# Patient Record
Sex: FEMALE | Race: WHITE | NOT HISPANIC OR LATINO | Employment: PART TIME | URBAN - NONMETROPOLITAN AREA
[De-identification: names, ages, dates, MRNs, and addresses within clinical notes are randomized per-mention and may not be internally consistent; named-entity substitution may affect disease eponyms.]

---

## 2017-01-03 ENCOUNTER — OFFICE VISIT (OUTPATIENT)
Dept: OBGYN | Facility: OTHER | Age: 27
End: 2017-01-03
Attending: NURSE PRACTITIONER
Payer: COMMERCIAL

## 2017-01-03 VITALS
DIASTOLIC BLOOD PRESSURE: 80 MMHG | WEIGHT: 145 LBS | HEIGHT: 64 IN | RESPIRATION RATE: 15 BRPM | HEART RATE: 90 BPM | SYSTOLIC BLOOD PRESSURE: 122 MMHG | OXYGEN SATURATION: 100 % | BODY MASS INDEX: 24.75 KG/M2

## 2017-01-03 DIAGNOSIS — B96.89 BV (BACTERIAL VAGINOSIS): ICD-10-CM

## 2017-01-03 DIAGNOSIS — N76.0 BV (BACTERIAL VAGINOSIS): ICD-10-CM

## 2017-01-03 DIAGNOSIS — R10.84 ABDOMINAL PAIN, GENERALIZED: Primary | ICD-10-CM

## 2017-01-03 LAB
ALBUMIN UR-MCNC: NEGATIVE MG/DL
APPEARANCE UR: CLEAR
BILIRUB UR QL STRIP: NEGATIVE
COLOR UR AUTO: YELLOW
GLUCOSE UR STRIP-MCNC: NEGATIVE MG/DL
HGB UR QL STRIP: NEGATIVE
KETONES UR STRIP-MCNC: NEGATIVE MG/DL
LEUKOCYTE ESTERASE UR QL STRIP: NEGATIVE
MICRO REPORT STATUS: ABNORMAL
NITRATE UR QL: NEGATIVE
PH UR STRIP: 6 PH (ref 4.7–8)
SP GR UR STRIP: 1.01 (ref 1–1.03)
SPECIMEN SOURCE: ABNORMAL
URN SPEC COLLECT METH UR: NORMAL
UROBILINOGEN UR STRIP-MCNC: NORMAL MG/DL (ref 0–2)
WET PREP SPEC: ABNORMAL

## 2017-01-03 PROCEDURE — 87210 SMEAR WET MOUNT SALINE/INK: CPT | Performed by: NURSE PRACTITIONER

## 2017-01-03 PROCEDURE — 81003 URINALYSIS AUTO W/O SCOPE: CPT | Performed by: NURSE PRACTITIONER

## 2017-01-03 PROCEDURE — 99213 OFFICE O/P EST LOW 20 MIN: CPT | Performed by: NURSE PRACTITIONER

## 2017-01-03 RX ORDER — CLINDAMYCIN PHOSPHATE 20 MG/G
1 CREAM VAGINAL AT BEDTIME
Qty: 40 G | Refills: 0 | Status: SHIPPED | OUTPATIENT
Start: 2017-01-03 | End: 2017-01-06

## 2017-01-03 ASSESSMENT — PAIN SCALES - GENERAL: PAINLEVEL: EXTREME PAIN (8)

## 2017-01-03 NOTE — PROGRESS NOTES
"[facility]                HPI   Ihsan presents with 10-14 day h/o generalized pelvic pain and nausea with vomiting during the most severe pain.   States \"it's just like the last time my endometriosis flared up\".  Hx of vaginal hysterectomy with bilateral salpingectomy and right oophorectomy.  She feels her IBS has improved.  She was treated 3-4 weeks ago for BV but did not have the pain at that time.  She denies vaginal discharge or dysuria. She states,\" I just want you to take out my other ovary\".              Medications:     Current Outpatient Prescriptions Ordered in Epic   Medication     ibuprofen (ADVIL/MOTRIN) 800 MG tablet     OMEPRAZOLE PO     albuterol (PROAIR HFA, PROVENTIL HFA, VENTOLIN HFA) 108 (90 BASE) MCG/ACT inhaler     No current Epic-ordered facility-administered medications on file.                Allergies:   Codeine sulfate; Penicillins; and Tramadol         Review of Systems:   The 5 point Review of Systems is negative other than noted in the HPI                     Physical Exam:   Blood pressure 122/80, pulse 90, resp. rate 15, height 5' 4\" (1.626 m), weight 145 lb (65.772 kg), SpO2 100 %, not currently breastfeeding.  Constitutional:   alert, cooperative and mild distress     Abdomen:   soft, non-distended and tenderness noted in the suprapubic region, in the right lower quadrant and in the left lower quadrant     Genitounirinary:   External Genitalia:  General appearance; normal, Lesions absent  Vagina:  Lesions absent, scant white discharge  Adenexa:  Abnormal findings: tenderness right and left without masses.     Wet prep and UA pending.             Assessment and Plan:   Pelvic pain - wet prep, UA today.  Pelvic US ordered.  Scheduled with Dr. Clifford to discuss her request for left oophorectomy and possible recurrent endometriosis.      RADHA Ko  1/3/2017  10:42 AM  "

## 2017-01-03 NOTE — NURSING NOTE
"Chief Complaint   Patient presents with     Abdominal Pain       Initial /80 mmHg  Pulse 90  Resp 15  Ht 5' 4\" (1.626 m)  Wt 145 lb (65.772 kg)  BMI 24.88 kg/m2  SpO2 100%  LMP  (LMP Unknown) Estimated body mass index is 24.88 kg/(m^2) as calculated from the following:    Height as of this encounter: 5' 4\" (1.626 m).    Weight as of this encounter: 145 lb (65.772 kg).  BP completed using cuff size: jagdish Pastrana      "

## 2017-01-05 ENCOUNTER — HOSPITAL ENCOUNTER (EMERGENCY)
Facility: HOSPITAL | Age: 27
Discharge: HOME | End: 2017-01-05
Payer: COMMERCIAL

## 2017-01-05 ENCOUNTER — HOSPITAL ENCOUNTER (OUTPATIENT)
Dept: ULTRASOUND IMAGING | Facility: HOSPITAL | Age: 27
Discharge: HOME | End: 2017-01-05
Attending: NURSE PRACTITIONER | Admitting: NURSE PRACTITIONER
Payer: COMMERCIAL

## 2017-01-05 VITALS
OXYGEN SATURATION: 99 % | TEMPERATURE: 97.6 F | DIASTOLIC BLOOD PRESSURE: 76 MMHG | RESPIRATION RATE: 18 BRPM | SYSTOLIC BLOOD PRESSURE: 110 MMHG

## 2017-01-05 DIAGNOSIS — H60.00 FURUNCLE OF EAR: ICD-10-CM

## 2017-01-05 PROCEDURE — 76830 TRANSVAGINAL US NON-OB: CPT | Mod: TC

## 2017-01-05 PROCEDURE — 76856 US EXAM PELVIC COMPLETE: CPT | Mod: TC

## 2017-01-05 PROCEDURE — 10021 FNA BX W/O IMG GDN 1ST LES: CPT

## 2017-01-05 PROCEDURE — 40000268 ZZH STATISTIC NO CHARGES

## 2017-01-05 NOTE — ED AVS SNAPSHOT
[facility]    [address] [address]    Phone:  [phone]                                       Ihsan Millan   MRN: [id number]    Department:  [facility]   Date of Visit:  1/5/2017           Patient Information     Date Of Birth          1990        Your diagnoses for this visit were:     Furuncle of ear        You were seen by Aga Ge APRN FNP.      Follow-up Information     Follow up with Temo Houston DO In 1 week.    Specialty:  Family Practice    Why:  if not improving or back to baseline    Contact information:    [facility]  [address]  [facility] [address] [phone]          Follow up with [facility].    Specialty:  EMERGENCY MEDICINE    Why:  As needed, If symptoms worsen    Contact information:    [address] [address] [phone]    Additional information:    From [place]: Take [address]. Turn left at [address]. Turn left at the first stoplight on [address]. At the first stop sign, take a right onto [address]. Take a left into the parking lot and continue through until you reach the North enterance of the building.       From [place]: Take [address]. Take the [address] ramp towards [place]. Turn left onto [address]. Take a slight right onto [address]. Turn left at the first stoplight on [address]. At the first stop sign, take a right onto [address]. Take a left into the parking lot and continue through until you reach the North enterance of the building.       From [place]: Take [address]. Take a right at [address]. At the first stop sign, take a right onto [address]. Take a left into the parking lot and continue through until you reach the North enterance of the building.         Discharge Instructions       See attached for home care  Keep area clean and  dry  Ibuprofen for pain  F/U with PCP if not improving or back to base line in 1 week  Return to  with worsening symptoms.           Abscess (Incision & Drainage)  An abscess (sometimes called a  boil ) occurs when bacteria get trapped under the skin and begin to grow. Pus forms inside the abscess as the body responds to the bacteria. An abscess can occur with an insect bite, ingrown hair, blocked oil gland, pimple, cyst, or puncture wound.  Treatment of your abscess has required an incision to drain the pus. If the abscess pocket was large, a gauze packing may have been inserted. This will need to be removed and possibly replaced on your next visit. Antibiotics are not required in the treatment of a simple abscess, unless the infection is spreading into the skin around the wound (known as  cellulitis ).  Healing of the wound will take about one to two weeks depending on the size of the abscess. Healthy tissue will grow from the bottom and sides of the opening until it seals over.  Home care  The following home care guidelines can help your wound heal:    The wound may drain for the first two days. Cover the wound with a clean dry dressing. If the dressing becomes soaked with blood or pus, change it.    If a gauze packing was placed inside the abscess cavity, you may be advised to remove it yourself. You may do this in the shower. Once the packing is removed, you should wash the area in the shower or bath 3 to 4 times a day, until the skin opening has closed. Make sure you wash your hands after changing the packing or cleaning the wound.    If you were prescribed antibiotics, take them as directed until they are all gone.    You may use acetaminophen (Tylenol) or ibuprofen (Motrin, Advil) to control pain, unless another pain medicine was prescribed. If you have liver disease or ever had a stomach ulcer, talk with your doctor before using these medicines.  Follow-up care  Follow up with your doctor as advised by  our staff. If a gauze packing was inserted in your wound, it should be removed in 1 to 2 days. Check your wound every day for the signs of worsening infection listed below.  When to seek medical care  Get prompt medical attention if any of the following occur:    Increasing redness or swelling    Red streaks in the skin leading away from the wound    Increasing local pain or swelling    Continued pus draining from the wound two days after treatment    Fever of 100.4 F (38 C) or higher, or as directed by your health care provider    Boil returns when you are at home.    1104-7580 [employer]. [address] [place] [address]. All rights reserved. This information is not intended as a substitute for professional medical care. Always follow your healthcare professional's instructions.          Future Appointments        Provider Department Dept Phone Center    1/10/2017 9:30 AM Rodolfo Clifford MD [facility] [phone] [place]         Review of your medicines      Our records show that you are taking the medicines listed below. If these are incorrect, please call your family doctor or clinic.        Dose / Directions Last dose taken    albuterol 108 (90 BASE) MCG/ACT Inhaler   Commonly known as:  PROAIR HFA/PROVENTIL HFA/VENTOLIN HFA   Dose:  2 puff   Quantity:  1 Inhaler        Inhale 2 puffs into the lungs every 4 hours as needed for shortness of breath / dyspnea or wheezing   Refills:  2        clindamycin 2 % cream   Commonly known as:  CLEOCIN   Dose:  1 applicator   Quantity:  40 g        Place 1 applicator vaginally At Bedtime for 3 days   Refills:  0        ibuprofen 800 MG tablet   Commonly known as:  ADVIL/MOTRIN   Dose:  800 mg        Take 1 tablet (800 mg) by mouth every 8 hours as needed   Refills:  0        OMEPRAZOLE PO   Dose:  20 mg        Take 20 mg by mouth every morning   Refills:  0                Procedures and tests performed during your visit      Incision and drainage      Orders Needing Specimen Collection     None      Pending Results     Date and Time Order Name Status Description    1/5/2017 1235 US Pelvic Complete with Transvaginal In process             Pending Culture Results     No orders found from 1/4/2017 to 1/6/2017.            Thank you for choosing [facility]       Thank you for choosing [facility] for your care. Our goal is always to provide you with excellent care. Hearing back from our patients is one way we can continue to improve our services. Please take a few minutes to complete the written survey that you may receive in the mail after you visit with us. Thank you!        [employer] Information     [employer] gives you secure access to your electronic health record. If you see a primary care provider, you can also send messages to your care team and make appointments. If you have questions, please call your primary care clinic.  If you do not have a primary care provider, please call [phone] and they will assist you.        Care EveryWhere ID     This is your Care EveryWhere ID. This could be used by other organizations to access your [facility] medical records  [id number]        After Visit Summary       This is your record. Keep this with you and show to your community pharmacist(s) and doctor(s) at your next visit.

## 2017-01-05 NOTE — ED AVS SNAPSHOT
[facility]    [address] [address]    Phone:  [phone]                                       Ihsan Millan   MRN: [id number]    Department:  [facility]   Date of Visit:  1/5/2017           After Visit Summary Signature Page     I have received my discharge instructions, and my questions have been answered. I have discussed any challenges I see with this plan with the nurse or doctor.    ..........................................................................................................................................  Patient/Patient Representative Signature      ..........................................................................................................................................  Patient Representative Print Name and Relationship to Patient    ..................................................               ................................................  Date                                            Time    ..........................................................................................................................................  Reviewed by Signature/Title    ...................................................              ..............................................  Date                                                            Time

## 2017-01-05 NOTE — ED PROVIDER NOTES
"  History     Chief Complaint   Patient presents with     Wound Check     c/o red bump near left ear that has been there for a month. states \"it won't pop, and it's really starting to hurt.\"      The history is provided by the patient. No  was used.     Ihsan Millan is a 26 year old female who presents to  with friend for evaluation of left pre auricular cyst. Noted \"bump\" about a month ago, became red and swollen over the past couple days. Now more painful. Did try to \"pop it\" today, took ibuprofen with mild improvement.     I have reviewed the Medications, Allergies, Past Medical and Surgical History, and Social History in the Epic system.    Review of Systems   Constitutional: Negative for fever.   HENT: Negative for congestion.    Eyes: Negative for redness.   Respiratory: Negative for shortness of breath.    Cardiovascular: Negative for chest pain.   Gastrointestinal: Negative for abdominal pain.   Genitourinary: Negative for difficulty urinating.   Musculoskeletal: Negative for arthralgias and neck stiffness.   Skin: Positive for wound. Negative for color change.        Pustule left pre auricle, see HPI   Neurological: Negative for headaches.   Psychiatric/Behavioral: Negative for confusion.       Physical Exam   BP: 110/76 mmHg  Heart Rate: 86  Temp: 97.6  F (36.4  C)  Resp: 18  SpO2: 99 %  Physical Exam   Constitutional: She is oriented to person, place, and time. No distress.   HENT:   Head: Normocephalic.   Right Ear: Tympanic membrane normal.   Left Ear: Tympanic membrane normal.   Ears:    1 cm area of erythema, induration, with pustule head as illustrated. Area cleansed with alcohol, anesthestized with lidocaine 1 % 1 ml, aspirated with 18 g needle, scant amt purulent drainage.     Eyes: Conjunctivae are normal. Pupils are equal, round, and reactive to light.   Cardiovascular: Normal rate and regular rhythm.    Pulmonary/Chest: Effort normal. No respiratory distress. "   Abdominal: Soft. She exhibits no distension.   Musculoskeletal: Normal range of motion.   Neurological: She is alert and oriented to person, place, and time.   Skin: Skin is warm and dry. She is not diaphoretic.   Nursing note and vitals reviewed.      ED Course   Incision + drainage  Date/Time: 1/5/2017 4:04 PM  Performed by: AGA GE  Authorized by: AGA GE  Consent: Verbal consent obtained.  Consent given by: patient  Patient identity confirmed: verbally with patient  Type: abscess  Body area: head  Location details: face  Local anesthetic: lidocaine 1% without epinephrine  Needle gauge: 18  Complexity: simple  Drainage: purulent  Drainage amount: scant  Wound treatment: wound left open  Patient tolerance: Patient tolerated the procedure well with no immediate complications              Assessments & Plan (with Medical Decision Making)   Pt presents with pustule, cyst left pre auricle region. I and D performed per procedure note. Pt tolerated well.   Epic discharge instructions given. Pt discharged in stable condition.     I have reviewed the nursing notes.    I have reviewed the findings, diagnosis, plan and need for follow up with the patient.    Discharge Medication List as of 1/5/2017  4:06 PM          Final diagnoses:   Furuncle of ear     See attached for home care  Keep area clean and dry  Ibuprofen for pain  F/U with PCP if not improving or back to base line in 1 week  Return to  with worsening symptoms.   1/5/2017   [facility]      Aga Ge APRN FNP  01/06/17 1527

## 2017-01-05 NOTE — ED NOTES
Pt presents with wound to left ear area x 1 month but popped out yesterday and started to hurt really bad.

## 2017-01-05 NOTE — DISCHARGE INSTRUCTIONS
See attached for home care  Keep area clean and dry  Ibuprofen for pain  F/U with PCP if not improving or back to base line in 1 week  Return to  with worsening symptoms.           Abscess (Incision & Drainage)  An abscess (sometimes called a  boil ) occurs when bacteria get trapped under the skin and begin to grow. Pus forms inside the abscess as the body responds to the bacteria. An abscess can occur with an insect bite, ingrown hair, blocked oil gland, pimple, cyst, or puncture wound.  Treatment of your abscess has required an incision to drain the pus. If the abscess pocket was large, a gauze packing may have been inserted. This will need to be removed and possibly replaced on your next visit. Antibiotics are not required in the treatment of a simple abscess, unless the infection is spreading into the skin around the wound (known as  cellulitis ).  Healing of the wound will take about one to two weeks depending on the size of the abscess. Healthy tissue will grow from the bottom and sides of the opening until it seals over.  Home care  The following home care guidelines can help your wound heal:    The wound may drain for the first two days. Cover the wound with a clean dry dressing. If the dressing becomes soaked with blood or pus, change it.    If a gauze packing was placed inside the abscess cavity, you may be advised to remove it yourself. You may do this in the shower. Once the packing is removed, you should wash the area in the shower or bath 3 to 4 times a day, until the skin opening has closed. Make sure you wash your hands after changing the packing or cleaning the wound.    If you were prescribed antibiotics, take them as directed until they are all gone.    You may use acetaminophen (Tylenol) or ibuprofen (Motrin, Advil) to control pain, unless another pain medicine was prescribed. If you have liver disease or ever had a stomach ulcer, talk with your doctor before using these medicines.  Follow-up  care  Follow up with your doctor as advised by our staff. If a gauze packing was inserted in your wound, it should be removed in 1 to 2 days. Check your wound every day for the signs of worsening infection listed below.  When to seek medical care  Get prompt medical attention if any of the following occur:    Increasing redness or swelling    Red streaks in the skin leading away from the wound    Increasing local pain or swelling    Continued pus draining from the wound two days after treatment    Fever of 100.4 F (38 C) or higher, or as directed by your health care provider    Boil returns when you are at home.    6854-4211 [employer]. [address] [place] [address]. All rights reserved. This information is not intended as a substitute for professional medical care. Always follow your healthcare professional's instructions.

## 2017-01-06 ASSESSMENT — ENCOUNTER SYMPTOMS
ARTHRALGIAS: 0
CONFUSION: 0
EYE REDNESS: 0
DIFFICULTY URINATING: 0
ABDOMINAL PAIN: 0
NECK STIFFNESS: 0
FEVER: 0
COLOR CHANGE: 0
WOUND: 1
SHORTNESS OF BREATH: 0
HEADACHES: 0

## 2017-01-10 ENCOUNTER — OFFICE VISIT (OUTPATIENT)
Dept: OBGYN | Facility: OTHER | Age: 27
End: 2017-01-10
Attending: OBSTETRICS & GYNECOLOGY
Payer: COMMERCIAL

## 2017-01-10 VITALS
SYSTOLIC BLOOD PRESSURE: 110 MMHG | DIASTOLIC BLOOD PRESSURE: 72 MMHG | BODY MASS INDEX: 24.75 KG/M2 | WEIGHT: 145 LBS | HEART RATE: 104 BPM | OXYGEN SATURATION: 100 % | HEIGHT: 64 IN

## 2017-01-10 DIAGNOSIS — R10.2 PELVIC PAIN IN FEMALE: ICD-10-CM

## 2017-01-10 DIAGNOSIS — N80.9 ENDOMETRIOSIS: Primary | ICD-10-CM

## 2017-01-10 PROCEDURE — 96372 THER/PROPH/DIAG INJ SC/IM: CPT | Performed by: OBSTETRICS & GYNECOLOGY

## 2017-01-10 PROCEDURE — 99214 OFFICE O/P EST MOD 30 MIN: CPT | Mod: 25 | Performed by: OBSTETRICS & GYNECOLOGY

## 2017-01-10 ASSESSMENT — PAIN SCALES - GENERAL: PAINLEVEL: SEVERE PAIN (7)

## 2017-01-10 NOTE — NURSING NOTE
"Chief Complaint   Patient presents with     Pelvic Pain     bilateral pelvic pain       Initial /72 mmHg  Pulse 104  Ht 5' 4\" (1.626 m)  Wt 145 lb (65.772 kg)  BMI 24.88 kg/m2  SpO2 100%  LMP  (LMP Unknown) Estimated body mass index is 24.88 kg/(m^2) as calculated from the following:    Height as of this encounter: 5' 4\" (1.626 m).    Weight as of this encounter: 145 lb (65.772 kg).  BP completed using cuff size: jagdish Flower      "

## 2017-01-10 NOTE — PROGRESS NOTES
S:  F/u pelvic pain  25 yo f with 3 wk h/o lower abdominal pain.  Associated Nausea.   No other GI/ sx except urinary frequency.  Was recently treated for BV without improvement.  UA negative.  H/o IBS, LAVH and RSO, endometriosis.   See my prior evals.          Patient Active Problem List   Diagnosis     GERD (gastroesophageal reflux disease)     Intermittent asthma     Moderate persistent asthma     H. pylori infection     ASCUS on Pap smear     Postpartum depression     Endometriosis     Surgery, elective     Status post laparoscopic assisted vaginal hysterectomy (LAVH)            Past Medical History   Diagnosis Date     Asthma      H. pylori infection      GERD (gastroesophageal reflux disease)             Past Surgical History   Procedure Laterality Date     No surgeries       Dilation and curettage, hysteroscopy diagnostic, combined  8/1/2014     Procedure: COMBINED DILATION AND CURETTAGE, HYSTEROSCOPY DIAGNOSTIC;  Surgeon: Rodolfo Clifford MD;  Location: [facility]     Esophagoscopy, gastroscopy, duodenoscopy (egd), combined N/A 9/5/2014     Procedure: COMBINED ESOPHAGOSCOPY, GASTROSCOPY, DUODENOSCOPY (EGD);  Surgeon: Zacarias Paz MD;  Location: [facility]     Colonoscopy N/A 11/6/2014     Procedure: COLONOSCOPY;  Surgeon: Zacarias Paz MD;  Location: [facility]     Laparoscopy diagnostic (gyn) N/A 3/18/2015     Procedure: LAPAROSCOPY DIAGNOSTIC (GYN);  Surgeon: Rodolfo Clifford MD;  Location: [facility]     Laparoscopic assisted hysterectomy vaginal N/A 6/24/2015     Procedure: LAPAROSCOPIC ASSISTED HYSTERECTOMY VAGINAL;  Surgeon: Rodolfo Clifford MD;  Location: [facility]     Salpingectomy Bilateral 6/24/2015     Procedure: SALPINGECTOMY;  Surgeon: Rodolfo Clifford MD;  Location: [facility]     Laparoscopic oophorectomy Right 3/23/2016     Procedure: LAPAROSCOPIC OOPHORECTOMY;  Surgeon: Rodolfo Clifford MD;  Location: [facility]            Social History   Substance Use Topics     Smoking status: Current Every Day Smoker -- 0.50 packs/day  "for 7 years     Types: Cigarettes     Smokeless tobacco: Never Used     Alcohol Use: Yes      Comment: social            Family History   Problem Relation Age of Onset     Asthma Mother      Unknown/Adopted Maternal Grandmother      Unknown/Adopted Maternal Grandfather                Allergies   Allergen Reactions     Codeine Sulfate Rash     Penicillins Rash     Tramadol Rash            Current Outpatient Prescriptions   Medication Sig Dispense Refill     leuprolide (LUPRON DEPOT) 11.25 MG kit Inject 11.25 mg into the muscle every 3 months 1 each 0     OMEPRAZOLE PO Take 20 mg by mouth every morning       ibuprofen (ADVIL/MOTRIN) 800 MG tablet Take 1 tablet (800 mg) by mouth every 8 hours as needed  0     albuterol (PROAIR HFA, PROVENTIL HFA, VENTOLIN HFA) 108 (90 BASE) MCG/ACT inhaler Inhale 2 puffs into the lungs every 4 hours as needed for shortness of breath / dyspnea or wheezing 1 Inhaler 2          Review Of Systems  Constitutional:  Denies fever  GI/ negative except as noted per hpi    O:   /72 mmHg  Pulse 104  Ht 5' 4\" (1.626 m)  Wt 145 lb (65.772 kg)  BMI 24.88 kg/m2  SpO2 100%  LMP  (LMP Unknown)  Gen:  NAD, A and O  Vitals: /72 mmHg  Pulse 104  Ht 5' 4\" (1.626 m)  Wt 145 lb (65.772 kg)  BMI 24.88 kg/m2  SpO2 100%  LMP  (LMP Unknown)  BMI= Body mass index is 24.88 kg/(m^2).  Abd soft, + ttp LLQ over sigmoid, ND    Vulva: No lesions, erythema, BUS wnl  Vagina: Pink, moist mucosa with rugae,no lesions  Cervix: absent.  Bladder mild ttp  Uterus: Midposition, normal size and count our, mobile, NT  Adnexa: right  Non-palpable, Left:  NT, no masses  Anus without lesions  Ext.  neg           A:Pelvic pain.  Chronic.  H/o IBS and endometriosis    P:  Depo-Lupron r/b/se's discussed.  This would treat endometriosis/GYN pelvic pain.  If no improvement consider further GI/ w/u.  Pt exam more consistent with IBS.  If improves on Lupron then more likely gyn/endometriosis pain and could " consider continuing or surgical options.  Pt agrees with POC.  Norethindrone rx for menopausal SE's and bone protection.  F/u prn problems in the interim.  25  minutes were spent with the patient with greater than 50% of the visit spent in face-to-face counseling and coordination of care. Depo-Lupron 11.25mg given to pt.

## 2017-01-10 NOTE — PROGRESS NOTES
The following medication was given:     MEDICATION: Lupron Depot 11.25 mg  ROUTE: IM  SITE: [facility]  DOSE: 11.25  LOT #: [id number]  :  [employer]  EXPIRATION DATE:  11/25/18  NDC#: 8953-2812-44  CANDIDA PEREZ

## 2017-02-10 ENCOUNTER — HOSPITAL ENCOUNTER (EMERGENCY)
Facility: HOSPITAL | Age: 27
Discharge: HOME | End: 2017-02-10
Attending: FAMILY MEDICINE | Admitting: FAMILY MEDICINE
Payer: MEDICAID

## 2017-02-10 VITALS
HEART RATE: 89 BPM | TEMPERATURE: 98.2 F | SYSTOLIC BLOOD PRESSURE: 110 MMHG | OXYGEN SATURATION: 99 % | DIASTOLIC BLOOD PRESSURE: 70 MMHG | RESPIRATION RATE: 16 BRPM

## 2017-02-10 DIAGNOSIS — J18.9 PNEUMONIA OF RIGHT MIDDLE LOBE DUE TO INFECTIOUS ORGANISM: ICD-10-CM

## 2017-02-10 DIAGNOSIS — K29.00 OTHER ACUTE GASTRITIS: ICD-10-CM

## 2017-02-10 LAB
ALBUMIN SERPL-MCNC: 4 G/DL (ref 3.4–5)
ALBUMIN UR-MCNC: NEGATIVE MG/DL
ALP SERPL-CCNC: 73 U/L (ref 40–150)
ALT SERPL W P-5'-P-CCNC: 15 U/L (ref 0–50)
ANION GAP SERPL CALCULATED.3IONS-SCNC: 10 MMOL/L (ref 3–14)
APPEARANCE UR: CLEAR
AST SERPL W P-5'-P-CCNC: 6 U/L (ref 0–45)
BACTERIA #/AREA URNS HPF: ABNORMAL /HPF
BASOPHILS # BLD AUTO: 0 10E9/L (ref 0–0.2)
BASOPHILS NFR BLD AUTO: 0.1 %
BILIRUB SERPL-MCNC: 0.1 MG/DL (ref 0.2–1.3)
BILIRUB UR QL STRIP: NEGATIVE
BUN SERPL-MCNC: 21 MG/DL (ref 7–30)
CALCIUM SERPL-MCNC: 8.6 MG/DL (ref 8.5–10.1)
CHLORIDE SERPL-SCNC: 108 MMOL/L (ref 94–109)
CO2 SERPL-SCNC: 25 MMOL/L (ref 20–32)
COLOR UR AUTO: ABNORMAL
CREAT SERPL-MCNC: 0.84 MG/DL (ref 0.52–1.04)
CRP SERPL-MCNC: <2.9 MG/L (ref 0–8)
DIFFERENTIAL METHOD BLD: NORMAL
EOSINOPHIL # BLD AUTO: 0.2 10E9/L (ref 0–0.7)
EOSINOPHIL NFR BLD AUTO: 2.2 %
ERYTHROCYTE [DISTWIDTH] IN BLOOD BY AUTOMATED COUNT: 12.8 % (ref 10–15)
GFR SERPL CREATININE-BSD FRML MDRD: 82 ML/MIN/1.7M2
GLUCOSE SERPL-MCNC: 89 MG/DL (ref 70–99)
GLUCOSE UR STRIP-MCNC: NEGATIVE MG/DL
HCT VFR BLD AUTO: 41.1 % (ref 35–47)
HGB BLD-MCNC: 14 G/DL (ref 11.7–15.7)
HGB UR QL STRIP: NEGATIVE
IMM GRANULOCYTES # BLD: 0 10E9/L (ref 0–0.4)
IMM GRANULOCYTES NFR BLD: 0.3 %
KETONES UR STRIP-MCNC: NEGATIVE MG/DL
LACTATE SERPL-SCNC: 0.9 MMOL/L (ref 0.4–2)
LEUKOCYTE ESTERASE UR QL STRIP: ABNORMAL
LYMPHOCYTES # BLD AUTO: 1.6 10E9/L (ref 0.8–5.3)
LYMPHOCYTES NFR BLD AUTO: 22.7 %
MCH RBC QN AUTO: 30.2 PG (ref 26.5–33)
MCHC RBC AUTO-ENTMCNC: 34.1 G/DL (ref 31.5–36.5)
MCV RBC AUTO: 89 FL (ref 78–100)
MONOCYTES # BLD AUTO: 0.4 10E9/L (ref 0–1.3)
MONOCYTES NFR BLD AUTO: 6.1 %
MUCOUS THREADS #/AREA URNS LPF: PRESENT /LPF
NEUTROPHILS # BLD AUTO: 4.7 10E9/L (ref 1.6–8.3)
NEUTROPHILS NFR BLD AUTO: 68.6 %
NITRATE UR QL: NEGATIVE
NRBC # BLD AUTO: 0 10*3/UL
NRBC BLD AUTO-RTO: 0 /100
PH UR STRIP: 6.5 PH (ref 4.7–8)
PLATELET # BLD AUTO: 194 10E9/L (ref 150–450)
POTASSIUM SERPL-SCNC: 3.7 MMOL/L (ref 3.4–5.3)
PROT SERPL-MCNC: 7.2 G/DL (ref 6.8–8.8)
RBC # BLD AUTO: 4.63 10E12/L (ref 3.8–5.2)
RBC #/AREA URNS AUTO: 1 /HPF (ref 0–2)
SODIUM SERPL-SCNC: 143 MMOL/L (ref 133–144)
SP GR UR STRIP: 1.01 (ref 1–1.03)
SQUAMOUS #/AREA URNS AUTO: 3 /HPF (ref 0–1)
URN SPEC COLLECT METH UR: ABNORMAL
UROBILINOGEN UR STRIP-MCNC: NORMAL MG/DL (ref 0–2)
WBC # BLD AUTO: 6.9 10E9/L (ref 4–11)
WBC #/AREA URNS AUTO: 2 /HPF (ref 0–2)

## 2017-02-10 PROCEDURE — 99285 EMERGENCY DEPT VISIT HI MDM: CPT | Mod: 25

## 2017-02-10 PROCEDURE — 99285 EMERGENCY DEPT VISIT HI MDM: CPT | Performed by: FAMILY MEDICINE

## 2017-02-10 PROCEDURE — 81001 URINALYSIS AUTO W/SCOPE: CPT | Performed by: FAMILY MEDICINE

## 2017-02-10 PROCEDURE — 71020 ZZHC CHEST TWO VIEWS, FRONT/LAT: CPT | Mod: TC

## 2017-02-10 PROCEDURE — 25000125 ZZHC RX 250: Performed by: INTERNAL MEDICINE

## 2017-02-10 PROCEDURE — 96361 HYDRATE IV INFUSION ADD-ON: CPT

## 2017-02-10 PROCEDURE — 96374 THER/PROPH/DIAG INJ IV PUSH: CPT | Mod: 59

## 2017-02-10 PROCEDURE — 25000132 ZZH RX MED GY IP 250 OP 250 PS 637: Performed by: INTERNAL MEDICINE

## 2017-02-10 PROCEDURE — 36415 COLL VENOUS BLD VENIPUNCTURE: CPT | Performed by: FAMILY MEDICINE

## 2017-02-10 PROCEDURE — 80053 COMPREHEN METABOLIC PANEL: CPT | Performed by: FAMILY MEDICINE

## 2017-02-10 PROCEDURE — S0028 INJECTION, FAMOTIDINE, 20 MG: HCPCS | Performed by: INTERNAL MEDICINE

## 2017-02-10 PROCEDURE — 74177 CT ABD & PELVIS W/CONTRAST: CPT | Mod: TC

## 2017-02-10 PROCEDURE — 76705 ECHO EXAM OF ABDOMEN: CPT | Mod: TC

## 2017-02-10 PROCEDURE — 96376 TX/PRO/DX INJ SAME DRUG ADON: CPT

## 2017-02-10 PROCEDURE — 87040 BLOOD CULTURE FOR BACTERIA: CPT | Performed by: INTERNAL MEDICINE

## 2017-02-10 PROCEDURE — 85025 COMPLETE CBC W/AUTO DIFF WBC: CPT | Performed by: FAMILY MEDICINE

## 2017-02-10 PROCEDURE — 96375 TX/PRO/DX INJ NEW DRUG ADDON: CPT

## 2017-02-10 PROCEDURE — 83605 ASSAY OF LACTIC ACID: CPT | Performed by: INTERNAL MEDICINE

## 2017-02-10 PROCEDURE — 25000128 H RX IP 250 OP 636: Performed by: FAMILY MEDICINE

## 2017-02-10 PROCEDURE — 25000128 H RX IP 250 OP 636: Performed by: INTERNAL MEDICINE

## 2017-02-10 PROCEDURE — 86140 C-REACTIVE PROTEIN: CPT | Performed by: FAMILY MEDICINE

## 2017-02-10 RX ORDER — MORPHINE SULFATE 2 MG/ML
2 INJECTION, SOLUTION INTRAMUSCULAR; INTRAVENOUS ONCE
Status: COMPLETED | OUTPATIENT
Start: 2017-02-10 | End: 2017-02-10

## 2017-02-10 RX ORDER — DIPHENHYDRAMINE HYDROCHLORIDE 50 MG/ML
25 INJECTION INTRAMUSCULAR; INTRAVENOUS ONCE
Status: COMPLETED | OUTPATIENT
Start: 2017-02-10 | End: 2017-02-10

## 2017-02-10 RX ORDER — ALUMINA, MAGNESIA, AND SIMETHICONE 2400; 2400; 240 MG/30ML; MG/30ML; MG/30ML
15 SUSPENSION ORAL ONCE
Status: DISCONTINUED | OUTPATIENT
Start: 2017-02-10 | End: 2017-02-11 | Stop reason: HOSPADM

## 2017-02-10 RX ORDER — LEVOFLOXACIN 750 MG/1
750 TABLET, FILM COATED ORAL ONCE
Status: COMPLETED | OUTPATIENT
Start: 2017-02-10 | End: 2017-02-10

## 2017-02-10 RX ORDER — SODIUM CHLORIDE 9 MG/ML
1000 INJECTION, SOLUTION INTRAVENOUS CONTINUOUS
Status: DISCONTINUED | OUTPATIENT
Start: 2017-02-10 | End: 2017-02-11 | Stop reason: HOSPADM

## 2017-02-10 RX ORDER — MORPHINE SULFATE 2 MG/ML
4 INJECTION, SOLUTION INTRAMUSCULAR; INTRAVENOUS ONCE
Status: COMPLETED | OUTPATIENT
Start: 2017-02-10 | End: 2017-02-10

## 2017-02-10 RX ORDER — ONDANSETRON 2 MG/ML
4 INJECTION INTRAMUSCULAR; INTRAVENOUS
Status: COMPLETED | OUTPATIENT
Start: 2017-02-10 | End: 2017-02-10

## 2017-02-10 RX ORDER — LEVOFLOXACIN 750 MG/1
750 TABLET, FILM COATED ORAL DAILY
Qty: 5 TABLET | Refills: 0 | Status: SHIPPED | OUTPATIENT
Start: 2017-02-10 | End: 2017-02-15

## 2017-02-10 RX ORDER — IOPAMIDOL 612 MG/ML
100 INJECTION, SOLUTION INTRAVASCULAR ONCE
Status: COMPLETED | OUTPATIENT
Start: 2017-02-10 | End: 2017-02-10

## 2017-02-10 RX ORDER — FAMOTIDINE 20 MG/1
20 TABLET, FILM COATED ORAL 2 TIMES DAILY
Qty: 20 TABLET | Refills: 0 | Status: SHIPPED | OUTPATIENT
Start: 2017-02-10 | End: 2017-02-11

## 2017-02-10 RX ADMIN — MORPHINE SULFATE 2 MG: 2 INJECTION, SOLUTION INTRAMUSCULAR; INTRAVENOUS at 19:11

## 2017-02-10 RX ADMIN — SODIUM CHLORIDE 1000 ML: 9 INJECTION, SOLUTION INTRAVENOUS at 18:52

## 2017-02-10 RX ADMIN — IOPAMIDOL 100 ML: 612 INJECTION, SOLUTION INTRAVASCULAR at 20:39

## 2017-02-10 RX ADMIN — MORPHINE SULFATE 4 MG: 2 INJECTION, SOLUTION INTRAMUSCULAR; INTRAVENOUS at 21:19

## 2017-02-10 RX ADMIN — DIPHENHYDRAMINE HYDROCHLORIDE 25 MG: 50 INJECTION, SOLUTION INTRAMUSCULAR; INTRAVENOUS at 19:11

## 2017-02-10 RX ADMIN — LEVOFLOXACIN 750 MG: 750 TABLET, FILM COATED ORAL at 22:14

## 2017-02-10 RX ADMIN — FAMOTIDINE 20 MG: 10 INJECTION, SOLUTION INTRAVENOUS at 21:20

## 2017-02-10 RX ADMIN — ONDANSETRON 4 MG: 2 INJECTION INTRAMUSCULAR; INTRAVENOUS at 18:52

## 2017-02-10 ASSESSMENT — ENCOUNTER SYMPTOMS
NAUSEA: 1
RESPIRATORY NEGATIVE: 1
FREQUENCY: 0
DYSPHORIC MOOD: 1
CONSTIPATION: 1
DYSURIA: 0
NEUROLOGICAL NEGATIVE: 1
VOMITING: 1
MUSCULOSKELETAL NEGATIVE: 1
ACTIVITY CHANGE: 1
CARDIOVASCULAR NEGATIVE: 1
ABDOMINAL PAIN: 1

## 2017-02-10 NOTE — ED AVS SNAPSHOT
[facility]    [address] [address]    Phone:  [phone]                                       Ihsan Millan   MRN: [id number]    Department:  [facility]   Date of Visit:  2/10/2017           Patient Information     Date Of Birth          1990        Your diagnoses for this visit were:     Pneumonia of right middle lobe due to infectious organism     Other acute gastritis        You were seen by Joann Baugh MD.      Follow-up Information     Follow up with Temo Houston DO.    Specialty:  Family Practice    Contact information:    [facility]  [address] [facility] [address]  [phone]          Discharge Instructions         Discharge Instructions for Pneumonia  You have been diagnosed with pneumonia, a serious lung infection. Most cases of pneumonia are caused by bacteria. Pneumonia most often occurs in older adults, young children, and people with chronic health problems.  Home care    Take your medication exactly as directed. Don t skip doses. Continue taking your antibiotics as directed until they are all gone -- even if you start to feel better. This will prevent the pneumonia from coming back.    Drink at least 8 glasses of water daily, unless directed otherwise. This helps to loosen and thin secretions so that you can cough them up.    Use a cool-mist humidifier in your bedroom. Be sure to clean the humidifier daily.    Coughing up mucus is normal. Don t use medications to suppress your cough unless your cough is dry, painful, or interferes with your sleep. You may use an expectorant if ordered by your doctor.    Warm compresses or a heating pad on the lowest setting can be used to relieve chest discomfort. Use several times a day for 15 to 20 minutes at a time. (To prevent injuring your skin, be sure the temperature of the compress or heating pad is warm, not hot.)    Get plenty of rest until your  fever, shortness of breath, and chest pain go away.    Plan to get a flu shot every year.    Ask your doctor about pneumonia vaccinations.     Follow-up care  Make a follow-up appointment as directed by our staff.     When to seek medical care  Call 911 right away if you have any of the following:    Chest pain    Trouble breathing    Blue lips or fingernails  Otherwise, call your doctor if you have any of the following:    Fever above 101.5 F  (38.6 C)    Yellow, green, bloody, or smelly sputum    More than normal mucus production    Vomiting     1437-8442 [employer]. [address] [place] [address]. All rights reserved. This information is not intended as a substitute for professional medical care. Always follow your healthcare professional's instructions.          Future Appointments        Provider Department Dept Phone Center    4/11/2017 9:30 AM Rodolfo Clifford MD [facility] [phone] [facility]         Review of your medicines      START taking        Dose / Directions Last dose taken    famotidine 20 MG tablet   Commonly known as:  PEPCID   Dose:  20 mg   Quantity:  20 tablet        Take 1 tablet (20 mg) by mouth 2 times daily for 10 days   Refills:  0        levofloxacin 750 MG tablet   Commonly known as:  LEVAQUIN   Dose:  750 mg   Quantity:  5 tablet        Take 1 tablet (750 mg) by mouth daily for 5 days   Refills:  0          Our records show that you are taking the medicines listed below. If these are incorrect, please call your family doctor or clinic.        Dose / Directions Last dose taken    albuterol 108 (90 BASE) MCG/ACT Inhaler   Commonly known as:  PROAIR HFA/PROVENTIL HFA/VENTOLIN HFA   Dose:  2 puff   Quantity:  1 Inhaler        Inhale 2 puffs into the lungs every 4 hours as needed for shortness of breath / dyspnea or wheezing   Refills:  2        ibuprofen 800 MG tablet   Commonly known as:  ADVIL/MOTRIN   Dose:  800 mg        Take 1 tablet (800  mg) by mouth every 8 hours as needed   Refills:  0        leuprolide 11.25 MG kit   Commonly known as:  LUPRON DEPOT   Dose:  11.25 mg   Quantity:  1 each        Inject 11.25 mg into the muscle every 3 months   Refills:  0        OMEPRAZOLE PO   Dose:  20 mg        Take 20 mg by mouth every morning   Refills:  0                Prescriptions were sent or printed at these locations (2 Prescriptions)                   Other Prescriptions                Printed at Department/Unit printer (2 of 2)         levofloxacin (LEVAQUIN) 750 MG tablet               famotidine (PEPCID) 20 MG tablet                Procedures and tests performed during your visit     Procedure/Test Number of Times Performed    Abdomen US, limited (RUQ only) 1    Blood culture 2    CBC with platelets differential 1    CRP inflammation 1    CT Abdomen Pelvis w Contrast 1    Comprehensive metabolic panel 1    Lactic acid 1    Peripheral IV: Standard 1    UA reflex to Microscopic and Culture 1    Vital signs 1    XR Chest 2 Views 1      Orders Needing Specimen Collection     None      Pending Results     Date and Time Order Name Status Description    2/10/2017 2115 Blood culture In process     2/10/2017 2115 Blood culture In process     2/10/2017 2106 XR Chest 2 Views In process     2/10/2017 2009 CT Abdomen Pelvis w Contrast Preliminary     2/10/2017 1903 Abdomen US, limited (RUQ only) In process             Pending Culture Results     Date and Time Order Name Status Description    2/10/2017 2115 Blood culture In process     2/10/2017 2115 Blood culture In process             Thank you for choosing [facility]       Thank you for choosing [facility] for your care. Our goal is always to provide you with excellent care. Hearing back from our patients is one way we can continue to improve our services. Please take a few minutes to complete the written survey that you may receive in the mail after you visit with us. Thank you!        MyChart Information      [facility] gives you secure access to your electronic health record. If you see a primary care provider, you can also send messages to your care team and make appointments. If you have questions, please call your primary care clinic.  If you do not have a primary care provider, please call [phone] and they will assist you.        Care EveryWhere ID     This is your Care EveryWhere ID. This could be used by other organizations to access your [facility] medical records  [id number]        After Visit Summary       This is your record. Keep this with you and show to your community pharmacist(s) and doctor(s) at your next visit.

## 2017-02-10 NOTE — ED AVS SNAPSHOT
[facility]    [address] [address]    Phone:  [phone]                                       Ihsan Millan   MRN: [id number]    Department:  [facility]   Date of Visit:  2/10/2017           After Visit Summary Signature Page     I have received my discharge instructions, and my questions have been answered. I have discussed any challenges I see with this plan with the nurse or doctor.    ..........................................................................................................................................  Patient/Patient Representative Signature      ..........................................................................................................................................  Patient Representative Print Name and Relationship to Patient    ..................................................               ................................................  Date                                            Time    ..........................................................................................................................................  Reviewed by Signature/Title    ...................................................              ..............................................  Date                                                            Time

## 2017-02-11 ENCOUNTER — HOSPITAL ENCOUNTER (OUTPATIENT)
Facility: HOSPITAL | Age: 27
Setting detail: OBSERVATION
Discharge: HOME | End: 2017-02-12
Attending: FAMILY MEDICINE | Admitting: FAMILY MEDICINE
Payer: MEDICAID

## 2017-02-11 DIAGNOSIS — J18.9 PNEUMONIA OF RIGHT MIDDLE LOBE DUE TO INFECTIOUS ORGANISM: Primary | ICD-10-CM

## 2017-02-11 DIAGNOSIS — R10.11 RUQ ABDOMINAL PAIN: ICD-10-CM

## 2017-02-11 LAB
ALBUMIN SERPL-MCNC: 4.1 G/DL (ref 3.4–5)
ALP SERPL-CCNC: 75 U/L (ref 40–150)
ALT SERPL W P-5'-P-CCNC: 18 U/L (ref 0–50)
ANION GAP SERPL CALCULATED.3IONS-SCNC: 7 MMOL/L (ref 3–14)
AST SERPL W P-5'-P-CCNC: 7 U/L (ref 0–45)
BASOPHILS # BLD AUTO: 0 10E9/L (ref 0–0.2)
BASOPHILS NFR BLD AUTO: 0.2 %
BILIRUB SERPL-MCNC: 0.3 MG/DL (ref 0.2–1.3)
BUN SERPL-MCNC: 11 MG/DL (ref 7–30)
CALCIUM SERPL-MCNC: 9.2 MG/DL (ref 8.5–10.1)
CHLORIDE SERPL-SCNC: 108 MMOL/L (ref 94–109)
CO2 SERPL-SCNC: 27 MMOL/L (ref 20–32)
CREAT SERPL-MCNC: 0.86 MG/DL (ref 0.52–1.04)
CRP SERPL-MCNC: 5.2 MG/L (ref 0–8)
DIFFERENTIAL METHOD BLD: NORMAL
EOSINOPHIL # BLD AUTO: 0.1 10E9/L (ref 0–0.7)
EOSINOPHIL NFR BLD AUTO: 1.7 %
ERYTHROCYTE [DISTWIDTH] IN BLOOD BY AUTOMATED COUNT: 12.9 % (ref 10–15)
GFR SERPL CREATININE-BSD FRML MDRD: 80 ML/MIN/1.7M2
GLUCOSE SERPL-MCNC: 74 MG/DL (ref 70–99)
HCT VFR BLD AUTO: 40.9 % (ref 35–47)
HGB BLD-MCNC: 14 G/DL (ref 11.7–15.7)
IMM GRANULOCYTES # BLD: 0 10E9/L (ref 0–0.4)
IMM GRANULOCYTES NFR BLD: 0.2 %
LYMPHOCYTES # BLD AUTO: 1.8 10E9/L (ref 0.8–5.3)
LYMPHOCYTES NFR BLD AUTO: 38.1 %
MCH RBC QN AUTO: 30.3 PG (ref 26.5–33)
MCHC RBC AUTO-ENTMCNC: 34.2 G/DL (ref 31.5–36.5)
MCV RBC AUTO: 89 FL (ref 78–100)
MONOCYTES # BLD AUTO: 0.4 10E9/L (ref 0–1.3)
MONOCYTES NFR BLD AUTO: 8 %
NEUTROPHILS # BLD AUTO: 2.4 10E9/L (ref 1.6–8.3)
NEUTROPHILS NFR BLD AUTO: 51.8 %
NRBC # BLD AUTO: 0 10*3/UL
NRBC BLD AUTO-RTO: 0 /100
PLATELET # BLD AUTO: 186 10E9/L (ref 150–450)
POTASSIUM SERPL-SCNC: 3.8 MMOL/L (ref 3.4–5.3)
PROT SERPL-MCNC: 7.4 G/DL (ref 6.8–8.8)
RBC # BLD AUTO: 4.62 10E12/L (ref 3.8–5.2)
SODIUM SERPL-SCNC: 142 MMOL/L (ref 133–144)
WBC # BLD AUTO: 4.6 10E9/L (ref 4–11)

## 2017-02-11 PROCEDURE — 99285 EMERGENCY DEPT VISIT HI MDM: CPT | Mod: 25

## 2017-02-11 PROCEDURE — 86140 C-REACTIVE PROTEIN: CPT | Performed by: FAMILY MEDICINE

## 2017-02-11 PROCEDURE — 74020 ZZHC X-RAY ABDOMEN COMPLETE: CPT | Mod: TC

## 2017-02-11 PROCEDURE — 96374 THER/PROPH/DIAG INJ IV PUSH: CPT

## 2017-02-11 PROCEDURE — 25000128 H RX IP 250 OP 636: Performed by: FAMILY MEDICINE

## 2017-02-11 PROCEDURE — 96375 TX/PRO/DX INJ NEW DRUG ADDON: CPT

## 2017-02-11 PROCEDURE — 80053 COMPREHEN METABOLIC PANEL: CPT | Performed by: FAMILY MEDICINE

## 2017-02-11 PROCEDURE — 85025 COMPLETE CBC W/AUTO DIFF WBC: CPT | Performed by: FAMILY MEDICINE

## 2017-02-11 PROCEDURE — 99284 EMERGENCY DEPT VISIT MOD MDM: CPT | Performed by: FAMILY MEDICINE

## 2017-02-11 PROCEDURE — 71020 ZZHC CHEST TWO VIEWS, FRONT/LAT: CPT | Mod: TC

## 2017-02-11 PROCEDURE — 96361 HYDRATE IV INFUSION ADD-ON: CPT

## 2017-02-11 RX ORDER — ONDANSETRON 2 MG/ML
4 INJECTION INTRAMUSCULAR; INTRAVENOUS
Status: COMPLETED | OUTPATIENT
Start: 2017-02-11 | End: 2017-02-11

## 2017-02-11 RX ORDER — KETOROLAC TROMETHAMINE 30 MG/ML
30 INJECTION, SOLUTION INTRAMUSCULAR; INTRAVENOUS ONCE
Status: COMPLETED | OUTPATIENT
Start: 2017-02-11 | End: 2017-02-11

## 2017-02-11 RX ORDER — SODIUM CHLORIDE 9 MG/ML
1000 INJECTION, SOLUTION INTRAVENOUS CONTINUOUS
Status: DISCONTINUED | OUTPATIENT
Start: 2017-02-11 | End: 2017-02-12 | Stop reason: HOSPADM

## 2017-02-11 RX ORDER — MORPHINE SULFATE 2 MG/ML
2 INJECTION, SOLUTION INTRAMUSCULAR; INTRAVENOUS ONCE
Status: COMPLETED | OUTPATIENT
Start: 2017-02-11 | End: 2017-02-11

## 2017-02-11 RX ORDER — DIPHENHYDRAMINE HYDROCHLORIDE 50 MG/ML
25 INJECTION INTRAMUSCULAR; INTRAVENOUS ONCE
Status: COMPLETED | OUTPATIENT
Start: 2017-02-11 | End: 2017-02-11

## 2017-02-11 RX ADMIN — SODIUM CHLORIDE 1000 ML: 9 INJECTION, SOLUTION INTRAVENOUS at 21:55

## 2017-02-11 RX ADMIN — DIPHENHYDRAMINE HYDROCHLORIDE 25 MG: 50 INJECTION, SOLUTION INTRAMUSCULAR; INTRAVENOUS at 23:08

## 2017-02-11 RX ADMIN — KETOROLAC TROMETHAMINE 30 MG: 30 INJECTION, SOLUTION INTRAMUSCULAR; INTRAVENOUS at 22:02

## 2017-02-11 RX ADMIN — ONDANSETRON 4 MG: 2 INJECTION INTRAMUSCULAR; INTRAVENOUS at 21:58

## 2017-02-11 RX ADMIN — MORPHINE SULFATE 2 MG: 2 INJECTION, SOLUTION INTRAMUSCULAR; INTRAVENOUS at 23:10

## 2017-02-11 RX ADMIN — SODIUM CHLORIDE 1000 ML: 9 INJECTION, SOLUTION INTRAVENOUS at 22:40

## 2017-02-11 RX ADMIN — SODIUM CHLORIDE 1000 ML: 9 INJECTION, SOLUTION INTRAVENOUS at 23:52

## 2017-02-11 ASSESSMENT — ENCOUNTER SYMPTOMS
CONSTIPATION: 0
DIARRHEA: 0
FATIGUE: 1
ABDOMINAL PAIN: 1
NERVOUS/ANXIOUS: 1
APPETITE CHANGE: 1
PALPITATIONS: 0
NAUSEA: 1
FEVER: 0
NEUROLOGICAL NEGATIVE: 1
CHEST TIGHTNESS: 0
MUSCULOSKELETAL NEGATIVE: 1
ACTIVITY CHANGE: 1
VOMITING: 1
COUGH: 1
WHEEZING: 0
DYSPHORIC MOOD: 1

## 2017-02-11 NOTE — ED NOTES
Pt to be discharged home on levaquin and pepcid, pt given paper scripts.  Pt verbalized understanding of all discharge instructions.

## 2017-02-11 NOTE — ED PROVIDER NOTES
History     Chief Complaint   Patient presents with     Abdominal Pain     c/o abd pain and vomiting     HPI  Ihsan Millan is a 26 year old female who has been having nausea and vomiting for about a week, has been unable to hold anything down.  She began having pain on the right side from the back to the front about 2 days ago, and at present the pain is 8/10.  Her bowels have been working as they usually do, she has IBS with constipation, she did have a bowel movement today and it appeared normal.  She previously had an issue with the right kidney having what sounds like hydronephrosis, but it resolved completely.  She has never had a kidney stone and has never had anything done with her gallbladder.    I have reviewed the Medications, Allergies, Past Medical and Surgical History, and Social History in the Epic system.    Review of Systems   Constitutional: Positive for activity change.   HENT: Negative.    Respiratory: Negative.    Cardiovascular: Negative.    Gastrointestinal: Positive for nausea, vomiting, abdominal pain and constipation.   Genitourinary: Negative for dysuria, urgency and frequency.   Musculoskeletal: Negative.    Skin: Positive for pallor.   Neurological: Negative.    Psychiatric/Behavioral: Positive for dysphoric mood.       Physical Exam   BP: 112/70 mmHg  Heart Rate: 107  Temp: 97.2  F (36.2  C)  Resp: 16  SpO2: 97 %  Physical Exam   Constitutional: She is oriented to person, place, and time. She appears well-developed and well-nourished.   HENT:   Head: Normocephalic and atraumatic.   Neck: Normal range of motion. Neck supple.   Cardiovascular: Regular rhythm, normal heart sounds and intact distal pulses.  Tachycardia present.    No murmur heard.  Pulmonary/Chest: Effort normal and breath sounds normal. No respiratory distress.   Abdominal: Soft. Bowel sounds are normal. She exhibits no distension. There is tenderness in the right upper quadrant. There is guarding and positive  Hanna's sign. There is no rebound.   Pain radiates to or from right back.   Musculoskeletal: Normal range of motion. She exhibits no edema or tenderness.   Neurological: She is alert and oriented to person, place, and time.   Skin: Skin is warm and dry.   Psychiatric: Her speech is normal. Judgment normal. Her affect is blunt. Cognition and memory are normal.   Nursing note and vitals reviewed.      ED Course   Procedures        Labs Ordered and Resulted from Time of ED Arrival Up to the Time of Departure from the ED   COMPREHENSIVE METABOLIC PANEL - Abnormal; Notable for the following:     Bilirubin Total 0.1 (*)     All other components within normal limits   UA MACROSCOPIC WITH REFLEX TO MICRO AND CULTURE - Abnormal; Notable for the following:     Leukocyte Esterase Urine Trace (*)     Bacteria Urine None (*)     Squamous Epithelial /HPF Urine 3 (*)     Mucous Urine Present (*)     All other components within normal limits   CBC WITH PLATELETS DIFFERENTIAL   CRP INFLAMMATION   LACTIC ACID   VITAL SIGNS   PERIPHERAL IV CATHETER       Assessments & Plan (with Medical Decision Making)   Ultrasound did not give cause for RUQ pain, patient will need CT for further diagnostics.  Given Morphine for pain with Benadryl given codeine allergy, also Zofran for nausea.      Patient signed out to Dr. Rj Figueroa at change of shift with CT pending.    I have reviewed the nursing notes.    I have reviewed the findings, diagnosis, plan and need for follow up with the patient.    New Prescriptions    FAMOTIDINE (PEPCID) 20 MG TABLET    Take 1 tablet (20 mg) by mouth 2 times daily for 10 days    LEVOFLOXACIN (LEVAQUIN) 750 MG TABLET    Take 1 tablet (750 mg) by mouth daily for 5 days       DIAGNOSIS  Gastritis    2/10/2017   [facility]      Joann Baugh MD  02/11/17 2319

## 2017-02-11 NOTE — DISCHARGE INSTRUCTIONS
Discharge Instructions for Pneumonia  You have been diagnosed with pneumonia, a serious lung infection. Most cases of pneumonia are caused by bacteria. Pneumonia most often occurs in older adults, young children, and people with chronic health problems.  Home care    Take your medication exactly as directed. Don t skip doses. Continue taking your antibiotics as directed until they are all gone -- even if you start to feel better. This will prevent the pneumonia from coming back.    Drink at least 8 glasses of water daily, unless directed otherwise. This helps to loosen and thin secretions so that you can cough them up.    Use a cool-mist humidifier in your bedroom. Be sure to clean the humidifier daily.    Coughing up mucus is normal. Don t use medications to suppress your cough unless your cough is dry, painful, or interferes with your sleep. You may use an expectorant if ordered by your doctor.    Warm compresses or a heating pad on the lowest setting can be used to relieve chest discomfort. Use several times a day for 15 to 20 minutes at a time. (To prevent injuring your skin, be sure the temperature of the compress or heating pad is warm, not hot.)    Get plenty of rest until your fever, shortness of breath, and chest pain go away.    Plan to get a flu shot every year.    Ask your doctor about pneumonia vaccinations.     Follow-up care  Make a follow-up appointment as directed by our staff.     When to seek medical care  Call 911 right away if you have any of the following:    Chest pain    Trouble breathing    Blue lips or fingernails  Otherwise, call your doctor if you have any of the following:    Fever above 101.5 F  (38.6 C)    Yellow, green, bloody, or smelly sputum    More than normal mucus production    Vomiting     8885-9968 [email or web address]. [address] [place] [address], [place] [address]. All rights reserved. This information is not intended as a substitute for professional medical care. Always  follow your healthcare professional's instructions.

## 2017-02-11 NOTE — IP AVS SNAPSHOT
[facility]    [address] [place] [address]    Phone:  [phone]    Fax:  [phone]                                       After Visit Summary   2/11/2017    Ihsan Millan    MRN: [id number]           After Visit Summary Signature Page     I have received my discharge instructions, and my questions have been answered. I have discussed any challenges I see with this plan with the nurse or doctor.    ..........................................................................................................................................  Patient/Patient Representative Signature      ..........................................................................................................................................  Patient Representative Print Name and Relationship to Patient    ..................................................               ................................................  Date                                            Time    ..........................................................................................................................................  Reviewed by Signature/Title    ...................................................              ..............................................  Date                                                            Time

## 2017-02-11 NOTE — ED NOTES
"Pt reports abdominal pain times one week, with vomiting after each meal, \"unable to keep anything down\".  Pt reports R flank pain that wraps to front abdomen.  Pt denies any pain with urination. Pt has \"normal\" BM this am.  Reports some \"hot flashes and shaky chills\".  Rates pain 8/10 to abdomen.   "

## 2017-02-11 NOTE — ED PROVIDER NOTES
CT abd prelem report:  R middle lobe infiltraion  CXR: normal  Pt has had coughing for days  levaquin started, she is allergic to PCN and due to gastritis can not tolerated zithromax  Fu with PCP

## 2017-02-11 NOTE — IP AVS SNAPSHOT
MRN:[id number]                      After Visit Summary   2/11/2017    Ihsan Millan    MRN: [id number]           Thank you!     Thank you for choosing [facility] for your care. Our goal is always to provide you with excellent care. Hearing back from our patients is one way we can continue to improve our services. Please take a few minutes to complete the written survey that you may receive in the mail after you visit with us. Thank you!        Patient Information     Date Of Birth          1990        About your hospital stay     You were admitted on:  February 12, 2017 You last received care in the:  [facility]    You were discharged on:  February 12, 2017       Who to Call     For medical emergencies, please call 911.  For non-urgent questions about your medical care, please call your primary care provider or clinic, [phone]          Attending Provider     Provider Specialty    Joann Baugh MD Emergency Medicine    Temo Houston DO [facility]       Primary Care Provider Office Phone # Fax #    Temo Houston [phone]       [facility] [address] [facility] [address]        Your next 10 appointments already scheduled     Apr 11, 2017  9:30 AM CDT   (Arrive by 9:15 AM)   SHORT with Rodolfo Clifford MD   [facility]    [address] [facility] [facility] [id number] [phone]              Further instructions from your care team       *Finish taking Levaquin 750mg tablets as prescribed, when completed; then take Levaquin 500mg tablets as prescribed.     F/u Dr. Houston in 1-2 wks, call Monday to make your appointment [phone]    Pending Results     Date and Time Order Name Status Description    2/10/2017 2115 Blood culture Preliminary     2/10/2017 2115 Blood culture Preliminary             Statement of Approval     Ordered          02/12/17 1022  I have reviewed and agree with  "all the recommendations and orders detailed in this document.  EFFECTIVE NOW     Approved and electronically signed by:  Svetlana Benitez MD             Admission Information     Date & Time Provider Department Dept. Phone    2/11/2017 Temo Houston,  [facility] [phone]      Your Vitals Were     Blood Pressure Temperature Respirations Height Last Period Pulse Oximetry    101/71 98.3  F (36.8  C) (Tympanic) 18 1.651 m (5' 5\") (LMP Unknown) 96%      [employer] Information     [employer] gives you secure access to your electronic health record. If you see a primary care provider, you can also send messages to your care team and make appointments. If you have questions, please call your primary care clinic.  If you do not have a primary care provider, please call [phone] and they will assist you.        Care EveryWhere ID     This is your Care EveryWhere ID. This could be used by other organizations to access your [facility] medical records  [id number]           Review of your medicines      START taking        Dose / Directions    HYDROcodone-acetaminophen 5-325 MG per tablet   Commonly known as:  NORCO        Dose:  1-2 tablet   Take 1-2 tablets by mouth every 4 hours as needed for moderate to severe pain maximum 8  tablet(s) per day   Quantity:  20 tablet   Refills:  0       prochlorperazine 10 MG tablet   Commonly known as:  COMPAZINE        Dose:  10 mg   Take 1 tablet (10 mg) by mouth every 6 hours as needed for nausea or vomiting   Quantity:  20 tablet   Refills:  0         CONTINUE these medicines which may have CHANGED, or have new prescriptions. If we are uncertain of the size of tablets/capsules you have at home, strength may be listed as something that might have changed.        Dose / Directions    * levofloxacin 750 MG tablet   Commonly known as:  LEVAQUIN   This may have changed:  Another medication with the same name was added. Make sure you understand how and when to take " each.        Dose:  750 mg   Take 1 tablet (750 mg) by mouth daily for 5 days   Quantity:  5 tablet   Refills:  0       * levofloxacin 500 MG tablet   Commonly known as:  LEVAQUIN   This may have changed:  You were already taking a medication with the same name, and this prescription was added. Make sure you understand how and when to take each.        Dose:  500 mg   Take 1 tablet (500 mg) by mouth daily for 5 doses   Quantity:  5 tablet   Refills:  0       * Notice:  This list has 2 medication(s) that are the same as other medications prescribed for you. Read the directions carefully, and ask your doctor or other care provider to review them with you.      CONTINUE these medicines which have NOT CHANGED        Dose / Directions    albuterol 108 (90 BASE) MCG/ACT Inhaler   Commonly known as:  PROAIR HFA/PROVENTIL HFA/VENTOLIN HFA   Used for:  Intermittent asthma        Dose:  2 puff   Inhale 2 puffs into the lungs every 4 hours as needed for shortness of breath / dyspnea or wheezing   Quantity:  1 Inhaler   Refills:  2       leuprolide 11.25 MG kit   Commonly known as:  LUPRON DEPOT   Used for:  Endometriosis        Dose:  11.25 mg   Inject 11.25 mg into the muscle every 3 months   Quantity:  1 each   Refills:  0       OMEPRAZOLE PO        Dose:  20 mg   Take 20 mg by mouth every morning   Refills:  0         STOP taking     ibuprofen 800 MG tablet   Commonly known as:  ADVIL/MOTRIN                Where to get your medicines      These medications were sent to [facility] [address] [address]     Phone:  [phone]     levofloxacin 500 MG tablet    prochlorperazine 10 MG tablet         Some of these will need a paper prescription and others can be bought over the counter. Ask your nurse if you have questions.     Bring a paper prescription for each of these medications     HYDROcodone-acetaminophen 5-325 MG per tablet                Protect  others around you: Learn how to safely use, store and throw away your medicines at www.disposemymeds.org.             Medication List: This is a list of all your medications and when to take them. Check marks below indicate your daily home schedule. Keep this list as a reference.      Medications           Morning Afternoon Evening Bedtime As Needed    albuterol 108 (90 BASE) MCG/ACT Inhaler   Commonly known as:  PROAIR HFA/PROVENTIL HFA/VENTOLIN HFA   Inhale 2 puffs into the lungs every 4 hours as needed for shortness of breath / dyspnea or wheezing                                HYDROcodone-acetaminophen 5-325 MG per tablet   Commonly known as:  NORCO   Take 1-2 tablets by mouth every 4 hours as needed for moderate to severe pain maximum 8  tablet(s) per day                                leuprolide 11.25 MG kit   Commonly known as:  LUPRON DEPOT   Inject 11.25 mg into the muscle every 3 months                                * levofloxacin 750 MG tablet   Commonly known as:  LEVAQUIN   Take 1 tablet (750 mg) by mouth daily for 5 days                                * levofloxacin 500 MG tablet   Commonly known as:  LEVAQUIN   Take 1 tablet (500 mg) by mouth daily for 5 doses                                OMEPRAZOLE PO   Take 20 mg by mouth every morning                                prochlorperazine 10 MG tablet   Commonly known as:  COMPAZINE   Take 1 tablet (10 mg) by mouth every 6 hours as needed for nausea or vomiting                                * Notice:  This list has 2 medication(s) that are the same as other medications prescribed for you. Read the directions carefully, and ask your doctor or other care provider to review them with you.              More Information        Discharge Instructions for Pneumonia  You have been diagnosed with pneumonia, a serious lung infection. Most cases of pneumonia are caused by bacteria. Pneumonia most often occurs in older adults, young children, and people with  chronic health problems.  Home care    Take your medication exactly as directed. Don t skip doses. Continue taking your antibiotics as directed until they are all gone -- even if you start to feel better. This will prevent the pneumonia from coming back.    Drink at least 8 glasses of water daily, unless directed otherwise. This helps to loosen and thin secretions so that you can cough them up.    Use a cool-mist humidifier in your bedroom. Be sure to clean the humidifier daily.    Coughing up mucus is normal. Don t use medications to suppress your cough unless your cough is dry, painful, or interferes with your sleep. You may use an expectorant if ordered by your doctor.    Warm compresses or a heating pad on the lowest setting can be used to relieve chest discomfort. Use several times a day for 15 to 20 minutes at a time. (To prevent injuring your skin, be sure the temperature of the compress or heating pad is warm, not hot.)    Get plenty of rest until your fever, shortness of breath, and chest pain go away.    Plan to get a flu shot every year.    Ask your doctor about pneumonia vaccinations.     Follow-up care  Make a follow-up appointment as directed by our staff.     When to seek medical care  Call 911 right away if you have any of the following:    Chest pain    Trouble breathing    Blue lips or fingernails  Otherwise, call your doctor if you have any of the following:    Fever above 101.5 F  (38.6 C)    Yellow, green, bloody, or smelly sputum    More than normal mucus production    Vomiting     2631-1702 [employer]. [address] [place] [address], [place] [address]. All rights reserved. This information is not intended as a substitute for professional medical care. Always follow your healthcare professional's instructions.                Treating Pneumonia  Pneumonia is an infection of one or both of the lungs. Pneumonia:    Is usually caused by a virus    Can be very serious, especially in infants,  young children, and older adults. And also in those with other long-term health problems or weakened immune systems    Is sometimes treated at home and sometimes in the hospital    Antibiotic Medications  Antibiotics may be prescribed or administered for pneumonia caused by bacteria. They may be pills or oral medications, or shots or injections. Or they may be given intravenously (IV) or into the vein. If you are taking oral medications at home:    Fill your prescription and start taking your medication as soon as you can.    You will likely start to feel better in a day or two, but don t stop taking the antibiotic.    Use a pill organizer to help you remember to take your medication.    Let your health care provider know if you have side effects.    Take your medication exactly as directed on the label. Talk to your pharmacist if you have any questions.  Antiviral Medications  Antiviral medication may be prescribed or given for pneumonia cause by a virus. For example, antiviral medication may be prescribed for pneumonia caused by the flu virus. Antibiotics do not work against viruses. If you are taking antiviral medication at home:    Fill your prescription and start taking your medication as soon as you can.    Talk with your provider or pharmacist about possible side effects.    Take the medication exactly as instructed.  To Relieve Symptoms  There are many medications that can help relieve symptoms of pneumonia. Some are prescription and some are over-the-counter.  Your health care provider may recommend:    Acetaminophen or ibuprofen to lower your fever and to lessen headache or other pain    Cough medication to loosen mucus or to reduce coughing  Make sure you check with your health care provider or pharmacist before taking any over-the-counter medications.  Special Treatments  If you are hospitalized for pneumonia, you may have other therapies, including:    Inhaled medications to help with breathing or chest  congestion    Supplemental oxygen to increase low oxygen levels  Drink Fluids and Eat Healthy  You should eat healthy to help your body fight the infection and drink a lot of fluids to replace fluids lost from fever and to help loosen mucus in the chest.    Diet. Make health food choices, including fruits and vegetables, lean meats and other proteins, 100% whole grain and low- or no-fat dairy products.    Fluids. Drinks at least 6-8 tall glasses a day. Water and 100% fruit or vegetable juice are best.  Get Plenty of Rest and Sleep  You may be more tired than usual for a while. It is important to get enough sleep at night. And, it's important to rest during the day. Talk with your health care provider if coughing or other symptoms are interfering with your sleep.  Preventing the Spread of Germs  The best thing you can do to prevent spreading germs is to wash your hands often. You should:    Rub your hand with soap and water for 20 to 30 seconds.    Clean in between your fingers, the backs of your hands, and around your nails.    Dry your hands on a separate towel or use paper towels.  You should also:    Keep alcohol-based hand  nearby.    Make sure you also clean surfaces that you touch. Use a product that kills all types of germs.    Stay away from others until you are feeling better.  When to Call Your Health Care Provider  Call your health care provider if you have any of these:    Symptoms get worse    Fever continues    Shortness of breath gets worse    Increased mucus or mucus that is darker in color    Coughing gets worse    Lips or fingers are bluish in color    Side effects from your medication     9969-0610 [employer]. [address] [place] [address], [place] [address]. All rights reserved. This information is not intended as a substitute for professional medical care. Always follow your healthcare professional's instructions.                What is Pneumonia?  Pneumonia is a serious lung  infection. Many cases of pneumonia are caused by bacteria or viruses. Other less common causes include    Fungi    Chemicals    Gases    You may also get pneumonia after another illness, such as a cold, flu, or bronchitis. Those most at risk include the elderly and people with chronic health problems.  Healthy Lungs    Air travels in and out of the lungs through tubes called airways.    The tubes branch into smaller passages called bronchioles. These end in balloonlike sacs called alveoli.    Blood vessels surrounding the alveoli take oxygen into the bloodstream. At the same time, the alveoli remove carbon dioxide (a waste gas) from the blood. The carbon dioxide is then exhaled.  When You Have Pneumonia      Pneumonia causes the bronchioles and the alveoli to fill with excess mucus and become inflamed.    Your body s response may be to cough. This can help clear out the fluid.    The fluid (or mucus) you cough up may appear green or dark yellow.    The excess mucus may make you feel short of breath.    The inflammation and infection may give you a fever.  What are the Symptoms?  Symptoms of pneumonia can come without warning. At first, you may think you have a cold or flu. But symptoms may get worse quickly, turning into pneumonia. Symyptoms can be different for bacterial and viral pneumonia. Common symptoms may include the following:    Severe cough with green or yellow mucus that doesn't improve or gets worse    Fever and chills    Nausea, vomiting or diarrhea    Shortness of breath with normal daily activities    Increased heart rate    Chest pain or discomfort when breathing in or coughing    Headache    Excessive sweating and clammy skin    7306-7357 [employer]. [address] [place] [address], [place] [address]. All rights reserved. This information is not intended as a substitute for professional medical care. Always follow your healthcare professional's instructions.                Patient  Education    Levofloxacin Ophthalmic drops, solution    Levofloxacin Oral solution    Levofloxacin Oral tablet    Levofloxacin Solution for injection    Levofloxacin, Dextrose Solution for injection  Levofloxacin Oral tablet  What is this medicine?  LEVOFLOXACIN (radha deja FLOX a sin) is a quinolone antibiotic. It is used to treat certain kinds of bacterial infections. It will not work for colds, flu, or other viral infections.  This medicine may be used for other purposes; ask your health care provider or pharmacist if you have questions.  What should I tell my health care provider before I take this medicine?  They need to know if you have any of these conditions:    cerebral disease    irregular heartbeat    kidney disease    seizure disorder    an unusual or allergic reaction to levofloxacin, other antibiotics or medicines, foods, dyes, or preservatives    pregnant or trying to get pregnant    breast-feeding  How should I use this medicine?  Take this medicine by mouth with a full glass of water. Follow the directions on the prescription label. This medicine can be taken with or without food. Take your medicine at regular intervals. Do not take your medicine more often than directed. Do not skip doses or stop your medicine early even if you feel better. Do not stop taking except on your doctor's advice.  A special MedGuide will be given to you by the pharmacist with each prescription and refill. Be sure to read this information carefully each time.  Talk to your pediatrician regarding the use of this medicine in children. While this drug may be prescribed for children as young as 6 months for selected conditions, precautions do apply.  Overdosage: If you think you have taken too much of this medicine contact a poison control center or emergency room at once.  NOTE: This medicine is only for you. Do not share this medicine with others.  What if I miss a dose?  If you miss a dose, take it as soon as you remember. If  it is almost time for your next dose, take only that dose. Do not take double or extra doses.  What may interact with this medicine?  Do not take this medicine with any of the following medications:    arsenic trioxide    chloroquine    droperidol    medicines for irregular heart rhythm like amiodarone, disopyramide, dofetilide, flecainide, quinidine, procainamide, sotalol    some medicines for depression or mental problems like phenothiazines, pimozide, and ziprasidone  This medicine may also interact with the following medications:    amoxapine    antacids    cisapride    dairy products    didanosine (ddI) buffered tablets or powder    haloperidol    multivitamins    NSAIDS, medicines for pain and inflammation, like ibuprofen or naproxen    retinoid products like tretinoin or isotretinoin    risperidone    some other antibiotics like clarithromycin or erythromycin    sucralfate    theophylline    warfarin  This list may not describe all possible interactions. Give your health care provider a list of all the medicines, herbs, non-prescription drugs, or dietary supplements you use. Also tell them if you smoke, drink alcohol, or use illegal drugs. Some items may interact with your medicine.  What should I watch for while using this medicine?  Tell your doctor or health care professional if your symptoms do not improve or if they get worse. Drink several glasses of water a day and cut down on drinks that contain caffeine. You must not get dehydrated while taking this medicine.  You may get drowsy or dizzy. Do not drive, use machinery, or do anything that needs mental alertness until you know how this medicine affects you. Do not sit or stand up quickly, especially if you are an older patient. This reduces the risk of dizzy or fainting spells.  This medicine can make you more sensitive to the sun. Keep out of the sun. If you cannot avoid being in the sun, wear protective clothing and use a sunscreen. Do not use sun  lamps or tanning beds/booths. Contact your doctor if you get a sunburn.  If you are a diabetic monitor your blood glucose carefully. If you get an unusual reading stop taking this medicine and call your doctor right away.  Do not treat diarrhea with over-the-counter products. Contact your doctor if you have diarrhea that lasts more than 2 days or if the diarrhea is severe and watery.  Avoid antacids, calcium, iron, and zinc products for 2 hours before and 2 hours after taking a dose of this medicine.  What side effects may I notice from receiving this medicine?  Side effects that you should report to your doctor or health care professional as soon as possible:    allergic reactions like skin rash or hives, swelling of the face, lips, or tongue    changes in vision    confusion, nightmares or hallucinations    difficulty breathing    irregular heartbeat, chest pain    joint, muscle or tendon pain    pain or difficulty passing urine    persistent headache with or without blurred vision    redness, blistering, peeling or loosening of the skin, including inside the mouth    seizures    unusual pain, numbness, tingling, or weakness    vaginal irritation, discharge  Side effects that usually do not require medical attention (report to your doctor or health care professional if they continue or are bothersome):    diarrhea    dry mouth    headache    stomach upset, nausea    trouble sleeping  This list may not describe all possible side effects. Call your doctor for medical advice about side effects. You may report side effects to FDA at 6-172-FDA-2242.  Where should I keep my medicine?  Keep out of the reach of children.  Store at room temperature between 15 and 30 degrees C (59 and 86 degrees F). Keep in a tightly closed container. Throw away any unused medicine after the expiration date.  NOTE:This sheet is a summary. It may not cover all possible information. If you have questions about this medicine, talk to your  doctor, pharmacist, or health care provider. Copyright  2016 [employer]                Patient Education    Hydrocodone Bitartrate, Acetaminophen Oral capsule    Hydrocodone Bitartrate, Acetaminophen Oral solution    Hydrocodone Bitartrate, Acetaminophen Oral tablet  Hydrocodone Bitartrate, Acetaminophen Oral tablet  What is this medicine?  ACETAMINOPHEN; HYDROCODONE (a set a DENNYS odalis fen; artie droe KOE done) is a pain reliever. It is used to treat moderate to severe pain.  This medicine may be used for other purposes; ask your health care provider or pharmacist if you have questions.  What should I tell my health care provider before I take this medicine?  They need to know if you have any of these conditions:    brain tumor    Crohn's disease, inflammatory bowel disease, or ulcerative colitis    drug abuse or addiction    head injury    heart or circulation problems    if you often drink alcohol    kidney disease or problems going to the bathroom    liver disease    lung disease, asthma, or breathing problems    an unusual or allergic reaction to acetaminophen, hydrocodone, other opioid analgesics, other medicines, foods, dyes, or preservatives    pregnant or trying to get pregnant    breast-feeding  How should I use this medicine?  Take this medicine by mouth. Swallow it with a full glass of water. Follow the directions on the prescription label. If the medicine upsets your stomach, take the medicine with food or milk. Do not take more than you are told to take.  Talk to your pediatrician regarding the use of this medicine in children. This medicine is not approved for use in children.  Patients over 65 years may have a stronger reaction and need a smaller dose.  Overdosage: If you think you have taken too much of this medicine contact a poison control center or emergency room at once.  NOTE: This medicine is only for you. Do not share this medicine with others.  What if I miss a dose?  If you miss a dose, take  it as soon as you can. If it is almost time for your next dose, take only that dose. Do not take double or extra doses.  What may interact with this medicine?    alcohol    antihistamines    isoniazid    medicines for depression, anxiety, or psychotic disturbances    medicines for sleep    muscle relaxants    naltrexone    narcotic medicines (opiates) for pain    phenobarbital    ritonavir    tramadol  This list may not describe all possible interactions. Give your health care provider a list of all the medicines, herbs, non-prescription drugs, or dietary supplements you use. Also tell them if you smoke, drink alcohol, or use illegal drugs. Some items may interact with your medicine.  What should I watch for while using this medicine?  Tell your doctor or health care professional if your pain does not go away, if it gets worse, or if you have new or a different type of pain. You may develop tolerance to the medicine. Tolerance means that you will need a higher dose of the medicine for pain relief. Tolerance is normal and is expected if you take the medicine for a long time.  Do not suddenly stop taking your medicine because you may develop a severe reaction. Your body becomes used to the medicine. This does NOT mean you are addicted. Addiction is a behavior related to getting and using a drug for a non-medical reason. If you have pain, you have a medical reason to take pain medicine. Your doctor will tell you how much medicine to take. If your doctor wants you to stop the medicine, the dose will be slowly lowered over time to avoid any side effects.  You may get drowsy or dizzy when you first start taking the medicine or change doses. Do not drive, use machinery, or do anything that may be dangerous until you know how the medicine affects you. Stand or sit up slowly.  There are different types of narcotic medicines (opiates) for pain. If you take more than one type at the same time, you may have more side effects.  Give your health care provider a list of all medicines you use. Your doctor will tell you how much medicine to take. Do not take more medicine than directed. Call emergency for help if you have problems breathing.  The medicine will cause constipation. Try to have a bowel movement at least every 2 to 3 days. If you do not have a bowel movement for 3 days, call your doctor or health care professional.  Too much acetaminophen can be very dangerous. Do not take Tylenol (acetaminophen) or medicines that contain acetaminophen with this medicine. Many non-prescription medicines contain acetaminophen. Always read the labels carefully.  What side effects may I notice from receiving this medicine?  Side effects that you should report to your doctor or health care professional as soon as possible:    allergic reactions like skin rash, itching or hives, swelling of the face, lips, or tongue    breathing problems    confusion    feeling faint or lightheaded, falls    stomach pain    yellowing of the eyes or skin  Side effects that usually do not require medical attention (report to your doctor or health care professional if they continue or are bothersome):    nausea, vomiting    stomach upset  This list may not describe all possible side effects. Call your doctor for medical advice about side effects. You may report side effects to FDA at 0-940-FDA-6576.  Where should I keep my medicine?  Keep out of the reach of children. This medicine can be abused. Keep your medicine in a safe place to protect it from theft. Do not share this medicine with anyone. Selling or giving away this medicine is dangerous and against the law.  Store at room temperature between 15 and 30 degrees C (59 and 86 degrees F). Protect from light. Keep container tightly closed.  Throw away any unused medicine after the expiration date. Discard unused medicine and used packaging carefully. Pets and children can be harmed if they find used or lost  packages.  NOTE: This sheet is a summary. It may not cover all possible information. If you have questions about this medicine, talk to your doctor, pharmacist, or health care provider.  NOTE:This sheet is a summary. It may not cover all possible information. If you have questions about this medicine, talk to your doctor, pharmacist, or health care provider. Copyright  2016 [employer] Standard                Patient Education    Prochlorperazine Edisylate Solution for injection    Prochlorperazine Maleate Oral tablet    Prochlorperazine Rectal suppository  Prochlorperazine Maleate Oral tablet  What is this medicine?  PROCHLORPERAZINE (proe klor PER a zeen) helps to control severe nausea and vomiting. This medicine is also used to treat schizophrenia. It can also help patients who experience anxiety that is not due to psychological illness.  This medicine may be used for other purposes; ask your health care provider or pharmacist if you have questions.  What should I tell my health care provider before I take this medicine?  They need to know if you have any of these conditions:    blood disorders or disease    dementia    liver disease or jaundice    Parkinson's disease    uncontrollable movement disorder    an unusual or allergic reaction to prochlorperazine, other medicines, foods, dyes, or preservatives    pregnant or trying to get pregnant    breast-feeding  How should I use this medicine?  Take this medicine by mouth with a glass of water. Follow the directions on the prescription label. Take your doses at regular intervals. Do not take your medicine more often than directed. Do not stop taking this medicine suddenly. This can cause nausea, vomiting, and dizziness. Ask your doctor or health care professional for advice.  Talk to your pediatrician regarding the use of this medicine in children. Special care may be needed. While this drug may be prescribed for children as young as 2 years for selected conditions,  precautions do apply.  Overdosage: If you think you have taken too much of this medicine contact a poison control center or emergency room at once.  NOTE: This medicine is only for you. Do not share this medicine with others.  What if I miss a dose?  If you miss a dose, take it as soon as you can. If it is almost time for your next dose, take only that dose. Do not take double or extra doses.  What may interact with this medicine?  Do not take this medicine with any of the following medications:    amoxapine    antidepressants like citalopram, escitalopram, fluoxetine, paroxetine, and sertraline    deferoxamine    dofetilide    maprotiline    tricyclic antidepressants like amitriptyline, clomipramine, imipramine, nortiptyline and others  This medicine may also interact with the following medications:    lithium    medicines for pain    phenytoin    propranolol    warfarin  This list may not describe all possible interactions. Give your health care provider a list of all the medicines, herbs, non-prescription drugs, or dietary supplements you use. Also tell them if you smoke, drink alcohol, or use illegal drugs. Some items may interact with your medicine.  What should I watch for while using this medicine?  Visit your doctor or health care professional for regular checks on your progress.  You may get drowsy or dizzy. Do not drive, use machinery, or do anything that needs mental alertness until you know how this medicine affects you. Do not stand or sit up quickly, especially if you are an older patient. This reduces the risk of dizzy or fainting spells. Alcohol may interfere with the effect of this medicine. Avoid alcoholic drinks.  This medicine can reduce the response of your body to heat or cold. Dress warm in cold weather and stay hydrated in hot weather. If possible, avoid extreme temperatures like saunas, hot tubs, very hot or cold showers, or activities that can cause dehydration such as vigorous  exercise.  This medicine can make you more sensitive to the sun. Keep out of the sun. If you cannot avoid being in the sun, wear protective clothing and use sunscreen. Do not use sun lamps or tanning beds/booths.  Your mouth may get dry. Chewing sugarless gum or sucking hard candy, and drinking plenty of water may help. Contact your doctor if the problem does not go away or is severe.  What side effects may I notice from receiving this medicine?  Side effects that you should report to your doctor or health care professional as soon as possible:    blurred vision    breast enlargement in men or women    breast milk in women who are not breast-feeding    chest pain, fast or irregular heartbeat    confusion, restlessness    dark yellow or brown urine    difficulty breathing or swallowing    dizziness or fainting spells    drooling, shaking, movement difficulty (shuffling walk) or rigidity    fever, chills, sore throat    involuntary or uncontrollable movements of the eyes, mouth, head, arms, and legs    seizures    stomach area pain    unusually weak or tired    unusual bleeding or bruising    yellowing of skin or eyes  Side effects that usually do not require medical attention (report to your doctor or health care professional if they continue or are bothersome):    difficulty passing urine    difficulty sleeping    headache    sexual dysfunction    skin rash, or itching  This list may not describe all possible side effects. Call your doctor for medical advice about side effects. You may report side effects to FDA at 8-039-FDA-3223.  Where should I keep my medicine?  Keep out of the reach of children.  Store at room temperature between 15 and 30 degrees C (59 and 86 degrees F). Protect from light. Throw away any unused medicine after the expiration date.  NOTE:This sheet is a summary. It may not cover all possible information. If you have questions about this medicine, talk to your doctor, pharmacist, or health care  provider. Copyright  2016 Gold Standard

## 2017-02-12 VITALS
RESPIRATION RATE: 18 BRPM | SYSTOLIC BLOOD PRESSURE: 101 MMHG | HEIGHT: 65 IN | DIASTOLIC BLOOD PRESSURE: 71 MMHG | TEMPERATURE: 98.3 F | OXYGEN SATURATION: 96 %

## 2017-02-12 PROBLEM — R10.11 RUQ ABDOMINAL PAIN: Status: ACTIVE | Noted: 2017-02-12

## 2017-02-12 PROBLEM — Z72.0 TOBACCO ABUSE: Chronic | Status: ACTIVE | Noted: 2017-02-12

## 2017-02-12 PROBLEM — J18.9 PNEUMONIA OF RIGHT MIDDLE LOBE DUE TO INFECTIOUS ORGANISM: Status: ACTIVE | Noted: 2017-02-12

## 2017-02-12 PROCEDURE — G0378 HOSPITAL OBSERVATION PER HR: HCPCS

## 2017-02-12 PROCEDURE — 96375 TX/PRO/DX INJ NEW DRUG ADDON: CPT

## 2017-02-12 PROCEDURE — 25000128 H RX IP 250 OP 636: Performed by: FAMILY MEDICINE

## 2017-02-12 PROCEDURE — 96376 TX/PRO/DX INJ SAME DRUG ADON: CPT

## 2017-02-12 RX ORDER — ONDANSETRON 4 MG/1
4 TABLET, ORALLY DISINTEGRATING ORAL EVERY 6 HOURS PRN
Status: DISCONTINUED | OUTPATIENT
Start: 2017-02-12 | End: 2017-02-12 | Stop reason: HOSPADM

## 2017-02-12 RX ORDER — HYDROCODONE BITARTRATE AND ACETAMINOPHEN 5; 325 MG/1; MG/1
1-2 TABLET ORAL EVERY 4 HOURS PRN
Qty: 20 TABLET | Refills: 0 | Status: SHIPPED | OUTPATIENT
Start: 2017-02-12 | End: 2017-04-14

## 2017-02-12 RX ORDER — ALBUTEROL SULFATE 90 UG/1
2 AEROSOL, METERED RESPIRATORY (INHALATION) EVERY 4 HOURS PRN
Status: DISCONTINUED | OUTPATIENT
Start: 2017-02-12 | End: 2017-02-12 | Stop reason: HOSPADM

## 2017-02-12 RX ORDER — PROCHLORPERAZINE MALEATE 10 MG
10 TABLET ORAL EVERY 6 HOURS PRN
Qty: 20 TABLET | Refills: 0 | Status: SHIPPED | OUTPATIENT
Start: 2017-02-12 | End: 2017-04-14

## 2017-02-12 RX ORDER — LIDOCAINE 40 MG/G
CREAM TOPICAL
Status: DISCONTINUED | OUTPATIENT
Start: 2017-02-12 | End: 2017-02-12 | Stop reason: HOSPADM

## 2017-02-12 RX ORDER — NALOXONE HYDROCHLORIDE 0.4 MG/ML
.1-.4 INJECTION, SOLUTION INTRAMUSCULAR; INTRAVENOUS; SUBCUTANEOUS
Status: DISCONTINUED | OUTPATIENT
Start: 2017-02-12 | End: 2017-02-12 | Stop reason: HOSPADM

## 2017-02-12 RX ORDER — LEVOFLOXACIN 500 MG/1
500 TABLET, FILM COATED ORAL DAILY
Qty: 5 TABLET | Refills: 0 | Status: SHIPPED | OUTPATIENT
Start: 2017-02-12 | End: 2017-02-17

## 2017-02-12 RX ORDER — ACETAMINOPHEN 325 MG/1
650 TABLET ORAL EVERY 4 HOURS PRN
Status: DISCONTINUED | OUTPATIENT
Start: 2017-02-12 | End: 2017-02-12 | Stop reason: HOSPADM

## 2017-02-12 RX ORDER — MORPHINE SULFATE 2 MG/ML
2-4 INJECTION, SOLUTION INTRAMUSCULAR; INTRAVENOUS
Status: DISCONTINUED | OUTPATIENT
Start: 2017-02-12 | End: 2017-02-12 | Stop reason: HOSPADM

## 2017-02-12 RX ORDER — ONDANSETRON 2 MG/ML
4 INJECTION INTRAMUSCULAR; INTRAVENOUS EVERY 6 HOURS PRN
Status: DISCONTINUED | OUTPATIENT
Start: 2017-02-12 | End: 2017-02-12 | Stop reason: HOSPADM

## 2017-02-12 RX ORDER — NALOXONE HYDROCHLORIDE 0.4 MG/ML
.1-.4 INJECTION, SOLUTION INTRAMUSCULAR; INTRAVENOUS; SUBCUTANEOUS
Status: DISCONTINUED | OUTPATIENT
Start: 2017-02-12 | End: 2017-02-12

## 2017-02-12 RX ORDER — LEVOFLOXACIN 5 MG/ML
500 INJECTION, SOLUTION INTRAVENOUS EVERY 24 HOURS
Status: DISCONTINUED | OUTPATIENT
Start: 2017-02-12 | End: 2017-02-12 | Stop reason: HOSPADM

## 2017-02-12 RX ADMIN — MORPHINE SULFATE 2 MG: 2 INJECTION, SOLUTION INTRAMUSCULAR; INTRAVENOUS at 05:33

## 2017-02-12 RX ADMIN — MORPHINE SULFATE 2 MG: 2 INJECTION, SOLUTION INTRAMUSCULAR; INTRAVENOUS at 01:13

## 2017-02-12 RX ADMIN — ONDANSETRON 4 MG: 2 INJECTION, SOLUTION INTRAMUSCULAR; INTRAVENOUS at 10:26

## 2017-02-12 RX ADMIN — MORPHINE SULFATE 2 MG: 2 INJECTION, SOLUTION INTRAMUSCULAR; INTRAVENOUS at 10:29

## 2017-02-12 RX ADMIN — SODIUM CHLORIDE 1000 ML: 9 INJECTION, SOLUTION INTRAVENOUS at 08:33

## 2017-02-12 RX ADMIN — MORPHINE SULFATE 2 MG: 2 INJECTION, SOLUTION INTRAMUSCULAR; INTRAVENOUS at 10:04

## 2017-02-12 RX ADMIN — LEVOFLOXACIN 500 MG: 5 INJECTION, SOLUTION INTRAVENOUS at 10:35

## 2017-02-12 ASSESSMENT — PAIN DESCRIPTION - DESCRIPTORS: DESCRIPTORS: NAGGING;SHARP

## 2017-02-12 NOTE — ED PROVIDER NOTES
History     Chief Complaint   Patient presents with     Cough     notes dx with pneumonia last night and started on an antibiotic. c/o chest hurts to cough or deep breathe.     HPI  Ihsan Millan is a 26 year old female who was here last evening with pain in her upper right abdomen and under her rib cage.  She was here last night with the same complaints, has had nausea and vomiting again today and is feeling dehydrated.  She states her right chest hurts when she coughs or breathes, that her bowels are working normally and she is having no pain in the LUQ and minimal pain in the epigastrium.  She was placed on Pepcid last night and has taken it as prescribed without any improvement in the right sided pain.  She does not improve with NSAIDs.  She did start Levaquin for a presumed pneumonia diagnosed last night, radiologist did not confirm this diagnosis.  Patient was informed of this.      I have reviewed the Medications, Allergies, Past Medical and Surgical History, and Social History in the Epic system.    Review of Systems   Constitutional: Positive for activity change, appetite change and fatigue. Negative for fever.   HENT: Negative.    Respiratory: Positive for cough. Negative for chest tightness and wheezing.         Pain with deep inspiration in right ribcage.   Cardiovascular: Negative for palpitations.   Gastrointestinal: Positive for nausea, vomiting and abdominal pain. Negative for diarrhea and constipation.   Musculoskeletal: Negative.    Skin: Negative.    Neurological: Negative.    Psychiatric/Behavioral: Positive for dysphoric mood. The patient is nervous/anxious.        Physical Exam   BP: 101/56 mmHg  Heart Rate: 109  Temp: 96.8  F (36  C)  Resp: 18  SpO2: 96 %  Physical Exam   Constitutional: She is oriented to person, place, and time. She appears well-developed and well-nourished. No distress.   HENT:   Head: Normocephalic and atraumatic.   Neck: Normal range of motion. Neck supple.    Pulmonary/Chest: Effort normal and breath sounds normal.   Abdominal: Soft. Bowel sounds are normal. She exhibits no distension. There is tenderness in the right upper quadrant. There is guarding. There is no rebound.   Musculoskeletal: Normal range of motion.   Neurological: She is alert and oriented to person, place, and time.   Skin: Skin is warm and dry.   Psychiatric: Her affect is blunt.   Nursing note and vitals reviewed.      ED Course   Procedures        Labs Ordered and Resulted from Time of ED Arrival Up to the Time of Departure from the ED   CBC WITH PLATELETS DIFFERENTIAL   COMPREHENSIVE METABOLIC PANEL   CRP INFLAMMATION   PERIPHERAL IV CATHETER       Assessments & Plan (with Medical Decision Making)   XR of abdomen shows no air-fluid levels, there is a bolus of stool in the ascending colon.  CXR is again negative.  Pain persists, treated with morphine for pain and Zofran for nausea, right chest pain did not improve with Toradol.  Patient will be admitted to medicine by Dr. Svetlana Benitez for further evaluation and treatment.    I have reviewed the nursing notes.    I have reviewed the findings, diagnosis, plan and need for follow up with the patient.    Current Discharge Medication List          Final diagnoses:   RUQ abdominal pain       2/11/2017   [facility]      Joann Baugh MD  02/11/17 3497

## 2017-02-12 NOTE — PROGRESS NOTES
[facility]  Progress Note            Subjective:   Still having pain. Not as severe. Coughing continues                 Medications:     Current Facility-Administered Medications Ordered in Epic   Medication Dose Route Frequency Last Rate Last Dose     morphine (PF) injection 2-4 mg  2-4 mg Intravenous Q3H PRN   2 mg at 02/12/17 1004     albuterol (PROAIR HFA/PROVENTIL HFA/VENTOLIN HFA) Inhaler 2 puff  2 puff Inhalation Q4H PRN         omeprazole (priLOSEC) CR capsule 20 mg  20 mg Oral QAM AC         naloxone (NARCAN) injection 0.1-0.4 mg  0.1-0.4 mg Intravenous Q2 Min PRN         lidocaine 1 % 1 mL  1 mL Other Q1H PRN         lidocaine (LMX4) kit   Topical Q1H PRN         sodium chloride (PF) 0.9% PF flush 3 mL  3 mL Intracatheter Q1H PRN         sodium chloride (PF) 0.9% PF flush 3 mL  3 mL Intracatheter Q8H         acetaminophen (TYLENOL) tablet 650 mg  650 mg Oral Q4H PRN         ondansetron (ZOFRAN-ODT) ODT tab 4 mg  4 mg Oral Q6H PRN        Or     ondansetron (ZOFRAN) injection 4 mg  4 mg Intravenous Q6H PRN   4 mg at 02/12/17 1026     levofloxacin (LEVAQUIN) infusion 500 mg  500 mg Intravenous Q24H         sodium chloride (PF) 0.9% PF flush 3 mL  3 mL Intracatheter Q1H PRN         sodium chloride (PF) 0.9% PF flush 3 mL  3 mL Intracatheter Q8H   3 mL at 02/11/17 2150     0.9% sodium chloride infusion  1,000 mL Intravenous Continuous 125 mL/hr at 02/12/17 0833 1,000 mL at 02/12/17 0833     influenza quadrivalent (PF) vacc age 3 yrs and older (FLUZONE or Flulaval) injection 0.5 mL  0.5 mL Intramuscular Prior to discharge   0.5 mL at 02/12/17 0957     pneumococcal vaccine (PNEUMOVAX 23-herrera) injection 0.5 mL  0.5 mL Intramuscular Prior to discharge   0.5 mL at 02/12/17 0958     Current Outpatient Prescriptions Ordered in Epic   Medication     levofloxacin (LEVAQUIN) 500 MG tablet     HYDROcodone-acetaminophen (NORCO) 5-325 MG per tablet       Review of Systems:   C: NEGATIVE for fever, chills, change  "in weight  E/M: NEGATIVE for ear, mouth and throat problems  R:as above  CV: NEGATIVE for chest pain, palpitations or peripheral edema               Physical Exam:   Vitals were reviewed  Patient Vitals for the past 24 hrs:   BP Temp Temp src Heart Rate Resp SpO2 Height   02/12/17 0955 101/71 98.3  F (36.8  C) Tympanic 70 18 96 % -   02/11/17 2340 109/78 97.4  F (36.3  C) Tympanic 80 18 99 % 5' 5\" (1.651 m)   02/11/17 2329 95/59 97.6  F (36.4  C) Oral 77 16 98 % -   02/11/17 2315 95/59 - - 77 - 98 % -   02/11/17 2300 99/77 - - 90 - 100 % -   02/11/17 2245 101/65 - - 84 - 100 % -   02/11/17 2230 95/75 - - 80 - 97 % -   02/11/17 2223 - - - - - 99 % -   02/11/17 2222 107/68 - - 90 - - -   02/11/17 2200 102/69 - - 82 - 95 % -   02/11/17 2145 102/73 - - 91 - 95 % -   02/11/17 2130 112/78 - - 92 - 98 % -   02/11/17 2115 107/77 - - 92 - 97 % -   02/11/17 2110 - - - - - 100 % -   02/11/17 2106 - - - - - 96 % -   02/11/17 2105 99/80 - - 80 - - -   02/11/17 1908 101/56 96.8  F (36  C) Tympanic 109 18 96 % -     Constitutional: Awake, alert, cooperative.  Eyes:  sclera clear  Lungs: No increased work of breathing, good air exchange, clear to auscultation bilaterally, no crackles or wheezing.  Cardiovascular: Regular rate and rhythm, normal S1 and S2, no S3 or S4, and no murmur noted.  Abdomen: tender RUQ but no guarding   Neurologic: Awake, alert, oriented to name, place and time.    Neuropsychiatric: Normal affect, mood, orientation, memory and insight.  Skin: No rashes      Peripheral IV 02/11/17 Left (Active)   Site Assessment WDL 2/12/2017  1:16 AM   Line Status Infusing 2/12/2017  1:16 AM   Phlebitis Scale 0-->no symptoms 2/12/2017  1:16 AM   Infiltration Scale 0 2/12/2017  1:16 AM   Extravasation? No 2/11/2017 11:40 PM   Number of days:1     Plan to discontinue (See Orders)         Data:     Results for orders placed or performed during the hospital encounter of 02/11/17 (from the past 24 hour(s))   CBC with platelets " differential   Result Value Ref Range    WBC 4.6 4.0 - 11.0 10e9/L    RBC Count 4.62 3.8 - 5.2 10e12/L    Hemoglobin 14.0 11.7 - 15.7 g/dL    Hematocrit 40.9 35.0 - 47.0 %    MCV 89 78 - 100 fl    MCH 30.3 26.5 - 33.0 pg    MCHC 34.2 31.5 - 36.5 g/dL    RDW 12.9 10.0 - 15.0 %    Platelet Count 186 150 - 450 10e9/L    Diff Method Automated Method     % Neutrophils 51.8 %    % Lymphocytes 38.1 %    % Monocytes 8.0 %    % Eosinophils 1.7 %    % Basophils 0.2 %    % Immature Granulocytes 0.2 %    Nucleated RBCs 0 0 /100    Absolute Neutrophil 2.4 1.6 - 8.3 10e9/L    Absolute Lymphocytes 1.8 0.8 - 5.3 10e9/L    Absolute Monocytes 0.4 0.0 - 1.3 10e9/L    Absolute Eosinophils 0.1 0.0 - 0.7 10e9/L    Absolute Basophils 0.0 0.0 - 0.2 10e9/L    Abs Immature Granulocytes 0.0 0 - 0.4 10e9/L    Absolute Nucleated RBC 0.0    Comprehensive metabolic panel   Result Value Ref Range    Sodium 142 133 - 144 mmol/L    Potassium 3.8 3.4 - 5.3 mmol/L    Chloride 108 94 - 109 mmol/L    Carbon Dioxide 27 20 - 32 mmol/L    Anion Gap 7 3 - 14 mmol/L    Glucose 74 70 - 99 mg/dL    Urea Nitrogen 11 7 - 30 mg/dL    Creatinine 0.86 0.52 - 1.04 mg/dL    GFR Estimate 80 >60 mL/min/1.7m2    GFR Estimate If Black >90   GFR Calc   >60 mL/min/1.7m2    Calcium 9.2 8.5 - 10.1 mg/dL    Bilirubin Total 0.3 0.2 - 1.3 mg/dL    Albumin 4.1 3.4 - 5.0 g/dL    Protein Total 7.4 6.8 - 8.8 g/dL    Alkaline Phosphatase 75 40 - 150 U/L    ALT 18 0 - 50 U/L    AST 7 0 - 45 U/L   CRP inflammation   Result Value Ref Range    CRP Inflammation 5.2 0.0 - 8.0 mg/L   XR Abdomen 2 Views    Narrative    FLAT AND UPRIGHT VIEWS OF ABDOMEN    REPORT:  There is a normal intestinal gas pattern.  No extraluminal  gas or pathologic intra-abdominal calcifications.    IMPRESSION:  NORMAL ABDOMINAL GAS PATTERN.  Exam Date: Feb 11, 2017 10:18:46 PM  Author: KAYLA WYNNE  This report is preliminary and transcribed     XR Chest 2 Views    Narrative    CHEST TWO  VIEWS    REPORT: The lungs are clear.  Heart and pulmonary vessels are within  normal limits.    IMPRESSION:  NO EVIDENCE OF ACTIVE CARDIOPULMONARY DISEASE.  Exam Date: Feb 11, 2017 10:18:50 PM  Author: KAYLA WYNNE  This report is preliminary and transcribed       All cardiac studies reviewed by me.  All imaging studies reviewed by me.         Assessment and Plan:   Principal Problem:    Pneumonia of right middle lobe due to infectious organism  Likely referred source of her ruq pain.will discharge on po levaquin. Vital signs stable. Nl wbc. Plan in place for f/u  Active Problems:    RUQ abdominal pain    As above    Tobacco abuse  Encouraged cessation

## 2017-02-12 NOTE — PROGRESS NOTES
Ihsan Millan 26 year old    Returned from di  Exam performed: cxrs abd xr  Tolerated Procedure: well  May resume previous diet: Yes  Pushed to radiologist reading service? Not applicable  Time returned to unit: 2224  Handoff Method: Call Light on and in reach of patient   Was patient held? NO  If yes, by whom? Technologist NAME   2/11/2017 10:24 PM  Tammy Faust

## 2017-02-12 NOTE — DISCHARGE SUMMARY
DISCHARGE DIAGNOSES:   1.  Right upper quadrant pain likely secondary to right middle lobe pneumonia.   2.  History of gastroesophageal reflux disease.   3.  Tobacco abuse.      DISCHARGE MEDICATIONS:   1.  Levaquin was initially prescribed 750 mg in the emergency room.  She will finish those 4 doses and then she will be prescribed 500 mg for 5 additional doses.  (The reason for the dosage change is due to lack of insurance and the 500 mg should be more reasonably priced for her).    2.  ProAir as needed.   3.  Omeprazole 20mg daily  4. Compazine 5mg 1 po q 6hrs prn      PROCEDURES PERFORMED AND FINDINGS DURING THIS HOSPITALIZATION:  None but again the patient did have an ultrasound in the emergency room the day before discharge, which was normal and chest x-ray was normal but abdominal CT scan showed a right middle lobe infiltrate.      DISCHARGE DIET:  Clear liquid diet for the next several days until she is feeling better, then advance diet as tolerated.      DISCHARGE ACTIVITY:  As tolerated.      FOLLOWUP:  Scheduled with Dr. Houston in 2 weeks.      HOSPITAL COURSE:  Ihsan Millan is a 26-year-old female that came in with right upper quadrant pain.  Evaluation was strictly geared toward the intra-abdominal pathology.  However, the CT scan showed the right middle lobe infiltrate.  She was put on Levaquin just for 5 days.  She came back the next day for increasing pain.  She was admitted because of unknown etiology but in discussion with the patient, she has had just feeling of sickness, cough.  When she eats she vomits.  I felt that her symptoms were most consistent with her pneumonia.  Her lung exam was unremarkable.  Her vital signs were stable.  I did inform the patient after completing a course of antibiotics for 10 days if her symptoms continue she will possibly need a HIDA scan to rule out biliary dyskinesia and/or upper endoscopy to rule out gastritis or peptic ulcer disease.  In the meantime, the  patient will stay on PPI therapy.         SCARLETT PEREZ MD             D: 2017 10:29   T: 2017 11:06   MT: [email or web address]      Name:     LEX BALLARD   MRN:      [id number]        Account:        [id number]   :      1990           Admit Date:                                       Discharge Date:       Document: [id number]

## 2017-02-12 NOTE — DISCHARGE INSTRUCTIONS
*Finish taking Levaquin 750mg tablets as prescribed, when completed; then take Levaquin 500mg tablets as prescribed.     F/u Dr. Houston in 1-2 wks, call Monday to make your appointment [phone]

## 2017-02-12 NOTE — PROGRESS NOTES
Into see Ihsan, assessment as per flow sheet.     Ihsan is dc today home. Would like to go asap. She currently does not have any insurance. Was on [employer] but was recently dropped and has the paperwork to re apply. Her concern is for prescription needs. Nursing reported they had called pharmacy to request meds sent home. Pharmacy is not able to send meds with patient as local community pharmacies are open. Call to [facility], [place] rx for 20 tabs 22.69. Informed patient. She would like Zofran, a couple tabs. Alerted MD, obliged with request. Called [facility] for quote 18.49 for 20 tabs (as prescribed). Upon return to patients room after obaining this info, patient had already dc.

## 2017-02-12 NOTE — H&P
CHIEF COMPLAINT:  Abdominal pain.      SUBJECTIVE:  Ihsan Millan is a 26-year-old female who has had abdominal pain in the right upper quadrant for the last week.  According to the patient, if she eats anything solid, she will completely regurgitate undigested food.  The pain will escalate.  If she does not eat and only drinks liquids it is much better.  She has had no associated fever.  The pain does radiate from the right upper quadrant to the right mid back.  She has a tendency to be somewhat constipated because of irritable bowel but that has not changed in the setting of her abdominal pain.  Stools are the same color.  There has been no blood or mucus.  She has not vomited blood.  She had a history of GERD but does not feel her symptoms are related to that.  She has been consistently taking her omeprazole daily.  She is a smoker of 1/2 pack per day.  No alcohol use.  She has not had any foreign travel and no one else at home has similar symptoms.      She presented to the emergency room.  Labs were done which were normal.  Chest x-ray showed a questionable infiltrate but Radiology disagreed and that was read as normal.  She did have both an ultrasound of the abdomen and a CT scan of the abdomen.  The ultrasound of the abdomen was negative.  However, the CT scan of the abdomen showed a right middle lobe infiltrate suspicious for pneumonia which was different than her CAT scan of 05/2016.  She was started on Levaquin by Dr. Figueroa  and sent home, but her symptoms did not improve.      PAST MEDICAL HISTORY:   1.  G2, P2.   2.  Tobacco abuse.   3.  Possible history of endometriosis.  She has had a hysterectomy, but she has been getting Lupron injections every 3 months.  This is not new and she does not think it is associated with her symptoms.      ALLERGIES:  Codeine, penicillin and tramadol.      FAMILY HISTORY:  Positive for asthma.      SOCIAL HISTORY:  Lives locally, has 2 small children at home.       REVIEW OF SYSTEMS:   HEENT:  No headache.   EYES:  Glasses.   THROAT:  No issues.   EARS:  No concerns.   CHEST:  No chest pain or palpitations.   LUNGS:  Positive for cough.   ABDOMEN:  See above.   UROLOGIC AND GYNECOLOGIC:  Patient has a questionable history of endometriosis.   MENTAL STATUS:  Mood is stable.    NEUROLOGIC:  No dizziness.      PHYSICAL EXAMINATION:     VITAL SIGNS:  Temperature is 98.3.  Heart rate is 70, blood pressure is 101/71, respiratory rate is 18, O2 sats are 96%.   GENERAL:  Alert female in no acute distress.   HEENT:  NT/AC.  Eyes without injection.  TMs are clear.  Oral mucosa is slightly dry.     NECK:  Without adenopathy.   LUNGS:  Slightly diminished at the right base, otherwise good air movement.  No wheezes, crackles or rhonchi.   CV: Regular rate and rhythm. No murmur  ABDOMEN:  Slightly tender in the right upper quadrant but no masses, guarding or rebound tenderness.   EXTREMITIES:  Without edema.   NEUROLOGIC:  Alert and oriented x3.   MENTAL STATUS:  Reveals a flat mood.      LABORATORY DATA:  Sodium 142, potassium is 3.8, BUN is 11, creatinine is 0.86, glucose 74.  White count of 4.6, hemoglobin 14.0.      ASSESSMENT AND PLAN:   1.  This is a 26-year-old female who presents with atypical right upper quadrant pain with a negative evaluation for intra-abdominal pathology.  However, the patient could have biliary dyskinesia.  Unfortunately, at the current time, she does not have insurance and I did discuss the possibility of doing an outpatient HIDA scan.  She does have a history of acid reflux.  Possible peptic ulcer disease could also be contributing to her symptoms.  I encouraged her to continue with proton pump inhibitor therapy and then adding an H2 blocker.  Will not do Carafate as this would be too expensive for the patient and not absolutely necessary.  Chest x-ray did not show an infiltrate, but the CAT scan did show a right middle lobe pneumonia and so this is the  most feasible thing that is probably causing her right upper quadrant pain.  We will continue the outpatient treatment with Levaquin.  This would need to be for 10 days, not 5 as indicated in the emergency room.  Strongly encouraged smoking cessation.  If her symptoms do not improve, she may at some point need an upper endoscopy, but the patient was urged to get insurance so these other testing modalities could be done.   2.  Tobacco abuse.  Strongly encouraged cessation.   3.  History of gastroesophageal reflux disease.  Continue omeprazole.   4.  Expected length of hospitalization less than or equal to 2 days.   5.  Code status:  Patient is full code.   6.  Deep venous thrombosis prophylaxis.  The patient is low risk.         SCARLETT PEREZ MD             D: 2017 10:12   T: 2017 11:22   MT: GIANCARLO      Name:     LEX BALLARD   MRN:      [id number]        Account:      [id number]   :      1990           Admitted:     [id number]      Document: [id number]

## 2017-02-12 NOTE — PLAN OF CARE
Problem: Goal Outcome Summary  Goal: Goal Outcome Summary  Outcome: No Change  Pt alert and oriented, needs being met, using call light, transferring independently. Pt complained of pain x2, IV pain meds given x2, with pt follow up assessment pt sleeping. Pt complains of RUQ abdominal pain. Pt had no falls or injuries this shift. Will continue to monitor. Face to face report given with opportunity to observe patient.    Report given to Moriah Bryson   2/12/2017  8:20 AM

## 2017-02-12 NOTE — DOWNTIME EVENT NOTE
The EMR was down for 4.5 hours on 2/12/2017.    Esvin Bryson was responsible for completing the paper charting during this time period.     The following information was re-entered into the system by Esvin Bryson: MAR    The following information will remain in the paper chart: None    Esvin Bryson  2/12/2017

## 2017-02-12 NOTE — PLAN OF CARE
[facility] Inpatient Admission Note:    Patient admitted to 3228/3228-1 at approximately 2335  via bed accompanied by transport tech, son and sister from emergency room . Report received from Liliana VASQUEZ in SBAR format at 2310 via telephone. Patient ambulated to bed via self.. Patient is alert and oriented X 3, reports pain; rates at 7 on 0-10 scale.  Patient oriented to room, unit, hourly rounding, and plan of care. Explained admission packet and patient handbook with patient bill of rights brochure. Will continue to monitor and document as needed.     Inpatient Nursing criteria listed below was met:      Health care directives status obtained and documented: Yes      Care Everywhere authorization obtained No      MRSA swab completed for patient 65 years and older: N/A      Patient identifies a surrogate decision maker: Yes If yes, who:Al Contact Information:[phone]      Core Measure diagnosis present:No       If initial lactic acid >2.0, repeat lactic acid drawn within one hour of arrival to unit: No. If no, state reason: Not completed in ER      Vaccination assessment and education completed: Yes   Vaccinations received prior to admission: Pneumovax no  Influenza(seasonal)  NO   Vaccination(s) ordered: influenza vaccine, pneumococcal vaccine      Clergy visit ordered if patient requests: No      Skin issues/needs documented: Yes      Isolation Patient: no Education given, correct sign in place and documentation row added to PCS:  No      Fall Prevention No: Care plan updated, education given and documented, sticker and magnet in place: No      Care Plan initiated: Yes      Education Documented (including assessment): Yes      Patient has discharge needs : No

## 2017-02-12 NOTE — PLAN OF CARE
Problem: Goal Outcome Summary  Goal: Goal Outcome Summary  Outcome: Adequate for Discharge Date Met:  02/12/17  Patient discharged at 11:45PM via wheel chair accompanied by sister and staff. Prescriptions sent with patient to fill . All belongings sent with patient.      Discharge instructions reviewed with pt. Listed belongings gathered and returned to patient.      Patient discharged to home  Report called to n/a     Core Measures and Vaccines  Core Measures applicable during stay: No. If yes, state diagnosis:   Pneumonia and Influenza given prior to discharge, if indicated: N/A     Surgical Patient   Surgical Procedures during stay: n/a  Did patient receive discharge instruction on wound care and recognition of infection symptoms? N/A     MISC  Follow up appointment made:  No  Home and hospital aquired medications returned to patient: N/A  Patient reports pain was well managed at discharge: Yes

## 2017-02-17 LAB
BACTERIA SPEC CULT: NORMAL
BACTERIA SPEC CULT: NORMAL
Lab: NORMAL
Lab: NORMAL
MICRO REPORT STATUS: NORMAL
MICRO REPORT STATUS: NORMAL
SPECIMEN SOURCE: NORMAL
SPECIMEN SOURCE: NORMAL

## 2017-03-13 DIAGNOSIS — R10.11 RUQ ABDOMINAL PAIN: Primary | ICD-10-CM

## 2017-03-27 ENCOUNTER — HOSPITAL ENCOUNTER (OUTPATIENT)
Dept: NUCLEAR MEDICINE | Facility: HOSPITAL | Age: 27
Discharge: HOME | End: 2017-03-27
Attending: FAMILY MEDICINE | Admitting: FAMILY MEDICINE
Payer: MEDICAID

## 2017-03-27 PROCEDURE — 78227 HEPATOBIL SYST IMAGE W/DRUG: CPT | Mod: TC

## 2017-03-27 PROCEDURE — A9537 TC99M MEBROFENIN: HCPCS | Mod: TC

## 2017-03-27 RX ORDER — KIT FOR THE PREPARATION OF TECHNETIUM TC 99M MEBROFENIN 45 MG/10ML
5 INJECTION, POWDER, LYOPHILIZED, FOR SOLUTION INTRAVENOUS ONCE
Status: COMPLETED | OUTPATIENT
Start: 2017-03-27 | End: 2017-03-27

## 2017-03-27 RX ADMIN — KIT FOR THE PREPARATION OF TECHNETIUM TC 99M MEBROFENIN 5 MILLICURIE: 45 INJECTION, POWDER, LYOPHILIZED, FOR SOLUTION INTRAVENOUS at 12:21

## 2017-04-14 ENCOUNTER — OFFICE VISIT (OUTPATIENT)
Dept: OBGYN | Facility: OTHER | Age: 27
End: 2017-04-14
Attending: OBSTETRICS & GYNECOLOGY
Payer: COMMERCIAL

## 2017-04-14 VITALS
HEART RATE: 85 BPM | WEIGHT: 120 LBS | DIASTOLIC BLOOD PRESSURE: 64 MMHG | SYSTOLIC BLOOD PRESSURE: 110 MMHG | HEIGHT: 65 IN | BODY MASS INDEX: 19.99 KG/M2

## 2017-04-14 DIAGNOSIS — N80.9 ENDOMETRIOSIS: ICD-10-CM

## 2017-04-14 DIAGNOSIS — R10.2 PELVIC PAIN IN FEMALE: Primary | ICD-10-CM

## 2017-04-14 PROCEDURE — 99213 OFFICE O/P EST LOW 20 MIN: CPT | Performed by: OBSTETRICS & GYNECOLOGY

## 2017-04-14 ASSESSMENT — PAIN SCALES - GENERAL: PAINLEVEL: NO PAIN (0)

## 2017-04-14 NOTE — MR AVS SNAPSHOT
"              After Visit Summary   4/14/2017    Ihsan Millan    MRN: [id number]           Patient Information     Date Of Birth          1990        Visit Information        Provider Department      4/14/2017 11:30 AM Rodolfo Clifford MD [facility]        Today's Diagnoses     Pelvic pain in female    -  1    Endometriosis          Care Instructions    NPO after midnight.        Follow-ups after your visit        Who to contact     If you have questions or need follow up information about today's clinic visit or your schedule please contact [facility] directly at [phone].  Normal or non-critical lab and imaging results will be communicated to you by [employer], letter or phone within 4 business days after the clinic has received the results. If you do not hear from us within 7 days, please contact the clinic through [place] or phone. If you have a critical or abnormal lab result, we will notify you by phone as soon as possible.  Submit refill requests through [employer] or call your pharmacy and they will forward the refill request to us. Please allow 3 business days for your refill to be completed.          Additional Information About Your Visit        MyChart Information     [employer] gives you secure access to your electronic health record. If you see a primary care provider, you can also send messages to your care team and make appointments. If you have questions, please call your primary care clinic.  If you do not have a primary care provider, please call [phone] and they will assist you.        Care EveryWhere ID     This is your Care EveryWhere ID. This could be used by other organizations to access your [facility] medical records  [id number]        Your Vitals Were     Pulse Height Last Period BMI (Body Mass Index)          85 1.651 m (5' 5\") (LMP Unknown) 19.97 kg/m2         Blood Pressure from Last 3 Encounters:   04/14/17 110/64   02/12/17 101/71   02/10/17 " 110/70    Weight from Last 3 Encounters:   04/14/17 54.4 kg (120 lb)   01/10/17 65.8 kg (145 lb)   01/03/17 65.8 kg (145 lb)              Today, you had the following     No orders found for display       Primary Care Provider Office Phone # Fax #    Temo Houston [phone]       [facility] [address] [facility] [address]        Thank you!     Thank you for choosing [facility]  for your care. Our goal is always to provide you with excellent care. Hearing back from our patients is one way we can continue to improve our services. Please take a few minutes to complete the written survey that you may receive in the mail after your visit with us. Thank you!             Your Updated Medication List - Protect others around you: Learn how to safely use, store and throw away your medicines at www.disposemymeds.org.          This list is accurate as of: 4/14/17 12:02 PM.  Always use your most recent med list.                   Brand Name Dispense Instructions for use    albuterol 108 (90 BASE) MCG/ACT Inhaler    PROAIR HFA/PROVENTIL HFA/VENTOLIN HFA    1 Inhaler    Inhale 2 puffs into the lungs every 4 hours as needed for shortness of breath / dyspnea or wheezing       OMEPRAZOLE PO      Take 20 mg by mouth every morning

## 2017-04-14 NOTE — PROGRESS NOTES
S: f/u pelvic pain/endometriosis.  See my prior evals.  Pt with significant pain improvement on Depo-Lupron.  Currently denies pelvic pain/dyspareunia/significant vasomotor sx, vb etc.  Prior LAVH/BS and Right oophorectomy.          Patient Active Problem List   Diagnosis     GERD (gastroesophageal reflux disease)     Intermittent asthma     Moderate persistent asthma     H. pylori infection     ASCUS on Pap smear     Postpartum depression     Endometriosis     Surgery, elective     Status post laparoscopic assisted vaginal hysterectomy (LAVH)     RUQ abdominal pain     Pneumonia of right middle lobe due to infectious organism     Tobacco abuse            Past Medical History:   Diagnosis Date     Asthma      GERD (gastroesophageal reflux disease)      H. pylori infection             Past Surgical History:   Procedure Laterality Date     COLONOSCOPY N/A 11/6/2014    Procedure: COLONOSCOPY;  Surgeon: Zacarias Paz MD;  Location: [facility]     DILATION AND CURETTAGE, HYSTEROSCOPY DIAGNOSTIC, COMBINED  8/1/2014    Procedure: COMBINED DILATION AND CURETTAGE, HYSTEROSCOPY DIAGNOSTIC;  Surgeon: Rodolfo Clifford MD;  Location: [facility]     ESOPHAGOSCOPY, GASTROSCOPY, DUODENOSCOPY (EGD), COMBINED N/A 9/5/2014    Procedure: COMBINED ESOPHAGOSCOPY, GASTROSCOPY, DUODENOSCOPY (EGD);  Surgeon: Zacarias Paz MD;  Location: [facility]     LAPAROSCOPIC ASSISTED HYSTERECTOMY VAGINAL N/A 6/24/2015    Procedure: LAPAROSCOPIC ASSISTED HYSTERECTOMY VAGINAL;  Surgeon: Rodolfo Clifford MD;  Location: [facility]     LAPAROSCOPIC OOPHORECTOMY Right 3/23/2016    Procedure: LAPAROSCOPIC OOPHORECTOMY;  Surgeon: Rodolfo Clifford MD;  Location: [facility]     LAPAROSCOPY DIAGNOSTIC (GYN) N/A 3/18/2015    Procedure: LAPAROSCOPY DIAGNOSTIC (GYN);  Surgeon: Rodolfo Clifford MD;  Location: [facility] OR     no surgeries       SALPINGECTOMY Bilateral 6/24/2015    Procedure: SALPINGECTOMY;  Surgeon: Rodolfo Clifford MD;  Location: [facility]            Social History   Substance Use  "Topics     Smoking status: Current Every Day Smoker     Packs/day: 0.50     Years: 7.00     Types: Cigarettes     Smokeless tobacco: Never Used      Comment: declines quitline referral     Alcohol use Yes      Comment: social            Family History   Problem Relation Age of Onset     Asthma Mother      Unknown/Adopted Maternal Grandmother      Unknown/Adopted Maternal Grandfather                Allergies   Allergen Reactions     Codeine Sulfate Rash     Penicillins Rash     Tramadol Rash            Current Outpatient Prescriptions   Medication Sig Dispense Refill     OMEPRAZOLE PO Take 20 mg by mouth every morning       albuterol (PROAIR HFA, PROVENTIL HFA, VENTOLIN HFA) 108 (90 BASE) MCG/ACT inhaler Inhale 2 puffs into the lungs every 4 hours as needed for shortness of breath / dyspnea or wheezing 1 Inhaler 2          Review Of Systems  Constitutional:  Denies fever  GI/ negative except as noted per hpi    O:   /64  Pulse 85  Ht 1.651 m (5' 5\")  Wt 54.4 kg (120 lb)  LMP  (LMP Unknown)  BMI 19.97 kg/m2  Gen:  NAD, A and O  Abd soft, NT, ND         A:  Chronic pelvic pain/endometriosis.  Improved on Depo-Lupron therapy.    P:  Discussed options of continued Dept-Lupron therapy vs surgical management (laparascopic left oophorectomy).  We discussed long term menopausal risks with premature surgical menopause (CAD, osteoporosis, increased mortality) and need for ERT given young age to mitigate those risks.   Pt would like to proceed with definitive surgical treatment.  Reviewed goals, risks, alternatives for planned procedure.  Including risk of bleeding, infection, damage to nerves, blood vessels, bowel and bladder. Discussed recovery period and expected discomfort..  Discussed possible continued abdominal/pelvic pain from non-gyn causes.   All questions were answered.  Preoperative instructions discussed.  NPO after midnight.  20 minutes were spent with the patient with greater than 50% of the visit " spent in face-to-face counseling and coordination of care.  Surgery scheduled May 3 and she will call for preop appt with Dr. Houston.

## 2017-04-19 DIAGNOSIS — N80.9 ENDOMETRIOSIS: Primary | ICD-10-CM

## 2017-04-19 DIAGNOSIS — R10.2 PELVIC PAIN IN FEMALE: ICD-10-CM

## 2017-04-21 ENCOUNTER — TRANSFERRED RECORDS (OUTPATIENT)
Dept: HEALTH INFORMATION MANAGEMENT | Facility: HOSPITAL | Age: 27
End: 2017-04-21

## 2017-04-21 LAB
ALT SERPL-CCNC: 29 U/L (ref 18–65)
AST SERPL-CCNC: 12 U/L (ref 10–30)
CREAT SERPL-MCNC: 0.88 MG/DL (ref 0.7–1.2)
GLUCOSE SERPL-MCNC: 76 MG/DL (ref 60–99)
POTASSIUM SERPL-SCNC: 4.2 MEQ/L (ref 3.5–5.1)

## 2017-05-02 RX ORDER — PROMETHAZINE HYDROCHLORIDE 12.5 MG/1
12.5 TABLET ORAL EVERY 6 HOURS PRN
COMMUNITY
End: 2017-05-18

## 2017-05-02 RX ORDER — VENLAFAXINE HYDROCHLORIDE 75 MG/1
75 CAPSULE, EXTENDED RELEASE ORAL DAILY
COMMUNITY
End: 2017-08-02

## 2017-05-03 ENCOUNTER — ANESTHESIA (OUTPATIENT)
Dept: SURGERY | Facility: HOSPITAL | Age: 27
End: 2017-05-03
Payer: COMMERCIAL

## 2017-05-03 ENCOUNTER — ANESTHESIA EVENT (OUTPATIENT)
Dept: SURGERY | Facility: HOSPITAL | Age: 27
End: 2017-05-03
Payer: COMMERCIAL

## 2017-05-03 ENCOUNTER — HOSPITAL ENCOUNTER (OUTPATIENT)
Facility: HOSPITAL | Age: 27
Discharge: HOME | End: 2017-05-03
Attending: OBSTETRICS & GYNECOLOGY | Admitting: OBSTETRICS & GYNECOLOGY
Payer: COMMERCIAL

## 2017-05-03 ENCOUNTER — SURGERY (OUTPATIENT)
Age: 27
End: 2017-05-03

## 2017-05-03 VITALS
HEART RATE: 91 BPM | TEMPERATURE: 98.5 F | SYSTOLIC BLOOD PRESSURE: 96 MMHG | OXYGEN SATURATION: 98 % | BODY MASS INDEX: 22.02 KG/M2 | HEIGHT: 64 IN | WEIGHT: 129 LBS | DIASTOLIC BLOOD PRESSURE: 65 MMHG | RESPIRATION RATE: 18 BRPM

## 2017-05-03 DIAGNOSIS — Z41.9 SURGERY, ELECTIVE: Primary | ICD-10-CM

## 2017-05-03 LAB
ABO + RH BLD: NORMAL
ABO + RH BLD: NORMAL
BLD GP AB SCN SERPL QL: NORMAL
BLOOD BANK CMNT PATIENT-IMP: NORMAL
SPECIMEN EXP DATE BLD: NORMAL

## 2017-05-03 PROCEDURE — 40000306 ZZH STATISTIC PRE PROC ASSESS II: Performed by: OBSTETRICS & GYNECOLOGY

## 2017-05-03 PROCEDURE — 25000125 ZZHC RX 250: Performed by: ANESTHESIOLOGY

## 2017-05-03 PROCEDURE — 25000566 ZZH SEVOFLURANE, EA 15 MIN: Performed by: ANESTHESIOLOGY

## 2017-05-03 PROCEDURE — 25000125 ZZHC RX 250: Performed by: NURSE ANESTHETIST, CERTIFIED REGISTERED

## 2017-05-03 PROCEDURE — 36000056 ZZH SURGERY LEVEL 3 1ST 30 MIN: Performed by: OBSTETRICS & GYNECOLOGY

## 2017-05-03 PROCEDURE — 86901 BLOOD TYPING SEROLOGIC RH(D): CPT | Performed by: OBSTETRICS & GYNECOLOGY

## 2017-05-03 PROCEDURE — 25800025 ZZH RX 258: Performed by: ANESTHESIOLOGY

## 2017-05-03 PROCEDURE — 25000125 ZZHC RX 250: Performed by: OBSTETRICS & GYNECOLOGY

## 2017-05-03 PROCEDURE — 37000008 ZZH ANESTHESIA TECHNICAL FEE, 1ST 30 MIN: Performed by: OBSTETRICS & GYNECOLOGY

## 2017-05-03 PROCEDURE — 25000128 H RX IP 250 OP 636: Performed by: NURSE ANESTHETIST, CERTIFIED REGISTERED

## 2017-05-03 PROCEDURE — 27110028 ZZH OR GENERAL SUPPLY NON-STERILE: Performed by: OBSTETRICS & GYNECOLOGY

## 2017-05-03 PROCEDURE — 01999 UNLISTED ANES PROCEDURE: CPT | Performed by: NURSE ANESTHETIST, CERTIFIED REGISTERED

## 2017-05-03 PROCEDURE — 86850 RBC ANTIBODY SCREEN: CPT | Performed by: OBSTETRICS & GYNECOLOGY

## 2017-05-03 PROCEDURE — 27210794 ZZH OR GENERAL SUPPLY STERILE: Performed by: OBSTETRICS & GYNECOLOGY

## 2017-05-03 PROCEDURE — 71000027 ZZH RECOVERY PHASE 2 EACH 15 MINS: Performed by: OBSTETRICS & GYNECOLOGY

## 2017-05-03 PROCEDURE — S0077 INJECTION, CLINDAMYCIN PHOSP: HCPCS | Performed by: OBSTETRICS & GYNECOLOGY

## 2017-05-03 PROCEDURE — 58661 LAPAROSCOPY REMOVE ADNEXA: CPT | Performed by: OBSTETRICS & GYNECOLOGY

## 2017-05-03 PROCEDURE — 37000009 ZZH ANESTHESIA TECHNICAL FEE, EACH ADDTL 15 MIN: Performed by: OBSTETRICS & GYNECOLOGY

## 2017-05-03 PROCEDURE — 71000015 ZZH RECOVERY PHASE 1 LEVEL 2 EA ADDTL HR: Performed by: OBSTETRICS & GYNECOLOGY

## 2017-05-03 PROCEDURE — 25000128 H RX IP 250 OP 636: Performed by: OBSTETRICS & GYNECOLOGY

## 2017-05-03 PROCEDURE — 25000132 ZZH RX MED GY IP 250 OP 250 PS 637

## 2017-05-03 PROCEDURE — 88305 TISSUE EXAM BY PATHOLOGIST: CPT | Mod: TC | Performed by: OBSTETRICS & GYNECOLOGY

## 2017-05-03 PROCEDURE — 58661 LAPAROSCOPY REMOVE ADNEXA: CPT | Performed by: ANESTHESIOLOGY

## 2017-05-03 PROCEDURE — 25000128 H RX IP 250 OP 636: Performed by: ANESTHESIOLOGY

## 2017-05-03 PROCEDURE — 71000014 ZZH RECOVERY PHASE 1 LEVEL 2 FIRST HR: Performed by: OBSTETRICS & GYNECOLOGY

## 2017-05-03 PROCEDURE — 36000058 ZZH SURGERY LEVEL 3 EA 15 ADDTL MIN: Performed by: OBSTETRICS & GYNECOLOGY

## 2017-05-03 PROCEDURE — 86900 BLOOD TYPING SEROLOGIC ABO: CPT | Performed by: OBSTETRICS & GYNECOLOGY

## 2017-05-03 RX ORDER — IBUPROFEN 800 MG/1
800 TABLET, FILM COATED ORAL EVERY 8 HOURS PRN
Qty: 40 TABLET | Refills: 1 | Status: SHIPPED | OUTPATIENT
Start: 2017-05-03 | End: 2017-08-12

## 2017-05-03 RX ORDER — ONDANSETRON 2 MG/ML
4 INJECTION INTRAMUSCULAR; INTRAVENOUS EVERY 30 MIN PRN
Status: DISCONTINUED | OUTPATIENT
Start: 2017-05-03 | End: 2017-05-03 | Stop reason: HOSPADM

## 2017-05-03 RX ORDER — GLYCOPYRROLATE 0.2 MG/ML
INJECTION, SOLUTION INTRAMUSCULAR; INTRAVENOUS PRN
Status: DISCONTINUED | OUTPATIENT
Start: 2017-05-03 | End: 2017-05-03

## 2017-05-03 RX ORDER — FENTANYL CITRATE 50 UG/ML
25-50 INJECTION, SOLUTION INTRAMUSCULAR; INTRAVENOUS
Status: DISCONTINUED | OUTPATIENT
Start: 2017-05-03 | End: 2017-05-03 | Stop reason: HOSPADM

## 2017-05-03 RX ORDER — ONDANSETRON 2 MG/ML
INJECTION INTRAMUSCULAR; INTRAVENOUS PRN
Status: DISCONTINUED | OUTPATIENT
Start: 2017-05-03 | End: 2017-05-03

## 2017-05-03 RX ORDER — HYDROCODONE BITARTRATE AND ACETAMINOPHEN 7.5; 325 MG/1; MG/1
1 TABLET ORAL ONCE
Status: CANCELLED | OUTPATIENT
Start: 2017-05-03

## 2017-05-03 RX ORDER — SCOLOPAMINE TRANSDERMAL SYSTEM 1 MG/1
1 PATCH, EXTENDED RELEASE TRANSDERMAL ONCE
Status: COMPLETED | OUTPATIENT
Start: 2017-05-03 | End: 2017-05-03

## 2017-05-03 RX ORDER — DEXAMETHASONE SODIUM PHOSPHATE 10 MG/ML
INJECTION, SOLUTION INTRAMUSCULAR; INTRAVENOUS PRN
Status: DISCONTINUED | OUTPATIENT
Start: 2017-05-03 | End: 2017-05-03

## 2017-05-03 RX ORDER — HYDROMORPHONE HYDROCHLORIDE 1 MG/ML
.3-.5 INJECTION, SOLUTION INTRAMUSCULAR; INTRAVENOUS; SUBCUTANEOUS EVERY 10 MIN PRN
Status: DISCONTINUED | OUTPATIENT
Start: 2017-05-03 | End: 2017-05-03 | Stop reason: HOSPADM

## 2017-05-03 RX ORDER — NALOXONE HYDROCHLORIDE 0.4 MG/ML
.1-.4 INJECTION, SOLUTION INTRAMUSCULAR; INTRAVENOUS; SUBCUTANEOUS
Status: DISCONTINUED | OUTPATIENT
Start: 2017-05-03 | End: 2017-05-03 | Stop reason: HOSPADM

## 2017-05-03 RX ORDER — FENTANYL CITRATE 50 UG/ML
INJECTION, SOLUTION INTRAMUSCULAR; INTRAVENOUS PRN
Status: DISCONTINUED | OUTPATIENT
Start: 2017-05-03 | End: 2017-05-03

## 2017-05-03 RX ORDER — KETOROLAC TROMETHAMINE 30 MG/ML
30 INJECTION, SOLUTION INTRAMUSCULAR; INTRAVENOUS EVERY 6 HOURS PRN
Status: DISCONTINUED | OUTPATIENT
Start: 2017-05-03 | End: 2017-05-03 | Stop reason: HOSPADM

## 2017-05-03 RX ORDER — KETOROLAC TROMETHAMINE 30 MG/ML
30 INJECTION, SOLUTION INTRAMUSCULAR; INTRAVENOUS ONCE
Status: COMPLETED | OUTPATIENT
Start: 2017-05-03 | End: 2017-05-03

## 2017-05-03 RX ORDER — MEPERIDINE HYDROCHLORIDE 25 MG/ML
12.5 INJECTION INTRAMUSCULAR; INTRAVENOUS; SUBCUTANEOUS
Status: DISCONTINUED | OUTPATIENT
Start: 2017-05-03 | End: 2017-05-03 | Stop reason: HOSPADM

## 2017-05-03 RX ORDER — HYDRALAZINE HYDROCHLORIDE 20 MG/ML
2.5-5 INJECTION INTRAMUSCULAR; INTRAVENOUS EVERY 10 MIN PRN
Status: DISCONTINUED | OUTPATIENT
Start: 2017-05-03 | End: 2017-05-03 | Stop reason: HOSPADM

## 2017-05-03 RX ORDER — PROMETHAZINE HYDROCHLORIDE 25 MG/ML
12.5 INJECTION, SOLUTION INTRAMUSCULAR; INTRAVENOUS
Status: DISCONTINUED | OUTPATIENT
Start: 2017-05-03 | End: 2017-05-03 | Stop reason: HOSPADM

## 2017-05-03 RX ORDER — ALBUTEROL SULFATE 0.83 MG/ML
2.5 SOLUTION RESPIRATORY (INHALATION) EVERY 4 HOURS PRN
Status: DISCONTINUED | OUTPATIENT
Start: 2017-05-03 | End: 2017-05-03 | Stop reason: HOSPADM

## 2017-05-03 RX ORDER — DEXAMETHASONE SODIUM PHOSPHATE 4 MG/ML
4 INJECTION, SOLUTION INTRA-ARTICULAR; INTRALESIONAL; INTRAMUSCULAR; INTRAVENOUS; SOFT TISSUE EVERY 10 MIN PRN
Status: DISCONTINUED | OUTPATIENT
Start: 2017-05-03 | End: 2017-05-03 | Stop reason: HOSPADM

## 2017-05-03 RX ORDER — SODIUM CHLORIDE, SODIUM LACTATE, POTASSIUM CHLORIDE, CALCIUM CHLORIDE 600; 310; 30; 20 MG/100ML; MG/100ML; MG/100ML; MG/100ML
INJECTION, SOLUTION INTRAVENOUS CONTINUOUS
Status: DISCONTINUED | OUTPATIENT
Start: 2017-05-03 | End: 2017-05-03 | Stop reason: HOSPADM

## 2017-05-03 RX ORDER — HYDROCODONE BITARTRATE AND ACETAMINOPHEN 7.5; 325 MG/1; MG/1
1-2 TABLET ORAL EVERY 6 HOURS PRN
Qty: 30 TABLET | Refills: 0 | Status: SHIPPED | OUTPATIENT
Start: 2017-05-03 | End: 2017-05-18

## 2017-05-03 RX ORDER — ONDANSETRON 4 MG/1
4 TABLET, ORALLY DISINTEGRATING ORAL EVERY 30 MIN PRN
Status: DISCONTINUED | OUTPATIENT
Start: 2017-05-03 | End: 2017-05-03 | Stop reason: HOSPADM

## 2017-05-03 RX ORDER — PROPOFOL 10 MG/ML
INJECTION, EMULSION INTRAVENOUS PRN
Status: DISCONTINUED | OUTPATIENT
Start: 2017-05-03 | End: 2017-05-03

## 2017-05-03 RX ORDER — CLINDAMYCIN PHOSPHATE 900 MG/50ML
900 INJECTION, SOLUTION INTRAVENOUS
Status: COMPLETED | OUTPATIENT
Start: 2017-05-03 | End: 2017-05-03

## 2017-05-03 RX ORDER — HYDROCODONE BITARTRATE AND ACETAMINOPHEN 7.5; 325 MG/1; MG/1
TABLET ORAL
Status: COMPLETED
Start: 2017-05-03 | End: 2017-05-03

## 2017-05-03 RX ORDER — LABETALOL HYDROCHLORIDE 5 MG/ML
10 INJECTION, SOLUTION INTRAVENOUS
Status: DISCONTINUED | OUTPATIENT
Start: 2017-05-03 | End: 2017-05-03 | Stop reason: HOSPADM

## 2017-05-03 RX ORDER — LIDOCAINE HYDROCHLORIDE 20 MG/ML
INJECTION, SOLUTION INFILTRATION; PERINEURAL PRN
Status: DISCONTINUED | OUTPATIENT
Start: 2017-05-03 | End: 2017-05-03

## 2017-05-03 RX ORDER — NEOSTIGMINE METHYLSULFATE 1 MG/ML
VIAL (ML) INJECTION PRN
Status: DISCONTINUED | OUTPATIENT
Start: 2017-05-03 | End: 2017-05-03

## 2017-05-03 RX ADMIN — ROCURONIUM BROMIDE 15 MG: 10 INJECTION INTRAVENOUS at 09:41

## 2017-05-03 RX ADMIN — FENTANYL CITRATE 25 MCG: 50 INJECTION, SOLUTION INTRAMUSCULAR; INTRAVENOUS at 10:13

## 2017-05-03 RX ADMIN — KETOROLAC TROMETHAMINE 30 MG: 30 INJECTION, SOLUTION INTRAMUSCULAR; INTRAVENOUS at 08:22

## 2017-05-03 RX ADMIN — FENTANYL CITRATE 25 MCG: 50 INJECTION, SOLUTION INTRAMUSCULAR; INTRAVENOUS at 10:45

## 2017-05-03 RX ADMIN — HYDROCODONE BITARTRATE AND ACETAMINOPHEN 1 TABLET: 7.5; 325 TABLET ORAL at 11:56

## 2017-05-03 RX ADMIN — SODIUM CHLORIDE, POTASSIUM CHLORIDE, SODIUM LACTATE AND CALCIUM CHLORIDE: 600; 310; 30; 20 INJECTION, SOLUTION INTRAVENOUS at 08:51

## 2017-05-03 RX ADMIN — GLYCOPYRROLATE 0.4 MG: 0.2 INJECTION, SOLUTION INTRAMUSCULAR; INTRAVENOUS at 10:13

## 2017-05-03 RX ADMIN — FENTANYL CITRATE 50 MCG: 50 INJECTION, SOLUTION INTRAMUSCULAR; INTRAVENOUS at 11:04

## 2017-05-03 RX ADMIN — FENTANYL CITRATE 25 MCG: 50 INJECTION, SOLUTION INTRAMUSCULAR; INTRAVENOUS at 09:53

## 2017-05-03 RX ADMIN — MIDAZOLAM HYDROCHLORIDE 2 MG: 1 INJECTION, SOLUTION INTRAMUSCULAR; INTRAVENOUS at 09:28

## 2017-05-03 RX ADMIN — DEXAMETHASONE SODIUM PHOSPHATE 10 MG: 10 INJECTION, SOLUTION INTRAMUSCULAR; INTRAVENOUS at 09:40

## 2017-05-03 RX ADMIN — HYDROMORPHONE HYDROCHLORIDE 0.5 MG: 1 INJECTION, SOLUTION INTRAMUSCULAR; INTRAVENOUS; SUBCUTANEOUS at 11:46

## 2017-05-03 RX ADMIN — CLINDAMYCIN PHOSPHATE 900 MG: 18 INJECTION, SOLUTION INTRAVENOUS at 09:28

## 2017-05-03 RX ADMIN — NEOSTIGMINE METHYLSULFATE 3 MG: 1 INJECTION INTRAMUSCULAR; INTRAVENOUS; SUBCUTANEOUS at 10:13

## 2017-05-03 RX ADMIN — SODIUM CHLORIDE, POTASSIUM CHLORIDE, SODIUM LACTATE AND CALCIUM CHLORIDE: 600; 310; 30; 20 INJECTION, SOLUTION INTRAVENOUS at 09:28

## 2017-05-03 RX ADMIN — FENTANYL CITRATE 25 MCG: 50 INJECTION, SOLUTION INTRAMUSCULAR; INTRAVENOUS at 11:17

## 2017-05-03 RX ADMIN — LIDOCAINE HYDROCHLORIDE 40 MG: 20 INJECTION, SOLUTION INFILTRATION; PERINEURAL at 09:37

## 2017-05-03 RX ADMIN — FENTANYL CITRATE 50 MCG: 50 INJECTION, SOLUTION INTRAMUSCULAR; INTRAVENOUS at 09:28

## 2017-05-03 RX ADMIN — FENTANYL CITRATE 50 MCG: 50 INJECTION, SOLUTION INTRAMUSCULAR; INTRAVENOUS at 10:39

## 2017-05-03 RX ADMIN — SCOPALAMINE 1 PATCH: 1 PATCH, EXTENDED RELEASE TRANSDERMAL at 08:23

## 2017-05-03 RX ADMIN — FENTANYL CITRATE 25 MCG: 50 INJECTION, SOLUTION INTRAMUSCULAR; INTRAVENOUS at 10:53

## 2017-05-03 RX ADMIN — PROPOFOL 150 MG: 10 INJECTION, EMULSION INTRAVENOUS at 09:37

## 2017-05-03 RX ADMIN — ONDANSETRON 4 MG: 2 INJECTION INTRAMUSCULAR; INTRAVENOUS at 09:47

## 2017-05-03 RX ADMIN — SUCCINYLCHOLINE CHLORIDE 100 MG: 20 INJECTION, SOLUTION INTRAMUSCULAR; INTRAVENOUS at 09:37

## 2017-05-03 ASSESSMENT — LIFESTYLE VARIABLES: TOBACCO_USE: 1

## 2017-05-03 NOTE — OR NURSING
Patient and responsible adult given discharge instructions with no questions regarding instructions. Aleta score 20. Pain level 2/10.  Discharged from unit via w/c. Patient discharged to home.

## 2017-05-03 NOTE — DISCHARGE INSTRUCTIONS
Call MD prior to DC.  No driving today or while on pain meds  Pelvic rest for 2 weeks  Call Dr. Clifford [phone] as necessary if problems in interim,.  Schedule PO check 2-4 weeks Dr. Clifford  No heavy lifting, vigorous activity, swim, bath, exercise for 2 week    Remove the scopolamine patch behind your left ear after 24 hours after application.   After removing the patch, wash your hands and the area behind your ear thoroughly with soap and water.   The patch will still contain some medicine after use.   To avoid accidental contact or ingestion by children or pets, fold the used patch in half with the sticky side together and throw away in the trash out of the reach of children and pets.

## 2017-05-03 NOTE — ANESTHESIA PREPROCEDURE EVALUATION
Anesthesia Evaluation     . Pt has had prior anesthetic.     No history of anesthetic complications          ROS/MED HX    ENT/Pulmonary:     (+)tobacco use, Current use 0.5 PPD packs/day  Mild Persistent asthma , . .    Neurologic:  - neg neurologic ROS     Cardiovascular:  - neg cardiovascular ROS   (+) ----. : . . . :. . Previous cardiac testing date:results:date: results:ECG reviewed date:1/22/2016 results:NSR@86, LAE date: results:          METS/Exercise Tolerance:     Hematologic:  - neg hematologic  ROS       Musculoskeletal:  - neg musculoskeletal ROS       GI/Hepatic:     (+) GERD       Renal/Genitourinary:     (+) Other Renal/ Genitourinary, ENDOMETRIOSIS, PELVIC PAIN IN FEMALE      Endo:  - neg endo ROS       Psychiatric:     (+) psychiatric history depression      Infectious Disease:  - neg infectious disease ROS       Malignancy:      - no malignancy   Other: Comment: S/p Hysterectomy   (+) No chance of pregnancy   - neg other ROS                 Physical Exam  Normal systems: cardiovascular, pulmonary and dental    Airway   Mallampati: III  TM distance: >3 FB  Neck ROM: full    Dental     Cardiovascular   Rhythm and rate: regular and normal      Pulmonary    breath sounds clear to auscultation                    Anesthesia Plan      History & Physical Review  History and physical reviewed and following examination; no interval change.    ASA Status:  2 .    NPO Status:  > 8 hours    Plan for General and ETT with Intravenous and Propofol induction. Maintenance will be Balanced.    PONV prophylaxis:  Ondansetron (or other 5HT-3), Scopolamine patch and Dexamethasone or Solumedrol  S/p LAVH 6/24/2015      Postoperative Care  Postoperative pain management:  IV analgesics and Oral pain medications.      Consents  Anesthetic plan, risks, benefits and alternatives discussed with:  Patient..                          .

## 2017-05-03 NOTE — IP AVS SNAPSHOT
[facility]    [address] [address]    Phone:  [phone]                                       After Visit Summary   5/3/2017    Ihsan Millan    MRN: [id number]           After Visit Summary Signature Page     I have received my discharge instructions, and my questions have been answered. I have discussed any challenges I see with this plan with the nurse or doctor.    ..........................................................................................................................................  Patient/Patient Representative Signature      ..........................................................................................................................................  Patient Representative Print Name and Relationship to Patient    ..................................................               ................................................  Date                                            Time    ..........................................................................................................................................  Reviewed by Signature/Title    ...................................................              ..............................................  Date                                                            Time

## 2017-05-03 NOTE — IP AVS SNAPSHOT
MRN:[id number]                      After Visit Summary   5/3/2017    Ihsan Millan    MRN: [id number]           Thank you!     Thank you for choosing [facility] for your care. Our goal is always to provide you with excellent care. Hearing back from our patients is one way we can continue to improve our services. Please take a few minutes to complete the written survey that you may receive in the mail after you visit with us. Thank you!        Patient Information     Date Of Birth          1990        About your hospital stay     You were admitted on:  May 3, 2017 You last received care in the:  [facility]    You were discharged on:  May 3, 2017       Who to Call     For medical emergencies, please call 911.  For non-urgent questions about your medical care, please call your primary care provider or clinic, [phone]  For questions related to your surgery, please call your surgery clinic        Attending Provider     Provider Specialty    Rodolfo Clifford MD OB/Gyn       Primary Care Provider Office Phone # Fax #    Temo YVONNE [place] [phone]       [facility] [address] [address]        Further instructions from your care team       Call MD prior to DC.  No driving today or while on pain meds  Pelvic rest for 2 weeks  Call Dr. Clifford [phone] as necessary if problems in interim,.  Schedule PO check 2-4 weeks Dr. Clifford  No heavy lifting, vigorous activity, swim, bath, exercise for 2 week    Remove the scopolamine patch behind your left ear after 24 hours after application.   After removing the patch, wash your hands and the area behind your ear thoroughly with soap and water.   The patch will still contain some medicine after use.   To avoid accidental contact or ingestion by children or pets, fold the used patch in half with the sticky side together and throw away in the trash out of the reach of children and pets.  "      Pending Results     No orders found from 5/1/2017 to 5/4/2017.            Admission Information     Date & Time Provider Department Dept. Phone    5/3/2017 Rodolfo Clifford MD [facility] Preop/Phase [phone]      Your Vitals Were     Blood Pressure Pulse Temperature Respirations Height Weight    96/65 91 98.5  F (36.9  C) (Temporal) 18 1.626 m (5' 4\") 58.5 kg (129 lb)    Last Period Pulse Oximetry BMI (Body Mass Index)             (LMP Unknown) 98% 22.14 kg/m2         Walk-inhart Information     [employer] gives you secure access to your electronic health record. If you see a primary care provider, you can also send messages to your care team and make appointments. If you have questions, please call your primary care clinic.  If you do not have a primary care provider, please call [phone] and they will assist you.        Care EveryWhere ID     This is your Care EveryWhere ID. This could be used by other organizations to access your [facility] medical records  [id number]           Review of your medicines      START taking        Dose / Directions    HYDROcodone-acetaminophen 7.5-325 MG per tablet   Commonly known as:  NORCO   Used for:  Surgery, elective        Dose:  1-2 tablet   Take 1-2 tablets by mouth every 6 hours as needed for moderate to severe pain   Quantity:  30 tablet   Refills:  0       ibuprofen 800 MG tablet   Commonly known as:  ADVIL/MOTRIN   Used for:  Surgery, elective        Dose:  800 mg   Take 1 tablet (800 mg) by mouth every 8 hours as needed for moderate pain   Quantity:  40 tablet   Refills:  1         CONTINUE these medicines which have NOT CHANGED        Dose / Directions    albuterol 108 (90 BASE) MCG/ACT Inhaler   Commonly known as:  PROAIR HFA/PROVENTIL HFA/VENTOLIN HFA   Used for:  Intermittent asthma        Dose:  2 puff   Inhale 2 puffs into the lungs every 4 hours as needed for shortness of breath / dyspnea or wheezing   Quantity:  1 Inhaler   Refills:  2       OMEPRAZOLE PO        " Dose:  20 mg   Take 20 mg by mouth every morning   Refills:  0       promethazine 12.5 MG tablet   Commonly known as:  PHENERGAN        Dose:  12.5 mg   Take 12.5 mg by mouth every 6 hours as needed for nausea   Refills:  0       venlafaxine 75 MG 24 hr capsule   Commonly known as:  EFFEXOR-XR        Dose:  75 mg   Take 75 mg by mouth daily   Refills:  0            Where to get your medicines      These medications were sent to [facility] - [facility] - [address]  [address] [place] [address]     Phone:  [phone]     ibuprofen 800 MG tablet         Some of these will need a paper prescription and others can be bought over the counter. Ask your nurse if you have questions.     Bring a paper prescription for each of these medications     HYDROcodone-acetaminophen 7.5-325 MG per tablet                Protect others around you: Learn how to safely use, store and throw away your medicines at www.disposemymeds.org.             Medication List: This is a list of all your medications and when to take them. Check marks below indicate your daily home schedule. Keep this list as a reference.      Medications           Morning Afternoon Evening Bedtime As Needed    albuterol 108 (90 BASE) MCG/ACT Inhaler   Commonly known as:  PROAIR HFA/PROVENTIL HFA/VENTOLIN HFA   Inhale 2 puffs into the lungs every 4 hours as needed for shortness of breath / dyspnea or wheezing                                HYDROcodone-acetaminophen 7.5-325 MG per tablet   Commonly known as:  NORCO   Take 1-2 tablets by mouth every 6 hours as needed for moderate to severe pain   Last time this was given:  1 tablet on 5/3/2017 11:56 AM                                ibuprofen 800 MG tablet   Commonly known as:  ADVIL/MOTRIN   Take 1 tablet (800 mg) by mouth every 8 hours as needed for moderate pain                                OMEPRAZOLE PO   Take 20 mg by mouth every morning                                promethazine 12.5 MG  tablet   Commonly known as:  PHENERGAN   Take 12.5 mg by mouth every 6 hours as needed for nausea                                venlafaxine 75 MG 24 hr capsule   Commonly known as:  EFFEXOR-XR   Take 75 mg by mouth daily                                          More Information        Discharge Instructions for Oophorectomy  You had a procedure called oophorectomy. This is the surgical removal of one or more ovaries. These walnut-sized organs in your pelvic area make and release the eggs. The eggs can grow to become a baby when combined with a man s sperm. Ovaries also make hormones that regulate your menstrual cycle (also called your period). Your periods may stop if both ovaries were removed if you were still menstruating before the surgery. You may have other symptoms of menopause as well, such as hot flashes. A hysterectomy to remove the uterus is often done with oophorectomy.      Activity    Rest when you are tired.    Take your medicine exactly as instructed by your doctor.    Continue the coughing and deep breathing exercises that you learned in the hospital.    Listen to your body. If an activity causes pain, stop.    Limit your activity for 4 to 6 weeks.    Don t lift anything heavier than 10 pounds.    Don't do strenuous activities, such as mowing the lawn, vacuuming, or playing sports.    Limit your activity to regular short walks. Gradually increase your pace and distance as you feel able.    Don t drive for until you are comfortable without taking narcotics. This may take up to 2 weeks. You may ride in a car for short trips.  Other home care    Don t put anything in your vagina until your doctor says it s OK.    Don t douche.    Don t use tampons.    Don t have sex.    Shower as needed.    Wash your incision gently with mild soap and warm water.    Keep your incision clean and dry.    Check your temperature each day for 1 week after your surgery.    Eat a healthy, well-balanced diet.    Avoid  constipation.    Use laxatives, stool softeners, or enemas as directed by your doctor.    Eat more high-fiber foods.    Drink 6 to 8 glasses of water every day, unless directed otherwise.  Follow-up  Make a follow-up appointment as directed by our staff.     When to call your doctor  Call your doctor right away if you have any of the following:    Fever above 100.4 F (38 C) or chills    Bright red bleeding or foul smelling discharge from your vagina    Trouble urinating, or burning during urination    Severe abdominal pain or bloating    Redness, swelling, or draining at your incision site    Shortness of breath or chest pain    Nausea and vomiting    Increasing pain with or without activity     0803-5551 [employer]. [address] [place] [address], [place] [address]. All rights reserved. This information is not intended as a substitute for professional medical care. Always follow your healthcare professional's instructions.

## 2017-05-03 NOTE — OR NURSING
Hand off report given to Delaney VASQUEZ at bedside in Phase 2. Patient awake and alert, RA, surgical discomfort 3/10.

## 2017-05-03 NOTE — ANESTHESIA CARE TRANSFER NOTE
Patient: Ihsan Millan    Procedure(s):  LAPAROSCOPIC LEFT OOPHORECTOMY - Wound Class: II-Clean Contaminated    Diagnosis: ENDOMETRIOSIS, PELVIC PAIN IN FEMALE  Diagnosis Additional Information: No value filed.    Anesthesia Type:   General, ETT     Note:  Airway :Nasal Cannula  Patient transferred to:PACU        Vitals: (Last set prior to Anesthesia Care Transfer)    CRNA VITALS  5/3/2017 0952 - 5/3/2017 1026      5/3/2017             Pulse: 66    SpO2: 100 %    Resp Rate (observed): 18    Resp Rate (set): 8                Electronically Signed By: NELSON Rasheed CRNA  May 3, 2017  10:26 AM

## 2017-05-03 NOTE — ANESTHESIA POSTPROCEDURE EVALUATION
Patient: Ihsan Millan    Procedure(s):  LAPAROSCOPIC LEFT OOPHORECTOMY - Wound Class: II-Clean Contaminated    Diagnosis:ENDOMETRIOSIS, PELVIC PAIN IN FEMALE  Diagnosis Additional Information: No value filed.    Anesthesia Type:  General, ETT    Note:  Anesthesia Post Evaluation    Patient location during evaluation: Phase 2, PACU and Bedside  Patient participation: Able to fully participate in evaluation  Level of consciousness: awake and alert  Pain management: adequate  Airway patency: patent  Cardiovascular status: acceptable  Respiratory status: acceptable  Hydration status: stable  PONV: none     Anesthetic complications: None          Last vitals:  Vitals:    05/03/17 1145 05/03/17 1200 05/03/17 1215   BP: 96/64 96/65    Pulse:      Resp: 12 18 18   Temp:      SpO2: 97% 98%          Electronically Signed By: Rush Forde MD  May 3, 2017  1:28 PM

## 2017-05-03 NOTE — OP NOTE
[facility]  Operative Note    Pre-operative diagnosis: ENDOMETRIOSIS, PELVIC PAIN IN FEMALE   Post-operative diagnosis Same, Pathology Pending   Procedure: Procedure(s):  LAPAROSCOPIC LEFT OOPHORECTOMY - Wound Class: II-Clean Contaminated.  Fulguration of endometriosis   Surgeon:  Assistant: Rodolfo Clifford MD  None   Anesthesia: General    Estimated blood loss: Minimal   Blood transfusion: No transfusion was given during surgery   Drains: None   Specimens: Left ovary   Findings: Uterus, tubes and right ovary surgically absent.  Small endometriotic implants on left pelvic sidewall and posterior cul-de-sac.  Normal appearing left ovary.    Otherwise normal abdominal anatomy.     Complications: None   Condition: Stable       OPERATIVE NARRATION: The patient was brought to the Operating Room and uneventfully placed under general anesthesia. She was prepped and draped in the dorsal lithotomy position and her bladder drained. A sponge stick was placed in vagina. . We then changed gloves and proceeded to the abdominal portion of the case. A 5 mm infraumbilical skin incision was performed with a scalpel. A Veress needle was introduced without difficulty and a water drop test performed. The abdomen was insufflated with several liters of carbon dioxide. A 5 mm trocar and a laparoscope were introduced. Visualization was excellent. Findings were as described above. Next, an 11 mm suprapubic and a 5 mm  lower quadrant ports were placed under direct visualization. After initial exploration the endometriotic implants were fulgurated with monopolar cautery.  The LigaSure bipolar cutting cautery device was used to transect the Left  IP ligament away from pelvic sidewall structures.  The dissection was continued through the round and uteroovarian ligaments.  The adnexa were transected and removed through suprapubic port in an endobag.  The pelvis was irrigated and checked for hemostasis. A Jamari-Salazar needle was  used to close the suprapubic fascia with interrupted 0 Vicryl sutures. At this point there was excellent hemostasis and we proceeded to closure. The remaining trocars were removed and excess carbon dioxide expressed from the abdomen. Subcutaneous spaces were irrigated and checked for hemostasis. The skin incisions were closed with subcuticular 3.0 Monocryl and  surgical glue. There were no complications. The sponge stick was removed.  The patient was transferred to the Recovery Room in excellent and stable condition.     MARINA RAINEY MD

## 2017-05-04 ENCOUNTER — HOSPITAL ENCOUNTER (EMERGENCY)
Facility: HOSPITAL | Age: 27
Discharge: HOME | End: 2017-05-04
Attending: PHYSICIAN ASSISTANT | Admitting: PHYSICIAN ASSISTANT
Payer: COMMERCIAL

## 2017-05-04 VITALS
OXYGEN SATURATION: 98 % | DIASTOLIC BLOOD PRESSURE: 66 MMHG | SYSTOLIC BLOOD PRESSURE: 96 MMHG | HEART RATE: 65 BPM | TEMPERATURE: 98 F | RESPIRATION RATE: 16 BRPM

## 2017-05-04 DIAGNOSIS — Z98.890 POST-OPERATIVE NAUSEA AND VOMITING: ICD-10-CM

## 2017-05-04 DIAGNOSIS — G89.18 ACUTE POST-OPERATIVE PAIN: ICD-10-CM

## 2017-05-04 DIAGNOSIS — R11.2 POST-OPERATIVE NAUSEA AND VOMITING: ICD-10-CM

## 2017-05-04 LAB
ALBUMIN SERPL-MCNC: 3.6 G/DL (ref 3.4–5)
ALBUMIN UR-MCNC: NEGATIVE MG/DL
ALP SERPL-CCNC: 60 U/L (ref 40–150)
ALT SERPL W P-5'-P-CCNC: 28 U/L (ref 0–50)
ANION GAP SERPL CALCULATED.3IONS-SCNC: 8 MMOL/L (ref 3–14)
APPEARANCE UR: CLEAR
AST SERPL W P-5'-P-CCNC: 10 U/L (ref 0–45)
BASOPHILS # BLD AUTO: 0 10E9/L (ref 0–0.2)
BASOPHILS NFR BLD AUTO: 0.1 %
BILIRUB SERPL-MCNC: 0.2 MG/DL (ref 0.2–1.3)
BILIRUB UR QL STRIP: NEGATIVE
BUN SERPL-MCNC: 13 MG/DL (ref 7–30)
CALCIUM SERPL-MCNC: 8.4 MG/DL (ref 8.5–10.1)
CHLORIDE SERPL-SCNC: 112 MMOL/L (ref 94–109)
CO2 SERPL-SCNC: 26 MMOL/L (ref 20–32)
COLOR UR AUTO: NORMAL
COPATH REPORT: NORMAL
CREAT SERPL-MCNC: 0.75 MG/DL (ref 0.52–1.04)
DIFFERENTIAL METHOD BLD: NORMAL
EOSINOPHIL # BLD AUTO: 0.2 10E9/L (ref 0–0.7)
EOSINOPHIL NFR BLD AUTO: 2 %
ERYTHROCYTE [DISTWIDTH] IN BLOOD BY AUTOMATED COUNT: 13.2 % (ref 10–15)
GFR SERPL CREATININE-BSD FRML MDRD: ABNORMAL ML/MIN/1.7M2
GLUCOSE SERPL-MCNC: 92 MG/DL (ref 70–99)
GLUCOSE UR STRIP-MCNC: NEGATIVE MG/DL
HCT VFR BLD AUTO: 35.4 % (ref 35–47)
HGB BLD-MCNC: 12 G/DL (ref 11.7–15.7)
HGB UR QL STRIP: NEGATIVE
IMM GRANULOCYTES # BLD: 0 10E9/L (ref 0–0.4)
IMM GRANULOCYTES NFR BLD: 0.3 %
KETONES UR STRIP-MCNC: NEGATIVE MG/DL
LEUKOCYTE ESTERASE UR QL STRIP: NEGATIVE
LYMPHOCYTES # BLD AUTO: 3 10E9/L (ref 0.8–5.3)
LYMPHOCYTES NFR BLD AUTO: 30.8 %
MCH RBC QN AUTO: 30.2 PG (ref 26.5–33)
MCHC RBC AUTO-ENTMCNC: 33.9 G/DL (ref 31.5–36.5)
MCV RBC AUTO: 89 FL (ref 78–100)
MONOCYTES # BLD AUTO: 0.4 10E9/L (ref 0–1.3)
MONOCYTES NFR BLD AUTO: 3.9 %
NEUTROPHILS # BLD AUTO: 6.2 10E9/L (ref 1.6–8.3)
NEUTROPHILS NFR BLD AUTO: 62.9 %
NITRATE UR QL: NEGATIVE
NRBC # BLD AUTO: 0 10*3/UL
NRBC BLD AUTO-RTO: 0 /100
PH UR STRIP: 6.5 PH (ref 4.7–8)
PLATELET # BLD AUTO: 179 10E9/L (ref 150–450)
POTASSIUM SERPL-SCNC: 3.8 MMOL/L (ref 3.4–5.3)
PROT SERPL-MCNC: 6.3 G/DL (ref 6.8–8.8)
RBC # BLD AUTO: 3.98 10E12/L (ref 3.8–5.2)
SODIUM SERPL-SCNC: 146 MMOL/L (ref 133–144)
SP GR UR STRIP: 1.01 (ref 1–1.03)
URN SPEC COLLECT METH UR: NORMAL
UROBILINOGEN UR STRIP-MCNC: NORMAL MG/DL (ref 0–2)
WBC # BLD AUTO: 9.8 10E9/L (ref 4–11)

## 2017-05-04 PROCEDURE — 36415 COLL VENOUS BLD VENIPUNCTURE: CPT | Performed by: PHYSICIAN ASSISTANT

## 2017-05-04 PROCEDURE — 80053 COMPREHEN METABOLIC PANEL: CPT | Performed by: PHYSICIAN ASSISTANT

## 2017-05-04 PROCEDURE — 96375 TX/PRO/DX INJ NEW DRUG ADDON: CPT

## 2017-05-04 PROCEDURE — 99284 EMERGENCY DEPT VISIT MOD MDM: CPT | Performed by: PHYSICIAN ASSISTANT

## 2017-05-04 PROCEDURE — 81003 URINALYSIS AUTO W/O SCOPE: CPT | Performed by: PHYSICIAN ASSISTANT

## 2017-05-04 PROCEDURE — 25000128 H RX IP 250 OP 636: Performed by: PHYSICIAN ASSISTANT

## 2017-05-04 PROCEDURE — 99284 EMERGENCY DEPT VISIT MOD MDM: CPT | Mod: 25

## 2017-05-04 PROCEDURE — 85025 COMPLETE CBC W/AUTO DIFF WBC: CPT | Performed by: PHYSICIAN ASSISTANT

## 2017-05-04 PROCEDURE — 96374 THER/PROPH/DIAG INJ IV PUSH: CPT

## 2017-05-04 PROCEDURE — 96361 HYDRATE IV INFUSION ADD-ON: CPT

## 2017-05-04 RX ORDER — MORPHINE SULFATE 2 MG/ML
4 INJECTION, SOLUTION INTRAMUSCULAR; INTRAVENOUS ONCE
Status: COMPLETED | OUTPATIENT
Start: 2017-05-04 | End: 2017-05-04

## 2017-05-04 RX ORDER — KETOROLAC TROMETHAMINE 30 MG/ML
30 INJECTION, SOLUTION INTRAMUSCULAR; INTRAVENOUS ONCE
Status: COMPLETED | OUTPATIENT
Start: 2017-05-04 | End: 2017-05-04

## 2017-05-04 RX ORDER — ONDANSETRON 4 MG/1
4 TABLET, ORALLY DISINTEGRATING ORAL EVERY 6 HOURS PRN
Qty: 20 TABLET | Refills: 0 | Status: SHIPPED | OUTPATIENT
Start: 2017-05-04 | End: 2017-05-07

## 2017-05-04 RX ORDER — ONDANSETRON 2 MG/ML
8 INJECTION INTRAMUSCULAR; INTRAVENOUS ONCE
Status: COMPLETED | OUTPATIENT
Start: 2017-05-04 | End: 2017-05-04

## 2017-05-04 RX ORDER — ASPIRIN 81 MG
100 TABLET, DELAYED RELEASE (ENTERIC COATED) ORAL 2 TIMES DAILY
Qty: 60 TABLET | Refills: 1 | Status: SHIPPED | OUTPATIENT
Start: 2017-05-04 | End: 2017-05-18

## 2017-05-04 RX ADMIN — MORPHINE SULFATE 4 MG: 2 INJECTION, SOLUTION INTRAMUSCULAR; INTRAVENOUS at 14:18

## 2017-05-04 RX ADMIN — KETOROLAC TROMETHAMINE 30 MG: 30 INJECTION, SOLUTION INTRAMUSCULAR; INTRAVENOUS at 15:16

## 2017-05-04 RX ADMIN — SODIUM CHLORIDE 1000 ML: 9 INJECTION, SOLUTION INTRAVENOUS at 14:18

## 2017-05-04 RX ADMIN — ONDANSETRON 8 MG: 2 INJECTION INTRAMUSCULAR; INTRAVENOUS at 14:18

## 2017-05-04 RX ADMIN — SODIUM CHLORIDE 1000 ML: 9 INJECTION, SOLUTION INTRAVENOUS at 15:16

## 2017-05-04 ASSESSMENT — ENCOUNTER SYMPTOMS
FEVER: 0
FLANK PAIN: 0
NAUSEA: 1
UNEXPECTED WEIGHT CHANGE: 0
ANAL BLEEDING: 0
ABDOMINAL PAIN: 1
CHILLS: 0
HEMATURIA: 0
CONSTIPATION: 0
VOMITING: 1
DIFFICULTY URINATING: 0
NEUROLOGICAL NEGATIVE: 1
MUSCULOSKELETAL NEGATIVE: 1
SHORTNESS OF BREATH: 0
FREQUENCY: 0
DIARRHEA: 0
BLOOD IN STOOL: 0
APPETITE CHANGE: 0
DYSURIA: 1
ABDOMINAL DISTENTION: 0
SORE THROAT: 0

## 2017-05-04 NOTE — ED AVS SNAPSHOT
[facility]    [address]    [place] [address]    Phone:  [phone]                                       Ihsan Millan   MRN: [id number]    Department:  [facility]   Date of Visit:  5/4/2017           Patient Information     Date Of Birth          1990        Your diagnoses for this visit were:     Acute post-operative pain     Post-operative nausea and vomiting        You were seen by Elizabeth Billy PA-C.      Follow-up Information     Follow up with Rodolfo Clifford MD In 2 weeks.    Specialty:  OB/Gyn    Why:  as scheduled.     Contact information:    [facility]  [address]  [place] [address] [phone]          Follow up with [facility].    Specialty:  EMERGENCY MEDICINE    Why:  If symptoms worsen    Contact information:    [address] [place] [address] [phone]    Additional information:    From [place]: Take [address]. Turn left at [address]. Turn left at the first stoplight on [address]. At the first stop sign, take a right onto [address]. Take a left into the parking lot and continue through until you reach the North enterance of the building.       From [place]: Take [address]. Take the [address] ramp towards [place]. Turn left onto [address]. Take a slight right onto [address]. Turn left at the first stoplight on [address]. At the first stop sign, take a right onto [address]. Take a left into the parking lot and continue through until you reach the North enterance of the building.       From [place]: Take [address]. Take a right at [address]. At the first stop sign, take a right onto [address]. Take a left into the parking lot and continue through until you reach the North enterance of the building.         Discharge Instructions       Start taking the Colace as prescribed to soften your  stools. Take the Zofran as prescribed for nausea/vomiting. Increase the hydrocodone from 1 tab every 6 hours, to 1 tab every 4 hours. Continue taking the Ibuprofen every 6-8 hours. Return here with new/worsening symptoms.     Pain Management After Surgery  Once you re home after surgery, you may have some pain, since even minor surgery causes swelling and breakdown of tissue. When it comes to effective pain management, the tips you may have learned in the hospital also work at home. To get the best pain relief possible, remember these points:  Special note: Be sure to follow any specific post-operative instructions from your surgeon or nurse.     Use your medicine only as directed    If your pain is not relieved or if it gets worse, call your health care provider.    If pain lessens, try taking your medicine less often or in smaller doses.  Remember that medicines need time to work    Most pain relievers taken by mouth need at least 20 to 30 minutes to take effect. They may not reach their maximum effect for close to an hour.     Take pain medicine at regular times as directed. Don t wait until the pain gets bad to take it.  Time your medicine    Try to time your medicine so that you take it before beginning an activity, such as dressing or sitting at the table for dinner.    Taking your medicine at night may help you get a good night s rest.  Eat lots of fruit and vegetables    Constipation is a common side effect with some pain medicines. Eating fruit, vegetables, and other foods high in fiber can help.    Drink lots of fluids.    Talk to your health care provider about taking a preventive bowel regimen.  Avoid drinking alcohol while taking pain medicine    Mixing alcohol and pain medicine can cause dizziness and slow your respiratory system. It can even be fatal.  Avoid driving or operating machinery while taking pain medicines that can cause drowsiness.    3671-4646 [employer]. [address] [place] [address]  [place] [address]. All rights reserved. This information is not intended as a substitute for professional medical care. Always follow your healthcare professional's instructions.          Future Appointments        Provider Department Dept Phone Center    5/18/2017 9:30 AM Rodolfo Clifford MD [facility] [phone] [place] [facility]         Review of your medicines      START taking        Dose / Directions Last dose taken    docusate sodium 100 MG tablet   Commonly known as:  COLACE   Dose:  100 mg   Quantity:  60 tablet        Take 100 mg by mouth 2 times daily   Refills:  1        ondansetron 4 MG ODT tab   Commonly known as:  ZOFRAN ODT   Dose:  4 mg   Quantity:  20 tablet        Take 1 tablet (4 mg) by mouth every 6 hours as needed for nausea   Refills:  0          Our records show that you are taking the medicines listed below. If these are incorrect, please call your family doctor or clinic.        Dose / Directions Last dose taken    albuterol 108 (90 BASE) MCG/ACT Inhaler   Commonly known as:  PROAIR HFA/PROVENTIL HFA/VENTOLIN HFA   Dose:  2 puff   Quantity:  1 Inhaler        Inhale 2 puffs into the lungs every 4 hours as needed for shortness of breath / dyspnea or wheezing   Refills:  2        AMBIEN PO        Does not know how many mg   Refills:  0        HYDROcodone-acetaminophen 7.5-325 MG per tablet   Commonly known as:  NORCO   Dose:  1-2 tablet   Quantity:  30 tablet        Take 1-2 tablets by mouth every 6 hours as needed for moderate to severe pain   Refills:  0        ibuprofen 800 MG tablet   Commonly known as:  ADVIL/MOTRIN   Dose:  800 mg   Quantity:  40 tablet        Take 1 tablet (800 mg) by mouth every 8 hours as needed for moderate pain   Refills:  1        OMEPRAZOLE PO   Dose:  20 mg        Take 20 mg by mouth every morning   Refills:  0        promethazine 12.5 MG tablet   Commonly known as:  PHENERGAN   Dose:  12.5 mg        Take 12.5 mg by mouth every 6 hours as needed  for nausea   Refills:  0        venlafaxine 75 MG 24 hr capsule   Commonly known as:  EFFEXOR-XR   Dose:  75 mg        Take 75 mg by mouth daily   Refills:  0                Prescriptions were sent or printed at these locations (2 Prescriptions)                   [id number] - [place], [place] - [address] AT [facility] [address]   [address], [place] [address]    Telephone:  [phone]   Fax:  [phone]   Hours:                  E-Prescribed (2 of 2)         docusate sodium (COLACE) 100 MG tablet               ondansetron (ZOFRAN ODT) 4 MG ODT tab                Procedures and tests performed during your visit     CBC with platelets differential    Comprehensive metabolic panel    Peripheral IV catheter    UA reflex to Microscopic and Culture      Orders Needing Specimen Collection     None      Pending Results     No orders found from 5/2/2017 to 5/5/2017.            Pending Culture Results     No orders found from 5/2/2017 to 5/5/2017.            Thank you for choosing [facility]       Thank you for choosing [facility] for your care. Our goal is always to provide you with excellent care. Hearing back from our patients is one way we can continue to improve our services. Please take a few minutes to complete the written survey that you may receive in the mail after you visit with us. Thank you!        Geo[employer] Information     [employer] gives you secure access to your electronic health record. If you see a primary care provider, you can also send messages to your care team and make appointments. If you have questions, please call your primary care clinic.  If you do not have a primary care provider, please call [phone] and they will assist you.        Care EveryWhere ID     This is your Care EveryWhere ID. This could be used by other organizations to access your [facility] medical records  [id number]        After Visit Summary       This is your record. Keep this with you and show to your  community pharmacist(s) and doctor(s) at your next visit.

## 2017-05-04 NOTE — ED NOTES
D/c instructions reviewed with patient. IV removed.  Prescriptions e-scribed to pharmacy of choice.  Encouraged to return with new or worsening symptoms. No questions or concerns.

## 2017-05-04 NOTE — ED PROVIDER NOTES
History     Chief Complaint   Patient presents with     Hematemesis     Post-op Problem     HPI  Ihsan Millan is a 26 year old female who presents with nausea, vomiting, with some streaks of blood in the emesis, as well as increased lower abdominal pain, s/p left oophorectomy performed by Dr. Clifford yesterday. She has been taking Norco 7.5-325 every 6 hours and Ibuprofen 800mg every 8 hours for pain. She does not have anything for nausea at home. Denies fevers. Has h/o right oophorectomy and hysterectomy all secondary to pelvic pain due to endometriosis. Also, notes mild dysuria since yesterday.     I have reviewed the Medications, Allergies, Past Medical and Surgical History, and Social History in the Epic system.    Review of Systems   Constitutional: Negative for appetite change, chills, fever and unexpected weight change.   HENT: Negative for sore throat.    Respiratory: Negative for shortness of breath.    Cardiovascular: Negative for chest pain.   Gastrointestinal: Positive for abdominal pain, nausea and vomiting. Negative for abdominal distention, anal bleeding, blood in stool, constipation and diarrhea.   Genitourinary: Positive for dysuria. Negative for difficulty urinating, dyspareunia, flank pain, frequency, genital sores, hematuria, pelvic pain, urgency, vaginal bleeding, vaginal discharge and vaginal pain.   Musculoskeletal: Negative.    Skin: Negative.    Neurological: Negative.    All other systems reviewed and are negative.     Past Medical History:   Past Medical History:   Diagnosis Date     Asthma      GERD (gastroesophageal reflux disease)      H. pylori infection        Past Surgical History:   Procedure Laterality Date     COLONOSCOPY N/A 11/6/2014    Procedure: COLONOSCOPY;  Surgeon: Zacarias Paz MD;  Location: [facility]     DILATION AND CURETTAGE, HYSTEROSCOPY DIAGNOSTIC, COMBINED  8/1/2014    Procedure: COMBINED DILATION AND CURETTAGE, HYSTEROSCOPY DIAGNOSTIC;  Surgeon: Rodolfo Clifford  MD CALI;  Location[facility]     ESOPHAGOSCOPY, GASTROSCOPY, DUODENOSCOPY (EGD), COMBINED N/A 9/5/2014    Procedure: COMBINED ESOPHAGOSCOPY, GASTROSCOPY, DUODENOSCOPY (EGD);  Surgeon: Zacarias Paz MD;  [facility]     LAPAROSCOPIC ASSISTED HYSTERECTOMY VAGINAL N/A 6/24/2015    Procedure: LAPAROSCOPIC ASSISTED HYSTERECTOMY VAGINAL;  Surgeon: Rodolfo Clifford MD;  [facility]     LAPAROSCOPIC OOPHORECTOMY Right 3/23/2016    Procedure: LAPAROSCOPIC OOPHORECTOMY;  Surgeon: Rodolfo Clifford MD;  [facility]     LAPAROSCOPIC SALPINGO-OOPHORECTOMY Left 5/3/2017    Procedure: LAPAROSCOPIC SALPINGO-OOPHORECTOMY;  LAPAROSCOPIC LEFT OOPHORECTOMY;  Surgeon: Rodolfo Clifford MD;  [facility]     LAPAROSCOPY DIAGNOSTIC (GYN) N/A 3/18/2015    Procedure: LAPAROSCOPY DIAGNOSTIC (GYN);  Surgeon: Rodolfo Clifford MD;  Location: [facility]     no surgeries       SALPINGECTOMY Bilateral 6/24/2015    Procedure: SALPINGECTOMY;  Surgeon: Rodolfo Clifford MD;  Location: [facility]       Social History     Social History     Marital status: Single     Spouse name: N/A     Number of children: N/A     Years of education: N/A     Occupational History     Not on file.     Social History Main Topics     Smoking status: Current Every Day Smoker     Packs/day: 0.50     Years: 7.00     Types: Cigarettes     Smokeless tobacco: Never Used      Comment: declines quitline referral     Alcohol use Yes      Comment: social     Drug use: No     Sexual activity: Yes     Partners: Male     Birth control/ protection: None, Female Surgical     Other Topics Concern     Parent/Sibling W/ Cabg, Mi Or Angioplasty Before 65f 55m? No     Social History Narrative       Discharge Medication List as of 5/4/2017  4:05 PM      START taking these medications    Details   docusate sodium (COLACE) 100 MG tablet Take 100 mg by mouth 2 times daily, Disp-60 tablet, R-1, E-Prescribe      ondansetron (ZOFRAN ODT) 4 MG ODT tab Take 1 tablet (4 mg) by mouth every 6 hours as needed for  nausea, Disp-20 tablet, R-0, E-Prescribe         CONTINUE these medications which have NOT CHANGED    Details   Zolpidem Tartrate (AMBIEN PO) Does not know how many mg, Historical      HYDROcodone-acetaminophen (NORCO) 7.5-325 MG per tablet Take 1-2 tablets by mouth every 6 hours as needed for moderate to severe pain, Disp-30 tablet, R-0, Local Print      ibuprofen (ADVIL/MOTRIN) 800 MG tablet Take 1 tablet (800 mg) by mouth every 8 hours as needed for moderate pain, Disp-40 tablet, R-1, E-Prescribe      venlafaxine (EFFEXOR-XR) 75 MG 24 hr capsule Take 75 mg by mouth daily, Historical      OMEPRAZOLE PO Take 20 mg by mouth every morning, Historical      albuterol (PROAIR HFA, PROVENTIL HFA, VENTOLIN HFA) 108 (90 BASE) MCG/ACT inhaler Inhale 2 puffs into the lungs every 4 hours as needed for shortness of breath / dyspnea or wheezing, Disp-1 Inhaler, R-2, E-Prescribe      promethazine (PHENERGAN) 12.5 MG tablet Take 12.5 mg by mouth every 6 hours as needed for nausea, Historical             Allergies: Lexapro [escitalopram]; Codeine sulfate; Penicillins; and Tramadol      Physical Exam   BP: 140/63  Pulse: 65  Heart Rate: 77  Temp: 98.2  F (36.8  C)  Resp: 20  SpO2: 98 %  Physical Exam   Constitutional: She is oriented to person, place, and time. Vital signs are normal. She appears well-developed and well-nourished.  Non-toxic appearance. She does not have a sickly appearance. She does not appear ill. She appears distressed (Mildly. ).   HENT:   Head: Normocephalic and atraumatic.   Right Ear: External ear normal.   Left Ear: External ear normal.   Nose: Nose normal.   Mouth/Throat: Oropharynx is clear and moist. No oropharyngeal exudate.   Eyes: Conjunctivae are normal. Pupils are equal, round, and reactive to light. Right eye exhibits no discharge. Left eye exhibits no discharge. No scleral icterus.   Neck: Normal range of motion. Neck supple.   Cardiovascular: Normal rate, regular rhythm, normal heart sounds and  intact distal pulses.  Exam reveals no gallop and no friction rub.    No murmur heard.  Pulmonary/Chest: Effort normal and breath sounds normal. No respiratory distress. She has no wheezes. She has no rales. She exhibits no tenderness.   Abdominal: Soft. Bowel sounds are normal. She exhibits no distension and no mass. There is generalized tenderness. There is no rebound and no guarding.       Musculoskeletal: Normal range of motion. She exhibits no edema.   Lymphadenopathy:     She has no cervical adenopathy.   Neurological: She is alert and oriented to person, place, and time. No cranial nerve deficit.   Skin: Skin is warm and dry. No rash noted. She is not diaphoretic. No erythema. No pallor.   Psychiatric: She has a normal mood and affect. Her behavior is normal. Judgment and thought content normal.   Nursing note and vitals reviewed.      ED Course     ED Course     Procedures          Labs Ordered and Resulted from Time of ED Arrival Up to the Time of Departure from the ED   COMPREHENSIVE METABOLIC PANEL - Abnormal; Notable for the following:        Result Value    Sodium 146 (*)     Chloride 112 (*)     Calcium 8.4 (*)     Protein Total 6.3 (*)     All other components within normal limits   UA MACROSCOPIC WITH REFLEX TO MICRO AND CULTURE   CBC WITH PLATELETS DIFFERENTIAL   PERIPHERAL IV CATHETER       Assessments & Plan (with Medical Decision Making)   Ihsan was mildly dehydrated today so was given a 2 liter bolus of NS. Morphine 4mg IV, Toradol 30mg IV, and Zofran 8mg IV given for pain and nausea. Pt is feeling much better following. Labs are unremarkable. UA negative for infection. No indications for imaging at this point. See plan below.     Plan: Start taking the Colace as prescribed to soften your stools. Take the Zofran as prescribed for nausea/vomiting. Increase the hydrocodone from 1 tab every 6 hours, to 1 tab every 4 hours. Continue taking the Ibuprofen every 6-8 hours. Return here with  new/worsening symptoms.     I have reviewed the nursing notes.    I have reviewed the findings, diagnosis, plan and need for follow up with the patient.    Discharge Medication List as of 5/4/2017  4:05 PM      START taking these medications    Details   docusate sodium (COLACE) 100 MG tablet Take 100 mg by mouth 2 times daily, Disp-60 tablet, R-1, E-Prescribe      ondansetron (ZOFRAN ODT) 4 MG ODT tab Take 1 tablet (4 mg) by mouth every 6 hours as needed for nausea, Disp-20 tablet, R-0, E-Prescribe             Final diagnoses:   Acute post-operative pain   Post-operative nausea and vomiting       5/4/2017   [facility]     Elizabeth Billy PA-C  05/04/17 1474

## 2017-05-04 NOTE — DISCHARGE INSTRUCTIONS
Start taking the Colace as prescribed to soften your stools. Take the Zofran as prescribed for nausea/vomiting. Increase the hydrocodone from 1 tab every 6 hours, to 1 tab every 4 hours. Continue taking the Ibuprofen every 6-8 hours. Return here with new/worsening symptoms.     Pain Management After Surgery  Once you re home after surgery, you may have some pain, since even minor surgery causes swelling and breakdown of tissue. When it comes to effective pain management, the tips you may have learned in the hospital also work at home. To get the best pain relief possible, remember these points:  Special note: Be sure to follow any specific post-operative instructions from your surgeon or nurse.     Use your medicine only as directed    If your pain is not relieved or if it gets worse, call your health care provider.    If pain lessens, try taking your medicine less often or in smaller doses.  Remember that medicines need time to work    Most pain relievers taken by mouth need at least 20 to 30 minutes to take effect. They may not reach their maximum effect for close to an hour.     Take pain medicine at regular times as directed. Don t wait until the pain gets bad to take it.  Time your medicine    Try to time your medicine so that you take it before beginning an activity, such as dressing or sitting at the table for dinner.    Taking your medicine at night may help you get a good night s rest.  Eat lots of fruit and vegetables    Constipation is a common side effect with some pain medicines. Eating fruit, vegetables, and other foods high in fiber can help.    Drink lots of fluids.    Talk to your health care provider about taking a preventive bowel regimen.  Avoid drinking alcohol while taking pain medicine    Mixing alcohol and pain medicine can cause dizziness and slow your respiratory system. It can even be fatal.  Avoid driving or operating machinery while taking pain medicines that can cause drowsiness.     0131-5110 [employer]. [address] [place] [address], [place] [address]. All rights reserved. This information is not intended as a substitute for professional medical care. Always follow your healthcare professional's instructions.

## 2017-05-04 NOTE — ED AVS SNAPSHOT
[facility]    [address] [address]    Phone:  [phone]                                       Ihsan Millan   MRN: [id number]    Department:  [facility]   Date of Visit:  5/4/2017           After Visit Summary Signature Page     I have received my discharge instructions, and my questions have been answered. I have discussed any challenges I see with this plan with the nurse or doctor.    ..........................................................................................................................................  Patient/Patient Representative Signature      ..........................................................................................................................................  Patient Representative Print Name and Relationship to Patient    ..................................................               ................................................  Date                                            Time    ..........................................................................................................................................  Reviewed by Signature/Title    ...................................................              ..............................................  Date                                                            Time

## 2017-05-18 ENCOUNTER — OFFICE VISIT (OUTPATIENT)
Dept: OBGYN | Facility: OTHER | Age: 27
End: 2017-05-18
Attending: OBSTETRICS & GYNECOLOGY
Payer: COMMERCIAL

## 2017-05-18 VITALS
SYSTOLIC BLOOD PRESSURE: 98 MMHG | OXYGEN SATURATION: 96 % | HEART RATE: 86 BPM | HEIGHT: 64 IN | DIASTOLIC BLOOD PRESSURE: 67 MMHG

## 2017-05-18 DIAGNOSIS — N95.1 MENOPAUSAL SYNDROME (HOT FLASHES): Primary | ICD-10-CM

## 2017-05-18 PROCEDURE — 99212 OFFICE O/P EST SF 10 MIN: CPT

## 2017-05-18 PROCEDURE — 99024 POSTOP FOLLOW-UP VISIT: CPT | Performed by: OBSTETRICS & GYNECOLOGY

## 2017-05-18 RX ORDER — ESTRADIOL 1 MG/1
1 TABLET ORAL DAILY
Qty: 90 TABLET | Refills: 1 | Status: SHIPPED | OUTPATIENT
Start: 2017-05-18 | End: 2018-03-29

## 2017-05-18 ASSESSMENT — PAIN SCALES - GENERAL: PAINLEVEL: SEVERE PAIN (6)

## 2017-05-18 NOTE — PROGRESS NOTES
"AIME Millan is a 26 year old female presents for post operative check. She is  2  week(s) status post Laparoscopic LSO.  She reports doing well and denies significant pain or bleeding.  Bowel and bladder function is satisfactory. Denies incisional problems. Significant findings endometriosis    O.  Blood pressure 98/67, pulse 86, height 5' 4\" (1.626 m), SpO2 96 %, not currently breastfeeding.    Abd: soft, non-tender, non-distended. Incision clear, dry, and intact without evidence of infection.    A. /P. Satisfactory post-op check.Released from restrictions.    F/u prn problems or at next annual examination.    Discussed hormone replacement therapy and the potential benefits for symptoms associated with menopause.   She is also aware that there is bone benefit and HRT can help alleviate her hot flashes and possible cardioprotection in her age group.  Risks discussed.  Pt desires to proceed.  Estrace 1 mg daily.  Discussed sx of hormone excess/sufficiency.     Pt has my card and phone number to call as needed if problems in the interim or she does not here her results.    Pt has my card and phone number to call as needed if problems.      Rodolfo Clifford  "

## 2017-05-18 NOTE — NURSING NOTE
"Chief Complaint   Patient presents with     Surgical Followup     post op lap left oophorectomy on 5/3/17       Initial BP 98/67 (BP Location: Left arm, Cuff Size: Adult Regular)  Pulse 86  Ht 5' 4\" (1.626 m)  LMP  (LMP Unknown)  SpO2 96% Estimated body mass index is 22.14 kg/(m^2) as calculated from the following:    Height as of 5/3/17: 5' 4\" (1.626 m).    Weight as of 5/3/17: 129 lb (58.5 kg).  Medication Reconciliation: complete     Nadia Flower      "

## 2017-05-18 NOTE — MR AVS SNAPSHOT
"              After Visit Summary   5/18/2017    Ihsan Millan    MRN: [id number]           Patient Information     Date Of Birth          1990        Visit Information        Provider Department      5/18/2017 2:15 PM Rodolfo Clifford MD [facility]        Today's Diagnoses     Menopausal syndrome (hot flashes)    -  1      Care Instructions    F/u prn        Follow-ups after your visit        Who to contact     If you have questions or need follow up information about today's clinic visit or your schedule please contact [facility] directly at [phone].  Normal or non-critical lab and imaging results will be communicated to you by [employer], letter or phone within 4 business days after the clinic has received the results. If you do not hear from us within 7 days, please contact the clinic through [employer] or phone. If you have a critical or abnormal lab result, we will notify you by phone as soon as possible.  Submit refill requests through [employer] or call your pharmacy and they will forward the refill request to us. Please allow 3 business days for your refill to be completed.          Additional Information About Your Visit        MyChart Information     [employer] gives you secure access to your electronic health record. If you see a primary care provider, you can also send messages to your care team and make appointments. If you have questions, please call your primary care clinic.  If you do not have a primary care provider, please call [phone] and they will assist you.        Care EveryWhere ID     This is your Care EveryWhere ID. This could be used by other organizations to access your [facility] medical records  [id number]        Your Vitals Were     Pulse Height Last Period Pulse Oximetry          86 5' 4\" (1.626 m) (LMP Unknown) 96%         Blood Pressure from Last 3 Encounters:   05/18/17 98/67   05/04/17 96/66   05/03/17 96/65    Weight from Last 3 Encounters: "   05/03/17 129 lb (58.5 kg)   04/14/17 120 lb (54.4 kg)   01/10/17 145 lb (65.8 kg)              Today, you had the following     No orders found for display         Today's Medication Changes          These changes are accurate as of: 5/18/17  2:41 PM.  If you have any questions, ask your nurse or doctor.               Start taking these medicines.        Dose/Directions    estradiol 1 MG tablet   Commonly known as:  ESTRACE   Used for:  Menopausal syndrome (hot flashes)        Dose:  1 mg   Take 1 tablet (1 mg) by mouth daily   Quantity:  90 tablet   Refills:  1            Where to get your medicines      These medications were sent to [id number] - [place], [place] - [address] [place] [facility] [place] [facility] [address]  [address], [facility] [address]     Phone:  [phone]     estradiol 1 MG tablet                Primary Care Provider Office Phone # Fax #    Temo RUSSELL DO Rosemarie [phone]       [facility] [address]  [facility] [address]        Thank you!     Thank you for choosing [facility]  for your care. Our goal is always to provide you with excellent care. Hearing back from our patients is one way we can continue to improve our services. Please take a few minutes to complete the written survey that you may receive in the mail after your visit with us. Thank you!             Your Updated Medication List - Protect others around you: Learn how to safely use, store and throw away your medicines at www.disposemymeds.org.          This list is accurate as of: 5/18/17  2:41 PM.  Always use your most recent med list.                   Brand Name Dispense Instructions for use    albuterol 108 (90 BASE) MCG/ACT Inhaler    PROAIR HFA/PROVENTIL HFA/VENTOLIN HFA    1 Inhaler    Inhale 2 puffs into the lungs every 4 hours as needed for shortness of breath / dyspnea or wheezing       LUISA STAFFORD      Reported on 5/18/2017       estradiol 1 MG tablet    ESTRACE    90  tablet    Take 1 tablet (1 mg) by mouth daily       ibuprofen 800 MG tablet    ADVIL/MOTRIN    40 tablet    Take 1 tablet (800 mg) by mouth every 8 hours as needed for moderate pain       OMEPRAZOLE PO      Take 20 mg by mouth every morning       venlafaxine 75 MG 24 hr capsule    EFFEXOR-XR     Take 75 mg by mouth daily

## 2017-06-16 ENCOUNTER — HOSPITAL ENCOUNTER (EMERGENCY)
Facility: HOSPITAL | Age: 27
Discharge: HOME | End: 2017-06-16
Attending: PHYSICIAN ASSISTANT | Admitting: PHYSICIAN ASSISTANT
Payer: COMMERCIAL

## 2017-06-16 VITALS
DIASTOLIC BLOOD PRESSURE: 51 MMHG | RESPIRATION RATE: 20 BRPM | OXYGEN SATURATION: 95 % | SYSTOLIC BLOOD PRESSURE: 86 MMHG | TEMPERATURE: 98.2 F

## 2017-06-16 DIAGNOSIS — J18.9 PNEUMONIA OF RIGHT MIDDLE LOBE DUE TO INFECTIOUS ORGANISM: ICD-10-CM

## 2017-06-16 LAB
ALBUMIN SERPL-MCNC: 3.7 G/DL (ref 3.4–5)
ALBUMIN UR-MCNC: 10 MG/DL
ALP SERPL-CCNC: 90 U/L (ref 40–150)
ALT SERPL W P-5'-P-CCNC: 23 U/L (ref 0–50)
ANION GAP SERPL CALCULATED.3IONS-SCNC: 8 MMOL/L (ref 3–14)
APPEARANCE UR: CLEAR
AST SERPL W P-5'-P-CCNC: 9 U/L (ref 0–45)
BACTERIA #/AREA URNS HPF: ABNORMAL /HPF
BACTERIA SPEC CULT: NORMAL
BASOPHILS # BLD AUTO: 0 10E9/L (ref 0–0.2)
BASOPHILS NFR BLD AUTO: 0.2 %
BILIRUB SERPL-MCNC: 0.8 MG/DL (ref 0.2–1.3)
BILIRUB UR QL STRIP: NEGATIVE
BUN SERPL-MCNC: 16 MG/DL (ref 7–30)
CALCIUM SERPL-MCNC: 8.6 MG/DL (ref 8.5–10.1)
CHLORIDE SERPL-SCNC: 106 MMOL/L (ref 94–109)
CO2 SERPL-SCNC: 24 MMOL/L (ref 20–32)
COLOR UR AUTO: YELLOW
CREAT SERPL-MCNC: 0.8 MG/DL (ref 0.52–1.04)
CRP SERPL-MCNC: 34.1 MG/L (ref 0–8)
DIFFERENTIAL METHOD BLD: ABNORMAL
EOSINOPHIL # BLD AUTO: 0 10E9/L (ref 0–0.7)
EOSINOPHIL NFR BLD AUTO: 0.1 %
ERYTHROCYTE [DISTWIDTH] IN BLOOD BY AUTOMATED COUNT: 13.2 % (ref 10–15)
GFR SERPL CREATININE-BSD FRML MDRD: 86 ML/MIN/1.7M2
GLUCOSE SERPL-MCNC: 88 MG/DL (ref 70–99)
GLUCOSE UR STRIP-MCNC: NEGATIVE MG/DL
HCG UR QL: NEGATIVE
HCT VFR BLD AUTO: 38 % (ref 35–47)
HGB BLD-MCNC: 13.1 G/DL (ref 11.7–15.7)
HGB UR QL STRIP: ABNORMAL
IMM GRANULOCYTES # BLD: 0.1 10E9/L (ref 0–0.4)
IMM GRANULOCYTES NFR BLD: 0.5 %
KETONES UR STRIP-MCNC: 40 MG/DL
LACTATE SERPL-SCNC: 1.1 MMOL/L (ref 0.4–2)
LEUKOCYTE ESTERASE UR QL STRIP: NEGATIVE
LYMPHOCYTES # BLD AUTO: 1.9 10E9/L (ref 0.8–5.3)
LYMPHOCYTES NFR BLD AUTO: 10.6 %
MCH RBC QN AUTO: 30.3 PG (ref 26.5–33)
MCHC RBC AUTO-ENTMCNC: 34.5 G/DL (ref 31.5–36.5)
MCV RBC AUTO: 88 FL (ref 78–100)
MICRO REPORT STATUS: NORMAL
MICRO REPORT STATUS: NORMAL
MONOCYTES # BLD AUTO: 0.9 10E9/L (ref 0–1.3)
MONOCYTES NFR BLD AUTO: 5.2 %
MUCOUS THREADS #/AREA URNS LPF: PRESENT /LPF
NEUTROPHILS # BLD AUTO: 14.7 10E9/L (ref 1.6–8.3)
NEUTROPHILS NFR BLD AUTO: 83.4 %
NITRATE UR QL: NEGATIVE
NRBC # BLD AUTO: 0 10*3/UL
NRBC BLD AUTO-RTO: 0 /100
PH UR STRIP: 5 PH (ref 4.7–8)
PLATELET # BLD AUTO: 231 10E9/L (ref 150–450)
POTASSIUM SERPL-SCNC: 3.7 MMOL/L (ref 3.4–5.3)
PROT SERPL-MCNC: 7.2 G/DL (ref 6.8–8.8)
RBC # BLD AUTO: 4.33 10E12/L (ref 3.8–5.2)
RBC #/AREA URNS AUTO: <1 /HPF (ref 0–2)
SODIUM SERPL-SCNC: 138 MMOL/L (ref 133–144)
SP GR UR STRIP: 1.02 (ref 1–1.03)
SPECIMEN SOURCE: NORMAL
SPECIMEN SOURCE: NORMAL
SQUAMOUS #/AREA URNS AUTO: 1 /HPF (ref 0–1)
URN SPEC COLLECT METH UR: ABNORMAL
UROBILINOGEN UR STRIP-MCNC: NORMAL MG/DL (ref 0–2)
WBC # BLD AUTO: 17.6 10E9/L (ref 4–11)
WBC #/AREA URNS AUTO: <1 /HPF (ref 0–2)
WET PREP SPEC: NORMAL

## 2017-06-16 PROCEDURE — 25000132 ZZH RX MED GY IP 250 OP 250 PS 637: Performed by: PHYSICIAN ASSISTANT

## 2017-06-16 PROCEDURE — 25000125 ZZHC RX 250: Performed by: PHYSICIAN ASSISTANT

## 2017-06-16 PROCEDURE — 87491 CHLMYD TRACH DNA AMP PROBE: CPT | Performed by: PHYSICIAN ASSISTANT

## 2017-06-16 PROCEDURE — 81001 URINALYSIS AUTO W/SCOPE: CPT | Performed by: PHYSICIAN ASSISTANT

## 2017-06-16 PROCEDURE — 81025 URINE PREGNANCY TEST: CPT | Performed by: PHYSICIAN ASSISTANT

## 2017-06-16 PROCEDURE — 83605 ASSAY OF LACTIC ACID: CPT | Performed by: PHYSICIAN ASSISTANT

## 2017-06-16 PROCEDURE — 71020 ZZHC CHEST TWO VIEWS, FRONT/LAT: CPT | Mod: TC

## 2017-06-16 PROCEDURE — 99285 EMERGENCY DEPT VISIT HI MDM: CPT | Performed by: PHYSICIAN ASSISTANT

## 2017-06-16 PROCEDURE — 96367 TX/PROPH/DG ADDL SEQ IV INF: CPT

## 2017-06-16 PROCEDURE — 96375 TX/PRO/DX INJ NEW DRUG ADDON: CPT

## 2017-06-16 PROCEDURE — 74177 CT ABD & PELVIS W/CONTRAST: CPT | Mod: TC

## 2017-06-16 PROCEDURE — 87210 SMEAR WET MOUNT SALINE/INK: CPT | Performed by: PHYSICIAN ASSISTANT

## 2017-06-16 PROCEDURE — 96365 THER/PROPH/DIAG IV INF INIT: CPT | Mod: 59

## 2017-06-16 PROCEDURE — 87591 N.GONORRHOEAE DNA AMP PROB: CPT | Performed by: PHYSICIAN ASSISTANT

## 2017-06-16 PROCEDURE — 87040 BLOOD CULTURE FOR BACTERIA: CPT | Performed by: PHYSICIAN ASSISTANT

## 2017-06-16 PROCEDURE — 85025 COMPLETE CBC W/AUTO DIFF WBC: CPT | Performed by: PHYSICIAN ASSISTANT

## 2017-06-16 PROCEDURE — 86140 C-REACTIVE PROTEIN: CPT | Performed by: PHYSICIAN ASSISTANT

## 2017-06-16 PROCEDURE — 25000128 H RX IP 250 OP 636: Performed by: PHYSICIAN ASSISTANT

## 2017-06-16 PROCEDURE — 80053 COMPREHEN METABOLIC PANEL: CPT | Performed by: PHYSICIAN ASSISTANT

## 2017-06-16 PROCEDURE — 99285 EMERGENCY DEPT VISIT HI MDM: CPT | Mod: 25

## 2017-06-16 PROCEDURE — 36415 COLL VENOUS BLD VENIPUNCTURE: CPT | Performed by: PHYSICIAN ASSISTANT

## 2017-06-16 RX ORDER — LEVOFLOXACIN 500 MG/1
500 TABLET, FILM COATED ORAL DAILY
Qty: 7 TABLET | Refills: 0 | Status: SHIPPED | OUTPATIENT
Start: 2017-06-16 | End: 2017-06-23

## 2017-06-16 RX ORDER — ACETAMINOPHEN 10 MG/ML
1000 INJECTION, SOLUTION INTRAVENOUS ONCE
Status: COMPLETED | OUTPATIENT
Start: 2017-06-16 | End: 2017-06-16

## 2017-06-16 RX ORDER — KETOROLAC TROMETHAMINE 30 MG/ML
30 INJECTION, SOLUTION INTRAMUSCULAR; INTRAVENOUS ONCE
Status: COMPLETED | OUTPATIENT
Start: 2017-06-16 | End: 2017-06-16

## 2017-06-16 RX ORDER — ONDANSETRON 2 MG/ML
4 INJECTION INTRAMUSCULAR; INTRAVENOUS ONCE
Status: COMPLETED | OUTPATIENT
Start: 2017-06-16 | End: 2017-06-16

## 2017-06-16 RX ORDER — LEVOFLOXACIN 500 MG/1
TABLET, FILM COATED ORAL
Status: DISCONTINUED
Start: 2017-06-16 | End: 2017-06-17 | Stop reason: HOSPADM

## 2017-06-16 RX ORDER — IOPAMIDOL 612 MG/ML
100 INJECTION, SOLUTION INTRAVASCULAR ONCE
Status: COMPLETED | OUTPATIENT
Start: 2017-06-16 | End: 2017-06-16

## 2017-06-16 RX ORDER — LORAZEPAM 2 MG/ML
0.5 INJECTION INTRAMUSCULAR ONCE
Status: COMPLETED | OUTPATIENT
Start: 2017-06-16 | End: 2017-06-16

## 2017-06-16 RX ORDER — LEVOFLOXACIN 500 MG/1
500 TABLET, FILM COATED ORAL DAILY
Status: DISCONTINUED | OUTPATIENT
Start: 2017-06-17 | End: 2017-06-16

## 2017-06-16 RX ORDER — LEVOFLOXACIN 500 MG/1
500 TABLET, FILM COATED ORAL DAILY
Status: DISCONTINUED | OUTPATIENT
Start: 2017-06-16 | End: 2017-06-17 | Stop reason: HOSPADM

## 2017-06-16 RX ORDER — SENNOSIDES 8.6 MG
650 CAPSULE ORAL EVERY 8 HOURS PRN
Qty: 60 TABLET | Refills: 0 | Status: SHIPPED | OUTPATIENT
Start: 2017-06-16 | End: 2017-08-12

## 2017-06-16 RX ORDER — MORPHINE SULFATE 2 MG/ML
4 INJECTION, SOLUTION INTRAMUSCULAR; INTRAVENOUS ONCE
Status: COMPLETED | OUTPATIENT
Start: 2017-06-16 | End: 2017-06-16

## 2017-06-16 RX ORDER — IBUPROFEN 600 MG/1
600 TABLET, FILM COATED ORAL EVERY 6 HOURS PRN
Qty: 30 TABLET | Refills: 1 | Status: SHIPPED | OUTPATIENT
Start: 2017-06-16 | End: 2017-08-12

## 2017-06-16 RX ADMIN — IOPAMIDOL 100 ML: 612 INJECTION, SOLUTION INTRAVASCULAR at 20:51

## 2017-06-16 RX ADMIN — LORAZEPAM 0.5 MG: 2 INJECTION, SOLUTION INTRAMUSCULAR; INTRAVENOUS at 21:37

## 2017-06-16 RX ADMIN — ONDANSETRON 4 MG: 2 INJECTION INTRAMUSCULAR; INTRAVENOUS at 19:45

## 2017-06-16 RX ADMIN — CEFOXITIN 1 G: 1 INJECTION, POWDER, FOR SOLUTION INTRAVENOUS at 21:12

## 2017-06-16 RX ADMIN — SODIUM CHLORIDE 1000 ML: 9 INJECTION, SOLUTION INTRAVENOUS at 20:43

## 2017-06-16 RX ADMIN — KETOROLAC TROMETHAMINE 30 MG: 30 INJECTION, SOLUTION INTRAMUSCULAR at 19:44

## 2017-06-16 RX ADMIN — ACETAMINOPHEN 1000 MG: 10 INJECTION, SOLUTION INTRAVENOUS at 19:57

## 2017-06-16 RX ADMIN — LEVOFLOXACIN 500 MG: 500 TABLET ORAL at 21:54

## 2017-06-16 RX ADMIN — MORPHINE SULFATE 4 MG: 2 INJECTION, SOLUTION INTRAMUSCULAR; INTRAVENOUS at 20:10

## 2017-06-16 RX ADMIN — SODIUM CHLORIDE 1000 ML: 9 INJECTION, SOLUTION INTRAVENOUS at 19:43

## 2017-06-16 ASSESSMENT — ENCOUNTER SYMPTOMS
SORE THROAT: 0
NAUSEA: 1
ABDOMINAL PAIN: 0
DYSURIA: 0
UNEXPECTED WEIGHT CHANGE: 0
SHORTNESS OF BREATH: 0
DIARRHEA: 0
BACK PAIN: 1
APPETITE CHANGE: 0
ABDOMINAL DISTENTION: 0
CHILLS: 1
DIFFICULTY URINATING: 0
VOMITING: 0
NEUROLOGICAL NEGATIVE: 1
FLANK PAIN: 0
CONSTIPATION: 0
ANAL BLEEDING: 0
FEVER: 1
FREQUENCY: 0
BLOOD IN STOOL: 0
HEMATURIA: 0

## 2017-06-16 NOTE — ED AVS SNAPSHOT
[facility]    [address] [address]    Phone:  [phone]                                       Ihsan Millan   MRN: [id number]    Department:  [facility]   Date of Visit:  6/16/2017           After Visit Summary Signature Page     I have received my discharge instructions, and my questions have been answered. I have discussed any challenges I see with this plan with the nurse or doctor.    ..........................................................................................................................................  Patient/Patient Representative Signature      ..........................................................................................................................................  Patient Representative Print Name and Relationship to Patient    ..................................................               ................................................  Date                                            Time    ..........................................................................................................................................  Reviewed by Signature/Title    ...................................................              ..............................................  Date                                                            Time

## 2017-06-16 NOTE — ED AVS SNAPSHOT
[facility]    [address] [address]    Phone:  [phone]                                       Ihsan Millan   MRN: [id number]    Department:  [facility]   Date of Visit:  6/16/2017           Patient Information     Date Of Birth          1990        Your diagnoses for this visit were:     Pneumonia of right middle lobe due to infectious organism        You were seen by Elizabeth Billy PA-C.      Follow-up Information     Follow up with Temo Houston DO In 1 week.    Specialty:  Family Practice    Contact information:    [facility]  [address] [facility] [address] [phone]          Follow up with [facility].    Specialty:  EMERGENCY MEDICINE    Why:  If symptoms worsen    Contact information:    [address] [address] [phone]    Additional information:    From [place]: Take [address]. Turn left at [address]. Turn left at the first stoplight on [address]. At the first stop sign, take a right onto [address]. Take a left into the parking lot and continue through until you reach the North enterance of the building.       From [place]: Take [address]. Take the [address] ramp towards [place]. Turn left onto [address]. Take a slight right onto [address]. Turn left at the first stoplight on [address]. At the first stop sign, take a right onto [address]. Take a left into the parking lot and continue through until you reach the North enterance of the building.       From [place]: Take [address]. Take a right at [address]. At the first stop sign, take a right onto [address]. Take a left into the parking lot and continue through until you reach the North enterance of the building.         Discharge Instructions       Take the Levaquin as prescribed for your pneumonia. Take the Ibuprofen  and Tylenol as prescribed for pain and fever control. Increase fluids and rest. Follow up with primary care in 1 week for re-check. Return here sooner with any new or worsening symptoms.       Pneumonia (Adult)  Pneumonia is an infection deep within the lungs. It is in the small air sacs (alveoli). Pneumonia may be caused by a virus or bacteria. Pneumonia caused by bacteria is usually treated with an antibiotic. Severe cases may need to be treated in the hospital. Milder cases can be treated at home. Symptoms usually start to get better during the first 2 days of treatment.    Home care  Follow these guidelines when caring for yourself at home:    Rest at home for the first 2 to 3 days, or until you feel stronger. Don t let yourself get overly tired when you go back to your activities.    Stay away from cigarette smoke - yours or other people s.    You may use acetaminophen or ibuprofen to control fever or pain, unless another medicine was prescribed. If you have chronic liver or kidney disease, talk with your healthcare provider before using these medicines. Also talk with your provider if you ve had a stomach ulcer or gastrointestinal bleeding. Don t give aspirin to anyone younger than 18 years of age who is ill with a fever. It may cause severe liver damage.    Your appetite may be poor, so a light diet is fine.    Drink 6 to 8 glasses of fluids every day to make sure you are getting enough fluids. Beverages can include water, sport drinks, sodas without caffeine, juices, tea, or soup. Fluids will help loosen secretions in the lung. This will make it easier for you to cough up the phlegm (sputum). If you also have heart or kidney disease, check with your healthcare provider before you drink extra fluids.    Take antibiotic medicine prescribed until it is all gone, even if you are feeling better after a few days.  Follow-up care  Follow up with your healthcare provider in the next 2 to 3 days, or as advised. This  is to be sure the medicine is helping you get better.  If you are 65 or older, you should get a pneumococcal vaccine and a yearly flu (influenza) shot. You should also get these vaccines if you have chronic lung disease like asthma, emphysema, or COPD. Recently, a second type of pneumonia vaccine has become available for everyone over 65 years old. This is in addition to the previous vaccine. Ask your provider about this.  When to seek medical advice  Call your healthcare provider right away if any of these occur:    You don t get better within the first 48 hours of treatment    Shortness of breath gets worse    Rapid breathing (more than 25 breaths per minute)    Coughing up blood    Chest pain gets worse with breathing    Fever of 100.4 F (38 C) or higher that doesn t get better with fever medicine    Weakness, dizziness, or fainting that gets worse    Thirst or dry mouth that gets worse    Sinus pain, headache, or a stiff neck    Chest pain not caused by coughing  Date Last Reviewed: 1/1/2017 2000-2017 [employer]. [address] [place] [address]. All rights reserved. This information is not intended as a substitute for professional medical care. Always follow your healthcare professional's instructions.          What to expect when you have contrast    During your exam, we will inject  contrast  into your vein or artery. (Contrast is a clear liquid with iodine in it. It shows up on X-rays.)    You may feel warm or hot. You may have a metal taste in your mouth and a slight upset stomach. You may also feel pressure near the kidneys and bladder. These effects will last about 1 to 3 minutes.    Please tell us if you have:    Sneezing     Itching    Hives     Swelling in the face    A hoarse voice    Breathing problems    Other new symptoms    Serious problems are rare.  They may include:    Irregular heartbeat     Seizures    Kidney failure              Tissue damage    Shock       Death    If you have any problems during the exam, we  will treat them right away.    When you get home    Call your hospital if you have any new symptoms in the next 2 days, like hives or swelling. (Phone numbers are at the bottom of this page.) Or call your family doctor.     If you have wheezing or trouble breathing, call 911.    Self-care  -Drink at least 4 extra glasses of water today.   This reduces the stress on your kidneys.  -Keep taking your regular medicines.    The contrast will pass out of your body in your  Urine(pee). This will happen in the next 24 hours. You  will not feel this. Your urine will not  change color.    If you have kidney problems or take metformin    Drink 4 to 8 large glasses of water for the next  2 days, if you are not on a fluid restriction.    ?If you take metformin (Glucophage or Glucovance) for diabetes, keep taking it.      ?Your kidney function tests are abnormal.  If you take Metformin, do not take it for 48 hours. Please go to your clinic for a blood test within 3 days after your exam before the restarting this medicine.     (Note to provider:please give patient prescription for lab tests.)    ?Special instructions: Drink at least 4 extra glasses of water today.   This reduces the stress on your kidneys.    I have read and understand the above information.    Patient Sign Here:______________________________________Date:________Time:______    Staff Sign Here:________________________________________Date:_______Time:______      Radiology Departments:     ?[facility]: [phone] ?[facility]: [phone]     ?[place]: [phone] ?[facility]:[phone]      ?[facility]: [phone]  ?[place]: [phone]  ?[place]:[phone]    ?[facility] [place]:[phone]  ?[facility] [place]:[phone]       Review of your medicines      START taking        Dose / Directions Last dose taken    acetaminophen 650 MG CR tablet   Commonly known as:  TYLENOL 8 HOUR   Dose:  650 mg   Quantity:   60 tablet        Take 1 tablet (650 mg) by mouth every 8 hours as needed for mild pain or fever   Refills:  0        levofloxacin 500 MG tablet   Commonly known as:  LEVAQUIN   Dose:  500 mg   Quantity:  7 tablet        Take 1 tablet (500 mg) by mouth daily for 7 days   Refills:  0          Our records show that you are taking the medicines listed below. If these are incorrect, please call your family doctor or clinic.        Dose / Directions Last dose taken    albuterol 108 (90 BASE) MCG/ACT Inhaler   Commonly known as:  PROAIR HFA/PROVENTIL HFA/VENTOLIN HFA   Dose:  2 puff   Quantity:  1 Inhaler        Inhale 2 puffs into the lungs every 4 hours as needed for shortness of breath / dyspnea or wheezing   Refills:  2        AMBIEN PO        Reported on 5/18/2017   Refills:  0        estradiol 1 MG tablet   Commonly known as:  ESTRACE   Dose:  1 mg   Quantity:  90 tablet        Take 1 tablet (1 mg) by mouth daily   Refills:  1        OMEPRAZOLE PO   Dose:  20 mg        Take 20 mg by mouth every morning   Refills:  0        venlafaxine 75 MG 24 hr capsule   Commonly known as:  EFFEXOR-XR   Dose:  75 mg        Take 75 mg by mouth daily   Refills:  0          ASK your doctor about these medications        Dose / Directions Last dose taken    * ibuprofen 800 MG tablet   Commonly known as:  ADVIL/MOTRIN   Dose:  800 mg   What changed:  Another medication with the same name was added. Make sure you understand how and when to take each.   Quantity:  40 tablet   Ask about: Which instructions should I use?        Take 1 tablet (800 mg) by mouth every 8 hours as needed for moderate pain   Refills:  1        * ibuprofen 600 MG tablet   Commonly known as:  ADVIL/MOTRIN   Dose:  600 mg   What changed:  You were already taking a medication with the same name, and this prescription was added. Make sure you understand how and when to take each.   Quantity:  30 tablet   Ask about: Which instructions should I use?        Take 1 tablet  (600 mg) by mouth every 6 hours as needed for moderate pain   Refills:  1        * Notice:  This list has 2 medication(s) that are the same as other medications prescribed for you. Read the directions carefully, and ask your doctor or other care provider to review them with you.            Prescriptions were sent or printed at these locations (3 Prescriptions)                   [id number] - [place], [place] - [address] AT [facility] [address]   [address], [place] [address]    Telephone:  [phone]   Fax:  [phone]   Hours:                  E-Prescribed (3 of 3)         levofloxacin (LEVAQUIN) 500 MG tablet               acetaminophen (TYLENOL 8 HOUR) 650 MG CR tablet               ibuprofen (ADVIL/MOTRIN) 600 MG tablet                Procedures and tests performed during your visit     Procedure/Test Number of Times Performed    Blood culture 2    CBC with platelets differential 1    CHLAMYDIA TRACHOMATIS PCR 1    CRP inflammation 1    CT Abdomen Pelvis w Contrast 1    Chest XR,  PA & LAT 1    Comprehensive metabolic panel 1    HCG qualitative urine 1    Lactic acid 1    NEISSERIA GONORRHOEA PCR 1    Peripheral IV catheter 1    UA reflex to Microscopic and Culture 1    Wet prep 1      Orders Needing Specimen Collection     None      Pending Results     Date and Time Order Name Status Description    6/16/2017 2124 Chest XR,  PA & LAT In process     6/16/2017 2034 CT Abdomen Pelvis w Contrast Preliminary     6/16/2017 2006 CHLAMYDIA TRACHOMATIS PCR In process     6/16/2017 2006 NEISSERIA GONORRHOEA PCR In process     6/16/2017 1951 Blood culture In process             Pending Culture Results     Date and Time Order Name Status Description    6/16/2017 2006 CHLAMYDIA TRACHOMATIS PCR In process     6/16/2017 2006 NEISSERIA GONORRHOEA PCR In process     6/16/2017 1951 Blood culture In process             Thank you for choosing [facility]       Thank you for choosing [facility] for your care.  Our goal is always to provide you with excellent care. Hearing back from our patients is one way we can continue to improve our services. Please take a few minutes to complete the written survey that you may receive in the mail after you visit with us. Thank you!        [facility] Information     [employer] gives you secure access to your electronic health record. If you see a primary care provider, you can also send messages to your care team and make appointments. If you have questions, please call your primary care clinic.  If you do not have a primary care provider, please call [phone] and they will assist you.        Care EveryWhere ID     This is your Care EveryWhere ID. This could be used by other organizations to access your [facility] medical records  [id number]        After Visit Summary       This is your record. Keep this with you and show to your community pharmacist(s) and doctor(s) at your next visit.

## 2017-06-17 NOTE — ED NOTES
Pt co lower back pain bilaterally for a few days with low grade temp.  States blood in urine a few days ago.  Nauseated no vomitting.  Pt has had total hysterectomy.

## 2017-06-17 NOTE — DISCHARGE INSTRUCTIONS
Take the Levaquin as prescribed for your pneumonia. Take the Ibuprofen and Tylenol as prescribed for pain and fever control. Increase fluids and rest. Follow up with primary care in 1 week for re-check. Return here sooner with any new or worsening symptoms.       Pneumonia (Adult)  Pneumonia is an infection deep within the lungs. It is in the small air sacs (alveoli). Pneumonia may be caused by a virus or bacteria. Pneumonia caused by bacteria is usually treated with an antibiotic. Severe cases may need to be treated in the hospital. Milder cases can be treated at home. Symptoms usually start to get better during the first 2 days of treatment.    Home care  Follow these guidelines when caring for yourself at home:    Rest at home for the first 2 to 3 days, or until you feel stronger. Don t let yourself get overly tired when you go back to your activities.    Stay away from cigarette smoke - yours or other people s.    You may use acetaminophen or ibuprofen to control fever or pain, unless another medicine was prescribed. If you have chronic liver or kidney disease, talk with your healthcare provider before using these medicines. Also talk with your provider if you ve had a stomach ulcer or gastrointestinal bleeding. Don t give aspirin to anyone younger than 18 years of age who is ill with a fever. It may cause severe liver damage.    Your appetite may be poor, so a light diet is fine.    Drink 6 to 8 glasses of fluids every day to make sure you are getting enough fluids. Beverages can include water, sport drinks, sodas without caffeine, juices, tea, or soup. Fluids will help loosen secretions in the lung. This will make it easier for you to cough up the phlegm (sputum). If you also have heart or kidney disease, check with your healthcare provider before you drink extra fluids.    Take antibiotic medicine prescribed until it is all gone, even if you are feeling better after a few days.  Follow-up care  Follow up with  your healthcare provider in the next 2 to 3 days, or as advised. This is to be sure the medicine is helping you get better.  If you are 65 or older, you should get a pneumococcal vaccine and a yearly flu (influenza) shot. You should also get these vaccines if you have chronic lung disease like asthma, emphysema, or COPD. Recently, a second type of pneumonia vaccine has become available for everyone over 65 years old. This is in addition to the previous vaccine. Ask your provider about this.  When to seek medical advice  Call your healthcare provider right away if any of these occur:    You don t get better within the first 48 hours of treatment    Shortness of breath gets worse    Rapid breathing (more than 25 breaths per minute)    Coughing up blood    Chest pain gets worse with breathing    Fever of 100.4 F (38 C) or higher that doesn t get better with fever medicine    Weakness, dizziness, or fainting that gets worse    Thirst or dry mouth that gets worse    Sinus pain, headache, or a stiff neck    Chest pain not caused by coughing  Date Last Reviewed: 1/1/2017 2000-2017 [employer]. [address] [place] [address]. All rights reserved. This information is not intended as a substitute for professional medical care. Always follow your healthcare professional's instructions.          What to expect when you have contrast    During your exam, we will inject  contrast  into your vein or artery. (Contrast is a clear liquid with iodine in it. It shows up on X-rays.)    You may feel warm or hot. You may have a metal taste in your mouth and a slight upset stomach. You may also feel pressure near the kidneys and bladder. These effects will last about 1 to 3 minutes.    Please tell us if you have:    Sneezing     Itching    Hives     Swelling in the face    A hoarse voice    Breathing problems    Other new symptoms    Serious problems are rare.  They may include:    Irregular heartbeat      Seizures    Kidney failure              Tissue damage    Shock      Death    If you have any problems during the exam, we  will treat them right away.    When you get home    Call your hospital if you have any new symptoms in the next 2 days, like hives or swelling. (Phone numbers are at the bottom of this page.) Or call your family doctor.     If you have wheezing or trouble breathing, call 911.    Self-care  -Drink at least 4 extra glasses of water today.   This reduces the stress on your kidneys.  -Keep taking your regular medicines.    The contrast will pass out of your body in your  Urine(pee). This will happen in the next 24 hours. You  will not feel this. Your urine will not  change color.    If you have kidney problems or take metformin    Drink 4 to 8 large glasses of water for the next  2 days, if you are not on a fluid restriction.    ?If you take metformin (Glucophage or Glucovance) for diabetes, keep taking it.      ?Your kidney function tests are abnormal.  If you take Metformin, do not take it for 48 hours. Please go to your clinic for a blood test within 3 days after your exam before the restarting this medicine.     (Note to provider:please give patient prescription for lab tests.)    ?Special instructions: Drink at least 4 extra glasses of water today.   This reduces the stress on your kidneys.    I have read and understand the above information.    Patient Sign Here:______________________________________Date:________Time:______    Staff Sign Here:________________________________________Date:_______Time:______      Radiology Departments:     ?[facility]: [phone] ?[place]: [phone]     ?[place]: [phone] ?[facility]:[phone]      ?[place]: [phone]  ?[facility]: [phone]  ?[place]:[phone]    ?[facility]:[phone]  ?[facility]:[phone]

## 2017-06-17 NOTE — ED PROVIDER NOTES
History     Chief Complaint   Patient presents with     Back Pain     c/o lower back pain and h/a, temp 100.3 in triage. denies uti symptoms     HPI  Ihsan Millan is a 26 year old female who presents with lower back pain, headache, fevers and chills since last night. She is s/p left oopherectomy just over 1 month ago performed by Dr. Clifford. She had been doing well since the surgery until last night. She has h/o of a right oophorectomy, hysterectomy with cervix removed as well secondary to endometriosis. She denies abdominal pain or dysuria. She is sexually active currently. Denies cough or neck stiffness.    I have reviewed the Medications, Allergies, Past Medical and Surgical History, and Social History in the Epic system.    Review of Systems   Constitutional: Positive for chills and fever. Negative for appetite change and unexpected weight change.   HENT: Negative for sore throat.    Respiratory: Negative for shortness of breath.    Cardiovascular: Negative for chest pain.   Gastrointestinal: Positive for nausea. Negative for abdominal distention, abdominal pain, anal bleeding, blood in stool, constipation, diarrhea and vomiting.   Genitourinary: Negative.  Negative for difficulty urinating, dyspareunia, dysuria, flank pain, frequency, genital sores, hematuria, pelvic pain, urgency, vaginal bleeding, vaginal discharge and vaginal pain.   Musculoskeletal: Positive for back pain.   Skin: Negative.    Neurological: Negative.    All other systems reviewed and are negative.     Past Medical History:   Past Medical History:   Diagnosis Date     Asthma      GERD (gastroesophageal reflux disease)      H. pylori infection        Past Surgical History:   Procedure Laterality Date     COLONOSCOPY N/A 11/6/2014    Procedure: COLONOSCOPY;  Surgeon: Zacarias Paz MD;  [facility]     DILATION AND CURETTAGE, HYSTEROSCOPY DIAGNOSTIC, COMBINED  8/1/2014    Procedure: COMBINED DILATION AND CURETTAGE, HYSTEROSCOPY  DIAGNOSTIC;  Surgeon: Rodolfo Clifford MD;  Location[facility]     ESOPHAGOSCOPY, GASTROSCOPY, DUODENOSCOPY (EGD), COMBINED N/A 9/5/2014    Procedure: COMBINED ESOPHAGOSCOPY, GASTROSCOPY, DUODENOSCOPY (EGD);  Surgeon: Zacarias Paz MD;  [facility]     LAPAROSCOPIC ASSISTED HYSTERECTOMY VAGINAL N/A 6/24/2015    Procedure: LAPAROSCOPIC ASSISTED HYSTERECTOMY VAGINAL;  Surgeon: Rodolfo Clifford MD;  [facility]     LAPAROSCOPIC OOPHORECTOMY Right 3/23/2016    Procedure: LAPAROSCOPIC OOPHORECTOMY;  Surgeon: Rodolfo Clifford MD;  [facility]     LAPAROSCOPIC SALPINGO-OOPHORECTOMY Left 5/3/2017    Procedure: LAPAROSCOPIC SALPINGO-OOPHORECTOMY;  LAPAROSCOPIC LEFT OOPHORECTOMY;  Surgeon: Rodolfo Clifford MD;  [facility]     LAPAROSCOPY DIAGNOSTIC (GYN) N/A 3/18/2015    Procedure: LAPAROSCOPY DIAGNOSTIC (GYN);  Surgeon: Rodolfo Clifford MD;  Location: [facility]     no surgeries       SALPINGECTOMY Bilateral 6/24/2015    Procedure: SALPINGECTOMY;  Surgeon: Rodolfo Clifford MD;  Location[facility]       Social History     Social History     Marital status: Single     Spouse name: N/A     Number of children: N/A     Years of education: N/A     Occupational History     Not on file.     Social History Main Topics     Smoking status: Current Every Day Smoker     Packs/day: 0.50     Years: 7.00     Types: Cigarettes     Smokeless tobacco: Never Used      Comment: declines quitline referral     Alcohol use Yes      Comment: social     Drug use: No     Sexual activity: Yes     Partners: Male     Birth control/ protection: None, Female Surgical     Other Topics Concern     Parent/Sibling W/ Cabg, Mi Or Angioplasty Before 65f 55m? No     Social History Narrative       Patient's Medications   New Prescriptions    ACETAMINOPHEN (TYLENOL 8 HOUR) 650 MG CR TABLET    Take 1 tablet (650 mg) by mouth every 8 hours as needed for mild pain or fever    IBUPROFEN (ADVIL/MOTRIN) 600 MG TABLET    Take 1 tablet (600 mg) by mouth every 6 hours as  needed for moderate pain    LEVOFLOXACIN (LEVAQUIN) 500 MG TABLET    Take 1 tablet (500 mg) by mouth daily for 7 days   Previous Medications    ALBUTEROL (PROAIR HFA, PROVENTIL HFA, VENTOLIN HFA) 108 (90 BASE) MCG/ACT INHALER    Inhale 2 puffs into the lungs every 4 hours as needed for shortness of breath / dyspnea or wheezing    ESTRADIOL (ESTRACE) 1 MG TABLET    Take 1 tablet (1 mg) by mouth daily    IBUPROFEN (ADVIL/MOTRIN) 800 MG TABLET    Take 1 tablet (800 mg) by mouth every 8 hours as needed for moderate pain    OMEPRAZOLE PO    Take 20 mg by mouth every morning    VENLAFAXINE (EFFEXOR-XR) 75 MG 24 HR CAPSULE    Take 75 mg by mouth daily    ZOLPIDEM TARTRATE (AMBIEN PO)    Reported on 5/18/2017   Modified Medications    No medications on file   Discontinued Medications    No medications on file       Allergies: Lexapro [escitalopram]; Codeine sulfate; Penicillins; and Tramadol      Physical Exam   BP: 103/68  Heart Rate: (!) 124  Temp: 100.3  F (37.9  C)  Resp: 16  SpO2: 98 %  Physical Exam   Constitutional: She is oriented to person, place, and time. Vital signs are normal. She appears well-developed and well-nourished.  Non-toxic appearance. She does not have a sickly appearance. She does not appear ill. No distress.   HENT:   Head: Normocephalic and atraumatic.   Right Ear: External ear normal.   Left Ear: External ear normal.   Nose: Nose normal.   Mouth/Throat: Oropharynx is clear and moist. No oropharyngeal exudate.   Eyes: Conjunctivae are normal. Pupils are equal, round, and reactive to light. Right eye exhibits no discharge. Left eye exhibits no discharge. No scleral icterus.   Neck: Normal range of motion. Neck supple.   Cardiovascular: Normal rate, regular rhythm, normal heart sounds and intact distal pulses.  Exam reveals no gallop and no friction rub.    No murmur heard.  Pulmonary/Chest: Effort normal and breath sounds normal. No respiratory distress. She has no wheezes. She has no rales. She  exhibits no tenderness.   Abdominal: Soft. Bowel sounds are normal. She exhibits no distension and no mass. There is generalized tenderness. There is CVA tenderness. There is no rebound and no guarding.   Genitourinary: Vagina normal. Right adnexum displays no mass. Left adnexum displays no mass. No vaginal discharge found.   Genitourinary Comments: S/p complete hysterectomy. She does have right sided pelvic tenderness on bimanual exam.    Musculoskeletal: Normal range of motion. She exhibits no edema.   Lymphadenopathy:     She has no cervical adenopathy.   Neurological: She is alert and oriented to person, place, and time. No cranial nerve deficit.   Skin: Skin is warm and dry. No rash noted. She is not diaphoretic. No erythema. No pallor.   Psychiatric: She has a normal mood and affect. Her behavior is normal. Judgment and thought content normal.   Nursing note and vitals reviewed.      ED Course     ED Course     Procedures               Labs Ordered and Resulted from Time of ED Arrival Up to the Time of Departure from the ED   UA MACROSCOPIC WITH REFLEX TO MICRO AND CULTURE - Abnormal; Notable for the following:        Result Value    Ketones Urine 40 (*)     Blood Urine Trace (*)     Protein Albumin Urine 10 (*)     Bacteria Urine None (*)     Mucous Urine Present (*)     All other components within normal limits   CBC WITH PLATELETS DIFFERENTIAL - Abnormal; Notable for the following:     WBC 17.6 (*)     Absolute Neutrophil 14.7 (*)     All other components within normal limits   CRP INFLAMMATION - Abnormal; Notable for the following:     CRP Inflammation 34.1 (*)     All other components within normal limits   HCG QUALITATIVE URINE   COMPREHENSIVE METABOLIC PANEL   LACTIC ACID   PERIPHERAL IV CATHETER   BLOOD CULTURE   BLOOD CULTURE   WET PREP   NEISSERIA GONORRHOEAE PCR   CHLAMYDIA TRACHOMATIS PCR       Assessments & Plan (with Medical Decision Making)   Ihsan is ill appearing and febrile at 101.3 upon  arrival with tachycardia at 124. Labs reveal leukocytosis with white count at 17.6 with left shift and CRP elevated at 34.1. Lactic acid WNL. She is not septic. Pelvic exam reveals right sided pelvic tenderness, though she is s/o complete hysterectomy. Generalized abdominal tenderness on exam. CT abd/pelvis with IV contrast was performed given right sided pelvic tenderness to r/o appy and post-surgical complications. The abdominal/pelvis portion of the CT was normal, visualizing a normal appendix, but surprisingly shows a right middle lobe infiltrate consistent with pneumonia. 2 view CXR confirms this. Pt has not had a cough, but with back pain and fevers, certainly could fit this picture. Given recent surgery with hospitalization, will cover with Levaquin. She was given Cefoxitin 1g IV here while awaiting CT assuming she had an intra-abdominal infection. She was given IV tylenol and toradol for pain and fever, with fever resolving after. MS 4mg IV and Zofran 4mg IV also given along with 0.5mg Ativan and she feels better following. 2 liter bolus of NS also given. She was discharged home in good condition with her friend to drive.     Plan: Take the Levaquin as prescribed for your pneumonia. Take the Ibuprofen and Tylenol as prescribed for pain and fever control. Increase fluids and rest. Follow up with primary care in 1 week for re-check. Return here sooner with any new or worsening symptoms.         I have reviewed the nursing notes.    I have reviewed the findings, diagnosis, plan and need for follow up with the patient.    New Prescriptions    ACETAMINOPHEN (TYLENOL 8 HOUR) 650 MG CR TABLET    Take 1 tablet (650 mg) by mouth every 8 hours as needed for mild pain or fever    IBUPROFEN (ADVIL/MOTRIN) 600 MG TABLET    Take 1 tablet (600 mg) by mouth every 6 hours as needed for moderate pain    LEVOFLOXACIN (LEVAQUIN) 500 MG TABLET    Take 1 tablet (500 mg) by mouth daily for 7 days       Final diagnoses:   Pneumonia  of right middle lobe due to infectious organism       6/16/2017   [facility]     Elizabeth Billy PA-C  06/16/17 6124       Elizabeth Billy PA-C  06/16/17 8024

## 2017-06-19 NOTE — ED NOTES
Chest XR report faxed to PCP, Dr. LINDEN Houston.  Impression - right middle lobe infiltrate.  Pt was seen in ED on 6/16/17, prescribed Levaquin 500 mg daily x 7 days and advised to follow up with PCP in one week.

## 2017-06-20 LAB
C TRACH DNA SPEC QL NAA+PROBE: NORMAL
N GONORRHOEA DNA SPEC QL NAA+PROBE: NORMAL
SPECIMEN SOURCE: NORMAL
SPECIMEN SOURCE: NORMAL

## 2017-06-20 NOTE — PROGRESS NOTES
TC to patient, notified of negative Gonorrhea and Chlamydia tests.  Pt stated will be making follow up appt with PCP, Dr. Houston.  No further questions or concerns at this time.

## 2017-06-22 LAB
BACTERIA SPEC CULT: NORMAL
MICRO REPORT STATUS: NORMAL
SPECIMEN SOURCE: NORMAL

## 2017-07-25 ENCOUNTER — HOSPITAL ENCOUNTER (EMERGENCY)
Facility: HOSPITAL | Age: 27
Discharge: HOME | End: 2017-07-26
Attending: FAMILY MEDICINE | Admitting: FAMILY MEDICINE
Payer: COMMERCIAL

## 2017-07-25 DIAGNOSIS — R10.31 ABDOMINAL PAIN, RIGHT LOWER QUADRANT: ICD-10-CM

## 2017-07-25 DIAGNOSIS — K64.4 EXTERNAL HEMORRHOIDS: ICD-10-CM

## 2017-07-25 PROCEDURE — 36415 COLL VENOUS BLD VENIPUNCTURE: CPT | Performed by: FAMILY MEDICINE

## 2017-07-25 PROCEDURE — 80053 COMPREHEN METABOLIC PANEL: CPT | Performed by: FAMILY MEDICINE

## 2017-07-25 PROCEDURE — 99285 EMERGENCY DEPT VISIT HI MDM: CPT | Mod: 25

## 2017-07-25 PROCEDURE — 99285 EMERGENCY DEPT VISIT HI MDM: CPT | Performed by: FAMILY MEDICINE

## 2017-07-25 PROCEDURE — 85025 COMPLETE CBC W/AUTO DIFF WBC: CPT | Performed by: FAMILY MEDICINE

## 2017-07-25 PROCEDURE — 83690 ASSAY OF LIPASE: CPT | Performed by: FAMILY MEDICINE

## 2017-07-25 RX ORDER — SODIUM CHLORIDE 9 MG/ML
1000 INJECTION, SOLUTION INTRAVENOUS CONTINUOUS
Status: DISCONTINUED | OUTPATIENT
Start: 2017-07-25 | End: 2017-07-26 | Stop reason: HOSPADM

## 2017-07-25 NOTE — ED AVS SNAPSHOT
[facility]    [address] [address]    Phone:  [phone]                                       Ihsan Millan   MRN: [id number]    Department:  [facility]   Date of Visit:  7/25/2017           After Visit Summary Signature Page     I have received my discharge instructions, and my questions have been answered. I have discussed any challenges I see with this plan with the nurse or doctor.    ..........................................................................................................................................  Patient/Patient Representative Signature      ..........................................................................................................................................  Patient Representative Print Name and Relationship to Patient    ..................................................               ................................................  Date                                            Time    ..........................................................................................................................................  Reviewed by Signature/Title    ...................................................              ..............................................  Date                                                            Time

## 2017-07-25 NOTE — ED AVS SNAPSHOT
[facility]    [address] [address]    Phone:  [phone]                                       Ihsan Millan   MRN: [id number]    Department:  [facility]   Date of Visit:  7/25/2017           Patient Information     Date Of Birth          1990        Your diagnoses for this visit were:     Abdominal pain, right lower quadrant and supraumbilical.       You were seen by Lela Shabazz MD.      Follow-up Information     Follow up with Temo Houston DO. Schedule an appointment as soon as possible for a visit in 2 days.    Specialty:  Family Practice    Contact information:    [facility]  [address] [facility] [address] [phone]          Follow up with [facility].    Specialty:  EMERGENCY MEDICINE    Why:  If symptoms worsen    Contact information:    [address] [address] [phone]    Additional information:    From [facility]: Take [address]. Turn left at [address]. Turn left at the first stoplight on [address]. At the first stop sign, take a right onto [address]. Take a left into the parking lot and continue through until you reach the North enterance of the building.       From [place]: Take [address]. Take the [address] ramp towards [place]. Turn left onto [address]. Take a slight right onto [address]. Turn left at the first stoplight on [address]. At the first stop sign, take a right onto [address]. Take a left into the parking lot and continue through until you reach the North enterance of the building.       From [place]: Take [address]. Take a right at [address]. At the first stop sign, take a right onto [address]. Take a left into the parking lot and continue through until you reach the North enterance of the building.         Discharge Instructions       Thank you for  coming to [facility].  If you are concerned or things get worse, don't hesitate to return to the Emergency Room.    Return to the ER if you are having vomiting, increased pain, fever or you're concerned.     Use tylenol 1000 mg up to 4 times per day or T3, norco/hydrocodone, perocet/oxycodone but not both at the same time.    Take one or the other but not both at the same time.  You can take up to 8 tablets total in 24 hours.  If you are driving while on narcotics, it is considered a DUI.    Use ibuprofen 800 mg three times daily with food.    You can use both at the same time and repeat in 6 hours or you can alternate every 3 hours or you can do one or the other.   Home  Back  SP  RU  VI  CH     *ABDOMINAL PAIN, UNCERTAIN CAUSE (Female)    Based on your visit today, the exact cause of your abdominal (stomach) pain is not certain. Your condition does not seem serious now; however, the signs of a serious problem may take more time to appear. Therefore, it is important for you to watch for any new symptoms or worsening of your condition.  HOME CARE:    Rest until your next exam. No strenuous activities.    Eat a diet low in fiber (called a low-residue diet). Foods allowed include refined breads, white rice, fruit and vegetable juices without pulp, tender meats. These foods will pass more easily through the intestine.    Avoid fried or fatty foods, dairy, alcohol and spicy foods until your symptoms go away.  FOLLOW UP with your doctor or this facility as instructed, or if your pain does not begin to improve in the next 24 hours.  GET PROMPT MEDICAL ATTENTION if any of the following occur:    Pain gets worse or moves to the right lower abdomen    New or worsening vomiting or diarrhea    Swelling of the abdomen    Unable to pass stool for more than three days    New fever over 101  F (38.3 C), or rising fever    Blood in vomit or bowel movements (dark red or black color)    Jaundice (yellow  color of eyes and skin)    Weakness, dizziness or fainting    Chest, arm, back, neck or jaw pain    Unexpected vaginal bleeding or missed period    8112-3478 [facility], [address] [place] [address], [place] [address]. All rights reserved. This information is not intended as a substitute for professional medical care. Always follow your healthcare professional's instructions.  What to expect when you have contrast    During your exam, we will inject  contrast  into your vein or artery. (Contrast is a clear liquid with iodine in it. It shows up on X-rays.)    You may feel warm or hot. You may have a metal taste in your mouth and a slight upset stomach. You may also feel pressure near the kidneys and bladder. These effects will last about 1 to 3 minutes.    Please tell us if you have:    Sneezing     Itching    Hives     Swelling in the face    A hoarse voice    Breathing problems    Other new symptoms    Serious problems are rare.  They may include:    Irregular heartbeat     Seizures    Kidney failure              Tissue damage    Shock      Death    If you have any problems during the exam, we  will treat them right away.    When you get home    Call your hospital if you have any new symptoms in the next 2 days, like hives or swelling. (Phone numbers are at the bottom of this page.) Or call your family doctor.     If you have wheezing or trouble breathing, call 911.    Self-care  -Drink at least 4 extra glasses of water today.   This reduces the stress on your kidneys.  -Keep taking your regular medicines.    The contrast will pass out of your body in your  Urine(pee). This will happen in the next 24 hours. You  will not feel this. Your urine will not  change color.    If you have kidney problems or take metformin    Drink 4 to 8 large glasses of water for the next  2 days, if you are not on a fluid restriction.    ?If you take metformin (Glucophage or Glucovance) for diabetes, keep taking it.      ?Your kidney  function tests are abnormal.  If you take Metformin, do not take it for 48 hours. Please go to your clinic for a blood test within 3 days after your exam before the restarting this medicine.     (Note to provider:please give patient prescription for lab tests.)    ?Special instructions: Drink an extra 4 glasses of water to flush out the contrast.     I have read and understand the above information.    Patient Sign Here:______________________________________Date:________Time:______    Staff Sign Here:________________________________________Date:_______Time:______      Radiology Departments:     ?[facility]: [phone] ?[facility]: [phone]     ?[place]: [phone] ?[facility]:[phone]      ?[place]: [phone]  ?[facility]: [phone]  ?[place]:[phone]    ?[facility]:[phone]  ?[facility]:[phone]       Review of your medicines      START taking        Dose / Directions Last dose taken    HYDROcodone-acetaminophen 5-325 MG   Commonly known as:  NORCO   Dose:  1-2 tablet   Quantity:  6 tablet        Take 1-2 tablets by mouth every 4 hours as needed for moderate to severe pain   Refills:  0          Our records show that you are taking the medicines listed below. If these are incorrect, please call your family doctor or clinic.        Dose / Directions Last dose taken    acetaminophen 650 MG CR tablet   Commonly known as:  TYLENOL 8 HOUR   Dose:  650 mg   Quantity:  60 tablet        Take 1 tablet (650 mg) by mouth every 8 hours as needed for mild pain or fever   Refills:  0        albuterol 108 (90 BASE) MCG/ACT Inhaler   Commonly known as:  PROAIR HFA/PROVENTIL HFA/VENTOLIN HFA   Dose:  2 puff   Quantity:  1 Inhaler        Inhale 2 puffs into the lungs every 4 hours as needed for shortness of breath / dyspnea or wheezing   Refills:  2        AMBIEN PO        Reported on 5/18/2017   Refills:  0        estradiol 1 MG tablet   Commonly known as:  ESTRACE   Dose:  1 mg   Quantity:  90  tablet        Take 1 tablet (1 mg) by mouth daily   Refills:  1        * ibuprofen 800 MG tablet   Commonly known as:  ADVIL/MOTRIN   Dose:  800 mg   Quantity:  40 tablet        Take 1 tablet (800 mg) by mouth every 8 hours as needed for moderate pain   Refills:  1        * ibuprofen 600 MG tablet   Commonly known as:  ADVIL/MOTRIN   Dose:  600 mg   Quantity:  30 tablet        Take 1 tablet (600 mg) by mouth every 6 hours as needed for moderate pain   Refills:  1        OMEPRAZOLE PO   Dose:  20 mg        Take 20 mg by mouth every morning   Refills:  0        venlafaxine 75 MG 24 hr capsule   Commonly known as:  EFFEXOR-XR   Dose:  75 mg        Take 75 mg by mouth daily   Refills:  0        * Notice:  This list has 2 medication(s) that are the same as other medications prescribed for you. Read the directions carefully, and ask your doctor or other care provider to review them with you.            Prescriptions were sent or printed at these locations (1 Prescription)                   [facility] [address] [place] [address] [address] [address] [place], [place] [address] [address] [address]    Telephone:  [phone]   Fax:  [phone]   Hours:                  Printed at Department/Unit printer (1 of 1)         HYDROcodone-acetaminophen (NORCO) 5-325 MG                Procedures and tests performed during your visit     CBC with platelets differential    CT Abdomen Pelvis w Contrast    Comprehensive metabolic panel    Lipase      Orders Needing Specimen Collection     None      Pending Results     Date and Time Order Name Status Description    7/25/2017 2346 CT Abdomen Pelvis w Contrast In process             Pending Culture Results     No orders found for last 3 day(s).            Thank you for choosing [facility]       Thank you for choosing [facility] for your care. Our goal is always to provide you with excellent care. Hearing back from our patients is one way we can continue to improve our  services. Please take a few minutes to complete the written survey that you may receive in the mail after you visit with us. Thank you!        [facility] Information     [facility] gives you secure access to your electronic health record. If you see a primary care provider, you can also send messages to your care team and make appointments. If you have questions, please call your primary care clinic.  If you do not have a primary care provider, please call [phone] and they will assist you.        Care EveryWhere ID     This is your Care EveryWhere ID. This could be used by other organizations to access your [facility] medical records  [id number]        Equal Access to Services     ZHENG BLANCO : Ancelmo Saeed, hermes de jesus, leah wray. So [facility] [phone].    ATENCIÓN: Si habla español, tiene a spear disposición servicios gratuitos de asistencia lingüística. Llame al [phone].    We comply with applicable federal civil rights laws and [place] laws. We do not discriminate on the basis of race, color, national origin, age, disability sex, sexual orientation or gender identity.            After Visit Summary       This is your record. Keep this with you and show to your community pharmacist(s) and doctor(s) at your next visit.

## 2017-07-26 VITALS
TEMPERATURE: 98.3 F | HEART RATE: 75 BPM | DIASTOLIC BLOOD PRESSURE: 67 MMHG | OXYGEN SATURATION: 97 % | RESPIRATION RATE: 16 BRPM | SYSTOLIC BLOOD PRESSURE: 99 MMHG

## 2017-07-26 LAB
ALBUMIN SERPL-MCNC: 3.7 G/DL (ref 3.4–5)
ALP SERPL-CCNC: 87 U/L (ref 40–150)
ALT SERPL W P-5'-P-CCNC: 20 U/L (ref 0–50)
ANION GAP SERPL CALCULATED.3IONS-SCNC: 8 MMOL/L (ref 3–14)
AST SERPL W P-5'-P-CCNC: 13 U/L (ref 0–45)
BASOPHILS # BLD AUTO: 0 10E9/L (ref 0–0.2)
BASOPHILS NFR BLD AUTO: 0.1 %
BILIRUB SERPL-MCNC: 0.2 MG/DL (ref 0.2–1.3)
BUN SERPL-MCNC: 16 MG/DL (ref 7–30)
CALCIUM SERPL-MCNC: 8.7 MG/DL (ref 8.5–10.1)
CHLORIDE SERPL-SCNC: 111 MMOL/L (ref 94–109)
CO2 SERPL-SCNC: 25 MMOL/L (ref 20–32)
CREAT SERPL-MCNC: 0.8 MG/DL (ref 0.52–1.04)
DIFFERENTIAL METHOD BLD: NORMAL
EOSINOPHIL # BLD AUTO: 0.3 10E9/L (ref 0–0.7)
EOSINOPHIL NFR BLD AUTO: 3.3 %
ERYTHROCYTE [DISTWIDTH] IN BLOOD BY AUTOMATED COUNT: 13 % (ref 10–15)
GFR SERPL CREATININE-BSD FRML MDRD: 86 ML/MIN/1.7M2
GLUCOSE SERPL-MCNC: 120 MG/DL (ref 70–99)
HCT VFR BLD AUTO: 39.3 % (ref 35–47)
HGB BLD-MCNC: 13.3 G/DL (ref 11.7–15.7)
IMM GRANULOCYTES # BLD: 0 10E9/L (ref 0–0.4)
IMM GRANULOCYTES NFR BLD: 0.2 %
LIPASE SERPL-CCNC: 184 U/L (ref 73–393)
LYMPHOCYTES # BLD AUTO: 3.9 10E9/L (ref 0.8–5.3)
LYMPHOCYTES NFR BLD AUTO: 43.4 %
MCH RBC QN AUTO: 30.2 PG (ref 26.5–33)
MCHC RBC AUTO-ENTMCNC: 33.8 G/DL (ref 31.5–36.5)
MCV RBC AUTO: 89 FL (ref 78–100)
MONOCYTES # BLD AUTO: 0.4 10E9/L (ref 0–1.3)
MONOCYTES NFR BLD AUTO: 4.2 %
NEUTROPHILS # BLD AUTO: 4.4 10E9/L (ref 1.6–8.3)
NEUTROPHILS NFR BLD AUTO: 48.8 %
NRBC # BLD AUTO: 0 10*3/UL
NRBC BLD AUTO-RTO: 0 /100
PLATELET # BLD AUTO: 216 10E9/L (ref 150–450)
POTASSIUM SERPL-SCNC: 3.5 MMOL/L (ref 3.4–5.3)
PROT SERPL-MCNC: 6.8 G/DL (ref 6.8–8.8)
RBC # BLD AUTO: 4.4 10E12/L (ref 3.8–5.2)
SODIUM SERPL-SCNC: 144 MMOL/L (ref 133–144)
WBC # BLD AUTO: 9 10E9/L (ref 4–11)

## 2017-07-26 PROCEDURE — 96374 THER/PROPH/DIAG INJ IV PUSH: CPT | Mod: 59

## 2017-07-26 PROCEDURE — 74177 CT ABD & PELVIS W/CONTRAST: CPT | Mod: TC

## 2017-07-26 PROCEDURE — 96361 HYDRATE IV INFUSION ADD-ON: CPT

## 2017-07-26 PROCEDURE — 25000132 ZZH RX MED GY IP 250 OP 250 PS 637: Performed by: FAMILY MEDICINE

## 2017-07-26 PROCEDURE — 96375 TX/PRO/DX INJ NEW DRUG ADDON: CPT

## 2017-07-26 PROCEDURE — 25000128 H RX IP 250 OP 636: Performed by: FAMILY MEDICINE

## 2017-07-26 RX ORDER — ONDANSETRON 2 MG/ML
4 INJECTION INTRAMUSCULAR; INTRAVENOUS EVERY 30 MIN PRN
Status: DISCONTINUED | OUTPATIENT
Start: 2017-07-26 | End: 2017-07-26 | Stop reason: HOSPADM

## 2017-07-26 RX ORDER — HYDROCODONE BITARTRATE AND ACETAMINOPHEN 5; 325 MG/1; MG/1
2 TABLET ORAL ONCE
Status: COMPLETED | OUTPATIENT
Start: 2017-07-26 | End: 2017-07-26

## 2017-07-26 RX ORDER — KETOROLAC TROMETHAMINE 30 MG/ML
30 INJECTION, SOLUTION INTRAMUSCULAR; INTRAVENOUS ONCE
Status: COMPLETED | OUTPATIENT
Start: 2017-07-26 | End: 2017-07-26

## 2017-07-26 RX ORDER — IOPAMIDOL 612 MG/ML
100 INJECTION, SOLUTION INTRAVASCULAR ONCE
Status: COMPLETED | OUTPATIENT
Start: 2017-07-26 | End: 2017-07-26

## 2017-07-26 RX ADMIN — SODIUM CHLORIDE 1000 ML: 9 INJECTION, SOLUTION INTRAVENOUS at 00:01

## 2017-07-26 RX ADMIN — ONDANSETRON 4 MG: 2 INJECTION INTRAMUSCULAR; INTRAVENOUS at 01:06

## 2017-07-26 RX ADMIN — IOPAMIDOL 100 ML: 612 INJECTION, SOLUTION INTRAVASCULAR at 00:36

## 2017-07-26 RX ADMIN — HYDROCODONE BITARTRATE AND ACETAMINOPHEN 2 TABLET: 5; 325 TABLET ORAL at 01:06

## 2017-07-26 RX ADMIN — KETOROLAC TROMETHAMINE 30 MG: 30 INJECTION, SOLUTION INTRAMUSCULAR; INTRAVENOUS at 01:06

## 2017-07-26 ASSESSMENT — ENCOUNTER SYMPTOMS
VOMITING: 0
BRUISES/BLEEDS EASILY: 1
DIZZINESS: 0
ANAL BLEEDING: 1
DIARRHEA: 1
CHILLS: 0
SHORTNESS OF BREATH: 0
ABDOMINAL PAIN: 1
BLOOD IN STOOL: 1
SORE THROAT: 0
BACK PAIN: 0
COUGH: 0
FEVER: 0
NECK PAIN: 0
DYSURIA: 0
HEADACHES: 1
NAUSEA: 1

## 2017-07-26 NOTE — DISCHARGE INSTRUCTIONS
Thank you for coming to [facility].  If you are concerned or things get worse, don't hesitate to return to the Emergency Room.    Return to the ER if you are having vomiting, increased pain, fever or you're concerned.     Use tylenol 1000 mg up to 4 times per day or T3, norco/hydrocodone, perocet/oxycodone but not both at the same time.    Take one or the other but not both at the same time.  You can take up to 8 tablets total in 24 hours.  If you are driving while on narcotics, it is considered a DUI.    Use ibuprofen 800 mg three times daily with food.    You can use both at the same time and repeat in 6 hours or you can alternate every 3 hours or you can do one or the other.   Home  Back  SP  RU  VI  CH     *ABDOMINAL PAIN, UNCERTAIN CAUSE (Female)    Based on your visit today, the exact cause of your abdominal (stomach) pain is not certain. Your condition does not seem serious now; however, the signs of a serious problem may take more time to appear. Therefore, it is important for you to watch for any new symptoms or worsening of your condition.  HOME CARE:    Rest until your next exam. No strenuous activities.    Eat a diet low in fiber (called a low-residue diet). Foods allowed include refined breads, white rice, fruit and vegetable juices without pulp, tender meats. These foods will pass more easily through the intestine.    Avoid fried or fatty foods, dairy, alcohol and spicy foods until your symptoms go away.  FOLLOW UP with your doctor or this facility as instructed, or if your pain does not begin to improve in the next 24 hours.  GET PROMPT MEDICAL ATTENTION if any of the following occur:    Pain gets worse or moves to the right lower abdomen    New or worsening vomiting or diarrhea    Swelling of the abdomen    Unable to pass stool for more than three days    New fever over 101  F (38.3 C), or rising fever    Blood in vomit or bowel movements (dark red or black  color)    Jaundice (yellow color of eyes and skin)    Weakness, dizziness or fainting    Chest, arm, back, neck or jaw pain    Unexpected vaginal bleeding or missed period    [id number] Marium Acevedo, [address], [place] [address]. All rights reserved. This information is not intended as a substitute for professional medical care. Always follow your healthcare professional's instructions.  What to expect when you have contrast    During your exam, we will inject  contrast  into your vein or artery. (Contrast is a clear liquid with iodine in it. It shows up on X-rays.)    You may feel warm or hot. You may have a metal taste in your mouth and a slight upset stomach. You may also feel pressure near the kidneys and bladder. These effects will last about 1 to 3 minutes.    Please tell us if you have:    Sneezing     Itching    Hives     Swelling in the face    A hoarse voice    Breathing problems    Other new symptoms    Serious problems are rare.  They may include:    Irregular heartbeat     Seizures    Kidney failure              Tissue damage    Shock      Death    If you have any problems during the exam, we  will treat them right away.    When you get home    Call your hospital if you have any new symptoms in the next 2 days, like hives or swelling. (Phone numbers are at the bottom of this page.) Or call your family doctor.     If you have wheezing or trouble breathing, call 911.    Self-care  -Drink at least 4 extra glasses of water today.   This reduces the stress on your kidneys.  -Keep taking your regular medicines.    The contrast will pass out of your body in your  Urine(pee). This will happen in the next 24 hours. You  will not feel this. Your urine will not  change color.    If you have kidney problems or take metformin    Drink 4 to 8 large glasses of water for the next  2 days, if you are not on a fluid restriction.    ?If you take metformin (Glucophage or Glucovance) for diabetes, keep taking  it.      ?Your kidney function tests are abnormal.  If you take Metformin, do not take it for 48 hours. Please go to your clinic for a blood test within 3 days after your exam before the restarting this medicine.     (Note to provider:please give patient prescription for lab tests.)    ?Special instructions: Drink an extra 4 glasses of water to flush out the contrast.     I have read and understand the above information.    Patient Sign Here:______________________________________Date:________Time:______    Staff Sign Here:________________________________________Date:_______Time:______      Radiology Departments:     ?[facility]: [phone] ?[place]: [phone]     ?[place]: [phone] ?[facility]:[phone]      ?[place]: [phone]  ?[facility]: [phone]  ?[place]:[phone]    ?[facility]:[phone]  ?[facility]:[phone]

## 2017-07-26 NOTE — ED NOTES
Pt verbalized understanding of all discharge instructions. Pt reporting pain improved with norco that was given.  Pt given take home pack of norco.  Pt to followup with primary, no questions or concerns.

## 2017-07-26 NOTE — ED PROVIDER NOTES
"  History     Chief Complaint   Patient presents with     Abdominal Pain     abdominal pain that started 3 days ago     Rectal Bleeding     bloody stools started today     HPI  Ihsan Millan is a 26 year old female who has a  h/o of IBS and a right oophorectomy, hysterectomy with cervix removed (due to endometriosis) who has had RLQ, epigastric, umbilical abdominal pain x 3 days.  She woke up on Sunday and was fine and had a blood stool.  Abdominal pain started on Sunday afternoon. The pain stayed just above her umbilicus and now has moved down to the RLQ. Pain has been constant just superior to the umbilicus and is st2 DarvocetHe will call in her will as heabbing. Pain has been 4-8/10. She took tylenol and it helps a little bit.      RLQ pain: when it comes it's sharp . She says she has to lay down because it hurts \"so bad.\" Pain lasts for about 20 minutes when it happens.     BRBPR: Sunday x 1 bm but said it was there when she wiped. Monday x 1 bm --blood in the stool and when she wiped. Tuesday she had blood in her underwear.     Colonoscopy and EGD: 2014--both were normal per her report.     IBS: she says it manifests that she has a lot of hard stools.     Gifty her friend saw her 3 hours before she came in. The pain on the right side was new and she had nausea.     Social History:  7/17: Ihsan lives with her 2 children. She stays home with her children.     I have reviewed the Medications, Allergies, Past Medical and Surgical History, and Social History in the Epic system.    Allergies:   Allergies   Allergen Reactions     Lexapro [Escitalopram] Nausea and Vomiting     Codeine Sulfate Rash     Penicillins Rash     Tramadol Rash         No current facility-administered medications on file prior to encounter.   Current Outpatient Prescriptions on File Prior to Encounter:  acetaminophen (TYLENOL 8 HOUR) 650 MG CR tablet Take 1 tablet (650 mg) by mouth every 8 hours as needed for mild pain or fever   estradiol " (ESTRACE) 1 MG tablet Take 1 tablet (1 mg) by mouth daily   venlafaxine (EFFEXOR-XR) 75 MG 24 hr capsule Take 75 mg by mouth daily   OMEPRAZOLE PO Take 20 mg by mouth every morning   albuterol (PROAIR HFA, PROVENTIL HFA, VENTOLIN HFA) 108 (90 BASE) MCG/ACT inhaler Inhale 2 puffs into the lungs every 4 hours as needed for shortness of breath / dyspnea or wheezing   ibuprofen (ADVIL/MOTRIN) 600 MG tablet Take 1 tablet (600 mg) by mouth every 6 hours as needed for moderate pain   Zolpidem Tartrate (AMBIEN PO) Reported on 5/18/2017   ibuprofen (ADVIL/MOTRIN) 800 MG tablet Take 1 tablet (800 mg) by mouth every 8 hours as needed for moderate pain       Patient Active Problem List   Diagnosis     GERD (gastroesophageal reflux disease)     Intermittent asthma     Moderate persistent asthma     H. pylori infection     ASCUS on Pap smear     Postpartum depression     Endometriosis     Surgery, elective     Status post laparoscopic assisted vaginal hysterectomy (LAVH)     RUQ abdominal pain     Pneumonia of right middle lobe due to infectious organism (H)     Tobacco abuse       Past Surgical History:   Procedure Laterality Date     COLONOSCOPY N/A 11/6/2014    Procedure: COLONOSCOPY;  Surgeon: Zacarias Paz MD;  Location: [facility] OR     DILATION AND CURETTAGE, HYSTEROSCOPY DIAGNOSTIC, COMBINED  8/1/2014    Procedure: COMBINED DILATION AND CURETTAGE, HYSTEROSCOPY DIAGNOSTIC;  Surgeon: Rodoflo Clifford MD;  Location: [facility] OR     ESOPHAGOSCOPY, GASTROSCOPY, DUODENOSCOPY (EGD), COMBINED N/A 9/5/2014    Procedure: COMBINED ESOPHAGOSCOPY, GASTROSCOPY, DUODENOSCOPY (EGD);  Surgeon: Zacarias Paz MD;  Location: [facility]     LAPAROSCOPIC ASSISTED HYSTERECTOMY VAGINAL N/A 6/24/2015    Procedure: LAPAROSCOPIC ASSISTED HYSTERECTOMY VAGINAL;  Surgeon: Rodolfo Clifford MD;  Location: [facility]     LAPAROSCOPIC OOPHORECTOMY Right 3/23/2016    Procedure: LAPAROSCOPIC OOPHORECTOMY;  Surgeon: Rodolfo Clifford MD;  Location: [facility]     LAPAROSCOPIC  "SALPINGO-OOPHORECTOMY Left 5/3/2017    Procedure: LAPAROSCOPIC SALPINGO-OOPHORECTOMY;  LAPAROSCOPIC LEFT OOPHORECTOMY;  Surgeon: Rodolfo Clifford MD;  Location: [facility]     LAPAROSCOPY DIAGNOSTIC (GYN) N/A 3/18/2015    Procedure: LAPAROSCOPY DIAGNOSTIC (GYN);  Surgeon: Rodolfo Clifford MD;  Location: [facility] OR     no surgeries       SALPINGECTOMY Bilateral 6/24/2015    Procedure: SALPINGECTOMY;  Surgeon: Rodolfo Clifford MD;  Location: [facility]       Social History   Substance Use Topics     Smoking status: Current Every Day Smoker     Packs/day: 0.50     Years: 7.00     Types: Cigarettes     Smokeless tobacco: Never Used      Comment: declines quitline referral     Alcohol use Yes      Comment: social       Most Recent Immunizations   Administered Date(s) Administered     DTAP (<7y) 09/16/1992     HepB-Peds 04/27/2004     Influenza (IIV3) 10/29/2013     Influenza Vaccine IM 3yrs+ 4 Valent IIV4 10/29/2013     MMR 03/11/1992     Poliovirus, inactivated (IPV) 03/11/1992     Rhogam 01/30/2014     TDAP Vaccine (Boostrix) 01/30/2014     Tdap (Adacel,Boostrix) 01/30/2014     Tetanus 01/01/2005       BMI: Estimated body mass index is 22.14 kg/(m^2) as calculated from the following:    Height as of 5/3/17: 1.626 m (5' 4\").    Weight as of 5/3/17: 58.5 kg (129 lb).      Review of Systems   Constitutional: Negative for chills (she has felt cold) and fever.   HENT: Negative for ear pain and sore throat.    Eyes: Negative for visual disturbance.   Respiratory: Negative for cough and shortness of breath.    Gastrointestinal: Positive for abdominal pain, anal bleeding, blood in stool, diarrhea and nausea. Negative for vomiting.   Genitourinary: Negative for dysuria.   Musculoskeletal: Negative for back pain and neck pain.   Skin: Negative for rash.   Neurological: Positive for headaches (on and off). Negative for dizziness.   Hematological: Bruises/bleeds easily.   Psychiatric/Behavioral:        + depression. No anxiety   All other systems " reviewed and are negative.      Physical Exam   BP: 107/86  Pulse: 89  Temp: 98.3  F (36.8  C)  Resp: 16  SpO2: 99 %  Physical Exam   Constitutional: She is oriented to person, place, and time. She appears well-developed. No distress.   She seems to be tired.    HENT:   Head: Normocephalic and atraumatic.   Eyes: Pupils are equal, round, and reactive to light.   Cardiovascular: Normal rate, regular rhythm and normal heart sounds.  Exam reveals no gallop and no friction rub.    No murmur heard.  Pulmonary/Chest: Effort normal and breath sounds normal. No respiratory distress.   Abdominal: There is tenderness (she says it hurts most when I push in the epigastrium and in the RLQ). There is no rebound and no guarding.   Genitourinary:   Genitourinary Comments: Has a thrombosed hemorrhoid which is draining. Patient says it doesn't cause her pain.    Musculoskeletal: She exhibits no edema.   Neurological: She is alert and oriented to person, place, and time.   Skin: Skin is warm and dry.   Psychiatric: She has a normal mood and affect.   Nursing note and vitals reviewed.      ED Course     ED Course     Procedures            Patient Vitals for the past 24 hrs:   BP Temp Temp src Pulse Heart Rate Resp SpO2   07/26/17 0159 99/67 98.3  F (36.8  C) Oral 75 - 16 97 %   07/26/17 0000 112/73 - - - 83 - 99 %   07/25/17 2330 99/65 - - - 81 - 97 %   07/25/17 2314 103/75 - - - 83 - 98 %   07/25/17 2312 - 98.3  F (36.8  C) Oral - - - -   07/25/17 2311 107/86 - - 89 - 16 99 %       LABORATORY (REVIEWED AND INTERPRETED):  CBC BMP Liver Panel   Recent Labs   Lab Test  07/25/17   2321   WBC  9.0   HGB  13.3   PLT  216    Recent Labs   Lab Test  07/25/17   2321   POTASSIUM  3.5   CHLORIDE  111*   BUN  16    Recent Labs   Lab Test  07/25/17   2321   BILITOTAL  0.2   ALT  20   AST  13   LIPASE  184        UA     DIP MICRO   Recent Labs   Lab Test  06/16/17   1925   COLOR  Yellow   NITRITE  Negative    Invalid input(s): RBCUA, WBCUA,  BACTERIAUA, EPITHELIALUA       OTHER LABS   Recent Labs   Lab Test  07/25/17   2321  02/07/16   2137   07/18/14   1554   TSH   --    --    --   1.11   LIPASE  184  154   < >   --    AMYLASE   --   40   < >   --     < > = values in this interval not displayed.            INTERVENTIONS:  Medications   0.9% sodium chloride BOLUS (0 mLs Intravenous Stopped 7/26/17 0106)     Followed by   0.9% sodium chloride infusion (not administered)   ondansetron (ZOFRAN) injection 4 mg (4 mg Intravenous Given 7/26/17 0106)   HYDROcodone-acetaminophen (NORCO) 5-325 MG 6 tab ED starter pack (  Not Given 7/26/17 0157)   iopamidol (ISOVUE-300) IV solution 61% 100 mL (100 mLs Intravenous Given 7/26/17 0036)   sodium chloride (PF) 0.9% PF flush 60 mL (60 mLs Intravenous Given 7/26/17 0036)   ketorolac (TORADOL) injection 30 mg (30 mg Intravenous Given 7/26/17 0106)   HYDROcodone-acetaminophen (NORCO) 5-325 MG per tablet 2 tablet (2 tablets Oral Given 7/26/17 0106)       ECG (Reviewed and Interpreted by me):  None    IMAGING (Reviewed and Interpreted by me):  Abdominal Pelvic CT: negative    ED COURSE:  11:58 PM labs ordered    12:30 AM pt is at CT    12:59 AM discussed labs. She's been on norco in the past. Toradol, zofran ordered. Norco ordered if needed.     Discussed negative CT    IMPRESSIONS AND PLAN:  Abdominal pain: Ihsan is a 25 yo female who had negative colonoscopy and EGD in 2014. She has now had 3 days of abdominal pain and BRBPR. She says the pain is above her umbilicus and in her RLQ. CBC, CMP and CT are all negative. She was given IVF, zofran, toradol and norco. She is sent home with norco #6.     A broad differential diagnosis was considered including pancreatitis, biliary colic, cholecystitis, ileus, volvulus, bowel obstruction, urinary tract infection, kidney stone, pyelonephritis, mesenteric adenitis, appendicitis, UTI, aortic dissection, aortic aneurysm, among others      Hemorrhoid: she has a ruptured thrombosed  hemorrhoid. It is not causing her much pain per her report.  This is the most likely source of her BRBPR. Discussed steroid cream.     DIAGNOSIS:    ICD-10-CM    1. Abdominal pain, right lower quadrant R10.31 Lipase    and supraumbilical.   2. External hemorrhoids K64.4        DISCHARGE MEDICATIONS:  Discharge Medication List as of 7/26/2017  1:52 AM      START taking these medications    Details   HYDROcodone-acetaminophen (NORCO) 5-325 MG Take 1-2 tablets by mouth every 4 hours as needed for moderate to severe pain, Disp-6 tablet, R-0, Local Print               LOS:  4      7/25/2017  [facility]    Temo Houston Cassandra E, MD  07/26/17 0698

## 2017-07-26 NOTE — ED NOTES
Pt comes in with report of abdominal pain in RLQ, epigastric, & umbilical region pain for 3 days.   Pt reports nausea along with this pain.  Pt noted bloody stools that started this am, denies constipation.  Pt reports a history of IBS.  Pt has no history of bloody stools until today.  Pt has no pain with urination or frequency.  IV placed, labs gathered. Pt denies any recent temps.

## 2017-08-02 ENCOUNTER — HOSPITAL ENCOUNTER (EMERGENCY)
Facility: HOSPITAL | Age: 27
Discharge: HOME | End: 2017-08-02
Attending: INTERNAL MEDICINE | Admitting: INTERNAL MEDICINE

## 2017-08-02 VITALS
OXYGEN SATURATION: 98 % | TEMPERATURE: 98.6 F | RESPIRATION RATE: 16 BRPM | SYSTOLIC BLOOD PRESSURE: 107 MMHG | DIASTOLIC BLOOD PRESSURE: 73 MMHG

## 2017-08-02 DIAGNOSIS — K29.00 OTHER ACUTE GASTRITIS: ICD-10-CM

## 2017-08-02 LAB
ALBUMIN SERPL-MCNC: 3.8 G/DL (ref 3.4–5)
ALP SERPL-CCNC: 91 U/L (ref 40–150)
ALT SERPL W P-5'-P-CCNC: 20 U/L (ref 0–50)
AMYLASE SERPL-CCNC: 39 U/L (ref 30–110)
ANION GAP SERPL CALCULATED.3IONS-SCNC: 6 MMOL/L (ref 3–14)
AST SERPL W P-5'-P-CCNC: 8 U/L (ref 0–45)
BASOPHILS # BLD AUTO: 0 10E9/L (ref 0–0.2)
BASOPHILS NFR BLD AUTO: 0.2 %
BILIRUB SERPL-MCNC: 0.2 MG/DL (ref 0.2–1.3)
BUN SERPL-MCNC: 13 MG/DL (ref 7–30)
CALCIUM SERPL-MCNC: 9 MG/DL (ref 8.5–10.1)
CHLORIDE SERPL-SCNC: 111 MMOL/L (ref 94–109)
CO2 SERPL-SCNC: 26 MMOL/L (ref 20–32)
CREAT SERPL-MCNC: 0.75 MG/DL (ref 0.52–1.04)
CRP SERPL-MCNC: <2.9 MG/L (ref 0–8)
DIFFERENTIAL METHOD BLD: NORMAL
EOSINOPHIL # BLD AUTO: 0.3 10E9/L (ref 0–0.7)
EOSINOPHIL NFR BLD AUTO: 3.3 %
ERYTHROCYTE [DISTWIDTH] IN BLOOD BY AUTOMATED COUNT: 13.4 % (ref 10–15)
GFR SERPL CREATININE-BSD FRML MDRD: ABNORMAL ML/MIN/1.7M2
GLUCOSE SERPL-MCNC: 102 MG/DL (ref 70–99)
HCG UR QL: NEGATIVE
HCT VFR BLD AUTO: 40.7 % (ref 35–47)
HGB BLD-MCNC: 13.8 G/DL (ref 11.7–15.7)
IMM GRANULOCYTES # BLD: 0 10E9/L (ref 0–0.4)
IMM GRANULOCYTES NFR BLD: 0.2 %
LIPASE SERPL-CCNC: 195 U/L (ref 73–393)
LYMPHOCYTES # BLD AUTO: 3.5 10E9/L (ref 0.8–5.3)
LYMPHOCYTES NFR BLD AUTO: 35 %
MCH RBC QN AUTO: 30.1 PG (ref 26.5–33)
MCHC RBC AUTO-ENTMCNC: 33.9 G/DL (ref 31.5–36.5)
MCV RBC AUTO: 89 FL (ref 78–100)
MONOCYTES # BLD AUTO: 0.4 10E9/L (ref 0–1.3)
MONOCYTES NFR BLD AUTO: 4.1 %
NEUTROPHILS # BLD AUTO: 5.7 10E9/L (ref 1.6–8.3)
NEUTROPHILS NFR BLD AUTO: 57.2 %
NRBC # BLD AUTO: 0 10*3/UL
NRBC BLD AUTO-RTO: 0 /100
PLATELET # BLD AUTO: 240 10E9/L (ref 150–450)
POTASSIUM SERPL-SCNC: 3.6 MMOL/L (ref 3.4–5.3)
PROT SERPL-MCNC: 7.3 G/DL (ref 6.8–8.8)
RBC # BLD AUTO: 4.58 10E12/L (ref 3.8–5.2)
SODIUM SERPL-SCNC: 143 MMOL/L (ref 133–144)
WBC # BLD AUTO: 10 10E9/L (ref 4–11)

## 2017-08-02 PROCEDURE — 25000128 H RX IP 250 OP 636: Performed by: INTERNAL MEDICINE

## 2017-08-02 PROCEDURE — S0028 INJECTION, FAMOTIDINE, 20 MG: HCPCS | Performed by: INTERNAL MEDICINE

## 2017-08-02 PROCEDURE — 71020 ZZHC CHEST TWO VIEWS, FRONT/LAT: CPT | Mod: TC

## 2017-08-02 PROCEDURE — 25000132 ZZH RX MED GY IP 250 OP 250 PS 637: Performed by: INTERNAL MEDICINE

## 2017-08-02 PROCEDURE — 96374 THER/PROPH/DIAG INJ IV PUSH: CPT

## 2017-08-02 PROCEDURE — 85025 COMPLETE CBC W/AUTO DIFF WBC: CPT | Performed by: INTERNAL MEDICINE

## 2017-08-02 PROCEDURE — 82150 ASSAY OF AMYLASE: CPT | Performed by: INTERNAL MEDICINE

## 2017-08-02 PROCEDURE — 83690 ASSAY OF LIPASE: CPT | Performed by: INTERNAL MEDICINE

## 2017-08-02 PROCEDURE — 99285 EMERGENCY DEPT VISIT HI MDM: CPT | Mod: 25

## 2017-08-02 PROCEDURE — 96375 TX/PRO/DX INJ NEW DRUG ADDON: CPT

## 2017-08-02 PROCEDURE — 25000125 ZZHC RX 250: Performed by: INTERNAL MEDICINE

## 2017-08-02 PROCEDURE — 81025 URINE PREGNANCY TEST: CPT | Performed by: INTERNAL MEDICINE

## 2017-08-02 PROCEDURE — 94640 AIRWAY INHALATION TREATMENT: CPT

## 2017-08-02 PROCEDURE — 93005 ELECTROCARDIOGRAM TRACING: CPT

## 2017-08-02 PROCEDURE — 86140 C-REACTIVE PROTEIN: CPT | Performed by: INTERNAL MEDICINE

## 2017-08-02 PROCEDURE — 36415 COLL VENOUS BLD VENIPUNCTURE: CPT | Performed by: INTERNAL MEDICINE

## 2017-08-02 PROCEDURE — 80053 COMPREHEN METABOLIC PANEL: CPT | Performed by: INTERNAL MEDICINE

## 2017-08-02 PROCEDURE — 99285 EMERGENCY DEPT VISIT HI MDM: CPT | Performed by: INTERNAL MEDICINE

## 2017-08-02 PROCEDURE — 40000275 ZZH STATISTIC RCP TIME EA 10 MIN

## 2017-08-02 RX ORDER — FAMOTIDINE 20 MG/1
20 TABLET, FILM COATED ORAL 2 TIMES DAILY
Qty: 20 TABLET | Refills: 0 | Status: SHIPPED | OUTPATIENT
Start: 2017-08-02 | End: 2017-08-12

## 2017-08-02 RX ORDER — ALUMINA, MAGNESIA, AND SIMETHICONE 2400; 2400; 240 MG/30ML; MG/30ML; MG/30ML
30 SUSPENSION ORAL EVERY 4 HOURS PRN
Status: DISCONTINUED | OUTPATIENT
Start: 2017-08-02 | End: 2017-08-02 | Stop reason: HOSPADM

## 2017-08-02 RX ORDER — ALBUTEROL SULFATE 0.83 MG/ML
2.5 SOLUTION RESPIRATORY (INHALATION) ONCE
Status: COMPLETED | OUTPATIENT
Start: 2017-08-02 | End: 2017-08-02

## 2017-08-02 RX ORDER — METOCLOPRAMIDE HYDROCHLORIDE 5 MG/ML
10 INJECTION INTRAMUSCULAR; INTRAVENOUS ONCE
Status: COMPLETED | OUTPATIENT
Start: 2017-08-02 | End: 2017-08-02

## 2017-08-02 RX ADMIN — METOCLOPRAMIDE 10 MG: 5 INJECTION, SOLUTION INTRAMUSCULAR; INTRAVENOUS at 00:58

## 2017-08-02 RX ADMIN — ALUMINUM HYDROXIDE, MAGNESIUM HYDROXIDE, AND DIMETHICONE 30 ML: 400; 400; 40 SUSPENSION ORAL at 00:54

## 2017-08-02 RX ADMIN — ALBUTEROL SULFATE 2.5 MG: 2.5 SOLUTION RESPIRATORY (INHALATION) at 01:20

## 2017-08-02 RX ADMIN — FAMOTIDINE 20 MG: 10 INJECTION, SOLUTION INTRAVENOUS at 00:54

## 2017-08-02 ASSESSMENT — ENCOUNTER SYMPTOMS
DIAPHORESIS: 0
SHORTNESS OF BREATH: 0
ABDOMINAL DISTENTION: 0
CHEST TIGHTNESS: 0
HEMATURIA: 0
VOMITING: 0
COLOR CHANGE: 0
WOUND: 0
ABDOMINAL PAIN: 1
VOICE CHANGE: 0
FEVER: 0
CONFUSION: 0
HEADACHES: 0
PALPITATIONS: 0
ARTHRALGIAS: 0
NUMBNESS: 0
COUGH: 0
DYSURIA: 0
BLOOD IN STOOL: 0
NECK PAIN: 0
ANAL BLEEDING: 0
NECK STIFFNESS: 0
LIGHT-HEADEDNESS: 0
BACK PAIN: 0
MYALGIAS: 0
WHEEZING: 0
DIZZINESS: 0
FLANK PAIN: 0
CHILLS: 0
NAUSEA: 1

## 2017-08-02 NOTE — ED PROVIDER NOTES
History     Chief Complaint   Patient presents with     Chest Pain     x 2 days     Patient is a 26 year old female presenting with abdominal pain. The history is provided by the patient.   Abdominal Pain   Pain location:  Epigastric  Pain quality: sharp    Pain radiates to:  Chest  Pain severity:  Moderate  Onset quality:  Gradual  Timing:  Constant  Chronicity:  New  Associated symptoms: chest pain and nausea    Associated symptoms: no chills, no cough, no dysuria, no fever, no hematuria, no shortness of breath and no vomiting          I have reviewed the Medications, Allergies, Past Medical and Surgical History, and Social History in the Epic system.        Review of Systems   Constitutional: Negative for chills, diaphoresis and fever.   HENT: Negative for voice change.    Eyes: Negative for visual disturbance.   Respiratory: Negative for cough, chest tightness, shortness of breath and wheezing.    Cardiovascular: Positive for chest pain. Negative for palpitations and leg swelling.   Gastrointestinal: Positive for abdominal pain and nausea. Negative for abdominal distention, anal bleeding, blood in stool and vomiting.   Genitourinary: Negative for decreased urine volume, dysuria, flank pain and hematuria.   Musculoskeletal: Negative for arthralgias, back pain, gait problem, myalgias, neck pain and neck stiffness.   Skin: Negative for color change, pallor, rash and wound.   Neurological: Negative for dizziness, syncope, light-headedness, numbness and headaches.   Psychiatric/Behavioral: Negative for confusion and suicidal ideas.       Physical Exam   BP: 107/78  Heart Rate: 84  Temp: 97.5  F (36.4  C)  Resp: 16  SpO2: 99 %  Physical Exam   Constitutional: She is oriented to person, place, and time. She appears well-developed and well-nourished.   HENT:   Head: Normocephalic and atraumatic.   Mouth/Throat: No oropharyngeal exudate.   Eyes: Conjunctivae are normal. Pupils are equal, round, and reactive to light.    Neck: Normal range of motion. Neck supple. No JVD present. No tracheal deviation present. No thyromegaly present.   Cardiovascular: Normal rate, regular rhythm, normal heart sounds and intact distal pulses.  Exam reveals no gallop and no friction rub.    No murmur heard.  Pulmonary/Chest: Effort normal and breath sounds normal. No stridor. No respiratory distress. She has no wheezes. She has no rales. She exhibits no tenderness.   Abdominal: Soft. Bowel sounds are normal. She exhibits no distension and no mass. There is tenderness in the epigastric area. There is no rebound and no guarding.   Musculoskeletal: Normal range of motion. She exhibits no edema or tenderness.   Lymphadenopathy:     She has no cervical adenopathy.   Neurological: She is alert and oriented to person, place, and time.   Skin: Skin is warm and dry. No rash noted. No erythema. No pallor.   Psychiatric: Her behavior is normal.   Nursing note and vitals reviewed.      ED Course     ED Course     Procedures               Labs Ordered and Resulted from Time of ED Arrival Up to the Time of Departure from the ED   COMPREHENSIVE METABOLIC PANEL - Abnormal; Notable for the following:        Result Value    Chloride 111 (*)     Glucose 102 (*)     All other components within normal limits   CBC WITH PLATELETS DIFFERENTIAL   LIPASE   AMYLASE   CRP INFLAMMATION   HCG QUALITATIVE URINE       Assessments & Plan (with Medical Decision Making)   Epigastric pain with radation to substernal area  Hx of chronic abd pain with uncertain etiology  Labs WNL  Received anticid  Symptoms improved  Dc home  I have reviewed the nursing notes.    I have reviewed the findings, diagnosis, plan and need for follow up with the patient.      New Prescriptions    FAMOTIDINE (PEPCID) 20 MG TABLET    Take 1 tablet (20 mg) by mouth 2 times daily for 10 days       Final diagnoses:   Other acute gastritis       8/2/2017   [facility]     Rj Figueroa MD  08/02/17  0147

## 2017-08-02 NOTE — ED AVS SNAPSHOT
[facility]    [address] [address]    Phone:  [phone]                                       Ihsan Millan   MRN: [id number]    Department:  [facility]   Date of Visit:  8/2/2017           Patient Information     Date Of Birth          1990        Your diagnoses for this visit were:     Other acute gastritis        You were seen by Rj Figueroa MD.      Follow-up Information     Call Temo Houston DO.    Specialty:  Family Practice    Contact information:    Critical access hospital  [address] [facility] [address]  [phone]          Discharge Instructions         Understanding Gastritis    Gastritis is a painful inflammation of the stomach lining. It has a number of causes. Gastritis and its symptoms can be relieved with treatment. Work with your healthcare provider to find ways to treat your symptoms.  The Stomach  To digest the food you eat, your stomach makes strong acids and enzymes. A healthy stomach has built-in defenses that protect its lining from damage by these acids and enzymes.  When you have gastritis  Acids may damage the stomach lining when the built-in defenses of the stomach don t function as they should. The stomach lining can then become inflamed. When this happens, it is called gastritis.  Causes of gastritis  Gastritis has many causes. They may include:    Aspirin and anti-inflammatory medicines    Tobacco use    Alcohol use    Helicobacter pylori (H. pylori) bacteria    Trauma from injuries, burns, or major surgery    Critical illness or autoimmune disorders  Common symptoms  With gastritis, you may notice one or more of the following:    A burning feeling in your upper belly    Pain that happens after eating certain foods    Gas or a bloated feeling in your stomach    Frequent belching    Nausea with or without vomiting    Loss of appetite    Feeling full quickly    Fatigue  Date Last Reviewed: 7/1/2016     7859-4636 [employer]. [address] [place] [address]. All rights reserved. This information is not intended as a substitute for professional medical care. Always follow your healthcare professional's instructions.             Review of your medicines      START taking        Dose / Directions Last dose taken    famotidine 20 MG tablet   Commonly known as:  PEPCID   Dose:  20 mg   Quantity:  20 tablet        Take 1 tablet (20 mg) by mouth 2 times daily for 10 days   Refills:  0          Our records show that you are taking the medicines listed below. If these are incorrect, please call your family doctor or clinic.        Dose / Directions Last dose taken    acetaminophen 650 MG CR tablet   Commonly known as:  TYLENOL 8 HOUR   Dose:  650 mg   Quantity:  60 tablet        Take 1 tablet (650 mg) by mouth every 8 hours as needed for mild pain or fever   Refills:  0        albuterol 108 (90 BASE) MCG/ACT Inhaler   Commonly known as:  PROAIR HFA/PROVENTIL HFA/VENTOLIN HFA   Dose:  2 puff   Quantity:  1 Inhaler        Inhale 2 puffs into the lungs every 4 hours as needed for shortness of breath / dyspnea or wheezing   Refills:  2        estradiol 1 MG tablet   Commonly known as:  ESTRACE   Dose:  1 mg   Quantity:  90 tablet        Take 1 tablet (1 mg) by mouth daily   Refills:  1        HYDROcodone-acetaminophen 5-325 MG   Commonly known as:  NORCO   Dose:  1-2 tablet   Quantity:  6 tablet        Take 1-2 tablets by mouth every 4 hours as needed for moderate to severe pain   Refills:  0        * ibuprofen 800 MG tablet   Commonly known as:  ADVIL/MOTRIN   Dose:  800 mg   Quantity:  40 tablet        Take 1 tablet (800 mg) by mouth every 8 hours as needed for moderate pain   Refills:  1        * ibuprofen 600 MG tablet   Commonly known as:  ADVIL/MOTRIN   Dose:  600 mg   Quantity:  30 tablet        Take 1 tablet (600 mg) by mouth every 6 hours as needed for moderate pain   Refills:  1         OMEPRAZOLE PO   Dose:  20 mg        Take 20 mg by mouth every morning   Refills:  0        * Notice:  This list has 2 medication(s) that are the same as other medications prescribed for you. Read the directions carefully, and ask your doctor or other care provider to review them with you.            Prescriptions were sent or printed at these locations (1 Prescription)                   [facility] - [place], [place] - [address] AT [facility] [address]   [address], [place] [address]    Telephone:  [phone]   Fax:  [phone]   Hours:                  Printed at Department/Unit printer (1 of 1)         famotidine (PEPCID) 20 MG tablet                Procedures and tests performed during your visit     Amylase    CBC with platelets differential    CRP inflammation    Chest XR,  PA & LAT    Comprehensive metabolic panel    HCG qualitative urine    Lipase      Orders Needing Specimen Collection     None      Pending Results     Date and Time Order Name Status Description    8/2/2017 0106 Chest XR,  PA & LAT In process             Pending Culture Results     No orders found from 7/31/2017 to 8/3/2017.            Thank you for choosing [facility]       Thank you for choosing [facility] for your care. Our goal is always to provide you with excellent care. Hearing back from our patients is one way we can continue to improve our services. Please take a few minutes to complete the written survey that you may receive in the mail after you visit with us. Thank you!        [facility] Information     [employer] [facility] gives you secure access to your electronic health record. If you see a primary care provider, you can also send messages to your care team and make appointments. If you have questions, please call your primary care clinic.  If you do not have a primary care provider, please call [phone] and they will assist you.        Care EveryWhere ID     This is your Care EveryWhere ID. This could be used by  other organizations to access your [facility] medical records  [id number]        Equal Access to Services     ZHENG BLANCO : Ancelmo Saeed, hermes de jesus, leah wray. So [facility] [phone].    ATENCIÓN: Si habla español, tiene a spear disposición servicios gratuitos de asistencia lingüística. Llame al [phone].    We comply with applicable federal civil rights laws and [place] laws. We do not discriminate on the basis of race, color, national origin, age, disability sex, sexual orientation or gender identity.            After Visit Summary       This is your record. Keep this with you and show to your community pharmacist(s) and doctor(s) at your next visit.

## 2017-08-02 NOTE — ED NOTES
Dr. Figueroa notified of  No change in pain after meds, harsh cough and end inspiratory wheezes in upper post lung fields. Orders obtained.

## 2017-08-02 NOTE — ED NOTES
Discharge instructions reviewed with patient.  Patient verbalized understanding.  Patient given 1 printed RX.  No further questions at this time.  Home to rest.

## 2017-08-02 NOTE — DISCHARGE INSTRUCTIONS
Understanding Gastritis    Gastritis is a painful inflammation of the stomach lining. It has a number of causes. Gastritis and its symptoms can be relieved with treatment. Work with your healthcare provider to find ways to treat your symptoms.  The Stomach  To digest the food you eat, your stomach makes strong acids and enzymes. A healthy stomach has built-in defenses that protect its lining from damage by these acids and enzymes.  When you have gastritis  Acids may damage the stomach lining when the built-in defenses of the stomach don t function as they should. The stomach lining can then become inflamed. When this happens, it is called gastritis.  Causes of gastritis  Gastritis has many causes. They may include:    Aspirin and anti-inflammatory medicines    Tobacco use    Alcohol use    Helicobacter pylori (H. pylori) bacteria    Trauma from injuries, burns, or major surgery    Critical illness or autoimmune disorders  Common symptoms  With gastritis, you may notice one or more of the following:    A burning feeling in your upper belly    Pain that happens after eating certain foods    Gas or a bloated feeling in your stomach    Frequent belching    Nausea with or without vomiting    Loss of appetite    Feeling full quickly    Fatigue  Date Last Reviewed: 7/1/2016 2000-2017 [employer]. [address] [place] [address]. All rights reserved. This information is not intended as a substitute for professional medical care. Always follow your healthcare professional's instructions.

## 2017-08-02 NOTE — ED AVS SNAPSHOT
[facility]    [address] [address]    Phone:  [phone]                                       Ihsan Millan   MRN: [id number]    Department[facility]  [facility]   Date of Visit:  8/2/2017           After Visit Summary Signature Page     I have received my discharge instructions, and my questions have been answered. I have discussed any challenges I see with this plan with the nurse or doctor.    ..........................................................................................................................................  Patient/Patient Representative Signature      ..........................................................................................................................................  Patient Representative Print Name and Relationship to Patient    ..................................................               ................................................  Date                                            Time    ..........................................................................................................................................  Reviewed by Signature/Title    ...................................................              ..............................................  Date                                                            Time

## 2017-08-02 NOTE — ED NOTES
Pt presents with 2 day hx of chest pain that increases with cough and deep breath. Loose cough productive of green sputum  Denies fever.

## 2017-08-12 ENCOUNTER — HOSPITAL ENCOUNTER (EMERGENCY)
Facility: HOSPITAL | Age: 27
Discharge: HOME | End: 2017-08-12
Attending: PHYSICIAN ASSISTANT | Admitting: PHYSICIAN ASSISTANT

## 2017-08-12 VITALS
RESPIRATION RATE: 16 BRPM | TEMPERATURE: 97.7 F | OXYGEN SATURATION: 97 % | DIASTOLIC BLOOD PRESSURE: 74 MMHG | SYSTOLIC BLOOD PRESSURE: 113 MMHG | HEART RATE: 104 BPM

## 2017-08-12 DIAGNOSIS — M20.012 MALLET FINGER, LEFT: ICD-10-CM

## 2017-08-12 PROCEDURE — 99213 OFFICE O/P EST LOW 20 MIN: CPT

## 2017-08-12 PROCEDURE — 73140 X-RAY EXAM OF FINGER(S): CPT | Mod: TC,LT

## 2017-08-12 PROCEDURE — 99202 OFFICE O/P NEW SF 15 MIN: CPT | Performed by: PHYSICIAN ASSISTANT

## 2017-08-12 ASSESSMENT — ENCOUNTER SYMPTOMS
ARTHRALGIAS: 1
CONSTITUTIONAL NEGATIVE: 1
PSYCHIATRIC NEGATIVE: 1
NEUROLOGICAL NEGATIVE: 1
CARDIOVASCULAR NEGATIVE: 1

## 2017-08-12 NOTE — ED AVS SNAPSHOT
[facility]    [address] [address]    Phone:  [phone]                                       Ihsan Millan   MRN: [id number]    Department[facility]  [facility]   Date of Visit:  8/12/2017           Patient Information     Date Of Birth          1990        Your diagnoses for this visit were:     Mallet finger, left        You were seen by Cinda Bhatia PA.      Follow-up Information     Follow up with Temo Houston DO.    Specialty:  Family Practice    Why:  In the next week for reevaluation.    Contact information:    [facility]  [address] [facility] [address] [phone]          Follow up with [facility].    Specialty:  EMERGENCY MEDICINE    Why:  If further concerns develop    Contact information:    [address] [address] [phone]    Additional information:    From [facility]: Take [address]. Turn left at [address]. Turn left at the first stoplight on [address]. At the first stop sign, take a right onto [address]. Take a left into the parking lot and continue through until you reach the North enterance of the building.       From [place]: Take [address]. Take the [address] ramp towards [place]. Turn left onto [address]. Take a slight right onto [address]. Turn left at the first stoplight on [address]. At the first stop sign, take a right onto [address]. Take a left into the parking lot and continue through until you reach the North enterance of the building.       From [place]: Take [address]. Take a right at [address]. At the first stop sign, take a right onto [address]. Take a left into the parking lot and continue through until you reach the North enterance of the building.       Discharge References/Attachments     RADHA MAHER (ENGLISH)         Review of your medicines       Our records show that you are taking the medicines listed below. If these are incorrect, please call your family doctor or clinic.        Dose / Directions Last dose taken    albuterol 108 (90 BASE) MCG/ACT Inhaler   Commonly known as:  PROAIR HFA/PROVENTIL HFA/VENTOLIN HFA   Dose:  2 puff   Quantity:  1 Inhaler        Inhale 2 puffs into the lungs every 4 hours as needed for shortness of breath / dyspnea or wheezing   Refills:  2        estradiol 1 MG tablet   Commonly known as:  ESTRACE   Dose:  1 mg   Quantity:  90 tablet        Take 1 tablet (1 mg) by mouth daily   Refills:  1        OMEPRAZOLE PO   Dose:  20 mg        Take 20 mg by mouth every morning   Refills:  0                Procedures and tests performed during your visit     Fingers XR, 2-3 views, left      Orders Needing Specimen Collection     None      Pending Results     Date and Time Order Name Status Description    8/12/2017 1246 Fingers XR, 2-3 views, left In process             Pending Culture Results     No orders found from 8/10/2017 to 8/13/2017.            Thank you for choosing [facility]       Thank you for choosing [facility] for your care. Our goal is always to provide you with excellent care. Hearing back from our patients is one way we can continue to improve our services. Please take a few minutes to complete the written survey that you may receive in the mail after you visit with us. Thank you!        [employer] Information     [facility] gives you secure access to your electronic health record. If you see a primary care provider, you can also send messages to your care team and make appointments. If you have questions, please call your primary care clinic.  If you do not have a primary care provider, please call [phone] and they will assist you.        Care EveryWhere ID     This is your Care EveryWhere ID. This could be used by other organizations to access your [facility] medical records  [id number]        Equal Access to Services      ZHENG BLANCO : Hadii clifton Saeed, warefugioda luqadaha, qaybta kaalmaroslyn flowers, leah dennis. So [facility] [phone].    ATENCIÓN: Si habla español, tiene a spear disposición servicios gratuitos de asistencia lingüística. Llame al [phone].    We comply with applicable federal civil rights laws and [place] laws. We do not discriminate on the basis of race, color, national origin, age, disability sex, sexual orientation or gender identity.            After Visit Summary       This is your record. Keep this with you and show to your community pharmacist(s) and doctor(s) at your next visit.

## 2017-08-12 NOTE — ED NOTES
Ambulated to room  5 independently.  Woke up today with left middle finger swollen, crooked and painful.  No known injury.  CMS intact. Rates pain at 4, Tylenol 1000 mg at 0900.

## 2017-08-12 NOTE — ED NOTES
Splint applied to left middle finger by RENU Bhatia.  Copy of XR disk given to patient.  Pt will follow up with Dr. Houston in the next week.

## 2017-08-12 NOTE — ED AVS SNAPSHOT
[facility]    [address] [address]    Phone:  [phone]                                       Ihsan Millan   MRN: [id number]    Department[facility]  [facility]   Date of Visit:  8/12/2017           After Visit Summary Signature Page     I have received my discharge instructions, and my questions have been answered. I have discussed any challenges I see with this plan with the nurse or doctor.    ..........................................................................................................................................  Patient/Patient Representative Signature      ..........................................................................................................................................  Patient Representative Print Name and Relationship to Patient    ..................................................               ................................................  Date                                            Time    ..........................................................................................................................................  Reviewed by Signature/Title    ...................................................              ..............................................  Date                                                            Time

## 2017-08-12 NOTE — ED PROVIDER NOTES
History     Chief Complaint   Patient presents with     Hand Pain     woke up this am to her left hand middle finger swollen and crooked. states she doesnt remember injurying it     The history is provided by the patient. No  was used.     Ihsan Millan is a 26 year old female who woke this morning with her left 3rd finger tip bent/crooked and has some swelling.  Denies any known injury.  Does have some pain.  No redness    I have reviewed the Medications, Allergies, Past Medical and Surgical History, and Social History in the Epic system.    Allergies:   Allergies   Allergen Reactions     Lexapro [Escitalopram] Nausea and Vomiting     Codeine Sulfate Rash     Penicillins Rash     Tramadol Rash         No current facility-administered medications on file prior to encounter.   Current Outpatient Prescriptions on File Prior to Encounter:  estradiol (ESTRACE) 1 MG tablet Take 1 tablet (1 mg) by mouth daily   OMEPRAZOLE PO Take 20 mg by mouth every morning   albuterol (PROAIR HFA, PROVENTIL HFA, VENTOLIN HFA) 108 (90 BASE) MCG/ACT inhaler Inhale 2 puffs into the lungs every 4 hours as needed for shortness of breath / dyspnea or wheezing       Patient Active Problem List   Diagnosis     GERD (gastroesophageal reflux disease)     Intermittent asthma     Moderate persistent asthma     H. pylori infection     ASCUS on Pap smear     Postpartum depression     Endometriosis     Surgery, elective     Status post laparoscopic assisted vaginal hysterectomy (LAVH)     RUQ abdominal pain     Pneumonia of right middle lobe due to infectious organism (H)     Tobacco abuse       Past Surgical History:   Procedure Laterality Date     COLONOSCOPY N/A 11/6/2014    Procedure: COLONOSCOPY;  Surgeon: Zacarias Paz MD;  Location: [facility]     DILATION AND CURETTAGE, HYSTEROSCOPY DIAGNOSTIC, COMBINED  8/1/2014    Procedure: COMBINED DILATION AND CURETTAGE, HYSTEROSCOPY DIAGNOSTIC;  Surgeon: Rodolfo Clifford MD;   "Location: [facility]     ESOPHAGOSCOPY, GASTROSCOPY, DUODENOSCOPY (EGD), COMBINED N/A 9/5/2014    Procedure: COMBINED ESOPHAGOSCOPY, GASTROSCOPY, DUODENOSCOPY (EGD);  Surgeon: Zacarias Paz MD;  [facility]     LAPAROSCOPIC ASSISTED HYSTERECTOMY VAGINAL N/A 6/24/2015    Procedure: LAPAROSCOPIC ASSISTED HYSTERECTOMY VAGINAL;  Surgeon: Rodolfo Clifford MD;  Location: [facility]     LAPAROSCOPIC OOPHORECTOMY Right 3/23/2016    Procedure: LAPAROSCOPIC OOPHORECTOMY;  Surgeon: Rodolfo Clifford MD;  [facility]: [facility]     LAPAROSCOPIC SALPINGO-OOPHORECTOMY Left 5/3/2017    Procedure: LAPAROSCOPIC SALPINGO-OOPHORECTOMY;  LAPAROSCOPIC LEFT OOPHORECTOMY;  Surgeon: Rodolfo Clifford MD;  Location: [facility]     LAPAROSCOPY DIAGNOSTIC (GYN) N/A 3/18/2015    Procedure: LAPAROSCOPY DIAGNOSTIC (GYN);  Surgeon: Rodolfo Clifford MD;  Location: [facility]     no surgeries       SALPINGECTOMY Bilateral 6/24/2015    Procedure: SALPINGECTOMY;  Surgeon: Rodolfo Clifford MD;  Location: [facility]       Social History   Substance Use Topics     Smoking status: Current Every Day Smoker     Packs/day: 0.50     Years: 7.00     Types: Cigarettes     Smokeless tobacco: Never Used      Comment: declines quitline referral     Alcohol use Yes      Comment: social       Most Recent Immunizations   Administered Date(s) Administered     DTAP (<7y) 09/16/1992     HepB-Peds 04/27/2004     Influenza (IIV3) 10/29/2013     Influenza Vaccine IM 3yrs+ 4 Valent IIV4 10/29/2013     MMR 03/11/1992     Poliovirus, inactivated (IPV) 03/11/1992     Rhogam 01/30/2014     TDAP Vaccine (Boostrix) 01/30/2014     Tdap (Adacel,Boostrix) 01/30/2014     Tetanus 01/01/2005       BMI: Estimated body mass index is 22.14 kg/(m^2) as calculated from the following:    Height as of 5/3/17: 1.626 m (5' 4\").    Weight as of 5/3/17: 58.5 kg (129 lb).      Review of Systems   Constitutional: Negative.    HENT: Negative.    Cardiovascular: Negative.    Musculoskeletal: Positive for arthralgias.   Neurological: " Negative.    Psychiatric/Behavioral: Negative.        Physical Exam   BP: 113/74  Pulse: 104  Temp: 97.7  F (36.5  C)  Resp: 16  SpO2: 97 %  Physical Exam   Constitutional: She is oriented to person, place, and time. She appears well-developed and well-nourished. No distress.   Cardiovascular:   tachycardia   Pulmonary/Chest: Effort normal.   Musculoskeletal:   Left 3rd finger: the DIP joint is hyperextended. +PFROM, decreased active flexing due to pain at the DIP joint. M/n/v intact. 4/5 strength due to pain. Minimal edema. No erythema/ecchymosis   Neurological: She is alert and oriented to person, place, and time.   Skin: She is not diaphoretic.   Psychiatric: She has a normal mood and affect.   Nursing note and vitals reviewed.      ED Course     ED Course     Procedures      left 3rd finger xray:  No acute bony process noted, pending official rad results.    Finger splint applied.  DIP brought back to neutral and then splinted.  Pt tolerated well    Assessments & Plan (with Medical Decision Making)     I have reviewed the nursing notes.    I have reviewed the findings, diagnosis, plan and need for follow up with the patient.      Final diagnoses:   Mallet finger, left           Patient verbally educated and given appropriate education sheets for the diagnoses and has no questions.  Wear splint until told otherwise by a medical provider.  Follow up with your Primary Care provider in the next week for reevaluation. You may need further splinting and/or Occupational therapy.  if concerns develop, return to the ER  Cinda Bhatia Certified  Physician Assistant  8/12/2017  1:37 PM  URGENT CARE CLINIC      8/12/2017   [facility]     Cinda Bhatia PA  08/12/17 2842

## 2017-08-30 ENCOUNTER — HOSPITAL ENCOUNTER (EMERGENCY)
Facility: HOSPITAL | Age: 27
Discharge: HOME | End: 2017-08-30
Attending: NURSE PRACTITIONER | Admitting: NURSE PRACTITIONER

## 2017-08-30 VITALS
OXYGEN SATURATION: 99 % | DIASTOLIC BLOOD PRESSURE: 60 MMHG | TEMPERATURE: 97 F | SYSTOLIC BLOOD PRESSURE: 101 MMHG | RESPIRATION RATE: 16 BRPM

## 2017-08-30 DIAGNOSIS — H69.92 EUSTACHIAN TUBE DYSFUNCTION, LEFT: ICD-10-CM

## 2017-08-30 PROCEDURE — 99213 OFFICE O/P EST LOW 20 MIN: CPT | Performed by: NURSE PRACTITIONER

## 2017-08-30 PROCEDURE — 99213 OFFICE O/P EST LOW 20 MIN: CPT

## 2017-08-30 RX ORDER — MOMETASONE FUROATE MONOHYDRATE 50 UG/1
2 SPRAY, METERED NASAL DAILY
Qty: 1 BOX | Refills: 0 | Status: SHIPPED | OUTPATIENT
Start: 2017-08-30 | End: 2017-09-27

## 2017-08-30 ASSESSMENT — ENCOUNTER SYMPTOMS
NEUROLOGICAL NEGATIVE: 1
FEVER: 0
RHINORRHEA: 0
GASTROINTESTINAL NEGATIVE: 1
MUSCULOSKELETAL NEGATIVE: 1
SORE THROAT: 0
HEMATOLOGIC/LYMPHATIC NEGATIVE: 1
CARDIOVASCULAR NEGATIVE: 1
SINUS PRESSURE: 0
CONSTITUTIONAL NEGATIVE: 1

## 2017-08-30 NOTE — DISCHARGE INSTRUCTIONS
Common Middle Ear Problems    Your middle ear may have been injured or infected recently. Over time, certain growths or bone disease can also harm the middle ear. Left untreated, middle ear problems often lead to lifelong hearing loss. There are two types of hearing loss: conductive and sensorineural. One or both kinds can occur. Injury, infection, certain growths, or bone disease can cause your symptoms. A ruptured eardrum or a long-lasting (chronic) ear infection may be painful and decrease hearing.  Symptoms    Hearing loss in one or both ears    Fluid, often smelly, draining from the ear    Pain, pressure, or discomfort in the ear    Ringing in the ear  Conductive and sensorineural hearing loss  Sound waves may be disrupted before they reach the inner ear. If this happens, conductive hearing loss may occur. The ear canal can be blocked by wax, infection, a tumor, or a foreign object. The eardrum can be injured or infected. Abnormal bone growth, infection, or tumors in the middle ear can block sound waves.  Sound waves may not be processed correctly in the inner ear. If this happens, sensorineural hearing loss may occur. Permanent hearing loss is most commonly associated with sensorineural problems.  The tests and evaluations used to diagnose what type of hearing problem you have will depend on your symptoms.   Date Last Reviewed: 10/1/2016    3845-6438 [employer]. [address] [place] [address], [place] [address]. All rights reserved. This information is not intended as a substitute for professional medical care. Always follow your healthcare professional's instructions.      AFRIN Nasal spray for 3 days on and three days off  Nasonex no more than 2 weeks  Ibuprofen 800 mg with food every 8 hours

## 2017-08-30 NOTE — ED AVS SNAPSHOT
[facility]    [address] [address]    Phone:  [phone]                                       Ihsan Millan   MRN: [id number]    Department:  [facility]   Date of Visit:  8/30/2017           After Visit Summary Signature Page     I have received my discharge instructions, and my questions have been answered. I have discussed any challenges I see with this plan with the nurse or doctor.    ..........................................................................................................................................  Patient/Patient Representative Signature      ..........................................................................................................................................  Patient Representative Print Name and Relationship to Patient    ..................................................               ................................................  Date                                            Time    ..........................................................................................................................................  Reviewed by Signature/Title    ...................................................              ..............................................  Date                                                            Time

## 2017-08-30 NOTE — ED PROVIDER NOTES
History     Chief Complaint   Patient presents with     Otalgia     c/o lt ear pain     The history is provided by the patient. No  was used.     Ihsan Millan is a 26 year old female who presents with one month of left ear pain.  Throbbing pain , constant.  She denies any URI sx, no hx of seasonal allergies. She has taken occasional doses of tylenol which has been effective.     I have reviewed the Medications, Allergies, Past Medical and Surgical History, and Social History in the Epic system.  Dr Houston for PCP/ normally healthy      Review of Systems   Constitutional: Negative.  Negative for fever.   HENT: Positive for ear pain (left). Negative for congestion, ear discharge, postnasal drip, rhinorrhea, sinus pressure and sore throat.    Cardiovascular: Negative.    Gastrointestinal: Negative.    Genitourinary: Negative.    Musculoskeletal: Negative.    Skin: Negative.  Negative for rash.   Neurological: Negative.    Hematological: Negative.        Physical Exam   BP: 101/60  Heart Rate: 92  Temp: 97  F (36.1  C)  Resp: 16  SpO2: 99 %  Physical Exam   Constitutional: She is oriented to person, place, and time. She appears well-developed and well-nourished. No distress.   HENT:   Head: Normocephalic and atraumatic.   Right Ear: External ear normal.   Left Ear: External ear normal.   Mouth/Throat: Oropharynx is clear and moist. No oropharyngeal exudate.   Left TM with minimal retraction ,blm's visualized, light reflex intact  Right TM translucent, BLM's visualized , light reflex intact  Nasal mucosa with minimal swelling ,erythematous and pale     Eyes: Conjunctivae and EOM are normal. Pupils are equal, round, and reactive to light. Right eye exhibits no discharge. Left eye exhibits no discharge.   Neck: Normal range of motion. Neck supple.   Cardiovascular: Normal rate.    Pulmonary/Chest: Effort normal.   Musculoskeletal: Normal range of motion.   Lymphadenopathy:     She has no  cervical adenopathy.   Neurological: She is alert and oriented to person, place, and time.   Skin: Skin is warm and dry. No rash noted. She is not diaphoretic.   Nursing note and vitals reviewed.      ED Course     ED Course     Procedures             Labs Ordered and Resulted from Time of ED Arrival Up to the Time of Departure from the ED - No data to display    Assessments & Plan (with Medical Decision Making)     I have reviewed the nursing notes.  Likely ETD    Pathophysiology, possible etiology and treatment with potential outcomes, risks, benefits, and alternatives discussed to the best of my ability        Ibuprofen 800 mg every 8 hours, with food, afrin 3 days on and 3 days off, nasonex for 2 weeks . Netti pot.   Explained to pt that there is not a single effective treatment but this regime may help  F/U with Dr Houston for no slow resolution, new or worsening sx.  Here as needed.   I have reviewed the findings, diagnosis, plan and need for follow up with the patient.      Discharge Medication List as of 8/30/2017  3:13 PM      START taking these medications    Details   mometasone (NASONEX) 50 MCG/ACT spray Spray 2 sprays into both nostrils daily, Disp-1 Box, R-0, E-Prescribe             Final diagnoses:   Eustachian tube dysfunction, left       8/30/2017   [facility]     Marina Toscano NP  08/30/17 [id number]

## 2017-08-30 NOTE — ED AVS SNAPSHOT
[facility]    [address] [address]    Phone:  [phone]                                       Ihsan Millan   MRN: [id number]    Department:  [facility]   Date of Visit:  8/30/2017           Patient Information     Date Of Birth          1990        Your diagnoses for this visit were:     Eustachian tube dysfunction, left        You were seen by Marina Toscano NP.      Follow-up Information     Follow up with Temo Houston DO.    Specialty:  Family Practice    Why:  As needed, If symptoms worsen    Contact information:    [facility]  [address] [facility] [address] [phone]          Follow up with [facility].    Specialty:  EMERGENCY MEDICINE    Why:  As needed, If symptoms worsen    Contact information:    [address] [address] [phone]    Additional information:    From [place]: Take [address]. Turn left at [address]. Turn left at the first stoplight on [address]. At the first stop sign, take a right onto [address]. Take a left into the parking lot and continue through until you reach the North enterance of the building.       From [place]: Take [address]. Take the [address] ramp towards [place]. Turn left onto [address]. Take a slight right onto [address]. Turn left at the first stoplight on [address]. At the first stop sign, take a right onto [address]. Take a left into the parking lot and continue through until you reach the North enterance of the building.       From [place]: Take [address]. Take a right at [address]. At the first stop sign, take a right onto [address]. Take a left into the parking lot and continue through until you reach the North enterance of the building.         Discharge Instructions         Common Middle Ear Problems    Your middle ear may have  been injured or infected recently. Over time, certain growths or bone disease can also harm the middle ear. Left untreated, middle ear problems often lead to lifelong hearing loss. There are two types of hearing loss: conductive and sensorineural. One or both kinds can occur. Injury, infection, certain growths, or bone disease can cause your symptoms. A ruptured eardrum or a long-lasting (chronic) ear infection may be painful and decrease hearing.  Symptoms    Hearing loss in one or both ears    Fluid, often smelly, draining from the ear    Pain, pressure, or discomfort in the ear    Ringing in the ear  Conductive and sensorineural hearing loss  Sound waves may be disrupted before they reach the inner ear. If this happens, conductive hearing loss may occur. The ear canal can be blocked by wax, infection, a tumor, or a foreign object. The eardrum can be injured or infected. Abnormal bone growth, infection, or tumors in the middle ear can block sound waves.  Sound waves may not be processed correctly in the inner ear. If this happens, sensorineural hearing loss may occur. Permanent hearing loss is most commonly associated with sensorineural problems.  The tests and evaluations used to diagnose what type of hearing problem you have will depend on your symptoms.   Date Last Reviewed: 10/1/2016    5186-1450 [employer]. [address] [place] [address], [place] [id number]. All rights reserved. This information is not intended as a substitute for professional medical care. Always follow your healthcare professional's instructions.      AFRIN Nasal spray for 3 days on and three days off  Nasonex no more than 2 weeks  Ibuprofen 800 mg with food every 8 hours       Review of your medicines      START taking        Dose / Directions Last dose taken    mometasone 50 MCG/ACT spray   Commonly known as:  NASONEX   Dose:  2 spray   Quantity:  1 Box        Spray 2 sprays into both nostrils daily   Refills:  0          Our  records show that you are taking the medicines listed below. If these are incorrect, please call your family doctor or clinic.        Dose / Directions Last dose taken    albuterol 108 (90 BASE) MCG/ACT Inhaler   Commonly known as:  PROAIR HFA/PROVENTIL HFA/VENTOLIN HFA   Dose:  2 puff   Quantity:  1 Inhaler        Inhale 2 puffs into the lungs every 4 hours as needed for shortness of breath / dyspnea or wheezing   Refills:  2        estradiol 1 MG tablet   Commonly known as:  ESTRACE   Dose:  1 mg   Quantity:  90 tablet        Take 1 tablet (1 mg) by mouth daily   Refills:  1        OMEPRAZOLE PO   Dose:  20 mg        Take 20 mg by mouth every morning   Refills:  0                Prescriptions were sent or printed at these locations (1 Prescription)                   [facility] [address] [facility] [place] - [address] AT [facility] [address]   [address] [place] [address]    Telephone:  [phone]   Fax:  [phone]   Hours:                  E-Prescribed (1 of 1)         mometasone (NASONEX) 50 MCG/ACT spray                Orders Needing Specimen Collection     None      Pending Results     No orders found from 8/28/2017 to 8/31/2017.            Pending Culture Results     No orders found from 8/28/2017 to 8/31/2017.            Thank you for choosing [facility]       Thank you for choosing [facility] for your care. Our goal is always to provide you with excellent care. Hearing back from our patients is one way we can continue to improve our services. Please take a few minutes to complete the written survey that you may receive in the mail after you visit with us. Thank you!        [employer] Information     [employer] gives you secure access to your electronic health record. If you see a primary care provider, you can also send messages to your care team and make appointments. If you have questions, please call your primary care clinic.  If you do not have a primary care provider, please call [phone]  and they will assist you.        Care EveryWhere ID     This is your Care EveryWhere ID. This could be used by other organizations to access your [facility] medical records  [id number]        Equal Access to Services     ZHENG BLANCO : Ancelmo Saeed, hermes de jesus, aisha flowers, leah dennis. So [facility] [phone].    ATENCIÓN: Si habla español, tiene a spear disposición servicios gratuitos de asistencia lingüística. Llame al [phone].    We comply with applicable federal civil rights laws and [place] laws. We do not discriminate on the basis of race, color, national origin, age, disability sex, sexual orientation or gender identity.            After Visit Summary       This is your record. Keep this with you and show to your community pharmacist(s) and doctor(s) at your next visit.

## 2017-09-27 ENCOUNTER — HOSPITAL ENCOUNTER (EMERGENCY)
Facility: HOSPITAL | Age: 27
Discharge: HOME | End: 2017-09-27
Attending: PHYSICIAN ASSISTANT | Admitting: PHYSICIAN ASSISTANT

## 2017-09-27 VITALS
TEMPERATURE: 97.7 F | DIASTOLIC BLOOD PRESSURE: 66 MMHG | RESPIRATION RATE: 16 BRPM | SYSTOLIC BLOOD PRESSURE: 121 MMHG | HEART RATE: 92 BPM | OXYGEN SATURATION: 98 %

## 2017-09-27 DIAGNOSIS — N30.00 ACUTE CYSTITIS WITHOUT HEMATURIA: ICD-10-CM

## 2017-09-27 DIAGNOSIS — Z13.9 SCREENING FOR CONDITION: ICD-10-CM

## 2017-09-27 DIAGNOSIS — S39.012A BACK STRAIN, INITIAL ENCOUNTER: ICD-10-CM

## 2017-09-27 DIAGNOSIS — R11.15 INTRACTABLE CYCLICAL VOMITING WITH NAUSEA: ICD-10-CM

## 2017-09-27 LAB
ALBUMIN SERPL-MCNC: 3.9 G/DL (ref 3.4–5)
ALBUMIN UR-MCNC: NEGATIVE MG/DL
ALP SERPL-CCNC: 86 U/L (ref 40–150)
ALT SERPL W P-5'-P-CCNC: 26 U/L (ref 0–50)
ANION GAP SERPL CALCULATED.3IONS-SCNC: 6 MMOL/L (ref 3–14)
APPEARANCE UR: CLEAR
AST SERPL W P-5'-P-CCNC: 14 U/L (ref 0–45)
BACTERIA #/AREA URNS HPF: ABNORMAL /HPF
BASOPHILS # BLD AUTO: 0 10E9/L (ref 0–0.2)
BASOPHILS NFR BLD AUTO: 0.3 %
BILIRUB SERPL-MCNC: 0.1 MG/DL (ref 0.2–1.3)
BILIRUB UR QL STRIP: NEGATIVE
BUN SERPL-MCNC: 23 MG/DL (ref 7–30)
CALCIUM SERPL-MCNC: 8.9 MG/DL (ref 8.5–10.1)
CHLORIDE SERPL-SCNC: 110 MMOL/L (ref 94–109)
CO2 SERPL-SCNC: 27 MMOL/L (ref 20–32)
COLOR UR AUTO: ABNORMAL
CREAT SERPL-MCNC: 0.71 MG/DL (ref 0.52–1.04)
DIFFERENTIAL METHOD BLD: NORMAL
EOSINOPHIL # BLD AUTO: 0.2 10E9/L (ref 0–0.7)
EOSINOPHIL NFR BLD AUTO: 2.8 %
ERYTHROCYTE [DISTWIDTH] IN BLOOD BY AUTOMATED COUNT: 13.6 % (ref 10–15)
GFR SERPL CREATININE-BSD FRML MDRD: >90 ML/MIN/1.7M2
GLUCOSE SERPL-MCNC: 106 MG/DL (ref 70–99)
GLUCOSE UR STRIP-MCNC: NEGATIVE MG/DL
HCG UR QL: NEGATIVE
HCT VFR BLD AUTO: 41.3 % (ref 35–47)
HGB BLD-MCNC: 14.2 G/DL (ref 11.7–15.7)
HGB UR QL STRIP: NEGATIVE
IMM GRANULOCYTES # BLD: 0 10E9/L (ref 0–0.4)
IMM GRANULOCYTES NFR BLD: 0.3 %
KETONES UR STRIP-MCNC: NEGATIVE MG/DL
LEUKOCYTE ESTERASE UR QL STRIP: ABNORMAL
LYMPHOCYTES # BLD AUTO: 2.7 10E9/L (ref 0.8–5.3)
LYMPHOCYTES NFR BLD AUTO: 34.1 %
MCH RBC QN AUTO: 30.2 PG (ref 26.5–33)
MCHC RBC AUTO-ENTMCNC: 34.4 G/DL (ref 31.5–36.5)
MCV RBC AUTO: 88 FL (ref 78–100)
MONOCYTES # BLD AUTO: 0.3 10E9/L (ref 0–1.3)
MONOCYTES NFR BLD AUTO: 3.9 %
NEUTROPHILS # BLD AUTO: 4.6 10E9/L (ref 1.6–8.3)
NEUTROPHILS NFR BLD AUTO: 58.6 %
NITRATE UR QL: NEGATIVE
NRBC # BLD AUTO: 0 10*3/UL
NRBC BLD AUTO-RTO: 0 /100
PH UR STRIP: 5.5 PH (ref 4.7–8)
PLATELET # BLD AUTO: 234 10E9/L (ref 150–450)
POTASSIUM SERPL-SCNC: 4 MMOL/L (ref 3.4–5.3)
PROT SERPL-MCNC: 7.2 G/DL (ref 6.8–8.8)
RBC # BLD AUTO: 4.7 10E12/L (ref 3.8–5.2)
RBC #/AREA URNS AUTO: <1 /HPF (ref 0–2)
SODIUM SERPL-SCNC: 143 MMOL/L (ref 133–144)
SOURCE: ABNORMAL
SP GR UR STRIP: 1.02 (ref 1–1.03)
UROBILINOGEN UR STRIP-MCNC: NORMAL MG/DL (ref 0–2)
WBC # BLD AUTO: 7.9 10E9/L (ref 4–11)
WBC #/AREA URNS AUTO: 2 /HPF (ref 0–2)

## 2017-09-27 PROCEDURE — 99213 OFFICE O/P EST LOW 20 MIN: CPT | Performed by: PHYSICIAN ASSISTANT

## 2017-09-27 PROCEDURE — 80053 COMPREHEN METABOLIC PANEL: CPT | Performed by: PHYSICIAN ASSISTANT

## 2017-09-27 PROCEDURE — 85025 COMPLETE CBC W/AUTO DIFF WBC: CPT | Performed by: PHYSICIAN ASSISTANT

## 2017-09-27 PROCEDURE — 81001 URINALYSIS AUTO W/SCOPE: CPT | Performed by: PHYSICIAN ASSISTANT

## 2017-09-27 PROCEDURE — 81025 URINE PREGNANCY TEST: CPT | Performed by: PHYSICIAN ASSISTANT

## 2017-09-27 PROCEDURE — 99213 OFFICE O/P EST LOW 20 MIN: CPT

## 2017-09-27 PROCEDURE — 36415 COLL VENOUS BLD VENIPUNCTURE: CPT | Performed by: PHYSICIAN ASSISTANT

## 2017-09-27 RX ORDER — CYCLOBENZAPRINE HCL 5 MG
5 TABLET ORAL 3 TIMES DAILY PRN
Qty: 20 TABLET | Refills: 0 | Status: SHIPPED | OUTPATIENT
Start: 2017-09-27 | End: 2017-10-11

## 2017-09-27 RX ORDER — SULFAMETHOXAZOLE/TRIMETHOPRIM 800-160 MG
1 TABLET ORAL 2 TIMES DAILY
Qty: 10 TABLET | Refills: 0 | Status: SHIPPED | OUTPATIENT
Start: 2017-09-27 | End: 2017-10-11

## 2017-09-27 RX ORDER — CYCLOBENZAPRINE HCL 5 MG
TABLET ORAL
Qty: 20 TABLET | Refills: 0 | Status: SHIPPED | OUTPATIENT
Start: 2017-09-27 | End: 2017-09-27

## 2017-09-27 RX ORDER — ONDANSETRON 4 MG/1
4 TABLET, FILM COATED ORAL EVERY 8 HOURS PRN
Qty: 10 TABLET | Refills: 0 | Status: SHIPPED | OUTPATIENT
Start: 2017-09-27 | End: 2017-10-11

## 2017-09-27 RX ORDER — ONDANSETRON 4 MG/1
4 TABLET, ORALLY DISINTEGRATING ORAL EVERY 8 HOURS PRN
Qty: 10 TABLET | Refills: 0 | Status: SHIPPED | OUTPATIENT
Start: 2017-09-27 | End: 2017-09-27

## 2017-09-27 RX ORDER — SULFAMETHOXAZOLE/TRIMETHOPRIM 800-160 MG
1 TABLET ORAL 2 TIMES DAILY
Qty: 10 TABLET | Refills: 0 | Status: SHIPPED | OUTPATIENT
Start: 2017-09-27 | End: 2017-09-27

## 2017-09-27 ASSESSMENT — ENCOUNTER SYMPTOMS
ABDOMINAL PAIN: 0
HEMATURIA: 0
PSYCHIATRIC NEGATIVE: 1
FEVER: 0
DIARRHEA: 0
DYSURIA: 0
SHORTNESS OF BREATH: 0
SORE THROAT: 0
SINUS PRESSURE: 0
NECK STIFFNESS: 0
FREQUENCY: 1
NEUROLOGICAL NEGATIVE: 1
NECK PAIN: 0
MYALGIAS: 1
FLANK PAIN: 0
NAUSEA: 1
FATIGUE: 1

## 2017-09-27 NOTE — ED NOTES
"C/o lower back pain for 4 days, reports the first few days back was \"tingly\" c/o feeling nauseous also, vomited last yesterday. No known injury to back, tylenol at 0600  "

## 2017-09-27 NOTE — ED AVS SNAPSHOT
[facility]    [address] [address]    Phone:  [phone]                                       Ihsan Millan   MRN: [id number]    Department:  [facility]   Date of Visit:  9/27/2017           After Visit Summary Signature Page     I have received my discharge instructions, and my questions have been answered. I have discussed any challenges I see with this plan with the nurse or doctor.    ..........................................................................................................................................  Patient/Patient Representative Signature      ..........................................................................................................................................  Patient Representative Print Name and Relationship to Patient    ..................................................               ................................................  Date                                            Time    ..........................................................................................................................................  Reviewed by Signature/Title    ...................................................              ..............................................  Date                                                            Time

## 2017-09-27 NOTE — ED AVS SNAPSHOT
[facility]    [address] [address]    Phone:  [phone]                                       Ihsan Millan   MRN: [id number]    Department[facility]  [facility]   Date of Visit:  9/27/2017           Patient Information     Date Of Birth          1990        Your diagnoses for this visit were:     Acute cystitis without hematuria     Back strain, initial encounter     Intractable cyclical vomiting with nausea     Screening for condition CBC, CMP within normal limits       You were seen by Cinda Bhatia PA.      Follow-up Information     Follow up with Temo Houston DO.    Specialty:  Family Practice    Why:  If symptoms worsen    Contact information:    [facility]  [address] [facility] [address] [phone]          Follow up with [facility].    Specialty:  EMERGENCY MEDICINE    Why:  If further concerns develop    Contact information:    [address] [address] [phone]    Additional information:    From [facility]: Take [address]. Turn left at [address]. Turn left at the first stoplight on [address]. At the first stop sign, take a right onto [address]. Take a left into the parking lot and continue through until you reach the North enterance of the building.       From [place]: Take [address]. Take the [address] ramp towards [place]. Turn left onto [address]. Take a slight right onto [address]. Turn left at the first stoplight on [address]. At the first stop sign, take a right onto [address]. Take a left into the parking lot and continue through until you reach the North enterance of the building.       From [place]: Take [address]. Take a right at [address]. At the first stop sign, take a right onto [address]. Take a left into the parking lot and continue through until you reach the  North enterance of the building.       Discharge References/Attachments     BACK SPRAIN/STRAIN (ENGLISH)    URINARY TRACT INFECTIONS (UTIS), UNDERSTANDING (ENGLISH)    VOMITING (6Y-ADULT) (ENGLISH)         Review of your medicines      START taking        Dose / Directions Last dose taken    cyclobenzaprine 5 MG tablet   Commonly known as:  FLEXERIL   Quantity:  20 tablet        Take half to one tablet every 8 hours as needed for muscle pain   Refills:  0        ondansetron 4 MG ODT tab   Commonly known as:  ZOFRAN ODT   Dose:  4 mg   Quantity:  10 tablet        Take 1 tablet (4 mg) by mouth every 8 hours as needed   Refills:  0        sulfamethoxazole-trimethoprim 800-160 MG per tablet   Commonly known as:  BACTRIM DS/SEPTRA DS   Dose:  1 tablet   Quantity:  10 tablet        Take 1 tablet by mouth 2 times daily   Refills:  0          Our records show that you are taking the medicines listed below. If these are incorrect, please call your family doctor or clinic.        Dose / Directions Last dose taken    albuterol 108 (90 BASE) MCG/ACT Inhaler   Commonly known as:  PROAIR HFA/PROVENTIL HFA/VENTOLIN HFA   Dose:  2 puff   Quantity:  1 Inhaler        Inhale 2 puffs into the lungs every 4 hours as needed for shortness of breath / dyspnea or wheezing   Refills:  2        estradiol 1 MG tablet   Commonly known as:  ESTRACE   Dose:  1 mg   Quantity:  90 tablet        Take 1 tablet (1 mg) by mouth daily   Refills:  1        OMEPRAZOLE PO   Dose:  20 mg        Take 20 mg by mouth every morning   Refills:  0                Prescriptions were sent or printed at these locations (3 Prescriptions)                   [facility] [id number] - [place] - [address] AT [facility] [address] [place] [address]    Telephone:  [phone]   Fax:  [phone]   Hours:                  E-Prescribed (3 of 3)         sulfamethoxazole-trimethoprim (BACTRIM DS/SEPTRA DS) 800-160 MG per tablet                ondansetron (ZOFRAN ODT) 4 MG ODT tab               cyclobenzaprine (FLEXERIL) 5 MG tablet                Procedures and tests performed during your visit     CBC with platelets differential    Comprehensive metabolic panel    HCG qualitative urine    UA reflex to Microscopic and Culture      Orders Needing Specimen Collection     None      Pending Results     No orders found from 9/25/2017 to 9/28/2017.            Pending Culture Results     No orders found from 9/25/2017 to 9/28/2017.            Thank you for choosing [facility]       Thank you for choosing [facility] for your care. Our goal is always to provide you with excellent care. Hearing back from our patients is one way we can continue to improve our services. Please take a few minutes to complete the written survey that you may receive in the mail after you visit with us. Thank you!        [employer] Information     [employer] gives you secure access to your electronic health record. If you see a primary care provider, you can also send messages to your care team and make appointments. If you have questions, please call your primary care clinic.  If you do not have a primary care provider, please call [phone] and they will assist you.        Care EveryWhere ID     This is your Care EveryWhere ID. This could be used by other organizations to access your [facility] medical records  [id number]        Equal Access to Services     ZHENG BLANCO AH: Ancelmo Saeed, wajeannine de jesus, qarehana kaalmaroslyn flowers, leah dennis. So [facility] [phone].    ATENCIÓN: Si habla español, tiene a spear disposición servicios gratuitos de asistencia lingüística. Llame al [phone].    We comply with applicable federal civil rights laws and [place] laws. We do not discriminate on the basis of race, color, national origin, age, disability sex, sexual orientation or gender identity.            After Visit Summary       This is your record.  Keep this with you and show to your community pharmacist(s) and doctor(s) at your next visit.

## 2017-09-27 NOTE — ED PROVIDER NOTES
"  History     Chief Complaint   Patient presents with     Back Pain     bilateral lower back pain for 4 days. pt reprots she \"feels sick, also, like nauseated\"      The history is provided by the patient. No  was used.     Ihsan Millan is a 26 year old female who has LBP x 4 days. Has Urinary frequency. No burning/urgency. Has nausea but does not wish to have medication for this at this time. No v/d/f/c. Has decreased energy. No sore throat/ear/sinus pain. No pelvic or abd pain.     I have reviewed the Medications, Allergies, Past Medical and Surgical History, and Social History in the Epic system.    Allergies:   Allergies   Allergen Reactions     Lexapro [Escitalopram] Nausea and Vomiting     Codeine Sulfate Rash     Penicillins Rash     Tramadol Rash         No current facility-administered medications on file prior to encounter.   Current Outpatient Prescriptions on File Prior to Encounter:  estradiol (ESTRACE) 1 MG tablet Take 1 tablet (1 mg) by mouth daily   OMEPRAZOLE PO Take 20 mg by mouth every morning   albuterol (PROAIR HFA, PROVENTIL HFA, VENTOLIN HFA) 108 (90 BASE) MCG/ACT inhaler Inhale 2 puffs into the lungs every 4 hours as needed for shortness of breath / dyspnea or wheezing       Patient Active Problem List   Diagnosis     GERD (gastroesophageal reflux disease)     Intermittent asthma     Moderate persistent asthma     H. pylori infection     ASCUS on Pap smear     Postpartum depression     Endometriosis     Surgery, elective     Status post laparoscopic assisted vaginal hysterectomy (LAVH)     RUQ abdominal pain     Pneumonia of right middle lobe due to infectious organism (H)     Tobacco abuse       Past Surgical History:   Procedure Laterality Date     COLONOSCOPY N/A 11/6/2014    Procedure: COLONOSCOPY;  Surgeon: Zacarias Paz MD;  Location: [facility]     DILATION AND CURETTAGE, HYSTEROSCOPY DIAGNOSTIC, COMBINED  8/1/2014    Procedure: COMBINED DILATION AND CURETTAGE, " "HYSTEROSCOPY DIAGNOSTIC;  Surgeon: Rodolfo Clifford MD;  [facility]     ESOPHAGOSCOPY, GASTROSCOPY, DUODENOSCOPY (EGD), COMBINED N/A 9/5/2014    Procedure: COMBINED ESOPHAGOSCOPY, GASTROSCOPY, DUODENOSCOPY (EGD);  Surgeon: Zacarias Paz MD;  [facility]     LAPAROSCOPIC ASSISTED HYSTERECTOMY VAGINAL N/A 6/24/2015    Procedure: LAPAROSCOPIC ASSISTED HYSTERECTOMY VAGINAL;  Surgeon: Rodolfo Clifford MD;  [facility]     LAPAROSCOPIC OOPHORECTOMY Right 3/23/2016    Procedure: LAPAROSCOPIC OOPHORECTOMY;  Surgeon: Rodolfo Clifford MD;  [facility]     LAPAROSCOPIC SALPINGO-OOPHORECTOMY Left 5/3/2017    Procedure: LAPAROSCOPIC SALPINGO-OOPHORECTOMY;  LAPAROSCOPIC LEFT OOPHORECTOMY;  Surgeon: Rodolfo Clifford MD;  [facility]     LAPAROSCOPY DIAGNOSTIC (GYN) N/A 3/18/2015    Procedure: LAPAROSCOPY DIAGNOSTIC (GYN);  Surgeon: Rodolfo Clifford MD;  Location: [facility]     no surgeries       SALPINGECTOMY Bilateral 6/24/2015    Procedure: SALPINGECTOMY;  Surgeon: Rodolfo Clifford MD;  Location: [facility]       Social History   Substance Use Topics     Smoking status: Current Every Day Smoker     Packs/day: 0.50     Years: 7.00     Types: Cigarettes     Smokeless tobacco: Never Used      Comment: declines quitline referral     Alcohol use Yes      Comment: social       Most Recent Immunizations   Administered Date(s) Administered     DTAP (<7y) 09/16/1992     HepB 04/27/2004     Influenza (IIV3) 10/29/2013     Influenza Vaccine IM 3yrs+ 4 Valent IIV4 10/29/2013     MMR 03/11/1992     Poliovirus, inactivated (IPV) 03/11/1992     Rhogam 01/30/2014     TDAP Vaccine (Boostrix) 01/30/2014     Tdap (Adacel,Boostrix) 01/30/2014     Tetanus 01/01/2005       BMI: Estimated body mass index is 22.14 kg/(m^2) as calculated from the following:    Height as of 5/3/17: 1.626 m (5' 4\").    Weight as of 5/3/17: 58.5 kg (129 lb).      Review of Systems   Constitutional: Positive for fatigue. Negative for fever.   HENT: Negative for ear " pain, sinus pressure and sore throat.    Respiratory: Negative for shortness of breath.    Cardiovascular: Negative for chest pain.   Gastrointestinal: Positive for nausea. Negative for abdominal pain and diarrhea.   Genitourinary: Positive for frequency. Negative for dysuria, flank pain, hematuria, pelvic pain and urgency.   Musculoskeletal: Positive for myalgias. Negative for neck pain and neck stiffness.   Neurological: Negative.    Psychiatric/Behavioral: Negative.        Physical Exam   BP: 121/66  Pulse: 92  Temp: 97.7  F (36.5  C)  Resp: 16  SpO2: 98 %  Physical Exam   Constitutional: She is oriented to person, place, and time. She appears well-developed and well-nourished. No distress.   HENT:   Head: Normocephalic and atraumatic.   Neck: Normal range of motion. Neck supple.   Cardiovascular: Normal rate, regular rhythm and normal heart sounds.    Pulmonary/Chest: Effort normal and breath sounds normal. No respiratory distress.   Abdominal: Soft. Bowel sounds are normal. There is no tenderness.   Musculoskeletal:   Back: no e/e/e/e. +AFROM, m/n/v, 5/5 strength, moderate TTP to lumbar paraspinous muscles.\    No CVA TTP    BLE: +AFROM, m/n/v intact, 5/5 strength   Neurological: She is alert and oriented to person, place, and time.   Skin: She is not diaphoretic.   Psychiatric: She has a normal mood and affect.   Nursing note and vitals reviewed.      ED Course     ED Course     Procedures        Labs Ordered and Resulted from Time of ED Arrival Up to the Time of Departure from the ED   UA MACROSCOPIC WITH REFLEX TO MICRO AND CULTURE - Abnormal; Notable for the following:        Result Value    Leukocyte Esterase Urine Trace (*)     Bacteria Urine None (*)     All other components within normal limits   COMPREHENSIVE METABOLIC PANEL - Abnormal; Notable for the following:     Chloride 110 (*)     Glucose 106 (*)     Bilirubin Total 0.1 (*)     All other components within normal limits   CBC WITH PLATELETS  DIFFERENTIAL   HCG QUALITATIVE URINE       Assessments & Plan (with Medical Decision Making)     I have reviewed the nursing notes.    I have reviewed the findings, diagnosis, plan and need for follow up with the patient.    Discharge Medication List as of 9/27/2017 11:09 AM      START taking these medications    Details   sulfamethoxazole-trimethoprim (BACTRIM DS/SEPTRA DS) 800-160 MG per tablet Take 1 tablet by mouth 2 times daily, Disp-10 tablet, R-0, E-Prescribe      ondansetron (ZOFRAN ODT) 4 MG ODT tab Take 1 tablet (4 mg) by mouth every 8 hours as needed, Disp-10 tablet, R-0, E-Prescribe      cyclobenzaprine (FLEXERIL) 5 MG tablet Take half to one tablet every 8 hours as needed for muscle pain, Disp-20 tablet, R-0, E-Prescribe             Final diagnoses:   Acute cystitis without hematuria   Back strain, initial encounter   Intractable cyclical vomiting with nausea   Screening for condition - CBC, CMP within normal limits         Patient verbally educated and given appropriate education sheets for the diagnoses and has no questions.  Take medications as directed.   Follow up with your Primary Care provider if symptoms increase or if concerns develop, return to the ER  Cinda Bhatia Certified  Physician Assistant  9/27/2017  11:45 AM  URGENT CARE CLINIC      9/27/2017   [facility]     Cinda Bhatia PA  09/27/17 1149

## 2017-10-11 ENCOUNTER — HOSPITAL ENCOUNTER (EMERGENCY)
Facility: HOSPITAL | Age: 27
Discharge: HOME | End: 2017-10-11
Attending: PHYSICIAN ASSISTANT | Admitting: PHYSICIAN ASSISTANT

## 2017-10-11 ENCOUNTER — APPOINTMENT (OUTPATIENT)
Dept: GENERAL RADIOLOGY | Facility: HOSPITAL | Age: 27
End: 2017-10-11
Attending: PHYSICIAN ASSISTANT

## 2017-10-11 VITALS
DIASTOLIC BLOOD PRESSURE: 65 MMHG | BODY MASS INDEX: 22.31 KG/M2 | RESPIRATION RATE: 16 BRPM | HEART RATE: 98 BPM | TEMPERATURE: 98.4 F | OXYGEN SATURATION: 98 % | SYSTOLIC BLOOD PRESSURE: 99 MMHG | WEIGHT: 130 LBS

## 2017-10-11 DIAGNOSIS — R07.81 RIB PAIN: ICD-10-CM

## 2017-10-11 DIAGNOSIS — S29.011A MUSCLE STRAIN OF CHEST WALL, INITIAL ENCOUNTER: ICD-10-CM

## 2017-10-11 PROCEDURE — 99213 OFFICE O/P EST LOW 20 MIN: CPT | Performed by: PHYSICIAN ASSISTANT

## 2017-10-11 PROCEDURE — 99214 OFFICE O/P EST MOD 30 MIN: CPT | Mod: 25

## 2017-10-11 PROCEDURE — 71100 X-RAY EXAM RIBS UNI 2 VIEWS: CPT | Mod: TC,LT

## 2017-10-11 PROCEDURE — 99213 OFFICE O/P EST LOW 20 MIN: CPT

## 2017-10-11 PROCEDURE — 25000128 H RX IP 250 OP 636: Performed by: PHYSICIAN ASSISTANT

## 2017-10-11 PROCEDURE — 96372 THER/PROPH/DIAG INJ SC/IM: CPT

## 2017-10-11 RX ORDER — CYCLOBENZAPRINE HCL 10 MG
1 TABLET ORAL ONCE
Status: COMPLETED | OUTPATIENT
Start: 2017-10-11 | End: 2017-10-11

## 2017-10-11 RX ORDER — PREDNISONE 20 MG/1
TABLET ORAL
Qty: 10 TABLET | Refills: 0 | Status: SHIPPED | OUTPATIENT
Start: 2017-10-11 | End: 2017-10-19

## 2017-10-11 RX ORDER — KETOROLAC TROMETHAMINE 30 MG/ML
60 INJECTION, SOLUTION INTRAMUSCULAR; INTRAVENOUS ONCE
Status: COMPLETED | OUTPATIENT
Start: 2017-10-11 | End: 2017-10-11

## 2017-10-11 RX ORDER — CYCLOBENZAPRINE HCL 10 MG
5-10 TABLET ORAL 3 TIMES DAILY PRN
Qty: 16 TABLET | Refills: 1 | Status: SHIPPED | OUTPATIENT
Start: 2017-10-11 | End: 2017-12-09

## 2017-10-11 RX ADMIN — Medication 1 TABLET: at 22:06

## 2017-10-11 RX ADMIN — KETOROLAC TROMETHAMINE 60 MG: 30 INJECTION, SOLUTION INTRAMUSCULAR; INTRAVENOUS at 22:04

## 2017-10-11 ASSESSMENT — ENCOUNTER SYMPTOMS
CHILLS: 0
CONSTITUTIONAL NEGATIVE: 1
SORE THROAT: 0
SINUS PRESSURE: 0
FEVER: 0
EYES NEGATIVE: 1
NEUROLOGICAL NEGATIVE: 1
PSYCHIATRIC NEGATIVE: 1
SHORTNESS OF BREATH: 0
COUGH: 0
RESPIRATORY NEGATIVE: 1

## 2017-10-11 NOTE — ED AVS SNAPSHOT
[facility]    [address] [address]    Phone:  [phone]                                       Ihsan Millan   MRN: [id number]    Department:  [facility]   Date of Visit:  10/11/2017           Patient Information     Date Of Birth          1990        Your diagnoses for this visit were:     Muscle strain of chest wall, initial encounter     Rib pain        You were seen by Primo Oliva PA.      Follow-up Information     Follow up with Temo Houston DO In 1 week.    Specialty:  Family Practice    Contact information:    [facility]  [address] [facility] [address]  [phone]          Discharge Instructions       - Prednisone for pain/swelling  - May fill the flexeril if needed  - Topicals: HEAT more so than ice. Consider Arnica Cream (5$ at [employer]) and/or Capsaicin. (1/4 teaspoon cayenne pepper in a palmful olive oil and rub in 2-3 times daily for 5-7 days for pain)    * Primary care if no better next week, rechecking sooner with rashes.       Discharge References/Attachments     CHEST WALL PAIN, COSTOCHONDRITIS (ENGLISH)      ED Discharge Orders     CHIROPRACTIC REFERRAL       Your provider has referred you to: Per patient preference.     Please be aware that coverage of these services is subject to the terms and limitations of your health insurance plan.  Call member services at your health plan with any benefit or coverage questions.      Please bring the following to your appointment:    >>   Any x-rays, CTs or MRIs which have been performed.  Contact the facility where they were done to arrange for  prior to your scheduled appointment.    >>   List of current medications   >>   This referral request   >>   Any documents/labs given to you for this referral                     Review of your medicines      START taking        Dose / Directions Last dose taken    cyclobenzaprine 10 MG tablet   Commonly known as:   FLEXERIL   Dose:  5-10 mg   Quantity:  16 tablet        Take 0.5-1 tablets (5-10 mg) by mouth 3 times daily as needed for muscle spasms   Refills:  1        predniSONE 20 MG tablet   Commonly known as:  DELTASONE   Quantity:  10 tablet        Take two tablets (= 40mg) each day for 5 (five) days   Refills:  0          Our records show that you are taking the medicines listed below. If these are incorrect, please call your family doctor or clinic.        Dose / Directions Last dose taken    albuterol 108 (90 BASE) MCG/ACT Inhaler   Commonly known as:  PROAIR HFA/PROVENTIL HFA/VENTOLIN HFA   Dose:  2 puff   Quantity:  1 Inhaler        Inhale 2 puffs into the lungs every 4 hours as needed for shortness of breath / dyspnea or wheezing   Refills:  2        estradiol 1 MG tablet   Commonly known as:  ESTRACE   Dose:  1 mg   Quantity:  90 tablet        Take 1 tablet (1 mg) by mouth daily   Refills:  1        OMEPRAZOLE PO   Dose:  20 mg        Take 20 mg by mouth every morning   Refills:  0                Prescriptions were sent or printed at these locations (2 Prescriptions)                   [facility] [id number]  [place], [place] - [address] [facility] [address]   [address] [facility] [address] [address]    Telephone:  [phone]   Fax:  [phone]   Hours:                  Printed at Department/Unit printer (2 of 2)         predniSONE (DELTASONE) 20 MG tablet               cyclobenzaprine (FLEXERIL) 10 MG tablet                Procedures and tests performed during your visit     XR Ribs Left 2 Views      Orders Needing Specimen Collection     None      Pending Results     Date and Time Order Name Status Description    10/11/2017 2124 XR Ribs Left 2 Views In process             Pending Culture Results     No orders found from 10/9/2017 to 10/12/2017.            Thank you for choosing [facility]       Thank you for choosing [facility] for your care. Our goal is always to provide you with excellent care. Hearing back from our patients is one way we  can continue to improve our services. Please take a few minutes to complete the written survey that you may receive in the mail after you visit with us. Thank you!        [employer] Information     [employer] gives you secure access to your electronic health record. If you see a primary care provider, you can also send messages to your care team and make appointments. If you have questions, please call your primary care clinic.  If you do not have a primary care provider, please call [phone] and they will assist you.        Care EveryWhere ID     This is your Care EveryWhere ID. This could be used by other organizations to access your [facility] medical records  [id number]        Equal Access to Services     ZHENG BLANCO : Ancelmo Saeed, hermes de jesus, aisha flowers, leah dennis. So [facility] [phone].    ATENCIÓN: Si habla español, tiene a spear disposición servicios gratuitos de asistencia lingüística. Llame al [phone].    We comply with applicable federal civil rights laws and [place] laws. We do not discriminate on the basis of race, color, national origin, age, disability, sex, sexual orientation, or gender identity.            After Visit Summary       This is your record. Keep this with you and show to your community pharmacist(s) and doctor(s) at your next visit.

## 2017-10-11 NOTE — ED AVS SNAPSHOT
[facility]    [address] [address]    Phone:  [phone]                                       Ihsan Millan   MRN: [id number]    Department:  [facility]   Date of Visit:  10/11/2017           After Visit Summary Signature Page     I have received my discharge instructions, and my questions have been answered. I have discussed any challenges I see with this plan with the nurse or doctor.    ..........................................................................................................................................  Patient/Patient Representative Signature      ..........................................................................................................................................  Patient Representative Print Name and Relationship to Patient    ..................................................               ................................................  Date                                            Time    ..........................................................................................................................................  Reviewed by Signature/Title    ...................................................              ..............................................  Date                                                            Time

## 2017-10-12 NOTE — ED PROVIDER NOTES
"  History     Chief Complaint   Patient presents with     Rib Pain     \"left rib pain; started hurting a couple months ago, getting worse tonight\"      The history is provided by the patient. No  was used.     Ihsan Millan is a 26 year old female who presents with \"rib pain\" for months that is worse than normal tonight. It is described as achy and sharp. Movement like twisting or reaching out makes it worse, not moving makes it better. Tylenol tried with no improvement. No trauma. NO cough. No fevers. No shingles/rashes.     I have reviewed the Medications, Allergies, Past Medical and Surgical History, and Social History in the Epic system.    Allergies as of 10/11/2017 - Darshan as Reviewed 10/11/2017   Allergen Reaction Noted     Lexapro [escitalopram] Nausea and Vomiting 05/02/2017     Codeine sulfate Rash 06/02/2013     Penicillins Rash 06/02/2013     Tramadol Rash 06/02/2013     Discharge Medication List as of 10/11/2017 10:09 PM      START taking these medications    Details   predniSONE (DELTASONE) 20 MG tablet Take two tablets (= 40mg) each day for 5 (five) days, Disp-10 tablet, R-0, Local Print      cyclobenzaprine (FLEXERIL) 10 MG tablet Take 0.5-1 tablets (5-10 mg) by mouth 3 times daily as needed for muscle spasms, Disp-16 tablet, R-1, Local Print         CONTINUE these medications which have NOT CHANGED    Details   estradiol (ESTRACE) 1 MG tablet Take 1 tablet (1 mg) by mouth daily, Disp-90 tablet, R-1, E-Prescribe      OMEPRAZOLE PO Take 20 mg by mouth every morning, Historical      albuterol (PROAIR HFA, PROVENTIL HFA, VENTOLIN HFA) 108 (90 BASE) MCG/ACT inhaler Inhale 2 puffs into the lungs every 4 hours as needed for shortness of breath / dyspnea or wheezing, Disp-1 Inhaler, R-2, E-Prescribe           Past Medical History:   Diagnosis Date     Asthma      GERD (gastroesophageal reflux disease)      H. pylori infection      Past Surgical History:   Procedure Laterality Date "     COLONOSCOPY N/A 11/6/2014    Procedure: COLONOSCOPY;  Surgeon: Zacarias Paz MD;  [facility]     DILATION AND CURETTAGE, HYSTEROSCOPY DIAGNOSTIC, COMBINED  8/1/2014    Procedure: COMBINED DILATION AND CURETTAGE, HYSTEROSCOPY DIAGNOSTIC;  Surgeon: Rodolfo Clifford MD;  [facility]     ESOPHAGOSCOPY, GASTROSCOPY, DUODENOSCOPY (EGD), COMBINED N/A 9/5/2014    Procedure: COMBINED ESOPHAGOSCOPY, GASTROSCOPY, DUODENOSCOPY (EGD);  Surgeon: Zacarias Paz MD;  [facility]     LAPAROSCOPIC ASSISTED HYSTERECTOMY VAGINAL N/A 6/24/2015    Procedure: LAPAROSCOPIC ASSISTED HYSTERECTOMY VAGINAL;  Surgeon: Rodolfo Clifford MD;  [facility]     LAPAROSCOPIC OOPHORECTOMY Right 3/23/2016    Procedure: LAPAROSCOPIC OOPHORECTOMY;  Surgeon: Rodolfo Clifford MD;  [facility]     LAPAROSCOPIC SALPINGO-OOPHORECTOMY Left 5/3/2017    Procedure: LAPAROSCOPIC SALPINGO-OOPHORECTOMY;  LAPAROSCOPIC LEFT OOPHORECTOMY;  Surgeon: Rodolfo Clifford MD;  [facility]     LAPAROSCOPY DIAGNOSTIC (GYN) N/A 3/18/2015    Procedure: LAPAROSCOPY DIAGNOSTIC (GYN);  Surgeon: Rodolfo Clifford MD;  [facility]: [facility]     no surgeries       SALPINGECTOMY Bilateral 6/24/2015    Procedure: SALPINGECTOMY;  Surgeon: Rodolfo Clifford MD;  [facility]: [facility]     Family History   Problem Relation Age of Onset     Asthma Mother      Unknown/Adopted Maternal Grandmother      Unknown/Adopted Maternal Grandfather      Social History     Social History     Marital status: Single     Spouse name: N/A     Number of children: N/A     Years of education: N/A     Social History Main Topics     Smoking status: Current Every Day Smoker     Packs/day: 0.50     Years: 7.00     Types: Cigarettes     Smokeless tobacco: Never Used      Comment: declines quitline referral     Alcohol use Yes      Comment: social     Drug use: No     Sexual activity: Yes     Partners: Male     Birth control/ protection: None, Female Surgical     Other Topics Concern     Parent/Sibling W/  Cabg, Mi Or Angioplasty Before 65f 55m? No     Social History Narrative    7/17: Ihsan lives with her 2 children. She stays home with her children.              Review of Systems   Constitutional: Negative.  Negative for chills and fever.   HENT: Negative for congestion, ear pain, sinus pressure and sore throat.    Eyes: Negative.    Respiratory: Negative.  Negative for cough and shortness of breath.    Cardiovascular: Positive for chest pain (chest wall/rib).   Skin: Negative.    Neurological: Negative.    Psychiatric/Behavioral: Negative.        Physical Exam   BP: 99/65  Pulse: 98  Temp: 98.4  F (36.9  C)  Resp: 16  Weight: 59 kg (130 lb)  SpO2: 98 %       Physical Exam   Constitutional: She is oriented to person, place, and time. She appears well-developed and well-nourished.   HENT:   Head: Normocephalic and atraumatic.   Right Ear: External ear normal.   Left Ear: External ear normal.   Mouth/Throat: Oropharynx is clear and moist.   Eyes: Conjunctivae are normal.   Neck: Neck supple.   Cardiovascular: Normal rate and normal heart sounds.    Pulmonary/Chest: Effort normal and breath sounds normal. She has no wheezes. She has no rales.       Neurological: She is alert and oriented to person, place, and time.   Skin: Skin is warm and dry.   Psychiatric: She has a normal mood and affect.   Nursing note and vitals reviewed.    Medications   ketorolac (TORADOL) injection 60 mg (60 mg Intramuscular Given 10/11/17 2204)   cyclobenzaprine (FLEXERIL) 3 tablet ed starter pack 1 tablet (1 tablet Oral Given 10/11/17 2206)   Patient tolerated. Patient signed out AMA prior to 20 minutes.       ED Course     ED Course     Procedures    XR RIBS LT 2 VW, 10/11/2017 9:33 PM     History: Female, age 26 years; pain     Comparison: None.     Technique: Two views obtained.     FINDINGS: The visualized portions of the lungs are clear. No evidence  of rib fracture and evidence of pneumothorax.         IMPRESSION:   1.  No acute or  subacute abnormality. Normal examination.     JANE NOEL MD    Assessments & Plan (with Medical Decision Making)     I have reviewed the nursing notes.  I have reviewed the findings, diagnosis, plan and need for follow up with the patient.    Discharge Medication List as of 10/11/2017 10:09 PM      START taking these medications    Details   predniSONE (DELTASONE) 20 MG tablet Take two tablets (= 40mg) each day for 5 (five) days, Disp-10 tablet, R-0, Local Print      cyclobenzaprine (FLEXERIL) 10 MG tablet Take 0.5-1 tablets (5-10 mg) by mouth 3 times daily as needed for muscle spasms, Disp-16 tablet, R-1, Local Print             Final diagnoses:   Muscle strain of chest wall, initial encounter   Rib pain   pt treated as above. Consider chiropractor and topicals. Follow up with Primary Care Provider in 5-7 days with poor improvement. Seek attention sooner with worsening despite treatment. Patient verbally educated and given appropriate education sheets for each of the diagnoses and has no questions.    Primo Oliva PA-C   10/11/2017   10:25 PM      10/11/2017   [facility]     Primo Oliva PA  10/12/17 5545

## 2017-10-12 NOTE — ED NOTES
Pt presents today alone for c/o left rib pain located in her lower axilla area. Pt says the pain started a few months ago but it's getting worse, plus she has been moving now which is exacerbating it.

## 2017-10-12 NOTE — DISCHARGE INSTRUCTIONS
- Prednisone for pain/swelling  - May fill the flexeril if needed  - Topicals: HEAT more so than ice. Consider Arnica Cream (5$ at [employer]) and/or Capsaicin. (1/4 teaspoon cayenne pepper in a palmful olive oil and rub in 2-3 times daily for 5-7 days for pain)    * Primary care if no better next week, rechecking sooner with rashes.

## 2017-10-16 ENCOUNTER — OFFICE VISIT (OUTPATIENT)
Dept: CHIROPRACTIC MEDICINE | Facility: OTHER | Age: 27
End: 2017-10-16
Attending: CHIROPRACTOR

## 2017-10-16 DIAGNOSIS — M54.50 ACUTE BILATERAL LOW BACK PAIN WITHOUT SCIATICA: ICD-10-CM

## 2017-10-16 DIAGNOSIS — M99.02 SEGMENTAL AND SOMATIC DYSFUNCTION OF THORACIC REGION: Primary | ICD-10-CM

## 2017-10-16 DIAGNOSIS — M99.03 SEGMENTAL AND SOMATIC DYSFUNCTION OF LUMBAR REGION: ICD-10-CM

## 2017-10-16 PROCEDURE — 98940 CHIROPRACT MANJ 1-2 REGIONS: CPT | Mod: AT | Performed by: CHIROPRACTOR

## 2017-10-16 NOTE — MR AVS SNAPSHOT
After Visit Summary   10/16/2017    Ihsan Millan    MRN: [id number]           Patient Information     Date Of Birth          1990        Visit Information        Provider Department      10/16/2017 11:20 AM Homer Lopez DC  [facility]        Today's Diagnoses     Segmental and somatic dysfunction of thoracic region    -  1    Acute bilateral low back pain without sciatica        Segmental and somatic dysfunction of lumbar region           Follow-ups after your visit        Who to contact     If you have questions or need follow up information about today's clinic visit or your schedule please contact  [facility] directly at [phone].  Normal or non-critical lab and imaging results will be communicated to you by [employer], letter or phone within 4 business days after the clinic has received the results. If you do not hear from us within 7 days, please contact the clinic through [employer] [facility] or phone. If you have a critical or abnormal lab result, we will notify you by phone as soon as possible.  Submit refill requests through [employer] or call your pharmacy and they will forward the refill request to us. Please allow 3 business days for your refill to be completed.          Additional Information About Your Visit        MyChart Information     [employer] gives you secure access to your electronic health record. If you see a primary care provider, you can also send messages to your care team and make appointments. If you have questions, please call your primary care clinic.  If you do not have a primary care provider, please call [phone] and they will assist you.        Care EveryWhere ID     This is your Care EveryWhere ID. This could be used by other organizations to access your [facility] medical records  [id number]        Your Vitals Were     Last Period                   (LMP Unknown)            Blood Pressure from Last 3 Encounters:   10/19/17 105/73   10/11/17  99/65   09/27/17 121/66    Weight from Last 3 Encounters:   10/11/17 130 lb (59 kg)   05/03/17 129 lb (58.5 kg)   04/14/17 120 lb (54.4 kg)              We Performed the Following     CHIROPRAC MANIP,SPINAL,1-2 REGIONS        Primary Care Provider Office Phone # Fax #    Temo Houston [phone]       [facility] [address] [facility] [address]        Equal Access to Services     ZHENG BLANCO : Hadii aad ku hadasho Soomaali, waaxda luqadaha, qaybta kaalmada adeegyada, waxay idiin hayaan danielle graham . So [facility] [phone].    ATENCIÓN: Si habla español, tiene a spear disposición servicios gratuitos de asistencia lingüística. Ll[facility] [phone].    We comply with applicable federal civil rights laws and [place] laws. We do not discriminate on the basis of race, color, national origin, age, disability, sex, sexual orientation, or gender identity.            Thank you!     Thank you for choosing  [facility]  for your care. Our goal is always to provide you with excellent care. Hearing back from our patients is one way we can continue to improve our services. Please take a few minutes to complete the written survey that you may receive in the mail after your visit with us. Thank you!             Your Updated Medication List - Protect others around you: Learn how to safely use, store and throw away your medicines at www.disposemymeds.org.          This list is accurate as of: 10/16/17 11:59 PM.  Always use your most recent med list.                   Brand Name Dispense Instructions for use Diagnosis    albuterol 108 (90 BASE) MCG/ACT Inhaler    PROAIR HFA/PROVENTIL HFA/VENTOLIN HFA    1 Inhaler    Inhale 2 puffs into the lungs every 4 hours as needed for shortness of breath / dyspnea or wheezing    Intermittent asthma       cyclobenzaprine 10 MG tablet    FLEXERIL    16 tablet    Take 0.5-1 tablets (5-10 mg) by mouth 3 times daily as needed for muscle  spasms        estradiol 1 MG tablet    ESTRACE    90 tablet    Take 1 tablet (1 mg) by mouth daily    Menopausal syndrome (hot flashes)       OMEPRAZOLE PO      Take 20 mg by mouth every morning

## 2017-10-19 ENCOUNTER — HOSPITAL ENCOUNTER (EMERGENCY)
Facility: HOSPITAL | Age: 27
Discharge: HOME | End: 2017-10-19
Attending: NURSE PRACTITIONER | Admitting: NURSE PRACTITIONER

## 2017-10-19 VITALS
DIASTOLIC BLOOD PRESSURE: 73 MMHG | SYSTOLIC BLOOD PRESSURE: 105 MMHG | TEMPERATURE: 99 F | RESPIRATION RATE: 16 BRPM | OXYGEN SATURATION: 100 %

## 2017-10-19 DIAGNOSIS — J06.9 UPPER RESPIRATORY TRACT INFECTION, UNSPECIFIED TYPE: ICD-10-CM

## 2017-10-19 DIAGNOSIS — J45.21 MILD INTERMITTENT ASTHMA WITH EXACERBATION: ICD-10-CM

## 2017-10-19 LAB
FLUAV+FLUBV AG SPEC QL: NEGATIVE
FLUAV+FLUBV AG SPEC QL: NEGATIVE
SPECIMEN SOURCE: NORMAL

## 2017-10-19 PROCEDURE — 99213 OFFICE O/P EST LOW 20 MIN: CPT | Performed by: NURSE PRACTITIONER

## 2017-10-19 PROCEDURE — 87804 INFLUENZA ASSAY W/OPTIC: CPT | Performed by: FAMILY MEDICINE

## 2017-10-19 PROCEDURE — 99213 OFFICE O/P EST LOW 20 MIN: CPT

## 2017-10-19 RX ORDER — PREDNISONE 20 MG/1
TABLET ORAL
Qty: 10 TABLET | Refills: 0 | Status: SHIPPED | OUTPATIENT
Start: 2017-10-19 | End: 2017-11-09

## 2017-10-19 ASSESSMENT — ENCOUNTER SYMPTOMS
SHORTNESS OF BREATH: 1
FEVER: 0
WHEEZING: 1
DIARRHEA: 0
HEADACHES: 1
CHILLS: 1
MYALGIAS: 1
APPETITE CHANGE: 0
SINUS PRESSURE: 1
VOMITING: 0
ARTHRALGIAS: 1
COUGH: 1
NAUSEA: 0
SINUS PAIN: 1
SORE THROAT: 0
FATIGUE: 1

## 2017-10-19 NOTE — ED AVS SNAPSHOT
[facility]    [address] [address]    Phone:  [phone]                                       Ihsan Millan   MRN: [id number]    Department:  [facility]   Date of Visit:  10/19/2017           Patient Information     Date Of Birth          1990        Your diagnoses for this visit were:     Upper respiratory tract infection, unspecified type     Mild intermittent asthma with exacerbation        You were seen by Chiquis Parra NP.      Follow-up Information     Follow up with [facility].    Specialty:  EMERGENCY MEDICINE    Why:  As needed, If symptoms worsen, or concerns develop    Contact information:    [address] [address] [phone]    Additional information:    From [facility]: Take [address]. Turn left at [address]. Turn left at the first stoplight on [address]. At the first stop sign, take a right onto [address]. Take a left into the parking lot and continue through until you reach the North enterance of the building.       From [place]: Take [address]. Take the [address] ramp towards [place]. Turn left onto [address]. Take a slight right onto [address] [facility]. Turn left at the first stoplight on [address]. At the first stop sign, take a right onto [address]. Take a left into the parking lot and continue through until you reach the North enterance of the building.       From [place]: Take [address]. Take a right at [address]. At the first stop sign, take a right onto [address]. Take a left into the parking lot and continue through until you reach the North enterance of the building.         Follow up with Temo Houston DO On 10/19/2017.    Specialty:  Family Practice    Why:  to schedule an appointment for follow up next week    Contact information:    [facility]  [address]    [place] [address]  [phone]        Discharge References/Attachments     ASTHMA, ACUTE (ADULT) (ENGLISH)    URI, VIRAL W/ WHEEZING (ADULT) (ENGLISH)         Review of your medicines      START taking        Dose / Directions Last dose taken    predniSONE 20 MG tablet   Commonly known as:  DELTASONE   Quantity:  10 tablet        Take two tablets (= 40mg) each day for 5 (five) days   Refills:  0          Our records show that you are taking the medicines listed below. If these are incorrect, please call your family doctor or clinic.        Dose / Directions Last dose taken    albuterol 108 (90 BASE) MCG/ACT Inhaler   Commonly known as:  PROAIR HFA/PROVENTIL HFA/VENTOLIN HFA   Dose:  2 puff   Quantity:  1 Inhaler        Inhale 2 puffs into the lungs every 4 hours as needed for shortness of breath / dyspnea or wheezing   Refills:  2        cyclobenzaprine 10 MG tablet   Commonly known as:  FLEXERIL   Dose:  5-10 mg   Quantity:  16 tablet        Take 0.5-1 tablets (5-10 mg) by mouth 3 times daily as needed for muscle spasms   Refills:  1        estradiol 1 MG tablet   Commonly known as:  ESTRACE   Dose:  1 mg   Quantity:  90 tablet        Take 1 tablet (1 mg) by mouth daily   Refills:  1        OMEPRAZOLE PO   Dose:  20 mg        Take 20 mg by mouth every morning   Refills:  0                Prescriptions were sent or printed at these locations (1 Prescription)                   [facility]  [place], [place] - [address] [facility] [address] [address] [facility] [address] [address]    Telephone:  [phone]   Fax:  [phone]   Hours:                  E-Prescribed (1 of 1)         predniSONE (DELTASONE) 20 MG tablet                Procedures and tests performed during your visit     Influenza A/B antigen      Orders Needing Specimen Collection     None      Pending Results     No orders found from 10/17/2017 to 10/20/2017.            Pending Culture Results     No orders found from 10/17/2017 to 10/20/2017.            Thank you  for choosing [facility]       Thank you for choosing [facility] for your care. Our goal is always to provide you with excellent care. Hearing back from our patients is one way we can continue to improve our services. Please take a few minutes to complete the written survey that you may receive in the mail after you visit with us. Thank you!        [place] [employer] Information     [employer] gives you secure access to your electronic health record. If you see a primary care provider, you can also send messages to your care team and make appointments. If you have questions, please call your primary care clinic.  If you do not have a primary care provider, please call [phone] and they will assist you.        Care EveryWhere ID     This is your Care EveryWhere ID. This could be used by other organizations to access your [facility] medical records  [id number]        Equal Access to Services     ZHENG BLANCO : Ancelmo Saeed, hermes de jesus, leah wray. So [facility] [phone].    ATENCIÓN: Si habla español, tiene a spear disposición servicios gratuitos de asistencia lingüística. Llame al [phone].    We comply with applicable federal civil rights laws and [place] laws. We do not discriminate on the basis of race, color, national origin, age, disability, sex, sexual orientation, or gender identity.            After Visit Summary       This is your record. Keep this with you and show to your community pharmacist(s) and doctor(s) at your next visit.

## 2017-10-19 NOTE — ED AVS SNAPSHOT
[facility]    [address] [address]    Phone:  [phone]                                       Ihsan Millan   MRN: [id number]    Department:  [facility]   Date of Visit:  10/19/2017           After Visit Summary Signature Page     I have received my discharge instructions, and my questions have been answered. I have discussed any challenges I see with this plan with the nurse or doctor.    ..........................................................................................................................................  Patient/Patient Representative Signature      ..........................................................................................................................................  Patient Representative Print Name and Relationship to Patient    ..................................................               ................................................  Date                                            Time    ..........................................................................................................................................  Reviewed by Signature/Title    ...................................................              ..............................................  Date                                                            Time

## 2017-10-19 NOTE — ED PROVIDER NOTES
History     Chief Complaint   Patient presents with     Cough     started a few days ago     The history is provided by the patient. No  was used.     Ihsan Millan is a 26 year old female who presents today with a CC of cough, nasal congestion, feeling hot and cold, and body aches x 2 days.  She has been taking tylenol with minimal improvement.   She has a history of asthma and has been using albuterol inhaler without relief.  Appetite has been normal.      Problem List:    Patient Active Problem List    Diagnosis Date Noted     RUQ abdominal pain 02/12/2017     Priority: Medium     Pneumonia of right middle lobe due to infectious organism (H) 02/12/2017     Priority: Medium     Tobacco abuse 02/12/2017     Priority: Medium     Status post laparoscopic assisted vaginal hysterectomy (LAVH) 06/25/2015     Priority: Medium     Surgery, elective 06/24/2015     Priority: Medium     Endometriosis 04/16/2015     Priority: Medium     Depo lupron 4/15.  Laparoscopy 3/15       ASCUS on Pap smear 07/18/2014     Priority: Medium     + hpv colpo 7/14       Postpartum depression 07/18/2014     Priority: Medium     zoloft rx       H. pylori infection      Priority: Medium     Moderate persistent asthma 03/19/2014     Priority: Medium     GERD (gastroesophageal reflux disease) 09/12/2013     Priority: Medium     Intermittent asthma 09/12/2013     Priority: Medium     Increased with pregnancy  Send for eval and asthma action plan  Smoking cessation counseling  Flu shot given          Past Medical History:    Past Medical History:   Diagnosis Date     Asthma      GERD (gastroesophageal reflux disease)      H. pylori infection        Past Surgical History:    Past Surgical History:   Procedure Laterality Date     COLONOSCOPY N/A 11/6/2014    Procedure: COLONOSCOPY;  Surgeon: Zacarias Paz MD;  [facility]     DILATION AND CURETTAGE, HYSTEROSCOPY DIAGNOSTIC, COMBINED  8/1/2014    Procedure: COMBINED  DILATION AND CURETTAGE, HYSTEROSCOPY DIAGNOSTIC;  Surgeon: Rodolfo Clifford MD;  [facility]     ESOPHAGOSCOPY, GASTROSCOPY, DUODENOSCOPY (EGD), COMBINED N/A 9/5/2014    Procedure: COMBINED ESOPHAGOSCOPY, GASTROSCOPY, DUODENOSCOPY (EGD);  Surgeon: Zacarias Paz MD;  Location: [facility]     LAPAROSCOPIC ASSISTED HYSTERECTOMY VAGINAL N/A 6/24/2015    Procedure: LAPAROSCOPIC ASSISTED HYSTERECTOMY VAGINAL;  Surgeon: Rodolfo Clifford MD;  [facility]: [facility]     LAPAROSCOPIC OOPHORECTOMY Right 3/23/2016    Procedure: LAPAROSCOPIC OOPHORECTOMY;  Surgeon: Rodolfo Clifford MD;  [facility]: [facility]     LAPAROSCOPIC SALPINGO-OOPHORECTOMY Left 5/3/2017    Procedure: LAPAROSCOPIC SALPINGO-OOPHORECTOMY;  LAPAROSCOPIC LEFT OOPHORECTOMY;  Surgeon: Rodolfo Clifford MD;  [facility]: [facility]     LAPAROSCOPY DIAGNOSTIC (GYN) N/A 3/18/2015    Procedure: LAPAROSCOPY DIAGNOSTIC (GYN);  Surgeon: Rodolfo Clifford MD;  Location: [facility]     no surgeries       SALPINGECTOMY Bilateral 6/24/2015    Procedure: SALPINGECTOMY;  Surgeon: Rodolfo Clifford MD;  Location: [facility]       Family History:    Family History   Problem Relation Age of Onset     Asthma Mother      Unknown/Adopted Maternal Grandmother      Unknown/Adopted Maternal Grandfather        Social History:  Marital Status:  Single [1]  Social History   Substance Use Topics     Smoking status: Current Every Day Smoker     Packs/day: 0.50     Years: 7.00     Types: Cigarettes     Smokeless tobacco: Never Used      Comment: declines quitline referral     Alcohol use Yes      Comment: social        Medications:      predniSONE (DELTASONE) 20 MG tablet   cyclobenzaprine (FLEXERIL) 10 MG tablet   estradiol (ESTRACE) 1 MG tablet   OMEPRAZOLE PO   albuterol (PROAIR HFA, PROVENTIL HFA, VENTOLIN HFA) 108 (90 BASE) MCG/ACT inhaler         Review of Systems   Constitutional: Positive for chills and fatigue. Negative for appetite change and fever.   HENT: Positive for congestion, ear pain (left ear feels plugged), sinus  pain and sinus pressure. Negative for sore throat.    Respiratory: Positive for cough, shortness of breath (a little bit) and wheezing.    Gastrointestinal: Negative for diarrhea, nausea and vomiting.   Musculoskeletal: Positive for arthralgias and myalgias.   Skin: Negative for rash.   Neurological: Positive for headaches.       Physical Exam   BP: 105/73  Heart Rate: 102  Temp: 99  F (37.2  C)  Resp: 16  SpO2: 100 %      Physical Exam   Constitutional: She is oriented to person, place, and time. She appears well-developed. She is cooperative.  Non-toxic appearance. She appears ill. No distress.   HENT:   Head: Normocephalic and atraumatic.   Right Ear: Tympanic membrane, external ear and ear canal normal.   Left Ear: External ear and ear canal normal. Tympanic membrane is retracted. Tympanic membrane is not perforated, not erythematous and not bulging.   Mouth/Throat: Uvula is midline. No posterior oropharyngeal edema or posterior oropharyngeal erythema.   Eyes: Conjunctivae are normal.   Neck: Normal range of motion. Neck supple.   Cardiovascular: Normal rate and regular rhythm.    Pulmonary/Chest: Effort normal. She has wheezes (end inspiratory wheezes bilaterally upper airways).   Musculoskeletal: Normal range of motion.   Neurological: She is alert and oriented to person, place, and time.   Psychiatric: She has a normal mood and affect. Her behavior is normal.   Nursing note and vitals reviewed.      ED Course     ED Course     Procedures    No results found for this or any previous visit (from the past 24 hour(s)).    Assessments & Plan (with Medical Decision Making)     I have reviewed the nursing notes.    I have reviewed the findings, diagnosis, plan and need for follow up with the patient.  ASSESSMENT / PLAN:  (J06.9) Upper respiratory tract infection, unspecified type  Comment:   Plan:  Patient verbally educated and given appropriate education sheets for each of their diagnoses and has no  questions.   Symptomatic treatments such as those listed below are recommended as needed:   Take OTC motrin or tylenol as directed on the bottle as needed.   Cool mist humidifier at bedside    May try safely elevating HOB or sleeping in recliner   Take OTC cold medicine as directed on bottle - check ingredients, many are multi symptom and may contain tylenol or motrin   Frequent sips of non-caffeine fluids, rest, wash hands often   Sinus rinses such as a netti pot may help with sinus symptoms   Return to ED/UC if symptoms worsen or concerns develop: shortness of breath,     chest pain, unable to control fever < 103 with medications, persistent vomiting, signs/symptoms of dehydration.   If symptoms persist follow up with PCP for re-evaluation.      (J45.21) Mild intermittent asthma with exacerbation  Comment: needs refill on albuterol inhaler, currently does not have insurance, attempted to get patient set up with money from patient crisis fund to pay for inhaler, but she was unable to wait for the processing to go through.  She states she will follow up with Dr Houston for possible sample  Plan:  Prednisone as prescribed   See above        Discharge Medication List as of 10/19/2017  3:13 PM      START taking these medications    Details   predniSONE (DELTASONE) 20 MG tablet Take two tablets (= 40mg) each day for 5 (five) days, Disp-10 tablet, R-0, E-Prescribe             Final diagnoses:   Upper respiratory tract infection, unspecified type   Mild intermittent asthma with exacerbation       10/19/2017   [facility]     Chiquis Parra NP  10/20/17 3129

## 2017-10-19 NOTE — ED NOTES
Pt presents today with family for c/o cough and low grade temp for a few days now, pt was swabbed in triage for influenza.

## 2017-10-20 NOTE — PROGRESS NOTES
Late entry:    Into see patient for assistance with prescriptions. Will be needing a steroid and an inhaler. Per  provider, patient has limited funds.     Pt sees Dr. Houston at [facility].     Call to [facility] for coupons or other programs available.  Spoke with Penny.     Proair respiclick $25.00 after coupon,   Ventolin $66.00  Proair $70.49    Into see patient about costs, Pt states not able to pay for meds.  Call to admin for patient crisis fund application assistance.  In process of requesting the approval signatures for the cost of both medications,  provider informs writer patient left as she couldn't wait.

## 2017-10-23 NOTE — PROGRESS NOTES
Subjective Finding:    Chief compalint: Patient presents with:  Back Pain: sharp rib pain  , Pain Scale: 9/10, Intensity: sharp, Duration: 4 days, Radiating: no.    Date of injury:     Activities that the pain restricts:   Home/household/hobbies/social activities: yes.  Work duties: no.  Sleep: no.  Makes symptoms better: rest.  Makes symptoms worse: activity and walking.  Have you seen anyone else for the symptoms? Yes: MD.  Work related: no.  Automobile related injury: no.    Objective and Assessment:    Posture Analysis:   High shoulder: .  Head tilt: .  High iliac crest: .  Head carriage: neutral.  Thoracic Kyphosis: forward.  Lumbar Lordosis: neutral.    Lumbar Range of Motion: extension decreased.  Cervical Range of Motion: .  Thoracic Range of Motion: extension decreased.  Extremity Range of Motion: .    Palpation:   T paraspinals: sharp pain, no    Segmental dysfunction pre-treatment and treatment area: T6, T8 and L3.    Assessment post-treatment:  Cervical: .  Thoracic: ROM increased.  Lumbar: ROM increased.    Comments: .      Complicating Factors: .    Procedure(s):  CMT:  10103 Chiropractic manipulative treatment 1-2 regions performed   Thoracic: Diversified, See above for level, Prone and Lumbar: Diversified, See above for level, Side posture    Modalities:  None performed this visit    Therapeutic procedures:  None    Plan:  Treatment plan: PRN.  Instructed patient: stretch as instructed at visit.  Short term goals: reduce pain and increase ROM.  Long term goals: restore normal function.  Prognosis: excellent.

## 2017-11-02 ENCOUNTER — OFFICE VISIT (OUTPATIENT)
Dept: CHIROPRACTIC MEDICINE | Facility: OTHER | Age: 27
End: 2017-11-02
Attending: CHIROPRACTOR
Payer: COMMERCIAL

## 2017-11-02 DIAGNOSIS — M54.50 ACUTE LEFT-SIDED LOW BACK PAIN WITHOUT SCIATICA: ICD-10-CM

## 2017-11-02 DIAGNOSIS — M99.02 SEGMENTAL AND SOMATIC DYSFUNCTION OF THORACIC REGION: Primary | ICD-10-CM

## 2017-11-02 DIAGNOSIS — M99.03 SEGMENTAL AND SOMATIC DYSFUNCTION OF LUMBAR REGION: ICD-10-CM

## 2017-11-02 PROCEDURE — 98940 CHIROPRACT MANJ 1-2 REGIONS: CPT | Mod: AT | Performed by: CHIROPRACTOR

## 2017-11-02 NOTE — PROGRESS NOTES
Subjective Finding:    Chief compalint: No chief complaint on file.  , Pain Scale: 5/10, Intensity: sharp, Duration: 10 days, Radiating: no.    Date of injury:     Activities that the pain restricts:   Home/household/hobbies/social activities: yes.  Work duties: no.  Sleep: no.  Makes symptoms better: rest.  Makes symptoms worse: activity and walking.  Have you seen anyone else for the symptoms? Yes: MD.  Work related: no.  Automobile related injury: no.    Objective and Assessment:    Posture Analysis:   High shoulder: .  Head tilt: .  High iliac crest: .  Head carriage: neutral.  Thoracic Kyphosis: forward.  Lumbar Lordosis: neutral.    Lumbar Range of Motion: extension decreased.  Cervical Range of Motion: .  Thoracic Range of Motion: extension decreased.  Extremity Range of Motion: .    Palpation:   T paraspinals: sharp pain, no    Segmental dysfunction pre-treatment and treatment area: T6, T8 and L3.    Assessment post-treatment:  Cervical: .  Thoracic: ROM increased.  Lumbar: ROM increased.    Comments: .      Complicating Factors: .    Procedure(s):  CMT:  13945 Chiropractic manipulative treatment 1-2 regions performed   Thoracic: Diversified, See above for level, Prone and Lumbar: Diversified, See above for level, Side posture    Modalities:  None performed this visit    Therapeutic procedures:  None    Plan:  Treatment plan: PRN.  Instructed patient: stretch as instructed at visit.  Short term goals: reduce pain and increase ROM.  Long term goals: restore normal function.  Prognosis: excellent.

## 2017-11-02 NOTE — MR AVS SNAPSHOT
After Visit Summary   11/2/2017    Ihsan Millan    MRN: [id number]           Patient Information     Date Of Birth          1990        Visit Information        Provider Department      11/2/2017 11:20 AM Homer Lopez DC  [facility] [place]        Today's Diagnoses     Segmental and somatic dysfunction of thoracic region    -  1    Acute left-sided low back pain without sciatica        Segmental and somatic dysfunction of lumbar region           Follow-ups after your visit        Who to contact     If you have questions or need follow up information about today's clinic visit or your schedule please contact  [facility] [place] directly at [phone].  Normal or non-critical lab and imaging results will be communicated to you by kissnofroghart, letter or phone within 4 business days after the clinic has received the results. If you do not hear from us within 7 days, please contact the clinic through kissnofroghart or phone. If you have a critical or abnormal lab result, we will notify you by phone as soon as possible.  Submit refill requests through App[employer] or call your pharmacy and they will forward the refill request to us. Please allow 3 business days for your refill to be completed.          Additional Information About Your Visit        MyChart Information     [employer] gives you secure access to your electronic health record. If you see a primary care provider, you can also send messages to your care team and make appointments. If you have questions, please call your primary care clinic.  If you do not have a primary care provider, please call [phone] and they will assist you.        Care EveryWhere ID     This is your Care EveryWhere ID. This could be used by other organizations to access your [facility] medical records  [id number]        Your Vitals Were     Last Period                   (LMP Unknown)            Blood Pressure from Last 3 Encounters:   10/19/17 105/73   10/11/17  99/65   09/27/17 121/66    Weight from Last 3 Encounters:   10/11/17 130 lb (59 kg)   05/03/17 129 lb (58.5 kg)   04/14/17 120 lb (54.4 kg)              We Performed the Following     CHIROPRAC MANIP,SPINAL,1-2 REGIONS        Primary Care Provider Office Phone # Fax #    Temo Houston [phone]       [facility] [address] [facility] [address]        Equal Access to Services     ZHENG BLANCO : Hadii aad ku hadasho Soomaali, waaxda luqadaha, qaybta kaalmada adeegyada, waxay idiin hayaan danielle graham . So [facility] [phone].    ATENCIÓN: Si habla español, tiene a spear disposición servicios gratuitos de asistencia lingüística. [facility] [phone].    We comply with applicable federal civil rights laws and [place] laws. We do not discriminate on the basis of race, color, national origin, age, disability, sex, sexual orientation, or gender identity.            Thank you!     Thank you for choosing  [facility]  for your care. Our goal is always to provide you with excellent care. Hearing back from our patients is one way we can continue to improve our services. Please take a few minutes to complete the written survey that you may receive in the mail after your visit with us. Thank you!             Your Updated Medication List - Protect others around you: Learn how to safely use, store and throw away your medicines at www.disposemymeds.org.          This list is accurate as of: 11/2/17  2:02 PM.  Always use your most recent med list.                   Brand Name Dispense Instructions for use Diagnosis    albuterol 108 (90 BASE) MCG/ACT Inhaler    PROAIR HFA/PROVENTIL HFA/VENTOLIN HFA    1 Inhaler    Inhale 2 puffs into the lungs every 4 hours as needed for shortness of breath / dyspnea or wheezing    Intermittent asthma       cyclobenzaprine 10 MG tablet    FLEXERIL    16 tablet    Take 0.5-1 tablets (5-10 mg) by mouth 3 times daily as needed for muscle spasms         estradiol 1 MG tablet    ESTRACE    90 tablet    Take 1 tablet (1 mg) by mouth daily    Menopausal syndrome (hot flashes)       OMEPRAZOLE PO      Take 20 mg by mouth every morning        predniSONE 20 MG tablet    DELTASONE    10 tablet    Take two tablets (= 40mg) each day for 5 (five) days

## 2017-11-09 ENCOUNTER — HOSPITAL ENCOUNTER (EMERGENCY)
Facility: HOSPITAL | Age: 27
Discharge: HOME | End: 2017-11-09
Attending: INTERNAL MEDICINE | Admitting: INTERNAL MEDICINE
Payer: COMMERCIAL

## 2017-11-09 VITALS
DIASTOLIC BLOOD PRESSURE: 60 MMHG | TEMPERATURE: 97.8 F | RESPIRATION RATE: 16 BRPM | SYSTOLIC BLOOD PRESSURE: 98 MMHG | OXYGEN SATURATION: 96 % | HEART RATE: 80 BPM

## 2017-11-09 DIAGNOSIS — A08.4 VIRAL GASTROENTERITIS: ICD-10-CM

## 2017-11-09 DIAGNOSIS — N39.0 URINARY TRACT INFECTION WITHOUT HEMATURIA, SITE UNSPECIFIED: ICD-10-CM

## 2017-11-09 LAB
ALBUMIN SERPL-MCNC: 3.5 G/DL (ref 3.4–5)
ALBUMIN UR-MCNC: 10 MG/DL
ALP SERPL-CCNC: 86 U/L (ref 40–150)
ALT SERPL W P-5'-P-CCNC: 29 U/L (ref 0–50)
AMORPH CRY #/AREA URNS HPF: ABNORMAL /HPF
ANION GAP SERPL CALCULATED.3IONS-SCNC: 7 MMOL/L (ref 3–14)
APPEARANCE UR: ABNORMAL
AST SERPL W P-5'-P-CCNC: 11 U/L (ref 0–45)
BACTERIA #/AREA URNS HPF: ABNORMAL /HPF
BASOPHILS # BLD AUTO: 0 10E9/L (ref 0–0.2)
BASOPHILS NFR BLD AUTO: 0.3 %
BILIRUB SERPL-MCNC: 0.2 MG/DL (ref 0.2–1.3)
BILIRUB UR QL STRIP: NEGATIVE
BUN SERPL-MCNC: 18 MG/DL (ref 7–30)
CALCIUM SERPL-MCNC: 8.9 MG/DL (ref 8.5–10.1)
CHLORIDE SERPL-SCNC: 107 MMOL/L (ref 94–109)
CO2 SERPL-SCNC: 28 MMOL/L (ref 20–32)
COLOR UR AUTO: YELLOW
CREAT SERPL-MCNC: 0.7 MG/DL (ref 0.52–1.04)
CRP SERPL-MCNC: <2.9 MG/L (ref 0–8)
DIFFERENTIAL METHOD BLD: NORMAL
EOSINOPHIL # BLD AUTO: 0.3 10E9/L (ref 0–0.7)
EOSINOPHIL NFR BLD AUTO: 4.1 %
ERYTHROCYTE [DISTWIDTH] IN BLOOD BY AUTOMATED COUNT: 13.4 % (ref 10–15)
GFR SERPL CREATININE-BSD FRML MDRD: >90 ML/MIN/1.7M2
GLUCOSE SERPL-MCNC: 87 MG/DL (ref 70–99)
GLUCOSE UR STRIP-MCNC: NEGATIVE MG/DL
HCG UR QL: NEGATIVE
HCT VFR BLD AUTO: 38.9 % (ref 35–47)
HGB BLD-MCNC: 13.2 G/DL (ref 11.7–15.7)
HGB UR QL STRIP: NEGATIVE
IMM GRANULOCYTES # BLD: 0 10E9/L (ref 0–0.4)
IMM GRANULOCYTES NFR BLD: 0.1 %
KETONES UR STRIP-MCNC: NEGATIVE MG/DL
LEUKOCYTE ESTERASE UR QL STRIP: ABNORMAL
LIPASE SERPL-CCNC: 195 U/L (ref 73–393)
LYMPHOCYTES # BLD AUTO: 3.3 10E9/L (ref 0.8–5.3)
LYMPHOCYTES NFR BLD AUTO: 47.9 %
MCH RBC QN AUTO: 30.3 PG (ref 26.5–33)
MCHC RBC AUTO-ENTMCNC: 33.9 G/DL (ref 31.5–36.5)
MCV RBC AUTO: 89 FL (ref 78–100)
MONOCYTES # BLD AUTO: 0.4 10E9/L (ref 0–1.3)
MONOCYTES NFR BLD AUTO: 6 %
MUCOUS THREADS #/AREA URNS LPF: PRESENT /LPF
NEUTROPHILS # BLD AUTO: 2.8 10E9/L (ref 1.6–8.3)
NEUTROPHILS NFR BLD AUTO: 41.6 %
NITRATE UR QL: NEGATIVE
NRBC # BLD AUTO: 0 10*3/UL
NRBC BLD AUTO-RTO: 0 /100
PH UR STRIP: 7.5 PH (ref 4.7–8)
PLATELET # BLD AUTO: 237 10E9/L (ref 150–450)
POTASSIUM SERPL-SCNC: 4 MMOL/L (ref 3.4–5.3)
PROT SERPL-MCNC: 6.6 G/DL (ref 6.8–8.8)
RBC # BLD AUTO: 4.36 10E12/L (ref 3.8–5.2)
RBC #/AREA URNS AUTO: 0 /HPF (ref 0–2)
SODIUM SERPL-SCNC: 142 MMOL/L (ref 133–144)
SOURCE: ABNORMAL
SP GR UR STRIP: 1.02 (ref 1–1.03)
SQUAMOUS #/AREA URNS AUTO: 4 /HPF (ref 0–1)
UROBILINOGEN UR STRIP-MCNC: 2 MG/DL (ref 0–2)
WBC # BLD AUTO: 6.8 10E9/L (ref 4–11)
WBC #/AREA URNS AUTO: 20 /HPF (ref 0–2)

## 2017-11-09 PROCEDURE — 85025 COMPLETE CBC W/AUTO DIFF WBC: CPT | Performed by: INTERNAL MEDICINE

## 2017-11-09 PROCEDURE — 87086 URINE CULTURE/COLONY COUNT: CPT | Performed by: INTERNAL MEDICINE

## 2017-11-09 PROCEDURE — 36415 COLL VENOUS BLD VENIPUNCTURE: CPT | Performed by: INTERNAL MEDICINE

## 2017-11-09 PROCEDURE — S0028 INJECTION, FAMOTIDINE, 20 MG: HCPCS | Performed by: INTERNAL MEDICINE

## 2017-11-09 PROCEDURE — 99284 EMERGENCY DEPT VISIT MOD MDM: CPT | Performed by: INTERNAL MEDICINE

## 2017-11-09 PROCEDURE — 25000125 ZZHC RX 250: Performed by: INTERNAL MEDICINE

## 2017-11-09 PROCEDURE — 25000128 H RX IP 250 OP 636: Performed by: INTERNAL MEDICINE

## 2017-11-09 PROCEDURE — 96375 TX/PRO/DX INJ NEW DRUG ADDON: CPT

## 2017-11-09 PROCEDURE — 80053 COMPREHEN METABOLIC PANEL: CPT | Performed by: INTERNAL MEDICINE

## 2017-11-09 PROCEDURE — 81025 URINE PREGNANCY TEST: CPT | Performed by: INTERNAL MEDICINE

## 2017-11-09 PROCEDURE — 25000132 ZZH RX MED GY IP 250 OP 250 PS 637: Performed by: INTERNAL MEDICINE

## 2017-11-09 PROCEDURE — 83690 ASSAY OF LIPASE: CPT | Performed by: INTERNAL MEDICINE

## 2017-11-09 PROCEDURE — 86140 C-REACTIVE PROTEIN: CPT | Performed by: INTERNAL MEDICINE

## 2017-11-09 PROCEDURE — 81001 URINALYSIS AUTO W/SCOPE: CPT | Performed by: INTERNAL MEDICINE

## 2017-11-09 PROCEDURE — 96361 HYDRATE IV INFUSION ADD-ON: CPT

## 2017-11-09 PROCEDURE — 96374 THER/PROPH/DIAG INJ IV PUSH: CPT

## 2017-11-09 PROCEDURE — 99284 EMERGENCY DEPT VISIT MOD MDM: CPT | Mod: 25

## 2017-11-09 RX ORDER — ALUMINA, MAGNESIA, AND SIMETHICONE 2400; 2400; 240 MG/30ML; MG/30ML; MG/30ML
30 SUSPENSION ORAL EVERY 4 HOURS PRN
Status: DISCONTINUED | OUTPATIENT
Start: 2017-11-09 | End: 2017-11-10 | Stop reason: HOSPADM

## 2017-11-09 RX ORDER — METOCLOPRAMIDE HYDROCHLORIDE 5 MG/ML
5 INJECTION INTRAMUSCULAR; INTRAVENOUS ONCE
Status: COMPLETED | OUTPATIENT
Start: 2017-11-09 | End: 2017-11-09

## 2017-11-09 RX ORDER — CIPROFLOXACIN 500 MG/1
500 TABLET, FILM COATED ORAL ONCE
Status: COMPLETED | OUTPATIENT
Start: 2017-11-09 | End: 2017-11-09

## 2017-11-09 RX ORDER — FAMOTIDINE 20 MG/1
20 TABLET, FILM COATED ORAL 2 TIMES DAILY
Qty: 20 TABLET | Refills: 0 | Status: SHIPPED | OUTPATIENT
Start: 2017-11-09 | End: 2017-11-19

## 2017-11-09 RX ORDER — CIPROFLOXACIN 500 MG/1
500 TABLET, FILM COATED ORAL 2 TIMES DAILY
Qty: 6 TABLET | Refills: 0 | Status: SHIPPED | OUTPATIENT
Start: 2017-11-09 | End: 2017-11-12

## 2017-11-09 RX ADMIN — FAMOTIDINE 20 MG: 10 INJECTION, SOLUTION INTRAVENOUS at 22:06

## 2017-11-09 RX ADMIN — METOCLOPRAMIDE 5 MG: 5 INJECTION, SOLUTION INTRAMUSCULAR; INTRAVENOUS at 22:04

## 2017-11-09 RX ADMIN — SODIUM CHLORIDE 1000 ML: 9 INJECTION, SOLUTION INTRAVENOUS at 22:03

## 2017-11-09 RX ADMIN — CIPROFLOXACIN HYDROCHLORIDE 500 MG: 500 TABLET, FILM COATED ORAL at 23:01

## 2017-11-09 RX ADMIN — ALUMINUM HYDROXIDE, MAGNESIUM HYDROXIDE, AND DIMETHICONE 30 ML: 400; 400; 40 SUSPENSION ORAL at 22:08

## 2017-11-09 NOTE — ED AVS SNAPSHOT
[facility]    [address] [address]    Phone:  [phone]                                       Ihsan Millan   MRN: [id number]    Department:  [facility]   Date of Visit:  11/9/2017           After Visit Summary Signature Page     I have received my discharge instructions, and my questions have been answered. I have discussed any challenges I see with this plan with the nurse or doctor.    ..........................................................................................................................................  Patient/Patient Representative Signature      ..........................................................................................................................................  Patient Representative Print Name and Relationship to Patient    ..................................................               ................................................  Date                                            Time    ..........................................................................................................................................  Reviewed by Signature/Title    ...................................................              ..............................................  Date                                                            Time

## 2017-11-09 NOTE — ED AVS SNAPSHOT
[facility]    [address] [place] [address]    [place] [address]    Phone:  [phone]                                       Ihsan Millan   MRN: [id number]    Department:  [facility]   Date of Visit:  11/9/2017           Patient Information     Date Of Birth          1990        Your diagnoses for this visit were:     Viral gastroenteritis     Urinary tract infection without hematuria, site unspecified        You were seen by Rj Figueroa MD.      Follow-up Information     Schedule an appointment as soon as possible for a visit with Temo Houston DO.    Specialty:  Family Practice    Contact information:    [facility]  [address] [facility] [address]  [phone]          Discharge Instructions         Understanding Urinary Tract Infections (UTIs)  Most UTIs are caused by bacteria, although they may also be caused by viruses or fungi. Bacteria from the bowel are the most common source of infection. The infection may start because of any of the following:    Sexual activity. During sex, bacteria can travel from the penis, vagina, or rectum into the urethra.     Bacteria on the skin outside the rectum may travel into the urethra. This is more common in women since the rectum and urethra are closer to each other than in men. Wiping from front to back after using the toilet and keeping the area clean can help prevent germs from getting to the urethra.    Blockage of urine flow through the urinary tract. If urine sits too long, germs may start to grow out of control.      Parts of the urinary tract  The infection can occur in any part of the urinary tract.    The kidneys collect and store urine.    The ureters carry urine from the kidneys to the bladder.    The bladder holds urine until you are ready to let it out.    The urethra carries urine from the bladder out of the body. It is shorter in women, so bacteria can move through it more easily. The urethra is  longer in men, so a UTI is less likely to reach the bladder or kidneys in men.  Date Last Reviewed: 1/1/2017 2000-2017 The [employer]. [address] [place] [address], [place] [address]. All rights reserved. This information is not intended as a substitute for professional medical care. Always follow your healthcare professional's instructions.          Viral Gastroenteritis (Adult)    Gastroenteritis is commonly called the stomach flu. It is most often caused by a virus that affects the stomach and intestinal tract and usually lasts from 2 to 7 days. Common viruses causing gastroenteritis include norovirus, rotavirus, and hepatitis A. Non-viral causes of gastroenteritis include bacteria, parasites, and toxins.  The danger from repeated vomiting or diarrhea is dehydration. This is the loss of too much fluid from the body. When this occurs, body fluids must be replaced. Antibiotics do not help with this illness because it is usually viral.Simple home treatment will be helpful.  Symptoms of viral gastroenteritis may include:    Watery, loose stools    Stomach pain or abdominal cramps    Fever and chills    Nausea and vomiting    Loss of bowel control    Headache  Home care  Gastroenteritis is transmitted by contact with the stool or vomit of an infected person. This can occur from person to person or from contact with a contaminated surface.  Follow these guidelines when caring for yourself at home:    If symptoms are severe, rest at home for the next 24 hours or until you are feeling better.    Wash your hands with soap and water or use alcohol-based  to prevent the spread of infection. Wash your hands after touching anyone who is sick.    Wash your hands or use alcohol-based  after using the toilet and before meals. Clean the toilet after each use.  Remember these tips when preparing food:    People with diarrhea should not prepare or serve food to others. When preparing foods, wash your hands  before and after.    Wash your hands after using cutting boards, countertops, knives, or utensils that have been in contact with raw food.    Keep uncooked meats away from cooked and ready-to-eat foods.  Medicine  You may use acetaminophen or NSAID medicines like ibuprofen or naproxen to control fever unless another medicine was given. If you have chronic liver or kidney disease, talk with your healthcare provider before using these medicines. Also talk with your provider if you've had a stomach ulcer or gastrointestinal bleeding. Don't give aspirin to anyone under 18 years of age who is ill with a fever. It may cause severe liver damage. Don't use NSAIDS is you are already taking one for another condition (like arthritis) or are on aspirin (such as for heart disease or after a stroke).  If medicine for vomiting or diarrhea are prescribed, take these only as directed. Do not take over-the-counter medicines for vomiting or diarrhea unless instructed by your healthcare provider.  Diet  Follow these guidelines for food:    Water and liquids are important so you don't get dehydrated. Drink a small amount at a time or suck on ice chips if you are vomiting.    If you eat, avoid fatty, greasy, spicy, or fried foods.    Don't eat dairy if you have diarrhea. This can make diarrhea worse.    Avoid tobacco, alcohol, and caffeine which may worsen symptoms.  During the first 24 hours (the first full day), follow the diet below:    Beverages. Sports drinks, soft drinks without caffeine, ginger ale, mineral water (plain or flavored), decaffeinated tea and coffee. If you are very dehydrated, sports drinks aren't a good choice. They have too much sugar and not enough electrolytes. In this case, commercially available products called oral rehydration solutions, are best.    Soups. Eat clear broth, consommé, and bouillon.    Desserts. Eat gelatin, popsicles, and fruit juice bars.  During the next 24 hours (the second day), you may add  the following to the above:    Hot cereal, plain toast, bread, rolls, and crackers    Plain noodles, rice, mashed potatoes, chicken noodle or rice soup    Unsweetened canned fruit (avoid pineapple), bananas    Limit fat intake to less than 15 grams per day. Do this by avoiding margarine, butter, oils, mayonnaise, sauces, gravies, fried foods, peanut butter, meat, poultry, and fish.    Limit fiber and avoid raw or cooked vegetables, fresh fruits (except bananas), and bran cereals.    Limit caffeine and chocolate. Don't use spices or seasonings other than salt.    Limit dairy products.    Avoid alcohol.  During the next 24 hours:    Gradually resume a normal diet as you feel better and your symptoms improve.    If at any time it starts getting worse again, go back to clear liquids until you feel better.  Follow-up care  Follow up with your healthcare provider, or as advised. Call your provider if you don't get better within 24 hours or if diarrhea lasts more than a week. Also follow up if you are unable to keep down liquids and get dehydrated. If a stool (diarrhea) sample was taken, call as directed for the results.  Call 911  Call 911 if any of these occur:    Trouble breathing    Chest pain    Confused    Severe drowsiness or trouble awakening    Fainting or loss of consciousness    Rapid heart rate    Seizure    Stiff neck  When to seek medical advice  Call your healthcare provider right away if any of these occur:    Abdominal pain that gets worse    Continued vomiting (unable to keep liquids down)    Frequent diarrhea (more than 5 times a day)    Blood in vomit or stool (black or red color)    Dark urine, reduced urine output, or extreme thirst    Weakness or dizziness    Drowsiness    Fever of 100.4 F (38 C) oral or higher that does not get better with fever medicine    New rash  Date Last Reviewed: 1/3/2016    6300-4137 The [employer]. [address] [facility], [place] [address]. All rights reserved.  This information is not intended as a substitute for professional medical care. Always follow your healthcare professional's instructions.             Review of your medicines      START taking        Dose / Directions Last dose taken    ciprofloxacin 500 MG tablet   Commonly known as:  CIPRO   Dose:  500 mg   Quantity:  6 tablet        Take 1 tablet (500 mg) by mouth 2 times daily for 3 days   Refills:  0        famotidine 20 MG tablet   Commonly known as:  PEPCID   Dose:  20 mg   Quantity:  20 tablet        Take 1 tablet (20 mg) by mouth 2 times daily for 10 days   Refills:  0          Our records show that you are taking the medicines listed below. If these are incorrect, please call your family doctor or clinic.        Dose / Directions Last dose taken    albuterol 108 (90 BASE) MCG/ACT Inhaler   Commonly known as:  PROAIR HFA/PROVENTIL HFA/VENTOLIN HFA   Dose:  2 puff   Quantity:  1 Inhaler        Inhale 2 puffs into the lungs every 4 hours as needed for shortness of breath / dyspnea or wheezing   Refills:  2        cyclobenzaprine 10 MG tablet   Commonly known as:  FLEXERIL   Dose:  5-10 mg   Quantity:  16 tablet        Take 0.5-1 tablets (5-10 mg) by mouth 3 times daily as needed for muscle spasms   Refills:  1        estradiol 1 MG tablet   Commonly known as:  ESTRACE   Dose:  1 mg   Quantity:  90 tablet        Take 1 tablet (1 mg) by mouth daily   Refills:  1        OMEPRAZOLE PO   Dose:  20 mg        Take 20 mg by mouth every morning   Refills:  0                Prescriptions were sent or printed at these locations (2 Prescriptions)                   Other Prescriptions                Printed at Department/Unit printer (2 of 2)         ciprofloxacin (CIPRO) 500 MG tablet               famotidine (PEPCID) 20 MG tablet                Procedures and tests performed during your visit     CBC with platelets differential    CRP inflammation    Comprehensive metabolic panel    HCG qualitative urine    Lipase     UA with Microscopic reflex to Culture    Urine Culture Aerobic Bacterial      Orders Needing Specimen Collection     None      Pending Results     No orders found from 11/7/2017 to 11/10/2017.            Pending Culture Results     No orders found from 11/7/2017 to 11/10/2017.            Thank you for choosing [facility]       Thank you for choosing [facility] for your care. Our goal is always to provide you with excellent care. Hearing back from our patients is one way we can continue to improve our services. Please take a few minutes to complete the written survey that you may receive in the mail after you visit with us. Thank you!        [employer] Information     "[employer] gives you secure access to your electronic health record. If you see a primary care provider, you can also send messages to your care team and make appointments. If you have questions, please call your primary care clinic.  If you do not have a primary care provider, please call [phone] and they will assist you.        Care EveryWhere ID     This is your Care EveryWhere ID. This could be used by other organizations to access your [facility] medical records  [id number]        Equal Access to Services     ZHENG BLANCO AH: Hadheriberto scott Sophani, waaxda luqadaha, qaybta kaalmaroslyn flowers, leah dennis. So [facility] [phone].    ATENCIÓN: Si habla español, tiene a spear disposición servicios gratuitos de asistencia lingüística. Llame al [phone].    We comply with applicable federal civil rights laws and [place] laws. We do not discriminate on the basis of race, color, national origin, age, disability, sex, sexual orientation, or gender identity.            After Visit Summary       This is your record. Keep this with you and show to your community pharmacist(s) and doctor(s) at your next visit.

## 2017-11-10 ASSESSMENT — ENCOUNTER SYMPTOMS
HEADACHES: 0
ABDOMINAL PAIN: 1
WHEEZING: 0
PALPITATIONS: 0
FREQUENCY: 1
VOICE CHANGE: 0
ABDOMINAL DISTENTION: 0
BLOOD IN STOOL: 0
DIAPHORESIS: 0
COUGH: 0
NAUSEA: 1
DIARRHEA: 1
FLANK PAIN: 0
FEVER: 0
DYSURIA: 0
NECK PAIN: 0
BACK PAIN: 0
SHORTNESS OF BREATH: 0
VOMITING: 1
CHEST TIGHTNESS: 0
CONFUSION: 0
ANAL BLEEDING: 0
NECK STIFFNESS: 0
MYALGIAS: 0
NUMBNESS: 0
ARTHRALGIAS: 0
COLOR CHANGE: 0
LIGHT-HEADEDNESS: 0
HEMATURIA: 0
WOUND: 0
CHILLS: 0
DIZZINESS: 0

## 2017-11-10 NOTE — ED NOTES
Pt presents with c/o of abdominal pain on right side radiating to umbilicus, sharp and constant for 3 days with vomiting and diarrhea.  No fevers.

## 2017-11-10 NOTE — ED PROVIDER NOTES
History     Chief Complaint   Patient presents with     Abdominal Pain     RUQ and RLQ pain x 3 days, sharp, constant pain with vomiting and diarrhea     Patient is a 26 year old female presenting with abdominal pain. The history is provided by the patient.   Abdominal Pain   Pain location:  RLQ and suprapubic  Pain quality: sharp    Pain radiates to:  Does not radiate  Onset quality:  Gradual  Duration:  3 days  Timing:  Intermittent  Chronicity:  New  Associated symptoms: diarrhea, nausea and vomiting    Associated symptoms: no chest pain, no chills, no cough, no dysuria, no fever, no hematuria and no shortness of breath          Problem List:    Patient Active Problem List    Diagnosis Date Noted     RUQ abdominal pain 02/12/2017     Priority: Medium     Pneumonia of right middle lobe due to infectious organism (H) 02/12/2017     Priority: Medium     Tobacco abuse 02/12/2017     Priority: Medium     Status post laparoscopic assisted vaginal hysterectomy (LAVH) 06/25/2015     Priority: Medium     Surgery, elective 06/24/2015     Priority: Medium     Endometriosis 04/16/2015     Priority: Medium     Depo lupron 4/15.  Laparoscopy 3/15       ASCUS on Pap smear 07/18/2014     Priority: Medium     + hpv colpo 7/14       Postpartum depression 07/18/2014     Priority: Medium     zoloft rx       H. pylori infection      Priority: Medium     Moderate persistent asthma 03/19/2014     Priority: Medium     GERD (gastroesophageal reflux disease) 09/12/2013     Priority: Medium     Intermittent asthma 09/12/2013     Priority: Medium     Increased with pregnancy  Send for eval and asthma action plan  Smoking cessation counseling  Flu shot given          Past Medical History:    Past Medical History:   Diagnosis Date     Asthma      GERD (gastroesophageal reflux disease)      H. pylori infection        Past Surgical History:    Past Surgical History:   Procedure Laterality Date     COLONOSCOPY N/A 11/6/2014    Procedure:  COLONOSCOPY;  Surgeon: Zacarias Paz MD;  Location: [facility]     DILATION AND CURETTAGE, HYSTEROSCOPY DIAGNOSTIC, COMBINED  8/1/2014    Procedure: COMBINED DILATION AND CURETTAGE, HYSTEROSCOPY DIAGNOSTIC;  Surgeon: Rodolfo Clifford MD;  Location: [facility]     ESOPHAGOSCOPY, GASTROSCOPY, DUODENOSCOPY (EGD), COMBINED N/A 9/5/2014    Procedure: COMBINED ESOPHAGOSCOPY, GASTROSCOPY, DUODENOSCOPY (EGD);  Surgeon: Zacarias Paz MD;  Location: [facility]     LAPAROSCOPIC ASSISTED HYSTERECTOMY VAGINAL N/A 6/24/2015    Procedure: LAPAROSCOPIC ASSISTED HYSTERECTOMY VAGINAL;  Surgeon: Rodolfo Clifford MD;  [facility]     LAPAROSCOPIC OOPHORECTOMY Right 3/23/2016    Procedure: LAPAROSCOPIC OOPHORECTOMY;  Surgeon: Rodolfo Clifford MD;  [facility]     LAPAROSCOPIC SALPINGO-OOPHORECTOMY Left 5/3/2017    Procedure: LAPAROSCOPIC SALPINGO-OOPHORECTOMY;  LAPAROSCOPIC LEFT OOPHORECTOMY;  Surgeon: Rodolfo Clifford MD;  [facility]     LAPAROSCOPY DIAGNOSTIC (GYN) N/A 3/18/2015    Procedure: LAPAROSCOPY DIAGNOSTIC (GYN);  Surgeon: Rodolfo Clifford MD;  Location: [facility]     no surgeries       SALPINGECTOMY Bilateral 6/24/2015    Procedure: SALPINGECTOMY;  Surgeon: Rdoolfo Clifford MD;  Location: [facility]       Family History:    Family History   Problem Relation Age of Onset     Asthma Mother      Unknown/Adopted Maternal Grandmother      Unknown/Adopted Maternal Grandfather        Social History:  Marital Status:  Single [1]  Social History   Substance Use Topics     Smoking status: Current Every Day Smoker     Packs/day: 0.50     Years: 7.00     Types: Cigarettes     Smokeless tobacco: Never Used      Comment: declines quitline referral     Alcohol use Yes      Comment: social        Medications:      ciprofloxacin (CIPRO) 500 MG tablet   famotidine (PEPCID) 20 MG tablet   cyclobenzaprine (FLEXERIL) 10 MG tablet   estradiol (ESTRACE) 1 MG tablet   OMEPRAZOLE PO   albuterol (PROAIR HFA, PROVENTIL HFA, VENTOLIN HFA) 108 (90 BASE) MCG/ACT  inhaler         Review of Systems   Constitutional: Negative for chills, diaphoresis and fever.   HENT: Negative for voice change.    Eyes: Negative for visual disturbance.   Respiratory: Negative for cough, chest tightness, shortness of breath and wheezing.    Cardiovascular: Negative for chest pain, palpitations and leg swelling.   Gastrointestinal: Positive for abdominal pain, diarrhea, nausea and vomiting. Negative for abdominal distention, anal bleeding and blood in stool.   Genitourinary: Positive for frequency and urgency. Negative for decreased urine volume, dysuria, flank pain and hematuria.   Musculoskeletal: Negative for arthralgias, back pain, gait problem, myalgias, neck pain and neck stiffness.   Skin: Negative for color change, pallor, rash and wound.   Neurological: Negative for dizziness, syncope, light-headedness, numbness and headaches.   Psychiatric/Behavioral: Negative for confusion and suicidal ideas.       Physical Exam   BP: 103/68  Pulse: 90  Temp: 96.8  F (36  C)  Resp: 16  SpO2: 100 %      Physical Exam   Constitutional: She is oriented to person, place, and time. She appears well-developed and well-nourished.   HENT:   Head: Normocephalic and atraumatic.   Mouth/Throat: No oropharyngeal exudate.   Eyes: Conjunctivae are normal. Pupils are equal, round, and reactive to light.   Neck: Normal range of motion. Neck supple. No JVD present. No tracheal deviation present. No thyromegaly present.   Cardiovascular: Normal rate, regular rhythm, normal heart sounds and intact distal pulses.  Exam reveals no gallop and no friction rub.    No murmur heard.  Pulmonary/Chest: Effort normal and breath sounds normal. No stridor. No respiratory distress. She has no wheezes. She has no rales. She exhibits no tenderness.   Abdominal: Soft. Bowel sounds are normal. She exhibits no distension and no mass. There is no tenderness. There is no rebound and no guarding.   Musculoskeletal: Normal range of motion.  She exhibits no edema or tenderness.   Lymphadenopathy:     She has no cervical adenopathy.   Neurological: She is alert and oriented to person, place, and time.   Skin: Skin is warm and dry. No rash noted. No erythema. No pallor.   Psychiatric: Her behavior is normal.   Nursing note and vitals reviewed.      ED Course     ED Course     Procedures                   Labs Ordered and Resulted from Time of ED Arrival Up to the Time of Departure from the ED   COMPREHENSIVE METABOLIC PANEL - Abnormal; Notable for the following:        Result Value    Protein Total 6.6 (*)     All other components within normal limits   ROUTINE UA WITH MICROSCOPIC REFLEX TO CULTURE - Abnormal; Notable for the following:     Protein Albumin Urine 10 (*)     Leukocyte Esterase Urine Large (*)     WBC Urine 20 (*)     Bacteria Urine None (*)     Squamous Epithelial /HPF Urine 4 (*)     Mucous Urine Present (*)     Amorphous Crystals Few (*)     All other components within normal limits   CBC WITH PLATELETS DIFFERENTIAL   LIPASE   CRP INFLAMMATION   HCG QUALITATIVE URINE   URINE CULTURE AEROBIC BACTERIAL       Assessments & Plan (with Medical Decision Making)   3 days of diarrhea, 3-5 water BM, RLQ pain, with mild urianry symptoms  Labs: WNL  UA: positive     Like cystitis + viral gasroenteritis  Symptoms resolved, feeling comfortable  Dc home, advised to return to ER if symptoms recurred, had fever, chills, vomting   she verbalized that  understood it  And agreed with the plan  Allergic to PCN, cipro started  I have reviewed the nursing notes.    I have reviewed the findings, diagnosis, plan and need for follow up with the patient.      Discharge Medication List as of 11/9/2017 10:57 PM      START taking these medications    Details   ciprofloxacin (CIPRO) 500 MG tablet Take 1 tablet (500 mg) by mouth 2 times daily for 3 days, Disp-6 tablet, R-0, Local Print      famotidine (PEPCID) 20 MG tablet Take 1 tablet (20 mg) by mouth 2 times daily  for 10 days, Disp-20 tablet, R-0, Local Print             Final diagnoses:   Viral gastroenteritis   Urinary tract infection without hematuria, site unspecified       11/9/2017   [facility]     Rj Figueroa MD  11/10/17 3684

## 2017-11-10 NOTE — DISCHARGE INSTRUCTIONS
Understanding Urinary Tract Infections (UTIs)  Most UTIs are caused by bacteria, although they may also be caused by viruses or fungi. Bacteria from the bowel are the most common source of infection. The infection may start because of any of the following:    Sexual activity. During sex, bacteria can travel from the penis, vagina, or rectum into the urethra.     Bacteria on the skin outside the rectum may travel into the urethra. This is more common in women since the rectum and urethra are closer to each other than in men. Wiping from front to back after using the toilet and keeping the area clean can help prevent germs from getting to the urethra.    Blockage of urine flow through the urinary tract. If urine sits too long, germs may start to grow out of control.      Parts of the urinary tract  The infection can occur in any part of the urinary tract.    The kidneys collect and store urine.    The ureters carry urine from the kidneys to the bladder.    The bladder holds urine until you are ready to let it out.    The urethra carries urine from the bladder out of the body. It is shorter in women, so bacteria can move through it more easily. The urethra is longer in men, so a UTI is less likely to reach the bladder or kidneys in men.  Date Last Reviewed: 1/1/2017 2000-2017 [employer]. [address] [place] [address], [place] [id number]. All rights reserved. This information is not intended as a substitute for professional medical care. Always follow your healthcare professional's instructions.          Viral Gastroenteritis (Adult)    Gastroenteritis is commonly called the stomach flu. It is most often caused by a virus that affects the stomach and intestinal tract and usually lasts from 2 to 7 days. Common viruses causing gastroenteritis include norovirus, rotavirus, and hepatitis A. Non-viral causes of gastroenteritis include bacteria, parasites, and toxins.  The danger from repeated vomiting or diarrhea  is dehydration. This is the loss of too much fluid from the body. When this occurs, body fluids must be replaced. Antibiotics do not help with this illness because it is usually viral.Simple home treatment will be helpful.  Symptoms of viral gastroenteritis may include:    Watery, loose stools    Stomach pain or abdominal cramps    Fever and chills    Nausea and vomiting    Loss of bowel control    Headache  Home care  Gastroenteritis is transmitted by contact with the stool or vomit of an infected person. This can occur from person to person or from contact with a contaminated surface.  Follow these guidelines when caring for yourself at home:    If symptoms are severe, rest at home for the next 24 hours or until you are feeling better.    Wash your hands with soap and water or use alcohol-based  to prevent the spread of infection. Wash your hands after touching anyone who is sick.    Wash your hands or use alcohol-based  after using the toilet and before meals. Clean the toilet after each use.  Remember these tips when preparing food:    People with diarrhea should not prepare or serve food to others. When preparing foods, wash your hands before and after.    Wash your hands after using cutting boards, countertops, knives, or utensils that have been in contact with raw food.    Keep uncooked meats away from cooked and ready-to-eat foods.  Medicine  You may use acetaminophen or NSAID medicines like ibuprofen or naproxen to control fever unless another medicine was given. If you have chronic liver or kidney disease, talk with your healthcare provider before using these medicines. Also talk with your provider if you've had a stomach ulcer or gastrointestinal bleeding. Don't give aspirin to anyone under 18 years of age who is ill with a fever. It may cause severe liver damage. Don't use NSAIDS is you are already taking one for another condition (like arthritis) or are on aspirin (such as for heart  disease or after a stroke).  If medicine for vomiting or diarrhea are prescribed, take these only as directed. Do not take over-the-counter medicines for vomiting or diarrhea unless instructed by your healthcare provider.  Diet  Follow these guidelines for food:    Water and liquids are important so you don't get dehydrated. Drink a small amount at a time or suck on ice chips if you are vomiting.    If you eat, avoid fatty, greasy, spicy, or fried foods.    Don't eat dairy if you have diarrhea. This can make diarrhea worse.    Avoid tobacco, alcohol, and caffeine which may worsen symptoms.  During the first 24 hours (the first full day), follow the diet below:    Beverages. Sports drinks, soft drinks without caffeine, ginger ale, mineral water (plain or flavored), decaffeinated tea and coffee. If you are very dehydrated, sports drinks aren't a good choice. They have too much sugar and not enough electrolytes. In this case, commercially available products called oral rehydration solutions, are best.    Soups. Eat clear broth, consommé, and bouillon.    Desserts. Eat gelatin, popsicles, and fruit juice bars.  During the next 24 hours (the second day), you may add the following to the above:    Hot cereal, plain toast, bread, rolls, and crackers    Plain noodles, rice, mashed potatoes, chicken noodle or rice soup    Unsweetened canned fruit (avoid pineapple), bananas    Limit fat intake to less than 15 grams per day. Do this by avoiding margarine, butter, oils, mayonnaise, sauces, gravies, fried foods, peanut butter, meat, poultry, and fish.    Limit fiber and avoid raw or cooked vegetables, fresh fruits (except bananas), and bran cereals.    Limit caffeine and chocolate. Don't use spices or seasonings other than salt.    Limit dairy products.    Avoid alcohol.  During the next 24 hours:    Gradually resume a normal diet as you feel better and your symptoms improve.    If at any time it starts getting worse again, go  back to clear liquids until you feel better.  Follow-up care  Follow up with your healthcare provider, or as advised. Call your provider if you don't get better within 24 hours or if diarrhea lasts more than a week. Also follow up if you are unable to keep down liquids and get dehydrated. If a stool (diarrhea) sample was taken, call as directed for the results.  Call 911  Call 911 if any of these occur:    Trouble breathing    Chest pain    Confused    Severe drowsiness or trouble awakening    Fainting or loss of consciousness    Rapid heart rate    Seizure    Stiff neck  When to seek medical advice  Call your healthcare provider right away if any of these occur:    Abdominal pain that gets worse    Continued vomiting (unable to keep liquids down)    Frequent diarrhea (more than 5 times a day)    Blood in vomit or stool (black or red color)    Dark urine, reduced urine output, or extreme thirst    Weakness or dizziness    Drowsiness    Fever of 100.4 F (38 C) oral or higher that does not get better with fever medicine    New rash  Date Last Reviewed: 1/3/2016    6536-4735 [employer]. [address] [place] [address], [place] [address]. All rights reserved. This information is not intended as a substitute for professional medical care. Always follow your healthcare professional's instructions.

## 2017-11-11 LAB
BACTERIA SPEC CULT: ABNORMAL
SPECIMEN SOURCE: ABNORMAL

## 2017-11-26 ENCOUNTER — HEALTH MAINTENANCE LETTER (OUTPATIENT)
Age: 27
End: 2017-11-26

## 2017-12-05 ENCOUNTER — HOSPITAL ENCOUNTER (EMERGENCY)
Facility: HOSPITAL | Age: 27
Discharge: HOME | End: 2017-12-05
Attending: PHYSICIAN ASSISTANT | Admitting: PHYSICIAN ASSISTANT
Payer: COMMERCIAL

## 2017-12-05 VITALS
OXYGEN SATURATION: 99 % | DIASTOLIC BLOOD PRESSURE: 72 MMHG | RESPIRATION RATE: 16 BRPM | SYSTOLIC BLOOD PRESSURE: 113 MMHG | TEMPERATURE: 98.5 F

## 2017-12-05 DIAGNOSIS — H66.002 LEFT ACUTE SUPPURATIVE OTITIS MEDIA: ICD-10-CM

## 2017-12-05 DIAGNOSIS — H69.92 EUSTACHIAN TUBE DISORDER, LEFT: ICD-10-CM

## 2017-12-05 PROCEDURE — 99213 OFFICE O/P EST LOW 20 MIN: CPT | Performed by: PHYSICIAN ASSISTANT

## 2017-12-05 PROCEDURE — 99213 OFFICE O/P EST LOW 20 MIN: CPT

## 2017-12-05 RX ORDER — CEFDINIR 300 MG/1
300 CAPSULE ORAL 2 TIMES DAILY
Qty: 14 CAPSULE | Refills: 0 | Status: SHIPPED | OUTPATIENT
Start: 2017-12-05 | End: 2017-12-09

## 2017-12-05 RX ORDER — FLUTICASONE PROPIONATE 50 MCG
2 SPRAY, SUSPENSION (ML) NASAL DAILY
Qty: 16 G | Refills: 1 | Status: SHIPPED | OUTPATIENT
Start: 2017-12-05 | End: 2018-01-07

## 2017-12-05 ASSESSMENT — ENCOUNTER SYMPTOMS
NECK STIFFNESS: 0
COUGH: 1
FEVER: 0
FATIGUE: 0
NAUSEA: 0
TROUBLE SWALLOWING: 0
ABDOMINAL PAIN: 0
EYE DISCHARGE: 0
EYE REDNESS: 0
VOMITING: 0
APPETITE CHANGE: 0
LIGHT-HEADEDNESS: 0
DIZZINESS: 0
DIARRHEA: 0
PSYCHIATRIC NEGATIVE: 1
VOICE CHANGE: 0
SORE THROAT: 0
SINUS PRESSURE: 0
NECK PAIN: 0
HEADACHES: 0
CARDIOVASCULAR NEGATIVE: 1

## 2017-12-05 NOTE — ED AVS SNAPSHOT
[facility]    [address] [address]    Phone:  [phone]                                       Ihsan Millan   MRN: [id number]    Department:  [facility]   Date of Visit:  12/5/2017           After Visit Summary Signature Page     I have received my discharge instructions, and my questions have been answered. I have discussed any challenges I see with this plan with the nurse or doctor.    ..........................................................................................................................................  Patient/Patient Representative Signature      ..........................................................................................................................................  Patient Representative Print Name and Relationship to Patient    ..................................................               ................................................  Date                                            Time    ..........................................................................................................................................  Reviewed by Signature/Title    ...................................................              ..............................................  Date                                                            Time

## 2017-12-09 ENCOUNTER — HOSPITAL ENCOUNTER (EMERGENCY)
Facility: HOSPITAL | Age: 27
Discharge: HOME | End: 2017-12-09
Attending: PHYSICIAN ASSISTANT | Admitting: PHYSICIAN ASSISTANT
Payer: COMMERCIAL

## 2017-12-09 VITALS
RESPIRATION RATE: 16 BRPM | TEMPERATURE: 98.4 F | DIASTOLIC BLOOD PRESSURE: 68 MMHG | SYSTOLIC BLOOD PRESSURE: 110 MMHG | OXYGEN SATURATION: 98 % | HEIGHT: 64 IN

## 2017-12-09 DIAGNOSIS — S39.012A BACK STRAIN, INITIAL ENCOUNTER: ICD-10-CM

## 2017-12-09 PROCEDURE — 96372 THER/PROPH/DIAG INJ SC/IM: CPT

## 2017-12-09 PROCEDURE — 25000128 H RX IP 250 OP 636: Performed by: PHYSICIAN ASSISTANT

## 2017-12-09 PROCEDURE — 99214 OFFICE O/P EST MOD 30 MIN: CPT | Performed by: PHYSICIAN ASSISTANT

## 2017-12-09 PROCEDURE — 99212 OFFICE O/P EST SF 10 MIN: CPT | Mod: 25

## 2017-12-09 PROCEDURE — 25000132 ZZH RX MED GY IP 250 OP 250 PS 637: Performed by: PHYSICIAN ASSISTANT

## 2017-12-09 RX ORDER — CYCLOBENZAPRINE HCL 10 MG
10 TABLET ORAL ONCE
Status: COMPLETED | OUTPATIENT
Start: 2017-12-09 | End: 2017-12-09

## 2017-12-09 RX ORDER — KETOROLAC TROMETHAMINE 30 MG/ML
60 INJECTION, SOLUTION INTRAMUSCULAR; INTRAVENOUS ONCE
Status: COMPLETED | OUTPATIENT
Start: 2017-12-09 | End: 2017-12-09

## 2017-12-09 RX ORDER — ETODOLAC 500 MG
500 TABLET ORAL 2 TIMES DAILY
Qty: 10 TABLET | Refills: 0 | Status: SHIPPED | OUTPATIENT
Start: 2017-12-09 | End: 2018-01-07

## 2017-12-09 RX ORDER — CYCLOBENZAPRINE HCL 10 MG
5-10 TABLET ORAL
Qty: 14 TABLET | Refills: 1 | Status: SHIPPED | OUTPATIENT
Start: 2017-12-09 | End: 2018-01-07

## 2017-12-09 RX ADMIN — KETOROLAC TROMETHAMINE 60 MG: 30 INJECTION, SOLUTION INTRAMUSCULAR at 13:59

## 2017-12-09 RX ADMIN — CYCLOBENZAPRINE HYDROCHLORIDE 10 MG: 10 TABLET, FILM COATED ORAL at 13:58

## 2017-12-09 ASSESSMENT — ENCOUNTER SYMPTOMS
CARDIOVASCULAR NEGATIVE: 1
NUMBNESS: 0
HEMATURIA: 0
PSYCHIATRIC NEGATIVE: 1
FEVER: 0
FLANK PAIN: 0
FATIGUE: 0
GASTROINTESTINAL NEGATIVE: 1
ARTHRALGIAS: 0
FREQUENCY: 0
JOINT SWELLING: 0
BACK PAIN: 1
NAUSEA: 0
VOMITING: 0
WEAKNESS: 0
CHILLS: 0
COUGH: 0
DYSURIA: 0
DIFFICULTY URINATING: 0
RESPIRATORY NEGATIVE: 1

## 2017-12-09 NOTE — ED PROVIDER NOTES
History     Chief Complaint   Patient presents with     Back Pain     Pt states she slipped on ice at 1500 yesterday. Denies hitting head, denies any neck pain. Pt states pain on left side/midback worse today than yesterday.     HPI  Ihsan Millan is a 27 year old female who presents with low back pain after mechanical fall on ice 2 days ago. Pain is midline, sharp in nature and associated with jolting pain down to buttock bilaterally. No loss of bowel or bladder function. Made through her shift at work yesterday, but woke up today with significant worsening. No loss bowel/bladder control and no saddle numbness.     Problem List:    Patient Active Problem List    Diagnosis Date Noted     RUQ abdominal pain 02/12/2017     Priority: Medium     Pneumonia of right middle lobe due to infectious organism (H) 02/12/2017     Priority: Medium     Tobacco abuse 02/12/2017     Priority: Medium     Status post laparoscopic assisted vaginal hysterectomy (LAVH) 06/25/2015     Priority: Medium     Surgery, elective 06/24/2015     Priority: Medium     Endometriosis 04/16/2015     Priority: Medium     Depo lupron 4/15.  Laparoscopy 3/15       ASCUS on Pap smear 07/18/2014     Priority: Medium     + hpv colpo 7/14       Postpartum depression 07/18/2014     Priority: Medium     zoloft rx       H. pylori infection      Priority: Medium     Moderate persistent asthma 03/19/2014     Priority: Medium     GERD (gastroesophageal reflux disease) 09/12/2013     Priority: Medium     Intermittent asthma 09/12/2013     Priority: Medium     Increased with pregnancy  Send for eval and asthma action plan  Smoking cessation counseling  Flu shot given          Past Medical History:    Past Medical History:   Diagnosis Date     Asthma      GERD (gastroesophageal reflux disease)      H. pylori infection        Past Surgical History:    Past Surgical History:   Procedure Laterality Date     COLONOSCOPY N/A 11/6/2014    Procedure: COLONOSCOPY;   Surgeon: Zacarias Paz MD;  [facility]     DILATION AND CURETTAGE, HYSTEROSCOPY DIAGNOSTIC, COMBINED  8/1/2014    Procedure: COMBINED DILATION AND CURETTAGE, HYSTEROSCOPY DIAGNOSTIC;  Surgeon: Rodolfo Clifford MD;  [facility]     ESOPHAGOSCOPY, GASTROSCOPY, DUODENOSCOPY (EGD), COMBINED N/A 9/5/2014    Procedure: COMBINED ESOPHAGOSCOPY, GASTROSCOPY, DUODENOSCOPY (EGD);  Surgeon: Zacarias Paz MD;  [facility]     LAPAROSCOPIC ASSISTED HYSTERECTOMY VAGINAL N/A 6/24/2015    Procedure: LAPAROSCOPIC ASSISTED HYSTERECTOMY VAGINAL;  Surgeon: Rodolfo Clifford MD;  [facility]     LAPAROSCOPIC OOPHORECTOMY Right 3/23/2016    Procedure: LAPAROSCOPIC OOPHORECTOMY;  Surgeon: Rodolfo Clifford MD;  [facility]     LAPAROSCOPIC SALPINGO-OOPHORECTOMY Left 5/3/2017    Procedure: LAPAROSCOPIC SALPINGO-OOPHORECTOMY;  LAPAROSCOPIC LEFT OOPHORECTOMY;  Surgeon: Rodolfo Clifford MD;  [facility]     LAPAROSCOPY DIAGNOSTIC (GYN) N/A 3/18/2015    Procedure: LAPAROSCOPY DIAGNOSTIC (GYN);  Surgeon: Rodolfo Clifford MD;  [facility]     no surgeries       SALPINGECTOMY Bilateral 6/24/2015    Procedure: SALPINGECTOMY;  Surgeon: Rodolfo Clifford MD;  [facility]       Family History:    Family History   Problem Relation Age of Onset     Asthma Mother      Unknown/Adopted Maternal Grandmother      Unknown/Adopted Maternal Grandfather        Social History:  Marital Status:  Single [1]  Social History   Substance Use Topics     Smoking status: Current Every Day Smoker     Packs/day: 0.50     Years: 7.00     Types: Cigarettes     Smokeless tobacco: Never Used      Comment: declines quitline referral     Alcohol use Yes      Comment: social        Medications:      Acetaminophen (TYLENOL PO)   etodolac (LODINE) 500 MG tablet   cyclobenzaprine (FLEXERIL) 10 MG tablet   fluticasone (FLONASE) 50 MCG/ACT spray   estradiol (ESTRACE) 1 MG tablet   OMEPRAZOLE PO   albuterol (PROAIR HFA, PROVENTIL HFA, VENTOLIN HFA) 108 (90  "BASE) MCG/ACT inhaler         Review of Systems   Constitutional: Negative for chills, fatigue and fever.   Respiratory: Negative.  Negative for cough.    Cardiovascular: Negative.    Gastrointestinal: Negative.  Negative for nausea and vomiting.   Genitourinary: Negative for difficulty urinating, dysuria, flank pain, frequency, hematuria and urgency.   Musculoskeletal: Positive for back pain. Negative for arthralgias and joint swelling.   Skin: Negative for rash.   Neurological: Negative for weakness and numbness (tingling into buttock).   Psychiatric/Behavioral: Negative.        Physical Exam   BP: 110/68  Heart Rate: 104  Temp: 98.4  F (36.9  C)  Resp: 16  Height: 162.6 cm (5' 4\")  SpO2: 98 %      Physical Exam   Constitutional: She is oriented to person, place, and time. She appears well-developed and well-nourished.   Eyes: Conjunctivae are normal.   Cardiovascular: Normal rate.    Pulmonary/Chest: Effort normal.   Musculoskeletal: She exhibits tenderness.        Lumbar back: She exhibits decreased range of motion and tenderness. She exhibits no bony tenderness.        Back:    Lumbar spine without gross deformity, rash or bruising.  NO point tenderness.  Patient is tender left lateral lumbar area. NO palpable spasm. Left lateral rotation to 35+ degrees. Right lateral rotation to 35+ degrees. Brings fingertips within 20 inches of the floor. Patellar reflexes 2+ bilaterally. Sensation intact medial leg bilaterally. Pt can extend at the knees bilaterally. Sensation intact over dorsal surface of feet bilaterally. Pt can dorsiflex feet bilaterally. Sensation intact along lateral surface foot bilaterally. Pt can plantarflex bilaterally.       Neurological: She is alert and oriented to person, place, and time.   Skin: Skin is warm and dry.   Psychiatric: She has a normal mood and affect.   Nursing note and vitals reviewed.      ED Course     ED Course     Procedures    Medications   ketorolac (TORADOL) injection 60 " mg (60 mg Intramuscular Given 12/9/17 6160)   cyclobenzaprine (FLEXERIL) tablet 10 mg (10 mg Oral Given 12/9/17 7137)   Patient tolerated. Patient observed for 20 minutes.       Assessments & Plan (with Medical Decision Making)     I have reviewed the nursing notes.  I have reviewed the findings, diagnosis, plan and need for follow up with the patient.    New Prescriptions    CYCLOBENZAPRINE (FLEXERIL) 10 MG TABLET    Take 0.5-1 tablets (5-10 mg) by mouth nightly as needed for muscle spasms    ETODOLAC (LODINE) 500 MG TABLET    Take 1 tablet (500 mg) by mouth 2 times daily for 5 days       Final diagnoses:   Back strain, initial encounter   Flexeril for spasm/strain, etodolac for anitinflammatory, capsaicin/heat topically.  Stretch as tolerated. Consider chiropractor, massage, acupuncture. Patient verbally educated and given appropriate education sheets for the diagnoses and has no questions. F/U PCP in one week with persistence; tp ED sooner with loss of bowel/bladder function or saddle numbness.     Primo Oliva PA-C  12/9/2017  2:23 PM  [facility]      12/9/2017   [facility]     Primo Oliva PA  12/09/17 1426

## 2017-12-09 NOTE — ED AVS SNAPSHOT
[facility]    [address] [address]    Phone:  [phone]                                       Ihsan Millan   MRN: [id number]    Department:  [facility]   Date of Visit:  12/9/2017           Patient Information     Date Of Birth          1990        Your diagnoses for this visit were:     Back strain, initial encounter        You were seen by Primo Oliva PA.      Follow-up Information     Follow up with Temo Houston DO In 3 days.    Specialty:  Family Practice    Contact information:    [facility]  [address] [facility] [address] [phone]          Follow up with [facility].    Specialty:  EMERGENCY MEDICINE    Why:  If symptoms worsen    Contact information:    [address] [address] [phone]    Additional information:    From [facility]: Take [address]. Turn left at [address] [facility]. Turn left at the first stoplight on [address]. At the first stop sign, take a right onto [address]. Take a left into the parking lot and continue through until you reach the North enterance of the building.       From [place]: Take [address]. Take the [address] ramp towards [place]. Turn left onto [address]. Take a slight right onto [address] [facility]. Turn left at the first stoplight on [address]. At the first stop sign, take a right onto [address]. Take a left into the parking lot and continue through until you reach the North enterance of the building.       From [place]: Take [address]. Take a right at [address] [place] [address]. At the first stop sign, take a right onto [address]. Take a left into the parking lot and continue through until you reach the North enterance of the building.         Discharge Instructions       - Oral muscle relaxant/Flexeril.   - Etodolac orally for 5 days for pain/swelling  - Heat packs and  topical Capsaicin - may get hot, so test it on a smaller area of skin  - Be mindful of lifting and posture.  - As soon as tolerated, stretch.  - Consider chiropractor, massage, acupuncture.    * Most back pain will go away on it's own in 6-8 weeks. Follow up with family practice with persistent symptoms in 7-10 days.   ** Must be rechecked with: numbness in groin, loss of bowel or bladder function.     Discharge References/Attachments     BACK SPRAIN/STRAIN (ENGLISH)      Future Appointments        Provider Department Dept Phone Center    12/14/2017 10:30 AM Svetlana Ramirez NP [facility] [phone] [place]         Review of your medicines      START taking        Dose / Directions Last dose taken    cyclobenzaprine 10 MG tablet   Commonly known as:  FLEXERIL   Dose:  5-10 mg   Quantity:  14 tablet        Take 0.5-1 tablets (5-10 mg) by mouth nightly as needed for muscle spasms   Refills:  1        etodolac 500 MG tablet   Commonly known as:  LODINE   Dose:  500 mg   Quantity:  10 tablet        Take 1 tablet (500 mg) by mouth 2 times daily for 5 days   Refills:  0          Our records show that you are taking the medicines listed below. If these are incorrect, please call your family doctor or clinic.        Dose / Directions Last dose taken    albuterol 108 (90 BASE) MCG/ACT Inhaler   Commonly known as:  PROAIR HFA/PROVENTIL HFA/VENTOLIN HFA   Dose:  2 puff   Quantity:  1 Inhaler        Inhale 2 puffs into the lungs every 4 hours as needed for shortness of breath / dyspnea or wheezing   Refills:  2        estradiol 1 MG tablet   Commonly known as:  ESTRACE   Dose:  1 mg   Quantity:  90 tablet        Take 1 tablet (1 mg) by mouth daily   Refills:  1        fluticasone 50 MCG/ACT spray   Commonly known as:  FLONASE   Dose:  2 spray   Quantity:  16 g        Spray 2 sprays into both nostrils daily   Refills:  1        OMEPRAZOLE PO   Dose:  20 mg        Take 20 mg by mouth every morning   Refills:  0         TYLENOL PO   Dose:  1000 mg        Take 1,000 mg by mouth every 8 hours as needed for mild pain or fever   Refills:  0                Prescriptions were sent or printed at these locations (2 Prescriptions)                   [id number] - [place] - [address] AT [facility] [address]   [address], [place] [address]    Telephone:  [phone]   Fax:  [phone]   Hours:                  E-Prescribed (2 of 2)         etodolac (LODINE) 500 MG tablet               cyclobenzaprine (FLEXERIL) 10 MG tablet                Orders Needing Specimen Collection     None      Pending Results     No orders found from 12/7/2017 to 12/10/2017.            Pending Culture Results     No orders found from 12/7/2017 to 12/10/2017.            Thank you for choosing [facility]       Thank you for choosing [facility] for your care. Our goal is always to provide you with excellent care. Hearing back from our patients is one way we can continue to improve our services. Please take a few minutes to complete the written survey that you may receive in the mail after you visit with us. Thank you!        [facility] Information     [employer] gives you secure access to your electronic health record. If you see a primary care provider, you can also send messages to your care team and make appointments. If you have questions, please call your primary care clinic.  If you do not have a primary care provider, please call [phone] and they will assist you.        Care EveryWhere ID     This is your Care EveryWhere ID. This could be used by other organizations to access your [facility] medical records  [id number]        Equal Access to Services     [facility]: Hadii clifton garzao Sophani, waaxda luqadaha, qaybta kaalmada adeegyada, leah dennis. So [facility] [phone].    ATENCIÓN: Si habla español, tiene a spear disposición servicios gratuitos de asistencia lingüística. Llame al  [phone].    We comply with applicable federal civil rights laws and [place] laws. We do not discriminate on the basis of race, color, national origin, age, disability, sex, sexual orientation, or gender identity.            After Visit Summary       This is your record. Keep this with you and show to your community pharmacist(s) and doctor(s) at your next visit.

## 2017-12-09 NOTE — ED NOTES
AVS reviewed with pt including when pt needs to seek medical attention and the symptoms she should call 911 for. Pt verbalizes understanding and is agreeable to this plan. Pt discharged to home.

## 2017-12-09 NOTE — ED NOTES
"Pt ambulated independently into room. Pt reports that yesterday at 1500 she slipped on some ice and fell on her back and states she was twisting her torso in attempt to break her fall. Back pain, \"wasn't as bad yesterday, but I could hardly get out of bed this morning and it has been getting progressively worse. I still have to go to work today.\"  Pt rates pain at 8/10, took 1000 mg tylenol at 1100 this morning. Pt denies pain down legs. Pt states she has new intermittent tingling in her legs. No incontinence.   "

## 2017-12-09 NOTE — DISCHARGE INSTRUCTIONS
- Oral muscle relaxant/Flexeril.   - Etodolac orally for 5 days for pain/swelling  - Heat packs and topical Capsaicin - may get hot, so test it on a smaller area of skin  - Be mindful of lifting and posture.  - As soon as tolerated, stretch.  - Consider chiropractor, massage, acupuncture.    * Most back pain will go away on it's own in 6-8 weeks. Follow up with family practice with persistent symptoms in 7-10 days.   ** Must be rechecked with: numbness in groin, loss of bowel or bladder function.

## 2017-12-09 NOTE — LETTER
[place] [address] [place] [address]  Main: [phone]  Dept: [phone]      December 9, 2017      Re: Ihsan Millan      TO WHOM IT MAY CONCERN:    Ihsan Millan was evaluated in the urgent care for acute injury. She has started treatment and I expect her symptoms to improve in 2-3 days. She may return to work but is advised to avoid lifting and twisting for 3 days.         Sincerely,      Primo Oliva PA-C   12/9/2017   2:01 PM

## 2017-12-09 NOTE — ED AVS SNAPSHOT
[facility]    [address] [address]    Phone:  [phone]                                       Ihsan Millan   MRN: [id number]    Department[facility]  [facility]   Date of Visit:  12/9/2017           After Visit Summary Signature Page     I have received my discharge instructions, and my questions have been answered. I have discussed any challenges I see with this plan with the nurse or doctor.    ..........................................................................................................................................  Patient/Patient Representative Signature      ..........................................................................................................................................  Patient Representative Print Name and Relationship to Patient    ..................................................               ................................................  Date                                            Time    ..........................................................................................................................................  Reviewed by Signature/Title    ...................................................              ..............................................  Date                                                            Time

## 2017-12-15 ENCOUNTER — OFFICE VISIT (OUTPATIENT)
Dept: CHIROPRACTIC MEDICINE | Facility: OTHER | Age: 27
End: 2017-12-15
Attending: CHIROPRACTOR
Payer: COMMERCIAL

## 2017-12-15 DIAGNOSIS — M99.02 SEGMENTAL AND SOMATIC DYSFUNCTION OF THORACIC REGION: ICD-10-CM

## 2017-12-15 DIAGNOSIS — M54.50 ACUTE BILATERAL LOW BACK PAIN WITHOUT SCIATICA: ICD-10-CM

## 2017-12-15 DIAGNOSIS — M99.03 SEGMENTAL AND SOMATIC DYSFUNCTION OF LUMBAR REGION: Primary | ICD-10-CM

## 2017-12-15 PROCEDURE — 98940 CHIROPRACT MANJ 1-2 REGIONS: CPT | Mod: AT | Performed by: CHIROPRACTOR

## 2017-12-15 NOTE — MR AVS SNAPSHOT
After Visit Summary   12/15/2017    Ihsan Millan    MRN: [id number]           Patient Information     Date Of Birth          1990        Visit Information        Provider Department      12/15/2017 8:40 AM Homer Lopez DC  [facility]        Today's Diagnoses     Segmental and somatic dysfunction of lumbar region    -  1    Acute bilateral low back pain without sciatica        Segmental and somatic dysfunction of thoracic region           Follow-ups after your visit        Who to contact     If you have questions or need follow up information about today's clinic visit or your schedule please contact  [facility] directly at [phone].  Normal or non-critical lab and imaging results will be communicated to you by [facility], letter or phone within 4 business days after the clinic has received the results. If you do not hear from us within 7 days, please contact the clinic through [facility] or phone. If you have a critical or abnormal lab result, we will notify you by phone as soon as possible.  Submit refill requests through [facility] or call your pharmacy and they will forward the refill request to us. Please allow 3 business days for your refill to be completed.          Additional Information About Your Visit        MyChart Information     [facility] gives you secure access to your electronic health record. If you see a primary care provider, you can also send messages to your care team and make appointments. If you have questions, please call your primary care clinic.  If you do not have a primary care provider, please call [phone] and they will assist you.        Care EveryWhere ID     This is your Care EveryWhere ID. This could be used by other organizations to access your [facility] medical records  [id number]        Your Vitals Were     Last Period                   (LMP Unknown)            Blood Pressure from Last 3 Encounters:   12/09/17 110/68   12/05/17  113/72   11/09/17 98/60    Weight from Last 3 Encounters:   10/11/17 130 lb (59 kg)   05/03/17 129 lb (58.5 kg)   04/14/17 120 lb (54.4 kg)              We Performed the Following     CHIROPRAC MANIP,SPINAL,1-2 REGIONS        Primary Care Provider Office Phone # Fax #    Temo Houston [phone]       [facility] [address] [facility] [address]        Equal Access to Services     ZHENG BLANCO : Hadii aad ku hadasho Soomaali, waaxda luqadaha, qaybta kaalmada adeegyada, waxay idiin hayaan adejc graham . So [facility] [phone].    ATENCIÓN: Si habla español, tiene a spear disposición servicios gratuitos de asistencia lingüística. Llame al [phone].    We comply with applicable federal civil rights laws and [place] laws. We do not discriminate on the basis of race, color, national origin, age, disability, sex, sexual orientation, or gender identity.            Thank you!     Thank you for choosing  [facility]  for your care. Our goal is always to provide you with excellent care. Hearing back from our patients is one way we can continue to improve our services. Please take a few minutes to complete the written survey that you may receive in the mail after your visit with us. Thank you!             Your Updated Medication List - Protect others around you: Learn how to safely use, store and throw away your medicines at www.disposemymeds.org.          This list is accurate as of: 12/15/17 11:59 PM.  Always use your most recent med list.                   Brand Name Dispense Instructions for use Diagnosis    albuterol 108 (90 BASE) MCG/ACT Inhaler    PROAIR HFA/PROVENTIL HFA/VENTOLIN HFA    1 Inhaler    Inhale 2 puffs into the lungs every 4 hours as needed for shortness of breath / dyspnea or wheezing    Intermittent asthma       cyclobenzaprine 10 MG tablet    FLEXERIL    14 tablet    Take 0.5-1 tablets (5-10 mg) by mouth nightly as needed for muscle spasms         estradiol 1 MG tablet    ESTRACE    90 tablet    Take 1 tablet (1 mg) by mouth daily    Menopausal syndrome (hot flashes)       etodolac 500 MG tablet    LODINE    10 tablet    Take 1 tablet (500 mg) by mouth 2 times daily for 5 days        fluticasone 50 MCG/ACT spray    FLONASE    16 g    Spray 2 sprays into both nostrils daily        OMEPRAZOLE PO      Take 20 mg by mouth every morning        TYLENOL PO      Take 1,000 mg by mouth every 8 hours as needed for mild pain or fever

## 2017-12-18 NOTE — PROGRESS NOTES
Subjective Finding:    Chief compalint: Patient presents with:  Back Pain: mid and low back pain  , Pain Scale: 5/10, Intensity: sharp, Duration: 3 days, Radiating: no.    Date of injury:     Activities that the pain restricts:   Home/household/hobbies/social activities: yes.  Work duties: no.  Sleep: no.  Makes symptoms better: rest.  Makes symptoms worse: activity and walking.  Have you seen anyone else for the symptoms? Yes: MD.  Work related: no.  Automobile related injury: no.    Objective and Assessment:    Posture Analysis:   High shoulder: .  Head tilt: .  High iliac crest: .  Head carriage: neutral.  Thoracic Kyphosis: forward.  Lumbar Lordosis: neutral.    Lumbar Range of Motion: extension decreased.  Cervical Range of Motion: .  Thoracic Range of Motion: extension decreased.  Extremity Range of Motion: .    Palpation:   T paraspinals: sharp pain, no    Segmental dysfunction pre-treatment and treatment area: T6, T8 and L3.    Assessment post-treatment:  Cervical: .  Thoracic: ROM increased.  Lumbar: ROM increased.    Comments: .      Complicating Factors: .    Procedure(s):  CMT:  34434 Chiropractic manipulative treatment 1-2 regions performed   Thoracic: Diversified, See above for level, Prone and Lumbar: Diversified, See above for level, Side posture    Modalities:  None performed this visit    Therapeutic procedures:  None    Plan:  Treatment plan: PRN.  Instructed patient: stretch as instructed at visit.  Short term goals: reduce pain and increase ROM.  Long term goals: restore normal function.  Prognosis: excellent.

## 2018-01-01 ENCOUNTER — HOSPITAL ENCOUNTER (EMERGENCY)
Facility: HOSPITAL | Age: 28
Discharge: HOME | End: 2018-01-01
Attending: PHYSICIAN ASSISTANT | Admitting: PHYSICIAN ASSISTANT
Payer: COMMERCIAL

## 2018-01-01 VITALS
DIASTOLIC BLOOD PRESSURE: 69 MMHG | OXYGEN SATURATION: 100 % | RESPIRATION RATE: 16 BRPM | TEMPERATURE: 97.2 F | SYSTOLIC BLOOD PRESSURE: 117 MMHG

## 2018-01-01 DIAGNOSIS — J45.901 EXACERBATION OF ASTHMA, UNSPECIFIED ASTHMA SEVERITY, UNSPECIFIED WHETHER PERSISTENT: ICD-10-CM

## 2018-01-01 DIAGNOSIS — J11.1 INFLUENZA-LIKE ILLNESS: ICD-10-CM

## 2018-01-01 LAB
FLUAV+FLUBV AG SPEC QL: NEGATIVE
FLUAV+FLUBV AG SPEC QL: NEGATIVE
SPECIMEN SOURCE: NORMAL

## 2018-01-01 PROCEDURE — 25000128 H RX IP 250 OP 636: Performed by: PHYSICIAN ASSISTANT

## 2018-01-01 PROCEDURE — 94640 AIRWAY INHALATION TREATMENT: CPT

## 2018-01-01 PROCEDURE — 25000125 ZZHC RX 250: Performed by: PHYSICIAN ASSISTANT

## 2018-01-01 PROCEDURE — 87804 INFLUENZA ASSAY W/OPTIC: CPT | Performed by: PHYSICIAN ASSISTANT

## 2018-01-01 PROCEDURE — 94640 AIRWAY INHALATION TREATMENT: CPT | Mod: 76

## 2018-01-01 PROCEDURE — G0463 HOSPITAL OUTPT CLINIC VISIT: HCPCS | Mod: 25

## 2018-01-01 PROCEDURE — 96372 THER/PROPH/DIAG INJ SC/IM: CPT

## 2018-01-01 PROCEDURE — 40000275 ZZH STATISTIC RCP TIME EA 10 MIN

## 2018-01-01 PROCEDURE — 99213 OFFICE O/P EST LOW 20 MIN: CPT | Performed by: PHYSICIAN ASSISTANT

## 2018-01-01 RX ORDER — ONDANSETRON 4 MG/1
4 TABLET, ORALLY DISINTEGRATING ORAL ONCE
Status: COMPLETED | OUTPATIENT
Start: 2018-01-01 | End: 2018-01-01

## 2018-01-01 RX ORDER — PREDNISONE 20 MG/1
TABLET ORAL
Qty: 10 TABLET | Refills: 0 | Status: SHIPPED | OUTPATIENT
Start: 2018-01-01 | End: 2018-01-20

## 2018-01-01 RX ORDER — IPRATROPIUM BROMIDE AND ALBUTEROL SULFATE 2.5; .5 MG/3ML; MG/3ML
3 SOLUTION RESPIRATORY (INHALATION) ONCE
Status: COMPLETED | OUTPATIENT
Start: 2018-01-01 | End: 2018-01-01

## 2018-01-01 RX ORDER — ALBUTEROL SULFATE 0.83 MG/ML
2.5 SOLUTION RESPIRATORY (INHALATION) ONCE
Status: COMPLETED | OUTPATIENT
Start: 2018-01-01 | End: 2018-01-01

## 2018-01-01 RX ORDER — METHYLPREDNISOLONE SODIUM SUCCINATE 125 MG/2ML
125 INJECTION, POWDER, LYOPHILIZED, FOR SOLUTION INTRAMUSCULAR; INTRAVENOUS ONCE
Status: COMPLETED | OUTPATIENT
Start: 2018-01-01 | End: 2018-01-01

## 2018-01-01 RX ORDER — ONDANSETRON 4 MG/1
4 TABLET, ORALLY DISINTEGRATING ORAL EVERY 8 HOURS PRN
Qty: 10 TABLET | Refills: 0 | Status: SHIPPED | OUTPATIENT
Start: 2018-01-01 | End: 2018-01-04

## 2018-01-01 RX ADMIN — METHYLPREDNISOLONE SODIUM SUCCINATE 125 MG: 125 INJECTION, POWDER, FOR SOLUTION INTRAMUSCULAR; INTRAVENOUS at 20:06

## 2018-01-01 RX ADMIN — ONDANSETRON 4 MG: 4 TABLET, ORALLY DISINTEGRATING ORAL at 20:06

## 2018-01-01 RX ADMIN — ALBUTEROL SULFATE 2.5 MG: 2.5 SOLUTION RESPIRATORY (INHALATION) at 20:21

## 2018-01-01 RX ADMIN — IPRATROPIUM BROMIDE AND ALBUTEROL SULFATE 3 ML: .5; 3 SOLUTION RESPIRATORY (INHALATION) at 19:51

## 2018-01-01 ASSESSMENT — ENCOUNTER SYMPTOMS
PSYCHIATRIC NEGATIVE: 1
COUGH: 1
FEVER: 1
CARDIOVASCULAR NEGATIVE: 1
NEUROLOGICAL NEGATIVE: 1
CHEST TIGHTNESS: 1
WHEEZING: 1
SHORTNESS OF BREATH: 1
SINUS PRESSURE: 1
VOMITING: 1
CHILLS: 1
SORE THROAT: 0
NAUSEA: 1
FATIGUE: 1
EYES NEGATIVE: 1

## 2018-01-01 NOTE — LETTER
[address] [address]  Main: [phone]  Dept: [phone]      January 1, 2018      Re: Ihsan Millan      TO WHOM IT MAY CONCERN:    Ihsan Millan was evaluated in the emergency department for acute illness. She has started treatment and I expect her condition to improve over the next 3-5 days. She may return to work 24 hours after fevers have resolved.         Sincerely,      Primo Oliva PA-C   1/1/2018   7:48 PM

## 2018-01-01 NOTE — ED AVS SNAPSHOT
[facility]    [address] [address]    Phone:  [phone]                                       Ihsan Millan   MRN: [id number]    Department[facility]  [facility]   Date of Visit:  1/1/2018           After Visit Summary Signature Page     I have received my discharge instructions, and my questions have been answered. I have discussed any challenges I see with this plan with the nurse or doctor.    ..........................................................................................................................................  Patient/Patient Representative Signature      ..........................................................................................................................................  Patient Representative Print Name and Relationship to Patient    ..................................................               ................................................  Date                                            Time    ..........................................................................................................................................  Reviewed by Signature/Title    ...................................................              ..............................................  Date                                                            Time

## 2018-01-01 NOTE — ED AVS SNAPSHOT
[facility]    [address] [address]    Phone:  [phone]                                       Ihsan Millan   MRN: [id number]    Department:  [facility]   Date of Visit:  1/1/2018           Patient Information     Date Of Birth          1990        Your diagnoses for this visit were:     Influenza-like illness     Exacerbation of asthma, unspecified asthma severity, unspecified whether persistent        You were seen by Primo Oliva PA.      Follow-up Information     Follow up with Temo Houston DO.    Specialty:  Family Practice    Why:  3-5 days    Contact information:    [facility]  [address] [facility] [address] [phone]          Follow up with [facility].    Specialty:  EMERGENCY MEDICINE    Why:  If symptoms worsen    Contact information:    [address] [address] [phone]    Additional information:    From [place]: Take [address]. Turn left at [address]. Turn left at the first stoplight on [address]. At the first stop sign, take a right onto [address]. Take a left into the parking lot and continue through until you reach the North enterance of the building.       From [place]: Take [address]. Take the [address] ramp towards [place]. Turn left onto [address]. Take a slight right onto [address]. Turn left at the first stoplight on [address]. At the first stop sign, take a right onto [address]. Take a left into the parking lot and continue through until you reach the North enterance of the building.       From [place]: Take [address]. Take a right at [address]. At the first stop sign, take a right onto [address]. Take a left into the parking lot and continue through until you reach the North enterance of the building.         Discharge Instructions       - Will treat  as influenza like illness with asthma exacerbation.   - Prednisone burst starting tomorrow.   - Nebs/inahler 3-4 x daily for 2-3 days.   - Zofran for nausea.   - REST and plenty of fluids.   * If poor improvement after 5 days, follow up with primary for recheck - back here or ER sooner with worsening despite treatment.     Discharge References/Attachments     ASTHMA, ACUTE (ADULT) (ENGLISH)    (ADULT), INFLUENZA (ENGLISH)         Review of your medicines      START taking        Dose / Directions Last dose taken    ondansetron 4 MG ODT tab   Commonly known as:  ZOFRAN ODT   Dose:  4 mg   Quantity:  10 tablet        Take 1 tablet (4 mg) by mouth every 8 hours as needed for nausea   Refills:  0        predniSONE 20 MG tablet   Commonly known as:  DELTASONE   Quantity:  10 tablet        Take two tablets (= 40mg) each day for 5 (five) days   Refills:  0          Our records show that you are taking the medicines listed below. If these are incorrect, please call your family doctor or clinic.        Dose / Directions Last dose taken    albuterol 108 (90 BASE) MCG/ACT Inhaler   Commonly known as:  PROAIR HFA/PROVENTIL HFA/VENTOLIN HFA   Dose:  2 puff   Quantity:  1 Inhaler        Inhale 2 puffs into the lungs every 4 hours as needed for shortness of breath / dyspnea or wheezing   Refills:  2        cyclobenzaprine 10 MG tablet   Commonly known as:  FLEXERIL   Dose:  5-10 mg   Quantity:  14 tablet        Take 0.5-1 tablets (5-10 mg) by mouth nightly as needed for muscle spasms   Refills:  1        estradiol 1 MG tablet   Commonly known as:  ESTRACE   Dose:  1 mg   Quantity:  90 tablet        Take 1 tablet (1 mg) by mouth daily   Refills:  1        etodolac 500 MG tablet   Commonly known as:  LODINE   Dose:  500 mg   Quantity:  10 tablet        Take 1 tablet (500 mg) by mouth 2 times daily for 5 days   Refills:  0        fluticasone 50 MCG/ACT spray   Commonly known as:  FLONASE   Dose:  2 spray   Quantity:  16 g         Spray 2 sprays into both nostrils daily   Refills:  1        OMEPRAZOLE PO   Dose:  20 mg        Take 20 mg by mouth every morning   Refills:  0        TYLENOL PO   Dose:  1000 mg        Take 1,000 mg by mouth every 8 hours as needed for mild pain or fever   Refills:  0                Prescriptions were sent or printed at these locations (2 Prescriptions)                   [facility] [place], [place] - [address] AT [facility] [address]   [address], [place] [address]    Telephone:  [phone]   Fax:  [phone]   Hours:                  E-Prescribed (2 of 2)         predniSONE (DELTASONE) 20 MG tablet               ondansetron (ZOFRAN ODT) 4 MG ODT tab                Procedures and tests performed during your visit     Influenza A/B antigen      Orders Needing Specimen Collection     None      Pending Results     No orders found from 12/30/2017 to 1/2/2018.            Pending Culture Results     No orders found from 12/30/2017 to 1/2/2018.            Thank you for choosing [facility]       Thank you for choosing [facility] for your care. Our goal is always to provide you with excellent care. Hearing back from our patients is one way we can continue to improve our services. Please take a few minutes to complete the written survey that you may receive in the mail after you visit with us. Thank you!        [employer] Information     [employer] gives you secure access to your electronic health record. If you see a primary care provider, you can also send messages to your care team and make appointments. If you have questions, please call your primary care clinic.  If you do not have a primary care provider, please call [phone] and they will assist you.        Care EveryWhere ID     This is your Care EveryWhere ID. This could be used by other organizations to access your [facility] medical records  [id number]        Equal Access to Services     ZHENG BLANCO AH: Ancelmo Saeed  hermes de jesus, leah wray. So [facility] [phone].    ATENCIÓN: Si habla español, tiene a spear disposición servicios gratuitos de asistencia lingüística. Llame al [phone].    We comply with applicable federal civil rights laws and [place] laws. We do not discriminate on the basis of race, color, national origin, age, disability, sex, sexual orientation, or gender identity.            After Visit Summary       This is your record. Keep this with you and show to your community pharmacist(s) and doctor(s) at your next visit.

## 2018-01-02 NOTE — ED PROVIDER NOTES
History     Chief Complaint   Patient presents with     Cough     c/o cough and chest hurts to breathe     Vomiting     c/o vomiting and diarrhea     The history is provided by the patient. No  was used.     Ihsan Millan is a 27 year old female who presents with acute onset flu like illness starting today. symptoms include cough, fevers/chills, body aches, headache followed by N/V and loose stools tonight. Chest tightness hurts the most. 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     Problem List:    Patient Active Problem List    Diagnosis Date Noted     RUQ abdominal pain 02/12/2017     Priority: Medium     Pneumonia of right middle lobe due to infectious organism (H) 02/12/2017     Priority: Medium     Tobacco abuse 02/12/2017     Priority: Medium     Status post laparoscopic assisted vaginal hysterectomy (LAVH) 06/25/2015     Priority: Medium     Surgery, elective 06/24/2015     Priority: Medium     Endometriosis 04/16/2015     Priority: Medium     Depo lupron 4/15.  Laparoscopy 3/15       ASCUS on Pap smear 07/18/2014     Priority: Medium     + hpv colpo 7/14       Postpartum depression 07/18/2014     Priority: Medium     zoloft rx       H. pylori infection      Priority: Medium     Moderate persistent asthma 03/19/2014     Priority: Medium     GERD (gastroesophageal reflux disease) 09/12/2013     Priority: Medium     Intermittent asthma 09/12/2013     Priority: Medium     Increased with pregnancy  Send for eval and asthma action plan  Smoking cessation counseling  Flu shot given          Past Medical History:    Past Medical History:   Diagnosis Date     Asthma      GERD (gastroesophageal reflux disease)      H. pylori infection        Past Surgical History:    Past Surgical History:   Procedure Laterality Date     COLONOSCOPY N/A 11/6/2014    Procedure: COLONOSCOPY;  Surgeon: Zacarias Paz MD;  [facility]     DILATION AND CURETTAGE, HYSTEROSCOPY DIAGNOSTIC,  COMBINED  8/1/2014    Procedure: COMBINED DILATION AND CURETTAGE, HYSTEROSCOPY DIAGNOSTIC;  Surgeon: Rodolfo Clifford MD;  [facility]     ESOPHAGOSCOPY, GASTROSCOPY, DUODENOSCOPY (EGD), COMBINED N/A 9/5/2014    Procedure: COMBINED ESOPHAGOSCOPY, GASTROSCOPY, DUODENOSCOPY (EGD);  Surgeon: Zacarias Paz MD;  [facility]: [facility]     LAPAROSCOPIC ASSISTED HYSTERECTOMY VAGINAL N/A 6/24/2015    Procedure: LAPAROSCOPIC ASSISTED HYSTERECTOMY VAGINAL;  Surgeon: Rodolfo Clifford MD;  [facility]: [facility]     LAPAROSCOPIC OOPHORECTOMY Right 3/23/2016    Procedure: LAPAROSCOPIC OOPHORECTOMY;  Surgeon: Rodolfo Clifford MD;  [facility]     LAPAROSCOPIC SALPINGO-OOPHORECTOMY Left 5/3/2017    Procedure: LAPAROSCOPIC SALPINGO-OOPHORECTOMY;  LAPAROSCOPIC LEFT OOPHORECTOMY;  Surgeon: Rodolfo Clifford MD;  Location: [facility]     LAPAROSCOPY DIAGNOSTIC (GYN) N/A 3/18/2015    Procedure: LAPAROSCOPY DIAGNOSTIC (GYN);  Surgeon: Rodolfo Clifford MD;  Location: [facility]     no surgeries       SALPINGECTOMY Bilateral 6/24/2015    Procedure: SALPINGECTOMY;  Surgeon: Rodolfo Clifford MD;  Location: [facility]       Family History:    Family History   Problem Relation Age of Onset     Asthma Mother      Unknown/Adopted Maternal Grandmother      Unknown/Adopted Maternal Grandfather        Social History:  Marital Status:  Single [1]  Social History   Substance Use Topics     Smoking status: Current Every Day Smoker     Packs/day: 0.50     Years: 7.00     Types: Cigarettes     Smokeless tobacco: Never Used      Comment: declines quitline referral     Alcohol use Yes      Comment: social        Medications:      predniSONE (DELTASONE) 20 MG tablet   ondansetron (ZOFRAN ODT) 4 MG ODT tab   Acetaminophen (TYLENOL PO)   etodolac (LODINE) 500 MG tablet   cyclobenzaprine (FLEXERIL) 10 MG tablet   fluticasone (FLONASE) 50 MCG/ACT spray   estradiol (ESTRACE) 1 MG tablet   OMEPRAZOLE PO   albuterol (PROAIR HFA, PROVENTIL HFA, VENTOLIN HFA) 108 (90 BASE) MCG/ACT inhaler          Review of Systems   Constitutional: Positive for chills, fatigue and fever.   HENT: Positive for congestion and sinus pressure. Negative for ear pain and sore throat.    Eyes: Negative.    Respiratory: Positive for cough, chest tightness, shortness of breath and wheezing.    Cardiovascular: Negative.    Gastrointestinal: Positive for nausea and vomiting.   Skin: Negative.    Neurological: Negative.    Psychiatric/Behavioral: Negative.        Physical Exam   BP: 117/69  Heart Rate: 96  Temp: 97.2  F (36.2  C)  Resp: 16  SpO2: 100 %      Physical Exam   Constitutional: She is oriented to person, place, and time. She appears well-developed and well-nourished.   HENT:   Head: Normocephalic and atraumatic.   Right Ear: External ear normal.   Left Ear: External ear normal.   Mouth/Throat: Posterior oropharyngeal erythema present. No posterior oropharyngeal edema.   Eyes: Conjunctivae are normal.   Neck: Neck supple.   Cardiovascular: Normal rate and normal heart sounds.    Pulmonary/Chest: Effort normal. She has wheezes.   Abdominal: Soft. Bowel sounds are normal. There is no tenderness.   Neurological: She is alert and oriented to person, place, and time.   Skin: Skin is warm and dry.   Psychiatric: She has a normal mood and affect.   Nursing note and vitals reviewed.      ED Course     ED Course     Procedures  Medications   ondansetron (ZOFRAN-ODT) ODT tab 4 mg (4 mg Oral Given 1/1/18 2006)   ipratropium - albuterol 0.5 mg/2.5 mg/3 mL (DUONEB) neb solution 3 mL (3 mLs Nebulization Given 1/1/18 1951)   methylPREDNISolone sodium succinate (solu-MEDROL) injection 125 mg (125 mg Intramuscular Given 1/1/18 2006)   albuterol neb solution 2.5 mg (2.5 mg Nebulization Given 1/1/18 2021)       Labs Ordered and Resulted from Time of ED Arrival Up to the Time of Departure from the ED   INFLUENZA A/B ANTIGEN       Assessments & Plan (with Medical Decision Making)     I have reviewed the nursing notes.  I have reviewed the  findings, diagnosis, plan and need for follow up with the patient.    Discharge Medication List as of 1/1/2018  8:35 PM      START taking these medications    Details   predniSONE (DELTASONE) 20 MG tablet Take two tablets (= 40mg) each day for 5 (five) days, Disp-10 tablet, R-0, E-Prescribe      ondansetron (ZOFRAN ODT) 4 MG ODT tab Take 1 tablet (4 mg) by mouth every 8 hours as needed for nausea, Disp-10 tablet, R-0, E-Prescribe             Final diagnoses:   Influenza-like illness   Exacerbation of asthma, unspecified asthma severity, unspecified whether persistent   2 nebs, steroid and zofran in office with improvement of symptoms. Pt started on prednisone burst for asthma. Will treat as influenza with exposure. May use zofran PRN for nausea. Fluids, rest and return to work 24 hrs after fevers resolved. Follow up with Primary Care Provider in 3-5 days with poor improvement. Seek attention sooner with worsening despite treatment. Patient verbally educated and given appropriate education sheets for each of the diagnoses and has no questions.    Primo Oliva PA-C   1/2/2018   7:54 PM    1/1/2018   [facility]     Primo Oliva PA  01/02/18 1954

## 2018-01-02 NOTE — DISCHARGE INSTRUCTIONS
- Will treat as influenza like illness with asthma exacerbation.   - Prednisone burst starting tomorrow.   - Nebs/inahler 3-4 x daily for 2-3 days.   - Zofran for nausea.   - REST and plenty of fluids.   * If poor improvement after 5 days, follow up with primary for recheck - back here or ER sooner with worsening despite treatment.

## 2018-01-07 ENCOUNTER — HOSPITAL ENCOUNTER (EMERGENCY)
Facility: HOSPITAL | Age: 28
Discharge: HOME | End: 2018-01-07
Attending: FAMILY MEDICINE | Admitting: FAMILY MEDICINE
Payer: COMMERCIAL

## 2018-01-07 ENCOUNTER — APPOINTMENT (OUTPATIENT)
Dept: ULTRASOUND IMAGING | Facility: HOSPITAL | Age: 28
End: 2018-01-07
Attending: FAMILY MEDICINE
Payer: COMMERCIAL

## 2018-01-07 VITALS
HEART RATE: 105 BPM | DIASTOLIC BLOOD PRESSURE: 67 MMHG | RESPIRATION RATE: 16 BRPM | TEMPERATURE: 98.1 F | OXYGEN SATURATION: 97 % | SYSTOLIC BLOOD PRESSURE: 91 MMHG

## 2018-01-07 DIAGNOSIS — R10.31 RLQ ABDOMINAL PAIN: ICD-10-CM

## 2018-01-07 DIAGNOSIS — K59.01 SLOW TRANSIT CONSTIPATION: ICD-10-CM

## 2018-01-07 LAB
ANION GAP SERPL CALCULATED.3IONS-SCNC: 7 MMOL/L (ref 3–14)
BASOPHILS # BLD AUTO: 0 10E9/L (ref 0–0.2)
BASOPHILS NFR BLD AUTO: 0.3 %
BUN SERPL-MCNC: 15 MG/DL (ref 7–30)
CALCIUM SERPL-MCNC: 8.9 MG/DL (ref 8.5–10.1)
CHLORIDE SERPL-SCNC: 108 MMOL/L (ref 94–109)
CO2 SERPL-SCNC: 26 MMOL/L (ref 20–32)
CREAT SERPL-MCNC: 0.79 MG/DL (ref 0.52–1.04)
CRP SERPL-MCNC: <2.9 MG/L (ref 0–8)
DIFFERENTIAL METHOD BLD: ABNORMAL
EOSINOPHIL # BLD AUTO: 0.3 10E9/L (ref 0–0.7)
EOSINOPHIL NFR BLD AUTO: 3.1 %
ERYTHROCYTE [DISTWIDTH] IN BLOOD BY AUTOMATED COUNT: 12.8 % (ref 10–15)
GFR SERPL CREATININE-BSD FRML MDRD: 88 ML/MIN/1.7M2
GLUCOSE SERPL-MCNC: 86 MG/DL (ref 70–99)
HCT VFR BLD AUTO: 46.4 % (ref 35–47)
HGB BLD-MCNC: 15.8 G/DL (ref 11.7–15.7)
IMM GRANULOCYTES # BLD: 0 10E9/L (ref 0–0.4)
IMM GRANULOCYTES NFR BLD: 0.4 %
LYMPHOCYTES # BLD AUTO: 3.8 10E9/L (ref 0.8–5.3)
LYMPHOCYTES NFR BLD AUTO: 36.7 %
MCH RBC QN AUTO: 29.9 PG (ref 26.5–33)
MCHC RBC AUTO-ENTMCNC: 34.1 G/DL (ref 31.5–36.5)
MCV RBC AUTO: 88 FL (ref 78–100)
MONOCYTES # BLD AUTO: 0.5 10E9/L (ref 0–1.3)
MONOCYTES NFR BLD AUTO: 4.4 %
NEUTROPHILS # BLD AUTO: 5.8 10E9/L (ref 1.6–8.3)
NEUTROPHILS NFR BLD AUTO: 55.1 %
NRBC # BLD AUTO: 0 10*3/UL
NRBC BLD AUTO-RTO: 0 /100
PLATELET # BLD AUTO: 287 10E9/L (ref 150–450)
POTASSIUM SERPL-SCNC: 4.1 MMOL/L (ref 3.4–5.3)
RBC # BLD AUTO: 5.28 10E12/L (ref 3.8–5.2)
SODIUM SERPL-SCNC: 141 MMOL/L (ref 133–144)
WBC # BLD AUTO: 10.5 10E9/L (ref 4–11)

## 2018-01-07 PROCEDURE — 36415 COLL VENOUS BLD VENIPUNCTURE: CPT | Performed by: FAMILY MEDICINE

## 2018-01-07 PROCEDURE — 80048 BASIC METABOLIC PNL TOTAL CA: CPT | Performed by: FAMILY MEDICINE

## 2018-01-07 PROCEDURE — 99284 EMERGENCY DEPT VISIT MOD MDM: CPT | Performed by: FAMILY MEDICINE

## 2018-01-07 PROCEDURE — 76705 ECHO EXAM OF ABDOMEN: CPT | Mod: TC

## 2018-01-07 PROCEDURE — 85025 COMPLETE CBC W/AUTO DIFF WBC: CPT | Performed by: FAMILY MEDICINE

## 2018-01-07 PROCEDURE — 25000128 H RX IP 250 OP 636: Performed by: FAMILY MEDICINE

## 2018-01-07 PROCEDURE — 99285 EMERGENCY DEPT VISIT HI MDM: CPT | Mod: 25

## 2018-01-07 PROCEDURE — 25000131 ZZH RX MED GY IP 250 OP 636 PS 637: Performed by: FAMILY MEDICINE

## 2018-01-07 PROCEDURE — 96374 THER/PROPH/DIAG INJ IV PUSH: CPT

## 2018-01-07 PROCEDURE — 86140 C-REACTIVE PROTEIN: CPT | Performed by: FAMILY MEDICINE

## 2018-01-07 PROCEDURE — 96375 TX/PRO/DX INJ NEW DRUG ADDON: CPT

## 2018-01-07 RX ORDER — MECLIZINE HYDROCHLORIDE 25 MG/1
12.5-25 TABLET ORAL 3 TIMES DAILY PRN
Qty: 15 TABLET | Refills: 0 | COMMUNITY
Start: 2018-01-07 | End: 2018-01-20

## 2018-01-07 RX ORDER — MECLIZINE HYDROCHLORIDE 25 MG/1
25 TABLET ORAL ONCE
Status: COMPLETED | OUTPATIENT
Start: 2018-01-07 | End: 2018-01-07

## 2018-01-07 RX ORDER — KETOROLAC TROMETHAMINE 30 MG/ML
30 INJECTION, SOLUTION INTRAMUSCULAR; INTRAVENOUS ONCE
Status: COMPLETED | OUTPATIENT
Start: 2018-01-07 | End: 2018-01-07

## 2018-01-07 RX ORDER — KETOROLAC TROMETHAMINE 30 MG/ML
60 INJECTION, SOLUTION INTRAMUSCULAR; INTRAVENOUS ONCE
Status: DISCONTINUED | OUTPATIENT
Start: 2018-01-07 | End: 2018-01-07

## 2018-01-07 RX ORDER — MORPHINE SULFATE 2 MG/ML
2 INJECTION, SOLUTION INTRAMUSCULAR; INTRAVENOUS ONCE
Status: COMPLETED | OUTPATIENT
Start: 2018-01-07 | End: 2018-01-07

## 2018-01-07 RX ORDER — DIPHENHYDRAMINE HYDROCHLORIDE 50 MG/ML
12.5 INJECTION INTRAMUSCULAR; INTRAVENOUS ONCE
Status: COMPLETED | OUTPATIENT
Start: 2018-01-07 | End: 2018-01-07

## 2018-01-07 RX ADMIN — KETOROLAC TROMETHAMINE 30 MG: 30 INJECTION, SOLUTION INTRAMUSCULAR at 18:16

## 2018-01-07 RX ADMIN — MORPHINE SULFATE 2 MG: 2 INJECTION, SOLUTION INTRAMUSCULAR; INTRAVENOUS at 18:54

## 2018-01-07 RX ADMIN — DIPHENHYDRAMINE HYDROCHLORIDE 12.5 MG: 50 INJECTION, SOLUTION INTRAMUSCULAR; INTRAVENOUS at 18:54

## 2018-01-07 RX ADMIN — MECLIZINE HYDROCHLORIDE 25 MG: 25 TABLET ORAL at 19:36

## 2018-01-07 ASSESSMENT — ENCOUNTER SYMPTOMS
MUSCULOSKELETAL NEGATIVE: 1
FEVER: 0
CONSTIPATION: 0
DYSURIA: 0
NAUSEA: 0
SHORTNESS OF BREATH: 0
WEAKNESS: 0
DIAPHORESIS: 0
ABDOMINAL PAIN: 1
DIZZINESS: 1
ACTIVITY CHANGE: 1
VOMITING: 0
DIARRHEA: 0

## 2018-01-07 NOTE — ED NOTES
"Pt arrives to ER with report of RLQ abdominal pain for one week with vomiting, denies loose stools. Pt also reports being dizzy.  Pt states in to er one week ago and dx with \"flu\".    "

## 2018-01-07 NOTE — ED AVS SNAPSHOT
[facility]    [address] [address]    Phone:  [phone]                                       Ihsan Millan   MRN: [id number]    Department:  [facility]   Date of Visit:  1/7/2018           Patient Information     Date Of Birth          1990        Your diagnoses for this visit were:     RLQ abdominal pain     Slow transit constipation        You were seen by Joann Baugh MD.      Follow-up Information     Follow up with Temo Houston DO In 2 days.    Specialty:  Family Practice    Why:  Follow up ED visit    Contact information:    [facility]  [address] [facility] [id number] [phone]          Discharge Instructions       This could still be early appendicitis, so if symptoms do not go away after you empty your bowels, go to your primary physician for reevaluation.  At present labs and ultrasound do not indicate appendicitis.    Dizziness can be treated with Meclizine as needed.  If it does not remit, follow up with primary care for reevaluation.    Discharge References/Attachments     CONSTIPATION, TREATING (ENGLISH)         Review of your medicines      START taking        Dose / Directions Last dose taken    magnesium hydroxide 400 MG/5ML suspension   Commonly known as:  MILK OF MAGNESIA   Dose:  30 mL   Quantity:  360 mL        Take 30 mLs by mouth daily as needed for constipation   Refills:  1        meclizine 25 MG tablet   Commonly known as:  ANTIVERT   Dose:  12.5-25 mg   Quantity:  15 tablet        Take 0.5-1 tablets (12.5-25 mg) by mouth 3 times daily as needed for dizziness   Refills:  0          Our records show that you are taking the medicines listed below. If these are incorrect, please call your family doctor or clinic.        Dose / Directions Last dose taken    albuterol 108 (90 BASE) MCG/ACT Inhaler   Commonly known as:  PROAIR HFA/PROVENTIL HFA/VENTOLIN HFA   Dose:  2 puff   Quantity:  1  Inhaler        Inhale 2 puffs into the lungs every 4 hours as needed for shortness of breath / dyspnea or wheezing   Refills:  2        estradiol 1 MG tablet   Commonly known as:  ESTRACE   Dose:  1 mg   Quantity:  90 tablet        Take 1 tablet (1 mg) by mouth daily   Refills:  1        OMEPRAZOLE PO   Dose:  20 mg        Take 20 mg by mouth every morning   Refills:  0        predniSONE 20 MG tablet   Commonly known as:  DELTASONE   Quantity:  10 tablet        Take two tablets (= 40mg) each day for 5 (five) days   Refills:  0        TYLENOL PO   Dose:  1000 mg        Take 1,000 mg by mouth every 8 hours as needed for mild pain or fever   Refills:  0                Prescriptions were sent or printed at these locations (2 Prescriptions)                   [facility] - [place], [place] - [address] AT [facility] [address]   [address], [place] [address]    Telephone:  [phone]   Fax:  [phone]   Hours:                  E-Prescribed (1 of 2)         magnesium hydroxide (MILK OF MAGNESIA) 400 MG/5ML suspension                 Not Printed or Sent (1 of 2)         meclizine (ANTIVERT) 25 MG tablet                Procedures and tests performed during your visit     Basic metabolic panel    CBC with platelets differential    CRP inflammation    US Abdomen Limited      Orders Needing Specimen Collection     None      Pending Results     Date and Time Order Name Status Description    1/7/2018 1810 US Abdomen Limited In process             Pending Culture Results     No orders found from 1/5/2018 to 1/8/2018.            Thank you for choosing [facility]       Thank you for choosing [facility] for your care. Our goal is always to provide you with excellent care. Hearing back from our patients is one way we can continue to improve our services. Please take a few minutes to complete the written survey that you may receive in the mail after you visit with us. Thank you!        MyChart Information      [employer] gives you secure access to your electronic health record. If you see a primary care provider, you can also send messages to your care team and make appointments. If you have questions, please call your primary care clinic.  If you do not have a primary care provider, please call [phone] and they will assist you.        Care EveryWhere ID     This is your Care EveryWhere ID. This could be used by other organizations to access your [facility] medical records  [id number]        Equal Access to Services     ZHENG BLANCO : Hadii clifton garzao Sophani, waaxda luqadaha, qaybta kaalmada adeeglissa, leah dennis. So [facility] [phone].    ATENCIÓN: Si habla español, tiene a spear disposición servicios gratuitos de asistencia lingüística. Llame al [phone].    We comply with applicable federal civil rights laws and [place] laws. We do not discriminate on the basis of race, color, national origin, age, disability, sex, sexual orientation, or gender identity.            After Visit Summary       This is your record. Keep this with you and show to your community pharmacist(s) and doctor(s) at your next visit.

## 2018-01-07 NOTE — ED AVS SNAPSHOT
[facility]    [address] [address]    Phone:  [phone]                                       Ihsan Millan   MRN: [id number]    Department[facility]  [facility]   Date of Visit:  1/7/2018           After Visit Summary Signature Page     I have received my discharge instructions, and my questions have been answered. I have discussed any challenges I see with this plan with the nurse or doctor.    ..........................................................................................................................................  Patient/Patient Representative Signature      ..........................................................................................................................................  Patient Representative Print Name and Relationship to Patient    ..................................................               ................................................  Date                                            Time    ..........................................................................................................................................  Reviewed by Signature/Title    ...................................................              ..............................................  Date                                                            Time

## 2018-01-08 NOTE — ED PROVIDER NOTES
History     Chief Complaint   Patient presents with     Abdominal Pain     RLQ pain, vomiting     Dizziness     one week     HPI  Ihsan Millan is a 27 year old female who comes to the ED for 2 problems - RLQ pain with vomiting and dizziness.  She states she has been dizzy for a week, but the pain in her abdomen is new today.  She is curled up in a ball on the gurney and only reluctantly repositions for exam.  She has a history of endometriosis and has had a EVELYNE with BSO, so ovaries are not a concern.  She does still have an appendix.  The dizziness is described as unsteadiness, not spinning, and occurs when she changes posture, turns her head or while she is walking.    Problem List:    Patient Active Problem List    Diagnosis Date Noted     RUQ abdominal pain 02/12/2017     Priority: Medium     Pneumonia of right middle lobe due to infectious organism (H) 02/12/2017     Priority: Medium     Tobacco abuse 02/12/2017     Priority: Medium     Status post laparoscopic assisted vaginal hysterectomy (LAVH) 06/25/2015     Priority: Medium     Surgery, elective 06/24/2015     Priority: Medium     Endometriosis 04/16/2015     Priority: Medium     Depo lupron 4/15.  Laparoscopy 3/15       ASCUS on Pap smear 07/18/2014     Priority: Medium     + hpv colpo 7/14       Postpartum depression 07/18/2014     Priority: Medium     zoloft rx       H. pylori infection      Priority: Medium     Moderate persistent asthma 03/19/2014     Priority: Medium     GERD (gastroesophageal reflux disease) 09/12/2013     Priority: Medium     Intermittent asthma 09/12/2013     Priority: Medium     Increased with pregnancy  Send for eval and asthma action plan  Smoking cessation counseling  Flu shot given          Past Medical History:    Past Medical History:   Diagnosis Date     Asthma      GERD (gastroesophageal reflux disease)      H. pylori infection        Past Surgical History:    Past Surgical History:   Procedure Laterality Date      COLONOSCOPY N/A 11/6/2014    Procedure: COLONOSCOPY;  Surgeon: Zacarias Paz MD;  Location: [facility]     DILATION AND CURETTAGE, HYSTEROSCOPY DIAGNOSTIC, COMBINED  8/1/2014    Procedure: COMBINED DILATION AND CURETTAGE, HYSTEROSCOPY DIAGNOSTIC;  Surgeon: Rodolfo Clifford MD;  [facility]     ESOPHAGOSCOPY, GASTROSCOPY, DUODENOSCOPY (EGD), COMBINED N/A 9/5/2014    Procedure: COMBINED ESOPHAGOSCOPY, GASTROSCOPY, DUODENOSCOPY (EGD);  Surgeon: Zacarias Paz MD;  [facility]     LAPAROSCOPIC ASSISTED HYSTERECTOMY VAGINAL N/A 6/24/2015    Procedure: LAPAROSCOPIC ASSISTED HYSTERECTOMY VAGINAL;  Surgeon: Rodolfo Clifford MD;  [facility]     LAPAROSCOPIC OOPHORECTOMY Right 3/23/2016    Procedure: LAPAROSCOPIC OOPHORECTOMY;  Surgeon: Rodolfo Clifford MD;  [facility]     LAPAROSCOPIC SALPINGO-OOPHORECTOMY Left 5/3/2017    Procedure: LAPAROSCOPIC SALPINGO-OOPHORECTOMY;  LAPAROSCOPIC LEFT OOPHORECTOMY;  Surgeon: Rodolfo Clifford MD;  [facility]     LAPAROSCOPY DIAGNOSTIC (GYN) N/A 3/18/2015    Procedure: LAPAROSCOPY DIAGNOSTIC (GYN);  Surgeon: Rodolfo Clifford MD;  Location: [facility]     no surgeries       SALPINGECTOMY Bilateral 6/24/2015    Procedure: SALPINGECTOMY;  Surgeon: Rodolfo Clifford MD;  Location: [facility]       Family History:    Family History   Problem Relation Age of Onset     Asthma Mother      Unknown/Adopted Maternal Grandmother      Unknown/Adopted Maternal Grandfather        Social History:  Marital Status:  Single [1]  Social History   Substance Use Topics     Smoking status: Current Every Day Smoker     Packs/day: 0.50     Years: 7.00     Types: Cigarettes     Smokeless tobacco: Never Used      Comment: declines quitline referral     Alcohol use Yes      Comment: social        Medications:      magnesium hydroxide (MILK OF MAGNESIA) 400 MG/5ML suspension   meclizine (ANTIVERT) 25 MG tablet   predniSONE (DELTASONE) 20 MG tablet   Acetaminophen (TYLENOL PO)   estradiol (ESTRACE) 1 MG tablet    OMEPRAZOLE PO   albuterol (PROAIR HFA, PROVENTIL HFA, VENTOLIN HFA) 108 (90 BASE) MCG/ACT inhaler         Review of Systems   Constitutional: Positive for activity change. Negative for diaphoresis and fever.   HENT: Negative for congestion and ear pain.    Respiratory: Negative for shortness of breath.    Cardiovascular: Negative for chest pain.   Gastrointestinal: Positive for abdominal pain. Negative for constipation, diarrhea, nausea and vomiting.   Genitourinary: Negative for dysuria.   Musculoskeletal: Negative.    Skin: Negative.    Neurological: Positive for dizziness. Negative for syncope and weakness.       Physical Exam   BP: 117/69  Pulse: 105  Temp: 98.7  F (37.1  C)  Resp: 18  SpO2: 94 %      Physical Exam   Constitutional: She is oriented to person, place, and time. She appears well-developed and well-nourished. She appears distressed.   HENT:   Head: Normocephalic and atraumatic.   Neck: Normal range of motion. Neck supple.   Cardiovascular: Normal rate, regular rhythm and normal heart sounds.    No murmur heard.  Pulmonary/Chest: Effort normal and breath sounds normal. No respiratory distress.   Abdominal: Soft. Bowel sounds are normal. She exhibits no distension. There is tenderness in the right lower quadrant. There is guarding. There is no rebound, no tenderness at McBurney's point and negative Hanna's sign.   Neurological: She is alert and oriented to person, place, and time.   Skin: Skin is warm and dry.   Psychiatric: She has a normal mood and affect.   Vitals reviewed.      ED Course     ED Course     Procedures      Labs Ordered and Resulted from Time of ED Arrival Up to the Time of Departure from the ED   CBC WITH PLATELETS DIFFERENTIAL - Abnormal; Notable for the following:        Result Value    RBC Count 5.28 (*)     Hemoglobin 15.8 (*)     All other components within normal limits   BASIC METABOLIC PANEL   CRP INFLAMMATION       Assessments & Plan (with Medical Decision Making)    Patient received Toradol without improvement in her pain, then Morphine 2mg with Benadryl 12.5 for further pain control.  US abdomen shows large amount of peristalsis in the RLQ, no evidence of appendicitis.  Treated dizziness with Meclizine.  Patient discharged with a prescription for this and MOM to assist with evacuating bowels.  Follow up with primary care for reevaluation if either of the problems are still active in 3 days.    I have reviewed the nursing notes.    I have reviewed the findings, diagnosis, plan and need for follow up with the patient.  New Prescriptions    MAGNESIUM HYDROXIDE (MILK OF MAGNESIA) 400 MG/5ML SUSPENSION    Take 30 mLs by mouth daily as needed for constipation    MECLIZINE (ANTIVERT) 25 MG TABLET    Take 0.5-1 tablets (12.5-25 mg) by mouth 3 times daily as needed for dizziness       Final diagnoses:   RLQ abdominal pain   Slow transit constipation       1/7/2018   [facility]     Joann Baugh MD  01/07/18 1956

## 2018-01-08 NOTE — ED NOTES
Face to face report given with opportunity to observe patient.    Report given to PEDRO Garner   1/7/2018  7:05 PM

## 2018-01-08 NOTE — PROGRESS NOTES
Verbal preliminary results for the STAT Ultrasound were given to Dr. Samantha Lopez at 1900.  Final report by radiologist to follow.

## 2018-01-08 NOTE — ED NOTES
Patient states that pain is now 5/10 from 7/10. She denies nausea or vomiting at this time. Does state she has some dizziness and Meclizine is given as ordered.

## 2018-01-08 NOTE — DISCHARGE INSTRUCTIONS
This could still be early appendicitis, so if symptoms do not go away after you empty your bowels, go to your primary physician for reevaluation.  At present labs and ultrasound do not indicate appendicitis.    Dizziness can be treated with Meclizine as needed.  If it does not remit, follow up with primary care for reevaluation.

## 2018-01-20 ENCOUNTER — HOSPITAL ENCOUNTER (EMERGENCY)
Facility: HOSPITAL | Age: 28
Discharge: HOME | End: 2018-01-21
Attending: FAMILY MEDICINE | Admitting: FAMILY MEDICINE
Payer: COMMERCIAL

## 2018-01-20 DIAGNOSIS — R10.31 RLQ ABDOMINAL PAIN: ICD-10-CM

## 2018-01-20 DIAGNOSIS — N28.82 DILATION OF RIGHT URETER: ICD-10-CM

## 2018-01-20 LAB
ALBUMIN UR-MCNC: NEGATIVE MG/DL
APPEARANCE UR: CLEAR
BASOPHILS # BLD AUTO: 0 10E9/L (ref 0–0.2)
BASOPHILS NFR BLD AUTO: 0.3 %
BILIRUB UR QL STRIP: NEGATIVE
COLOR UR AUTO: YELLOW
DIFFERENTIAL METHOD BLD: NORMAL
EOSINOPHIL # BLD AUTO: 0.2 10E9/L (ref 0–0.7)
EOSINOPHIL NFR BLD AUTO: 4.1 %
ERYTHROCYTE [DISTWIDTH] IN BLOOD BY AUTOMATED COUNT: 12.9 % (ref 10–15)
GLUCOSE UR STRIP-MCNC: NEGATIVE MG/DL
HCT VFR BLD AUTO: 36.3 % (ref 35–47)
HGB BLD-MCNC: 12.4 G/DL (ref 11.7–15.7)
HGB UR QL STRIP: NEGATIVE
IMM GRANULOCYTES # BLD: 0 10E9/L (ref 0–0.4)
IMM GRANULOCYTES NFR BLD: 0.2 %
KETONES UR STRIP-MCNC: NEGATIVE MG/DL
LEUKOCYTE ESTERASE UR QL STRIP: NEGATIVE
LYMPHOCYTES # BLD AUTO: 2.6 10E9/L (ref 0.8–5.3)
LYMPHOCYTES NFR BLD AUTO: 44.5 %
MCH RBC QN AUTO: 30.2 PG (ref 26.5–33)
MCHC RBC AUTO-ENTMCNC: 34.2 G/DL (ref 31.5–36.5)
MCV RBC AUTO: 89 FL (ref 78–100)
MONOCYTES # BLD AUTO: 0.3 10E9/L (ref 0–1.3)
MONOCYTES NFR BLD AUTO: 5.6 %
NEUTROPHILS # BLD AUTO: 2.7 10E9/L (ref 1.6–8.3)
NEUTROPHILS NFR BLD AUTO: 45.3 %
NITRATE UR QL: NEGATIVE
NRBC # BLD AUTO: 0 10*3/UL
NRBC BLD AUTO-RTO: 0 /100
PH UR STRIP: 6 PH (ref 4.7–8)
PLATELET # BLD AUTO: 203 10E9/L (ref 150–450)
RBC # BLD AUTO: 4.1 10E12/L (ref 3.8–5.2)
SOURCE: NORMAL
SP GR UR STRIP: 1.02 (ref 1–1.03)
UROBILINOGEN UR STRIP-MCNC: NORMAL MG/DL (ref 0–2)
WBC # BLD AUTO: 5.9 10E9/L (ref 4–11)

## 2018-01-20 PROCEDURE — 99285 EMERGENCY DEPT VISIT HI MDM: CPT | Mod: 25

## 2018-01-20 PROCEDURE — 99285 EMERGENCY DEPT VISIT HI MDM: CPT | Performed by: FAMILY MEDICINE

## 2018-01-20 PROCEDURE — 85025 COMPLETE CBC W/AUTO DIFF WBC: CPT | Performed by: FAMILY MEDICINE

## 2018-01-20 PROCEDURE — 36415 COLL VENOUS BLD VENIPUNCTURE: CPT | Performed by: FAMILY MEDICINE

## 2018-01-20 PROCEDURE — 81003 URINALYSIS AUTO W/O SCOPE: CPT | Performed by: FAMILY MEDICINE

## 2018-01-20 PROCEDURE — 80048 BASIC METABOLIC PNL TOTAL CA: CPT | Performed by: FAMILY MEDICINE

## 2018-01-20 NOTE — ED AVS SNAPSHOT
[facility]    [address] [address]    Phone:  [phone]                                       Ihsan Millan   MRN: [id number]    Department:  [facility]   Date of Visit:  1/20/2018           Patient Information     Date Of Birth          1990        Your diagnoses for this visit were:     RLQ abdominal pain     Dilation of right ureter        You were seen by Lisa Erwin MD.      Follow-up Information     Follow up with Temo Houston DO.    Specialty:  Family Practice    Why:  for ER follow up and to discuss CT results     Contact information:    Pending sale to [facility]  [address] [facility] [id number] [phone]          Discharge Instructions       What to expect when you have contrast    During your exam, we will inject  contrast  into your vein or artery. (Contrast is a clear liquid with iodine in it. It shows up on X-rays.)    You may feel warm or hot. You may have a metal taste in your mouth and a slight upset stomach. You may also feel pressure near the kidneys and bladder. These effects will last about 1 to 3 minutes.    Please tell us if you have:    Sneezing     Itching    Hives     Swelling in the face    A hoarse voice    Breathing problems    Other new symptoms    Serious problems are rare.  They may include:    Irregular heartbeat     Seizures    Kidney failure              Tissue damage    Shock      Death    If you have any problems during the exam, we  will treat them right away.    When you get home    Call your hospital if you have any new symptoms in the next 2 days, like hives or swelling. (Phone numbers are at the bottom of this page.) Or call your family doctor.     If you have wheezing or trouble breathing, call 911.    Self-care  -Drink at least 4 extra glasses of water today.   This reduces the stress on your kidneys.  -Keep taking your regular medicines.    The contrast will pass out of your  body in your  Urine(pee). This will happen in the next 24 hours. You  will not feel this. Your urine will not  change color.    If you have kidney problems or take metformin    Drink 4 to 8 large glasses of water for the next  2 days, if you are not on a fluid restriction.    ?If you take metformin (Glucophage or Glucovance) for diabetes, keep taking it.      ?Your kidney function tests are abnormal.  If you take Metformin, do not take it for 48 hours. Please go to your clinic for a blood test within 3 days after your exam before the restarting this medicine.     (Note to provider:please give patient prescription for lab tests.)    ?Special instructions: Drink an extra 4 glasses of water to flush out the contrast.     I have read and understand the above information.    Patient Sign Here:______________________________________Date:________Time:______    Staff Sign Here:________________________________________Date:_______Time:______      Radiology Departments:     ?[facility]: [phone] ?[facility]: [phone]     ?[facility]: [phone] ?[facility]:[phone]      ?[place]: [phone]  ?[facility]: [phone]  ?[facility]:[phone]    ?[facility]:[phone]  ?[facility]:[phone]       Review of your medicines      Our records show that you are taking the medicines listed below. If these are incorrect, please call your family doctor or clinic.        Dose / Directions Last dose taken    albuterol 108 (90 BASE) MCG/ACT Inhaler   Commonly known as:  PROAIR HFA/PROVENTIL HFA/VENTOLIN HFA   Dose:  2 puff   Quantity:  1 Inhaler        Inhale 2 puffs into the lungs every 4 hours as needed for shortness of breath / dyspnea or wheezing   Refills:  2        estradiol 1 MG tablet   Commonly known as:  ESTRACE   Dose:  1 mg   Quantity:  90 tablet        Take 1 tablet (1 mg) by mouth daily   Refills:  1        OMEPRAZOLE PO   Dose:  20 mg        Take 20 mg by mouth every morning   Refills:  0        TYLENOL  PO   Dose:  1000 mg        Take 1,000 mg by mouth every 8 hours as needed for mild pain or fever   Refills:  0                Procedures and tests performed during your visit     Basic metabolic panel    CBC with platelets differential    CT Abdomen w Contrast    UA reflex to Microscopic and Culture      Orders Needing Specimen Collection     None      Pending Results     Date and Time Order Name Status Description    1/21/2018 0017 CT Abdomen w Contrast In process             Pending Culture Results     No orders found for last 3 day(s).            Thank you for choosing [facility]       Thank you for choosing [facility] for your care. Our goal is always to provide you with excellent care. Hearing back from our patients is one way we can continue to improve our services. Please take a few minutes to complete the written survey that you may receive in the mail after you visit with us. Thank you!        [facility] Information     [employer] gives you secure access to your electronic health record. If you see a primary care provider, you can also send messages to your care team and make appointments. If you have questions, please call your primary care clinic.  If you do not have a primary care provider, please call [phone] and they will assist you.        Care EveryWhere ID     This is your Care EveryWhere ID. This could be used by other organizations to access your [facility] medical records  [id number]        Equal Access to Services     ZHENG BLANCO : Ancelmo Saeed, hermes de jesus, aisha flowers, leah dennis. So [facility] [phone].    ATENCIÓN: Si habla español, tiene a spear disposición servicios gratuitos de asistencia lingüística. Krunal al [phone].    We comply with applicable federal civil rights laws and [place] laws. We do not discriminate on the basis of race, color, national origin, age, disability, sex, sexual orientation, or gender identity.             After Visit Summary       This is your record. Keep this with you and show to your community pharmacist(s) and doctor(s) at your next visit.

## 2018-01-20 NOTE — ED AVS SNAPSHOT
[facility]    [address] [address]    Phone:  [phone]                                       Ihsan Millan   MRN: [id number]    Department:  [facility]   Date of Visit:  1/20/2018           After Visit Summary Signature Page     I have received my discharge instructions, and my questions have been answered. I have discussed any challenges I see with this plan with the nurse or doctor.    ..........................................................................................................................................  Patient/Patient Representative Signature      ..........................................................................................................................................  Patient Representative Print Name and Relationship to Patient    ..................................................               ................................................  Date                                            Time    ..........................................................................................................................................  Reviewed by Signature/Title    ...................................................              ..............................................  Date                                                            Time

## 2018-01-21 ENCOUNTER — APPOINTMENT (OUTPATIENT)
Dept: CT IMAGING | Facility: HOSPITAL | Age: 28
End: 2018-01-21
Attending: FAMILY MEDICINE
Payer: COMMERCIAL

## 2018-01-21 VITALS
SYSTOLIC BLOOD PRESSURE: 97 MMHG | DIASTOLIC BLOOD PRESSURE: 54 MMHG | RESPIRATION RATE: 16 BRPM | HEART RATE: 82 BPM | TEMPERATURE: 97.9 F | OXYGEN SATURATION: 98 %

## 2018-01-21 LAB
ANION GAP SERPL CALCULATED.3IONS-SCNC: 8 MMOL/L (ref 3–14)
BUN SERPL-MCNC: 16 MG/DL (ref 7–30)
CALCIUM SERPL-MCNC: 8.5 MG/DL (ref 8.5–10.1)
CHLORIDE SERPL-SCNC: 108 MMOL/L (ref 94–109)
CO2 SERPL-SCNC: 26 MMOL/L (ref 20–32)
CREAT SERPL-MCNC: 0.77 MG/DL (ref 0.52–1.04)
GFR SERPL CREATININE-BSD FRML MDRD: >90 ML/MIN/1.7M2
GLUCOSE SERPL-MCNC: 81 MG/DL (ref 70–99)
POTASSIUM SERPL-SCNC: 3.7 MMOL/L (ref 3.4–5.3)
SODIUM SERPL-SCNC: 142 MMOL/L (ref 133–144)

## 2018-01-21 PROCEDURE — 25000128 H RX IP 250 OP 636: Performed by: FAMILY MEDICINE

## 2018-01-21 PROCEDURE — 96375 TX/PRO/DX INJ NEW DRUG ADDON: CPT | Mod: 59

## 2018-01-21 PROCEDURE — 96361 HYDRATE IV INFUSION ADD-ON: CPT

## 2018-01-21 PROCEDURE — 74160 CT ABDOMEN W/CONTRAST: CPT | Mod: TC

## 2018-01-21 PROCEDURE — 96374 THER/PROPH/DIAG INJ IV PUSH: CPT

## 2018-01-21 RX ORDER — IOPAMIDOL 612 MG/ML
100 INJECTION, SOLUTION INTRAVASCULAR ONCE
Status: COMPLETED | OUTPATIENT
Start: 2018-01-21 | End: 2018-01-21

## 2018-01-21 RX ORDER — ONDANSETRON 2 MG/ML
4 INJECTION INTRAMUSCULAR; INTRAVENOUS ONCE
Status: COMPLETED | OUTPATIENT
Start: 2018-01-21 | End: 2018-01-21

## 2018-01-21 RX ORDER — MORPHINE SULFATE 4 MG/ML
2 INJECTION, SOLUTION INTRAMUSCULAR; INTRAVENOUS ONCE
Status: COMPLETED | OUTPATIENT
Start: 2018-01-21 | End: 2018-01-21

## 2018-01-21 RX ORDER — ONDANSETRON 4 MG/1
4 TABLET, FILM COATED ORAL EVERY 8 HOURS PRN
Qty: 6 TABLET | Refills: 0 | Status: SHIPPED | OUTPATIENT
Start: 2018-01-21 | End: 2018-03-29

## 2018-01-21 RX ADMIN — ONDANSETRON 4 MG: 2 INJECTION INTRAMUSCULAR; INTRAVENOUS at 00:29

## 2018-01-21 RX ADMIN — SODIUM CHLORIDE 1000 ML: 9 INJECTION, SOLUTION INTRAVENOUS at 00:30

## 2018-01-21 RX ADMIN — MORPHINE SULFATE 2 MG: 4 INJECTION, SOLUTION INTRAMUSCULAR; INTRAVENOUS at 00:30

## 2018-01-21 RX ADMIN — IOPAMIDOL 100 ML: 612 INJECTION, SOLUTION INTRAVENOUS at 01:17

## 2018-01-21 NOTE — DISCHARGE INSTRUCTIONS
What to expect when you have contrast    During your exam, we will inject  contrast  into your vein or artery. (Contrast is a clear liquid with iodine in it. It shows up on X-rays.)    You may feel warm or hot. You may have a metal taste in your mouth and a slight upset stomach. You may also feel pressure near the kidneys and bladder. These effects will last about 1 to 3 minutes.    Please tell us if you have:    Sneezing     Itching    Hives     Swelling in the face    A hoarse voice    Breathing problems    Other new symptoms    Serious problems are rare.  They may include:    Irregular heartbeat     Seizures    Kidney failure              Tissue damage    Shock      Death    If you have any problems during the exam, we  will treat them right away.    When you get home    Call your hospital if you have any new symptoms in the next 2 days, like hives or swelling. (Phone numbers are at the bottom of this page.) Or call your family doctor.     If you have wheezing or trouble breathing, call 911.    Self-care  -Drink at least 4 extra glasses of water today.   This reduces the stress on your kidneys.  -Keep taking your regular medicines.    The contrast will pass out of your body in your  Urine(pee). This will happen in the next 24 hours. You  will not feel this. Your urine will not  change color.    If you have kidney problems or take metformin    Drink 4 to 8 large glasses of water for the next  2 days, if you are not on a fluid restriction.    ?If you take metformin (Glucophage or Glucovance) for diabetes, keep taking it.      ?Your kidney function tests are abnormal.  If you take Metformin, do not take it for 48 hours. Please go to your clinic for a blood test within 3 days after your exam before the restarting this medicine.     (Note to provider:please give patient prescription for lab tests.)    ?Special instructions: Drink an extra 4 glasses of water to flush out the contrast.     I have read and understand the  above information.    Patient Sign Here:______________________________________Date:________Time:______    Staff Sign Here:________________________________________Date:_______Time:______      Radiology Departments:     ?[facility]: [phone] ?[place]: [phone]     ?[place]: [phone] ?[facility]:[phone]      ?[facility]: [phone]  ?[place]: [phone]  ?[place]:[phone]    ?[facility]:[phone]  ?[facility]:[phone]

## 2018-01-21 NOTE — ED NOTES
Discharge instructions reviewed with patient, and patient verbalized understanding. Pt ambulated to friend that she walked here with's ED room. Pt will walk home.

## 2018-01-21 NOTE — ED NOTES
Pt ambulatory to ED room 1 with c/o 4 days of n/v and 2 days of diarrhea-which has since resolved approx 2 days ago. Pt stating that she had a regular bowel movement today. Pt has not been on abx recently. Pt does have her appendix and gallbladder and is unsure what makes pain better/worse. Pt stating that she does have her appedix and gallbladder. Pt does not have periods-had a hysterectomy. Pt denies dysuria, vaginal discharge, or exposure to STDs.

## 2018-01-21 NOTE — ED PROVIDER NOTES
History     Chief Complaint   Patient presents with     Abdominal Pain     HPI  Ihsan Millan is a 27 year old female who presents with recurrent abdominal pain . She has been seen in the ER previously for lower abdominal pain . She does not have her ovaries. She has been advised to see her primary care.  Pain is still located in the RLQ . No fever or other systemic symtpoms. She has persistent nausea     Problem List:    Patient Active Problem List    Diagnosis Date Noted     RUQ abdominal pain 02/12/2017     Priority: Medium     Pneumonia of right middle lobe due to infectious organism (H) 02/12/2017     Priority: Medium     Tobacco abuse 02/12/2017     Priority: Medium     Status post laparoscopic assisted vaginal hysterectomy (LAVH) 06/25/2015     Priority: Medium     Surgery, elective 06/24/2015     Priority: Medium     Endometriosis 04/16/2015     Priority: Medium     Depo lupron 4/15.  Laparoscopy 3/15       ASCUS on Pap smear 07/18/2014     Priority: Medium     + hpv colpo 7/14       Postpartum depression 07/18/2014     Priority: Medium     zoloft rx       H. pylori infection      Priority: Medium     Moderate persistent asthma 03/19/2014     Priority: Medium     GERD (gastroesophageal reflux disease) 09/12/2013     Priority: Medium     Intermittent asthma 09/12/2013     Priority: Medium     Increased with pregnancy  Send for eval and asthma action plan  Smoking cessation counseling  Flu shot given          Past Medical History:    Past Medical History:   Diagnosis Date     Asthma      GERD (gastroesophageal reflux disease)      H. pylori infection        Past Surgical History:    Past Surgical History:   Procedure Laterality Date     COLONOSCOPY N/A 11/6/2014    Procedure: COLONOSCOPY;  Surgeon: Zacarias Paz MD;  Location: [facility]     DILATION AND CURETTAGE, HYSTEROSCOPY DIAGNOSTIC, COMBINED  8/1/2014    Procedure: COMBINED DILATION AND CURETTAGE, HYSTEROSCOPY DIAGNOSTIC;  Surgeon: Rodolfo Clifford,  MD;  Location[facility]     ESOPHAGOSCOPY, GASTROSCOPY, DUODENOSCOPY (EGD), COMBINED N/A 9/5/2014    Procedure: COMBINED ESOPHAGOSCOPY, GASTROSCOPY, DUODENOSCOPY (EGD);  Surgeon: Zacarias Paz MD;  [facility]     LAPAROSCOPIC ASSISTED HYSTERECTOMY VAGINAL N/A 6/24/2015    Procedure: LAPAROSCOPIC ASSISTED HYSTERECTOMY VAGINAL;  Surgeon: Rodolfo Clifford MD;  [facility]     LAPAROSCOPIC OOPHORECTOMY Right 3/23/2016    Procedure: LAPAROSCOPIC OOPHORECTOMY;  Surgeon: Rodolfo Clifford MD;  [facility]     LAPAROSCOPIC SALPINGO-OOPHORECTOMY Left 5/3/2017    Procedure: LAPAROSCOPIC SALPINGO-OOPHORECTOMY;  LAPAROSCOPIC LEFT OOPHORECTOMY;  Surgeon: Rodolfo Clifford MD;  [facility]     LAPAROSCOPY DIAGNOSTIC (GYN) N/A 3/18/2015    Procedure: LAPAROSCOPY DIAGNOSTIC (GYN);  Surgeon: Rodolfo Clifford MD;  Location: [facility]     no surgeries       SALPINGECTOMY Bilateral 6/24/2015    Procedure: SALPINGECTOMY;  Surgeon: Rodolfo Clifford MD;  [facility]       Family History:    Family History   Problem Relation Age of Onset     Asthma Mother      Unknown/Adopted Maternal Grandmother      Unknown/Adopted Maternal Grandfather        Social History:  Marital Status:  Single [1]  Social History   Substance Use Topics     Smoking status: Current Every Day Smoker     Packs/day: 0.50     Years: 7.00     Types: Cigarettes     Smokeless tobacco: Never Used      Comment: declines quitline referral     Alcohol use Yes      Comment: social        Medications:      Acetaminophen (TYLENOL PO)   estradiol (ESTRACE) 1 MG tablet   OMEPRAZOLE PO   albuterol (PROAIR HFA, PROVENTIL HFA, VENTOLIN HFA) 108 (90 BASE) MCG/ACT inhaler         Review of Systems   Constitutional: Positive for appetite change. Negative for chills, diaphoresis, fatigue and fever.   HENT: Negative.    Eyes: Negative.    Respiratory: Negative.    Cardiovascular: Negative.    Gastrointestinal: Positive for abdominal pain, nausea and vomiting. Negative for blood in  stool and constipation.   Endocrine: Negative.    Genitourinary: Negative.    Musculoskeletal: Negative.    Skin: Negative.    Neurological: Negative.        Physical Exam   BP: 112/73  Pulse: 86  Temp: 96.3  F (35.7  C)  Resp: 16  SpO2: 98 %      Physical Exam   Constitutional: She is oriented to person, place, and time. She appears well-developed and well-nourished. No distress.   HENT:   Head: Normocephalic and atraumatic.   Right Ear: External ear normal.   Left Ear: External ear normal.   Mouth/Throat: Oropharynx is clear and moist.   Eyes: Pupils are equal, round, and reactive to light.   Neck: Normal range of motion. Neck supple.   Cardiovascular: Normal rate and regular rhythm.    Pulmonary/Chest: Effort normal and breath sounds normal.   Abdominal: Soft. She exhibits no distension. There is tenderness. There is no rebound and no guarding.   Musculoskeletal: Normal range of motion.   Neurological: She is alert and oriented to person, place, and time.   Skin: Skin is warm and dry. She is not diaphoretic.   Psychiatric: She has a normal mood and affect.   Nursing note and vitals reviewed.      ED Course     ED Course     Procedures          Patient arrived to ED with compaint of recurrent RLQ abdominal pain . Patient triaged to exam room Vital signs taken  No significant abnormality Intravenous fluids were administered,zofran given. 2 mg morphine for pain . CT abdomen and pelvis ordered showing extrarenal pelvis with ureteral dilation on the right . Unclear significance . No stone. No appendicitis. Reviewed results of CT scan with patient . Patient will be discharged with instructions to follow up with primary care to review results of her CT scan and discuss further recommendations for further evaluation and management of her abdominal pain which is now becoming a chronic issue.   Results for orders placed or performed during the hospital encounter of 01/20/18   CT Abdomen w Contrast    Narrative     PROCEDURE: CT ABDOMEN W CONTRAST 1/21/2018 1:23 AM    HISTORY: pain;     COMPARISONS: None.    Meds/Dose Given: [id number]    TECHNIQUE: Axial postcontrast enhanced images with coronal and  sagittal reformatted images.    FINDINGS: There are some patchy densities at the anterior right lung  base and posteriorly at the left lung base. There are vague  groundglass opacities in both lung bases, a stable finding.    No focal abnormality is seen in the liver, spleen, adrenal glands,  pancreas or in either kidney. Slightly prominent extrarenal pelvis on  the right is a stable finding. No ureteral stone is seen.    There are fluid-filled small bowel loops which are not dilated. Gas  and stool is seen throughout the colon. There is some mild  diverticulosis but no inflammatory changes seen to suggest  diverticulitis. No inflammatory changes seen in the right lower  quadrant to suggest appendicitis. There is no focal fluid collection.    There is no focal bone lesion. No compression fracture is seen.         Impression    IMPRESSION: No acute mass effect, inflammatory change or fluid  collection.    Findings are concordant with the original virtual radiology result.    KAYLYNN SMITH MD   UA reflex to Microscopic and Culture   Result Value Ref Range    Color Urine Yellow     Appearance Urine Clear     Glucose Urine Negative NEG^Negative mg/dL    Bilirubin Urine Negative NEG^Negative    Ketones Urine Negative NEG^Negative mg/dL    Specific Gravity Urine 1.019 1.003 - 1.035    Blood Urine Negative NEG^Negative    pH Urine 6.0 4.7 - 8.0 pH    Protein Albumin Urine Negative NEG^Negative mg/dL    Urobilinogen mg/dL Normal 0.0 - 2.0 mg/dL    Nitrite Urine Negative NEG^Negative    Leukocyte Esterase Urine Negative NEG^Negative    Source Midstream Urine    Basic metabolic panel   Result Value Ref Range    Sodium 142 133 - 144 mmol/L    Potassium 3.7 3.4 - 5.3 mmol/L    Chloride 108 94 - 109 mmol/L    Carbon Dioxide 26 20 - 32  mmol/L    Anion Gap 8 3 - 14 mmol/L    Glucose 81 70 - 99 mg/dL    Urea Nitrogen 16 7 - 30 mg/dL    Creatinine 0.77 0.52 - 1.04 mg/dL    GFR Estimate >90 >60 mL/min/1.7m2    GFR Estimate If Black >90 >60 mL/min/1.7m2    Calcium 8.5 8.5 - 10.1 mg/dL   CBC with platelets differential   Result Value Ref Range    WBC 5.9 4.0 - 11.0 10e9/L    RBC Count 4.10 3.8 - 5.2 10e12/L    Hemoglobin 12.4 11.7 - 15.7 g/dL    Hematocrit 36.3 35.0 - 47.0 %    MCV 89 78 - 100 fl    MCH 30.2 26.5 - 33.0 pg    MCHC 34.2 31.5 - 36.5 g/dL    RDW 12.9 10.0 - 15.0 %    Platelet Count 203 150 - 450 10e9/L    Diff Method Automated Method     % Neutrophils 45.3 %    % Lymphocytes 44.5 %    % Monocytes 5.6 %    % Eosinophils 4.1 %    % Basophils 0.3 %    % Immature Granulocytes 0.2 %    Nucleated RBCs 0 0 /100    Absolute Neutrophil 2.7 1.6 - 8.3 10e9/L    Absolute Lymphocytes 2.6 0.8 - 5.3 10e9/L    Absolute Monocytes 0.3 0.0 - 1.3 10e9/L    Absolute Eosinophils 0.2 0.0 - 0.7 10e9/L    Absolute Basophils 0.0 0.0 - 0.2 10e9/L    Abs Immature Granulocytes 0.0 0 - 0.4 10e9/L    Absolute Nucleated RBC 0.0              Labs Ordered and Resulted from Time of ED Arrival Up to the Time of Departure from the ED   UA MACROSCOPIC WITH REFLEX TO MICRO AND CULTURE   BASIC METABOLIC PANEL   CBC WITH PLATELETS DIFFERENTIAL       Assessments & Plan (with Medical Decision Making)     I have reviewed the nursing notes.    I have reviewed the findings, diagnosis, plan and need for follow up with the patient.      New Prescriptions    No medications on file       Final diagnoses:   None   (R10.31) RLQ abdominal pain  Etiology uncertain but no appendicitis or acute etiology identified at todays ER encounter     (N28.82) Dilation of right ureter  Comment: uncertain significance Plan: Recommend follow up with primary provider for further recommendations         1/20/2018   [facility]     Lisa Erwin MD  01/22/18 [id number]

## 2018-01-22 ASSESSMENT — ENCOUNTER SYMPTOMS
EYES NEGATIVE: 1
BLOOD IN STOOL: 0
ENDOCRINE NEGATIVE: 1
CARDIOVASCULAR NEGATIVE: 1
RESPIRATORY NEGATIVE: 1
MUSCULOSKELETAL NEGATIVE: 1
ABDOMINAL PAIN: 1
VOMITING: 1
DIAPHORESIS: 0
NEUROLOGICAL NEGATIVE: 1
CHILLS: 0
NAUSEA: 1
FEVER: 0
CONSTIPATION: 0
APPETITE CHANGE: 1
FATIGUE: 0

## 2018-01-30 ENCOUNTER — APPOINTMENT (OUTPATIENT)
Dept: GENERAL RADIOLOGY | Facility: HOSPITAL | Age: 28
End: 2018-01-30
Attending: PHYSICIAN ASSISTANT
Payer: COMMERCIAL

## 2018-01-30 ENCOUNTER — HOSPITAL ENCOUNTER (EMERGENCY)
Facility: HOSPITAL | Age: 28
Discharge: HOME | End: 2018-01-30
Attending: PHYSICIAN ASSISTANT | Admitting: PHYSICIAN ASSISTANT
Payer: COMMERCIAL

## 2018-01-30 VITALS
RESPIRATION RATE: 16 BRPM | HEART RATE: 92 BPM | OXYGEN SATURATION: 98 % | TEMPERATURE: 98.5 F | SYSTOLIC BLOOD PRESSURE: 107 MMHG | DIASTOLIC BLOOD PRESSURE: 66 MMHG

## 2018-01-30 DIAGNOSIS — S70.02XA CONTUSION OF LEFT HIP, INITIAL ENCOUNTER: ICD-10-CM

## 2018-01-30 DIAGNOSIS — S46.012A ROTATOR CUFF STRAIN, LEFT, INITIAL ENCOUNTER: ICD-10-CM

## 2018-01-30 PROCEDURE — 73030 X-RAY EXAM OF SHOULDER: CPT | Mod: TC,LT

## 2018-01-30 PROCEDURE — 99213 OFFICE O/P EST LOW 20 MIN: CPT | Performed by: PHYSICIAN ASSISTANT

## 2018-01-30 PROCEDURE — G0463 HOSPITAL OUTPT CLINIC VISIT: HCPCS

## 2018-01-30 PROCEDURE — 73502 X-RAY EXAM HIP UNI 2-3 VIEWS: CPT | Mod: TC

## 2018-01-30 RX ORDER — CETIRIZINE HYDROCHLORIDE 10 MG/1
10 TABLET ORAL ONCE
Status: DISCONTINUED | OUTPATIENT
Start: 2018-01-30 | End: 2018-01-30

## 2018-01-30 RX ORDER — CYCLOBENZAPRINE HCL 10 MG
TABLET ORAL
Qty: 20 TABLET | Refills: 0 | Status: SHIPPED | OUTPATIENT
Start: 2018-01-30 | End: 2018-03-29

## 2018-01-30 RX ORDER — KETOROLAC TROMETHAMINE 10 MG/1
10 TABLET, FILM COATED ORAL EVERY 6 HOURS PRN
Qty: 10 TABLET | Refills: 0 | Status: SHIPPED | OUTPATIENT
Start: 2018-01-30 | End: 2018-03-29

## 2018-01-30 ASSESSMENT — ENCOUNTER SYMPTOMS
NECK PAIN: 0
ARTHRALGIAS: 1
CARDIOVASCULAR NEGATIVE: 1
CONSTITUTIONAL NEGATIVE: 1
NECK STIFFNESS: 0
NEUROLOGICAL NEGATIVE: 1
MYALGIAS: 1
PSYCHIATRIC NEGATIVE: 1

## 2018-01-30 NOTE — ED AVS SNAPSHOT
[facility]    [address] [address]    Phone:  [phone]                                       Ihsan Millan   MRN: [id number]    Department:  [facility]   Date of Visit:  1/30/2018           Patient Information     Date Of Birth          1990        Your diagnoses for this visit were:     Rotator cuff strain, left, initial encounter     Contusion of left hip, initial encounter        You were seen by Cinda Bhatia PA.      Follow-up Information     Follow up with Temo Houston DO.    Specialty:  Family Practice    Why:  If symptoms worsen    Contact information:    [facility]  [address] [facility] [address] [phone]          Follow up with [facility].    Specialty:  EMERGENCY MEDICINE    Why:  If further concerns develop    Contact information:    [address] [address] [phone]    Additional information:    From [place] Area: Take [address]. Turn left at [address]. Turn left at the first stoplight on [address]. At the first stop sign, take a right onto [address]. Take a left into the parking lot and continue through until you reach the North enterance of the building.       From [place]: Take [address]. Take the [address] ramp towards [place]. Turn left onto [address]. Take a slight right onto [address]. Turn left at the first stoplight on [address]. At the first stop sign, take a right onto [address]. Take a left into the parking lot and continue through until you reach the North enterance of the building.       From [place]: Take [address]. Take a right at [address]. At the first stop sign, take a right onto [address]. Take a left into the parking lot and continue through until you reach the North enterance of the building.       Discharge References/Attachments     BONE  BRUISE (BONE CONTUSION), UNDERSTANDING (ENGLISH)    HIP CONTUSION (ENGLISH)    ROTATOR CUFF TENDONITIS, UNDERSTANDING (ENGLISH)         Review of your medicines      START taking        Dose / Directions Last dose taken    cyclobenzaprine 10 MG tablet   Commonly known as:  FLEXERIL   Quantity:  20 tablet        Take half to one tablet every 8 hours as needed for muscle spasms   Refills:  0        ketorolac 10 MG tablet   Commonly known as:  TORADOL   Dose:  10 mg   Quantity:  10 tablet        Take 1 tablet (10 mg) by mouth every 6 hours as needed for moderate pain   Refills:  0          Our records show that you are taking the medicines listed below. If these are incorrect, please call your family doctor or clinic.        Dose / Directions Last dose taken    albuterol 108 (90 BASE) MCG/ACT Inhaler   Commonly known as:  PROAIR HFA/PROVENTIL HFA/VENTOLIN HFA   Dose:  2 puff   Quantity:  1 Inhaler        Inhale 2 puffs into the lungs every 4 hours as needed for shortness of breath / dyspnea or wheezing   Refills:  2        estradiol 1 MG tablet   Commonly known as:  ESTRACE   Dose:  1 mg   Quantity:  90 tablet        Take 1 tablet (1 mg) by mouth daily   Refills:  1        OMEPRAZOLE PO   Dose:  20 mg        Take 20 mg by mouth every morning   Refills:  0        ondansetron 4 MG tablet   Commonly known as:  ZOFRAN   Dose:  4 mg   Quantity:  6 tablet        Take 1 tablet (4 mg) by mouth every 8 hours as needed for nausea   Refills:  0        TYLENOL PO   Dose:  1000 mg        Take 1,000 mg by mouth every 8 hours as needed for mild pain or fever   Refills:  0                Prescriptions were sent or printed at these locations (2 Prescriptions)                   [facility] [id number]  LISA SCHNEIDER - [address] AT [facility] [address] [place] [address]    Telephone:  [phone]   Fax:  [phone]   Hours:                  E-Prescribed (2 of 2)         cyclobenzaprine (FLEXERIL) 10 MG  tablet               ketorolac (TORADOL) 10 MG tablet                Procedures and tests performed during your visit     XR Pelvis including Hip Left 2-3 Views    XR Shoulder Left G/E 3 Views      Orders Needing Specimen Collection     None      Pending Results     No orders found from 1/28/2018 to 1/31/2018.            Pending Culture Results     No orders found from 1/28/2018 to 1/31/2018.            Thank you for choosing [facility]       Thank you for choosing [facility] for your care. Our goal is always to provide you with excellent care. Hearing back from our patients is one way we can continue to improve our services. Please take a few minutes to complete the written survey that you may receive in the mail after you visit with us. Thank you!        [facility] Information     [facility] gives you secure access to your electronic health record. If you see a primary care provider, you can also send messages to your care team and make appointments. If you have questions, please call your primary care clinic.  If you do not have a primary care provider, please call [phone] and they will assist you.        Care EveryWhere ID     This is your Care EveryWhere ID. This could be used by other organizations to access your [facility] medical records  [id number]        Equal Access to Services     ZHENG BLANCO : Ancelmo Saeed, hermes de jesus, leah wray . [facility] [phone].    ATENCIÓN: Si habla español, tiene a spear disposición servicios gratuitos de asistencia lingüística. Llame al [phone].    We comply with applicable federal civil rights laws and [place] laws. We do not discriminate on the basis of race, color, national origin, age, disability, sex, sexual orientation, or gender identity.            After Visit Summary       This is your record. Keep this with you and show to your community pharmacist(s) and doctor(s) at your next visit.

## 2018-01-30 NOTE — ED AVS SNAPSHOT
[facility]    [address] [address]    Phone:  [phone]                                       Ihsan Millan   MRN: [id number]    Department:  [facility]   Date of Visit:  1/30/2018           After Visit Summary Signature Page     I have received my discharge instructions, and my questions have been answered. I have discussed any challenges I see with this plan with the nurse or doctor.    ..........................................................................................................................................  Patient/Patient Representative Signature      ..........................................................................................................................................  Patient Representative Print Name and Relationship to Patient    ..................................................               ................................................  Date                                            Time    ..........................................................................................................................................  Reviewed by Signature/Title    ...................................................              ..............................................  Date                                                            Time

## 2018-01-30 NOTE — LETTER
[facility]  [address]  [place] [address]  Phone: [phone]    January 30, 2018        Ihsan Millan  [address]  [place] [address]          To whom it may concern:    RE: Ihsan Millan    Patient was seen and treated today at our clinic.    Please, excuse her from work on 31 Jan 2018, due to injury.      Sincerely,        Cinda Bhatia Certified  Physician Assistant  1/30/2018  9:25 PM  [facility]ARE CL[facility]

## 2018-01-31 NOTE — ED PROVIDER NOTES
History     Chief Complaint   Patient presents with     Shoulder Pain     Hip Pain     The history is provided by the patient. No  was used.     Ihsan Millan is a 27 year old female who has left shoulder and left hip pain, since falling on the ice. No head injury. No neck pain. Pt denies any other injury at this time.      Problem List:    Patient Active Problem List    Diagnosis Date Noted     RUQ abdominal pain 02/12/2017     Priority: Medium     Pneumonia of right middle lobe due to infectious organism (H) 02/12/2017     Priority: Medium     Tobacco abuse 02/12/2017     Priority: Medium     Status post laparoscopic assisted vaginal hysterectomy (LAVH) 06/25/2015     Priority: Medium     Surgery, elective 06/24/2015     Priority: Medium     Endometriosis 04/16/2015     Priority: Medium     Depo lupron 4/15.  Laparoscopy 3/15       ASCUS on Pap smear 07/18/2014     Priority: Medium     + hpv colpo 7/14       Postpartum depression 07/18/2014     Priority: Medium     zoloft rx       H. pylori infection      Priority: Medium     Moderate persistent asthma 03/19/2014     Priority: Medium     GERD (gastroesophageal reflux disease) 09/12/2013     Priority: Medium     Intermittent asthma 09/12/2013     Priority: Medium     Increased with pregnancy  Send for eval and asthma action plan  Smoking cessation counseling  Flu shot given          Past Medical History:    Past Medical History:   Diagnosis Date     Asthma      GERD (gastroesophageal reflux disease)      H. pylori infection        Past Surgical History:    Past Surgical History:   Procedure Laterality Date     COLONOSCOPY N/A 11/6/2014    Procedure: COLONOSCOPY;  Surgeon: Zacarias Paz MD;  [facility]     DILATION AND CURETTAGE, HYSTEROSCOPY DIAGNOSTIC, COMBINED  8/1/2014    Procedure: COMBINED DILATION AND CURETTAGE, HYSTEROSCOPY DIAGNOSTIC;  Surgeon: Rodolfo Clifford MD;  [facility]     ESOPHAGOSCOPY, GASTROSCOPY,  DUODENOSCOPY (EGD), COMBINED N/A 9/5/2014    Procedure: COMBINED ESOPHAGOSCOPY, GASTROSCOPY, DUODENOSCOPY (EGD);  Surgeon: Zacarias Paz MD;  [facility]     LAPAROSCOPIC ASSISTED HYSTERECTOMY VAGINAL N/A 6/24/2015    Procedure: LAPAROSCOPIC ASSISTED HYSTERECTOMY VAGINAL;  Surgeon: Rodolfo Clifford MD;  [facility]     LAPAROSCOPIC OOPHORECTOMY Right 3/23/2016    Procedure: LAPAROSCOPIC OOPHORECTOMY;  Surgeon: Rodolfo Clifford MD;  [facility]     LAPAROSCOPIC SALPINGO-OOPHORECTOMY Left 5/3/2017    Procedure: LAPAROSCOPIC SALPINGO-OOPHORECTOMY;  LAPAROSCOPIC LEFT OOPHORECTOMY;  Surgeon: Rodolfo Clifford MD;  [facility]     LAPAROSCOPY DIAGNOSTIC (GYN) N/A 3/18/2015    Procedure: LAPAROSCOPY DIAGNOSTIC (GYN);  Surgeon: Rodolfo Clifford MD;  Location: [facility]     no surgeries       SALPINGECTOMY Bilateral 6/24/2015    Procedure: SALPINGECTOMY;  Surgeon: Rodolfo Clifford MD;  [facility]       Family History:    Family History   Problem Relation Age of Onset     Asthma Mother      Unknown/Adopted Maternal Grandmother      Unknown/Adopted Maternal Grandfather        Social History:  Marital Status:  Single [1]  Social History   Substance Use Topics     Smoking status: Current Every Day Smoker     Packs/day: 0.50     Years: 7.00     Types: Cigarettes     Smokeless tobacco: Never Used      Comment: declines quitline referral     Alcohol use Yes      Comment: social        Medications:      cyclobenzaprine (FLEXERIL) 10 MG tablet   ketorolac (TORADOL) 10 MG tablet   Acetaminophen (TYLENOL PO)   estradiol (ESTRACE) 1 MG tablet   OMEPRAZOLE PO   albuterol (PROAIR HFA, PROVENTIL HFA, VENTOLIN HFA) 108 (90 BASE) MCG/ACT inhaler   ondansetron (ZOFRAN) 4 MG tablet         Review of Systems   Constitutional: Negative.    HENT: Negative.    Cardiovascular: Negative.    Musculoskeletal: Positive for arthralgias and myalgias. Negative for neck pain and neck stiffness.   Neurological: Negative.    Psychiatric/Behavioral:  Negative.        Physical Exam   BP: 107/66  Pulse: 92  Temp: 98.5  F (36.9  C)  Resp: 16  SpO2: 98 %      Physical Exam   Constitutional: She is oriented to person, place, and time. She appears well-developed and well-nourished. No distress.   Cardiovascular: Normal rate.    Pulmonary/Chest: Effort normal.   Musculoskeletal:   Left posterior shoulder: diffuse moderate TTP. No e/e/e/e. +AFROM w/ posterior shoulder pain. M/n/v intact. 4/5 strength due to pain. Pt guards against palpation in anyway.    Left Hip: small area of ecchymosis to the lateral side, approx 1 cm x 1 cm. Moderate TTP to this area.  Pt guards against palpation in anyway.    Subjective complaints well outweigh objective finding   Neurological: She is alert and oriented to person, place, and time.   Skin: She is not diaphoretic.   Psychiatric: She has a normal mood and affect.   Nursing note and vitals reviewed.      ED Course     ED Course     Procedures                XR Shoulder Left G/E 3 Views (Final result) Result time: 01/30/18 21:09:08     Final result by Stephen Wynne MD (01/30/18 21:09:08)     Impression:     IMPRESSION: No acute fracture.      STEPHEN WYNNE MD     Narrative:     PROCEDURE:  XR SHOULDER LT G/E 3 VW    HISTORY: fall;     COMPARISON:  None.    TECHNIQUE:  4 views of the left shoulder were obtained.    FINDINGS:  No fracture or dislocation is identified. The joint spaces  are preserved.                   XR Pelvis including Hip Left 2-3 Views (Final result) Result time: 01/30/18 21:10:33     Final result by Stephen Wynne MD (01/30/18 21:10:33)     Impression:     IMPRESSION: Negative examination pelvis and left hip    STEPHEN WYNNE MD     Narrative:     PROCEDURE: XR PELVIS AND HIP LEFT 2 VIEWS 1/30/2018 9:01 PM    HISTORY: fall;     COMPARISONS: None.    TECHNIQUE: Pelvis one view left hip 2 views    FINDINGS: Pelvis is intact. The sacrum and sacroiliac joints appear  normal. Articular spaces are normal  height of both hips. Both proximal  femurs appear intact.    Left hip 2 views the acetabulum and proximal femur on the left appears  normal. There are no fractures or destructive lesions seen                         Assessments & Plan (with Medical Decision Making)     I have reviewed the nursing notes.    I have reviewed the findings, diagnosis, plan and need for follow up with the patient.      Discharge Medication List as of 1/30/2018  9:20 PM      START taking these medications    Details   cyclobenzaprine (FLEXERIL) 10 MG tablet Take half to one tablet every 8 hours as needed for muscle spasms, Disp-20 tablet, R-0, E-Prescribe      ketorolac (TORADOL) 10 MG tablet Take 1 tablet (10 mg) by mouth every 6 hours as needed for moderate pain, Disp-10 tablet, R-0, E-Prescribe             Final diagnoses:   Rotator cuff strain, left, initial encounter   Contusion of left hip, initial encounter         Work excuse given for tomorrow  Patient verbally educated and given appropriate education sheets for the diagnoses and has no questions.  Take medications as directed.   Follow up with your Primary Care provider if symptoms increase or if concerns develop, return to the ER  Cinda Bhatia Certified  Physician Assistant  1/30/2018  10:53 PM  URGENT CARE CLINIC      1/30/2018   [facility]     Cinda Bhatia PA  01/30/18 1310

## 2018-02-17 ENCOUNTER — HOSPITAL ENCOUNTER (EMERGENCY)
Facility: HOSPITAL | Age: 28
Discharge: HOME | End: 2018-02-17
Attending: EMERGENCY MEDICINE | Admitting: EMERGENCY MEDICINE
Payer: COMMERCIAL

## 2018-02-17 ENCOUNTER — APPOINTMENT (OUTPATIENT)
Dept: GENERAL RADIOLOGY | Facility: HOSPITAL | Age: 28
End: 2018-02-17
Attending: EMERGENCY MEDICINE
Payer: COMMERCIAL

## 2018-02-17 VITALS
SYSTOLIC BLOOD PRESSURE: 123 MMHG | TEMPERATURE: 96.2 F | OXYGEN SATURATION: 98 % | RESPIRATION RATE: 16 BRPM | HEART RATE: 92 BPM | DIASTOLIC BLOOD PRESSURE: 71 MMHG

## 2018-02-17 DIAGNOSIS — S50.01XA CONTUSION OF RIGHT ELBOW, INITIAL ENCOUNTER: Primary | ICD-10-CM

## 2018-02-17 DIAGNOSIS — M25.521 RIGHT ELBOW PAIN: ICD-10-CM

## 2018-02-17 PROCEDURE — 99283 EMERGENCY DEPT VISIT LOW MDM: CPT

## 2018-02-17 PROCEDURE — 73070 X-RAY EXAM OF ELBOW: CPT | Mod: TC,RT

## 2018-02-17 PROCEDURE — 99283 EMERGENCY DEPT VISIT LOW MDM: CPT | Performed by: EMERGENCY MEDICINE

## 2018-02-17 PROCEDURE — 25000128 H RX IP 250 OP 636: Performed by: EMERGENCY MEDICINE

## 2018-02-17 RX ORDER — KETOROLAC TROMETHAMINE 30 MG/ML
30 INJECTION, SOLUTION INTRAMUSCULAR; INTRAVENOUS ONCE
Status: DISCONTINUED | OUTPATIENT
Start: 2018-02-17 | End: 2018-02-18 | Stop reason: HOSPADM

## 2018-02-17 NOTE — ED AVS SNAPSHOT
[facility]    [address] [address]    Phone:  [phone]                                       Ihsan Millan   MRN: [id number]    Department:  [facility]   Date of Visit:  2/17/2018           After Visit Summary Signature Page     I have received my discharge instructions, and my questions have been answered. I have discussed any challenges I see with this plan with the nurse or doctor.    ..........................................................................................................................................  Patient/Patient Representative Signature      ..........................................................................................................................................  Patient Representative Print Name and Relationship to Patient    ..................................................               ................................................  Date                                            Time    ..........................................................................................................................................  Reviewed by Signature/Title    ...................................................              ..............................................  Date                                                            Time

## 2018-02-17 NOTE — ED AVS SNAPSHOT
[facility]    [address] [address]    Phone:  [phone]                                       Ihsan Millan   MRN: [id number]    Department:  [facility]   Date of Visit:  2/17/2018           Patient Information     Date Of Birth          1990        Your diagnoses for this visit were:     Right elbow pain     Contusion of right elbow, initial encounter        You were seen by Gumaro Mahoney MD.      Follow-up Information     Follow up with Temo Houston DO.    Specialty:  Family Practice    Why:  As needed    Contact information:    [facility]  [address] [facility] [address]  [phone]          Discharge Instructions         Elbow Bruise  You have a bruise (contusion) of your elbow. A bruise causes local pain, swelling, and sometimes bruising. There are no broken bones. This injury takes a few days to a few weeks to heal. You may be given a sling for comfort and arm support.  You may notice color changes over the skin. It may change from reddish to bluish to greenish or yellowish before the bruising fades. The skin will then go back to its normal color.  Home care  Follow these guidelines when caring for yourself at home.    Keep your arm elevated to reduce pain and swelling. This is most important during the first 2 days (48 hours) after the injury.    Put an ice pack on the injured area. Do this for 20 minutes every 1 to 2 hours the first day. You can make an ice pack by wrapping a plastic bag of ice in a thin towel. You should continue to use the ice pack 3 to 4 times a day for the next 2 days. Then use the ice pack as needed to ease pain and swelling.    Don t use a heating pad.    Don t stick a needle into the contusion or bruising to drain it.    You may use acetaminophen or ibuprofen to control pain, unless another pain medicine was prescribed. If you have chronic liver or kidney disease, talk with your healthcare  provider before using these medicines. Also talk with your provider if you ve had a stomach ulcer or gastrointestinal bleeding.    If a sling was provided, you may take it off to shower or bathe. Don t wear it for more than 1 week or it may cause joint stiffness.  Follow-up care  Follow up with your healthcare provider, or as advised, if you are not starting to get better within the next 3 days.  When to seek medical advice  Call your healthcare provider right away if any of these occur:    Pain or swelling gets worse    The back of your elbow becomes very swollen where it almost looks like a gold ball or egg-like mass is growing there. This is a sign of olecrenon bursitis or septic bursitis which may need immediate treatment.    Redness, red streaks down the arm, warmth, or drainage from the bruise    Hand or fingers becomes cold, blue, numb, or tingly    New bruises, and you don t know what caused them    Contusion doesn t heal  Date Last Reviewed: 2/1/2017 2000-2017 The "[employer]. [address] [place] [address]. All rights reserved. This information is not intended as a substitute for professional medical care. Always follow your healthcare professional's instructions.             Review of your medicines      Our records show that you are taking the medicines listed below. If these are incorrect, please call your family doctor or clinic.        Dose / Directions Last dose taken    albuterol 108 (90 BASE) MCG/ACT Inhaler   Commonly known as:  PROAIR HFA/PROVENTIL HFA/VENTOLIN HFA   Dose:  2 puff   Quantity:  1 Inhaler        Inhale 2 puffs into the lungs every 4 hours as needed for shortness of breath / dyspnea or wheezing   Refills:  2        cyclobenzaprine 10 MG tablet   Commonly known as:  FLEXERIL   Quantity:  20 tablet        Take half to one tablet every 8 hours as needed for muscle spasms   Refills:  0        estradiol 1 MG tablet   Commonly known as:  ESTRACE   Dose:  1 mg    Quantity:  90 tablet        Take 1 tablet (1 mg) by mouth daily   Refills:  1        ketorolac 10 MG tablet   Commonly known as:  TORADOL   Dose:  10 mg   Quantity:  10 tablet        Take 1 tablet (10 mg) by mouth every 6 hours as needed for moderate pain   Refills:  0        OMEPRAZOLE PO   Dose:  20 mg        Take 20 mg by mouth every morning   Refills:  0        ondansetron 4 MG tablet   Commonly known as:  ZOFRAN   Dose:  4 mg   Quantity:  6 tablet        Take 1 tablet (4 mg) by mouth every 8 hours as needed for nausea   Refills:  0        TYLENOL PO   Dose:  1000 mg        Take 1,000 mg by mouth every 8 hours as needed for mild pain or fever   Refills:  0                Procedures and tests performed during your visit     Elbow XR, 2 views, right      Orders Needing Specimen Collection     None      Pending Results     Date and Time Order Name Status Description    2/17/2018 2249 Elbow XR, 2 views, right In process             Pending Culture Results     No orders found from 2/15/2018 to 2/18/2018.            Thank you for choosing [facility]       Thank you for choosing [facility] for your care. Our goal is always to provide you with excellent care. Hearing back from our patients is one way we can continue to improve our services. Please take a few minutes to complete the written survey that you may receive in the mail after you visit with us. Thank you!        [facility] Information     [facility] gives you secure access to your electronic health record. If you see a primary care provider, you can also send messages to your care team and make appointments. If you have questions, please call your primary care clinic.  If you do not have a primary care provider, please call [phone] and they will assist you.        Care EveryWhere ID     This is your Care EveryWhere ID. This could be used by other organizations to access your [facility] medical records  [id number]        Equal Access to Services     ZHENG BLANCO AH:  Hadii clifton Saeed, warefugioda liza, qadavidta kaalleah nazario. So [facility] [phone].    ATENCIÓN: Si habla español, tiene a spear disposición servicios gratuitos de asistencia lingüística. Llame al [phone].    We comply with applicable federal civil rights laws and [place] laws. We do not discriminate on the basis of race, color, national origin, age, disability, sex, sexual orientation, or gender identity.            After Visit Summary       This is your record. Keep this with you and show to your community pharmacist(s) and doctor(s) at your next visit.

## 2018-02-18 NOTE — ED NOTES
Patient arrives with friend with complaint of right elbow pain. Patient states she tripped and fell onto her right side. Reports she did not hit her head or lose consciousness. Currently rating pain to right elbow 8/10. CMS intact. Swelling noted, no obvious deformity. Patient took Tylenol at home. Ice pack given. Call light within reach.

## 2018-02-18 NOTE — ED PROVIDER NOTES
History     Chief Complaint   Patient presents with     Arm Pain     Patient fell half hour ago onto right elbow. 8/10 pain to right arm. CMS intact.      HPI  Ihsan Millan is a 27 year old female who presented to the emergency department with complaints of right elbow pain.  Patient apparently slipped and fell to the floor landing on the right elbow.  This happened approximately 1 hour ago.  Since then she has had pain in the right elbow.  She denies any bleeding or bruising.  The elbow is flexed and patient is not willing to extend it because of the pain.  She denies any other injuries.  She took Tylenol prior to arrival at the ED and cold pack was applied.    Problem List:    Patient Active Problem List    Diagnosis Date Noted     RUQ abdominal pain 02/12/2017     Priority: Medium     Pneumonia of right middle lobe due to infectious organism (H) 02/12/2017     Priority: Medium     Tobacco abuse 02/12/2017     Priority: Medium     Status post laparoscopic assisted vaginal hysterectomy (LAVH) 06/25/2015     Priority: Medium     Surgery, elective 06/24/2015     Priority: Medium     Endometriosis 04/16/2015     Priority: Medium     Depo lupron 4/15.  Laparoscopy 3/15       ASCUS on Pap smear 07/18/2014     Priority: Medium     + hpv colpo 7/14       Postpartum depression 07/18/2014     Priority: Medium     zoloft rx       H. pylori infection      Priority: Medium     Moderate persistent asthma 03/19/2014     Priority: Medium     GERD (gastroesophageal reflux disease) 09/12/2013     Priority: Medium     Intermittent asthma 09/12/2013     Priority: Medium     Increased with pregnancy  Send for eval and asthma action plan  Smoking cessation counseling  Flu shot given          Past Medical History:    Past Medical History:   Diagnosis Date     Asthma      GERD (gastroesophageal reflux disease)      H. pylori infection        Past Surgical History:    Past Surgical History:   Procedure Laterality Date      COLONOSCOPY N/A 11/6/2014    Procedure: COLONOSCOPY;  Surgeon: Zacarias Paz MD;  [facility]     DILATION AND CURETTAGE, HYSTEROSCOPY DIAGNOSTIC, COMBINED  8/1/2014    Procedure: COMBINED DILATION AND CURETTAGE, HYSTEROSCOPY DIAGNOSTIC;  Surgeon: Rodolfo Clifford MD;  [facility]     ESOPHAGOSCOPY, GASTROSCOPY, DUODENOSCOPY (EGD), COMBINED N/A 9/5/2014    Procedure: COMBINED ESOPHAGOSCOPY, GASTROSCOPY, DUODENOSCOPY (EGD);  Surgeon: Zacarisa Paz MD;  [facility]     LAPAROSCOPIC ASSISTED HYSTERECTOMY VAGINAL N/A 6/24/2015    Procedure: LAPAROSCOPIC ASSISTED HYSTERECTOMY VAGINAL;  Surgeon: Rodolfo Clifford MD;  [facility]     LAPAROSCOPIC OOPHORECTOMY Right 3/23/2016    Procedure: LAPAROSCOPIC OOPHORECTOMY;  Surgeon: Rodolfo Clifford MD;  [facility]     LAPAROSCOPIC SALPINGO-OOPHORECTOMY Left 5/3/2017    Procedure: LAPAROSCOPIC SALPINGO-OOPHORECTOMY;  LAPAROSCOPIC LEFT OOPHORECTOMY;  Surgeon: Rodolfo Clifford MD;  [facility]     LAPAROSCOPY DIAGNOSTIC (GYN) N/A 3/18/2015    Procedure: LAPAROSCOPY DIAGNOSTIC (GYN);  Surgeon: Rodolfo Clifford MD;  [facility]: [facility]     no surgeries       SALPINGECTOMY Bilateral 6/24/2015    Procedure: SALPINGECTOMY;  Surgeon: Rodolfo Clifford MD;  [facility]       Family History:    Family History   Problem Relation Age of Onset     Asthma Mother      Unknown/Adopted Maternal Grandmother      Unknown/Adopted Maternal Grandfather        Social History:  Marital Status:  Single [1]  Social History   Substance Use Topics     Smoking status: Current Every Day Smoker     Packs/day: 0.50     Years: 7.00     Types: Cigarettes     Smokeless tobacco: Never Used      Comment: declines quitline referral     Alcohol use Yes      Comment: social        Medications:      ketorolac (TORADOL) 10 MG tablet   Acetaminophen (TYLENOL PO)   estradiol (ESTRACE) 1 MG tablet   OMEPRAZOLE PO   albuterol (PROAIR HFA, PROVENTIL HFA, VENTOLIN HFA) 108 (90 BASE) MCG/ACT inhaler    cyclobenzaprine (FLEXERIL) 10 MG tablet   ondansetron (ZOFRAN) 4 MG tablet         Review of Systems   All other systems reviewed and are negative.      Physical Exam   BP: 123/71  Pulse: 92  Temp: 96.2  F (35.7  C)  Resp: 16  SpO2: 98 %      Physical Exam   Constitutional: She is oriented to person, place, and time. She appears well-developed and well-nourished. No distress.   HENT:   Head: Normocephalic and atraumatic.   Mouth/Throat: Oropharynx is clear and moist. No oropharyngeal exudate.   Eyes: Pupils are equal, round, and reactive to light. No scleral icterus.   Cardiovascular: Normal rate, regular rhythm, normal heart sounds and intact distal pulses.    Pulmonary/Chest: Breath sounds normal. No respiratory distress. She has no wheezes. She has no rales.   Abdominal: Soft. Bowel sounds are normal. There is no tenderness.   Musculoskeletal: She exhibits tenderness. She exhibits no edema.        Arms:  Neurological: She is alert and oriented to person, place, and time.   Skin: Skin is warm. No rash noted. She is not diaphoretic.   Nursing note and vitals reviewed.      ED Course   Patient evaluated  and x-ray of the right elbow ordered.  Cold packs applied to the right elbow.    ED Course     Procedures         Labs Ordered and Resulted from Time of ED Arrival Up to the Time of Departure from the ED - No data to display    Assessments & Plan (with Medical Decision Making)   Contusion to the right elbow: Patient apparently fell 1 hour ago after slipping on ice complaining of pain in the right elbow.  Evaluated at the ED showing intact distal pulses.  X-ray of the right elbow is negative for any fractures.  Patient subsequently discharged home and advised to take OTC Tylenol as needed for pain. Continue applying cold packs.  All questions answered and subsequently discharged home.    I have reviewed the nursing notes.    I have reviewed the findings, diagnosis, plan and need for follow up with the  patient.    Discharge Medication List as of 2/17/2018 11:10 PM          Final diagnoses:   Right elbow pain   Contusion of right elbow, initial encounter       2/17/2018   [facility]     Gumaro Mahoney MD  02/17/18 2471

## 2018-02-18 NOTE — DISCHARGE INSTRUCTIONS
Elbow Bruise  You have a bruise (contusion) of your elbow. A bruise causes local pain, swelling, and sometimes bruising. There are no broken bones. This injury takes a few days to a few weeks to heal. You may be given a sling for comfort and arm support.  You may notice color changes over the skin. It may change from reddish to bluish to greenish or yellowish before the bruising fades. The skin will then go back to its normal color.  Home care  Follow these guidelines when caring for yourself at home.    Keep your arm elevated to reduce pain and swelling. This is most important during the first 2 days (48 hours) after the injury.    Put an ice pack on the injured area. Do this for 20 minutes every 1 to 2 hours the first day. You can make an ice pack by wrapping a plastic bag of ice in a thin towel. You should continue to use the ice pack 3 to 4 times a day for the next 2 days. Then use the ice pack as needed to ease pain and swelling.    Don t use a heating pad.    Don t stick a needle into the contusion or bruising to drain it.    You may use acetaminophen or ibuprofen to control pain, unless another pain medicine was prescribed. If you have chronic liver or kidney disease, talk with your healthcare provider before using these medicines. Also talk with your provider if you ve had a stomach ulcer or gastrointestinal bleeding.    If a sling was provided, you may take it off to shower or bathe. Don t wear it for more than 1 week or it may cause joint stiffness.  Follow-up care  Follow up with your healthcare provider, or as advised, if you are not starting to get better within the next 3 days.  When to seek medical advice  Call your healthcare provider right away if any of these occur:    Pain or swelling gets worse    The back of your elbow becomes very swollen where it almost looks like a gold ball or egg-like mass is growing there. This is a sign of olecrenon bursitis or septic bursitis which may need immediate  treatment.    Redness, red streaks down the arm, warmth, or drainage from the bruise    Hand or fingers becomes cold, blue, numb, or tingly    New bruises, and you don t know what caused them    Contusion doesn t heal  Date Last Reviewed: 2/1/2017 2000-2017 The [employer]. [address] [place], [place] [address]. All rights reserved. This information is not intended as a substitute for professional medical care. Always follow your healthcare professional's instructions.

## 2018-03-07 ENCOUNTER — OFFICE VISIT (OUTPATIENT)
Dept: CHIROPRACTIC MEDICINE | Facility: OTHER | Age: 28
End: 2018-03-07
Attending: CHIROPRACTOR
Payer: COMMERCIAL

## 2018-03-07 DIAGNOSIS — M99.02 SEGMENTAL AND SOMATIC DYSFUNCTION OF THORACIC REGION: ICD-10-CM

## 2018-03-07 DIAGNOSIS — M99.01 SEGMENTAL AND SOMATIC DYSFUNCTION OF CERVICAL REGION: ICD-10-CM

## 2018-03-07 DIAGNOSIS — M99.03 SEGMENTAL AND SOMATIC DYSFUNCTION OF LUMBAR REGION: Primary | ICD-10-CM

## 2018-03-07 DIAGNOSIS — M54.50 ACUTE BILATERAL LOW BACK PAIN WITHOUT SCIATICA: ICD-10-CM

## 2018-03-07 PROCEDURE — 99213 OFFICE O/P EST LOW 20 MIN: CPT | Mod: 25 | Performed by: CHIROPRACTOR

## 2018-03-07 PROCEDURE — 98941 CHIROPRACT MANJ 3-4 REGIONS: CPT | Mod: AT | Performed by: CHIROPRACTOR

## 2018-03-07 NOTE — MR AVS SNAPSHOT
After Visit Summary   3/7/2018    Ihsan Millan    MRN: [id number]           Patient Information     Date Of Birth          1990        Visit Information        Provider Department      3/7/2018 4:20 PM Homer Lopez DC  [facility]        Today's Diagnoses     Segmental and somatic dysfunction of lumbar region    -  1    Acute bilateral low back pain without sciatica        Segmental and somatic dysfunction of thoracic region        Segmental and somatic dysfunction of cervical region           Follow-ups after your visit        Who to contact     If you have questions or need follow up information about today's clinic visit or your schedule please contact  [facility] directly at [phone].  Normal or non-critical lab and imaging results will be communicated to you by bright boxhart, letter or phone within 4 business days after the clinic has received the results. If you do not hear from us within 7 days, please contact the clinic through bright boxhart or phone. If you have a critical or abnormal lab result, we will notify you by phone as soon as possible.  Submit refill requests through [employer] or call your pharmacy and they will forward the refill request to us. Please allow 3 business days for your refill to be completed.          Additional Information About Your Visit        MyChart Information     [employer] gives you secure access to your electronic health record. If you see a primary care provider, you can also send messages to your care team and make appointments. If you have questions, please call your primary care clinic.  If you do not have a primary care provider, please call [phone] and they will assist you.        Care EveryWhere ID     This is your Care EveryWhere ID. This could be used by other organizations to access your [facility] medical records  [id number]        Your Vitals Were     Last Period                   (LMP Unknown)            Blood Pressure  from Last 3 Encounters:   02/17/18 123/71   01/30/18 107/66   01/21/18 97/54    Weight from Last 3 Encounters:   10/11/17 130 lb (59 kg)   05/03/17 129 lb (58.5 kg)   04/14/17 120 lb (54.4 kg)              We Performed the Following     CHIROPRAC MANIP,SPINAL,3-4 REGIONS        Primary Care Provider Office Phone # Fax #    Temo Houston [phone]       [facility] [address]  [facility] [address]        Equal Access to Services     [facility]: Hadii aad ku hadasho Soomaali, waaxda luqadaha, qaybta kaalmada adeegyaroslyn, leah graham . So [facility] [phone].    ATENCIÓN: Si habla español, tiene a spear disposición servicios gratuitos de asistencia lingüística. [facility] [phone].    We comply with applicable federal civil rights laws and [place] laws. We do not discriminate on the basis of race, color, national origin, age, disability, sex, sexual orientation, or gender identity.            Thank you!     Thank you for choosing  [facility]  for your care. Our goal is always to provide you with excellent care. Hearing back from our patients is one way we can continue to improve our services. Please take a few minutes to complete the written survey that you may receive in the mail after your visit with us. Thank you!             Your Updated Medication List - Protect others around you: Learn how to safely use, store and throw away your medicines at www.disposemymeds.org.          This list is accurate as of 3/7/18 11:59 PM.  Always use your most recent med list.                   Brand Name Dispense Instructions for use Diagnosis    albuterol 108 (90 BASE) MCG/ACT Inhaler    PROAIR HFA/PROVENTIL HFA/VENTOLIN HFA    1 Inhaler    Inhale 2 puffs into the lungs every 4 hours as needed for shortness of breath / dyspnea or wheezing    Intermittent asthma       cyclobenzaprine 10 MG tablet    FLEXERIL    20 tablet    Take half to one tablet every  8 hours as needed for muscle spasms        estradiol 1 MG tablet    ESTRACE    90 tablet    Take 1 tablet (1 mg) by mouth daily    Menopausal syndrome (hot flashes)       ketorolac 10 MG tablet    TORADOL    10 tablet    Take 1 tablet (10 mg) by mouth every 6 hours as needed for moderate pain        OMEPRAZOLE PO      Take 20 mg by mouth every morning        ondansetron 4 MG tablet    ZOFRAN    6 tablet    Take 1 tablet (4 mg) by mouth every 8 hours as needed for nausea        TYLENOL PO      Take 1,000 mg by mouth every 8 hours as needed for mild pain or fever

## 2018-03-09 NOTE — PROGRESS NOTES
Subjective Finding:    Chief compalint: No chief complaint on file.  , Pain Scale: 5/10, Intensity: sharp, Duration: 3 days, Radiating: no.    Date of injury:     Activities that the pain restricts:   Home/household/hobbies/social activities: yes.  Work duties: no.  Sleep: no.  Makes symptoms better: rest.  Makes symptoms worse: activity and walking.  Have you seen anyone else for the symptoms? Yes: MD.  Work related: no.  Automobile related injury: no.    Objective and Assessment:    Posture Analysis:   High shoulder: .  Head tilt: .  High iliac crest: .  Head carriage: neutral.  Thoracic Kyphosis: forward.  Lumbar Lordosis: neutral.    Lumbar Range of Motion: extension decreased.  Cervical Range of Motion: .  Thoracic Range of Motion: extension decreased.  Extremity Range of Motion: .    Palpation:   T paraspinals: sharp pain, no    Segmental dysfunction pre-treatment and treatment area: T6, T8 and L3.  C45    Assessment post-treatment:  Cervical: .  Thoracic: ROM increased.  Lumbar: ROM increased.    Comments: .      Complicating Factors: .    Procedure(s):  CMT:  80656 Chiropractic manipulative treatment 1-2 regions performed   Thoracic: Diversified, See above for level, Prone and Lumbar: Diversified, See above for level, Side posture    Modalities:  None performed this visit    Therapeutic procedures:  None    Plan:  Treatment plan: PRN.  Instructed patient: stretch as instructed at visit.  Short term goals: reduce pain and increase ROM.  Long term goals: restore normal function.  Prognosis: excellent.

## 2018-03-23 ENCOUNTER — HOSPITAL ENCOUNTER (EMERGENCY)
Facility: HOSPITAL | Age: 28
Discharge: HOME | End: 2018-03-23
Attending: NURSE PRACTITIONER | Admitting: NURSE PRACTITIONER
Payer: COMMERCIAL

## 2018-03-23 VITALS
TEMPERATURE: 97.8 F | SYSTOLIC BLOOD PRESSURE: 99 MMHG | OXYGEN SATURATION: 99 % | DIASTOLIC BLOOD PRESSURE: 67 MMHG | RESPIRATION RATE: 14 BRPM

## 2018-03-23 DIAGNOSIS — J20.9 ACUTE BRONCHITIS, UNSPECIFIED ORGANISM: ICD-10-CM

## 2018-03-23 LAB
FLUAV+FLUBV AG SPEC QL: NEGATIVE
FLUAV+FLUBV AG SPEC QL: NEGATIVE
SPECIMEN SOURCE: NORMAL

## 2018-03-23 PROCEDURE — G0463 HOSPITAL OUTPT CLINIC VISIT: HCPCS

## 2018-03-23 PROCEDURE — 99213 OFFICE O/P EST LOW 20 MIN: CPT | Performed by: NURSE PRACTITIONER

## 2018-03-23 PROCEDURE — 87804 INFLUENZA ASSAY W/OPTIC: CPT | Performed by: FAMILY MEDICINE

## 2018-03-23 RX ORDER — BENZONATATE 100 MG/1
100 CAPSULE ORAL 3 TIMES DAILY PRN
Qty: 30 CAPSULE | Refills: 0 | Status: SHIPPED | OUTPATIENT
Start: 2018-03-23 | End: 2018-03-29

## 2018-03-23 RX ORDER — AZITHROMYCIN 250 MG/1
TABLET, FILM COATED ORAL
Qty: 6 TABLET | Refills: 0 | Status: SHIPPED | OUTPATIENT
Start: 2018-03-23 | End: 2018-03-29

## 2018-03-23 RX ORDER — ALBUTEROL SULFATE 90 UG/1
2 AEROSOL, METERED RESPIRATORY (INHALATION) EVERY 6 HOURS PRN
Qty: 1 INHALER | Refills: 0 | Status: SHIPPED | OUTPATIENT
Start: 2018-03-23 | End: 2023-04-13

## 2018-03-23 ASSESSMENT — ENCOUNTER SYMPTOMS
DIARRHEA: 0
WEAKNESS: 0
WHEEZING: 1
ACTIVITY CHANGE: 0
APPETITE CHANGE: 1
NAUSEA: 0
DYSURIA: 0
RHINORRHEA: 0
PSYCHIATRIC NEGATIVE: 1
STRIDOR: 0
SHORTNESS OF BREATH: 0
COUGH: 1
FEVER: 0
SINUS PRESSURE: 0
SINUS PAIN: 0
SORE THROAT: 0
VOMITING: 0

## 2018-03-23 NOTE — ED AVS SNAPSHOT
[facility]    [address] [address]    Phone:  [phone]                                       Ihsan Millan   MRN: [id number]    Department:  [facility]   Date of Visit:  3/23/2018           After Visit Summary Signature Page     I have received my discharge instructions, and my questions have been answered. I have discussed any challenges I see with this plan with the nurse or doctor.    ..........................................................................................................................................  Patient/Patient Representative Signature      ..........................................................................................................................................  Patient Representative Print Name and Relationship to Patient    ..................................................               ................................................  Date                                            Time    ..........................................................................................................................................  Reviewed by Signature/Title    ...................................................              ..............................................  Date                                                            Time

## 2018-03-23 NOTE — ED AVS SNAPSHOT
[facility]    [address] [address]    Phone:  [phone]                                       Ihsan Millan   MRN: [id number]    Department:  [facility]   Date of Visit:  3/23/2018           Patient Information     Date Of Birth          1990        Your diagnoses for this visit were:     Acute bronchitis, unspecified organism        You were seen by Henny Tamez NP.      Follow-up Information     Follow up with Temo Houston DO.    Specialty:  Family Practice    Why:  As needed, If symptoms worsen    Contact information:    [facility]  [address] [facility] [address] [phone]          Follow up with [facility].    Specialty:  EMERGENCY MEDICINE    Why:  As needed, If symptoms worsen    Contact information:    [address] [address] [phone]    Additional information:    From [place]: Take [address]. Turn left at [address]. Turn left at the first stoplight on [address]. At the first stop sign, take a right onto [address]. Take a left into the parking lot and continue through until you reach the North enterance of the building.       From [place]: Take [address]. Take the [address] ramp towards [place]. Turn left onto [address]. Take a slight right onto [address]. Turn left at the first stoplight on [address]. At the first stop sign, take a right onto [address]. Take a left into the parking lot and continue through until you reach the North enterance of the building.       From [place]: Take [address]. Take a right at [address]. At the first stop sign, take a right onto [address]. Take a left into the parking lot and continue through until you reach the North enterance of the building.         Discharge Instructions       Take antibiotics as ordered.   Eat a yogurt daily  while taking antibiotics.   Take tessalon as needed as directed for cough.   Use Albuterol inhaler as needed as directed for cough.   Increase water intake.   Follow up with PCP with any increase in symptoms or concerns.   Return to urgent care or emergency department with any increase in symptoms or concerns.     Discharge References/Attachments     BRONCHITIS, ANTIBIOTIC TREATMENT (ADULT) (ENGLISH)         Review of your medicines      START taking        Dose / Directions Last dose taken    azithromycin 250 MG tablet   Commonly known as:  ZITHROMAX Z-CHRISTELLE   Quantity:  6 tablet        Two tablets on the first day, then one tablet daily for the next 4 days   Refills:  0        benzonatate 100 MG capsule   Commonly known as:  TESSALON   Dose:  100 mg   Quantity:  30 capsule        Take 1 capsule (100 mg) by mouth 3 times daily as needed for cough   Refills:  0          CONTINUE these medicines which may have CHANGED, or have new prescriptions. If we are uncertain of the size of tablets/capsules you have at home, strength may be listed as something that might have changed.        Dose / Directions Last dose taken    albuterol 108 (90 BASE) MCG/ACT Inhaler   Commonly known as:  PROAIR HFA/PROVENTIL HFA/VENTOLIN HFA   Dose:  2 puff   What changed:  when to take this   Quantity:  1 Inhaler        Inhale 2 puffs into the lungs every 6 hours as needed for shortness of breath / dyspnea or wheezing   Refills:  0          Our records show that you are taking the medicines listed below. If these are incorrect, please call your family doctor or clinic.        Dose / Directions Last dose taken    cyclobenzaprine 10 MG tablet   Commonly known as:  FLEXERIL   Quantity:  20 tablet        Take half to one tablet every 8 hours as needed for muscle spasms   Refills:  0        estradiol 1 MG tablet   Commonly known as:  ESTRACE   Dose:  1 mg   Quantity:  90 tablet        Take 1 tablet (1 mg) by mouth daily   Refills:  1         ketorolac 10 MG tablet   Commonly known as:  TORADOL   Dose:  10 mg   Quantity:  10 tablet        Take 1 tablet (10 mg) by mouth every 6 hours as needed for moderate pain   Refills:  0        OMEPRAZOLE PO   Dose:  20 mg        Take 20 mg by mouth every morning   Refills:  0        ondansetron 4 MG tablet   Commonly known as:  ZOFRAN   Dose:  4 mg   Quantity:  6 tablet        Take 1 tablet (4 mg) by mouth every 8 hours as needed for nausea   Refills:  0        TYLENOL PO   Dose:  1000 mg        Take 1,000 mg by mouth every 8 hours as needed for mild pain or fever   Refills:  0                Prescriptions were sent or printed at these locations (3 Prescriptions)                   [id number] - [place], [place] - [address] AT [facility] [address], [place] [address]    Telephone:  [phone]   Fax:  [phone]   Hours:                  E-Prescribed (3 of 3)         azithromycin (ZITHROMAX Z-CHRISTELLE) 250 MG tablet               benzonatate (TESSALON) 100 MG capsule               albuterol (PROAIR HFA/PROVENTIL HFA/VENTOLIN HFA) 108 (90 BASE) MCG/ACT Inhaler                Procedures and tests performed during your visit     Influenza A/B antigen      Orders Needing Specimen Collection     None      Pending Results     No orders found from 3/21/2018 to 3/24/2018.            Pending Culture Results     No orders found from 3/21/2018 to 3/24/2018.            Thank you for choosing [facility]       Thank you for choosing [facility] for your care. Our goal is always to provide you with excellent care. Hearing back from our patients is one way we can continue to improve our services. Please take a few minutes to complete the written survey that you may receive in the mail after you visit with us. Thank you!        [employer] Information     [employer] gives you secure access to your electronic health record. If you see a primary care provider, you can also send messages to your care team and make  appointments. If you have questions, please call your primary care clinic.  If you do not have a primary care provider, please call [phone] and they will assist you.        Care EveryWhere ID     This is your Care EveryWhere ID. This could be used by other organizations to access your [place] [facility] medical records  [id number]        Equal Access to Services     ZHENG BLANCO : Ancelmo Saeed, hermes de jesus, leah wray. So [facility] -[phone].    ATENCIÓN: Si habla español, tiene a spear disposición servicios gratuitos de asistencia lingüística. Llame al [phone].    We comply with applicable federal civil rights laws and [place] laws. We do not discriminate on the basis of race, color, national origin, age, disability, sex, sexual orientation, or gender identity.            After Visit Summary       This is your record. Keep this with you and show to your community pharmacist(s) and doctor(s) at your next visit.

## 2018-03-24 NOTE — DISCHARGE INSTRUCTIONS
Take antibiotics as ordered.   Eat a yogurt daily while taking antibiotics.   Take tessalon as needed as directed for cough.   Use Albuterol inhaler as needed as directed for cough.   Increase water intake.   Follow up with PCP with any increase in symptoms or concerns.   Return to urgent care or emergency department with any increase in symptoms or concerns.

## 2018-03-24 NOTE — ED PROVIDER NOTES
History     Chief Complaint   Patient presents with     Cough     productive cough x3 days. denies fevers.      The history is provided by the patient. No  was used.     Ihsan Millan is a 27 year old female who presents with a cough that started 3 days ago. She's taken tylenol and Nyquil with mild effectiveness. Denies fever. Drinking well. Decreased appetite. Bowel and bladder are working well. No antibiotic use in the past 30 days. She is a tobacco user and smokes 0.5 PPD of cigarettes.       Problem List:    Patient Active Problem List    Diagnosis Date Noted     RUQ abdominal pain 02/12/2017     Priority: Medium     Pneumonia of right middle lobe due to infectious organism (H) 02/12/2017     Priority: Medium     Tobacco abuse 02/12/2017     Priority: Medium     Status post laparoscopic assisted vaginal hysterectomy (LAVH) 06/25/2015     Priority: Medium     Surgery, elective 06/24/2015     Priority: Medium     Endometriosis 04/16/2015     Priority: Medium     Depo lupron 4/15.  Laparoscopy 3/15       ASCUS on Pap smear 07/18/2014     Priority: Medium     + hpv colpo 7/14       Postpartum depression 07/18/2014     Priority: Medium     zoloft rx       H. pylori infection      Priority: Medium     Moderate persistent asthma 03/19/2014     Priority: Medium     GERD (gastroesophageal reflux disease) 09/12/2013     Priority: Medium     Intermittent asthma 09/12/2013     Priority: Medium     Increased with pregnancy  Send for eval and asthma action plan  Smoking cessation counseling  Flu shot given          Past Medical History:    Past Medical History:   Diagnosis Date     Asthma      GERD (gastroesophageal reflux disease)      H. pylori infection        Past Surgical History:    Past Surgical History:   Procedure Laterality Date     COLONOSCOPY N/A 11/6/2014    Procedure: COLONOSCOPY;  Surgeon: Zacarias Paz MD;  Location: [facility]     DILATION AND CURETTAGE, HYSTEROSCOPY DIAGNOSTIC,  COMBINED  8/1/2014    Procedure: COMBINED DILATION AND CURETTAGE, HYSTEROSCOPY DIAGNOSTIC;  Surgeon: Rodolfo Clifford MD;  Location: [facility]     ESOPHAGOSCOPY, GASTROSCOPY, DUODENOSCOPY (EGD), COMBINED N/A 9/5/2014    Procedure: COMBINED ESOPHAGOSCOPY, GASTROSCOPY, DUODENOSCOPY (EGD);  Surgeon: Zacarias Paz MD;  Location: [facility]     LAPAROSCOPIC ASSISTED HYSTERECTOMY VAGINAL N/A 6/24/2015    Procedure: LAPAROSCOPIC ASSISTED HYSTERECTOMY VAGINAL;  Surgeon: Rodolfo Clifford MD;  Location: [facility]     LAPAROSCOPIC OOPHORECTOMY Right 3/23/2016    Procedure: LAPAROSCOPIC OOPHORECTOMY;  Surgeon: Rodolfo Cliffodr MD;  Location: [facility]     LAPAROSCOPIC SALPINGO-OOPHORECTOMY Left 5/3/2017    Procedure: LAPAROSCOPIC SALPINGO-OOPHORECTOMY;  LAPAROSCOPIC LEFT OOPHORECTOMY;  Surgeon: Rodolfo Clifford MD;  [facility]: [facility]     LAPAROSCOPY DIAGNOSTIC (GYN) N/A 3/18/2015    Procedure: LAPAROSCOPY DIAGNOSTIC (GYN);  Surgeon: Rodolfo Clifford MD;  Location: [facility] OR     no surgeries       SALPINGECTOMY Bilateral 6/24/2015    Procedure: SALPINGECTOMY;  Surgeon: Rodolfo Clifford MD;  Location: [facility]       Family History:    Family History   Problem Relation Age of Onset     Asthma Mother      Unknown/Adopted Maternal Grandmother      Unknown/Adopted Maternal Grandfather        Social History:  Marital Status:  Single [1]  Social History   Substance Use Topics     Smoking status: Current Every Day Smoker     Packs/day: 0.50     Years: 7.00     Types: Cigarettes     Smokeless tobacco: Never Used      Comment: declines quitline referral     Alcohol use Yes      Comment: social        Medications:      azithromycin (ZITHROMAX Z-CHRISTELLE) 250 MG tablet   benzonatate (TESSALON) 100 MG capsule   albuterol (PROAIR HFA/PROVENTIL HFA/VENTOLIN HFA) 108 (90 BASE) MCG/ACT Inhaler   Acetaminophen (TYLENOL PO)   estradiol (ESTRACE) 1 MG tablet   OMEPRAZOLE PO   cyclobenzaprine (FLEXERIL) 10 MG tablet   ketorolac (TORADOL) 10 MG tablet   ondansetron (ZOFRAN) 4 MG tablet    [DISCONTINUED] albuterol (PROAIR HFA, PROVENTIL HFA, VENTOLIN HFA) 108 (90 BASE) MCG/ACT inhaler         Review of Systems   Constitutional: Positive for appetite change. Negative for activity change and fever.   HENT: Positive for congestion. Negative for ear discharge, ear pain, postnasal drip, rhinorrhea, sinus pain, sinus pressure and sore throat.    Respiratory: Positive for cough and wheezing. Negative for shortness of breath and stridor.         Sore lungs from coughing.    Cardiovascular: Negative for chest pain.   Gastrointestinal: Negative for diarrhea, nausea and vomiting.   Genitourinary: Negative for dysuria.   Skin: Negative for rash.   Neurological: Negative for weakness.   Psychiatric/Behavioral: Negative.        Physical Exam   BP: 99/67  Heart Rate: 85  Temp: 97.8  F (36.6  C)  Resp: 14  SpO2: 99 %      Physical Exam   Constitutional: She is oriented to person, place, and time. She appears well-developed and well-nourished. No distress.   HENT:   Head: Normocephalic.   Right Ear: External ear normal.   Left Ear: External ear normal.   Mouth/Throat: Oropharynx is clear and moist. No oropharyngeal exudate.   Neck: Normal range of motion. Neck supple.   Cardiovascular: Normal rate, regular rhythm and normal heart sounds.    No murmur heard.  Pulmonary/Chest: Effort normal. No respiratory distress. She has no wheezes. She has rales.   Abdominal: Soft. She exhibits no distension.   Musculoskeletal: Normal range of motion.   Lymphadenopathy:     She has no cervical adenopathy.   Neurological: She is alert and oriented to person, place, and time. She exhibits normal muscle tone.   Skin: Skin is warm and dry. No rash noted. She is not diaphoretic.   Psychiatric: She has a normal mood and affect. Her behavior is normal.   Nursing note and vitals reviewed.      ED Course     ED Course     Procedures    Results for orders placed or performed during the hospital encounter of 03/23/18   Influenza A/B antigen    Result Value Ref Range    Influenza A/B Agn Specimen Nares     Influenza A Negative NEG^Negative    Influenza B Negative NEG^Negative       Assessments & Plan (with Medical Decision Making)     Discussed plan of care. She verbalized understanding. All questions answered.     I have reviewed the nursing notes.    I have reviewed the findings, diagnosis, plan and need for follow up with the patient.  Discharged in stable condition.     New Prescriptions    ALBUTEROL (PROAIR HFA/PROVENTIL HFA/VENTOLIN HFA) 108 (90 BASE) MCG/ACT INHALER    Inhale 2 puffs into the lungs every 6 hours as needed for shortness of breath / dyspnea or wheezing    AZITHROMYCIN (ZITHROMAX Z-CHRISTELLE) 250 MG TABLET    Two tablets on the first day, then one tablet daily for the next 4 days    BENZONATATE (TESSALON) 100 MG CAPSULE    Take 1 capsule (100 mg) by mouth 3 times daily as needed for cough       Final diagnoses:   Acute bronchitis, unspecified organism     Take antibiotics as ordered.   Eat a yogurt daily while taking antibiotics.   Take tessalon as needed as directed for cough.   Use Albuterol inhaler as needed as directed for cough.   Increase water intake.   Follow up with PCP with any increase in symptoms or concerns.   Return to urgent care or emergency department with any increase in symptoms or concerns.     JOSE Castillo  3/23/2018  8:25 PM  UR[facility]ENT CARE CLINIC       Henny Tamez NP  03/23/18 1504

## 2018-03-29 ENCOUNTER — APPOINTMENT (OUTPATIENT)
Dept: GENERAL RADIOLOGY | Facility: HOSPITAL | Age: 28
End: 2018-03-29
Attending: PHYSICIAN ASSISTANT
Payer: COMMERCIAL

## 2018-03-29 ENCOUNTER — HOSPITAL ENCOUNTER (EMERGENCY)
Facility: HOSPITAL | Age: 28
Discharge: HOME | End: 2018-03-29
Attending: PHYSICIAN ASSISTANT | Admitting: PHYSICIAN ASSISTANT
Payer: COMMERCIAL

## 2018-03-29 VITALS
HEIGHT: 64 IN | WEIGHT: 140 LBS | BODY MASS INDEX: 23.9 KG/M2 | SYSTOLIC BLOOD PRESSURE: 118 MMHG | RESPIRATION RATE: 16 BRPM | DIASTOLIC BLOOD PRESSURE: 70 MMHG | OXYGEN SATURATION: 97 % | TEMPERATURE: 97 F | HEART RATE: 98 BPM

## 2018-03-29 DIAGNOSIS — J98.01 ACUTE BRONCHOSPASM: ICD-10-CM

## 2018-03-29 DIAGNOSIS — J40 BRONCHITIS: ICD-10-CM

## 2018-03-29 LAB
ANION GAP SERPL CALCULATED.3IONS-SCNC: 7 MMOL/L (ref 3–14)
BUN SERPL-MCNC: 16 MG/DL (ref 7–30)
CALCIUM SERPL-MCNC: 9.1 MG/DL (ref 8.5–10.1)
CHLORIDE SERPL-SCNC: 108 MMOL/L (ref 94–109)
CO2 SERPL-SCNC: 27 MMOL/L (ref 20–32)
CREAT SERPL-MCNC: 0.85 MG/DL (ref 0.52–1.04)
D DIMER PPP DDU-MCNC: 210 NG/ML D-DU (ref 0–300)
ERYTHROCYTE [DISTWIDTH] IN BLOOD BY AUTOMATED COUNT: 13 % (ref 10–15)
GFR SERPL CREATININE-BSD FRML MDRD: 80 ML/MIN/1.7M2
GLUCOSE SERPL-MCNC: 93 MG/DL (ref 70–99)
HCT VFR BLD AUTO: 39.4 % (ref 35–47)
HGB BLD-MCNC: 13.7 G/DL (ref 11.7–15.7)
MCH RBC QN AUTO: 30.4 PG (ref 26.5–33)
MCHC RBC AUTO-ENTMCNC: 34.8 G/DL (ref 31.5–36.5)
MCV RBC AUTO: 88 FL (ref 78–100)
PLATELET # BLD AUTO: 238 10E9/L (ref 150–450)
POTASSIUM SERPL-SCNC: 3.8 MMOL/L (ref 3.4–5.3)
RBC # BLD AUTO: 4.5 10E12/L (ref 3.8–5.2)
SODIUM SERPL-SCNC: 142 MMOL/L (ref 133–144)
WBC # BLD AUTO: 8.1 10E9/L (ref 4–11)

## 2018-03-29 PROCEDURE — 94640 AIRWAY INHALATION TREATMENT: CPT

## 2018-03-29 PROCEDURE — 99284 EMERGENCY DEPT VISIT MOD MDM: CPT | Mod: 25

## 2018-03-29 PROCEDURE — 25000125 ZZHC RX 250: Performed by: PHYSICIAN ASSISTANT

## 2018-03-29 PROCEDURE — 85379 FIBRIN DEGRADATION QUANT: CPT | Performed by: PHYSICIAN ASSISTANT

## 2018-03-29 PROCEDURE — 96374 THER/PROPH/DIAG INJ IV PUSH: CPT

## 2018-03-29 PROCEDURE — 80048 BASIC METABOLIC PNL TOTAL CA: CPT | Performed by: PHYSICIAN ASSISTANT

## 2018-03-29 PROCEDURE — 85027 COMPLETE CBC AUTOMATED: CPT | Performed by: PHYSICIAN ASSISTANT

## 2018-03-29 PROCEDURE — 25000128 H RX IP 250 OP 636: Performed by: PHYSICIAN ASSISTANT

## 2018-03-29 PROCEDURE — 99284 EMERGENCY DEPT VISIT MOD MDM: CPT | Performed by: PHYSICIAN ASSISTANT

## 2018-03-29 PROCEDURE — 71046 X-RAY EXAM CHEST 2 VIEWS: CPT | Mod: TC

## 2018-03-29 PROCEDURE — 36415 COLL VENOUS BLD VENIPUNCTURE: CPT | Performed by: PHYSICIAN ASSISTANT

## 2018-03-29 RX ORDER — BENZONATATE 200 MG/1
200 CAPSULE ORAL 3 TIMES DAILY PRN
Qty: 21 CAPSULE | Refills: 0 | Status: SHIPPED | OUTPATIENT
Start: 2018-03-29 | End: 2018-04-17

## 2018-03-29 RX ORDER — KETOROLAC TROMETHAMINE 30 MG/ML
30 INJECTION, SOLUTION INTRAMUSCULAR; INTRAVENOUS ONCE
Status: COMPLETED | OUTPATIENT
Start: 2018-03-29 | End: 2018-03-29

## 2018-03-29 RX ORDER — PREDNISONE 20 MG/1
TABLET ORAL
Qty: 10 TABLET | Refills: 0 | Status: SHIPPED | OUTPATIENT
Start: 2018-03-29 | End: 2018-04-17

## 2018-03-29 RX ORDER — IPRATROPIUM BROMIDE AND ALBUTEROL SULFATE 2.5; .5 MG/3ML; MG/3ML
3 SOLUTION RESPIRATORY (INHALATION) ONCE
Status: COMPLETED | OUTPATIENT
Start: 2018-03-29 | End: 2018-03-29

## 2018-03-29 RX ADMIN — IPRATROPIUM BROMIDE AND ALBUTEROL SULFATE 3 ML: .5; 3 SOLUTION RESPIRATORY (INHALATION) at 21:53

## 2018-03-29 RX ADMIN — SODIUM CHLORIDE 1000 ML: 9 INJECTION, SOLUTION INTRAVENOUS at 21:49

## 2018-03-29 RX ADMIN — KETOROLAC TROMETHAMINE 30 MG: 30 INJECTION, SOLUTION INTRAMUSCULAR at 21:53

## 2018-03-29 ASSESSMENT — ENCOUNTER SYMPTOMS
GASTROINTESTINAL NEGATIVE: 1
SORE THROAT: 0
NEUROLOGICAL NEGATIVE: 1
COUGH: 1
FATIGUE: 1
PSYCHIATRIC NEGATIVE: 1
EYES NEGATIVE: 1
CHEST TIGHTNESS: 1
CHILLS: 1
FEVER: 1
SINUS PRESSURE: 0
WHEEZING: 1

## 2018-03-29 NOTE — ED AVS SNAPSHOT
[facility]    [address] [address]    Phone:  [phone]                                       Ihsan Millan   MRN: [id number]    Department:  [facility]   Date of Visit:  3/29/2018           Patient Information     Date Of Birth          1990        Your diagnoses for this visit were:     Acute bronchospasm        You were seen by Primo Oliva PA.      Follow-up Information     Follow up with Temo Houston DO.    Specialty:  Family Practice    Why:  Mary    Contact information:    [facility]  [address] [facility] [address] [phone]          Follow up with [facility].    Specialty:  EMERGENCY MEDICINE    Why:  If symptoms worsen    Contact information:    [address] [address] [phone]    Additional information:    From [facility]: Take [address]. Turn left at [address]. Turn left at the first stoplight on [address]. At the first stop sign, take a right onto [address]. Take a left into the parking lot and continue through until you reach the North enterance of the building.       From [place]: Take [address]. Take the [address] ramp towards [address]. Turn left onto [address]. Take a slight right onto [address]. Turn left at the first stoplight on [address]. At the first stop sign, take a right onto [address]. Take a left into the parking lot and continue through until you reach the North enterance of the building.       From [place]: Take [address]. Take a right at [address]. At the first stop sign, take a right onto [address]. Take a left into the parking lot and continue through until you reach the North enterance of the building.         Discharge Instructions       - No pneumonia and white count is normal. Lab for blood clot in the lung is negative also.   - Prednisone  burst, tessalon for cough, and albuterol every 4-6 hrs for 3-5 days.     * follow up with primary care - ideally in the next 3-5 days.       Discharge References/Attachments     BRONCHOSPASM (ADULT) (ENGLISH)         Review of your medicines      START taking        Dose / Directions Last dose taken    benzonatate 200 MG capsule   Commonly known as:  TESSALON   Dose:  200 mg   Quantity:  21 capsule        Take 1 capsule (200 mg) by mouth 3 times daily as needed for cough   Refills:  0        predniSONE 20 MG tablet   Commonly known as:  DELTASONE   Quantity:  10 tablet        Take two tablets (= 40mg) each day for 5 (five) days   Refills:  0          Our records show that you are taking the medicines listed below. If these are incorrect, please call your family doctor or clinic.        Dose / Directions Last dose taken    albuterol 108 (90 BASE) MCG/ACT Inhaler   Commonly known as:  PROAIR HFA/PROVENTIL HFA/VENTOLIN HFA   Dose:  2 puff   Quantity:  1 Inhaler        Inhale 2 puffs into the lungs every 6 hours as needed for shortness of breath / dyspnea or wheezing   Refills:  0        OMEPRAZOLE PO   Dose:  20 mg        Take 20 mg by mouth every morning   Refills:  0        TYLENOL PO   Dose:  1000 mg        Take 1,000 mg by mouth every 8 hours as needed for mild pain or fever   Refills:  0                Prescriptions were sent or printed at these locations (2 Prescriptions)                   [id number] [place], [place] - [address] AT [facility] [address]   [address], [facility] [address]    Telephone:  [phone]   Fax:  [phone]   Hours:                  Printed at Department/Unit printer (2 of 2)         predniSONE (DELTASONE) 20 MG tablet               benzonatate (TESSALON) 200 MG capsule                Procedures and tests performed during your visit     Basic metabolic panel    CBC with platelets    Chest XR,  PA & LAT    D-Dimer ([facility])      Orders Needing Specimen Collection      None      Pending Results     Date and Time Order Name Status Description    3/29/2018 2136 Chest XR,  PA & LAT In process             Pending Culture Results     No orders found from 3/27/2018 to 3/30/2018.            Thank you for choosing [facility]       Thank you for choosing [facility] for your care. Our goal is always to provide you with excellent care. Hearing back from our patients is one way we can continue to improve our services. Please take a few minutes to complete the written survey that you may receive in the mail after you visit with us. Thank you!        [employer] Information     [facility] gives you secure access to your electronic health record. If you see a primary care provider, you can also send messages to your care team and make appointments. If you have questions, please call your primary care clinic.  If you do not have a primary care provider, please call [phone] and they will assist you.        Care EveryWhere ID     This is your Care EveryWhere ID. This could be used by other organizations to access your [facility] medical records  [id number]        Equal Access to Services     ZHENG BLANCO : Hadii clifton garzao Sophani, waaxda luqadaha, qaybta kaalmada aderick, leah graham . So [facility] [phone].    ATENCIÓN: Si habla español, tiene a spear disposición servicios gratuitos de asistencia lingüística. Llame al [phone].    We comply with applicable federal civil rights laws and [place] laws. We do not discriminate on the basis of race, color, national origin, age, disability, sex, sexual orientation, or gender identity.            After Visit Summary       This is your record. Keep this with you and show to your community pharmacist(s) and doctor(s) at your next visit.

## 2018-03-29 NOTE — ED AVS SNAPSHOT
[facility]    [address] [address]    Phone:  [phone]                                       Ihsan Millan   MRN: [id number]    Department:  [facility]   Date of Visit:  3/29/2018           After Visit Summary Signature Page     I have received my discharge instructions, and my questions have been answered. I have discussed any challenges I see with this plan with the nurse or doctor.    ..........................................................................................................................................  Patient/Patient Representative Signature      ..........................................................................................................................................  Patient Representative Print Name and Relationship to Patient    ..................................................               ................................................  Date                                            Time    ..........................................................................................................................................  Reviewed by Signature/Title    ...................................................              ..............................................  Date                                                            Time

## 2018-03-29 NOTE — LETTER
[address] [address]  Main: [phone]  Dept: [phone]      March 29, 2018      Re: Ihsan Millan      TO WHOM IT MAY CONCERN:    Ihsan Millan was evaluated for acute illness in the emergency department today. She may return to work on  31 March 2018.         Sincerely,      Primo Oliva PA-C   3/29/2018   11:02 PM

## 2018-03-30 NOTE — ED NOTES
Discharge instructions reviewed with patient, and patient verbalized understanding. Rx x2 and work note given to pt. Pt ambulated with a steady gait to the exit.

## 2018-03-30 NOTE — DISCHARGE INSTRUCTIONS
- No pneumonia and white count is normal. Lab for blood clot in the lung is negative also.   - Prednisone burst, tessalon for cough, and albuterol every 4-6 hrs for 3-5 days.     * follow up with primary care - ideally in the next 3-5 days.

## 2018-03-30 NOTE — ED PROVIDER NOTES
History     Chief Complaint   Patient presents with     Cough     Patient diagnosed with bronchitis recently and now reports worsening symptoms. Patient completed Z-pack yesterday     Chest Wall Pain     Constant chest heaviness that worsens with cough.      HPI  Ihsan Millan is a 27 year old female with Hx asthma and GERD who presents with 10 days of cough and chest pain that is made worse with coughing and deep breaths.  Patient reports sweats and chills but is unsure of any fevers at home.  Cough is nonproductive and associated with wheezing.  Ihsan has been using tylenol and her inhaler with moderate improvement in symptoms.      Problem List:    Patient Active Problem List    Diagnosis Date Noted     RUQ abdominal pain 02/12/2017     Priority: Medium     Pneumonia of right middle lobe due to infectious organism (H) 02/12/2017     Priority: Medium     Tobacco abuse 02/12/2017     Priority: Medium     Status post laparoscopic assisted vaginal hysterectomy (LAVH) 06/25/2015     Priority: Medium     Surgery, elective 06/24/2015     Priority: Medium     Endometriosis 04/16/2015     Priority: Medium     Depo lupron 4/15.  Laparoscopy 3/15       ASCUS on Pap smear 07/18/2014     Priority: Medium     + hpv colpo 7/14       Postpartum depression 07/18/2014     Priority: Medium     zoloft rx       H. pylori infection      Priority: Medium     Moderate persistent asthma 03/19/2014     Priority: Medium     GERD (gastroesophageal reflux disease) 09/12/2013     Priority: Medium     Intermittent asthma 09/12/2013     Priority: Medium     Increased with pregnancy  Send for eval and asthma action plan  Smoking cessation counseling  Flu shot given          Past Medical History:    Past Medical History:   Diagnosis Date     Asthma      GERD (gastroesophageal reflux disease)      H. pylori infection        Past Surgical History:    Past Surgical History:   Procedure Laterality Date     COLONOSCOPY N/A 11/6/2014     Procedure: COLONOSCOPY;  Surgeon: Zacarias Paz MD;  [facility]: [facility]     DILATION AND CURETTAGE, HYSTEROSCOPY DIAGNOSTIC, COMBINED  8/1/2014    Procedure: COMBINED DILATION AND CURETTAGE, HYSTEROSCOPY DIAGNOSTIC;  Surgeon: Rodolfo Clifford MD;  [facility]: [facility]     ESOPHAGOSCOPY, GASTROSCOPY, DUODENOSCOPY (EGD), COMBINED N/A 9/5/2014    Procedure: COMBINED ESOPHAGOSCOPY, GASTROSCOPY, DUODENOSCOPY (EGD);  Surgeon: Zacarias Paz MD;  [facility]: [facility]     LAPAROSCOPIC ASSISTED HYSTERECTOMY VAGINAL N/A 6/24/2015    Procedure: LAPAROSCOPIC ASSISTED HYSTERECTOMY VAGINAL;  Surgeon: Rodolfo Clifford MD;  [facility]: [facility]     LAPAROSCOPIC OOPHORECTOMY Right 3/23/2016    Procedure: LAPAROSCOPIC OOPHORECTOMY;  Surgeon: Rodolfo Clifford MD;  [facility]: [facility]     LAPAROSCOPIC SALPINGO-OOPHORECTOMY Left 5/3/2017    Procedure: LAPAROSCOPIC SALPINGO-OOPHORECTOMY;  LAPAROSCOPIC LEFT OOPHORECTOMY;  Surgeon: Rodolfo Clifford MD;  [facility]: [facility]     LAPAROSCOPY DIAGNOSTIC (GYN) N/A 3/18/2015    Procedure: LAPAROSCOPY DIAGNOSTIC (GYN);  Surgeon: Rodolfo Clifford MD;  Location: [facility]     no surgeries       SALPINGECTOMY Bilateral 6/24/2015    Procedure: SALPINGECTOMY;  Surgeon: Rodolfo Clifford MD;  Location: [facility]       Family History:    Family History   Problem Relation Age of Onset     Asthma Mother      Unknown/Adopted Maternal Grandmother      Unknown/Adopted Maternal Grandfather        Social History:  Marital Status:  Single [1]  Social History   Substance Use Topics     Smoking status: Current Every Day Smoker     Packs/day: 0.50     Years: 7.00     Types: Cigarettes     Smokeless tobacco: Never Used      Comment: declines quitline referral     Alcohol use Yes      Comment: social        Medications:      predniSONE (DELTASONE) 20 MG tablet   benzonatate (TESSALON) 200 MG capsule   albuterol (PROAIR HFA/PROVENTIL HFA/VENTOLIN HFA) 108 (90 BASE) MCG/ACT Inhaler   Acetaminophen (TYLENOL PO)   OMEPRAZOLE PO         Review of Systems  "  Constitutional: Positive for chills, fatigue and fever.   HENT: Positive for congestion. Negative for ear pain, sinus pressure and sore throat.    Eyes: Negative.    Respiratory: Positive for cough, chest tightness and wheezing.    Cardiovascular: Positive for chest pain (chest wall).   Gastrointestinal: Negative.    Skin: Negative.    Neurological: Negative.    Psychiatric/Behavioral: Negative.        Physical Exam   BP: 115/71  Pulse: 96  Temp: 97.8  F (36.6  C)  Resp: 16  Height: 162.6 cm (5' 4\")  Weight: 63.5 kg (140 lb)  SpO2: 98 %      Physical Exam   Constitutional: She is oriented to person, place, and time. She appears well-developed and well-nourished. No distress.   HENT:   Head: Normocephalic and atraumatic.   Right Ear: External ear normal.   Left Ear: External ear normal.   Mouth/Throat: Oropharynx is clear and moist.   Eyes: Conjunctivae are normal.   Cardiovascular: Normal rate and normal heart sounds.    Pulmonary/Chest: Effort normal. She has wheezes.   Abdominal: Soft. Bowel sounds are normal.   Neurological: She is alert and oriented to person, place, and time.   Skin: Skin is warm and dry.   Psychiatric: She has a normal mood and affect.   Nursing note and vitals reviewed.      ED Course     ED Course     Procedures        Results for orders placed or performed during the hospital encounter of 03/29/18 (from the past 24 hour(s))   CBC with platelets   Result Value Ref Range    WBC 8.1 4.0 - 11.0 10e9/L    RBC Count 4.50 3.8 - 5.2 10e12/L    Hemoglobin 13.7 11.7 - 15.7 g/dL    Hematocrit 39.4 35.0 - 47.0 %    MCV 88 78 - 100 fl    MCH 30.4 26.5 - 33.0 pg    MCHC 34.8 31.5 - 36.5 g/dL    RDW 13.0 10.0 - 15.0 %    Platelet Count 238 150 - 450 10e9/L   Basic metabolic panel   Result Value Ref Range    Sodium 142 133 - 144 mmol/L    Potassium 3.8 3.4 - 5.3 mmol/L    Chloride 108 94 - 109 mmol/L    Carbon Dioxide 27 20 - 32 mmol/L    Anion Gap 7 3 - 14 mmol/L    Glucose 93 70 - 99 mg/dL    Urea " Nitrogen 16 7 - 30 mg/dL    Creatinine 0.85 0.52 - 1.04 mg/dL    GFR Estimate 80 >60 mL/min/1.7m2    GFR Estimate If Black >90 >60 mL/min/1.7m2    Calcium 9.1 8.5 - 10.1 mg/dL   D-Dimer ([facility],)   Result Value Ref Range    D-Dimer ng/mL 210 0 - 300 ng/ml D-DU   Chest XR,  PA & LAT    Narrative    PROCEDURE:  XR CHEST 2 VW    HISTORY:  cough 10+ days, chest wall pain; .     COMPARISON:  August 2017    FINDINGS:   The cardiac silhouette is normal in size. The pulmonary vasculature is  normal.  The lungs are clear. No pleural effusion or pneumothorax.      Impression    IMPRESSION:  No acute cardiopulmonary disease.      KAYLA WYNNE MD       Medications   0.9% sodium chloride BOLUS (0 mLs Intravenous Stopped 3/29/18 2300)   ketorolac (TORADOL) injection 30 mg (30 mg Intravenous Given 3/29/18 2153)   ipratropium - albuterol 0.5 mg/2.5 mg/3 mL (DUONEB) neb solution 3 mL (3 mLs Nebulization Given 3/29/18 2153)       Assessments & Plan (with Medical Decision Making)     I have reviewed the nursing notes.    I have reviewed the findings, diagnosis, plan and need for follow up with the patient.      Discharge Medication List as of 3/29/2018 11:00 PM      START taking these medications    Details   predniSONE (DELTASONE) 20 MG tablet Take two tablets (= 40mg) each day for 5 (five) days, Disp-10 tablet, R-0, Local Print      benzonatate (TESSALON) 200 MG capsule Take 1 capsule (200 mg) by mouth 3 times daily as needed for cough, Disp-21 capsule, R-0, Local Print             Final diagnoses:   Acute bronchospasm   Bronchitis   CXR clear, labs WNL. Pt received duoneb, IVF and toradol. Will start prednisone, continue inhaler Q4-6hr PRN and follow up with PCP 3-5 days, returning here with any worsening despite adherence to plan. Pt agreeable to plan and is discharged in stable condition.     3/29/2018   [facility]     Primo Oliva PA  03/30/18 2788

## 2018-04-17 ENCOUNTER — HOSPITAL ENCOUNTER (EMERGENCY)
Facility: HOSPITAL | Age: 28
Discharge: HOME | End: 2018-04-17
Attending: NURSE PRACTITIONER | Admitting: NURSE PRACTITIONER
Payer: COMMERCIAL

## 2018-04-17 ENCOUNTER — APPOINTMENT (OUTPATIENT)
Dept: GENERAL RADIOLOGY | Facility: HOSPITAL | Age: 28
End: 2018-04-17
Attending: NURSE PRACTITIONER
Payer: COMMERCIAL

## 2018-04-17 VITALS
HEART RATE: 89 BPM | OXYGEN SATURATION: 100 % | TEMPERATURE: 98.2 F | DIASTOLIC BLOOD PRESSURE: 62 MMHG | SYSTOLIC BLOOD PRESSURE: 104 MMHG | RESPIRATION RATE: 16 BRPM

## 2018-04-17 DIAGNOSIS — M25.562 ACUTE PAIN OF LEFT KNEE: ICD-10-CM

## 2018-04-17 PROCEDURE — G0463 HOSPITAL OUTPT CLINIC VISIT: HCPCS

## 2018-04-17 PROCEDURE — 73562 X-RAY EXAM OF KNEE 3: CPT | Mod: TC,LT

## 2018-04-17 PROCEDURE — 99213 OFFICE O/P EST LOW 20 MIN: CPT | Performed by: NURSE PRACTITIONER
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
## 2018-04-17 NOTE — ED AVS SNAPSHOT
[facility]    [address] [address]    Phone:  [phone]                                       Ihsan Millan   MRN: [id number]    Department:  [facility]   Date of Visit:  4/17/2018           Patient Information     Date Of Birth          1990        Your diagnoses for this visit were:     Acute pain of left knee        You were seen by Chiquis Parra NP.      Follow-up Information     Follow up with [facility].    Specialty:  EMERGENCY MEDICINE    Why:  As needed, If symptoms worsen, or concerns develop    Contact information:    [address] [address] [phone]    Additional information:    From [facility]: Take [address]. Turn left at [address]. Turn left at the first stoplight on [address]. At the first stop sign, take a right onto [address]. Take a left into the parking lot and continue through until you reach the North enterance of the building.       From [place]: Take [address]. Take the [address] ramp towards [address]. Turn left onto [address]. Take a slight right onto [address]. Turn left at the first stoplight on [address]. At the first stop sign, take a right onto [address]. Take a left into the parking lot and continue through until you reach the North enterance of the building.       From [place]: Take [address]. Take a right at [address]. At the first stop sign, take a right onto [address]. Take a left into the parking lot and continue through until you reach the North enterance of the building.         Follow up with Temo Houston DO.    Specialty:  Family Practice    Why:  As needed, if symptoms do not improve    Contact information:    [facility]  [address] [facility] [id number] [phone]        Discharge References/Attachments     KNEE PAIN AND  SWELLING, REDUCING (ENGLISH)         Review of your medicines      Our records show that you are taking the medicines listed below. If these are incorrect, please call your family doctor or clinic.        Dose / Directions Last dose taken    albuterol 108 (90 Base) MCG/ACT Inhaler   Commonly known as:  PROAIR HFA/PROVENTIL HFA/VENTOLIN HFA   Dose:  2 puff   Quantity:  1 Inhaler        Inhale 2 puffs into the lungs every 6 hours as needed for shortness of breath / dyspnea or wheezing   Refills:  0        OMEPRAZOLE PO   Dose:  20 mg        Take 20 mg by mouth every morning   Refills:  0        TYLENOL PO   Dose:  1000 mg        Take 1,000 mg by mouth every 8 hours as needed for mild pain or fever   Refills:  0                Procedures and tests performed during your visit     XR Knee Left 3 Views      Orders Needing Specimen Collection     None      Pending Results     No orders found from 4/15/2018 to 4/18/2018.            Pending Culture Results     No orders found from 4/15/2018 to 4/18/2018.            Thank you for choosing [facility]       Thank you for choosing [facility] for your care. Our goal is always to provide you with excellent care. Hearing back from our patients is one way we can continue to improve our services. Please take a few minutes to complete the written survey that you may receive in the mail after you visit with us. Thank you!        SoapBox Soapshart Information     clipkit gives you secure access to your electronic health record. If you see a primary care provider, you can also send messages to your care team and make appointments. If you have questions, please call your primary care clinic.  If you do not have a primary care provider, please call [phone] and they will assist you.        Care EveryWhere ID     This is your Care EveryWhere ID. This could be used by other organizations to access your [facility] medical records  [id number]        Equal Access to Services     ZHENG LOVETT: Ancelmo lazo  eladia Saeed, hermes de jesus, aisha flowers, leah dennis. So [facility] [phone].    ATENCIÓN: Si habla español, tiene a spear disposición servicios gratuitos de asistencia lingüística. Llame al [phone].    We comply with applicable federal civil rights laws and [place] laws. We do not discriminate on the basis of race, color, national origin, age, disability, sex, sexual orientation, or gender identity.            After Visit Summary       This is your record. Keep this with you and show to your community pharmacist(s) and doctor(s) at your next visit.

## 2018-04-17 NOTE — ED AVS SNAPSHOT
[facility]    [address] [address]    Phone:  [phone]                                       Ihsan Millan   MRN: [id number]    Department:  [facility]   Date of Visit:  4/17/2018           After Visit Summary Signature Page     I have received my discharge instructions, and my questions have been answered. I have discussed any challenges I see with this plan with the nurse or doctor.    ..........................................................................................................................................  Patient/Patient Representative Signature      ..........................................................................................................................................  Patient Representative Print Name and Relationship to Patient    ..................................................               ................................................  Date                                            Time    ..........................................................................................................................................  Reviewed by Signature/Title    ...................................................              ..............................................  Date                                                            Time

## 2018-04-19 ASSESSMENT — ENCOUNTER SYMPTOMS
ARTHRALGIAS: 1
CONSTITUTIONAL NEGATIVE: 1

## 2018-06-27 ENCOUNTER — HOSPITAL ENCOUNTER (EMERGENCY)
Facility: HOSPITAL | Age: 28
Discharge: HOME | End: 2018-06-27
Attending: INTERNAL MEDICINE | Admitting: INTERNAL MEDICINE
Payer: COMMERCIAL

## 2018-06-27 VITALS
TEMPERATURE: 96.8 F | SYSTOLIC BLOOD PRESSURE: 102 MMHG | DIASTOLIC BLOOD PRESSURE: 74 MMHG | OXYGEN SATURATION: 98 % | RESPIRATION RATE: 14 BRPM

## 2018-06-27 DIAGNOSIS — L84 CALLUS OF FOOT: ICD-10-CM

## 2018-06-27 PROCEDURE — 25000132 ZZH RX MED GY IP 250 OP 250 PS 637: Performed by: INTERNAL MEDICINE

## 2018-06-27 PROCEDURE — 99283 EMERGENCY DEPT VISIT LOW MDM: CPT | Performed by: INTERNAL MEDICINE

## 2018-06-27 PROCEDURE — 99283 EMERGENCY DEPT VISIT LOW MDM: CPT

## 2018-06-27 RX ORDER — NAPROXEN 500 MG/1
500 TABLET ORAL ONCE
Status: COMPLETED | OUTPATIENT
Start: 2018-06-27 | End: 2018-06-27

## 2018-06-27 RX ADMIN — NAPROXEN 500 MG: 500 TABLET ORAL at 23:12

## 2018-06-27 NOTE — ED AVS SNAPSHOT
[facility]    [address] [address]    Phone:  [phone]                                       Ihsan Millan   MRN: [id number]    Department[facility]  [facility]   Date of Visit:  6/27/2018           After Visit Summary Signature Page     I have received my discharge instructions, and my questions have been answered. I have discussed any challenges I see with this plan with the nurse or doctor.    ..........................................................................................................................................  Patient/Patient Representative Signature      ..........................................................................................................................................  Patient Representative Print Name and Relationship to Patient    ..................................................               ................................................  Date                                            Time    ..........................................................................................................................................  Reviewed by Signature/Title    ...................................................              ..............................................  Date                                                            Time

## 2018-06-27 NOTE — ED AVS SNAPSHOT
[facility]    [address] [address]    Phone:  [phone]                                       Ihsan Millan   MRN: [id number]    Department[facility]  [facility]   Date of Visit:  6/27/2018           Patient Information     Date Of Birth          1990        Your diagnoses for this visit were:     Callus of foot        You were seen by Rj Figueroa MD.      Follow-up Information     Schedule an appointment as soon as possible for a visit with Temo Houston DO.    Specialty:  Family Practice    Contact information:    [facility]  [address] [facility] [address]  [phone]          Discharge Instructions         Treating Corns and Calluses  If your corns or calluses are mild, reducing friction may help. Different shoes, moleskin patches, or soft pads may be all the treatment you need. In more severe cases, treating tissue buildup may require your doctor s care. Sometimes custom-made shoe inserts (orthotics) or special pads are prescribed to reduce friction and pressure.    Change shoes  If you have corns, your doctor may suggest wearing shoes that have more toe room. This way, buckled joints are less likely to be pinched against the top of the shoe. If you have calluses, wearing a cushioned insole, arch support, or heel counter can help reduce friction.  Visit your doctor  In some cases, your doctor may trim away the outer layers of skin that make up the corn or callus. For a painful corn, medicine may be injected beneath the built-up tissue.  Wear orthotics  Orthotics are specially made to meet the needs of your feet. They cushion calluses or divert pressure away from these problem areas. Worn as directed, orthotics help limit existing problems and prevent new ones from forming.  If you need surgery  If a bone or joint is out of place, certain parts of your foot may be under too much pressure. This can cause severe corns and calluses.  In such cases, surgery may be the best way to correct the problem.  Outpatient procedures  In most cases, surgery to improve bone position is an outpatient procedure. Your doctor may cut away excess bone, reposition prominent bones, or even fuse joints. Sometimes tendons or ligaments are cut to reduce tension on a bone or joint. Your doctor will talk with you about the procedure that is best suited to your needs.  Date Last Reviewed: 7/1/2016 2000-2017 [employer]. [address] [place] [address]. All rights reserved. This information is not intended as a substitute for professional medical care. Always follow your healthcare professional's instructions.             Review of your medicines      START taking        Dose / Directions Last dose taken    salicylic acid 17 % topical gel   Quantity:  7 g        Apply topically daily for 21 days   Refills:  0          Our records show that you are taking the medicines listed below. If these are incorrect, please call your family doctor or clinic.        Dose / Directions Last dose taken    albuterol 108 (90 Base) MCG/ACT Inhaler   Commonly known as:  PROAIR HFA/PROVENTIL HFA/VENTOLIN HFA   Dose:  2 puff   Quantity:  1 Inhaler        Inhale 2 puffs into the lungs every 6 hours as needed for shortness of breath / dyspnea or wheezing   Refills:  0        OMEPRAZOLE PO   Dose:  20 mg        Take 20 mg by mouth every morning   Refills:  0        TYLENOL PO   Dose:  1000 mg        Take 1,000 mg by mouth every 8 hours as needed for mild pain or fever   Refills:  0                Prescriptions were sent or printed at these locations (1 Prescription)                   Other Prescriptions                Printed at Department/Unit printer (1 of 1)         salicylic acid 17 % topical gel                Orders Needing Specimen Collection     None      Pending Results     No orders found from 6/25/2018 to 6/28/2018.            Pending Culture Results     No  orders found from 6/25/2018 to 6/28/2018.            Thank you for choosing [facility]       Thank you for choosing [facility] for your care. Our goal is always to provide you with excellent care. Hearing back from our patients is one way we can continue to improve our services. Please take a few minutes to complete the written survey that you may receive in the mail after you visit with us. Thank you!        Notice Kioskhart Information     ManagerComplete gives you secure access to your electronic health record. If you see a primary care provider, you can also send messages to your care team and make appointments. If you have questions, please call your primary care clinic.  If you do not have a primary care provider, please call [phone] and they will assist you.        Care EveryWhere ID     This is your Care EveryWhere ID. This could be used by other organizations to access your [facility] medical records  [id number]        Equal Access to Services     ZHENG BLANCO : Ancelmo Saeed, hermes de jesus, leah wray. So [facility] [phone].    ATENCIÓN: Si habla español, tiene a spear disposición servicios gratuitos de asistencia lingüística. Llame al [phone].    We comply with applicable federal civil rights laws and [place] laws. We do not discriminate on the basis of race, color, national origin, age, disability, sex, sexual orientation, or gender identity.            After Visit Summary       This is your record. Keep this with you and show to your community pharmacist(s) and doctor(s) at your next visit.

## 2018-06-28 ASSESSMENT — ENCOUNTER SYMPTOMS
ABDOMINAL PAIN: 0
FEVER: 0
EYE REDNESS: 0
COLOR CHANGE: 0
DIFFICULTY URINATING: 0
CONFUSION: 0
ARTHRALGIAS: 0
HEADACHES: 0
SHORTNESS OF BREATH: 0
NECK STIFFNESS: 0

## 2018-06-28 NOTE — DISCHARGE INSTRUCTIONS
Treating Corns and Calluses  If your corns or calluses are mild, reducing friction may help. Different shoes, moleskin patches, or soft pads may be all the treatment you need. In more severe cases, treating tissue buildup may require your doctor s care. Sometimes custom-made shoe inserts (orthotics) or special pads are prescribed to reduce friction and pressure.    Change shoes  If you have corns, your doctor may suggest wearing shoes that have more toe room. This way, buckled joints are less likely to be pinched against the top of the shoe. If you have calluses, wearing a cushioned insole, arch support, or heel counter can help reduce friction.  Visit your doctor  In some cases, your doctor may trim away the outer layers of skin that make up the corn or callus. For a painful corn, medicine may be injected beneath the built-up tissue.  Wear orthotics  Orthotics are specially made to meet the needs of your feet. They cushion calluses or divert pressure away from these problem areas. Worn as directed, orthotics help limit existing problems and prevent new ones from forming.  If you need surgery  If a bone or joint is out of place, certain parts of your foot may be under too much pressure. This can cause severe corns and calluses. In such cases, surgery may be the best way to correct the problem.  Outpatient procedures  In most cases, surgery to improve bone position is an outpatient procedure. Your doctor may cut away excess bone, reposition prominent bones, or even fuse joints. Sometimes tendons or ligaments are cut to reduce tension on a bone or joint. Your doctor will talk with you about the procedure that is best suited to your needs.  Date Last Reviewed: 7/1/2016 2000-2017 [employer]. [address] [place] [address]. All rights reserved. This information is not intended as a substitute for professional medical care. Always follow your healthcare professional's instructions.

## 2018-06-28 NOTE — ED NOTES
Ambulated to room 11 independently, accompanied by cousin, call light in reach.  Bump on the bottom of left foot for the past few months.  Unable to put pressure on it the last 2 weeks.  Rates pain at 0, increases to 7 when walking or bending toes, pressure and burning.  Pain goal is zero.

## 2018-06-28 NOTE — ED PROVIDER NOTES
History     Chief Complaint   Patient presents with     Foot Pain     bump on bottom of foot x2 months. has not seen PCP. no injury      Patient is a 27 year old female presenting with foot injury. The history is provided by the patient.   Foot Injury   Location:  Foot  Foot location:  R foot  Pain details:     Quality:  Aching    Onset quality:  Gradual    Timing:  Intermittent    Progression:  Worsening  Chronicity:  Chronic  Associated symptoms: no fever          Problem List:    Patient Active Problem List    Diagnosis Date Noted     RUQ abdominal pain 02/12/2017     Priority: Medium     Pneumonia of right middle lobe due to infectious organism (H) 02/12/2017     Priority: Medium     Tobacco abuse 02/12/2017     Priority: Medium     Status post laparoscopic assisted vaginal hysterectomy (LAVH) 06/25/2015     Priority: Medium     Surgery, elective 06/24/2015     Priority: Medium     Endometriosis 04/16/2015     Priority: Medium     Depo lupron 4/15.  Laparoscopy 3/15       ASCUS on Pap smear 07/18/2014     Priority: Medium     + hpv colpo 7/14       Postpartum depression 07/18/2014     Priority: Medium     zoloft rx       H. pylori infection      Priority: Medium     Moderate persistent asthma 03/19/2014     Priority: Medium     GERD (gastroesophageal reflux disease) 09/12/2013     Priority: Medium     Intermittent asthma 09/12/2013     Priority: Medium     Increased with pregnancy  Send for eval and asthma action plan  Smoking cessation counseling  Flu shot given          Past Medical History:    Past Medical History:   Diagnosis Date     Asthma      GERD (gastroesophageal reflux disease)      H. pylori infection        Past Surgical History:    Past Surgical History:   Procedure Laterality Date     COLONOSCOPY N/A 11/6/2014    Procedure: COLONOSCOPY;  Surgeon: Zacarias Paz MD;  [facility]     DILATION AND CURETTAGE, HYSTEROSCOPY DIAGNOSTIC, COMBINED  8/1/2014    Procedure: COMBINED DILATION AND  CURETTAGE, HYSTEROSCOPY DIAGNOSTIC;  Surgeon: Rodolfo Clifford MD;  [facility]: [facility]     ESOPHAGOSCOPY, GASTROSCOPY, DUODENOSCOPY (EGD), COMBINED N/A 9/5/2014    Procedure: COMBINED ESOPHAGOSCOPY, GASTROSCOPY, DUODENOSCOPY (EGD);  Surgeon: Zacarias Paz MD;  [facility]: [facility]     LAPAROSCOPIC ASSISTED HYSTERECTOMY VAGINAL N/A 6/24/2015    Procedure: LAPAROSCOPIC ASSISTED HYSTERECTOMY VAGINAL;  Surgeon: Rodolfo Clifford MD;  [facility]: [facility]     LAPAROSCOPIC OOPHORECTOMY Right 3/23/2016    Procedure: LAPAROSCOPIC OOPHORECTOMY;  Surgeon: Rodolfo Clifford MD;  [facility]: [facility]     LAPAROSCOPIC SALPINGO-OOPHORECTOMY Left 5/3/2017    Procedure: LAPAROSCOPIC SALPINGO-OOPHORECTOMY;  LAPAROSCOPIC LEFT OOPHORECTOMY;  Surgeon: Rodolfo Clifford MD;  [facility]     LAPAROSCOPY DIAGNOSTIC (GYN) N/A 3/18/2015    Procedure: LAPAROSCOPY DIAGNOSTIC (GYN);  Surgeon: Rodolfo Clifford MD;  Location: [facility]     no surgeries       SALPINGECTOMY Bilateral 6/24/2015    Procedure: SALPINGECTOMY;  Surgeon: Rodolfo Clifford MD;  Location: [facility]       Family History:    Family History   Problem Relation Age of Onset     Asthma Mother      Unknown/Adopted Maternal Grandmother      Unknown/Adopted Maternal Grandfather        Social History:  Marital Status:  Single [1]  Social History   Substance Use Topics     Smoking status: Current Every Day Smoker     Packs/day: 0.50     Years: 7.00     Types: Cigarettes     Smokeless tobacco: Never Used      Comment: declines quitline referral     Alcohol use Yes      Comment: social        Medications:      salicylic acid 17 % topical gel   Acetaminophen (TYLENOL PO)   albuterol (PROAIR HFA/PROVENTIL HFA/VENTOLIN HFA) 108 (90 BASE) MCG/ACT Inhaler   OMEPRAZOLE PO         Review of Systems   Constitutional: Negative for fever.   HENT: Negative for congestion.    Eyes: Negative for redness.   Respiratory: Negative for shortness of breath.    Cardiovascular: Negative for chest pain.   Gastrointestinal: Negative for  abdominal pain.   Genitourinary: Negative for difficulty urinating.   Musculoskeletal: Negative for arthralgias and neck stiffness.   Skin: Negative for color change.   Neurological: Negative for headaches.   Psychiatric/Behavioral: Negative for confusion.       Physical Exam   BP: 102/74  Heart Rate: 86  Temp: 96.8  F (36  C)  Resp: 14  SpO2: 98 %      Physical Exam   Constitutional: No distress.   HENT:   Head: Atraumatic.   Mouth/Throat: Oropharynx is clear and moist. No oropharyngeal exudate.   Eyes: Pupils are equal, round, and reactive to light. No scleral icterus.   Cardiovascular: Normal heart sounds and intact distal pulses.    Pulmonary/Chest: Breath sounds normal. No respiratory distress.   Abdominal: Soft. Bowel sounds are normal. There is no tenderness.   Musculoskeletal: She exhibits no edema or tenderness.   Skin: Skin is warm. No rash noted. She is not diaphoretic.   About 1 x 1 cm callus on R sole, on guillermina lateral area       ED Course     ED Course     Procedures                 No results found for this or any previous visit (from the past 24 hour(s)).    Medications   naproxen (NAPROSYN) tablet 500 mg (500 mg Oral Given 6/27/18 8429)       Assessments & Plan (with Medical Decision Making)   Foot sole callus for 5 months  A trial of topical salicylic acid  Fu with PCP, podiatry  I have reviewed the nursing notes.    I have reviewed the findings, diagnosis, plan and need for follow up with the patient.      Discharge Medication List as of 6/27/2018 11:08 PM      START taking these medications    Details   salicylic acid 17 % topical gel Apply topically daily for 21 daysDisp-7 g, R-0Local Print             Final diagnoses:   Callus of foot       6/27/2018   [facility]     Rj Figueroa MD  06/28/18 [id number]

## 2018-08-13 NOTE — ED NOTES
Pt presents today with a friend for c/o left hip and shoulder pain since a fall on Saturday.    no pelvic pain/no hematuria

## 2018-09-12 ENCOUNTER — HOSPITAL ENCOUNTER (EMERGENCY)
Facility: HOSPITAL | Age: 28
Discharge: HOME | End: 2018-09-12
Attending: PHYSICIAN ASSISTANT | Admitting: PHYSICIAN ASSISTANT

## 2018-09-12 VITALS
DIASTOLIC BLOOD PRESSURE: 71 MMHG | SYSTOLIC BLOOD PRESSURE: 108 MMHG | RESPIRATION RATE: 16 BRPM | OXYGEN SATURATION: 100 % | TEMPERATURE: 96.3 F

## 2018-09-12 DIAGNOSIS — M25.562 ACUTE PAIN OF LEFT KNEE: ICD-10-CM

## 2018-09-12 PROCEDURE — G0463 HOSPITAL OUTPT CLINIC VISIT: HCPCS

## 2018-09-12 PROCEDURE — 99213 OFFICE O/P EST LOW 20 MIN: CPT | Performed by: PHYSICIAN ASSISTANT

## 2018-09-12 RX ORDER — PREDNISONE 20 MG/1
TABLET ORAL
Qty: 10 TABLET | Refills: 0 | Status: SHIPPED | OUTPATIENT
Start: 2018-09-12 | End: 2018-12-16

## 2018-09-12 ASSESSMENT — ENCOUNTER SYMPTOMS
CONSTITUTIONAL NEGATIVE: 1
PSYCHIATRIC NEGATIVE: 1
ARTHRALGIAS: 1
CARDIOVASCULAR NEGATIVE: 1

## 2018-09-12 NOTE — ED AVS SNAPSHOT
[facility]    [address]    [facility] [address]    Phone:  [phone]                                       Ihsan Millan   MRN: [id number]    Department:  [facility]   Date of Visit:  9/12/2018           Patient Information     Date Of Birth          1990        Your diagnoses for this visit were:     Acute pain of left knee        You were seen by Cinda Bhatia PA.      Follow-up Information     Follow up with Bobby Saul MD.    Specialty:  Orthopedics    Why:  For all further knee pain issues    Contact information:    [facility]  [address]  [address]  [facility] [address] [phone]          Follow up with [facility].    Specialty:  EMERGENCY MEDICINE    Why:  If further concerns develop    Contact information:    [address] [address] [phone]    Additional information:    From [facility]: Take [address]. Turn left at [address]. Turn left at the first stoplight on [address] [place] [address]. At the first stop sign, take a right onto [address]. Take a left into the parking lot and continue through until you reach the North enterance of the building.       From [place]: Take [address]. Take the [address] ramp towards [place]. Turn left onto [address]. Take a slight right onto [address]. Turn left at the first stoplight on [address]. At the first stop sign, take a right onto [address]. Take a left into the parking lot and continue through until you reach the North enterance of the building.       From [place]: Take [address]. Take a right at [address]. At the first stop sign, take a right onto [address]. Take a left into the parking lot and continue through until you reach the North enterance of the building.       Discharge References/Attachments     KNEE PAIN (ENGLISH)    KNEE PAIN AND SWELLING, REDUCING  (ENGLISH)         Review of your medicines      START taking        Dose / Directions Last dose taken    predniSONE 20 MG tablet   Commonly known as:  DELTASONE   Quantity:  10 tablet        Take two tablets (= 40mg) each day for 5 (five) days   Refills:  0          Our records show that you are taking the medicines listed below. If these are incorrect, please call your family doctor or clinic.        Dose / Directions Last dose taken    albuterol 108 (90 Base) MCG/ACT inhaler   Commonly known as:  PROAIR HFA/PROVENTIL HFA/VENTOLIN HFA   Dose:  2 puff   Quantity:  1 Inhaler        Inhale 2 puffs into the lungs every 6 hours as needed for shortness of breath / dyspnea or wheezing   Refills:  0        OMEPRAZOLE PO   Dose:  20 mg        Take 20 mg by mouth every morning   Refills:  0        TYLENOL PO   Dose:  1000 mg        Take 1,000 mg by mouth every 8 hours as needed for mild pain or fever   Refills:  0                Prescriptions were sent or printed at these locations (1 Prescription)                   [facility] [address] [place] [address] [address]  AT [facility] [address]   [address], [place] [address]    Telephone:  [phone]   Fax:  [phone]   Hours:                  E-Prescribed (1 of 1)         predniSONE (DELTASONE) 20 MG tablet                Orders Needing Specimen Collection     None      Pending Results     No orders found from 9/10/2018 to 9/13/2018.            Pending Culture Results     No orders found from 9/10/2018 to 9/13/2018.            Thank you for choosing [facility]       Thank you for choosing [facility] for your care. Our goal is always to provide you with excellent care. Hearing back from our patients is one way we can continue to improve our services. Please take a few minutes to complete the written survey that you may receive in the mail after you visit with us. Thank you!        [employer] Information     [employer] gives you secure access to your electronic health  record. If you see a primary care provider, you can also send messages to your care team and make appointments. If you have questions, please call your primary care clinic.  If you do not have a primary care provider, please call [phone] and they will assist you.        Care EveryWhere ID     This is your Care EveryWhere ID. This could be used by other organizations to access your [facility] medical records  [id number]        Equal Access to Services     ZHENG BLANCO : Ancelmo Saeed, hermes de jesus, aisha flowers, leah dennis. So [facility] [phone].    ATENCIÓN: Si habla español, tiene a spear disposición servicios gratuitos de asistencia lingüística. Llame al [phone].    We comply with applicable federal civil rights laws and [place] laws. We do not discriminate on the basis of race, color, national origin, age, disability, sex, sexual orientation, or gender identity.            After Visit Summary       This is your record. Keep this with you and show to your community pharmacist(s) and doctor(s) at your next visit.

## 2018-09-12 NOTE — ED AVS SNAPSHOT
[facility]    [address] [address]    Phone:  [phone]                                       Ihsan Millan   MRN: [id number]    Department:  [facility]   Date of Visit:  9/12/2018           After Visit Summary Signature Page     I have received my discharge instructions, and my questions have been answered. I have discussed any challenges I see with this plan with the nurse or doctor.    ..........................................................................................................................................  Patient/Patient Representative Signature      ..........................................................................................................................................  Patient Representative Print Name and Relationship to Patient    ..................................................               ................................................  Date                                   Time    ..........................................................................................................................................  Reviewed by Signature/Title    ...................................................              ..............................................  Date                                               Time          22EPIC Rev 08/18

## 2018-09-13 NOTE — ED TRIAGE NOTES
Pt presents with left knee pain for 2 mos but it has gotten worse over the last 2 weeks.Rates pain at 8/10. Describes the pain as a sharp throbbing pain. Took Tylenol at 1800 today. States has been icing it also.

## 2018-09-13 NOTE — ED PROVIDER NOTES
History     Chief Complaint   Patient presents with     Knee Pain     lt knee pain x 1 week, notes knee surg 2 months ago     The history is provided by the patient. No  was used.     Ihsan Millan is a 27 year old female who has left knee pain x 1 week. 2 months ago she had knee surgery, arthroscopy. Pt has not called her surgeon.  Pain is sharp and throbbing. Denies any injury or direct trauma    Problem List:    Patient Active Problem List    Diagnosis Date Noted     RUQ abdominal pain 02/12/2017     Priority: Medium     Pneumonia of right middle lobe due to infectious organism (H) 02/12/2017     Priority: Medium     Tobacco abuse 02/12/2017     Priority: Medium     Status post laparoscopic assisted vaginal hysterectomy (LAVH) 06/25/2015     Priority: Medium     Surgery, elective 06/24/2015     Priority: Medium     Endometriosis 04/16/2015     Priority: Medium     Depo lupron 4/15.  Laparoscopy 3/15       ASCUS on Pap smear 07/18/2014     Priority: Medium     + hpv colpo 7/14       Postpartum depression 07/18/2014     Priority: Medium     zoloft rx       H. pylori infection      Priority: Medium     Moderate persistent asthma 03/19/2014     Priority: Medium     GERD (gastroesophageal reflux disease) 09/12/2013     Priority: Medium     Intermittent asthma 09/12/2013     Priority: Medium     Increased with pregnancy  Send for eval and asthma action plan  Smoking cessation counseling  Flu shot given          Past Medical History:    Past Medical History:   Diagnosis Date     Asthma      GERD (gastroesophageal reflux disease)      H. pylori infection        Past Surgical History:    Past Surgical History:   Procedure Laterality Date     COLONOSCOPY N/A 11/6/2014    Procedure: COLONOSCOPY;  Surgeon: Zacarias Paz MD;  Location: [facility]     DILATION AND CURETTAGE, HYSTEROSCOPY DIAGNOSTIC, COMBINED  8/1/2014    Procedure: COMBINED DILATION AND CURETTAGE, HYSTEROSCOPY DIAGNOSTIC;  Surgeon:  Rodolfo Clifford MD;  Location: [facility]     ESOPHAGOSCOPY, GASTROSCOPY, DUODENOSCOPY (EGD), COMBINED N/A 9/5/2014    Procedure: COMBINED ESOPHAGOSCOPY, GASTROSCOPY, DUODENOSCOPY (EGD);  Surgeon: Zacarias Paz MD;  Location: [facility]     LAPAROSCOPIC ASSISTED HYSTERECTOMY VAGINAL N/A 6/24/2015    Procedure: LAPAROSCOPIC ASSISTED HYSTERECTOMY VAGINAL;  Surgeon: Rodolfo Clifford MD;  Location: [facility]     LAPAROSCOPIC OOPHORECTOMY Right 3/23/2016    Procedure: LAPAROSCOPIC OOPHORECTOMY;  Surgeon: Rodolfo Clifford MD;  Location: [facility]     LAPAROSCOPIC SALPINGO-OOPHORECTOMY Left 5/3/2017    Procedure: LAPAROSCOPIC SALPINGO-OOPHORECTOMY;  LAPAROSCOPIC LEFT OOPHORECTOMY;  Surgeon: Rodolfo Clifford MD;  Location: [facility]     LAPAROSCOPY DIAGNOSTIC (GYN) N/A 3/18/2015    Procedure: LAPAROSCOPY DIAGNOSTIC (GYN);  Surgeon: Rodolfo Clifford MD;  Location: [facility] OR     no surgeries       SALPINGECTOMY Bilateral 6/24/2015    Procedure: SALPINGECTOMY;  Surgeon: Rodolfo Clifford MD;  Location: [facility] OR       Family History:    Family History   Problem Relation Age of Onset     Asthma Mother      Unknown/Adopted Maternal Grandmother      Unknown/Adopted Maternal Grandfather        Social History:  Marital Status:  Single [1]  Social History   Substance Use Topics     Smoking status: Current Every Day Smoker     Packs/day: 0.50     Years: 7.00     Types: Cigarettes     Smokeless tobacco: Never Used      Comment: declines quitline referral     Alcohol use Yes      Comment: social        Medications:      Acetaminophen (TYLENOL PO)   albuterol (PROAIR HFA/PROVENTIL HFA/VENTOLIN HFA) 108 (90 BASE) MCG/ACT Inhaler   predniSONE (DELTASONE) 20 MG tablet   OMEPRAZOLE PO         Review of Systems   Constitutional: Negative.    HENT: Negative.    Cardiovascular: Negative.    Musculoskeletal: Positive for arthralgias and gait problem.   Psychiatric/Behavioral: Negative.        Physical Exam   BP: 108/71  Heart Rate: 81  Temp: 96.3  F (35.7  C)  Resp: 16  SpO2: 100  %      Physical Exam   Constitutional: She is oriented to person, place, and time. She appears well-developed and well-nourished. No distress.   Cardiovascular: Normal rate.    Pulmonary/Chest: Effort normal.   Musculoskeletal:   Left knee: no e/e/e/e, m/n/v intact, 4/5 strength due to pain. +AFROM with medial pain. Medial joint line TTP. Neg v/v/d   Neurological: She is alert and oriented to person, place, and time.   Skin: She is not diaphoretic.   Psychiatric: She has a normal mood and affect.   Nursing note and vitals reviewed.      ED Course     ED Course     Procedures          Pt declines ace wrap or crutches    Assessments & Plan (with Medical Decision Making)     I have reviewed the nursing notes.    I have reviewed the findings, diagnosis, plan and need for follow up with the patient.      New Prescriptions    PREDNISONE (DELTASONE) 20 MG TABLET    Take two tablets (= 40mg) each day for 5 (five) days       Final diagnoses:   Acute pain of left knee         Patient verbally educated and given appropriate education sheets for the diagnoses and has no questions.  Take medications as directed.   Follow up with Dr. Saul with first available appt.   if further concerns develop, return to the ER  Cinda Bhatia Certified  Physician Assistant  9/12/2018  9:24 PM  URGENT CARE CLINIC      9/12/2018   [facility]     Cnida Bhatia PA  09/12/18 6752

## 2018-11-27 ENCOUNTER — HEALTH MAINTENANCE LETTER (OUTPATIENT)
Age: 28
End: 2018-11-27

## 2018-12-16 ENCOUNTER — HOSPITAL ENCOUNTER (EMERGENCY)
Facility: HOSPITAL | Age: 28
Discharge: HOME | End: 2018-12-16
Attending: PHYSICIAN ASSISTANT | Admitting: PHYSICIAN ASSISTANT

## 2018-12-16 VITALS
BODY MASS INDEX: 24.03 KG/M2 | TEMPERATURE: 97.1 F | HEIGHT: 64 IN | RESPIRATION RATE: 14 BRPM | DIASTOLIC BLOOD PRESSURE: 68 MMHG | SYSTOLIC BLOOD PRESSURE: 101 MMHG | OXYGEN SATURATION: 98 %

## 2018-12-16 DIAGNOSIS — J20.9 ACUTE BRONCHITIS, UNSPECIFIED ORGANISM: ICD-10-CM

## 2018-12-16 DIAGNOSIS — R07.89 CHEST WALL PAIN: ICD-10-CM

## 2018-12-16 PROCEDURE — 96372 THER/PROPH/DIAG INJ SC/IM: CPT

## 2018-12-16 PROCEDURE — 93005 ELECTROCARDIOGRAM TRACING: CPT

## 2018-12-16 PROCEDURE — 25000128 H RX IP 250 OP 636: Performed by: PHYSICIAN ASSISTANT

## 2018-12-16 PROCEDURE — 25000132 ZZH RX MED GY IP 250 OP 250 PS 637: Performed by: PHYSICIAN ASSISTANT

## 2018-12-16 PROCEDURE — 99283 EMERGENCY DEPT VISIT LOW MDM: CPT | Performed by: PHYSICIAN ASSISTANT

## 2018-12-16 PROCEDURE — 99284 EMERGENCY DEPT VISIT MOD MDM: CPT | Mod: 25

## 2018-12-16 RX ORDER — AZITHROMYCIN 250 MG/1
TABLET, FILM COATED ORAL
Qty: 6 TABLET | Refills: 0 | Status: SHIPPED | OUTPATIENT
Start: 2018-12-16 | End: 2019-03-31

## 2018-12-16 RX ORDER — KETOROLAC TROMETHAMINE 30 MG/ML
60 INJECTION, SOLUTION INTRAMUSCULAR; INTRAVENOUS ONCE
Status: COMPLETED | OUTPATIENT
Start: 2018-12-16 | End: 2018-12-16

## 2018-12-16 RX ORDER — KETOROLAC TROMETHAMINE 10 MG/1
10 TABLET, FILM COATED ORAL EVERY 6 HOURS PRN
Qty: 10 TABLET | Refills: 0 | Status: SHIPPED | OUTPATIENT
Start: 2018-12-16 | End: 2019-03-31

## 2018-12-16 RX ORDER — BENZONATATE 100 MG/1
100 CAPSULE ORAL ONCE
Status: COMPLETED | OUTPATIENT
Start: 2018-12-16 | End: 2018-12-16

## 2018-12-16 RX ORDER — BENZONATATE 100 MG/1
100 CAPSULE ORAL 3 TIMES DAILY PRN
Qty: 20 CAPSULE | Refills: 0 | Status: SHIPPED | OUTPATIENT
Start: 2018-12-16 | End: 2019-03-31

## 2018-12-16 RX ORDER — AZITHROMYCIN 250 MG/1
500 TABLET, FILM COATED ORAL ONCE
Status: COMPLETED | OUTPATIENT
Start: 2018-12-16 | End: 2018-12-16

## 2018-12-16 RX ADMIN — KETOROLAC TROMETHAMINE 60 MG: 30 INJECTION, SOLUTION INTRAMUSCULAR at 21:54

## 2018-12-16 RX ADMIN — AZITHROMYCIN 500 MG: 250 TABLET, FILM COATED ORAL at 21:55

## 2018-12-16 RX ADMIN — BENZONATATE 100 MG: 100 CAPSULE, LIQUID FILLED ORAL at 21:55

## 2018-12-16 ASSESSMENT — ENCOUNTER SYMPTOMS
SHORTNESS OF BREATH: 0
NEUROLOGICAL NEGATIVE: 1
STRIDOR: 0
SORE THROAT: 0
WHEEZING: 0
NAUSEA: 0
NECK STIFFNESS: 0
CHEST TIGHTNESS: 0
CHILLS: 1
ABDOMINAL PAIN: 0
NECK PAIN: 0
FEVER: 0
PALPITATIONS: 0
BACK PAIN: 0
COUGH: 1
VOMITING: 0

## 2018-12-16 NOTE — ED AVS SNAPSHOT
[facility]  [address] [address]  Phone:  [phone]                                    Ihsan Millan   MRN: [id number]    Department:  [facility]   Date of Visit:  12/16/2018           After Visit Summary Signature Page    I have received my discharge instructions, and my questions have been answered. I have discussed any challenges I see with this plan with the nurse or doctor.    ..........................................................................................................................................  Patient/Patient Representative Signature      ..........................................................................................................................................  Patient Representative Print Name and Relationship to Patient    ..................................................               ................................................  Date                                   Time    ..........................................................................................................................................  Reviewed by Signature/Title    ...................................................              ..............................................  Date                                               Time          22EPIC Rev 08/18

## 2018-12-17 NOTE — ED PROVIDER NOTES
History     Chief Complaint   Patient presents with     Chest Pain     Cough     HPI  Ihsan Millan is a 28 year old female who presents with productive cough with green sputum and chest wall pain, worse with coughing x 2 days. She has h/o asthma.     Problem List:    Patient Active Problem List    Diagnosis Date Noted     RUQ abdominal pain 02/12/2017     Priority: Medium     Pneumonia of right middle lobe due to infectious organism (H) 02/12/2017     Priority: Medium     Tobacco abuse 02/12/2017     Priority: Medium     Status post laparoscopic assisted vaginal hysterectomy (LAVH) 06/25/2015     Priority: Medium     Surgery, elective 06/24/2015     Priority: Medium     Endometriosis 04/16/2015     Priority: Medium     Depo lupron 4/15.  Laparoscopy 3/15       ASCUS on Pap smear 07/18/2014     Priority: Medium     + hpv colpo 7/14       Postpartum depression 07/18/2014     Priority: Medium     zoloft rx       H. pylori infection      Priority: Medium     Moderate persistent asthma 03/19/2014     Priority: Medium     GERD (gastroesophageal reflux disease) 09/12/2013     Priority: Medium     Intermittent asthma 09/12/2013     Priority: Medium     Increased with pregnancy  Send for eval and asthma action plan  Smoking cessation counseling  Flu shot given          Past Medical History:    Past Medical History:   Diagnosis Date     Asthma      GERD (gastroesophageal reflux disease)      H. pylori infection        Past Surgical History:    Past Surgical History:   Procedure Laterality Date     COLONOSCOPY N/A 11/6/2014    Procedure: COLONOSCOPY;  Surgeon: Zacarias Paz MD;  Location: [facility]     DILATION AND CURETTAGE, HYSTEROSCOPY DIAGNOSTIC, COMBINED  8/1/2014    Procedure: COMBINED DILATION AND CURETTAGE, HYSTEROSCOPY DIAGNOSTIC;  Surgeon: Rodolfo Clifford MD;  Location: [facility]     ESOPHAGOSCOPY, GASTROSCOPY, DUODENOSCOPY (EGD), COMBINED N/A 9/5/2014    Procedure: COMBINED ESOPHAGOSCOPY, GASTROSCOPY,  DUODENOSCOPY (EGD);  Surgeon: Zacarias Paz MD;  [facility]: [facility]     LAPAROSCOPIC ASSISTED HYSTERECTOMY VAGINAL N/A 6/24/2015    Procedure: LAPAROSCOPIC ASSISTED HYSTERECTOMY VAGINAL;  Surgeon: Rodolfo Clifford MD;  [facility]: [facility]     LAPAROSCOPIC OOPHORECTOMY Right 3/23/2016    Procedure: LAPAROSCOPIC OOPHORECTOMY;  Surgeon: Rodolfo Clifofrd MD;  [facility]: [facility]     LAPAROSCOPIC SALPINGO-OOPHORECTOMY Left 5/3/2017    Procedure: LAPAROSCOPIC SALPINGO-OOPHORECTOMY;  LAPAROSCOPIC LEFT OOPHORECTOMY;  Surgeon: Rodolfo Clifford MD;  [facility]: [facility]     LAPAROSCOPY DIAGNOSTIC (GYN) N/A 3/18/2015    Procedure: LAPAROSCOPY DIAGNOSTIC (GYN);  Surgeon: Rodolfo Clifford MD;  Location: [facility] OR     no surgeries       SALPINGECTOMY Bilateral 6/24/2015    Procedure: SALPINGECTOMY;  Surgeon: Rodolfo Clifford MD;  [facility]: [facility]       Family History:    Family History   Problem Relation Age of Onset     Asthma Mother      Unknown/Adopted Maternal Grandmother      Unknown/Adopted Maternal Grandfather        Social History:  Marital Status:  Single [1]  Social History     Tobacco Use     Smoking status: Current Every Day Smoker     Packs/day: 0.50     Years: 7.00     Pack years: 3.50     Types: Cigarettes     Smokeless tobacco: Never Used     Tobacco comment: declines quitline referral   Substance Use Topics     Alcohol use: Yes     Comment: social     Drug use: No        Medications:      Acetaminophen (TYLENOL PO)   albuterol (PROAIR HFA/PROVENTIL HFA/VENTOLIN HFA) 108 (90 BASE) MCG/ACT Inhaler   azithromycin (ZITHROMAX Z-CHRISTELLE) 250 MG tablet   benzonatate (TESSALON PERLES) 100 MG capsule   ketorolac (TORADOL) 10 MG tablet   OMEPRAZOLE PO         Review of Systems   Constitutional: Positive for chills. Negative for fever.   HENT: Negative for congestion and sore throat.    Respiratory: Positive for cough. Negative for chest tightness, shortness of breath, wheezing and stridor.    Cardiovascular: Positive for chest pain. Negative for  "palpitations and leg swelling.   Gastrointestinal: Negative for abdominal pain, nausea and vomiting.   Genitourinary: Negative.    Musculoskeletal: Negative for back pain, neck pain and neck stiffness.   Skin: Negative.    Neurological: Negative.    All other systems reviewed and are negative.      Physical Exam   BP: 118/80  Heart Rate: 96  Temp: 97.1  F (36.2  C)  Resp: 14  Height: 162.6 cm (5' 4\")  SpO2: 100 %      Physical Exam   Constitutional: She is oriented to person, place, and time. She appears well-developed and well-nourished. No distress.   HENT:   Head: Normocephalic and atraumatic.   Right Ear: External ear normal.   Left Ear: External ear normal.   Nose: Mucosal edema present.   Mouth/Throat: Oropharynx is clear and moist. No oropharyngeal exudate.   Eyes: Conjunctivae and EOM are normal. Pupils are equal, round, and reactive to light. Right eye exhibits no discharge. Left eye exhibits no discharge. No scleral icterus.   Neck: Normal range of motion. Neck supple.   Cardiovascular: Normal rate, regular rhythm, normal heart sounds and intact distal pulses. Exam reveals no gallop and no friction rub.   No murmur heard.  Pulmonary/Chest: Effort normal and breath sounds normal. No respiratory distress. She has no wheezes. She has no rales. She exhibits no tenderness.   Abdominal: Soft. Bowel sounds are normal. There is no tenderness.   Musculoskeletal: She exhibits no edema.   Lymphadenopathy:     She has no cervical adenopathy.   Neurological: She is alert and oriented to person, place, and time. No cranial nerve deficit. Coordination normal.   Skin: Skin is warm and dry. No rash noted. She is not diaphoretic. No erythema. No pallor.   Psychiatric: She has a normal mood and affect. Her behavior is normal. Judgment and thought content normal.   Nursing note and vitals reviewed.      ED Course        Procedures            No results found for this or any previous visit (from the past 24 " hour(s)).    Medications   ketorolac (TORADOL) injection 60 mg (60 mg Intramuscular Given 12/16/18 2154)   azithromycin (ZITHROMAX) tablet 500 mg (500 mg Oral Given 12/16/18 2155)   benzonatate (TESSALON) capsule 100 mg (100 mg Oral Given 12/16/18 2155)       Assessments & Plan (with Medical Decision Making)   Ihsan is in NAD. Lungs are CTA. Exam consistent with acute bronchitis. RX for Zpak, tessalon perls, and toradol was given, first doses given here. She was discharged home following in good condition.     I have reviewed the nursing notes.    I have reviewed the findings, diagnosis, plan and need for follow up with the patient.       Medication List      Started    azithromycin 250 MG tablet  Commonly known as:  ZITHROMAX Z-CHRISTELLE  Two tablets on the first day, then one tablet daily for the next 4 days     benzonatate 100 MG capsule  Commonly known as:  TESSALON PERLES  100 mg, Oral, 3 TIMES DAILY PRN     ketorolac 10 MG tablet  Commonly known as:  TORADOL  10 mg, Oral, EVERY 6 HOURS PRN            Final diagnoses:   Acute bronchitis, unspecified organism   Chest wall pain       12/16/2018   [facility]     Elizabeth Billy PA-C  12/16/18 [id number]

## 2018-12-17 NOTE — ED TRIAGE NOTES
Pt comes to the ER with mid-sternal chest pain that started a couple days ago. Pt is short of breath, pain increases with a cough. Sniffling a lot. Decreased appetite. Nausea.   Denies fevers at home. Asthma history.

## 2019-03-29 ENCOUNTER — OFFICE VISIT (OUTPATIENT)
Dept: CHIROPRACTIC MEDICINE | Facility: OTHER | Age: 29
End: 2019-03-29
Attending: CHIROPRACTOR
Payer: MEDICAID

## 2019-03-29 DIAGNOSIS — M99.02 SEGMENTAL AND SOMATIC DYSFUNCTION OF THORACIC REGION: ICD-10-CM

## 2019-03-29 DIAGNOSIS — M54.50 ACUTE BILATERAL LOW BACK PAIN WITHOUT SCIATICA: ICD-10-CM

## 2019-03-29 DIAGNOSIS — M99.01 SEGMENTAL AND SOMATIC DYSFUNCTION OF CERVICAL REGION: ICD-10-CM

## 2019-03-29 DIAGNOSIS — M99.03 SEGMENTAL AND SOMATIC DYSFUNCTION OF LUMBAR REGION: Primary | ICD-10-CM

## 2019-03-29 PROCEDURE — 98941 CHIROPRACT MANJ 3-4 REGIONS: CPT | Mod: AT | Performed by: CHIROPRACTOR

## 2019-03-30 ENCOUNTER — APPOINTMENT (OUTPATIENT)
Dept: GENERAL RADIOLOGY | Facility: HOSPITAL | Age: 29
End: 2019-03-30
Attending: FAMILY MEDICINE
Payer: MEDICAID

## 2019-03-30 ENCOUNTER — HOSPITAL ENCOUNTER (EMERGENCY)
Facility: HOSPITAL | Age: 29
Discharge: HOME | End: 2019-03-30
Attending: FAMILY MEDICINE | Admitting: FAMILY MEDICINE
Payer: MEDICAID

## 2019-03-30 VITALS
SYSTOLIC BLOOD PRESSURE: 110 MMHG | TEMPERATURE: 97.2 F | RESPIRATION RATE: 17 BRPM | DIASTOLIC BLOOD PRESSURE: 79 MMHG | HEART RATE: 79 BPM | OXYGEN SATURATION: 98 %

## 2019-03-30 DIAGNOSIS — R07.89 CHEST WALL PAIN: ICD-10-CM

## 2019-03-30 LAB
ALBUMIN SERPL-MCNC: 4.1 G/DL (ref 3.4–5)
ALP SERPL-CCNC: 102 U/L (ref 40–150)
ALT SERPL W P-5'-P-CCNC: 15 U/L (ref 0–50)
ANION GAP SERPL CALCULATED.3IONS-SCNC: 6 MMOL/L (ref 3–14)
AST SERPL W P-5'-P-CCNC: 10 U/L (ref 0–45)
BASOPHILS # BLD AUTO: 0 10E9/L (ref 0–0.2)
BASOPHILS NFR BLD AUTO: 0.3 %
BILIRUB SERPL-MCNC: 0.4 MG/DL (ref 0.2–1.3)
BUN SERPL-MCNC: 9 MG/DL (ref 7–30)
CALCIUM SERPL-MCNC: 8.4 MG/DL (ref 8.5–10.1)
CHLORIDE SERPL-SCNC: 114 MMOL/L (ref 94–109)
CO2 SERPL-SCNC: 23 MMOL/L (ref 20–32)
CREAT SERPL-MCNC: 0.75 MG/DL (ref 0.52–1.04)
D DIMER PPP DDU-MCNC: <200 NG/ML D-DU (ref 0–300)
DIFFERENTIAL METHOD BLD: NORMAL
EOSINOPHIL # BLD AUTO: 0.1 10E9/L (ref 0–0.7)
EOSINOPHIL NFR BLD AUTO: 1.3 %
ERYTHROCYTE [DISTWIDTH] IN BLOOD BY AUTOMATED COUNT: 12.6 % (ref 10–15)
GFR SERPL CREATININE-BSD FRML MDRD: >90 ML/MIN/{1.73_M2}
GLUCOSE SERPL-MCNC: 80 MG/DL (ref 70–99)
HCG UR QL: NEGATIVE
HCT VFR BLD AUTO: 38 % (ref 35–47)
HGB BLD-MCNC: 13.3 G/DL (ref 11.7–15.7)
IMM GRANULOCYTES # BLD: 0 10E9/L (ref 0–0.4)
IMM GRANULOCYTES NFR BLD: 0.1 %
LYMPHOCYTES # BLD AUTO: 2.3 10E9/L (ref 0.8–5.3)
LYMPHOCYTES NFR BLD AUTO: 30.1 %
MCH RBC QN AUTO: 30.7 PG (ref 26.5–33)
MCHC RBC AUTO-ENTMCNC: 35 G/DL (ref 31.5–36.5)
MCV RBC AUTO: 88 FL (ref 78–100)
MONOCYTES # BLD AUTO: 0.4 10E9/L (ref 0–1.3)
MONOCYTES NFR BLD AUTO: 4.7 %
NEUTROPHILS # BLD AUTO: 4.8 10E9/L (ref 1.6–8.3)
NEUTROPHILS NFR BLD AUTO: 63.5 %
NRBC # BLD AUTO: 0 10*3/UL
NRBC BLD AUTO-RTO: 0 /100
PLATELET # BLD AUTO: 213 10E9/L (ref 150–450)
POTASSIUM SERPL-SCNC: 3.2 MMOL/L (ref 3.4–5.3)
PROT SERPL-MCNC: 6.8 G/DL (ref 6.8–8.8)
RBC # BLD AUTO: 4.33 10E12/L (ref 3.8–5.2)
SODIUM SERPL-SCNC: 143 MMOL/L (ref 133–144)
TROPONIN I SERPL-MCNC: <0.015 UG/L (ref 0–0.04)
WBC # BLD AUTO: 7.6 10E9/L (ref 4–11)

## 2019-03-30 PROCEDURE — 84484 ASSAY OF TROPONIN QUANT: CPT | Performed by: FAMILY MEDICINE

## 2019-03-30 PROCEDURE — 81025 URINE PREGNANCY TEST: CPT | Performed by: FAMILY MEDICINE

## 2019-03-30 PROCEDURE — 93010 ELECTROCARDIOGRAM REPORT: CPT | Performed by: INTERNAL MEDICINE

## 2019-03-30 PROCEDURE — 99285 EMERGENCY DEPT VISIT HI MDM: CPT | Mod: 25

## 2019-03-30 PROCEDURE — 93005 ELECTROCARDIOGRAM TRACING: CPT

## 2019-03-30 PROCEDURE — 25000128 H RX IP 250 OP 636: Performed by: FAMILY MEDICINE

## 2019-03-30 PROCEDURE — 96374 THER/PROPH/DIAG INJ IV PUSH: CPT

## 2019-03-30 PROCEDURE — 85025 COMPLETE CBC W/AUTO DIFF WBC: CPT | Performed by: FAMILY MEDICINE

## 2019-03-30 PROCEDURE — 80053 COMPREHEN METABOLIC PANEL: CPT | Performed by: FAMILY MEDICINE

## 2019-03-30 PROCEDURE — 85379 FIBRIN DEGRADATION QUANT: CPT | Performed by: FAMILY MEDICINE

## 2019-03-30 PROCEDURE — 99285 EMERGENCY DEPT VISIT HI MDM: CPT | Mod: Z6 | Performed by: FAMILY MEDICINE

## 2019-03-30 PROCEDURE — 36415 COLL VENOUS BLD VENIPUNCTURE: CPT | Performed by: FAMILY MEDICINE

## 2019-03-30 PROCEDURE — 71046 X-RAY EXAM CHEST 2 VIEWS: CPT | Mod: TC

## 2019-03-30 RX ORDER — IBUPROFEN 800 MG/1
800 TABLET, FILM COATED ORAL EVERY 8 HOURS PRN
Qty: 15 TABLET | Refills: 0 | Status: ON HOLD | OUTPATIENT
Start: 2019-03-30 | End: 2019-09-03

## 2019-03-30 RX ORDER — KETOROLAC TROMETHAMINE 30 MG/ML
30 INJECTION, SOLUTION INTRAMUSCULAR; INTRAVENOUS ONCE
Status: COMPLETED | OUTPATIENT
Start: 2019-03-30 | End: 2019-03-30

## 2019-03-30 RX ADMIN — KETOROLAC TROMETHAMINE 30 MG: 30 INJECTION, SOLUTION INTRAMUSCULAR at 19:06

## 2019-03-30 NOTE — LETTER
March 30, 2019      To Whom It May Concern:      Ihsan Millan was seen in our Emergency Department today, 03/30/19. She may return to work/school when improved.    Sincerely,        Nikki Lino MD

## 2019-03-30 NOTE — ED PROVIDER NOTES
eMERGENCY dEPARTMENT eNCOUnter        CHIEF COMPLAINT    Chief Complaint   Patient presents with     Chest Pain       HPI    Ihsan Millan is a 28 year old female who presents with chest pain off and on for the last 2 days, constant today. She works as a UA at an assisted living.  Today the pain became sharp, then her hands clenched, then her whole body tingled, then she couldn't move her legs.  Denies any drug use.  Is a smoker.  Her friends called 911.  On arrival she didn't appear post ictal, vitals were stable. Moving all extremities.     REVIEW OF SYSTEMS    Cardiac: +Chest Pain, Denies syncope  Respiratory: no sputum production, No hemoptysis  GI: No Vomiting or Diarrhea  General: No Fever or Chills  All other systems reviewed and are negative.     PAST MEDICAL & SURGICAL HISTORY    Past Medical History:   Diagnosis Date     Asthma      GERD (gastroesophageal reflux disease)      H. pylori infection      Past Surgical History:   Procedure Laterality Date     COLONOSCOPY N/A 11/6/2014    Procedure: COLONOSCOPY;  Surgeon: Zacarias Paz MD;  Location: [facility]     DILATION AND CURETTAGE, HYSTEROSCOPY DIAGNOSTIC, COMBINED  8/1/2014    Procedure: COMBINED DILATION AND CURETTAGE, HYSTEROSCOPY DIAGNOSTIC;  Surgeon: Rodolfo Clifford MD;  [facility]     ESOPHAGOSCOPY, GASTROSCOPY, DUODENOSCOPY (EGD), COMBINED N/A 9/5/2014    Procedure: COMBINED ESOPHAGOSCOPY, GASTROSCOPY, DUODENOSCOPY (EGD);  Surgeon: Zacarias Paz MD;  [facility]     LAPAROSCOPIC ASSISTED HYSTERECTOMY VAGINAL N/A 6/24/2015    Procedure: LAPAROSCOPIC ASSISTED HYSTERECTOMY VAGINAL;  Surgeon: Rodolfo Clifford MD;  [facility]     LAPAROSCOPIC OOPHORECTOMY Right 3/23/2016    Procedure: LAPAROSCOPIC OOPHORECTOMY;  Surgeon: Rodolfo Clifford MD;  [facility]     LAPAROSCOPIC SALPINGO-OOPHORECTOMY Left 5/3/2017    Procedure: LAPAROSCOPIC SALPINGO-OOPHORECTOMY;  LAPAROSCOPIC LEFT OOPHORECTOMY;  Surgeon: Rodolfo Clifford MD;  [facility]      LAPAROSCOPY DIAGNOSTIC (GYN) N/A 3/18/2015    Procedure: LAPAROSCOPY DIAGNOSTIC (GYN);  Surgeon: Rodolfo Clifford MD;  Location: [facility]     no surgeries       SALPINGECTOMY Bilateral 6/24/2015    Procedure: SALPINGECTOMY;  Surgeon: Rodolfo Clifford MD;  [facility]       CURRENT MEDICATIONS    Current Outpatient Rx   Medication Sig Dispense Refill     Acetaminophen (TYLENOL PO) Take 1,000 mg by mouth every 8 hours as needed for mild pain or fever       ibuprofen (ADVIL/MOTRIN) 800 MG tablet Take 1 tablet (800 mg) by mouth every 8 hours as needed for moderate pain 15 tablet 0     albuterol (PROAIR HFA/PROVENTIL HFA/VENTOLIN HFA) 108 (90 BASE) MCG/ACT Inhaler Inhale 2 puffs into the lungs every 6 hours as needed for shortness of breath / dyspnea or wheezing 1 Inhaler 0     OMEPRAZOLE PO Take 20 mg by mouth every morning         ALLERGIES    Allergies   Allergen Reactions     Lexapro [Escitalopram] Nausea and Vomiting     Codeine Sulfate Rash     Penicillins Rash     Tramadol Rash       SOCIAL & FAMILY HISTORY    Social History     Socioeconomic History     Marital status: Single     Spouse name: Not on file     Number of children: Not on file     Years of education: Not on file     Highest education level: Not on file   Occupational History     Not on file   Social Needs     Financial resource strain: Not on file     Food insecurity:     Worry: Not on file     Inability: Not on file     Transportation needs:     Medical: Not on file     Non-medical: Not on file   Tobacco Use     Smoking status: Current Every Day Smoker     Packs/day: 0.50     Years: 7.00     Pack years: 3.50     Types: Cigarettes     Smokeless tobacco: Never Used     Tobacco comment: declines quitline referral   Substance and Sexual Activity     Alcohol use: Yes     Comment: social     Drug use: No     Sexual activity: Yes     Partners: Male     Birth control/protection: None, Female Surgical   Lifestyle     Physical activity:     Days per week:  Not on file     Minutes per session: Not on file     Stress: Not on file   Relationships     Social connections:     Talks on phone: Not on file     Gets together: Not on file     Attends Hindu service: Not on file     Active member of club or organization: Not on file     Attends meetings of clubs or organizations: Not on file     Relationship status: Not on file     Intimate partner violence:     Fear of current or ex partner: Not on file     Emotionally abused: Not on file     Physically abused: Not on file     Forced sexual activity: Not on file   Other Topics Concern     Parent/sibling w/ CABG, MI or angioplasty before 65F 55M? No   Social History Narrative    7/17: Ihsan lives with her 2 children. She stays home with her children.      Family History   Problem Relation Age of Onset     Asthma Mother      Unknown/Adopted Maternal Grandmother      Unknown/Adopted Maternal Grandfather        PHYSICAL EXAM    VITAL SIGNS: /79   Pulse 79   Temp 97.2  F (36.2  C) (Oral)   Resp 17   LMP  (LMP Unknown)   SpO2 98%    Constitutional:  Well developed, well nourished, no acute distress   HENT:  Atraumatic, moist mucus membranes  Neck: supple, no JVD   Respiratory:  Lungs reveal clear by auscultation, no retractions, positive for left chest wall tenderness   Cardiovascular:  regular rate, no murmurs  Vascular: Radial pulses 2+ equal bilaterally  GI:  Soft, nontender, normal bowel sounds  Musculoskeletal:  No edema, no acute deformities  Integument:  Skin warm and dry, no petechiae   Neurologic: she keeps her eyes closed during interview, says she's tired.   Psych: Pleasant affect, no hallucinations    EKG    Interpreted by emergency department physician  NSR, no ischemic changes    RADIOLOGY/PROCEDURES    CXR:    Results for orders placed or performed during the hospital encounter of 03/30/19   XR Chest 2 Views    Narrative    PROCEDURE:  XR CHEST 2 VW    HISTORY:  chest pain.     COMPARISON:   None.    FINDINGS:   The cardiac silhouette is normal in size. The pulmonary vasculature is  normal.  The lungs are clear. No pleural effusion or pneumothorax.  Bibasilar nipple shadows are seen. There is a cervical rib on the  left.      Impression    IMPRESSION:  No acute cardiopulmonary disease.      KAYLA WYNNE MD         ED COURSE & MEDICAL DECISION MAKING    Pertinent tests interpreted. (See chart for details)  See chart for details of medications given during the ED stay.    Vitals:    03/30/19 1658 03/30/19 1923   BP: 118/85 110/79   Pulse:  79   Resp: 16 17   Temp: 98.5  F (36.9  C) 97.2  F (36.2  C)   TempSrc: Tympanic Oral   SpO2: 97% 98%           FINAL IMPRESSION    1. Chest wall pain        Plan: labs and XR normal.  Pain on palpation.  Treated with Toradol.  She requests work note.      Nikki Lino MD  03/30/19 1958

## 2019-03-30 NOTE — ED AVS SNAPSHOT
[facility]  [address] [address]  Phone:  [phone]                                    Ihsan Millan   MRN: [id number]    Department:  [facility]   Date of Visit:  3/30/2019           After Visit Summary Signature Page    I have received my discharge instructions, and my questions have been answered. I have discussed any challenges I see with this plan with the nurse or doctor.    ..........................................................................................................................................  Patient/Patient Representative Signature      ..........................................................................................................................................  Patient Representative Print Name and Relationship to Patient    ..................................................               ................................................  Date                                   Time    ..........................................................................................................................................  Reviewed by Signature/Title    ...................................................              ..............................................  Date                                               Time          22EPIC Rev 08/18

## 2019-03-31 ENCOUNTER — HOSPITAL ENCOUNTER (EMERGENCY)
Facility: HOSPITAL | Age: 29
Discharge: HOME | End: 2019-03-31
Attending: EMERGENCY MEDICINE | Admitting: EMERGENCY MEDICINE
Payer: MEDICAID

## 2019-03-31 VITALS
SYSTOLIC BLOOD PRESSURE: 100 MMHG | HEART RATE: 90 BPM | OXYGEN SATURATION: 99 % | TEMPERATURE: 97.1 F | RESPIRATION RATE: 14 BRPM | DIASTOLIC BLOOD PRESSURE: 70 MMHG

## 2019-03-31 DIAGNOSIS — M94.0 COSTOCHONDRITIS: Primary | ICD-10-CM

## 2019-03-31 PROCEDURE — 93010 ELECTROCARDIOGRAM REPORT: CPT | Performed by: INTERNAL MEDICINE

## 2019-03-31 PROCEDURE — 99284 EMERGENCY DEPT VISIT MOD MDM: CPT | Mod: Z6 | Performed by: EMERGENCY MEDICINE

## 2019-03-31 PROCEDURE — 93005 ELECTROCARDIOGRAM TRACING: CPT

## 2019-03-31 PROCEDURE — 99283 EMERGENCY DEPT VISIT LOW MDM: CPT

## 2019-03-31 ASSESSMENT — ENCOUNTER SYMPTOMS
SHORTNESS OF BREATH: 0
ABDOMINAL PAIN: 0
FEVER: 0

## 2019-03-31 NOTE — ED NOTES
Arrived to ED room 2 via [facility] with c/o hands tingling and clenching up and left side chest pain. Rates pain 7 or 8 out of 10. States took tylenol at home without any relief. Denies SOB. IV in place to LAC placed by [facility] with 1 liter of NS infusing without issue. Patient states she is very tired and keeps closing eyes but responds appropriately to questions. Call light within reach.

## 2019-03-31 NOTE — ED AVS SNAPSHOT
[facility]  [address] [address]  Phone:  [phone]                                    Ihsan Millan   MRN: [id number]    Department:  [facility]   Date of Visit:  3/31/2019           After Visit Summary Signature Page    I have received my discharge instructions, and my questions have been answered. I have discussed any challenges I see with this plan with the nurse or doctor.    ..........................................................................................................................................  Patient/Patient Representative Signature      ..........................................................................................................................................  Patient Representative Print Name and Relationship to Patient    ..................................................               ................................................  Date                                   Time    ..........................................................................................................................................  Reviewed by Signature/Title    ...................................................              ..............................................  Date                                               Time          22EPIC Rev 08/18

## 2019-03-31 NOTE — ED NOTES
Requested doctor's excuse for work. Patient had left ED before doctor excuse could be delivered to her.

## 2019-04-01 NOTE — ED NOTES
Patient given verbal and written discharge instructions. Patient verbalized understanding of discharge instructions. Take home pack of hydrocodone 5-325mg 6 tab ED started pack sent home with pt with instructions. Pt's significant other is driving pt home. HR 80's with no ectopy.

## 2019-04-01 NOTE — PROGRESS NOTES
Subjective Finding:    Chief compalint: Patient presents with:  Back Pain  Neck Pain  , Pain Scale: 5/10, Intensity: sharp, Duration: 3 days, Radiating: no.    Date of injury:     Activities that the pain restricts:   Home/household/hobbies/social activities: yes.  Work duties: no.  Sleep: no.  Makes symptoms better: rest.  Makes symptoms worse: activity and walking.  Have you seen anyone else for the symptoms? Yes: MD.  Work related: no.  Automobile related injury: no.    Objective and Assessment:    Posture Analysis:   High shoulder: .  Head tilt: .  High iliac crest: .  Head carriage: neutral.  Thoracic Kyphosis: forward.  Lumbar Lordosis: neutral.    Lumbar Range of Motion: extension decreased.  Cervical Range of Motion: .  Thoracic Range of Motion: extension decreased.  Extremity Range of Motion: .    Palpation:   T paraspinals: sharp pain, no    Segmental dysfunction pre-treatment and treatment area: T6, T8 and L3.  C45    Assessment post-treatment:  Cervical: .  Thoracic: ROM increased.  Lumbar: ROM increased.    Comments: .      Complicating Factors: .    Procedure(s):  CMT:  53385 Chiropractic manipulative treatment 1-2 regions performed   Thoracic: Diversified, See above for level, Prone and Lumbar: Diversified, See above for level, Side posture    Modalities:  None performed this visit    Therapeutic procedures:  None    Plan:  Treatment plan: PRN.  Instructed patient: stretch as instructed at visit.  Short term goals: reduce pain and increase ROM.  Long term goals: restore normal function.  Prognosis: excellent.

## 2019-04-01 NOTE — ED PROVIDER NOTES
History     Chief Complaint   Patient presents with     Chest Pain     chest pain on left side, was seen in ED yesterday for same symptom, states pain increases with deep breath and cough.      Numbness     on left side of body, down arms and down leg into toes, was seen in ED yesterday for this symptom     HPI  Ihsan Millan is a 28 year old female who presents to the emergency department with left-sided pleuritic chest pain for the last 2 days.  Pain is worse with deep inspiration and also when palpating in the left anterior chest wall just above the nipple.  Patient was evaluated yesterday in the emergency department and had normal chest x-ray, CBC, CMP, d-dimer and troponin.  Was discharged home on Motrin but patient states that the Motrin is not helping with her pain.    Allergies:  Allergies   Allergen Reactions     Lexapro [Escitalopram] Nausea and Vomiting     Codeine Sulfate Rash     Penicillins Rash     Tramadol Rash       Problem List:    Patient Active Problem List    Diagnosis Date Noted     RUQ abdominal pain 02/12/2017     Priority: Medium     Pneumonia of right middle lobe due to infectious organism (H) 02/12/2017     Priority: Medium     Tobacco abuse 02/12/2017     Priority: Medium     Status post laparoscopic assisted vaginal hysterectomy (LAVH) 06/25/2015     Priority: Medium     Surgery, elective 06/24/2015     Priority: Medium     Endometriosis 04/16/2015     Priority: Medium     Depo lupron 4/15.  Laparoscopy 3/15       ASCUS on Pap smear 07/18/2014     Priority: Medium     + hpv colpo 7/14       Postpartum depression 07/18/2014     Priority: Medium     zoloft rx       H. pylori infection      Priority: Medium     Moderate persistent asthma 03/19/2014     Priority: Medium     GERD (gastroesophageal reflux disease) 09/12/2013     Priority: Medium     Intermittent asthma 09/12/2013     Priority: Medium     Increased with pregnancy  Send for eval and asthma action plan  Smoking cessation  counseling  Flu shot given          Past Medical History:    Past Medical History:   Diagnosis Date     Asthma      GERD (gastroesophageal reflux disease)      H. pylori infection        Past Surgical History:    Past Surgical History:   Procedure Laterality Date     COLONOSCOPY N/A 11/6/2014    Procedure: COLONOSCOPY;  Surgeon: Zacarias Paz MD;  [facility]     DILATION AND CURETTAGE, HYSTEROSCOPY DIAGNOSTIC, COMBINED  8/1/2014    Procedure: COMBINED DILATION AND CURETTAGE, HYSTEROSCOPY DIAGNOSTIC;  Surgeon: Rodolfo Clifford MD;  [facility]     ESOPHAGOSCOPY, GASTROSCOPY, DUODENOSCOPY (EGD), COMBINED N/A 9/5/2014    Procedure: COMBINED ESOPHAGOSCOPY, GASTROSCOPY, DUODENOSCOPY (EGD);  Surgeon: Zacarias Paz MD;  [facility]     LAPAROSCOPIC ASSISTED HYSTERECTOMY VAGINAL N/A 6/24/2015    Procedure: LAPAROSCOPIC ASSISTED HYSTERECTOMY VAGINAL;  Surgeon: Rodolfo Clifford MD;  [facility]     LAPAROSCOPIC OOPHORECTOMY Right 3/23/2016    Procedure: LAPAROSCOPIC OOPHORECTOMY;  Surgeon: Rodolfo Clifford MD;  [facility]     LAPAROSCOPIC SALPINGO-OOPHORECTOMY Left 5/3/2017    Procedure: LAPAROSCOPIC SALPINGO-OOPHORECTOMY;  LAPAROSCOPIC LEFT OOPHORECTOMY;  Surgeon: Rodolfo Clifford MD;  [facility]     LAPAROSCOPY DIAGNOSTIC (GYN) N/A 3/18/2015    Procedure: LAPAROSCOPY DIAGNOSTIC (GYN);  Surgeon: Rodolfo Clifford MD;  [facility]     no surgeries       SALPINGECTOMY Bilateral 6/24/2015    Procedure: SALPINGECTOMY;  Surgeon: Rodolfo Clifford MD;  [facility]       Family History:    Family History   Problem Relation Age of Onset     Asthma Mother      Unknown/Adopted Maternal Grandmother      Unknown/Adopted Maternal Grandfather        Social History:  Marital Status:  Single [1]  Social History     Tobacco Use     Smoking status: Current Every Day Smoker     Packs/day: 2.00     Years: 7.00     Pack years: 14.00     Types: Cigarettes     Smokeless tobacco: Never Used     Tobacco comment: declines  quitline referral   Substance Use Topics     Alcohol use: Yes     Comment: social     Drug use: No        Medications:      Acetaminophen (TYLENOL PO)   ibuprofen (ADVIL/MOTRIN) 800 MG tablet   albuterol (PROAIR HFA/PROVENTIL HFA/VENTOLIN HFA) 108 (90 BASE) MCG/ACT Inhaler   OMEPRAZOLE PO         Review of Systems   Constitutional: Negative for fever.   Respiratory: Negative for shortness of breath.    Cardiovascular: Positive for chest pain.   Gastrointestinal: Negative for abdominal pain.   All other systems reviewed and are negative.      Physical Exam   BP: 108/75  Pulse: 90  Heart Rate: 84  Temp: 97.1  F (36.2  C)  Resp: 17  SpO2: 100 %      Physical Exam   Constitutional: She is oriented to person, place, and time. She appears well-developed and well-nourished. No distress.   HENT:   Head: Normocephalic and atraumatic.   Mouth/Throat: Oropharynx is clear and moist.   Eyes: EOM are normal. Pupils are equal, round, and reactive to light.   Cardiovascular: Normal rate, regular rhythm, normal heart sounds and intact distal pulses.   Pulmonary/Chest: Effort normal and breath sounds normal. No respiratory distress. She exhibits tenderness.       Abdominal: Soft. Bowel sounds are normal. She exhibits no distension. There is no tenderness.   Neurological: She is alert and oriented to person, place, and time. No cranial nerve deficit.   Skin: She is not diaphoretic.   Nursing note and vitals reviewed.      ED Course   EKG: Normal sinus rhythm without any acute changes.     Procedures    No results found for this or any previous visit (from the past 24 hour(s)).    Medications   HYDROcodone-acetaminophen (NORCO) 5-325 MG 6 tab ED starter pack 1 tablet (not administered)       Assessments & Plan (with Medical Decision Making)   Costochondritis: Patient was thoroughly evaluated including CBC, CMP, d-dimer, troponin, chest x-ray.  EKG done today showed normal sinus rhythm without acute changes.  Advised patient to continue  Motrin and in addition patient was given Norco as needed for the pain.  First dose was given at the ED and patient discharged home.  At the time of discharge, patient was requesting a CT scan of the head.  Patient was informed that there is no clinical indication for doing a CT scan of the head with normal neurological evaluation and no neurological complaints.    I have reviewed the nursing notes.    I have reviewed the findings, diagnosis, plan and need for follow up with the patient.       Medication List      There are no discharge medications for this visit.         Final diagnoses:   Costochondritis       3/31/2019   [facility]     Gumaro Mahoney MD  03/31/19 [id number]

## 2019-04-01 NOTE — ED NOTES
Patient presents to emergency room with c/o chest pain and left side of body tingling. Seen in ED yesterday for chest pain and body numbness. Pt states the chest pain isn't going away.  No relief with ibuprofen and aspirin. Appears in no distress. Ambulated to room 11 without difficulty. Denies SOB. C/o increased left chest pain with deep breath and cough. C/o neck pain. Pt able to move all extremities.  Afebrile. CMS intact. Monitors on.

## 2019-05-23 ENCOUNTER — OFFICE VISIT (OUTPATIENT)
Dept: CHIROPRACTIC MEDICINE | Facility: OTHER | Age: 29
End: 2019-05-23
Attending: CHIROPRACTOR
Payer: COMMERCIAL

## 2019-05-23 DIAGNOSIS — M54.50 ACUTE BILATERAL LOW BACK PAIN WITHOUT SCIATICA: ICD-10-CM

## 2019-05-23 DIAGNOSIS — M99.02 SEGMENTAL AND SOMATIC DYSFUNCTION OF THORACIC REGION: ICD-10-CM

## 2019-05-23 DIAGNOSIS — M99.03 SEGMENTAL AND SOMATIC DYSFUNCTION OF LUMBAR REGION: Primary | ICD-10-CM

## 2019-05-23 DIAGNOSIS — M99.01 SEGMENTAL AND SOMATIC DYSFUNCTION OF CERVICAL REGION: ICD-10-CM

## 2019-05-23 PROCEDURE — 98941 CHIROPRACT MANJ 3-4 REGIONS: CPT | Mod: AT | Performed by: CHIROPRACTOR

## 2019-05-23 NOTE — PROGRESS NOTES
Subjective Finding:    Chief compalint: Patient presents with:  Back Pain  Neck Pain  , Pain Scale: 5/10, Intensity: sharp, Duration: 3 days, Radiating: no.    Date of injury:     Activities that the pain restricts:   Home/household/hobbies/social activities: yes.  Work duties: no.  Sleep: no.  Makes symptoms better: rest.  Makes symptoms worse: activity and walking.  Have you seen anyone else for the symptoms? Yes: MD.  Work related: no.  Automobile related injury: no.    Objective and Assessment:    Posture Analysis:   High shoulder: .  Head tilt: .  High iliac crest: .  Head carriage: neutral.  Thoracic Kyphosis: forward.  Lumbar Lordosis: neutral.    Lumbar Range of Motion: extension decreased.  Cervical Range of Motion: .  Thoracic Range of Motion: extension decreased.  Extremity Range of Motion: .    Palpation:   T paraspinals: sharp pain, no    Segmental dysfunction pre-treatment and treatment area: T6, T8 and L3.  C45    Assessment post-treatment:  Cervical: .  Thoracic: ROM increased.  Lumbar: ROM increased.    Comments: .      Complicating Factors: .    Procedure(s):  CMT:  65629 Chiropractic manipulative treatment 1-2 regions performed   Thoracic: Diversified, See above for level, Prone and Lumbar: Diversified, See above for level, Side posture    Modalities:  None performed this visit    Therapeutic procedures:  None    Plan:  Treatment plan: PRN.  Instructed patient: stretch as instructed at visit.  Short term goals: reduce pain and increase ROM.  Long term goals: restore normal function.  Prognosis: excellent.

## 2019-05-30 ENCOUNTER — TELEPHONE (OUTPATIENT)
Dept: OBGYN | Facility: OTHER | Age: 29
End: 2019-05-30

## 2019-05-30 DIAGNOSIS — N95.1 MENOPAUSAL SYNDROME (HOT FLASHES): Primary | ICD-10-CM

## 2019-05-30 RX ORDER — ESTRADIOL 1 MG/1
1 TABLET ORAL DAILY
Qty: 90 TABLET | Refills: 0 | Status: SHIPPED | OUTPATIENT
Start: 2019-05-30 | End: 2021-11-28

## 2019-05-30 NOTE — TELEPHONE ENCOUNTER
Pt was last seen on 5/18/17. Wants order for estrace 1 mg sent to [facility] in [place]. Please advise

## 2019-05-30 NOTE — TELEPHONE ENCOUNTER
I can do refill for short term but needs annual/breast exam etc.  This could be done with Svetlana Ramirez NP  or can see her PCM Dr. Houston and have him prescribe going forward.

## 2019-08-12 ENCOUNTER — TELEPHONE (OUTPATIENT)
Dept: SURGERY | Facility: OTHER | Age: 29
End: 2019-08-12

## 2019-08-12 NOTE — TELEPHONE ENCOUNTER
Returned call to patient. She states that she was referred by Dr. Houston to a surgeon in [place] for a hernia at her belly button. She wishes to schedule in [place] instead with Dr. Vaughn. Advised patient that Dr. Vaughn does not require a referral to see her, but that her insurance may require a referral, so she should check on that. Appointment scheduled for Wednesday.

## 2019-08-16 NOTE — PATIENT INSTRUCTIONS
Thank you for allowing our surgical team to participate in your care. Please review the instructions below.   If you have questions, you may contact us at the any of the following numbers:     [facility] Coordinator: [phone]  Clinic Surgery Nurse: [phone]  Surgery Education Nurse: [phone]    You are scheduled for: Open Umbilical Hernia Repair with Dr. Vaughn on 9/3/19  Admit Time: Hospital Surgery will call you the day before your procedure by 5pm with your arrival time. If your surgery is on Monday, expect a call on Friday. If you are not contacted before 5PM, please call admitting at [phone]. After hours or on weekends, please call [phone] to postpone.     Call the clinic surgery nurse if you become ill within 1-2 weeks of your procedure to reschedule. This includes fever, chills, sore throat, cough, chest congestion, runny nose, or any other symptom of any other illness.     Please call the Surgery Education Nurse at [phone] 1-2 weeks prior to your procedure and have a medication list ready.     Try to stop smoking for 2 weeks prior to surgery date.    Do not take Aspirin or other NSAIDS (Ibuprofen, Motrin, Aleve, Celebrex, Naproxen, etc), vitamins, and supplements 7 days before your surgery unless you have been otherwise directed.     Shower the night before and the morning of your procedure with Chlorhexidine soap.  On The Night Before Your Surgery:  1.  Remove your jewelry and leave off until after surgery. Wash your hair and body with your regular soap/shampoo. Use a freshly washed washcloth. Include cleaning inside your belly button. Rinse off soap/shampoo.  2. Wash your body from neck to feet using 1/2 of the 1st Hibiclens (Chlorhexidine) bottle with your bare hands. Do not use the Hibiclens on your face, hair or genital area. Leave the soap on your body for one minute and rinse.  3. Repeat step 2 with the 2nd half of the bottle.  4. Rinse off all soap and dry with a  freshly washed towel. Do not use lotions or hair products.  5. Sleep in freshly washed pajamas and freshly washed bedding.  On The Morning of Your Surgery:  1.Wash your hair and body with your regular soap/shampoo. Use another freshly washed washcloth. Rinse off.   2. Wash your body from neck to feet using 1/2 of the 2nd Hibiclens (Chlorhexidine) bottle with your bare hands. Leave the soap on your body for one minute and rinse.  3. Repeat step 2 with the 2nd half of the bottle.  4. Rinse off all the soap and dry with another freshly washed towel. Do not use lotions or hair products.  5. Wear freshly washed clothing to the hospital.    Do not have anything to eat or drink after midnight the night before your surgery (or 8 hours prior to surgery), except clear liquids (water, clear juice, clear broth, plain coffee or tea without cream or milk) up until 2 hours prior to arrival time. Do not chew gum, suck on hard candy, or smoke. You can brush your teeth. If you are directed to take any medications, take them with a tiny sip of water. If you use an inhaler, bring it with you.    Arrive in comfortable clothing. Do not wear any jewelry, make-up, nail polish, lotions, hair products, or perfumes. [place] in hospital admitting through the [facility].    A responsible adult must be available to drive you home and stay with you for 24 hours at home. If you need to take a taxi or the bus, you must have another responsible adult to ride with you. Your procedure will be cancelled if you do not have a responsible adult .     Return to clinic for a postop appointment 2 week(s) from your surgery date.

## 2019-08-20 ENCOUNTER — OFFICE VISIT (OUTPATIENT)
Dept: SURGERY | Facility: OTHER | Age: 29
End: 2019-08-20
Attending: SURGERY
Payer: COMMERCIAL

## 2019-08-20 VITALS
WEIGHT: 122 LBS | TEMPERATURE: 97 F | HEART RATE: 82 BPM | BODY MASS INDEX: 20.83 KG/M2 | DIASTOLIC BLOOD PRESSURE: 62 MMHG | OXYGEN SATURATION: 100 % | HEIGHT: 64 IN | SYSTOLIC BLOOD PRESSURE: 102 MMHG

## 2019-08-20 DIAGNOSIS — K42.9 UMBILICAL HERNIA WITHOUT OBSTRUCTION AND WITHOUT GANGRENE: Primary | ICD-10-CM

## 2019-08-20 PROCEDURE — 99204 OFFICE O/P NEW MOD 45 MIN: CPT | Performed by: SURGERY

## 2019-08-20 PROCEDURE — G0463 HOSPITAL OUTPT CLINIC VISIT: HCPCS

## 2019-08-20 ASSESSMENT — PAIN SCALES - GENERAL: PAINLEVEL: SEVERE PAIN (7)

## 2019-08-20 ASSESSMENT — MIFFLIN-ST. JEOR: SCORE: 1268.39

## 2019-08-20 NOTE — PROGRESS NOTES
[facility] Surgery Consultation    CC:  Umbilical hernia    HPI:  This 28 year old year old female is seen at the request of Temo Houston for evaluation of umbilical hernia.  The history is obtained from the patient, and reviewing the medical record.  She is good medical historian. She says that over the past year she has noticed a lump near her umbilicus. She has undergone multiple previous laparoscopic procedures. She says that when she stands the lump with get bigger. She has had no obstructive symptoms. She has had no skin changes over the area. She says that over the past month she has been having pain around her umbilicus. She was referred for evaluation.    Past Medical History:   Diagnosis Date     Asthma      GERD (gastroesophageal reflux disease)      H. pylori infection        Past Surgical History:   Procedure Laterality Date     COLONOSCOPY N/A 11/6/2014    Procedure: COLONOSCOPY;  Surgeon: Zacarias Paz MD;  Location: [facility]     DILATION AND CURETTAGE, HYSTEROSCOPY DIAGNOSTIC, COMBINED  8/1/2014    Procedure: COMBINED DILATION AND CURETTAGE, HYSTEROSCOPY DIAGNOSTIC;  Surgeon: Rodolfo Clifford MD;  Location: [facility]     ESOPHAGOSCOPY, GASTROSCOPY, DUODENOSCOPY (EGD), COMBINED N/A 9/5/2014    Procedure: COMBINED ESOPHAGOSCOPY, GASTROSCOPY, DUODENOSCOPY (EGD);  Surgeon: Zacarias Paz MD;  Location: [facility]     LAPAROSCOPIC ASSISTED HYSTERECTOMY VAGINAL N/A 6/24/2015    Procedure: LAPAROSCOPIC ASSISTED HYSTERECTOMY VAGINAL;  Surgeon: Rodolfo Clifford MD;  Location: [facility]     LAPAROSCOPIC OOPHORECTOMY Right 3/23/2016    Procedure: LAPAROSCOPIC OOPHORECTOMY;  Surgeon: Rodolfo Clifford MD;  [facility]: [facility]     LAPAROSCOPIC SALPINGO-OOPHORECTOMY Left 5/3/2017    Procedure: LAPAROSCOPIC SALPINGO-OOPHORECTOMY;  LAPAROSCOPIC LEFT OOPHORECTOMY;  Surgeon: Rodolfo Clifford MD;  Location: [facility]     LAPAROSCOPY DIAGNOSTIC (GYN) N/A 3/18/2015    Procedure: LAPAROSCOPY DIAGNOSTIC (GYN);  Surgeon: Rodolfo Clifford MD;   Location: [facility]     no surgeries       SALPINGECTOMY Bilateral 6/24/2015    Procedure: SALPINGECTOMY;  Surgeon: Rodolfo Clifford MD;  Location: [facility]       Family History   Problem Relation Age of Onset     Asthma Mother      Unknown/Adopted Maternal Grandmother      Unknown/Adopted Maternal Grandfather        Social History     Tobacco Use     Smoking status: Current Every Day Smoker     Packs/day: 2.00     Years: 7.00     Pack years: 14.00     Types: Cigarettes     Smokeless tobacco: Never Used     Tobacco comment: declines quitline referral   Substance Use Topics     Alcohol use: Yes     Comment: social     Drug use: No       Prior to Admission medications    Medication Sig Start Date End Date Taking? Authorizing Provider   Acetaminophen (TYLENOL PO) Take 1,000 mg by mouth every 8 hours as needed for mild pain or fever   Yes Reported, Patient   albuterol (PROAIR HFA/PROVENTIL HFA/VENTOLIN HFA) 108 (90 BASE) MCG/ACT Inhaler Inhale 2 puffs into the lungs every 6 hours as needed for shortness of breath / dyspnea or wheezing 3/23/18  Yes Henny Tamez NP   estradiol (ESTRACE) 1 MG tablet Take 1 tablet (1 mg) by mouth daily 5/30/19  Yes Rodolfo Clifford MD   OMEPRAZOLE PO Take 20 mg by mouth every morning   Yes Reported, Patient   ibuprofen (ADVIL/MOTRIN) 800 MG tablet Take 1 tablet (800 mg) by mouth every 8 hours as needed for moderate pain 3/30/19 4/7/19  Nikki Lino MD       Pt denied problems with bleeding or anesthesia  No mood altering drug use.       Allergies   Allergen Reactions     Lexapro [Escitalopram] Nausea and Vomiting     Codeine Sulfate Rash     Penicillins Rash     Tramadol Rash       REVIEW OF SYSTEMS:  Ten point review of systems negative except those mentioned in the HPI.     The patient denies sleep apnea, latex allergies or MRSA    OBJECTIVE:    /62 (BP Location: Left arm, Patient Position: Chair, Cuff Size: Adult Regular)   Pulse 82   Temp 97  F (36.1  C) (Tympanic)   Ht 1.626 m  "(5' 4\")   Wt 55.3 kg (122 lb)   LMP  (LMP Unknown)   SpO2 100%   BMI 20.94 kg/m      GENERAL: Generally appears well, in no distress with appropriate affect.  HEENT:   Sclerae anicteric - No cervical, supra/infraclavicular lymphadenopathy  Respiratory:  Lungs clear to ausculation bilaterally with good air excursion  Cardiovascular:  Regular Rate and Rhythm with no murmurs gallops or rubs, normal   Abdomen: soft, non-reducible umbilical hernia, no skin erythema  :  deferred  Extremities:  Extremities normal. No deformities, edema, or skin discoloration.  Skin:  no suspicious lesions or rashes  Neurological: grossly intact  Psych:  Alert, oriented, affect appropriate with normal decision making ability.      IMPRESSION:  27 yo female with incarcerated umbilical hernia    PLAN:  I discussed that with her symptoms she should undergo repair of her hernia. Both open and laparoscopic repair were discussed. At this time I would recommend an open repair. The pathophysiology of hernia defects was described with their natural course if left untreated and operative approach.  Complications including incarceration and strangulation with infarction of contents outlined as indication for repair. An informed consent was obtained documenting the risks including but not limited to bleeding, infection, damage to surrounding structures, need for further operations. At this point we will proceed with operative repair at a mutually convenient date and time.         Thank you for allowing me to participate in the care of your patient.       Carter Vaughn MD MD    8/20/2019  3:27 PM    cc:  Temo Houston    "

## 2019-08-20 NOTE — NURSING NOTE
"Chief Complaint   Patient presents with     Consult For     umbilical hernia. Self referral.        Initial /62 (BP Location: Left arm, Patient Position: Chair, Cuff Size: Adult Regular)   Pulse 82   Temp 97  F (36.1  C) (Tympanic)   Ht 1.626 m (5' 4\")   Wt 55.3 kg (122 lb)   LMP  (LMP Unknown)   SpO2 100%   BMI 20.94 kg/m   Estimated body mass index is 20.94 kg/m  as calculated from the following:    Height as of this encounter: 1.626 m (5' 4\").    Weight as of this encounter: 55.3 kg (122 lb).  Medication Reconciliation: complete   DOM RICHARD LPN      "

## 2019-08-26 ENCOUNTER — ANESTHESIA EVENT (OUTPATIENT)
Dept: SURGERY | Facility: HOSPITAL | Age: 29
End: 2019-08-26
Payer: COMMERCIAL

## 2019-08-26 ASSESSMENT — LIFESTYLE VARIABLES: TOBACCO_USE: 1

## 2019-08-26 NOTE — ANESTHESIA PREPROCEDURE EVALUATION
Anesthesia Pre-Procedure Evaluation    Patient: Ihsan Millan   MRN: [id number] : 1990          Preoperative Diagnosis: UMBILICAL HERNIA W/O OBSTRUCTION AND W/O GANGRENE    Procedure(s):  OPEN UMBILICAL HERNIA REPAIR    Past Medical History:   Diagnosis Date     Asthma      GERD (gastroesophageal reflux disease)      H. pylori infection      Past Surgical History:   Procedure Laterality Date     COLONOSCOPY N/A 2014    Procedure: COLONOSCOPY;  Surgeon: Zacarias Paz MD;  Location: [facility]     DILATION AND CURETTAGE, HYSTEROSCOPY DIAGNOSTIC, COMBINED  2014    Procedure: COMBINED DILATION AND CURETTAGE, HYSTEROSCOPY DIAGNOSTIC;  Surgeon: Rodolfo Clifford MD;  [facility]: [facility]     ESOPHAGOSCOPY, GASTROSCOPY, DUODENOSCOPY (EGD), COMBINED N/A 2014    Procedure: COMBINED ESOPHAGOSCOPY, GASTROSCOPY, DUODENOSCOPY (EGD);  Surgeon: Zacarias Paz MD;  [facility]: [facility]     LAPAROSCOPIC ASSISTED HYSTERECTOMY VAGINAL N/A 2015    Procedure: LAPAROSCOPIC ASSISTED HYSTERECTOMY VAGINAL;  Surgeon: Rodolfo Clifford MD;  [facility]     LAPAROSCOPIC OOPHORECTOMY Right 3/23/2016    Procedure: LAPAROSCOPIC OOPHORECTOMY;  Surgeon: Rodolfo Clifford MD;  [facility]     LAPAROSCOPIC SALPINGO-OOPHORECTOMY Left 5/3/2017    Procedure: LAPAROSCOPIC SALPINGO-OOPHORECTOMY;  LAPAROSCOPIC LEFT OOPHORECTOMY;  Surgeon: Rodolfo Clifford MD;  [facility]     LAPAROSCOPY DIAGNOSTIC (GYN) N/A 3/18/2015    Procedure: LAPAROSCOPY DIAGNOSTIC (GYN);  Surgeon: Rodolfo Clifford MD;  Location: [facility]     no surgeries       SALPINGECTOMY Bilateral 2015    Procedure: SALPINGECTOMY;  Surgeon: Rodolfo Clifford MD;  Location: [facility]       Anesthesia Evaluation     . Pt has had prior anesthetic.     No history of anesthetic complications          ROS/MED HX    ENT/Pulmonary:     (+)tobacco use, Current use 2 PPD packs/day  Mild Persistent asthma , . .    Neurologic:  - neg neurologic ROS     Cardiovascular:  - neg cardiovascular  ROS   (+) ----. : . . . :. . Previous cardiac testing date:results:date: results:ECG reviewed date:1/22/2016 results:NSR@86, LAE date: results:          METS/Exercise Tolerance:     Hematologic:  - neg hematologic  ROS       Musculoskeletal:  - neg musculoskeletal ROS       GI/Hepatic:     (+) GERD       Renal/Genitourinary:     (+) Other Renal/ Genitourinary, ENDOMETRIOSIS, PELVIC PAIN IN FEMALE      Endo:  - neg endo ROS       Psychiatric:     (+) psychiatric history depression      Infectious Disease:  - neg infectious disease ROS       Malignancy:      - no malignancy   Other: Comment: S/p Hysterectomy   (+) No chance of pregnancy   - neg other ROS                      Physical Exam  Normal systems: cardiovascular and dental    Airway   Mallampati: II  TM distance: >3 FB  Neck ROM: full    Dental     Cardiovascular   Rhythm and rate: regular and normal      Pulmonary (+) wheezes               Lab Results   Component Value Date    WBC 7.6 03/30/2019    HGB 13.3 03/30/2019    HCT 38.0 03/30/2019     03/30/2019    CRP <2.9 01/07/2018    SED 7 11/17/2014     03/30/2019    POTASSIUM 3.2 (L) 03/30/2019    CHLORIDE 114 (H) 03/30/2019    CO2 23 03/30/2019    BUN 9 03/30/2019    CR 0.75 03/30/2019    GLC 80 03/30/2019    BERNARD 8.4 (L) 03/30/2019    ALBUMIN 4.1 03/30/2019    PROTTOTAL 6.8 03/30/2019    ALT 15 03/30/2019    AST 10 03/30/2019    ALKPHOS 102 03/30/2019    BILITOTAL 0.4 03/30/2019    LIPASE 195 11/09/2017    AMYLASE 39 08/02/2017    TSH 1.11 07/18/2014    HCG Negative 03/30/2019    HCGS Negative 09/14/2014       Preop Vitals  BP Readings from Last 3 Encounters:   08/20/19 102/62   03/31/19 100/70   03/30/19 110/79    Pulse Readings from Last 3 Encounters:   08/20/19 82   03/31/19 90   03/30/19 79      Resp Readings from Last 3 Encounters:   03/31/19 14   03/30/19 17   12/16/18 14    SpO2 Readings from Last 3 Encounters:   08/20/19 100%   03/31/19 99%   03/30/19 98%      Temp Readings from Last 1  "Encounters:   08/20/19 97  F (36.1  C) (Tympanic)    Ht Readings from Last 1 Encounters:   08/20/19 1.626 m (5' 4\")      Wt Readings from Last 1 Encounters:   08/20/19 55.3 kg (122 lb)    Estimated body mass index is 20.94 kg/m  as calculated from the following:    Height as of 8/20/19: 1.626 m (5' 4\").    Weight as of 8/20/19: 55.3 kg (122 lb).       Anesthesia Plan      History & Physical Review  History and physical reviewed and following examination; no interval change.    ASA Status:  3 .    NPO Status:  > 8 hours    Plan for General, ETT and Periph. Nerve Block for postop pain with Intravenous and Propofol induction. Maintenance will be Balanced.    PONV prophylaxis:  Ondansetron (or other 5HT-3), Dexamethasone or Solumedrol and Scopolamine patch  HP 8-20-19  Hx Hysterectomy  Will provide preop NEB      Postoperative Care  Postoperative pain management:  IV analgesics, Oral pain medications and Peripheral nerve block (Single Shot).      Consents  Anesthetic plan, risks, benefits and alternatives discussed with:  Patient..                 NELSON Leiva CRNA  "

## 2019-08-29 ENCOUNTER — HOSPITAL ENCOUNTER (EMERGENCY)
Facility: HOSPITAL | Age: 29
Discharge: HOME | End: 2019-08-29
Attending: EMERGENCY MEDICINE | Admitting: EMERGENCY MEDICINE
Payer: COMMERCIAL

## 2019-08-29 VITALS
TEMPERATURE: 97.9 F | HEART RATE: 87 BPM | OXYGEN SATURATION: 100 % | SYSTOLIC BLOOD PRESSURE: 112 MMHG | BODY MASS INDEX: 21.97 KG/M2 | DIASTOLIC BLOOD PRESSURE: 81 MMHG | RESPIRATION RATE: 98 BRPM | WEIGHT: 128 LBS

## 2019-08-29 DIAGNOSIS — N30.00 ACUTE CYSTITIS WITHOUT HEMATURIA: Primary | ICD-10-CM

## 2019-08-29 LAB
ALBUMIN SERPL-MCNC: 3.9 G/DL (ref 3.4–5)
ALBUMIN UR-MCNC: 10 MG/DL
ALP SERPL-CCNC: 99 U/L (ref 40–150)
ALT SERPL W P-5'-P-CCNC: 18 U/L (ref 0–50)
ANION GAP SERPL CALCULATED.3IONS-SCNC: 7 MMOL/L (ref 3–14)
APPEARANCE UR: CLEAR
AST SERPL W P-5'-P-CCNC: 9 U/L (ref 0–45)
BACTERIA #/AREA URNS HPF: ABNORMAL /HPF
BASOPHILS # BLD AUTO: 0 10E9/L (ref 0–0.2)
BASOPHILS NFR BLD AUTO: 0.4 %
BILIRUB SERPL-MCNC: 0.4 MG/DL (ref 0.2–1.3)
BILIRUB UR QL STRIP: NEGATIVE
BUN SERPL-MCNC: 12 MG/DL (ref 7–30)
CALCIUM SERPL-MCNC: 9 MG/DL (ref 8.5–10.1)
CHLORIDE SERPL-SCNC: 108 MMOL/L (ref 94–109)
CO2 SERPL-SCNC: 29 MMOL/L (ref 20–32)
COLOR UR AUTO: YELLOW
CREAT SERPL-MCNC: 0.95 MG/DL (ref 0.52–1.04)
DIFFERENTIAL METHOD BLD: NORMAL
EOSINOPHIL # BLD AUTO: 0.3 10E9/L (ref 0–0.7)
EOSINOPHIL NFR BLD AUTO: 3.4 %
ERYTHROCYTE [DISTWIDTH] IN BLOOD BY AUTOMATED COUNT: 13.8 % (ref 10–15)
GFR SERPL CREATININE-BSD FRML MDRD: 81 ML/MIN/{1.73_M2}
GLUCOSE SERPL-MCNC: 79 MG/DL (ref 70–99)
GLUCOSE UR STRIP-MCNC: NEGATIVE MG/DL
HCT VFR BLD AUTO: 45.2 % (ref 35–47)
HGB BLD-MCNC: 15.2 G/DL (ref 11.7–15.7)
HGB UR QL STRIP: NEGATIVE
HYALINE CASTS #/AREA URNS LPF: 13 /LPF
IMM GRANULOCYTES # BLD: 0 10E9/L (ref 0–0.4)
IMM GRANULOCYTES NFR BLD: 0.3 %
KETONES UR STRIP-MCNC: NEGATIVE MG/DL
LEUKOCYTE ESTERASE UR QL STRIP: ABNORMAL
LIPASE SERPL-CCNC: 219 U/L (ref 73–393)
LYMPHOCYTES # BLD AUTO: 3.2 10E9/L (ref 0.8–5.3)
LYMPHOCYTES NFR BLD AUTO: 44.2 %
MCH RBC QN AUTO: 30.5 PG (ref 26.5–33)
MCHC RBC AUTO-ENTMCNC: 33.6 G/DL (ref 31.5–36.5)
MCV RBC AUTO: 91 FL (ref 78–100)
MONOCYTES # BLD AUTO: 0.5 10E9/L (ref 0–1.3)
MONOCYTES NFR BLD AUTO: 6.2 %
MUCOUS THREADS #/AREA URNS LPF: PRESENT /LPF
NEUTROPHILS # BLD AUTO: 3.3 10E9/L (ref 1.6–8.3)
NEUTROPHILS NFR BLD AUTO: 45.5 %
NITRATE UR QL: NEGATIVE
NRBC # BLD AUTO: 0 10*3/UL
NRBC BLD AUTO-RTO: 0 /100
PH UR STRIP: 6 PH (ref 4.7–8)
PLATELET # BLD AUTO: 222 10E9/L (ref 150–450)
POTASSIUM SERPL-SCNC: 3.5 MMOL/L (ref 3.4–5.3)
PROT SERPL-MCNC: 6.9 G/DL (ref 6.8–8.8)
RBC # BLD AUTO: 4.98 10E12/L (ref 3.8–5.2)
RBC #/AREA URNS AUTO: 1 /HPF (ref 0–2)
SODIUM SERPL-SCNC: 144 MMOL/L (ref 133–144)
SOURCE: ABNORMAL
SP GR UR STRIP: 1.02 (ref 1–1.03)
SQUAMOUS #/AREA URNS AUTO: 4 /HPF (ref 0–1)
UROBILINOGEN UR STRIP-MCNC: NORMAL MG/DL (ref 0–2)
WBC # BLD AUTO: 7.3 10E9/L (ref 4–11)
WBC #/AREA URNS AUTO: 7 /HPF (ref 0–5)

## 2019-08-29 PROCEDURE — 99284 EMERGENCY DEPT VISIT MOD MDM: CPT | Mod: 25

## 2019-08-29 PROCEDURE — 96372 THER/PROPH/DIAG INJ SC/IM: CPT

## 2019-08-29 PROCEDURE — 36415 COLL VENOUS BLD VENIPUNCTURE: CPT | Performed by: FAMILY MEDICINE

## 2019-08-29 PROCEDURE — 85025 COMPLETE CBC W/AUTO DIFF WBC: CPT | Performed by: FAMILY MEDICINE

## 2019-08-29 PROCEDURE — 25000132 ZZH RX MED GY IP 250 OP 250 PS 637: Performed by: EMERGENCY MEDICINE

## 2019-08-29 PROCEDURE — 80053 COMPREHEN METABOLIC PANEL: CPT | Performed by: FAMILY MEDICINE

## 2019-08-29 PROCEDURE — 99284 EMERGENCY DEPT VISIT MOD MDM: CPT | Mod: Z6 | Performed by: EMERGENCY MEDICINE

## 2019-08-29 PROCEDURE — 25000128 H RX IP 250 OP 636: Performed by: EMERGENCY MEDICINE

## 2019-08-29 PROCEDURE — 81001 URINALYSIS AUTO W/SCOPE: CPT | Performed by: FAMILY MEDICINE

## 2019-08-29 PROCEDURE — 83690 ASSAY OF LIPASE: CPT | Performed by: FAMILY MEDICINE

## 2019-08-29 RX ORDER — CIPROFLOXACIN 500 MG/1
500 TABLET, FILM COATED ORAL 2 TIMES DAILY
Qty: 6 TABLET | Refills: 0 | Status: ON HOLD | OUTPATIENT
Start: 2019-08-29 | End: 2019-09-03

## 2019-08-29 RX ORDER — KETOROLAC TROMETHAMINE 30 MG/ML
30 INJECTION, SOLUTION INTRAMUSCULAR; INTRAVENOUS ONCE
Status: COMPLETED | OUTPATIENT
Start: 2019-08-29 | End: 2019-08-29

## 2019-08-29 RX ORDER — CIPROFLOXACIN 500 MG/1
500 TABLET, FILM COATED ORAL ONCE
Status: COMPLETED | OUTPATIENT
Start: 2019-08-29 | End: 2019-08-29

## 2019-08-29 RX ORDER — KETOROLAC TROMETHAMINE 10 MG/1
10 TABLET, FILM COATED ORAL EVERY 6 HOURS PRN
Qty: 30 TABLET | Refills: 0 | Status: SHIPPED | OUTPATIENT
Start: 2019-08-29 | End: 2019-12-07

## 2019-08-29 RX ADMIN — CIPROFLOXACIN HYDROCHLORIDE 500 MG: 500 TABLET, FILM COATED ORAL at 20:56

## 2019-08-29 RX ADMIN — KETOROLAC TROMETHAMINE 30 MG: 30 INJECTION, SOLUTION INTRAMUSCULAR at 20:03

## 2019-08-29 ASSESSMENT — ENCOUNTER SYMPTOMS
FEVER: 0
ABDOMINAL PAIN: 0
SHORTNESS OF BREATH: 0

## 2019-08-29 NOTE — ED AVS SNAPSHOT
[facility]  [address] [address]  Phone:  [phone]                                    Ihsan Millan   MRN: [id number]    Department:  [facility]   Date of Visit:  8/29/2019           After Visit Summary Signature Page    I have received my discharge instructions, and my questions have been answered. I have discussed any challenges I see with this plan with the nurse or doctor.    ..........................................................................................................................................  Patient/Patient Representative Signature      ..........................................................................................................................................  Patient Representative Print Name and Relationship to Patient    ..................................................               ................................................  Date                                   Time    ..........................................................................................................................................  Reviewed by Signature/Title    ...................................................              ..............................................  Date                                               Time          22EPIC Rev 08/18

## 2019-08-30 NOTE — ED PROVIDER NOTES
History     Chief Complaint   Patient presents with     Flank Pain     statrted this am     Abdominal Pain     HPI  Ihsan Millan is a 28 year old female who presented to the emergency department with complaints of right flank pain since this morning.  Denies urinary frequency or dysuria.  No fever, chills, abdominal distention, diarrhea or vomiting.  Pain is rated as 9/10 and does not radiate.  Patient seems comfortable in the emergency department and is watching TV and her claim of a pain of 9/10 is not consistent with her clinical status.  Denies any history of trauma to the abdomen, recent instrumentation, fever or chills.    Allergies:  Allergies   Allergen Reactions     Lexapro [Escitalopram] Nausea and Vomiting     Codeine Sulfate Rash     Penicillins Rash     Tramadol Rash       Problem List:    Patient Active Problem List    Diagnosis Date Noted     RUQ abdominal pain 02/12/2017     Priority: Medium     Pneumonia of right middle lobe due to infectious organism (H) 02/12/2017     Priority: Medium     Tobacco abuse 02/12/2017     Priority: Medium     Status post laparoscopic assisted vaginal hysterectomy (LAVH) 06/25/2015     Priority: Medium     Surgery, elective 06/24/2015     Priority: Medium     Endometriosis 04/16/2015     Priority: Medium     Depo lupron 4/15.  Laparoscopy 3/15       ASCUS on Pap smear 07/18/2014     Priority: Medium     + hpv colpo 7/14       Postpartum depression 07/18/2014     Priority: Medium     zoloft rx       H. pylori infection      Priority: Medium     Moderate persistent asthma 03/19/2014     Priority: Medium     GERD (gastroesophageal reflux disease) 09/12/2013     Priority: Medium     Intermittent asthma 09/12/2013     Priority: Medium     Increased with pregnancy  Send for eval and asthma action plan  Smoking cessation counseling  Flu shot given          Past Medical History:    Past Medical History:   Diagnosis Date     Asthma      GERD (gastroesophageal reflux  disease)      H. pylori infection        Past Surgical History:    Past Surgical History:   Procedure Laterality Date     COLONOSCOPY N/A 11/6/2014    Procedure: COLONOSCOPY;  Surgeon: Zacarias Paz MD;  Location: [facility]     DILATION AND CURETTAGE, HYSTEROSCOPY DIAGNOSTIC, COMBINED  8/1/2014    Procedure: COMBINED DILATION AND CURETTAGE, HYSTEROSCOPY DIAGNOSTIC;  Surgeon: Rodolfo Clifford MD;  Location: [facility]     ESOPHAGOSCOPY, GASTROSCOPY, DUODENOSCOPY (EGD), COMBINED N/A 9/5/2014    Procedure: COMBINED ESOPHAGOSCOPY, GASTROSCOPY, DUODENOSCOPY (EGD);  Surgeon: Zacarias Paz MD;  Location: [facility]     LAPAROSCOPIC ASSISTED HYSTERECTOMY VAGINAL N/A 6/24/2015    Procedure: LAPAROSCOPIC ASSISTED HYSTERECTOMY VAGINAL;  Surgeon: Rodolfo Clifford MD;  Location: [facility]     LAPAROSCOPIC OOPHORECTOMY Right 3/23/2016    Procedure: LAPAROSCOPIC OOPHORECTOMY;  Surgeon: Rodolfo Clifford MD;  Location: [facility]     LAPAROSCOPIC SALPINGO-OOPHORECTOMY Left 5/3/2017    Procedure: LAPAROSCOPIC SALPINGO-OOPHORECTOMY;  LAPAROSCOPIC LEFT OOPHORECTOMY;  Surgeon: Rodolfo Clifford MD;  Location: [facility]     LAPAROSCOPY DIAGNOSTIC (GYN) N/A 3/18/2015    Procedure: LAPAROSCOPY DIAGNOSTIC (GYN);  Surgeon: Rodolfo Clifford MD;  Location: [facility]     no surgeries       SALPINGECTOMY Bilateral 6/24/2015    Procedure: SALPINGECTOMY;  Surgeon: Rodolfo Clifford MD;  Location: [facility]       Family History:    Family History   Problem Relation Age of Onset     Asthma Mother      Unknown/Adopted Maternal Grandmother      Unknown/Adopted Maternal Grandfather        Social History:  Marital Status:  Single [1]  Social History     Tobacco Use     Smoking status: Current Every Day Smoker     Packs/day: 2.00     Years: 7.00     Pack years: 14.00     Types: Cigarettes     Smokeless tobacco: Never Used     Tobacco comment: declines quitline referral   Substance Use Topics     Alcohol use: Yes     Comment: social     Drug use: No        Medications:      Acetaminophen  (TYLENOL PO)   albuterol (PROAIR HFA/PROVENTIL HFA/VENTOLIN HFA) 108 (90 BASE) MCG/ACT Inhaler   ciprofloxacin (CIPRO) 500 MG tablet   estradiol (ESTRACE) 1 MG tablet   ketorolac (TORADOL) 10 MG tablet   OMEPRAZOLE PO         Review of Systems   Constitutional: Negative for fever.   Respiratory: Negative for shortness of breath.    Cardiovascular: Negative for chest pain.   Gastrointestinal: Negative for abdominal pain.   All other systems reviewed and are negative.      Physical Exam   BP: 112/72  Pulse: 100  Temp: 96.6  F (35.9  C)  Resp: 18  Weight: 58.1 kg (128 lb)  SpO2: 100 %      Physical Exam   Constitutional: She appears well-developed and well-nourished. No distress.   HENT:   Head: Atraumatic.   Mouth/Throat: Oropharynx is clear and moist. No oropharyngeal exudate.   Eyes: Pupils are equal, round, and reactive to light. No scleral icterus.   Cardiovascular: Normal heart sounds and intact distal pulses.   Pulmonary/Chest: Breath sounds normal. No respiratory distress.   Abdominal: Soft. Bowel sounds are normal. There is no tenderness.   Musculoskeletal: She exhibits no edema or tenderness.   Skin: Skin is warm. No rash noted. She is not diaphoretic.   Nursing note and vitals reviewed.      ED Course        Procedures       Results for orders placed or performed during the hospital encounter of 08/29/19 (from the past 24 hour(s))   Routine UA with microscopic   Result Value Ref Range    Color Urine Yellow     Appearance Urine Clear     Glucose Urine Negative NEG^Negative mg/dL    Bilirubin Urine Negative NEG^Negative    Ketones Urine Negative NEG^Negative mg/dL    Specific Gravity Urine 1.018 1.003 - 1.035    Blood Urine Negative NEG^Negative    pH Urine 6.0 4.7 - 8.0 pH    Protein Albumin Urine 10 (A) NEG^Negative mg/dL    Urobilinogen mg/dL Normal 0.0 - 2.0 mg/dL    Nitrite Urine Negative NEG^Negative    Leukocyte Esterase Urine Small (A) NEG^Negative    Source Unspecified Urine     WBC Urine 7 (H) 0 - 5  /HPF    RBC Urine 1 0 - 2 /HPF    Bacteria Urine None (A) NEG^Negative /HPF    Squamous Epithelial /HPF Urine 4 (H) 0 - 1 /HPF    Mucous Urine Present (A) NEG^Negative /LPF    Hyaline Casts 13 (A) OTO2^O - 2 /LPF   CBC with platelets differential   Result Value Ref Range    WBC 7.3 4.0 - 11.0 10e9/L    RBC Count 4.98 3.8 - 5.2 10e12/L    Hemoglobin 15.2 11.7 - 15.7 g/dL    Hematocrit 45.2 35.0 - 47.0 %    MCV 91 78 - 100 fl    MCH 30.5 26.5 - 33.0 pg    MCHC 33.6 31.5 - 36.5 g/dL    RDW 13.8 10.0 - 15.0 %    Platelet Count 222 150 - 450 10e9/L    Diff Method Automated Method     % Neutrophils 45.5 %    % Lymphocytes 44.2 %    % Monocytes 6.2 %    % Eosinophils 3.4 %    % Basophils 0.4 %    % Immature Granulocytes 0.3 %    Nucleated RBCs 0 0 /100    Absolute Neutrophil 3.3 1.6 - 8.3 10e9/L    Absolute Lymphocytes 3.2 0.8 - 5.3 10e9/L    Absolute Monocytes 0.5 0.0 - 1.3 10e9/L    Absolute Eosinophils 0.3 0.0 - 0.7 10e9/L    Absolute Basophils 0.0 0.0 - 0.2 10e9/L    Abs Immature Granulocytes 0.0 0 - 0.4 10e9/L    Absolute Nucleated RBC 0.0    Comprehensive metabolic panel   Result Value Ref Range    Sodium 144 133 - 144 mmol/L    Potassium 3.5 3.4 - 5.3 mmol/L    Chloride 108 94 - 109 mmol/L    Carbon Dioxide 29 20 - 32 mmol/L    Anion Gap 7 3 - 14 mmol/L    Glucose 79 70 - 99 mg/dL    Urea Nitrogen 12 7 - 30 mg/dL    Creatinine 0.95 0.52 - 1.04 mg/dL    GFR Estimate 81 >60 mL/min/[1.73_m2]    GFR Estimate If Black >90 >60 mL/min/[1.73_m2]    Calcium 9.0 8.5 - 10.1 mg/dL    Bilirubin Total 0.4 0.2 - 1.3 mg/dL    Albumin 3.9 3.4 - 5.0 g/dL    Protein Total 6.9 6.8 - 8.8 g/dL    Alkaline Phosphatase 99 40 - 150 U/L    ALT 18 0 - 50 U/L    AST 9 0 - 45 U/L   Lipase   Result Value Ref Range    Lipase 219 73 - 393 U/L       Medications   ketorolac (TORADOL) injection 30 mg (30 mg Intramuscular Given 8/29/19 2003)   ciprofloxacin (CIPRO) tablet 500 mg (500 mg Oral Given 8/29/19 2056)       Assessments & Plan (with Medical  Decision Making)   Acute cystitis without hematuria: Started on Cipro at the ED and Toradol IM given for pain.  Patient was however not satisfied and was still requesting for stronger pain medication at the time of discharge.  Patient has had multiple emergency department visits requesting for pain medication.  Was discharged home on Cipro and Toradol and advised to drink plenty of fluids.  Discharged home in stable condition pending urine culture results.  Follow-up with PCP as outpatient.    I have reviewed the nursing notes.    I have reviewed the findings, diagnosis, plan and need for follow up with the patient.    Discharge Medication List as of 8/29/2019  8:54 PM      START taking these medications    Details   ciprofloxacin (CIPRO) 500 MG tablet Take 1 tablet (500 mg) by mouth 2 times daily for 3 days, Disp-6 tablet, R-0, E-Prescribe      ketorolac (TORADOL) 10 MG tablet Take 1 tablet (10 mg) by mouth every 6 hours as needed for pain, Disp-30 tablet, R-0, E-Prescribe             Final diagnoses:   Acute cystitis without hematuria       8/29/2019   [facility]     Gumaro Mahoney MD  08/29/19 9331

## 2019-08-30 NOTE — ED NOTES
Pt reports she woke right right sided abdominal pains that is constant and radiates to right flank. Pt reports the pain is more now in the right flank area. Denies fever, chills.  More painful throughout the day.  Nausea. Took Ibuprofen 400 mg about 2 hours ago and had no relief. No emesis. No diarrhea. Denies dysuria. Denies blood in urine. Denies hx of kidney stones. Pt has gallbladder. No injury to the area.

## 2019-09-03 ENCOUNTER — HOSPITAL ENCOUNTER (OUTPATIENT)
Facility: HOSPITAL | Age: 29
Discharge: HOME | End: 2019-09-03
Attending: SURGERY | Admitting: SURGERY
Payer: COMMERCIAL

## 2019-09-03 ENCOUNTER — ANESTHESIA (OUTPATIENT)
Dept: SURGERY | Facility: HOSPITAL | Age: 29
End: 2019-09-03
Payer: COMMERCIAL

## 2019-09-03 VITALS
SYSTOLIC BLOOD PRESSURE: 99 MMHG | BODY MASS INDEX: 20.94 KG/M2 | RESPIRATION RATE: 16 BRPM | OXYGEN SATURATION: 97 % | TEMPERATURE: 98.6 F | DIASTOLIC BLOOD PRESSURE: 69 MMHG | WEIGHT: 122 LBS

## 2019-09-03 DIAGNOSIS — K42.9 UMBILICAL HERNIA WITHOUT OBSTRUCTION AND WITHOUT GANGRENE: Primary | ICD-10-CM

## 2019-09-03 PROCEDURE — 64488 TAP BLOCK BI INJECTION: CPT | Mod: 59 | Performed by: NURSE ANESTHETIST, CERTIFIED REGISTERED

## 2019-09-03 PROCEDURE — 36000058 ZZH SURGERY LEVEL 3 EA 15 ADDTL MIN: Performed by: SURGERY

## 2019-09-03 PROCEDURE — 25000128 H RX IP 250 OP 636: Performed by: NURSE ANESTHETIST, CERTIFIED REGISTERED

## 2019-09-03 PROCEDURE — 49585 ZZHC REPAIR UMBILICAL HERN,5+Y/O,REDUC: CPT | Performed by: SURGERY

## 2019-09-03 PROCEDURE — 94640 AIRWAY INHALATION TREATMENT: CPT

## 2019-09-03 PROCEDURE — 25000128 H RX IP 250 OP 636

## 2019-09-03 PROCEDURE — 37000008 ZZH ANESTHESIA TECHNICAL FEE, 1ST 30 MIN: Performed by: SURGERY

## 2019-09-03 PROCEDURE — 25800030 ZZH RX IP 258 OP 636: Performed by: NURSE ANESTHETIST, CERTIFIED REGISTERED

## 2019-09-03 PROCEDURE — 71000015 ZZH RECOVERY PHASE 1 LEVEL 2 EA ADDTL HR: Performed by: SURGERY

## 2019-09-03 PROCEDURE — 36000056 ZZH SURGERY LEVEL 3 1ST 30 MIN: Performed by: SURGERY

## 2019-09-03 PROCEDURE — 37000009 ZZH ANESTHESIA TECHNICAL FEE, EACH ADDTL 15 MIN: Performed by: SURGERY

## 2019-09-03 PROCEDURE — 40000275 ZZH STATISTIC RCP TIME EA 10 MIN

## 2019-09-03 PROCEDURE — 25000125 ZZHC RX 250: Performed by: NURSE ANESTHETIST, CERTIFIED REGISTERED

## 2019-09-03 PROCEDURE — 40000305 ZZH STATISTIC PRE PROC ASSESS I: Performed by: SURGERY

## 2019-09-03 PROCEDURE — 49585 AS REPAIR UMBILICAL HERN,5+Y/O,REDUC: CPT | Performed by: NURSE ANESTHETIST, CERTIFIED REGISTERED

## 2019-09-03 PROCEDURE — 27110028 ZZH OR GENERAL SUPPLY NON-STERILE: Performed by: SURGERY

## 2019-09-03 PROCEDURE — 71000014 ZZH RECOVERY PHASE 1 LEVEL 2 FIRST HR: Performed by: SURGERY

## 2019-09-03 PROCEDURE — 25000125 ZZHC RX 250: Performed by: SURGERY

## 2019-09-03 PROCEDURE — 25000125 ZZHC RX 250: Performed by: ANESTHESIOLOGY

## 2019-09-03 PROCEDURE — 25000566 ZZH SEVOFLURANE, EA 15 MIN: Performed by: ANESTHESIOLOGY

## 2019-09-03 PROCEDURE — 49585 AS REPAIR UMBILICAL HERN,5+Y/O,REDUC: CPT | Performed by: ANESTHESIOLOGY

## 2019-09-03 PROCEDURE — 71000027 ZZH RECOVERY PHASE 2 EACH 15 MINS: Performed by: SURGERY

## 2019-09-03 PROCEDURE — 27210794 ZZH OR GENERAL SUPPLY STERILE: Performed by: SURGERY

## 2019-09-03 PROCEDURE — 25000132 ZZH RX MED GY IP 250 OP 250 PS 637: Performed by: ANESTHESIOLOGY

## 2019-09-03 RX ORDER — ROPIVACAINE HYDROCHLORIDE 5 MG/ML
INJECTION, SOLUTION EPIDURAL; INFILTRATION; PERINEURAL PRN
Status: DISCONTINUED | OUTPATIENT
Start: 2019-09-03 | End: 2019-09-03

## 2019-09-03 RX ORDER — FENTANYL CITRATE 50 UG/ML
INJECTION, SOLUTION INTRAMUSCULAR; INTRAVENOUS PRN
Status: DISCONTINUED | OUTPATIENT
Start: 2019-09-03 | End: 2019-09-03

## 2019-09-03 RX ORDER — ONDANSETRON 2 MG/ML
INJECTION INTRAMUSCULAR; INTRAVENOUS PRN
Status: DISCONTINUED | OUTPATIENT
Start: 2019-09-03 | End: 2019-09-03

## 2019-09-03 RX ORDER — SODIUM CHLORIDE, SODIUM LACTATE, POTASSIUM CHLORIDE, CALCIUM CHLORIDE 600; 310; 30; 20 MG/100ML; MG/100ML; MG/100ML; MG/100ML
INJECTION, SOLUTION INTRAVENOUS CONTINUOUS
Status: DISCONTINUED | OUTPATIENT
Start: 2019-09-03 | End: 2019-09-03 | Stop reason: HOSPADM

## 2019-09-03 RX ORDER — LIDOCAINE HYDROCHLORIDE 20 MG/ML
INJECTION, SOLUTION INFILTRATION; PERINEURAL PRN
Status: DISCONTINUED | OUTPATIENT
Start: 2019-09-03 | End: 2019-09-03

## 2019-09-03 RX ORDER — IBUPROFEN 800 MG/1
1 TABLET, FILM COATED ORAL EVERY 8 HOURS PRN
Refills: 0 | COMMUNITY
Start: 2019-03-31 | End: 2021-12-02

## 2019-09-03 RX ORDER — PROPOFOL 10 MG/ML
INJECTION, EMULSION INTRAVENOUS PRN
Status: DISCONTINUED | OUTPATIENT
Start: 2019-09-03 | End: 2019-09-03

## 2019-09-03 RX ORDER — ZOLPIDEM TARTRATE 5 MG/1
1 TABLET ORAL DAILY PRN
Refills: 0 | COMMUNITY
Start: 2019-06-04 | End: 2019-09-10

## 2019-09-03 RX ORDER — DEXAMETHASONE SODIUM PHOSPHATE 10 MG/ML
INJECTION, SOLUTION INTRAMUSCULAR; INTRAVENOUS PRN
Status: DISCONTINUED | OUTPATIENT
Start: 2019-09-03 | End: 2019-09-03

## 2019-09-03 RX ORDER — ALBUTEROL SULFATE 0.83 MG/ML
2.5 SOLUTION RESPIRATORY (INHALATION) ONCE
Status: COMPLETED | OUTPATIENT
Start: 2019-09-03 | End: 2019-09-03

## 2019-09-03 RX ORDER — FENTANYL CITRATE 50 UG/ML
25-50 INJECTION, SOLUTION INTRAMUSCULAR; INTRAVENOUS
Status: DISCONTINUED | OUTPATIENT
Start: 2019-09-03 | End: 2019-09-03 | Stop reason: HOSPADM

## 2019-09-03 RX ORDER — OXYCODONE HYDROCHLORIDE 5 MG/1
5-10 TABLET ORAL EVERY 6 HOURS PRN
Qty: 10 TABLET | Refills: 0 | Status: SHIPPED | OUTPATIENT
Start: 2019-09-03 | End: 2019-09-10

## 2019-09-03 RX ORDER — CLINDAMYCIN PHOSPHATE 900 MG/50ML
900 INJECTION, SOLUTION INTRAVENOUS
Status: DISCONTINUED | OUTPATIENT
Start: 2019-09-03 | End: 2019-09-03 | Stop reason: HOSPADM

## 2019-09-03 RX ORDER — CLINDAMYCIN PHOSPHATE 900 MG/50ML
900 INJECTION, SOLUTION INTRAVENOUS SEE ADMIN INSTRUCTIONS
Status: DISCONTINUED | OUTPATIENT
Start: 2019-09-03 | End: 2019-09-03 | Stop reason: HOSPADM

## 2019-09-03 RX ORDER — OXYCODONE HYDROCHLORIDE 5 MG/1
TABLET ORAL
Status: DISCONTINUED
Start: 2019-09-03 | End: 2019-09-03 | Stop reason: HOSPADM

## 2019-09-03 RX ORDER — SCOLOPAMINE TRANSDERMAL SYSTEM 1 MG/1
1 PATCH, EXTENDED RELEASE TRANSDERMAL ONCE
Status: COMPLETED | OUTPATIENT
Start: 2019-09-03 | End: 2019-09-03

## 2019-09-03 RX ORDER — HYDRALAZINE HYDROCHLORIDE 20 MG/ML
2.5-5 INJECTION INTRAMUSCULAR; INTRAVENOUS EVERY 10 MIN PRN
Status: DISCONTINUED | OUTPATIENT
Start: 2019-09-03 | End: 2019-09-03 | Stop reason: HOSPADM

## 2019-09-03 RX ORDER — OXYCODONE HYDROCHLORIDE 5 MG/1
5 TABLET ORAL EVERY 4 HOURS PRN
Status: DISCONTINUED | OUTPATIENT
Start: 2019-09-03 | End: 2019-09-03 | Stop reason: HOSPADM

## 2019-09-03 RX ORDER — MEPERIDINE HYDROCHLORIDE 50 MG/ML
12.5 INJECTION INTRAMUSCULAR; INTRAVENOUS; SUBCUTANEOUS
Status: DISCONTINUED | OUTPATIENT
Start: 2019-09-03 | End: 2019-09-03 | Stop reason: HOSPADM

## 2019-09-03 RX ORDER — ONDANSETRON 2 MG/ML
4 INJECTION INTRAMUSCULAR; INTRAVENOUS EVERY 30 MIN PRN
Status: DISCONTINUED | OUTPATIENT
Start: 2019-09-03 | End: 2019-09-03 | Stop reason: HOSPADM

## 2019-09-03 RX ORDER — FENTANYL CITRATE 50 UG/ML
INJECTION, SOLUTION INTRAMUSCULAR; INTRAVENOUS
Status: COMPLETED
Start: 2019-09-03 | End: 2019-09-03

## 2019-09-03 RX ORDER — LITHIUM CARBONATE 300 MG
1 TABLET ORAL 2 TIMES DAILY
Refills: 2 | COMMUNITY
Start: 2019-07-29 | End: 2023-02-04

## 2019-09-03 RX ORDER — NAPROXEN 500 MG/1
1 TABLET ORAL EVERY 12 HOURS PRN
Refills: 0 | COMMUNITY
Start: 2019-04-01 | End: 2019-09-10

## 2019-09-03 RX ORDER — ALBUTEROL SULFATE 0.83 MG/ML
2.5 SOLUTION RESPIRATORY (INHALATION) EVERY 4 HOURS PRN
Status: DISCONTINUED | OUTPATIENT
Start: 2019-09-03 | End: 2019-09-03 | Stop reason: HOSPADM

## 2019-09-03 RX ORDER — NALOXONE HYDROCHLORIDE 0.4 MG/ML
.1-.4 INJECTION, SOLUTION INTRAMUSCULAR; INTRAVENOUS; SUBCUTANEOUS
Status: DISCONTINUED | OUTPATIENT
Start: 2019-09-03 | End: 2019-09-03 | Stop reason: HOSPADM

## 2019-09-03 RX ORDER — BUPIVACAINE HYDROCHLORIDE AND EPINEPHRINE 2.5; 5 MG/ML; UG/ML
INJECTION, SOLUTION INFILTRATION; PERINEURAL PRN
Status: DISCONTINUED | OUTPATIENT
Start: 2019-09-03 | End: 2019-09-03 | Stop reason: HOSPADM

## 2019-09-03 RX ORDER — ONDANSETRON 4 MG/1
4 TABLET, ORALLY DISINTEGRATING ORAL EVERY 30 MIN PRN
Status: DISCONTINUED | OUTPATIENT
Start: 2019-09-03 | End: 2019-09-03 | Stop reason: HOSPADM

## 2019-09-03 RX ORDER — AMOXICILLIN 250 MG
1-2 CAPSULE ORAL 2 TIMES DAILY
Qty: 30 TABLET | Refills: 0 | Status: SHIPPED | OUTPATIENT
Start: 2019-09-03 | End: 2019-09-10

## 2019-09-03 RX ADMIN — SCOPALAMINE 1 PATCH: 1 PATCH, EXTENDED RELEASE TRANSDERMAL at 07:17

## 2019-09-03 RX ADMIN — ROPIVACAINE HYDROCHLORIDE 15 ML: 5 INJECTION, SOLUTION EPIDURAL; INFILTRATION; PERINEURAL at 08:17

## 2019-09-03 RX ADMIN — ROPIVACAINE HYDROCHLORIDE 15 ML: 5 INJECTION, SOLUTION EPIDURAL; INFILTRATION; PERINEURAL at 08:13

## 2019-09-03 RX ADMIN — Medication 100 MG: at 07:38

## 2019-09-03 RX ADMIN — MIDAZOLAM 2 MG: 1 INJECTION INTRAMUSCULAR; INTRAVENOUS at 07:30

## 2019-09-03 RX ADMIN — FENTANYL CITRATE 50 MCG: 50 INJECTION INTRAMUSCULAR; INTRAVENOUS at 08:45

## 2019-09-03 RX ADMIN — FENTANYL CITRATE 50 MCG: 50 INJECTION INTRAMUSCULAR; INTRAVENOUS at 09:05

## 2019-09-03 RX ADMIN — ROCURONIUM BROMIDE 10 MG: 10 INJECTION INTRAVENOUS at 07:38

## 2019-09-03 RX ADMIN — FENTANYL CITRATE 100 MCG: 50 INJECTION, SOLUTION INTRAMUSCULAR; INTRAVENOUS at 07:30

## 2019-09-03 RX ADMIN — CLINDAMYCIN PHOSPHATE 900 MG: 18 INJECTION, SOLUTION INTRAVENOUS at 07:30

## 2019-09-03 RX ADMIN — ONDANSETRON 4 MG: 2 INJECTION INTRAMUSCULAR; INTRAVENOUS at 08:15

## 2019-09-03 RX ADMIN — PROPOFOL 150 MG: 10 INJECTION, EMULSION INTRAVENOUS at 07:38

## 2019-09-03 RX ADMIN — LIDOCAINE HYDROCHLORIDE 40 MG: 20 INJECTION, SOLUTION INFILTRATION; PERINEURAL at 07:38

## 2019-09-03 RX ADMIN — DEXAMETHASONE SODIUM PHOSPHATE 10 MG: 10 INJECTION, SOLUTION INTRAMUSCULAR; INTRAVENOUS at 07:41

## 2019-09-03 RX ADMIN — ALBUTEROL SULFATE 2.5 MG: 2.5 SOLUTION RESPIRATORY (INHALATION) at 07:19

## 2019-09-03 RX ADMIN — OXYCODONE HYDROCHLORIDE 5 MG: 5 TABLET ORAL at 10:08

## 2019-09-03 RX ADMIN — SODIUM CHLORIDE, POTASSIUM CHLORIDE, SODIUM LACTATE AND CALCIUM CHLORIDE: 600; 310; 30; 20 INJECTION, SOLUTION INTRAVENOUS at 07:17

## 2019-09-03 NOTE — ANESTHESIA PROCEDURE NOTES
Peripheral nerve/Neuraxial procedure note : TAP  Pre-Procedure  Performed by   Referred by DR. HATCH  Location: OR    Procedure Times:9/3/2019 8:11 AM and 9/3/2019 8:18 AM  Pre-Anesthestic Checklist: patient identified, IV checked, risks and benefits discussed, informed consent, monitors and equipment checked, pre-op evaluation, at physician/surgeon's request and post-op pain management    Timeout  Correct Patient: Yes   Correct Procedure: Yes   Correct Site: Yes   Correct Laterality: N/A   Correct Position: Yes   Site Marked: N/A   .   Procedure Documentation    .    Procedure:  bilateral  TAP.     Ultrasound used to identify targeted nerve, plexus, or vascular marker and placed a needle adjacent to it., Ultrasound was used to visualize the spread of the anesthetic in close proximity to the above stated nerve. A permanent image is entered into the patient's record.  Patient Prep;chlorhexidine gluconate and isopropyl alcohol.  .  Needle: insulated Needle Gauge: 22.    Needle Length (Inches) 4  Insertion Method: Single Shot.       Assessment/Narrative  Injection made incrementally with aspirations every 5 mL..  The placement was negative for: blood aspirated and site bleeding.  Bolus given via catheter..   Secured via.   Complications:.

## 2019-09-03 NOTE — ANESTHESIA CARE TRANSFER NOTE
Patient: Ihsan Millan    Procedure(s):  OPEN UMBILICAL HERNIA REPAIR    Diagnosis: UMBILICAL HERNIA W/O OBSTRUCTION AND W/O GANGRENE  Diagnosis Additional Information: No value filed.    Anesthesia Type:   General, ETT, Periph. Nerve Block for postop pain     Note:  Airway :Nasal Cannula  Patient transferred to:PACU  Handoff Report: Identifed the Patient, Identified the Reponsible Provider, Reviewed the pertinent medical history, Discussed the surgical course, Reviewed Intra-OP anesthesia mangement and issues during anesthesia, Set expectations for post-procedure period and Allowed opportunity for questions and acknowledgement of understanding      Vitals: (Last set prior to Anesthesia Care Transfer)    CRNA VITALS  9/3/2019 0800 - 9/3/2019 0836      9/3/2019             Resp Rate (set):  8                Electronically Signed By: NELSON Wing CRNA  September 3, 2019  8:36 AM

## 2019-09-03 NOTE — ANESTHESIA POSTPROCEDURE EVALUATION
Patient: Ihsan Millan    Procedure(s):  OPEN UMBILICAL HERNIA REPAIR    Diagnosis:UMBILICAL HERNIA W/O OBSTRUCTION AND W/O GANGRENE  Diagnosis Additional Information: No value filed.    Anesthesia Type:  General, ETT, Periph. Nerve Block for postop pain    Note:  Anesthesia Post Evaluation    Patient location during evaluation: Phase 2, PACU and Bedside  Patient participation: Able to fully participate in evaluation  Level of consciousness: awake and alert  Pain management: adequate  Airway patency: patent  Cardiovascular status: acceptable  Respiratory status: acceptable  Hydration status: stable  PONV: none     Anesthetic complications: None          Last vitals:  Vitals:    09/03/19 1015 09/03/19 1025 09/03/19 1030   BP: 97/68  99/69   Resp: 16  16   Temp:   98.6  F (37  C)   SpO2: 95% 96% 97%         Electronically Signed By: Rush Forde MD  September 3, 2019  11:35 AM

## 2019-09-03 NOTE — DISCHARGE INSTRUCTIONS
Post-Anesthesia Patient Instructions    IMMEDIATELY FOLLOWING SURGERY:  Do not drive or operate machinery for the first twenty four hours after surgery.  Do not make any important decisions for twenty four hours after surgery or while taking narcotic pain medications or sedatives.  If you develop intractable nausea and vomiting or a severe headache please notify your doctor immediately.    FOLLOW-UP:  Please make an appointment with your surgeon as instructed. You do not need to follow up with anesthesia unless specifically instructed to do so.    WOUND CARE INSTRUCTIONS (if applicable):  Keep a dry clean dressing on the anesthesia/puncture wound site if there is drainage.  Once the wound has quit draining you may leave it open to air.  Generally you should leave the bandage intact for twenty four hours unless there is drainage.  If the epidural site drains for more than 36-48 hours please call the anesthesia department.    QUESTIONS?:  Please feel free to call your physician or the hospital  if you have any questions, and they will be happy to assist you.   Pain Control Regimen  Alternate Tylenol and ibuprofen every 4 hours to ensure adequate pain control and then use oxycodone as needed.  Example schedule    8 am - Tylenol either 975 mg (3 regular strength Tylenol) or 1000 mg (2 extra strength Tylenol)  Noon - Ibuprofen 400 mg (2 tabs)  4 pm - Tylenol either 975 mg (3 regular strength Tylenol) or 1000 mg (2 extra strength Tylenol)  8 pm - Ibuprofen 400 mg (2 tabs)    If you need you may schedule the pain medication throughout the night, but if you are able to sleep then no need to wake up to take the medications. Continue this regimen for the first 3-4 days to help minimize pain and narcotic usage.    Remove the scopolamine patch behind your right ear after 24 hours after application (Wednesday Sept. 4, 2019 @ 7:15am.   After removing the patch, wash your hands and the area behind your ear thoroughly  with soap and water.   The patch will still contain some medicine after use.   To avoid accidental contact or ingestion by children or pets, fold the used patch in half with the sticky side together and throw away in the trash out of the reach of children and pets.             Discharge Instructions for Open Hernia Repair  You had a procedure called open hernia repair. Also called a rupture, a hernia is a tear or weakness in the wall of the belly. This weakness may be present at birth. Or it can be caused by the wear and tear of daily living. Hernias may get worse with time or with physical stress. But surgery can help repair the weakness and eliminate symptoms.  Activity after surgery  Recommendations include the following:    After surgery, take it easy for the rest of the day. If you had general anesthesia, don t use machinery or power tools, drink alcohol, or make any major decisions for at least the first 24 hours.    Don t drive while you are still taking opioid pain medicine, and don t drive until you are able to step firmly on the brake pedal without hesitation.    Ask others to help with chores and errands while you recover.    Don t lift anything heavier than 20 pounds until your healthcare provider says it s OK.    Don t mow the lawn, use a vacuum , or do other strenuous activities until your healthcare provider says it s OK.    Walk as often as you feel able.    Continue the coughing and deep breathing exercises that you learned in the hospital.    Ask your healthcare provider when you can expect to return to work.    Avoid constipation:  ? Eat fruits, vegetables, and whole grains.  ? Drink 6 to 8 glasses of water a day, unless otherwise instructed.  ? Use a laxative or a mild stool softener as instructed by your healthcare provider.  Bandage and incision care  Tips include the following:    Do not get the bandage or wound wet for 24 hours.    If strips of tape were used to close your incision,  don t pull them off. Let them fall off on their own.    Remove any gauze bandage in 24 hours.    Wash your incision with mild soap and water. Pat it dry. Don t use oils, powders, or lotions on your incision. Do not soak your incision or take tub baths until cleared by your healthcare provider.  Follow-up care  Keep follow-up appointments during your recovery. These allow your healthcare provider to check your progress and make sure you re healing well. You may also need to have your stitches, staples, or bandage removed. During office visits, tell your healthcare provider if you have any new symptoms. And be sure to ask any questions you have.     When to call your healthcare provider  Call your healthcare provider immediately if you have any of the following:    A large amount of swelling or bruising (some testicular swelling and bruising is common)    Bleeding    Increasing pain    Increased redness or drainage of the incision    Fever of 100.4 F (38.0 C) or higher, or as directed by your healthcare provider    Trouble urinating    Nausea or vomiting   Date Last Reviewed: 7/1/2016 2000-2018 [employer]. [address] [place] [id number]. All rights reserved. This information is not intended as a substitute for professional medical care. Always follow your healthcare professional's instructions.

## 2019-09-03 NOTE — OP NOTE
[facility]   Operative Note    Pre-operative diagnosis: UMBILICAL HERNIA W/O OBSTRUCTION AND W/O GANGRENE   Post-operative diagnosis Umbilical hernia   Procedure: Procedure(s):  OPEN UMBILICAL HERNIA REPAIR   Surgeon(s): Surgeon(s) and Role:     * Carter Vaughn MD - Primary   Estimated blood loss: Minimal    Specimens: None   Findings: Small umbilical hernia     Description of procedure:   Patient was transferred to the operating room and placed on the operating room table in supine position.  After induction of general endotracheal anesthetic the area was prepped and draped in a sterile fashion.  Timeout was then done indicating patient, procedure, and antibiotics given.  Began with injection of local anesthetic infraumbilically.  A curvilinear incision was then made infraumbilically.  Dissected down through subcutaneous tissues using electrocautery.  The rose-umbilical fascia was then dissected free from the subcutaneous tissues with electrocautery.  There is a small umbilical defect which was identified.  Once the subcutaneous tissues were dissected off the fascia circumferentially around the umbilical defect was able to then close the defect.  The umbilical fascial defect was approximately 5 mm in size.  The defect was closed with two 0 Vicryl figure-of-eight sutures.  The wound was then irrigated with normal saline and aspirated.  The base of the umbilicus was then sutured to the fascia using 3-0 Vicryl.  Deep dermal approximation was performed with 3-0 Vicryl suture.  The skin was then closed with 4-0 Monocryl in a running subcuticular stitch.  The abdomen was cleaned and dried.  Topical adhesive and a sterile dressing was then applied.  Patient tolerated procedure well was transferred to the PACU in stable condition.      Carter Vaughn MD

## 2019-09-04 ENCOUNTER — TELEPHONE (OUTPATIENT)
Dept: SURGERY | Facility: OTHER | Age: 29
End: 2019-09-04

## 2019-09-04 ENCOUNTER — HOSPITAL ENCOUNTER (EMERGENCY)
Facility: HOSPITAL | Age: 29
Discharge: HOME | End: 2019-09-04
Admitting: NURSE PRACTITIONER
Payer: COMMERCIAL

## 2019-09-04 VITALS
TEMPERATURE: 97.2 F | SYSTOLIC BLOOD PRESSURE: 111 MMHG | OXYGEN SATURATION: 98 % | RESPIRATION RATE: 14 BRPM | DIASTOLIC BLOOD PRESSURE: 67 MMHG

## 2019-09-04 DIAGNOSIS — G89.18 ACUTE POST-OPERATIVE PAIN: Primary | ICD-10-CM

## 2019-09-04 LAB
BASOPHILS # BLD AUTO: 0 10E9/L (ref 0–0.2)
BASOPHILS NFR BLD AUTO: 0.2 %
DIFFERENTIAL METHOD BLD: NORMAL
EOSINOPHIL # BLD AUTO: 0.2 10E9/L (ref 0–0.7)
EOSINOPHIL NFR BLD AUTO: 1.7 %
ERYTHROCYTE [DISTWIDTH] IN BLOOD BY AUTOMATED COUNT: 14 % (ref 10–15)
HCT VFR BLD AUTO: 40.4 % (ref 35–47)
HGB BLD-MCNC: 13.6 G/DL (ref 11.7–15.7)
IMM GRANULOCYTES # BLD: 0 10E9/L (ref 0–0.4)
IMM GRANULOCYTES NFR BLD: 0.4 %
LYMPHOCYTES # BLD AUTO: 4.2 10E9/L (ref 0.8–5.3)
LYMPHOCYTES NFR BLD AUTO: 39.5 %
MCH RBC QN AUTO: 30.8 PG (ref 26.5–33)
MCHC RBC AUTO-ENTMCNC: 33.7 G/DL (ref 31.5–36.5)
MCV RBC AUTO: 92 FL (ref 78–100)
MONOCYTES # BLD AUTO: 0.5 10E9/L (ref 0–1.3)
MONOCYTES NFR BLD AUTO: 4.2 %
NEUTROPHILS # BLD AUTO: 5.8 10E9/L (ref 1.6–8.3)
NEUTROPHILS NFR BLD AUTO: 54 %
NRBC # BLD AUTO: 0 10*3/UL
NRBC BLD AUTO-RTO: 0 /100
PLATELET # BLD AUTO: 194 10E9/L (ref 150–450)
RBC # BLD AUTO: 4.41 10E12/L (ref 3.8–5.2)
WBC # BLD AUTO: 10.7 10E9/L (ref 4–11)

## 2019-09-04 PROCEDURE — 25000128 H RX IP 250 OP 636: Performed by: NURSE PRACTITIONER

## 2019-09-04 PROCEDURE — 36415 COLL VENOUS BLD VENIPUNCTURE: CPT | Performed by: NURSE PRACTITIONER

## 2019-09-04 PROCEDURE — 85025 COMPLETE CBC W/AUTO DIFF WBC: CPT | Performed by: NURSE PRACTITIONER

## 2019-09-04 PROCEDURE — 25000131 ZZH RX MED GY IP 250 OP 636 PS 637: Performed by: NURSE PRACTITIONER

## 2019-09-04 PROCEDURE — 99213 OFFICE O/P EST LOW 20 MIN: CPT | Mod: 24 | Performed by: NURSE PRACTITIONER

## 2019-09-04 PROCEDURE — 96372 THER/PROPH/DIAG INJ SC/IM: CPT

## 2019-09-04 PROCEDURE — 25000132 ZZH RX MED GY IP 250 OP 250 PS 637: Performed by: NURSE PRACTITIONER

## 2019-09-04 PROCEDURE — G0463 HOSPITAL OUTPT CLINIC VISIT: HCPCS | Mod: 25

## 2019-09-04 RX ORDER — IBUPROFEN 800 MG/1
800 TABLET, FILM COATED ORAL ONCE
Status: COMPLETED | OUTPATIENT
Start: 2019-09-04 | End: 2019-09-04

## 2019-09-04 RX ORDER — HYDROMORPHONE HYDROCHLORIDE 2 MG/ML
2 INJECTION, SOLUTION INTRAMUSCULAR; INTRAVENOUS; SUBCUTANEOUS ONCE
Status: COMPLETED | OUTPATIENT
Start: 2019-09-04 | End: 2019-09-04

## 2019-09-04 RX ORDER — OXYCODONE HYDROCHLORIDE 5 MG/1
5-10 TABLET ORAL EVERY 6 HOURS PRN
Qty: 18 TABLET | Refills: 0 | Status: SHIPPED | OUTPATIENT
Start: 2019-09-04 | End: 2019-09-10

## 2019-09-04 RX ORDER — ONDANSETRON 4 MG/1
4 TABLET, ORALLY DISINTEGRATING ORAL EVERY 6 HOURS PRN
Qty: 10 TABLET | Refills: 0 | Status: SHIPPED | OUTPATIENT
Start: 2019-09-04 | End: 2019-09-10

## 2019-09-04 RX ORDER — IBUPROFEN 800 MG/1
800 TABLET, FILM COATED ORAL EVERY 8 HOURS PRN
Qty: 60 TABLET | Refills: 0 | Status: SHIPPED | OUTPATIENT
Start: 2019-09-04 | End: 2019-09-10

## 2019-09-04 RX ORDER — ONDANSETRON 4 MG/1
4 TABLET, ORALLY DISINTEGRATING ORAL ONCE
Status: COMPLETED | OUTPATIENT
Start: 2019-09-04 | End: 2019-09-04

## 2019-09-04 RX ADMIN — ONDANSETRON 4 MG: 4 TABLET, ORALLY DISINTEGRATING ORAL at 21:41

## 2019-09-04 RX ADMIN — HYDROMORPHONE HYDROCHLORIDE 2 MG: 2 INJECTION INTRAMUSCULAR; INTRAVENOUS; SUBCUTANEOUS at 21:42

## 2019-09-04 RX ADMIN — IBUPROFEN 800 MG: 800 TABLET ORAL at 22:34

## 2019-09-04 ASSESSMENT — ENCOUNTER SYMPTOMS
DIFFICULTY URINATING: 0
HEMATURIA: 0
ABDOMINAL DISTENTION: 0
LIGHT-HEADEDNESS: 0
CHILLS: 1
DIARRHEA: 0
VOMITING: 0
FREQUENCY: 0
FEVER: 0
DIZZINESS: 0
WOUND: 1
DYSURIA: 0
HEADACHES: 0
APPETITE CHANGE: 1
FATIGUE: 0
ABDOMINAL PAIN: 1
WEAKNESS: 0
NAUSEA: 1
FLANK PAIN: 0

## 2019-09-04 NOTE — TELEPHONE ENCOUNTER
She has been taking two and only has 3 left she is unaware of what has helped in the past. Patient advised to continue taking with alternating with Ibuprofen.  Sarai Cisneros LPN

## 2019-09-04 NOTE — TELEPHONE ENCOUNTER
Patient states her pain is 10/10 and has been alternating tylenol and Ibuprofen and states pain medication is not working, Please advise.   Sarai Cisneros LPN

## 2019-09-04 NOTE — ED AVS SNAPSHOT
[facility]  [address] [address]  Phone:  [phone]                                    Ihsan Millan   MRN: [id number]    Department:  [facility]   Date of Visit:  9/4/2019           After Visit Summary Signature Page    I have received my discharge instructions, and my questions have been answered. I have discussed any challenges I see with this plan with the nurse or doctor.    ..........................................................................................................................................  Patient/Patient Representative Signature      ..........................................................................................................................................  Patient Representative Print Name and Relationship to Patient    ..................................................               ................................................  Date                                   Time    ..........................................................................................................................................  Reviewed by Signature/Title    ...................................................              ..............................................  Date                                               Time          22EPIC Rev 08/18

## 2019-09-05 NOTE — ED TRIAGE NOTES
Pt is here today with c/o 10/10 pain from hernia surgery that pt had yesterday. Pt has taken ibu 400mg and tylenol and she is unsure of dosage and time that she has taken them. She had 10 mg of oxycodone at 6 pm today and says it hasn't touched the pain.

## 2019-09-05 NOTE — ED PROVIDER NOTES
History     Chief Complaint   Patient presents with     Post-op Problem     post op hernia repair. notes surg yesteday, notes pain     HPI  Ihsan Millan is a 28 year old female who presents with increased abdominal pain and chills after hernia repair yesterday. Denies urinary symptoms. She is passing flatus but has not had a BM since surgery. Increased pain and chills started this morning. She is also having nausea, denies vomiting. She has been able to eat today. She currently rates pain at 8-9/10, sharp, sore, shooting. She has tried Tylenol, ibuprofen, two oxycodone without any relief of discomfort. She has a history of hysterectomy and denies issues with pain control postoperatively.     Allergies:  Allergies   Allergen Reactions     Lexapro [Escitalopram] Nausea and Vomiting     Codeine Sulfate Rash     Penicillins Rash     Tramadol Rash       Problem List:    Patient Active Problem List    Diagnosis Date Noted     RUQ abdominal pain 02/12/2017     Priority: Medium     Pneumonia of right middle lobe due to infectious organism (H) 02/12/2017     Priority: Medium     Tobacco abuse 02/12/2017     Priority: Medium     Status post laparoscopic assisted vaginal hysterectomy (LAVH) 06/25/2015     Priority: Medium     Surgery, elective 06/24/2015     Priority: Medium     Endometriosis 04/16/2015     Priority: Medium     Depo lupron 4/15.  Laparoscopy 3/15       ASCUS on Pap smear 07/18/2014     Priority: Medium     + hpv colpo 7/14       Postpartum depression 07/18/2014     Priority: Medium     zoloft rx       H. pylori infection      Priority: Medium     Moderate persistent asthma 03/19/2014     Priority: Medium     GERD (gastroesophageal reflux disease) 09/12/2013     Priority: Medium     Intermittent asthma 09/12/2013     Priority: Medium     Increased with pregnancy  Send for eval and asthma action plan  Smoking cessation counseling  Flu shot given          Past Medical History:    Past Medical History:    Diagnosis Date     Asthma      GERD (gastroesophageal reflux disease)      H. pylori infection        Past Surgical History:    Past Surgical History:   Procedure Laterality Date     COLONOSCOPY N/A 11/6/2014    Procedure: COLONOSCOPY;  Surgeon: Zacarias Paz MD;  Location: [facility]     DILATION AND CURETTAGE, HYSTEROSCOPY DIAGNOSTIC, COMBINED  8/1/2014    Procedure: COMBINED DILATION AND CURETTAGE, HYSTEROSCOPY DIAGNOSTIC;  Surgeon: Rodolfo Clifford MD;  Location: [facility]     ESOPHAGOSCOPY, GASTROSCOPY, DUODENOSCOPY (EGD), COMBINED N/A 9/5/2014    Procedure: COMBINED ESOPHAGOSCOPY, GASTROSCOPY, DUODENOSCOPY (EGD);  Surgeon: Zacarias Paz MD;  Location: [facility]     HERNIORRHAPHY UMBILICAL N/A 9/3/2019    Procedure: OPEN UMBILICAL HERNIA REPAIR;  Surgeon: Carter Vaughn MD;  Location: [facility]     LAPAROSCOPIC ASSISTED HYSTERECTOMY VAGINAL N/A 6/24/2015    Procedure: LAPAROSCOPIC ASSISTED HYSTERECTOMY VAGINAL;  Surgeon: Rodolfo Clifford MD;  Location: [facility]     LAPAROSCOPIC OOPHORECTOMY Right 3/23/2016    Procedure: LAPAROSCOPIC OOPHORECTOMY;  Surgeon: Rodolfo Clifford MD;  Location: [facility]     LAPAROSCOPIC SALPINGO-OOPHORECTOMY Left 5/3/2017    Procedure: LAPAROSCOPIC SALPINGO-OOPHORECTOMY;  LAPAROSCOPIC LEFT OOPHORECTOMY;  Surgeon: Roodlfo Clifford MD;  Location: [facility]     LAPAROSCOPY DIAGNOSTIC (GYN) N/A 3/18/2015    Procedure: LAPAROSCOPY DIAGNOSTIC (GYN);  Surgeon: Rodolfo Clifford MD;  Location: [facility]     no surgeries       SALPINGECTOMY Bilateral 6/24/2015    Procedure: SALPINGECTOMY;  Surgeon: Rodolfo Clifford MD;  Location: [facility]       Family History:    Family History   Problem Relation Age of Onset     Asthma Mother      Unknown/Adopted Maternal Grandmother      Unknown/Adopted Maternal Grandfather        Social History:  Marital Status:  Single [1]  Social History     Tobacco Use     Smoking status: Current Every Day Smoker     Packs/day: 2.00     Years: 7.00     Pack years: 14.00     Types: Cigarettes      Smokeless tobacco: Never Used     Tobacco comment: declines quitline referral   Substance Use Topics     Alcohol use: Yes     Comment: social     Drug use: No        Medications:      Acetaminophen (TYLENOL PO)   ibuprofen (ADVIL/MOTRIN) 800 MG tablet   oxyCODONE (ROXICODONE) 5 MG tablet   oxyCODONE (ROXICODONE) 5 MG tablet   albuterol (PROAIR HFA/PROVENTIL HFA/VENTOLIN HFA) 108 (90 BASE) MCG/ACT Inhaler   estradiol (ESTRACE) 1 MG tablet   ketorolac (TORADOL) 10 MG tablet   lithium (ESKALITH) 300 MG tablet   naproxen (NAPROSYN) 500 MG tablet   OMEPRAZOLE PO   senna-docusate (SENOKOT-S/PERICOLACE) 8.6-50 MG tablet   zolpidem (AMBIEN) 5 MG tablet         Review of Systems   Constitutional: Positive for appetite change (decreased) and chills. Negative for fatigue and fever.   Gastrointestinal: Positive for abdominal pain and nausea. Negative for abdominal distention, diarrhea and vomiting.   Genitourinary: Negative for difficulty urinating, dysuria, flank pain, frequency, hematuria and pelvic pain.   Skin: Positive for wound (surgical incision).   Neurological: Negative for dizziness, syncope, weakness, light-headedness and headaches.       Physical Exam   BP: 111/67  Heart Rate: 75  Temp: 97.2  F (36.2  C)  Resp: 14  SpO2: 98 %      Physical Exam   Constitutional: She is oriented to person, place, and time. She is cooperative.  Non-toxic appearance. She appears distressed (guarding abdomen).   HENT:   Head: Normocephalic and atraumatic.   Right Ear: Tympanic membrane, external ear and ear canal normal.   Left Ear: Tympanic membrane, external ear and ear canal normal.   Nose: Nose normal. No rhinorrhea.   Cardiovascular: Normal rate, regular rhythm, S1 normal and S2 normal. Exam reveals no gallop and no friction rub.   No murmur heard.  Pulmonary/Chest: Effort normal and breath sounds normal. No respiratory distress. She has no wheezes. She has no rhonchi. She has no rales.   Abdominal: Soft. Bowel sounds are normal.  There is no hepatosplenomegaly. There is tenderness in the periumbilical area. There is guarding. There is no rigidity, no rebound, no CVA tenderness, no tenderness at McBurney's point and negative Hanna's sign.       Neurological: She is alert and oriented to person, place, and time.   Skin: Skin is warm and dry.   Psychiatric: She has a normal mood and affect. Her speech is normal and behavior is normal.       ED Course        Procedures               Results for orders placed or performed during the hospital encounter of 09/04/19 (from the past 24 hour(s))   CBC with platelets differential   Result Value Ref Range    WBC 10.7 4.0 - 11.0 10e9/L    RBC Count 4.41 3.8 - 5.2 10e12/L    Hemoglobin 13.6 11.7 - 15.7 g/dL    Hematocrit 40.4 35.0 - 47.0 %    MCV 92 78 - 100 fl    MCH 30.8 26.5 - 33.0 pg    MCHC 33.7 31.5 - 36.5 g/dL    RDW 14.0 10.0 - 15.0 %    Platelet Count 194 150 - 450 10e9/L    Diff Method Automated Method     % Neutrophils 54.0 %    % Lymphocytes 39.5 %    % Monocytes 4.2 %    % Eosinophils 1.7 %    % Basophils 0.2 %    % Immature Granulocytes 0.4 %    Nucleated RBCs 0 0 /100    Absolute Neutrophil 5.8 1.6 - 8.3 10e9/L    Absolute Lymphocytes 4.2 0.8 - 5.3 10e9/L    Absolute Monocytes 0.5 0.0 - 1.3 10e9/L    Absolute Eosinophils 0.2 0.0 - 0.7 10e9/L    Absolute Basophils 0.0 0.0 - 0.2 10e9/L    Abs Immature Granulocytes 0.0 0 - 0.4 10e9/L    Absolute Nucleated RBC 0.0        Medications   HYDROmorphone (DILAUDID) injection 2 mg (2 mg Intramuscular Given 9/4/19 2142)   ondansetron (ZOFRAN-ODT) ODT tab 4 mg (4 mg Oral Given 9/4/19 2141)       Assessments & Plan (with Medical Decision Making)     I have reviewed the nursing notes.    I have reviewed the findings, diagnosis, plan and need for follow up with the patient.  (G89.18) Acute post-operative pain  (primary encounter diagnosis)  Comment: Acute, symptomatic.   Plan: Given increased pain and chills- CBC obtained. Normal WBC and neutrophils.    Shot of dilaudid to help get on top of pain control as well as zofran for nausea.  Take:  Ibuprofen 800 mg with food every 8 hours  Tylenol 1,000 mg every 6 hours  Oxycodone as directed if above are not helpful  Okay to apply ice and/or heat for comfort  Use pillow to splint abdomen with deep breathing and coughing.    Narcotics are intended for short term use of acute pain.  They are not typically indicated for long term use or treatment of chronic pain due to risks and side effects.  Ensure you are taking your narcotic prescription as prescribed and using sparingly after tryin.) non-pharmacologic (Rest, Ice and/or Heat, Compression, Elevation, Massage, Topical anesthetic such as Bengay, Biofreeze, lidocaine, etc, chiropractor, support braces, others) and   2.) non-narcotic analgesics such as Tylenol 500-650 mg four times daily and/or ibuprofen 800 mg every 8 hours with food to avoid stomach upset. If you have ever been told to avoid acetaminophen (Tylenol) due to liver disease or nonsteroidal antiinflammatories (NASIDs) due to kidney disease, gastritis, stomach ulcers, etc. Avoid use.  Avoid operating vehicle/heavy equipment while taking narcotics. Also avoid drinking alcohol and using elicit substances while taking narcotics as doing so may increase your risk of side effects including respiratory depression which can lead to death.  Common side effects of narcotics include but are not limited to: constipation, nausea, vomiting, dizziness, sedation.     Discussed possible interaction of lithium level increasing with use of ibuprofen. Recommend she contact provider of this medication to discuss whether or not level should be checked post-operatively with use of NSAIDs      Follow-up with no improvement    New Prescriptions    OXYCODONE (ROXICODONE) 5 MG TABLET    Take 1-2 tablets (5-10 mg) by mouth every 6 hours as needed for pain       Final diagnoses:   Acute post-operative pain     Lorraine Carreon, CNP    9/4/2019   [facility]     Lorraine Carreon, CALIN  09/04/19 2226       Lorraine Carreon, CNP  09/04/19 2232

## 2019-09-05 NOTE — DISCHARGE INSTRUCTIONS
(G89.18) Acute post-operative pain  (primary encounter diagnosis)  Comment: Acute, symptomatic.   Plan: Given increased pain and chills- CBC obtained. Normal WBC and neutrophils.   Shot of dilaudid to help get on top of pain control as well as zofran for nausea.  Take:  Ibuprofen 800 mg with food every 8 hours  Tylenol 1,000 mg every 6 hours  Oxycodone as directed if above are not helpful  Okay to apply ice and/or heat for comfort  Use pillow to splint abdomen with deep breathing and coughing.    Narcotics are intended for short term use of acute pain.  They are not typically indicated for long term use or treatment of chronic pain due to risks and side effects.  Ensure you are taking your narcotic prescription as prescribed and using sparingly after tryin.) non-pharmacologic (Rest, Ice and/or Heat, Compression, Elevation, Massage, Topical anesthetic such as Bengay, Biofreeze, lidocaine, etc, chiropractor, support braces, others) and   2.) non-narcotic analgesics such as Tylenol 500-650 mg four times daily and/or ibuprofen 800 mg every 8 hours with food to avoid stomach upset. If you have ever been told to avoid acetaminophen (Tylenol) due to liver disease or nonsteroidal antiinflammatories (NASIDs) due to kidney disease, gastritis, stomach ulcers, etc. Avoid use.  Avoid operating vehicle/heavy equipment while taking narcotics. Also avoid drinking alcohol and using elicit substances while taking narcotics as doing so may increase your risk of side effects including respiratory depression which can lead to death.  Common side effects of narcotics include but are not limited to: constipation, nausea, vomiting, dizziness, sedation.         Follow-up with no improvement or worsening symptoms.     Lorraine Carreon, CNP

## 2019-09-10 ENCOUNTER — MYC MEDICAL ADVICE (OUTPATIENT)
Dept: SURGERY | Facility: OTHER | Age: 29
End: 2019-09-10

## 2019-09-10 ENCOUNTER — OFFICE VISIT (OUTPATIENT)
Dept: SURGERY | Facility: OTHER | Age: 29
End: 2019-09-10
Attending: SURGERY
Payer: COMMERCIAL

## 2019-09-10 VITALS
HEART RATE: 108 BPM | DIASTOLIC BLOOD PRESSURE: 70 MMHG | OXYGEN SATURATION: 98 % | WEIGHT: 124 LBS | TEMPERATURE: 98.8 F | SYSTOLIC BLOOD PRESSURE: 100 MMHG | HEIGHT: 64 IN | BODY MASS INDEX: 21.17 KG/M2

## 2019-09-10 DIAGNOSIS — Z09 POSTOP CHECK: ICD-10-CM

## 2019-09-10 DIAGNOSIS — R10.84 ABDOMINAL PAIN, GENERALIZED: Primary | ICD-10-CM

## 2019-09-10 LAB
ALBUMIN SERPL-MCNC: 4.1 G/DL (ref 3.4–5)
ALP SERPL-CCNC: 115 U/L (ref 40–150)
ALT SERPL W P-5'-P-CCNC: 20 U/L (ref 0–50)
ANION GAP SERPL CALCULATED.3IONS-SCNC: 4 MMOL/L (ref 3–14)
AST SERPL W P-5'-P-CCNC: 8 U/L (ref 0–45)
BASOPHILS # BLD AUTO: 0 10E9/L (ref 0–0.2)
BASOPHILS NFR BLD AUTO: 0.2 %
BILIRUB SERPL-MCNC: 0.3 MG/DL (ref 0.2–1.3)
BUN SERPL-MCNC: 13 MG/DL (ref 7–30)
CALCIUM SERPL-MCNC: 9.4 MG/DL (ref 8.5–10.1)
CHLORIDE SERPL-SCNC: 107 MMOL/L (ref 94–109)
CO2 SERPL-SCNC: 30 MMOL/L (ref 20–32)
CREAT SERPL-MCNC: 0.88 MG/DL (ref 0.52–1.04)
DIFFERENTIAL METHOD BLD: ABNORMAL
EOSINOPHIL # BLD AUTO: 0.4 10E9/L (ref 0–0.7)
EOSINOPHIL NFR BLD AUTO: 5 %
ERYTHROCYTE [DISTWIDTH] IN BLOOD BY AUTOMATED COUNT: 13.5 % (ref 10–15)
GFR SERPL CREATININE-BSD FRML MDRD: 89 ML/MIN/{1.73_M2}
GLUCOSE SERPL-MCNC: 83 MG/DL (ref 70–99)
HCT VFR BLD AUTO: 46.8 % (ref 35–47)
HGB BLD-MCNC: 16.1 G/DL (ref 11.7–15.7)
IMM GRANULOCYTES # BLD: 0 10E9/L (ref 0–0.4)
IMM GRANULOCYTES NFR BLD: 0.4 %
LYMPHOCYTES # BLD AUTO: 2.7 10E9/L (ref 0.8–5.3)
LYMPHOCYTES NFR BLD AUTO: 32.1 %
MCH RBC QN AUTO: 30.7 PG (ref 26.5–33)
MCHC RBC AUTO-ENTMCNC: 34.4 G/DL (ref 31.5–36.5)
MCV RBC AUTO: 89 FL (ref 78–100)
MONOCYTES # BLD AUTO: 0.5 10E9/L (ref 0–1.3)
MONOCYTES NFR BLD AUTO: 6.1 %
NEUTROPHILS # BLD AUTO: 4.8 10E9/L (ref 1.6–8.3)
NEUTROPHILS NFR BLD AUTO: 56.2 %
NRBC # BLD AUTO: 0 10*3/UL
NRBC BLD AUTO-RTO: 0 /100
PLATELET # BLD AUTO: 269 10E9/L (ref 150–450)
POTASSIUM SERPL-SCNC: 3.9 MMOL/L (ref 3.4–5.3)
PROT SERPL-MCNC: 7.5 G/DL (ref 6.8–8.8)
RBC # BLD AUTO: 5.25 10E12/L (ref 3.8–5.2)
SODIUM SERPL-SCNC: 141 MMOL/L (ref 133–144)
WBC # BLD AUTO: 8.5 10E9/L (ref 4–11)

## 2019-09-10 PROCEDURE — 36415 COLL VENOUS BLD VENIPUNCTURE: CPT | Mod: ZL | Performed by: SURGERY

## 2019-09-10 PROCEDURE — 99024 POSTOP FOLLOW-UP VISIT: CPT | Performed by: SURGERY

## 2019-09-10 PROCEDURE — 85025 COMPLETE CBC W/AUTO DIFF WBC: CPT | Mod: ZL | Performed by: SURGERY

## 2019-09-10 PROCEDURE — G0463 HOSPITAL OUTPT CLINIC VISIT: HCPCS

## 2019-09-10 PROCEDURE — 80053 COMPREHEN METABOLIC PANEL: CPT | Mod: ZL | Performed by: SURGERY

## 2019-09-10 ASSESSMENT — MIFFLIN-ST. JEOR: SCORE: 1277.46

## 2019-09-10 ASSESSMENT — PAIN SCALES - GENERAL: PAINLEVEL: SEVERE PAIN (7)

## 2019-09-10 NOTE — NURSING NOTE
"  Chief Complaint   Patient presents with     Surgical Followup     rash surrounding wound extending to left side, large amount of smelly yellow drainage, nausea.        Initial /70   Pulse 108   Temp 98.8  F (37.1  C)   Ht 1.626 m (5' 4\")   Wt 56.2 kg (124 lb)   LMP  (LMP Unknown)   SpO2 98%   BMI 21.28 kg/m   Estimated body mass index is 21.28 kg/m  as calculated from the following:    Height as of this encounter: 1.626 m (5' 4\").    Weight as of this encounter: 56.2 kg (124 lb).  Medication Reconciliation: complete    "

## 2019-09-10 NOTE — PROGRESS NOTES
"SUBJECTIVE:  Mrs. Millan presents with concern around her incision, abdominal pain, and nausea. She says that over the past 24 hours she has been having nausea and emesis while at home. She has no sick contacts at home. She was concerned that her incision was infected as she developed red papules surrounding the incision. She has had some mild serous drainage. She has had no fevers or chills. Over the past 24 hours she says that she has not been able to keep anything down and has barely drank anything.    OBJECTIVE:  /70   Pulse 108   Temp 98.8  F (37.1  C)   Ht 1.626 m (5' 4\")   Wt 56.2 kg (124 lb)   LMP  (LMP Unknown)   SpO2 98%   BMI 21.28 kg/m      Gen: Awake, alert, oriented, mild distress from pain and nausea  Chest: Bilateral expiratory wheeze  Card: regular rhythm tachycardic  Abdomen: soft, mild rose-incisional tenderness with no rebound, area of erythematous papules surrounding the incision infra-umbilically    ASSESSMENT/PLAN:  27 yo female s/p open umbilical hernia repair  Nausea  Contact dermatitis from topical adhesive      I will have the patient undergo labs now and if there are any concerns she is to go to the ER for further workup and evaluation. With regards to her contact dermatitis I removed the remaining glue from her incision. At this time as she has some mild serous drainage I will not be supplying topical steroids for concern with wound breakdown. If she has persistent symptoms from her incision then she may need topical steroids but for now will hold off. All questions and concerns were addressed with adequate time spent answering all concerns.    Carter Vaughn MD      "

## 2019-09-11 NOTE — PATIENT INSTRUCTIONS
Thank you for allowing Dr. Vaughn and our surgical team to participate in your care. Please call our health unit coordinator at [phone] with scheduling questions or the nurse at [phone] with any other questions or concerns.

## 2019-09-17 ENCOUNTER — OFFICE VISIT (OUTPATIENT)
Dept: SURGERY | Facility: OTHER | Age: 29
End: 2019-09-17
Attending: SURGERY
Payer: COMMERCIAL

## 2019-09-17 VITALS
BODY MASS INDEX: 21.99 KG/M2 | DIASTOLIC BLOOD PRESSURE: 68 MMHG | SYSTOLIC BLOOD PRESSURE: 116 MMHG | TEMPERATURE: 98.3 F | HEIGHT: 64 IN | OXYGEN SATURATION: 100 % | WEIGHT: 128.8 LBS | HEART RATE: 90 BPM

## 2019-09-17 DIAGNOSIS — L30.9 DERMATITIS: Primary | ICD-10-CM

## 2019-09-17 DIAGNOSIS — Z09 POSTOP CHECK: ICD-10-CM

## 2019-09-17 PROCEDURE — 99024 POSTOP FOLLOW-UP VISIT: CPT | Performed by: SURGERY

## 2019-09-17 PROCEDURE — G0463 HOSPITAL OUTPT CLINIC VISIT: HCPCS

## 2019-09-17 RX ORDER — DIAPER,BRIEF,INFANT-TODD,DISP
EACH MISCELLANEOUS 2 TIMES DAILY
Qty: 30 G | Refills: 1 | Status: SHIPPED | OUTPATIENT
Start: 2019-09-17 | End: 2019-12-07

## 2019-09-17 ASSESSMENT — MIFFLIN-ST. JEOR: SCORE: 1299.23

## 2019-09-17 ASSESSMENT — PAIN SCALES - GENERAL: PAINLEVEL: MODERATE PAIN (4)

## 2019-09-17 NOTE — PATIENT INSTRUCTIONS
Thank you for allowing Dr. Vaughn and our surgical team to participate in your care. Please call our health unit coordinator at [phone] with scheduling questions or the nurse at [phone] with any other questions or concerns.

## 2019-09-17 NOTE — PROGRESS NOTES
"SUBJECTIVE:  Ms Millan presents for follow up after open umbilical hernia repair. She says that the pain has gotten much better. She has had no nausea or emesis. She still complains of pruritus at her umbilicus and the small abdominal wall papules.    OBJECTIVE:  /68 (BP Location: Left arm, Patient Position: Sitting, Cuff Size: Adult Large)   Pulse 90   Temp 98.3  F (36.8  C) (Tympanic)   Ht 1.626 m (5' 4\")   Wt 58.4 kg (128 lb 12.8 oz)   LMP  (LMP Unknown)   SpO2 100%   BMI 22.11 kg/m      Gen: AAA, NAD  Ab: soft, NT/ND, small papules around incision and across abdomen which have improved since the operation    ASSESSMENT/PLAN:  27 yo female s/p open umbilical hernia repair  Contact dermatitis      At this time I recommend that she start topical hydrocortisone for her dermatitis. I will then re-evaluate her in two weeks. All questions and concerns were addressed with adequate time spent answering all concerns.    Carter Vaughn MD    "

## 2019-09-17 NOTE — NURSING NOTE
"Chief Complaint   Patient presents with     Surgical Followup     open umbilical hernia repair 9/3/2019       Initial /68 (BP Location: Left arm, Patient Position: Sitting, Cuff Size: Adult Large)   Pulse 90   Temp 98.3  F (36.8  C) (Tympanic)   Ht 1.626 m (5' 4\")   Wt 58.4 kg (128 lb 12.8 oz)   LMP  (LMP Unknown)   SpO2 100%   BMI 22.11 kg/m   Estimated body mass index is 22.11 kg/m  as calculated from the following:    Height as of this encounter: 1.626 m (5' 4\").    Weight as of this encounter: 58.4 kg (128 lb 12.8 oz).  Medication Reconciliation: complete  Sarai Cisneros LPN    "

## 2019-09-27 NOTE — PATIENT INSTRUCTIONS
Thank you for allowing Dr. Vaughn and our surgical team to participate in your care. Please call our health unit coordinator at [phone] with scheduling questions or the nurse at [phone] with any other questions or concerns.

## 2019-10-01 ENCOUNTER — OFFICE VISIT (OUTPATIENT)
Dept: SURGERY | Facility: OTHER | Age: 29
End: 2019-10-01
Attending: SURGERY
Payer: MEDICAID

## 2019-10-01 VITALS
TEMPERATURE: 98.6 F | OXYGEN SATURATION: 100 % | HEART RATE: 114 BPM | SYSTOLIC BLOOD PRESSURE: 100 MMHG | DIASTOLIC BLOOD PRESSURE: 64 MMHG | HEIGHT: 64 IN | WEIGHT: 127 LBS | BODY MASS INDEX: 21.68 KG/M2

## 2019-10-01 DIAGNOSIS — Z09 POSTOP CHECK: Primary | ICD-10-CM

## 2019-10-01 LAB
ALBUMIN SERPL-MCNC: 4.3 G/DL (ref 3.4–5)
ALP SERPL-CCNC: 115 U/L (ref 40–150)
ALT SERPL W P-5'-P-CCNC: 16 U/L (ref 0–50)
ANION GAP SERPL CALCULATED.3IONS-SCNC: 6 MMOL/L (ref 3–14)
AST SERPL W P-5'-P-CCNC: 7 U/L (ref 0–45)
BILIRUB SERPL-MCNC: 0.5 MG/DL (ref 0.2–1.3)
BUN SERPL-MCNC: 10 MG/DL (ref 7–30)
CALCIUM SERPL-MCNC: 9.7 MG/DL (ref 8.5–10.1)
CHLORIDE SERPL-SCNC: 104 MMOL/L (ref 94–109)
CO2 SERPL-SCNC: 29 MMOL/L (ref 20–32)
CREAT SERPL-MCNC: 0.93 MG/DL (ref 0.52–1.04)
ERYTHROCYTE [DISTWIDTH] IN BLOOD BY AUTOMATED COUNT: 13.6 % (ref 10–15)
GFR SERPL CREATININE-BSD FRML MDRD: 83 ML/MIN/{1.73_M2}
GLUCOSE SERPL-MCNC: 88 MG/DL (ref 70–99)
HCT VFR BLD AUTO: 51.2 % (ref 35–47)
HGB BLD-MCNC: 17.4 G/DL (ref 11.7–15.7)
MCH RBC QN AUTO: 30.6 PG (ref 26.5–33)
MCHC RBC AUTO-ENTMCNC: 34 G/DL (ref 31.5–36.5)
MCV RBC AUTO: 90 FL (ref 78–100)
PLATELET # BLD AUTO: 232 10E9/L (ref 150–450)
POTASSIUM SERPL-SCNC: 4.1 MMOL/L (ref 3.4–5.3)
PROT SERPL-MCNC: 7.8 G/DL (ref 6.8–8.8)
RBC # BLD AUTO: 5.69 10E12/L (ref 3.8–5.2)
SODIUM SERPL-SCNC: 139 MMOL/L (ref 133–144)
WBC # BLD AUTO: 6.1 10E9/L (ref 4–11)

## 2019-10-01 PROCEDURE — G0463 HOSPITAL OUTPT CLINIC VISIT: HCPCS | Performed by: SURGERY

## 2019-10-01 PROCEDURE — 99024 POSTOP FOLLOW-UP VISIT: CPT | Performed by: SURGERY

## 2019-10-01 PROCEDURE — 36415 COLL VENOUS BLD VENIPUNCTURE: CPT | Mod: ZL | Performed by: SURGERY

## 2019-10-01 PROCEDURE — 80053 COMPREHEN METABOLIC PANEL: CPT | Mod: ZL | Performed by: SURGERY

## 2019-10-01 PROCEDURE — G0463 HOSPITAL OUTPT CLINIC VISIT: HCPCS

## 2019-10-01 PROCEDURE — 85027 COMPLETE CBC AUTOMATED: CPT | Mod: ZL | Performed by: SURGERY

## 2019-10-01 ASSESSMENT — MIFFLIN-ST. JEOR: SCORE: 1291.07

## 2019-10-01 ASSESSMENT — PAIN SCALES - GENERAL: PAINLEVEL: SEVERE PAIN (7)

## 2019-10-01 NOTE — PROGRESS NOTES
"  HPI:  Returns for her final post surgical examination following open umbilical hernia repair.  Denies wound complication, fever, chills, nausea or vomiting. She says that she has been having mild rose-umbilical discomfort that will radiate to her RLQ. She is having bowel movements daily with no constipation or diarrhea. She is tolerating a diet. She says that the pain has been there since her operation.  ROS:   10 point ROS neg other than the symptoms noted above in the HPI.    Examination:  /64   Pulse 114   Temp 98.6  F (37  C)   Ht 1.626 m (5' 4\")   Wt 57.6 kg (127 lb)   LMP  (LMP Unknown)   SpO2 100%   BMI 21.80 kg/m      Constitutional: healthy, alert and no distress  Abdomen:  Soft, non-distended, minimal RLQ tenderness with no rebound or guarding   Wound(s) healing nicely without evidence of infection. Incision is clean/dry/intact. Fascial closure(s) intact without defect.    Impression:   27 yo female s/p open umbilical hernia repair with RLQ abdominal pain.  Recommendations:  The technical aspects of the procedure and operative findings were reviewed.  She was again reminding in the need to refrain from heavy lifting and strenuous activity for a total of 6 weeks following this procedure.  I will have her undergo labs to evaluate for any abnormalities. If there are any abnormalities I will then have her undergo a CT scan.      Carter Vaughn MD MD    10/1/2019  9:22 AM               "

## 2019-10-01 NOTE — NURSING NOTE
"Chief Complaint   Patient presents with     Surgical Followup     open umbilical hernia repair 9/3/19       Initial /64   Pulse 114   Temp 98.6  F (37  C)   Ht 1.626 m (5' 4\")   Wt 57.6 kg (127 lb)   LMP  (LMP Unknown)   SpO2 100%   BMI 21.80 kg/m   Estimated body mass index is 21.8 kg/m  as calculated from the following:    Height as of this encounter: 1.626 m (5' 4\").    Weight as of this encounter: 57.6 kg (127 lb).  Medication Reconciliation: complete  MURALI MORFIN LPN    "

## 2019-10-22 ENCOUNTER — HOSPITAL ENCOUNTER (EMERGENCY)
Facility: HOSPITAL | Age: 29
Discharge: HOME | End: 2019-10-22
Attending: FAMILY MEDICINE | Admitting: FAMILY MEDICINE
Payer: MEDICAID

## 2019-10-22 ENCOUNTER — APPOINTMENT (OUTPATIENT)
Dept: CT IMAGING | Facility: HOSPITAL | Age: 29
End: 2019-10-22
Attending: FAMILY MEDICINE
Payer: MEDICAID

## 2019-10-22 VITALS
TEMPERATURE: 98.4 F | SYSTOLIC BLOOD PRESSURE: 111 MMHG | OXYGEN SATURATION: 96 % | RESPIRATION RATE: 12 BRPM | DIASTOLIC BLOOD PRESSURE: 79 MMHG

## 2019-10-22 DIAGNOSIS — R10.31 ABDOMINAL PAIN, RIGHT LOWER QUADRANT: ICD-10-CM

## 2019-10-22 LAB
ALBUMIN SERPL-MCNC: 4.3 G/DL (ref 3.4–5)
ALBUMIN UR-MCNC: NEGATIVE MG/DL
ALP SERPL-CCNC: 107 U/L (ref 40–150)
ALT SERPL W P-5'-P-CCNC: 16 U/L (ref 0–50)
ANION GAP SERPL CALCULATED.3IONS-SCNC: 2 MMOL/L (ref 3–14)
APPEARANCE UR: CLEAR
AST SERPL W P-5'-P-CCNC: 12 U/L (ref 0–45)
BACTERIA #/AREA URNS HPF: ABNORMAL /HPF
BASOPHILS # BLD AUTO: 0 10E9/L (ref 0–0.2)
BASOPHILS NFR BLD AUTO: 0.3 %
BILIRUB SERPL-MCNC: 0.3 MG/DL (ref 0.2–1.3)
BILIRUB UR QL STRIP: NEGATIVE
BUN SERPL-MCNC: 11 MG/DL (ref 7–30)
CALCIUM SERPL-MCNC: 9.2 MG/DL (ref 8.5–10.1)
CHLORIDE SERPL-SCNC: 109 MMOL/L (ref 94–109)
CO2 SERPL-SCNC: 29 MMOL/L (ref 20–32)
COLOR UR AUTO: ABNORMAL
CREAT SERPL-MCNC: 0.82 MG/DL (ref 0.52–1.04)
DIFFERENTIAL METHOD BLD: ABNORMAL
EOSINOPHIL # BLD AUTO: 0.2 10E9/L (ref 0–0.7)
EOSINOPHIL NFR BLD AUTO: 2.3 %
ERYTHROCYTE [DISTWIDTH] IN BLOOD BY AUTOMATED COUNT: 13.5 % (ref 10–15)
GFR SERPL CREATININE-BSD FRML MDRD: >90 ML/MIN/{1.73_M2}
GLUCOSE SERPL-MCNC: 75 MG/DL (ref 70–99)
GLUCOSE UR STRIP-MCNC: NEGATIVE MG/DL
HCT VFR BLD AUTO: 48.2 % (ref 35–47)
HGB BLD-MCNC: 16.4 G/DL (ref 11.7–15.7)
HGB UR QL STRIP: NEGATIVE
IMM GRANULOCYTES # BLD: 0 10E9/L (ref 0–0.4)
IMM GRANULOCYTES NFR BLD: 0.2 %
KETONES UR STRIP-MCNC: NEGATIVE MG/DL
LEUKOCYTE ESTERASE UR QL STRIP: NEGATIVE
LIPASE SERPL-CCNC: 154 U/L (ref 73–393)
LYMPHOCYTES # BLD AUTO: 2.6 10E9/L (ref 0.8–5.3)
LYMPHOCYTES NFR BLD AUTO: 39.1 %
MCH RBC QN AUTO: 30.5 PG (ref 26.5–33)
MCHC RBC AUTO-ENTMCNC: 34 G/DL (ref 31.5–36.5)
MCV RBC AUTO: 90 FL (ref 78–100)
MONOCYTES # BLD AUTO: 0.4 10E9/L (ref 0–1.3)
MONOCYTES NFR BLD AUTO: 5.4 %
MUCOUS THREADS #/AREA URNS LPF: PRESENT /LPF
NEUTROPHILS # BLD AUTO: 3.5 10E9/L (ref 1.6–8.3)
NEUTROPHILS NFR BLD AUTO: 52.7 %
NITRATE UR QL: NEGATIVE
NRBC # BLD AUTO: 0 10*3/UL
NRBC BLD AUTO-RTO: 0 /100
PH UR STRIP: 5 PH (ref 4.7–8)
PLATELET # BLD AUTO: 263 10E9/L (ref 150–450)
POTASSIUM SERPL-SCNC: 3.7 MMOL/L (ref 3.4–5.3)
PROT SERPL-MCNC: 7.5 G/DL (ref 6.8–8.8)
RBC # BLD AUTO: 5.37 10E12/L (ref 3.8–5.2)
RBC #/AREA URNS AUTO: <1 /HPF (ref 0–2)
SODIUM SERPL-SCNC: 140 MMOL/L (ref 133–144)
SOURCE: ABNORMAL
SP GR UR STRIP: 1.01 (ref 1–1.03)
UROBILINOGEN UR STRIP-MCNC: NORMAL MG/DL (ref 0–2)
WBC # BLD AUTO: 6.5 10E9/L (ref 4–11)
WBC #/AREA URNS AUTO: 1 /HPF (ref 0–5)

## 2019-10-22 PROCEDURE — 80053 COMPREHEN METABOLIC PANEL: CPT | Performed by: FAMILY MEDICINE

## 2019-10-22 PROCEDURE — 25500064 ZZH RX 255 OP 636: Performed by: RADIOLOGY

## 2019-10-22 PROCEDURE — 81001 URINALYSIS AUTO W/SCOPE: CPT | Performed by: FAMILY MEDICINE

## 2019-10-22 PROCEDURE — 36415 COLL VENOUS BLD VENIPUNCTURE: CPT | Performed by: FAMILY MEDICINE

## 2019-10-22 PROCEDURE — 25000128 H RX IP 250 OP 636: Performed by: FAMILY MEDICINE

## 2019-10-22 PROCEDURE — 96374 THER/PROPH/DIAG INJ IV PUSH: CPT | Mod: XU

## 2019-10-22 PROCEDURE — 99285 EMERGENCY DEPT VISIT HI MDM: CPT | Mod: 25

## 2019-10-22 PROCEDURE — 99284 EMERGENCY DEPT VISIT MOD MDM: CPT | Mod: Z6 | Performed by: FAMILY MEDICINE

## 2019-10-22 PROCEDURE — 74177 CT ABD & PELVIS W/CONTRAST: CPT | Mod: TC

## 2019-10-22 PROCEDURE — 85025 COMPLETE CBC W/AUTO DIFF WBC: CPT | Performed by: FAMILY MEDICINE

## 2019-10-22 PROCEDURE — 83690 ASSAY OF LIPASE: CPT | Performed by: FAMILY MEDICINE

## 2019-10-22 RX ORDER — IOPAMIDOL 612 MG/ML
100 INJECTION, SOLUTION INTRAVASCULAR ONCE
Status: DISCONTINUED | OUTPATIENT
Start: 2019-10-22 | End: 2019-10-22

## 2019-10-22 RX ORDER — HYDROMORPHONE HYDROCHLORIDE 1 MG/ML
0.5 INJECTION, SOLUTION INTRAMUSCULAR; INTRAVENOUS; SUBCUTANEOUS ONCE
Status: COMPLETED | OUTPATIENT
Start: 2019-10-22 | End: 2019-10-22

## 2019-10-22 RX ORDER — IOPAMIDOL 612 MG/ML
100 INJECTION, SOLUTION INTRAVASCULAR ONCE
Status: COMPLETED | OUTPATIENT
Start: 2019-10-22 | End: 2019-10-22

## 2019-10-22 RX ADMIN — HYDROMORPHONE HYDROCHLORIDE 0.5 MG: 1 INJECTION, SOLUTION INTRAMUSCULAR; INTRAVENOUS; SUBCUTANEOUS at 14:46

## 2019-10-22 RX ADMIN — IOPAMIDOL 100 ML: 612 INJECTION, SOLUTION INTRAVENOUS at 14:19

## 2019-10-22 NOTE — ED AVS SNAPSHOT
[facility]  [address] [address]  Phone:  [phone]                                    Ihsan Millan   MRN: [id number]    Department:  [facility]   Date of Visit:  10/22/2019           After Visit Summary Signature Page    I have received my discharge instructions, and my questions have been answered. I have discussed any challenges I see with this plan with the nurse or doctor.    ..........................................................................................................................................  Patient/Patient Representative Signature      ..........................................................................................................................................  Patient Representative Print Name and Relationship to Patient    ..................................................               ................................................  Date                                   Time    ..........................................................................................................................................  Reviewed by Signature/Title    ...................................................              ..............................................  Date                                               Time          22EPIC Rev 08/18

## 2019-10-22 NOTE — ED NOTES
Pt states she has been having abdominal pain for 3 days starting around her belly button and radiating to right abdomen. Pt states today when she was wiping after a BM she had blood on the TP. States pain increased today. Denies any fevers. Reports history of hysterectomy with ovaries removed in the past. Denies any fevers.

## 2019-10-22 NOTE — DISCHARGE INSTRUCTIONS

## 2019-10-22 NOTE — ED PROVIDER NOTES
History     Chief Complaint   Patient presents with     Abdominal Pain     Rectal Bleeding     HPI  Ihsan Millan is a 28 year old female who has an extensive abdominal surgical history.  Presents with RLQ pain for about 1 day, getting worse.  She describes it as a stabbing intense pain that does not radiate.    No fevers, but some nausea.  She states when she eats it at times makes it hurt worse.    She still has her gallbladder and appendix, although she has several surgeries listed in her chart to her abdomen including hysterectomy and both ovaries.    She reports rectal bleeding to nursing but not to me.    No vomiting or diarrhea.    No urinary symptoms and no flank pains.    No epigatric pain or pain in that area after eating.        Allergies:  Allergies   Allergen Reactions     Skin Adhesives [Mecrylate] Dermatitis     Patient developed contact dermatitis after application of topical adhesive to surgical incision     Lexapro [Escitalopram] Nausea and Vomiting     Codeine Sulfate Rash     Penicillins Rash     Tramadol Rash       Problem List:    Patient Active Problem List    Diagnosis Date Noted     RUQ abdominal pain 02/12/2017     Priority: Medium     Pneumonia of right middle lobe due to infectious organism (H) 02/12/2017     Priority: Medium     Tobacco abuse 02/12/2017     Priority: Medium     Status post laparoscopic assisted vaginal hysterectomy (LAVH) 06/25/2015     Priority: Medium     Surgery, elective 06/24/2015     Priority: Medium     Endometriosis 04/16/2015     Priority: Medium     Depo lupron 4/15.  Laparoscopy 3/15       ASCUS on Pap smear 07/18/2014     Priority: Medium     + hpv colpo 7/14       Postpartum depression 07/18/2014     Priority: Medium     zoloft rx       H. pylori infection      Priority: Medium     Moderate persistent asthma 03/19/2014     Priority: Medium     GERD (gastroesophageal reflux disease) 09/12/2013     Priority: Medium     Intermittent asthma 09/12/2013      Priority: Medium     Increased with pregnancy  Send for eval and asthma action plan  Smoking cessation counseling  Flu shot given          Past Medical History:    Past Medical History:   Diagnosis Date     Asthma      GERD (gastroesophageal reflux disease)      H. pylori infection        Past Surgical History:    Past Surgical History:   Procedure Laterality Date     COLONOSCOPY N/A 11/6/2014    Procedure: COLONOSCOPY;  Surgeon: Zacarias Paz MD;  Location: [facility]     DILATION AND CURETTAGE, HYSTEROSCOPY DIAGNOSTIC, COMBINED  8/1/2014    Procedure: COMBINED DILATION AND CURETTAGE, HYSTEROSCOPY DIAGNOSTIC;  Surgeon: Rodolfo Clifford MD;  Location: [facility]     ESOPHAGOSCOPY, GASTROSCOPY, DUODENOSCOPY (EGD), COMBINED N/A 9/5/2014    Procedure: COMBINED ESOPHAGOSCOPY, GASTROSCOPY, DUODENOSCOPY (EGD);  Surgeon: Zacarias Paz MD;  Location: [facility]     HERNIORRHAPHY UMBILICAL N/A 9/3/2019    Procedure: OPEN UMBILICAL HERNIA REPAIR;  Surgeon: Carter Vaughn MD;  [facility]: [facility]     LAPAROSCOPIC ASSISTED HYSTERECTOMY VAGINAL N/A 6/24/2015    Procedure: LAPAROSCOPIC ASSISTED HYSTERECTOMY VAGINAL;  Surgeon: Rodolfo Clifford MD;  [facility]     LAPAROSCOPIC OOPHORECTOMY Right 3/23/2016    Procedure: LAPAROSCOPIC OOPHORECTOMY;  Surgeon: Rodolfo Clifford MD;  [facility]     LAPAROSCOPIC SALPINGO-OOPHORECTOMY Left 5/3/2017    Procedure: LAPAROSCOPIC SALPINGO-OOPHORECTOMY;  LAPAROSCOPIC LEFT OOPHORECTOMY;  Surgeon: Rodolfo Clifford MD;  [facility]     LAPAROSCOPY DIAGNOSTIC (GYN) N/A 3/18/2015    Procedure: LAPAROSCOPY DIAGNOSTIC (GYN);  Surgeon: Rodolfo Clifford MD;  Location: [facility]     no surgeries       SALPINGECTOMY Bilateral 6/24/2015    Procedure: SALPINGECTOMY;  Surgeon: Rodolfo Clifford MD;  Location: [facility]       Family History:    Family History   Problem Relation Age of Onset     Asthma Mother      Unknown/Adopted Maternal Grandmother      Unknown/Adopted Maternal Grandfather        Social History:  Marital  Status:  Single [1]  Social History     Tobacco Use     Smoking status: Current Every Day Smoker     Packs/day: 0.50     Years: 7.00     Pack years: 3.50     Types: Cigarettes     Smokeless tobacco: Never Used     Tobacco comment: declines quitline referral 9/10/19   Substance Use Topics     Alcohol use: Yes     Comment: social     Drug use: No        Medications:    Acetaminophen (TYLENOL PO)  estradiol (ESTRACE) 1 MG tablet  ibuprofen (ADVIL/MOTRIN) 800 MG tablet  lithium (ESKALITH) 300 MG tablet  OMEPRAZOLE PO  albuterol (PROAIR HFA/PROVENTIL HFA/VENTOLIN HFA) 108 (90 BASE) MCG/ACT Inhaler  hydrocortisone (CORTAID) 0.5 % external cream  ketorolac (TORADOL) 10 MG tablet          Review of Systems  No fevers, chills, rigors, HEENT, SOB, chest pain, cough  Physical Exam   BP: 107/77  Heart Rate: 113  Temp: 97.3  F (36.3  C)  Resp: 16  SpO2: 97 %      Physical Exam  Well woman.  Stable vitals.  HR 70.   mentates clearly.  No CVAT.  Heart reg.  Lungs clear.  abd tender in the RLQ, otherwise soft, NT, ND, NABS, no masses or hernias noted.  Hanna's negative.  No epigastric tenderness.  Highly doubt gallbladder etiology or pancreatitis.    U/A clear.  Diagnostics normal.    CT abd shows no acute findings.      ED Course        Procedures               Critical Care time:  none               Results for orders placed or performed during the hospital encounter of 10/22/19 (from the past 24 hour(s))   CBC with platelets differential   Result Value Ref Range    WBC 6.5 4.0 - 11.0 10e9/L    RBC Count 5.37 (H) 3.8 - 5.2 10e12/L    Hemoglobin 16.4 (H) 11.7 - 15.7 g/dL    Hematocrit 48.2 (H) 35.0 - 47.0 %    MCV 90 78 - 100 fl    MCH 30.5 26.5 - 33.0 pg    MCHC 34.0 31.5 - 36.5 g/dL    RDW 13.5 10.0 - 15.0 %    Platelet Count 263 150 - 450 10e9/L    Diff Method Automated Method     % Neutrophils 52.7 %    % Lymphocytes 39.1 %    % Monocytes 5.4 %    % Eosinophils 2.3 %    % Basophils 0.3 %    % Immature Granulocytes 0.2 %     Nucleated RBCs 0 0 /100    Absolute Neutrophil 3.5 1.6 - 8.3 10e9/L    Absolute Lymphocytes 2.6 0.8 - 5.3 10e9/L    Absolute Monocytes 0.4 0.0 - 1.3 10e9/L    Absolute Eosinophils 0.2 0.0 - 0.7 10e9/L    Absolute Basophils 0.0 0.0 - 0.2 10e9/L    Abs Immature Granulocytes 0.0 0 - 0.4 10e9/L    Absolute Nucleated RBC 0.0    Comprehensive metabolic panel   Result Value Ref Range    Sodium 140 133 - 144 mmol/L    Potassium 3.7 3.4 - 5.3 mmol/L    Chloride 109 94 - 109 mmol/L    Carbon Dioxide 29 20 - 32 mmol/L    Anion Gap 2 (L) 3 - 14 mmol/L    Glucose 75 70 - 99 mg/dL    Urea Nitrogen 11 7 - 30 mg/dL    Creatinine 0.82 0.52 - 1.04 mg/dL    GFR Estimate >90 >60 mL/min/[1.73_m2]    GFR Estimate If Black >90 >60 mL/min/[1.73_m2]    Calcium 9.2 8.5 - 10.1 mg/dL    Bilirubin Total 0.3 0.2 - 1.3 mg/dL    Albumin 4.3 3.4 - 5.0 g/dL    Protein Total 7.5 6.8 - 8.8 g/dL    Alkaline Phosphatase 107 40 - 150 U/L    ALT 16 0 - 50 U/L    AST 12 0 - 45 U/L   Lipase   Result Value Ref Range    Lipase 154 73 - 393 U/L   UA with Microscopic   Result Value Ref Range    Color Urine Straw     Appearance Urine Clear     Glucose Urine Negative NEG^Negative mg/dL    Bilirubin Urine Negative NEG^Negative    Ketones Urine Negative NEG^Negative mg/dL    Specific Gravity Urine 1.006 1.003 - 1.035    Blood Urine Negative NEG^Negative    pH Urine 5.0 4.7 - 8.0 pH    Protein Albumin Urine Negative NEG^Negative mg/dL    Urobilinogen mg/dL Normal 0.0 - 2.0 mg/dL    Nitrite Urine Negative NEG^Negative    Leukocyte Esterase Urine Negative NEG^Negative    Source Midstream Urine     WBC Urine 1 0 - 5 /HPF    RBC Urine <1 0 - 2 /HPF    Bacteria Urine None (A) NEG^Negative /HPF    Mucous Urine Present (A) NEG^Negative /LPF   CT Abdomen Pelvis w Contrast    Narrative    Exam:CT ABDOMEN PELVIS W CONTRAST    History: 28 years Female abdominal pain starting a couple days ago.     Comparisons: 1/21/2018    Technique: Axial CT imaging of the abdomen and pelvis  was performed  with intervenous contrast. Coronal and sagittal reconstructions were  obtained      FINDINGS:  Lung bases:The lung bases are clear    Abdomen:  Liver:Unremarkable  Gallbladder and biliary tree:No calcified gallstones are present.  There is no evidence of biliary dilatation.  Pancreas:Unremarkable  Spleen:Unremarkable  Adrenals:Normal    Kidneys and ureters:Unremarkable    Lymph nodes:There is no significant lymphadenopathy    Bowel:No abnormally distended or thickened loops of bowel are present.  There is no evidence of bowel obstruction.    Appendix:Unremarkable    Vessels:Unremarkable    Osseous structures:Unremarkable    Pelvis:There is no evidence of mass or lymphadenopathy. No abnormal  fluid collections are present.            Impression    IMPRESSION: Negative study. No acute intra-abdominal findings. There  is no evidence of mass, lymphadenopathy or an inflammatory process.    ESDRAS MACK MD       Medications   iopamidol (ISOVUE-300) IV solution 61% 100 mL (100 mLs Intravenous Given 10/22/19 1419)   sodium chloride (PF) 0.9% PF flush 60 mL (60 mLs Intravenous Given 10/22/19 1418)   HYDROmorphone (PF) (DILAUDID) injection 0.5 mg (0.5 mg Intravenous Given 10/22/19 1446)       Assessments & Plan (with Medical Decision Making)     I have reviewed the nursing notes.    I have reviewed the findings, diagnosis, plan and need for follow up with the patient.   the patient appears much better regarding her abdominal pain after dilaudid.      She does not appear to have an acute, emergent issue at this time.  No infectious or surgical issue is noted to the abdomen.  This does not appear to be appendicits, and she has no pain in the area of gallbladder/cholelithiasis or pancreatitis.  Also doubt peptic ulcer or active dyspepsia.    In addition, she has had both ovaries removed, so ovarian torsion cannot be a diagnosis.    Finally, she has had a hysterectomy and bilateral salpingectomy (and no  ovaries) so cannot be pregnant; I did not order a pregnancy test for those reasons.      I would recommend OTC meds for pain at home, and return to the ER with worsening symptoms.    Most likely, a muscular strain or gaseous/stool etiology to her pain in a patient with a hx of chronic abdominal pain.    New Prescriptions    No medications on file       Final diagnoses:   Abdominal pain, right lower quadrant       10/22/2019   [facility]     Alex Mcarthur MD  10/22/19 0532

## 2019-10-22 NOTE — ED TRIAGE NOTES
"Abdominal pains that started a couple days ago but are getting worse.  Pain starts \"near belly button\" and radiates down lower and to right side.  Bloody stools reported 2  Soft stool pt reported.    "

## 2019-11-16 ENCOUNTER — HOSPITAL ENCOUNTER (EMERGENCY)
Facility: HOSPITAL | Age: 29
Discharge: LEFT BEFORE DISCHARGE | End: 2019-11-16
Admitting: PHYSICIAN ASSISTANT

## 2019-11-16 VITALS
OXYGEN SATURATION: 100 % | DIASTOLIC BLOOD PRESSURE: 79 MMHG | RESPIRATION RATE: 14 BRPM | SYSTOLIC BLOOD PRESSURE: 116 MMHG | TEMPERATURE: 96.3 F

## 2019-11-16 PROCEDURE — 40000268 ZZH STATISTIC NO CHARGES

## 2019-12-07 ENCOUNTER — HOSPITAL ENCOUNTER (EMERGENCY)
Facility: HOSPITAL | Age: 29
Discharge: HOME | End: 2019-12-07
Attending: NURSE PRACTITIONER | Admitting: NURSE PRACTITIONER
Payer: MEDICAID

## 2019-12-07 VITALS
SYSTOLIC BLOOD PRESSURE: 130 MMHG | DIASTOLIC BLOOD PRESSURE: 81 MMHG | TEMPERATURE: 97 F | OXYGEN SATURATION: 100 % | HEART RATE: 78 BPM | RESPIRATION RATE: 17 BRPM

## 2019-12-07 DIAGNOSIS — L24.0 IRRITANT CONTACT DERMATITIS DUE TO DETERGENT: Primary | ICD-10-CM

## 2019-12-07 PROCEDURE — G0463 HOSPITAL OUTPT CLINIC VISIT: HCPCS

## 2019-12-07 PROCEDURE — 99213 OFFICE O/P EST LOW 20 MIN: CPT | Mod: Z6 | Performed by: NURSE PRACTITIONER

## 2019-12-07 RX ORDER — CLINDAMYCIN HCL 300 MG
300 CAPSULE ORAL 3 TIMES DAILY
Qty: 21 CAPSULE | Refills: 0 | Status: SHIPPED | OUTPATIENT
Start: 2019-12-07 | End: 2019-12-15

## 2019-12-07 ASSESSMENT — ENCOUNTER SYMPTOMS
MYALGIAS: 1
SHORTNESS OF BREATH: 0
DIZZINESS: 0
NUMBNESS: 1
JOINT SWELLING: 1
COLOR CHANGE: 1

## 2019-12-07 NOTE — ED AVS SNAPSHOT
[facility]  [address] [address]  Phone:  [phone]                                    Ihsan Millan   MRN: [id number]    Department:  [facility]   Date of Visit:  12/7/2019           After Visit Summary Signature Page    I have received my discharge instructions, and my questions have been answered. I have discussed any challenges I see with this plan with the nurse or doctor.    ..........................................................................................................................................  Patient/Patient Representative Signature      ..........................................................................................................................................  Patient Representative Print Name and Relationship to Patient    ..................................................               ................................................  Date                                   Time    ..........................................................................................................................................  Reviewed by Signature/Title    ...................................................              ..............................................  Date                                               Time          22EPIC Rev 08/18

## 2019-12-08 NOTE — ED PROVIDER NOTES
History     Chief Complaint   Patient presents with     Hand Pain     c/o hand pain, redness, and swelling for 2 weeks. States ends of fingers burn and are peeling. Hands are red in color and very dry. States started using a new  at work 2 weeks ago.      HPI  Ihsan Millan is a 29 year old female who presents to  for bilateral hand swelling and numbness. Onset 2 weeks ago. She reports numbness to fingertips, swelling and weeping to bilateral hands. Both hands are red and they burn. She started using a new oven  at work which is when all her symptoms started. She wears gloves most of the time but takes of gloves to get into the smaller areas. She had stopped using oven  x 1 week with no significant change in symptoms then she started using the oven  again. Denies chest pain, shortness of breath, dizziness, numbness or tingling to other parts of her body. She has tried aleve and ibuprofen with minimal effectiveness.     Allergies:  Allergies   Allergen Reactions     Skin Adhesives [Mecrylate] Dermatitis     Patient developed contact dermatitis after application of topical adhesive to surgical incision     Lexapro [Escitalopram] Nausea and Vomiting     Codeine Sulfate Rash     Penicillins Rash     Tramadol Rash       Problem List:    Patient Active Problem List    Diagnosis Date Noted     RUQ abdominal pain 02/12/2017     Priority: Medium     Pneumonia of right middle lobe due to infectious organism (H) 02/12/2017     Priority: Medium     Tobacco abuse 02/12/2017     Priority: Medium     Status post laparoscopic assisted vaginal hysterectomy (LAVH) 06/25/2015     Priority: Medium     Surgery, elective 06/24/2015     Priority: Medium     Endometriosis 04/16/2015     Priority: Medium     Depo lupron 4/15.  Laparoscopy 3/15       ASCUS on Pap smear 07/18/2014     Priority: Medium     + hpv colpo 7/14       Postpartum depression 07/18/2014     Priority: Medium     zoloft rx       H.  pylori infection      Priority: Medium     Moderate persistent asthma 03/19/2014     Priority: Medium     GERD (gastroesophageal reflux disease) 09/12/2013     Priority: Medium     Intermittent asthma 09/12/2013     Priority: Medium     Increased with pregnancy  Send for eval and asthma action plan  Smoking cessation counseling  Flu shot given          Past Medical History:    Past Medical History:   Diagnosis Date     Asthma      GERD (gastroesophageal reflux disease)      H. pylori infection        Past Surgical History:    Past Surgical History:   Procedure Laterality Date     COLONOSCOPY N/A 11/6/2014    Procedure: COLONOSCOPY;  Surgeon: Zacarias Paz MD;  Location: [facility]     DILATION AND CURETTAGE, HYSTEROSCOPY DIAGNOSTIC, COMBINED  8/1/2014    Procedure: COMBINED DILATION AND CURETTAGE, HYSTEROSCOPY DIAGNOSTIC;  Surgeon: Rodolfo Clifford MD;  [facility]     ESOPHAGOSCOPY, GASTROSCOPY, DUODENOSCOPY (EGD), COMBINED N/A 9/5/2014    Procedure: COMBINED ESOPHAGOSCOPY, GASTROSCOPY, DUODENOSCOPY (EGD);  Surgeon: Zacarias Paz MD;  [facility]     HERNIORRHAPHY UMBILICAL N/A 9/3/2019    Procedure: OPEN UMBILICAL HERNIA REPAIR;  Surgeon: Carter Vaughn MD;  [facility]     LAPAROSCOPIC ASSISTED HYSTERECTOMY VAGINAL N/A 6/24/2015    Procedure: LAPAROSCOPIC ASSISTED HYSTERECTOMY VAGINAL;  Surgeon: Rodolfo Clifford MD;  [facility]     LAPAROSCOPIC OOPHORECTOMY Right 3/23/2016    Procedure: LAPAROSCOPIC OOPHORECTOMY;  Surgeon: Rodolfo Clifford MD;  [facility]     LAPAROSCOPIC SALPINGO-OOPHORECTOMY Left 5/3/2017    Procedure: LAPAROSCOPIC SALPINGO-OOPHORECTOMY;  LAPAROSCOPIC LEFT OOPHORECTOMY;  Surgeon: Rodolfo Clifford MD;  [facility]     LAPAROSCOPY DIAGNOSTIC (GYN) N/A 3/18/2015    Procedure: LAPAROSCOPY DIAGNOSTIC (GYN);  Surgeon: Rodolfo Clifford MD;  [facility]: [facility]     no surgeries       SALPINGECTOMY Bilateral 6/24/2015    Procedure: SALPINGECTOMY;  Surgeon: Rodolfo Clifford MD;   [facility]       Family History:    Family History   Problem Relation Age of Onset     Asthma Mother      Unknown/Adopted Maternal Grandmother      Unknown/Adopted Maternal Grandfather        Social History:  Marital Status:  Single [1]  Social History     Tobacco Use     Smoking status: Current Every Day Smoker     Packs/day: 0.50     Years: 7.00     Pack years: 3.50     Types: Cigarettes     Smokeless tobacco: Never Used     Tobacco comment: declines quitline referral 9/10/19   Substance Use Topics     Alcohol use: Yes     Comment: social     Drug use: No        Medications:    Acetaminophen (TYLENOL PO)  albuterol (PROAIR HFA/PROVENTIL HFA/VENTOLIN HFA) 108 (90 BASE) MCG/ACT Inhaler  clindamycin (CLEOCIN) 300 MG capsule  estradiol (ESTRACE) 1 MG tablet  ibuprofen (ADVIL/MOTRIN) 800 MG tablet  lithium (ESKALITH) 300 MG tablet  OMEPRAZOLE PO          Review of Systems   Respiratory: Negative for shortness of breath.    Cardiovascular: Negative for chest pain.   Musculoskeletal: Positive for joint swelling and myalgias.   Skin: Positive for color change.   Neurological: Positive for numbness. Negative for dizziness.   All other systems reviewed and are negative.      Physical Exam   BP: 130/81  Pulse: 78  Temp: 97  F (36.1  C)  Resp: 17  SpO2: 100 %      Physical Exam  Vitals signs and nursing note reviewed.   Constitutional:       Appearance: She is not ill-appearing or toxic-appearing.   HENT:      Head: Atraumatic.   Neck:      Musculoskeletal: Normal range of motion.   Cardiovascular:      Rate and Rhythm: Normal rate and regular rhythm.      Pulses: Normal pulses.      Heart sounds: Normal heart sounds.   Pulmonary:      Effort: Pulmonary effort is normal. No respiratory distress.      Breath sounds: Normal breath sounds. No wheezing or rhonchi.   Musculoskeletal:      Right hand: She exhibits decreased range of motion and swelling. She exhibits normal capillary refill and no deformity. Decreased sensation  noted. Normal strength noted.      Left hand: She exhibits decreased range of motion, decreased capillary refill and swelling. She exhibits no deformity. Decreased sensation noted. Normal strength noted.      Comments: Erythema and weeping visualized and localized to palmar side of bilateral hands only.  Inability to make a full fist due to hands feeling tight because of the swelling.  Cap refill less than 2 seconds.  Small abrasions visualized to dorsal left hand.   Skin:     General: Skin is warm.      Findings: Erythema present.   Neurological:      Mental Status: She is alert and oriented to person, place, and time.      GCS: GCS eye subscore is 4. GCS verbal subscore is 5. GCS motor subscore is 6.      Cranial Nerves: Cranial nerves are intact.      Sensory: Sensory deficit present.      Motor: Motor function is intact.      Coordination: Coordination is intact.         ED Course        Procedures       No results found for this or any previous visit (from the past 24 hour(s)).    Medications - No data to display    Assessments & Plan (with Medical Decision Making)   Irritant contact dermatitis due to detergent:  Patient presented to urgent care for numbness and swelling to bilateral hands x2 weeks.  Patient started using a new oven  at work when symptoms initially started.  Erythema and weeping localized to to palmar side of bilateral hands only.  Small abrasions visualized mostly to dorsal left hand.  Heart rate and rhythm regular.  Cranial nerves II to XII intact.  Plan:  Will treat for possible underlying cellulitis with an antibiotic.  Offered to also prescribe a steroid to help with decreasing inflammation-patient states she does have 10mg prednisone left over from recent prescription, advised her to also use this.  Discussed with patient to avoid using oven  without any gloves.  Discussed with her to schedule a follow-up appointment in 1 week with PCP for reevaluation.  Return to emergency  department for worsening or concerning symptoms.  Patient verbalized understanding and agreeable with plan.    I have reviewed the nursing notes.    I have reviewed the findings, diagnosis, plan and need for follow up with the patient.      Final diagnoses:   Irritant contact dermatitis due to detergent       12/7/2019   [facility]     Mpofu, Brittni, CNP  12/07/19 8829

## 2019-12-08 NOTE — DISCHARGE INSTRUCTIONS
Take antibiotic as prescribed.  Can take the remaining steroids that you have at home.  Apply ice to bilateral hands and avoid using oven  without gloves.    Follow-up with your doctor in 1 week for reevaluation.    Return to emergency department for worsening or concerning symptoms.

## 2019-12-08 NOTE — ED TRIAGE NOTES
Pt presents with bilateral hand pain since she started using a different oven  2 weeks ago. Hands are reddened, swollen and she can't feel her fingertips. States that her right hand is worse than her left.

## 2019-12-15 ENCOUNTER — HOSPITAL ENCOUNTER (EMERGENCY)
Facility: HOSPITAL | Age: 29
Discharge: HOME | End: 2019-12-15
Attending: FAMILY MEDICINE | Admitting: FAMILY MEDICINE
Payer: MEDICAID

## 2019-12-15 ENCOUNTER — APPOINTMENT (OUTPATIENT)
Dept: GENERAL RADIOLOGY | Facility: HOSPITAL | Age: 29
End: 2019-12-15
Attending: FAMILY MEDICINE
Payer: MEDICAID

## 2019-12-15 VITALS
DIASTOLIC BLOOD PRESSURE: 87 MMHG | RESPIRATION RATE: 16 BRPM | WEIGHT: 134 LBS | TEMPERATURE: 98.5 F | SYSTOLIC BLOOD PRESSURE: 115 MMHG | OXYGEN SATURATION: 99 % | BODY MASS INDEX: 23 KG/M2

## 2019-12-15 DIAGNOSIS — R07.89 ATYPICAL CHEST PAIN: ICD-10-CM

## 2019-12-15 DIAGNOSIS — F15.10 METHAMPHETAMINE ABUSE (H): ICD-10-CM

## 2019-12-15 DIAGNOSIS — J98.01 ACUTE BRONCHOSPASM: ICD-10-CM

## 2019-12-15 DIAGNOSIS — Z72.0 TOBACCO ABUSE: ICD-10-CM

## 2019-12-15 LAB
ALBUMIN SERPL-MCNC: 3.8 G/DL (ref 3.4–5)
ALBUMIN UR-MCNC: NEGATIVE MG/DL
ALP SERPL-CCNC: 96 U/L (ref 40–150)
ALT SERPL W P-5'-P-CCNC: 29 U/L (ref 0–50)
AMPHETAMINES UR QL: NOT DETECTED NG/ML
ANION GAP SERPL CALCULATED.3IONS-SCNC: 7 MMOL/L (ref 3–14)
APPEARANCE UR: CLEAR
AST SERPL W P-5'-P-CCNC: 10 U/L (ref 0–45)
BACTERIA #/AREA URNS HPF: ABNORMAL /HPF
BARBITURATES UR QL SCN: NOT DETECTED NG/ML
BASOPHILS # BLD AUTO: 0 10E9/L (ref 0–0.2)
BASOPHILS NFR BLD AUTO: 0.2 %
BENZODIAZ UR QL SCN: NOT DETECTED NG/ML
BILIRUB SERPL-MCNC: 0.3 MG/DL (ref 0.2–1.3)
BILIRUB UR QL STRIP: NEGATIVE
BUN SERPL-MCNC: 18 MG/DL (ref 7–30)
BUPRENORPHINE UR QL: NOT DETECTED NG/ML
CALCIUM SERPL-MCNC: 8.8 MG/DL (ref 8.5–10.1)
CANNABINOIDS UR QL: NOT DETECTED NG/ML
CHLORIDE SERPL-SCNC: 109 MMOL/L (ref 94–109)
CO2 SERPL-SCNC: 25 MMOL/L (ref 20–32)
COCAINE UR QL SCN: NOT DETECTED NG/ML
COLOR UR AUTO: YELLOW
CREAT SERPL-MCNC: 0.73 MG/DL (ref 0.52–1.04)
D DIMER PPP FEU-MCNC: <0.3 UG/ML FEU (ref 0–0.5)
D-METHAMPHET UR QL: ABNORMAL NG/ML
DIFFERENTIAL METHOD BLD: NORMAL
EOSINOPHIL # BLD AUTO: 0.2 10E9/L (ref 0–0.7)
EOSINOPHIL NFR BLD AUTO: 2.8 %
ERYTHROCYTE [DISTWIDTH] IN BLOOD BY AUTOMATED COUNT: 13.4 % (ref 10–15)
ERYTHROCYTE [SEDIMENTATION RATE] IN BLOOD BY WESTERGREN METHOD: 4 MM/H (ref 0–20)
GFR SERPL CREATININE-BSD FRML MDRD: >90 ML/MIN/{1.73_M2}
GLUCOSE SERPL-MCNC: 85 MG/DL (ref 70–99)
GLUCOSE UR STRIP-MCNC: NEGATIVE MG/DL
HCT VFR BLD AUTO: 46.1 % (ref 35–47)
HGB BLD-MCNC: 15.5 G/DL (ref 11.7–15.7)
HGB UR QL STRIP: NEGATIVE
HYALINE CASTS #/AREA URNS LPF: 3 /LPF
IMM GRANULOCYTES # BLD: 0 10E9/L (ref 0–0.4)
IMM GRANULOCYTES NFR BLD: 0.4 %
KETONES UR STRIP-MCNC: NEGATIVE MG/DL
LEUKOCYTE ESTERASE UR QL STRIP: NEGATIVE
LYMPHOCYTES # BLD AUTO: 3.2 10E9/L (ref 0.8–5.3)
LYMPHOCYTES NFR BLD AUTO: 38.3 %
MAGNESIUM SERPL-MCNC: 2.3 MG/DL (ref 1.6–2.3)
MCH RBC QN AUTO: 30 PG (ref 26.5–33)
MCHC RBC AUTO-ENTMCNC: 33.6 G/DL (ref 31.5–36.5)
MCV RBC AUTO: 89 FL (ref 78–100)
METHADONE UR QL SCN: NOT DETECTED NG/ML
MONOCYTES # BLD AUTO: 0.5 10E9/L (ref 0–1.3)
MONOCYTES NFR BLD AUTO: 5.7 %
MUCOUS THREADS #/AREA URNS LPF: PRESENT /LPF
NEUTROPHILS # BLD AUTO: 4.4 10E9/L (ref 1.6–8.3)
NEUTROPHILS NFR BLD AUTO: 52.6 %
NITRATE UR QL: NEGATIVE
NRBC # BLD AUTO: 0 10*3/UL
NRBC BLD AUTO-RTO: 0 /100
NT-PROBNP SERPL-MCNC: 6 PG/ML (ref 0–450)
OPIATES UR QL SCN: NOT DETECTED NG/ML
OXYCODONE UR QL SCN: NOT DETECTED NG/ML
PCP UR QL SCN: NOT DETECTED NG/ML
PH UR STRIP: 6 PH (ref 4.7–8)
PLATELET # BLD AUTO: 272 10E9/L (ref 150–450)
POTASSIUM SERPL-SCNC: 4.2 MMOL/L (ref 3.4–5.3)
PROPOXYPH UR QL: NOT DETECTED NG/ML
PROT SERPL-MCNC: 6.8 G/DL (ref 6.8–8.8)
RBC # BLD AUTO: 5.17 10E12/L (ref 3.8–5.2)
RBC #/AREA URNS AUTO: 1 /HPF (ref 0–2)
SODIUM SERPL-SCNC: 141 MMOL/L (ref 133–144)
SOURCE: ABNORMAL
SP GR UR STRIP: 1.02 (ref 1–1.03)
SQUAMOUS #/AREA URNS AUTO: 2 /HPF (ref 0–1)
TRICYCLICS UR QL SCN: NOT DETECTED NG/ML
TROPONIN I SERPL-MCNC: <0.015 UG/L (ref 0–0.04)
UROBILINOGEN UR STRIP-MCNC: NORMAL MG/DL (ref 0–2)
WBC # BLD AUTO: 8.3 10E9/L (ref 4–11)
WBC #/AREA URNS AUTO: 2 /HPF (ref 0–5)

## 2019-12-15 PROCEDURE — 93010 ELECTROCARDIOGRAM REPORT: CPT | Performed by: INTERNAL MEDICINE

## 2019-12-15 PROCEDURE — 40000275 ZZH STATISTIC RCP TIME EA 10 MIN

## 2019-12-15 PROCEDURE — 96374 THER/PROPH/DIAG INJ IV PUSH: CPT

## 2019-12-15 PROCEDURE — 96375 TX/PRO/DX INJ NEW DRUG ADDON: CPT

## 2019-12-15 PROCEDURE — 93005 ELECTROCARDIOGRAM TRACING: CPT

## 2019-12-15 PROCEDURE — 84484 ASSAY OF TROPONIN QUANT: CPT | Performed by: FAMILY MEDICINE

## 2019-12-15 PROCEDURE — 85025 COMPLETE CBC W/AUTO DIFF WBC: CPT | Performed by: FAMILY MEDICINE

## 2019-12-15 PROCEDURE — 83735 ASSAY OF MAGNESIUM: CPT | Performed by: FAMILY MEDICINE

## 2019-12-15 PROCEDURE — 81001 URINALYSIS AUTO W/SCOPE: CPT | Performed by: FAMILY MEDICINE

## 2019-12-15 PROCEDURE — 25000128 H RX IP 250 OP 636: Performed by: FAMILY MEDICINE

## 2019-12-15 PROCEDURE — 85379 FIBRIN DEGRADATION QUANT: CPT | Performed by: FAMILY MEDICINE

## 2019-12-15 PROCEDURE — 80306 DRUG TEST PRSMV INSTRMNT: CPT | Performed by: FAMILY MEDICINE

## 2019-12-15 PROCEDURE — 36415 COLL VENOUS BLD VENIPUNCTURE: CPT | Performed by: FAMILY MEDICINE

## 2019-12-15 PROCEDURE — 99285 EMERGENCY DEPT VISIT HI MDM: CPT | Mod: Z6 | Performed by: FAMILY MEDICINE

## 2019-12-15 PROCEDURE — 85652 RBC SED RATE AUTOMATED: CPT | Performed by: FAMILY MEDICINE

## 2019-12-15 PROCEDURE — 83880 ASSAY OF NATRIURETIC PEPTIDE: CPT | Performed by: FAMILY MEDICINE

## 2019-12-15 PROCEDURE — 25000125 ZZHC RX 250: Performed by: FAMILY MEDICINE

## 2019-12-15 PROCEDURE — 99285 EMERGENCY DEPT VISIT HI MDM: CPT | Mod: 25

## 2019-12-15 PROCEDURE — 80053 COMPREHEN METABOLIC PANEL: CPT | Performed by: FAMILY MEDICINE

## 2019-12-15 PROCEDURE — 25000132 ZZH RX MED GY IP 250 OP 250 PS 637: Performed by: FAMILY MEDICINE

## 2019-12-15 PROCEDURE — 71045 X-RAY EXAM CHEST 1 VIEW: CPT | Mod: TC

## 2019-12-15 PROCEDURE — 94640 AIRWAY INHALATION TREATMENT: CPT

## 2019-12-15 RX ORDER — METHYLPREDNISOLONE 4 MG
TABLET, DOSE PACK ORAL
Qty: 21 TABLET | Refills: 0 | Status: SHIPPED | OUTPATIENT
Start: 2019-12-15 | End: 2020-11-23

## 2019-12-15 RX ORDER — ALBUTEROL SULFATE 0.83 MG/ML
2.5 SOLUTION RESPIRATORY (INHALATION) ONCE
Status: COMPLETED | OUTPATIENT
Start: 2019-12-15 | End: 2019-12-15

## 2019-12-15 RX ORDER — ASPIRIN 81 MG/1
324 TABLET, CHEWABLE ORAL ONCE
Status: COMPLETED | OUTPATIENT
Start: 2019-12-15 | End: 2019-12-15

## 2019-12-15 RX ORDER — ALBUTEROL SULFATE 90 UG/1
2 AEROSOL, METERED RESPIRATORY (INHALATION) EVERY 6 HOURS PRN
Qty: 1 INHALER | Refills: 1 | Status: SHIPPED | OUTPATIENT
Start: 2019-12-15 | End: 2021-12-02

## 2019-12-15 RX ORDER — KETOROLAC TROMETHAMINE 30 MG/ML
30 INJECTION, SOLUTION INTRAMUSCULAR; INTRAVENOUS ONCE
Status: COMPLETED | OUTPATIENT
Start: 2019-12-15 | End: 2019-12-15

## 2019-12-15 RX ORDER — METHYLPREDNISOLONE SODIUM SUCCINATE 125 MG/2ML
125 INJECTION, POWDER, LYOPHILIZED, FOR SOLUTION INTRAMUSCULAR; INTRAVENOUS ONCE
Status: COMPLETED | OUTPATIENT
Start: 2019-12-15 | End: 2019-12-15

## 2019-12-15 RX ADMIN — METHYLPREDNISOLONE SODIUM SUCCINATE 125 MG: 125 INJECTION, POWDER, FOR SOLUTION INTRAMUSCULAR; INTRAVENOUS at 16:48

## 2019-12-15 RX ADMIN — KETOROLAC TROMETHAMINE 30 MG: 30 INJECTION, SOLUTION INTRAMUSCULAR at 16:47

## 2019-12-15 RX ADMIN — ALBUTEROL SULFATE 2.5 MG: 2.5 SOLUTION RESPIRATORY (INHALATION) at 17:24

## 2019-12-15 RX ADMIN — ASPIRIN 81 MG 324 MG: 81 TABLET ORAL at 16:33

## 2019-12-15 ASSESSMENT — ENCOUNTER SYMPTOMS
EYE REDNESS: 0
ARTHRALGIAS: 0
CONFUSION: 0
MUSCULOSKELETAL NEGATIVE: 1
ALLERGIC/IMMUNOLOGIC NEGATIVE: 1
NECK STIFFNESS: 0
PSYCHIATRIC NEGATIVE: 1
GASTROINTESTINAL NEGATIVE: 1
COLOR CHANGE: 0
DIFFICULTY URINATING: 0
NEUROLOGICAL NEGATIVE: 1
ENDOCRINE NEGATIVE: 1
HEMATOLOGIC/LYMPHATIC NEGATIVE: 1
CHEST TIGHTNESS: 1
SHORTNESS OF BREATH: 0
ABDOMINAL PAIN: 0
EYES NEGATIVE: 1
HEADACHES: 0
CONSTITUTIONAL NEGATIVE: 1
FEVER: 0

## 2019-12-15 NOTE — ED AVS SNAPSHOT
[facility]  [address] [address]  Phone:  [phone]                                    Ihsan Millan   MRN: [id number]    Department:  [facility]   Date of Visit:  12/15/2019           After Visit Summary Signature Page    I have received my discharge instructions, and my questions have been answered. I have discussed any challenges I see with this plan with the nurse or doctor.    ..........................................................................................................................................  Patient/Patient Representative Signature      ..........................................................................................................................................  Patient Representative Print Name and Relationship to Patient    ..................................................               ................................................  Date                                   Time    ..........................................................................................................................................  Reviewed by Signature/Title    ...................................................              ..............................................  Date                                               Time          22EPIC Rev 08/18

## 2019-12-15 NOTE — ED TRIAGE NOTES
Pt c/o left chest pain that becomes worse with deep breath and radiates into left shoulder and left arm. Pt c/o numbness and tingling in left arm. Denies any recent injuries or illness. States started around 2330 on 12/14/2019.

## 2019-12-15 NOTE — ED PROVIDER NOTES
History     Chief Complaint   Patient presents with     Chest Wall Pain     Shoulder Pain     HPI  Ihsan Millan is a 29 year old female who left-sided anterior chest wall discomfort that started last night.  She states it does radiate to the left shoulder her left arm tingles.  She took some Tylenol.  She is had a hysterectomy.  She does smoke.  It is worse first when she takes a deep breath.  2 pregnancies and 2 children.  We will give Toradol 30 mg IV, Solu-Medrol 125 mg IV  EKG shows a normal sinus rhythm with a rate of 85  Allergies:  Allergies   Allergen Reactions     Skin Adhesives [Mecrylate] Dermatitis     Patient developed contact dermatitis after application of topical adhesive to surgical incision     Lexapro [Escitalopram] Nausea and Vomiting     Codeine Sulfate Rash     Penicillins Rash     Tramadol Rash       Problem List:    Patient Active Problem List    Diagnosis Date Noted     RUQ abdominal pain 02/12/2017     Priority: Medium     Pneumonia of right middle lobe due to infectious organism (H) 02/12/2017     Priority: Medium     Tobacco abuse 02/12/2017     Priority: Medium     Status post laparoscopic assisted vaginal hysterectomy (LAVH) 06/25/2015     Priority: Medium     Surgery, elective 06/24/2015     Priority: Medium     Endometriosis 04/16/2015     Priority: Medium     Depo lupron 4/15.  Laparoscopy 3/15       ASCUS on Pap smear 07/18/2014     Priority: Medium     + hpv colpo 7/14       Postpartum depression 07/18/2014     Priority: Medium     zoloft rx       H. pylori infection      Priority: Medium     Moderate persistent asthma 03/19/2014     Priority: Medium     GERD (gastroesophageal reflux disease) 09/12/2013     Priority: Medium     Intermittent asthma 09/12/2013     Priority: Medium     Increased with pregnancy  Send for eval and asthma action plan  Smoking cessation counseling  Flu shot given          Past Medical History:    Past Medical History:   Diagnosis Date     Asthma       GERD (gastroesophageal reflux disease)      H. pylori infection        Past Surgical History:    Past Surgical History:   Procedure Laterality Date     COLONOSCOPY N/A 11/6/2014    Procedure: COLONOSCOPY;  Surgeon: Zacarias Paz MD;  Location: [facility]     DILATION AND CURETTAGE, HYSTEROSCOPY DIAGNOSTIC, COMBINED  8/1/2014    Procedure: COMBINED DILATION AND CURETTAGE, HYSTEROSCOPY DIAGNOSTIC;  Surgeon: Rodolfo Clifford MD;  Location: [facility]     ESOPHAGOSCOPY, GASTROSCOPY, DUODENOSCOPY (EGD), COMBINED N/A 9/5/2014    Procedure: COMBINED ESOPHAGOSCOPY, GASTROSCOPY, DUODENOSCOPY (EGD);  Surgeon: Zacarias Paz MD;  [facility]     HERNIORRHAPHY UMBILICAL N/A 9/3/2019    Procedure: OPEN UMBILICAL HERNIA REPAIR;  Surgeon: Carter Vaughn MD;  [facility]     LAPAROSCOPIC ASSISTED HYSTERECTOMY VAGINAL N/A 6/24/2015    Procedure: LAPAROSCOPIC ASSISTED HYSTERECTOMY VAGINAL;  Surgeon: Rodolfo Clifford MD;  [facility]     LAPAROSCOPIC OOPHORECTOMY Right 3/23/2016    Procedure: LAPAROSCOPIC OOPHORECTOMY;  Surgeon: Rodolfo Clifford MD;  [facility]     LAPAROSCOPIC SALPINGO-OOPHORECTOMY Left 5/3/2017    Procedure: LAPAROSCOPIC SALPINGO-OOPHORECTOMY;  LAPAROSCOPIC LEFT OOPHORECTOMY;  Surgeon: Rodolfo Clifford MD;  [facility]     LAPAROSCOPY DIAGNOSTIC (GYN) N/A 3/18/2015    Procedure: LAPAROSCOPY DIAGNOSTIC (GYN);  Surgeon: Rodolfo Clifford MD;  Location: [facility]     no surgeries       SALPINGECTOMY Bilateral 6/24/2015    Procedure: SALPINGECTOMY;  Surgeon: Rodolfo Clifford MD;  Location: [facility]       Family History:    Family History   Problem Relation Age of Onset     Asthma Mother      Unknown/Adopted Maternal Grandmother      Unknown/Adopted Maternal Grandfather        Social History:  Marital Status:  Single [1]  Social History     Tobacco Use     Smoking status: Current Every Day Smoker     Packs/day: 0.50     Years: 7.00     Pack years: 3.50     Types: Cigarettes     Smokeless tobacco: Never Used      Tobacco comment: declines quitline referral 9/10/19   Substance Use Topics     Alcohol use: Yes     Comment: social     Drug use: No        Medications:    albuterol (PROAIR HFA/PROVENTIL HFA/VENTOLIN HFA) 108 (90 Base) MCG/ACT inhaler  estradiol (ESTRACE) 1 MG tablet  methylPREDNISolone (MEDROL DOSEPAK) 4 MG tablet therapy pack  Acetaminophen (TYLENOL PO)  albuterol (PROAIR HFA/PROVENTIL HFA/VENTOLIN HFA) 108 (90 BASE) MCG/ACT Inhaler  ibuprofen (ADVIL/MOTRIN) 800 MG tablet  lithium (ESKALITH) 300 MG tablet  OMEPRAZOLE PO          Review of Systems   Constitutional: Negative.  Negative for fever.   HENT: Negative.  Negative for congestion.    Eyes: Negative.  Negative for redness.   Respiratory: Positive for chest tightness. Negative for shortness of breath.    Cardiovascular: Negative for chest pain (Anterior left chest wall).   Gastrointestinal: Negative.  Negative for abdominal pain.   Endocrine: Negative.    Genitourinary: Negative.  Negative for difficulty urinating.   Musculoskeletal: Negative.  Negative for arthralgias and neck stiffness.   Skin: Negative.  Negative for color change.   Allergic/Immunologic: Negative.    Neurological: Negative.  Negative for headaches.   Hematological: Negative.    Psychiatric/Behavioral: Negative.  Negative for confusion.   All other systems reviewed and are negative.      Physical Exam   BP: 106/71  Heart Rate: 92  Temp: 98.2  F (36.8  C)  Resp: 16  Weight: 60.8 kg (134 lb)  SpO2: 100 %      Physical Exam  Vitals signs and nursing note reviewed.   Constitutional:       General: She is in acute distress (Mildly anxious and apprehensive).      Appearance: Normal appearance. She is normal weight. She is not ill-appearing, toxic-appearing or diaphoretic.   HENT:      Head: Normocephalic and atraumatic.      Right Ear: Tympanic membrane, ear canal and external ear normal. There is no impacted cerumen.      Left Ear: Tympanic membrane, ear canal and external ear normal. There is  no impacted cerumen.      Nose: Nose normal. No congestion or rhinorrhea.      Mouth/Throat:      Mouth: Mucous membranes are moist.      Pharynx: Oropharynx is clear. No oropharyngeal exudate or posterior oropharyngeal erythema.   Eyes:      General:         Right eye: No discharge.         Left eye: No discharge.      Extraocular Movements: Extraocular movements intact.      Pupils: Pupils are equal, round, and reactive to light.   Neck:      Musculoskeletal: Normal range of motion and neck supple. No neck rigidity or muscular tenderness.      Vascular: No carotid bruit.   Cardiovascular:      Rate and Rhythm: Normal rate.      Pulses: Normal pulses.      Heart sounds: Normal heart sounds. No murmur. No friction rub. No gallop.    Pulmonary:      Effort: Pulmonary effort is normal. No respiratory distress.      Breath sounds: No stridor. Wheezing (Mild bilateral inspiratory) present. No rhonchi.   Chest:      Chest wall: No tenderness.   Abdominal:      General: Abdomen is flat.   Musculoskeletal: Normal range of motion.         General: No swelling, tenderness, deformity or signs of injury.      Right lower leg: No edema.      Left lower leg: No edema.   Lymphadenopathy:      Cervical: No cervical adenopathy.   Skin:     General: Skin is warm and dry.      Capillary Refill: Capillary refill takes less than 2 seconds.      Coloration: Skin is not jaundiced or pale.      Findings: No bruising, erythema, lesion or rash.   Neurological:      General: No focal deficit present.      Mental Status: She is alert and oriented to person, place, and time.      Cranial Nerves: No cranial nerve deficit.      Sensory: No sensory deficit.      Motor: No weakness.      Coordination: Coordination normal.      Gait: Gait normal.   Psychiatric:         Mood and Affect: Mood normal.         Behavior: Behavior normal.         Thought Content: Thought content normal.         Judgment: Judgment normal.         ED Course         Procedures               Critical Care time:  none               Results for orders placed or performed during the hospital encounter of 12/15/19 (from the past 24 hour(s))   CBC with platelets differential   Result Value Ref Range    WBC 8.3 4.0 - 11.0 10e9/L    RBC Count 5.17 3.8 - 5.2 10e12/L    Hemoglobin 15.5 11.7 - 15.7 g/dL    Hematocrit 46.1 35.0 - 47.0 %    MCV 89 78 - 100 fl    MCH 30.0 26.5 - 33.0 pg    MCHC 33.6 31.5 - 36.5 g/dL    RDW 13.4 10.0 - 15.0 %    Platelet Count 272 150 - 450 10e9/L    Diff Method Automated Method     % Neutrophils 52.6 %    % Lymphocytes 38.3 %    % Monocytes 5.7 %    % Eosinophils 2.8 %    % Basophils 0.2 %    % Immature Granulocytes 0.4 %    Nucleated RBCs 0 0 /100    Absolute Neutrophil 4.4 1.6 - 8.3 10e9/L    Absolute Lymphocytes 3.2 0.8 - 5.3 10e9/L    Absolute Monocytes 0.5 0.0 - 1.3 10e9/L    Absolute Eosinophils 0.2 0.0 - 0.7 10e9/L    Absolute Basophils 0.0 0.0 - 0.2 10e9/L    Abs Immature Granulocytes 0.0 0 - 0.4 10e9/L    Absolute Nucleated RBC 0.0    Troponin I   Result Value Ref Range    Troponin I ES <0.015 0.000 - 0.045 ug/L   Comprehensive metabolic panel   Result Value Ref Range    Sodium 141 133 - 144 mmol/L    Potassium 4.2 3.4 - 5.3 mmol/L    Chloride 109 94 - 109 mmol/L    Carbon Dioxide 25 20 - 32 mmol/L    Anion Gap 7 3 - 14 mmol/L    Glucose 85 70 - 99 mg/dL    Urea Nitrogen 18 7 - 30 mg/dL    Creatinine 0.73 0.52 - 1.04 mg/dL    GFR Estimate >90 >60 mL/min/[1.73_m2]    GFR Estimate If Black >90 >60 mL/min/[1.73_m2]    Calcium 8.8 8.5 - 10.1 mg/dL    Bilirubin Total 0.3 0.2 - 1.3 mg/dL    Albumin 3.8 3.4 - 5.0 g/dL    Protein Total 6.8 6.8 - 8.8 g/dL    Alkaline Phosphatase 96 40 - 150 U/L    ALT 29 0 - 50 U/L    AST 10 0 - 45 U/L   D dimer quantitative   Result Value Ref Range    D Dimer <0.3 0.0 - 0.50 ug/ml FEU   Magnesium   Result Value Ref Range    Magnesium 2.3 1.6 - 2.3 mg/dL   Nt probnp inpatient (BNP)   Result Value Ref Range    N-Terminal  Pro BNP Inpatient 6 0 - 450 pg/mL   Erythrocyte sedimentation rate auto   Result Value Ref Range    Sed Rate 4 0 - 20 mm/h   XR Chest Port 1 View    Narrative    PROCEDURE:  XR CHEST PORT 1 VW    HISTORY:  chest pain.     COMPARISON:  None.    FINDINGS:   The cardiac silhouette is normal in size. The pulmonary vasculature is  normal.  There are bilateral nipple shadows seen at the lung bases.  The lungs are otherwise clear No pleural effusion or pneumothorax.      Impression    IMPRESSION:  No acute cardiopulmonary disease.      KAYLA WYNNE MD   UA with Microscopic reflex to Culture   Result Value Ref Range    Color Urine Yellow     Appearance Urine Clear     Glucose Urine Negative NEG^Negative mg/dL    Bilirubin Urine Negative NEG^Negative    Ketones Urine Negative NEG^Negative mg/dL    Specific Gravity Urine 1.023 1.003 - 1.035    Blood Urine Negative NEG^Negative    pH Urine 6.0 4.7 - 8.0 pH    Protein Albumin Urine Negative NEG^Negative mg/dL    Urobilinogen mg/dL Normal 0.0 - 2.0 mg/dL    Nitrite Urine Negative NEG^Negative    Leukocyte Esterase Urine Negative NEG^Negative    Source Midstream Urine     WBC Urine 2 0 - 5 /HPF    RBC Urine 1 0 - 2 /HPF    Bacteria Urine None (A) NEG^Negative /HPF    Squamous Epithelial /HPF Urine 2 (H) 0 - 1 /HPF    Mucous Urine Present (A) NEG^Negative /LPF    Hyaline Casts 3 (A) OTO2^O - 2 /LPF   Urine Drugs of Abuse Screen Panel 13   Result Value Ref Range    Cannabinoids (55-pxi-6-carboxy-9-THC) Not Detected NDET^Not Detected ng/mL    Phencyclidine (Phencyclidine) Not Detected NDET^Not Detected ng/mL    Cocaine (Benzoylecgonine) Not Detected NDET^Not Detected ng/mL    Methamphetamine (d-Methamphetamine) Detected, Abnormal Result (A) NDET^Not Detected ng/mL    Opiates (Morphine) Not Detected NDET^Not Detected ng/mL    Amphetamine (d-Amphetamine) Not Detected NDET^Not Detected ng/mL    Benzodiazepines (Nordiazepam) Not Detected NDET^Not Detected ng/mL    Tricyclic  Antidepressants (Desipramine) Not Detected NDET^Not Detected ng/mL    Methadone (Methadone) Not Detected NDET^Not Detected ng/mL    Barbiturates (Butalbital) Not Detected NDET^Not Detected ng/mL    Oxycodone (Oxycodone) Not Detected NDET^Not Detected ng/mL    Propoxyphene (Norpropoxyphene) Not Detected NDET^Not Detected ng/mL    Buprenorphine (Buprenorphine) Not Detected NDET^Not Detected ng/mL       Medications   aspirin (ASA) chewable tablet 324 mg (324 mg Oral Given 12/15/19 1633)   ketorolac (TORADOL) injection 30 mg (30 mg Intravenous Given 12/15/19 1647)   methylPREDNISolone sodium succinate (solu-MEDROL) injection 125 mg (125 mg Intravenous Given 12/15/19 1648)   albuterol (PROVENTIL) neb solution 2.5 mg (2.5 mg Nebulization Given 12/15/19 1724)       Assessments & Plan (with Medical Decision Making)     I have reviewed the nursing notes.    I have reviewed the findings, diagnosis, plan and need for follow up with the patient.   Patient did get a little improvement with the nebulizer treatment.  Plan to discharge her home.  Can refer to cardiology.  Prescription for albuterol inhaler and a Medrol Dosepak.  Recommend smoking and methamphetamine use cessation.  Can return if necessary.    Discharge Medication List as of 12/15/2019  6:07 PM          Final diagnoses:   Atypical chest pain   Acute bronchospasm   Tobacco abuse   Methamphetamine abuse (H)       12/15/2019   [facility]     Ryan Au DO  12/16/19 1144

## 2019-12-16 NOTE — ED NOTES
All discharge instructions and avs discussed with patient.  Prescriptions sent home.  Pt states that she doesn't have insurance to fill rx.  Asked if patient wanted  to call her tomorrow to help her get insurance.  Pt declined.  All belongings sent home

## 2020-03-02 ENCOUNTER — HEALTH MAINTENANCE LETTER (OUTPATIENT)
Age: 30
End: 2020-03-02

## 2020-09-26 ENCOUNTER — HOSPITAL ENCOUNTER (EMERGENCY)
Facility: HOSPITAL | Age: 30
End: 2020-09-26

## 2020-09-27 ENCOUNTER — HOSPITAL ENCOUNTER (EMERGENCY)
Facility: HOSPITAL | Age: 30
Discharge: HOME | End: 2020-09-27
Attending: PHYSICIAN ASSISTANT | Admitting: PHYSICIAN ASSISTANT
Payer: COMMERCIAL

## 2020-09-27 VITALS
HEART RATE: 106 BPM | DIASTOLIC BLOOD PRESSURE: 84 MMHG | SYSTOLIC BLOOD PRESSURE: 124 MMHG | TEMPERATURE: 96.3 F | OXYGEN SATURATION: 98 %

## 2020-09-27 DIAGNOSIS — S00.411A EAR ABRASION, RIGHT, INITIAL ENCOUNTER: ICD-10-CM

## 2020-09-27 PROCEDURE — 99213 OFFICE O/P EST LOW 20 MIN: CPT | Mod: Z6 | Performed by: PHYSICIAN ASSISTANT

## 2020-09-27 PROCEDURE — G0463 HOSPITAL OUTPT CLINIC VISIT: HCPCS

## 2020-09-27 ASSESSMENT — ENCOUNTER SYMPTOMS: ROS SKIN COMMENTS: SEE HPI

## 2020-09-27 NOTE — ED AVS SNAPSHOT
[facility]  [address] [address]  Phone:  [phone]                                    Ihsan Millan   MRN: [id number]    Department:  [facility]   Date of Visit:  9/27/2020           After Visit Summary Signature Page    I have received my discharge instructions, and my questions have been answered. I have discussed any challenges I see with this plan with the nurse or doctor.    ..........................................................................................................................................  Patient/Patient Representative Signature      ..........................................................................................................................................  Patient Representative Print Name and Relationship to Patient    ..................................................               ................................................  Date                                   Time    ..........................................................................................................................................  Reviewed by Signature/Title    ...................................................              ..............................................  Date                                               Time          22EPIC Rev 08/18

## 2020-09-27 NOTE — ED TRIAGE NOTES
"Pt is here with c/o \" 1 week ago my right ear was swollen shut on the inside and im still having a lot of pain from it\"  Pt notes no drainage anymore  Pt denies nay fevers   Pt notes there is a scab inside the ear  Aspirin today   "

## 2020-09-28 NOTE — ED PROVIDER NOTES
History     Chief Complaint   Patient presents with     Otalgia     rt ear pain     HPI  Ihsan Millan is a 29 year old female who presents to urgent care for evaluation of right ear pain.  Patient states that she previously had swelling and drainage of her ear but that has improved over the last week.  She states that she now has a scab located on the top part of her external ear canal.  She denies any fevers, cold symptoms, or any other associated symptoms at this time.  Patient took aspirin earlier today.    Allergies:  Allergies   Allergen Reactions     Skin Adhesives [Mecrylate] Dermatitis     Patient developed contact dermatitis after application of topical adhesive to surgical incision     Lexapro [Escitalopram] Nausea and Vomiting     Codeine Sulfate Rash     Penicillins Rash     Tramadol Rash       Problem List:    Patient Active Problem List    Diagnosis Date Noted     RUQ abdominal pain 02/12/2017     Priority: Medium     Pneumonia of right middle lobe due to infectious organism 02/12/2017     Priority: Medium     Tobacco abuse 02/12/2017     Priority: Medium     Status post laparoscopic assisted vaginal hysterectomy (LAVH) 06/25/2015     Priority: Medium     Surgery, elective 06/24/2015     Priority: Medium     Endometriosis 04/16/2015     Priority: Medium     Depo lupron 4/15.  Laparoscopy 3/15       ASCUS on Pap smear 07/18/2014     Priority: Medium     + hpv colpo 7/14       Postpartum depression 07/18/2014     Priority: Medium     zoloft rx       H. pylori infection      Priority: Medium     Moderate persistent asthma 03/19/2014     Priority: Medium     GERD (gastroesophageal reflux disease) 09/12/2013     Priority: Medium     Intermittent asthma 09/12/2013     Priority: Medium     Increased with pregnancy  Send for eval and asthma action plan  Smoking cessation counseling  Flu shot given          Past Medical History:    Past Medical History:   Diagnosis Date     Asthma      GERD  (gastroesophageal reflux disease)      H. pylori infection        Past Surgical History:    Past Surgical History:   Procedure Laterality Date     COLONOSCOPY N/A 11/6/2014    Procedure: COLONOSCOPY;  Surgeon: Zacarias Paz MD;  Location: [facility]     DILATION AND CURETTAGE, HYSTEROSCOPY DIAGNOSTIC, COMBINED  8/1/2014    Procedure: COMBINED DILATION AND CURETTAGE, HYSTEROSCOPY DIAGNOSTIC;  Surgeon: Rodolfo Clifford MD;  [facility]     ESOPHAGOSCOPY, GASTROSCOPY, DUODENOSCOPY (EGD), COMBINED N/A 9/5/2014    Procedure: COMBINED ESOPHAGOSCOPY, GASTROSCOPY, DUODENOSCOPY (EGD);  Surgeon: Zacarias Paz MD;  [facility]     HERNIORRHAPHY UMBILICAL N/A 9/3/2019    Procedure: OPEN UMBILICAL HERNIA REPAIR;  Surgeon: Carter Vaughn MD;  [facility]     LAPAROSCOPIC ASSISTED HYSTERECTOMY VAGINAL N/A 6/24/2015    Procedure: LAPAROSCOPIC ASSISTED HYSTERECTOMY VAGINAL;  Surgeon: Rodolfo Clifford MD;  [facility]     LAPAROSCOPIC OOPHORECTOMY Right 3/23/2016    Procedure: LAPAROSCOPIC OOPHORECTOMY;  Surgeon: Rodolfo Clifford MD;  [facility]     LAPAROSCOPIC SALPINGO-OOPHORECTOMY Left 5/3/2017    Procedure: LAPAROSCOPIC SALPINGO-OOPHORECTOMY;  LAPAROSCOPIC LEFT OOPHORECTOMY;  Surgeon: Rodolfo Clifford MD;  [facility]     LAPAROSCOPY DIAGNOSTIC (GYN) N/A 3/18/2015    Procedure: LAPAROSCOPY DIAGNOSTIC (GYN);  Surgeon: Rodolfo Clifford MD;  Location: [facility]     no surgeries       SALPINGECTOMY Bilateral 6/24/2015    Procedure: SALPINGECTOMY;  Surgeon: Rodolfo Clifford MD;  Location: [facility]       Family History:    Family History   Problem Relation Age of Onset     Asthma Mother      Unknown/Adopted Maternal Grandmother      Unknown/Adopted Maternal Grandfather        Social History:  Marital Status:  Single [1]  Social History     Tobacco Use     Smoking status: Current Every Day Smoker     Packs/day: 0.50     Years: 7.00     Pack years: 3.50     Types: Cigarettes     Smokeless tobacco: Never Used     Tobacco  comment: declines quitline referral 9/10/19   Substance Use Topics     Alcohol use: Yes     Comment: social     Drug use: No        Medications:    Acetaminophen (TYLENOL PO)  albuterol (PROAIR HFA/PROVENTIL HFA/VENTOLIN HFA) 108 (90 Base) MCG/ACT inhaler  albuterol (PROAIR HFA/PROVENTIL HFA/VENTOLIN HFA) 108 (90 BASE) MCG/ACT Inhaler  estradiol (ESTRACE) 1 MG tablet  ibuprofen (ADVIL/MOTRIN) 800 MG tablet  lithium (ESKALITH) 300 MG tablet  methylPREDNISolone (MEDROL DOSEPAK) 4 MG tablet therapy pack  OMEPRAZOLE PO          Review of Systems   Skin:        See HPI   All other systems reviewed and are negative.      Physical Exam   BP: 124/84  Pulse: 106  Temp: 96.3  F (35.7  C)  SpO2: 98 %      Physical Exam  Vitals signs and nursing note reviewed.   Constitutional:       General: She is not in acute distress.     Appearance: Normal appearance. She is not ill-appearing or toxic-appearing.   HENT:      Right Ear: Tympanic membrane normal. There is no impacted cerumen.      Left Ear: Tympanic membrane normal.      Ears:      Comments: Patient has small scab located over superior aspect of external canal.  No cobblestoning, drainage or constriction of external canal is noted.  There is no periauricular lymphadenopathy.  Cardiovascular:      Rate and Rhythm: Regular rhythm.      Heart sounds: Normal heart sounds.   Pulmonary:      Breath sounds: Normal breath sounds.   Neurological:      Mental Status: She is alert and oriented to person, place, and time.         ED Course        Procedures              Critical Care time:               No results found for this or any previous visit (from the past 24 hour(s)).    Medications - No data to display    Assessments & Plan (with Medical Decision Making)   #1.  Ear abrasion, right    Discussed exam findings with patient reassurance is given.  Tylenol or ibuprofen as directed for pain.  Any further concerns please return to urgent care or follow-up with primary care provider.   Patient verbalized understanding and agreement of plan.        I have reviewed the nursing notes.    I have reviewed the findings, diagnosis, plan and need for follow up with the patient.      Discharge Medication List as of 9/27/2020  7:08 PM          Final diagnoses:   Ear abrasion, right, initial encounter       9/27/2020   [facility]     Cecilio Freitas PA-C  09/27/20 1914

## 2020-10-07 NOTE — ED AVS SNAPSHOT
[facility]    [address] [address]    Phone:  [phone]                                       Ihsan Millan   MRN: [id number]    Department:  [facility]   Date of Visit:  12/5/2017           Patient Information     Date Of Birth          1990        Your diagnoses for this visit were:     Left acute suppurative otitis media     Eustachian tube disorder, left        You were seen by Cinda Bhatia PA.      Follow-up Information     Follow up with Temo Houston DO.    Specialty:  Family Practice    Why:  If symptoms worsen    Contact information:    [facility]  [address]  [facility] [address] [phone]          Follow up with [facility].    Specialty:  EMERGENCY MEDICINE    Why:  If further concerns develop    Contact information:    [address] [address] [phone]    Additional information:    From [place]: Take [address]. Turn left at [address]. Turn left at the first stoplight on [address]. At the first stop sign, take a right onto [address]. Take a left into the parking lot and continue through until you reach the North enterance of the building.       From [place]: Take [address]. Take the [address] ramp towards [place]. Turn left onto [address]. Take a slight right onto [address]. Turn left at the first stoplight on [address]. At the first stop sign, take a right onto [address]. Take a left into the parking lot and continue through until you reach the North enterance of the building.       From [place]: Take [address]. Take a right at [address]. At the first stop sign, take a right onto [address]. Take a left into the parking lot and continue through until you reach the North enterance of the building.       Discharge References/Attachments     OTITIS MEDIA (MIDDLE-EAR  INFECTION) IN ADULTS (ENGLISH)    EAR, ANATOMY OF THE (ENGLISH)      Future Appointments        Provider Department Dept Phone Center    12/7/2017 10:00 AM Svetlana Ramirez NP [facility] [phone] [place]         Review of your medicines      START taking        Dose / Directions Last dose taken    cefdinir 300 MG capsule   Commonly known as:  OMNICEF   Dose:  300 mg   Quantity:  14 capsule        Take 1 capsule (300 mg) by mouth 2 times daily for 7 days   Refills:  0        fluticasone 50 MCG/ACT spray   Commonly known as:  FLONASE   Dose:  2 spray   Quantity:  16 g        Spray 2 sprays into both nostrils daily   Refills:  1          Our records show that you are taking the medicines listed below. If these are incorrect, please call your family doctor or clinic.        Dose / Directions Last dose taken    albuterol 108 (90 BASE) MCG/ACT Inhaler   Commonly known as:  PROAIR HFA/PROVENTIL HFA/VENTOLIN HFA   Dose:  2 puff   Quantity:  1 Inhaler        Inhale 2 puffs into the lungs every 4 hours as needed for shortness of breath / dyspnea or wheezing   Refills:  2        cyclobenzaprine 10 MG tablet   Commonly known as:  FLEXERIL   Dose:  5-10 mg   Quantity:  16 tablet        Take 0.5-1 tablets (5-10 mg) by mouth 3 times daily as needed for muscle spasms   Refills:  1        estradiol 1 MG tablet   Commonly known as:  ESTRACE   Dose:  1 mg   Quantity:  90 tablet        Take 1 tablet (1 mg) by mouth daily   Refills:  1        OMEPRAZOLE PO   Dose:  20 mg        Take 20 mg by mouth every morning   Refills:  0                Prescriptions were sent or printed at these locations (2 Prescriptions)                   [facility] [id number] - [place] - [address] AT [facility] [address]   [address], [place] [address]    Telephone:  [phone]   Fax:  [phone]   Hours:                  E-Prescribed (2 of 2)         fluticasone (FLONASE) 50 MCG/ACT spray               cefdinir  (OMNICEF) 300 MG capsule                Orders Needing Specimen Collection     None      Pending Results     No orders found from 12/3/2017 to 12/6/2017.            Pending Culture Results     No orders found from 12/3/2017 to 12/6/2017.            Thank you for choosing [facility]       Thank you for choosing [facility] for your care. Our goal is always to provide you with excellent care. Hearing back from our patients is one way we can continue to improve our services. Please take a few minutes to complete the written survey that you may receive in the mail after you visit with us. Thank you!        [facility] Information     [facility] gives you secure access to your electronic health record. If you see a primary care provider, you can also send messages to your care team and make appointments. If you have questions, please call your primary care clinic.  If you do not have a primary care provider, please call [phone] and they will assist you.        Care EveryWhere ID     This is your Care EveryWhere ID. This could be used by other organizations to access your [facility] medical records  [id number]        Equal Access to Services     ZHENG BLANCO : Hadii clifton garzao Sophani, waaxda luqadaha, qaybta kaalmada aderick, leah dennis. So [facility] [phone].    ATENCIÓN: Si habla español, tiene a spear disposición servicios gratuitos de asistencia lingüística. Llame al [phone].    We comply with applicable federal civil rights laws and [place] laws. We do not discriminate on the basis of race, color, national origin, age, disability, sex, sexual orientation, or gender identity.            After Visit Summary       This is your record. Keep this with you and show to your community pharmacist(s) and doctor(s) at your next visit.                   dysuria with flank pain for few days; labs

## 2020-11-08 ENCOUNTER — APPOINTMENT (OUTPATIENT)
Dept: GENERAL RADIOLOGY | Facility: HOSPITAL | Age: 30
End: 2020-11-08
Attending: PHYSICIAN ASSISTANT
Payer: COMMERCIAL

## 2020-11-08 ENCOUNTER — HOSPITAL ENCOUNTER (EMERGENCY)
Facility: HOSPITAL | Age: 30
Discharge: HOME | End: 2020-11-08
Attending: PHYSICIAN ASSISTANT | Admitting: PHYSICIAN ASSISTANT
Payer: COMMERCIAL

## 2020-11-08 VITALS
DIASTOLIC BLOOD PRESSURE: 72 MMHG | HEART RATE: 98 BPM | OXYGEN SATURATION: 98 % | RESPIRATION RATE: 14 BRPM | TEMPERATURE: 98.9 F | SYSTOLIC BLOOD PRESSURE: 113 MMHG

## 2020-11-08 DIAGNOSIS — S69.91XA INJURY OF HAND, RIGHT, INITIAL ENCOUNTER: ICD-10-CM

## 2020-11-08 PROCEDURE — G0463 HOSPITAL OUTPT CLINIC VISIT: HCPCS

## 2020-11-08 PROCEDURE — 73130 X-RAY EXAM OF HAND: CPT | Mod: RT

## 2020-11-08 PROCEDURE — 73140 X-RAY EXAM OF FINGER(S): CPT | Mod: RT

## 2020-11-08 PROCEDURE — 99213 OFFICE O/P EST LOW 20 MIN: CPT | Performed by: PHYSICIAN ASSISTANT

## 2020-11-08 NOTE — ED AVS SNAPSHOT
[facility]  [address] [address]  Phone: [phone]                                    Ihsan Millan   MRN: [id number]    Department: [facility]   Date of Visit: 11/8/2020           After Visit Summary Signature Page    I have received my discharge instructions, and my questions have been answered. I have discussed any challenges I see with this plan with the nurse or doctor.    ..........................................................................................................................................  Patient/Patient Representative Signature      ..........................................................................................................................................  Patient Representative Print Name and Relationship to Patient    ..................................................               ................................................  Date                                   Time    ..........................................................................................................................................  Reviewed by Signature/Title    ...................................................              ..............................................  Date                                               Time          22EPIC Rev 08/18

## 2020-11-09 NOTE — ED PROVIDER NOTES
History     Chief Complaint   Patient presents with     Hand Pain     c/o rt hand pain, notes injury due to punched table a couple of times due to angry a few days ago     HPI  Ihsan Millan is a 29 year old female who presents to urgent care for evaluation of right hand pain.  Patient states that 2 days ago she slammed her right hand down on the table due to anger.  She states she has had pain in her right hand since.  She denies any previous fractures, dislocations or surgeries.  She states that she has taken Tylenol over-the-counter.    Allergies:  Allergies   Allergen Reactions     Skin Adhesives [Mecrylate] Dermatitis     Patient developed contact dermatitis after application of topical adhesive to surgical incision     Lexapro [Escitalopram] Nausea and Vomiting     Codeine Sulfate Rash     Penicillins Rash     Tramadol Rash       Problem List:    Patient Active Problem List    Diagnosis Date Noted     RUQ abdominal pain 02/12/2017     Priority: Medium     Pneumonia of right middle lobe due to infectious organism 02/12/2017     Priority: Medium     Tobacco abuse 02/12/2017     Priority: Medium     Status post laparoscopic assisted vaginal hysterectomy (LAVH) 06/25/2015     Priority: Medium     Surgery, elective 06/24/2015     Priority: Medium     Endometriosis 04/16/2015     Priority: Medium     Depo lupron 4/15.  Laparoscopy 3/15       ASCUS on Pap smear 07/18/2014     Priority: Medium     + hpv colpo 7/14       Postpartum depression 07/18/2014     Priority: Medium     zoloft rx       H. pylori infection      Priority: Medium     Moderate persistent asthma 03/19/2014     Priority: Medium     GERD (gastroesophageal reflux disease) 09/12/2013     Priority: Medium     Intermittent asthma 09/12/2013     Priority: Medium     Increased with pregnancy  Send for eval and asthma action plan  Smoking cessation counseling  Flu shot given          Past Medical History:    Past Medical History:   Diagnosis Date      Asthma      GERD (gastroesophageal reflux disease)      H. pylori infection        Past Surgical History:    Past Surgical History:   Procedure Laterality Date     COLONOSCOPY N/A 11/6/2014    Procedure: COLONOSCOPY;  Surgeon: Zacarias Paz MD;  [facility]     DILATION AND CURETTAGE, HYSTEROSCOPY DIAGNOSTIC, COMBINED  8/1/2014    Procedure: COMBINED DILATION AND CURETTAGE, HYSTEROSCOPY DIAGNOSTIC;  Surgeon: Rodolfo Clifford MD;  [facility]     ESOPHAGOSCOPY, GASTROSCOPY, DUODENOSCOPY (EGD), COMBINED N/A 9/5/2014    Procedure: COMBINED ESOPHAGOSCOPY, GASTROSCOPY, DUODENOSCOPY (EGD);  Surgeon: Zacarias Paz MD;  [facility]     HERNIORRHAPHY UMBILICAL N/A 9/3/2019    Procedure: OPEN UMBILICAL HERNIA REPAIR;  Surgeon: Carter Vaughn MD;  [facility]     LAPAROSCOPIC ASSISTED HYSTERECTOMY VAGINAL N/A 6/24/2015    Procedure: LAPAROSCOPIC ASSISTED HYSTERECTOMY VAGINAL;  Surgeon: Rodolfo Clifford MD;  [facility]     LAPAROSCOPIC OOPHORECTOMY Right 3/23/2016    Procedure: LAPAROSCOPIC OOPHORECTOMY;  Surgeon: Rodolfo Clifford MD;  [facility]     LAPAROSCOPIC SALPINGO-OOPHORECTOMY Left 5/3/2017    Procedure: LAPAROSCOPIC SALPINGO-OOPHORECTOMY;  LAPAROSCOPIC LEFT OOPHORECTOMY;  Surgeon: Rodolfo Clifford MD;  [facility]     LAPAROSCOPY DIAGNOSTIC (GYN) N/A 3/18/2015    Procedure: LAPAROSCOPY DIAGNOSTIC (GYN);  Surgeon: Rodolfo Clifford MD;  Location: [facility]     no surgeries       SALPINGECTOMY Bilateral 6/24/2015    Procedure: SALPINGECTOMY;  Surgeon: Rodolfo Clifford MD;  [facility]       Family History:    Family History   Problem Relation Age of Onset     Asthma Mother      Unknown/Adopted Maternal Grandmother      Unknown/Adopted Maternal Grandfather        Social History:  Marital Status:  Single [1]  Social History     Tobacco Use     Smoking status: Current Every Day Smoker     Packs/day: 0.50     Years: 7.00     Pack years: 3.50     Types: Cigarettes     Smokeless tobacco: Never  Used     Tobacco comment: declines quitline referral 9/10/19   Substance Use Topics     Alcohol use: Yes     Comment: social     Drug use: No        Medications:         Acetaminophen (TYLENOL PO)       albuterol (PROAIR HFA/PROVENTIL HFA/VENTOLIN HFA) 108 (90 Base) MCG/ACT inhaler       albuterol (PROAIR HFA/PROVENTIL HFA/VENTOLIN HFA) 108 (90 BASE) MCG/ACT Inhaler       estradiol (ESTRACE) 1 MG tablet       ibuprofen (ADVIL/MOTRIN) 800 MG tablet       lithium (ESKALITH) 300 MG tablet       methylPREDNISolone (MEDROL DOSEPAK) 4 MG tablet therapy pack       OMEPRAZOLE PO          Review of Systems   Musculoskeletal:        Right hand pain         Physical Exam   BP: 113/72  Pulse: 98  Temp: 98.9  F (37.2  C)  Resp: 14  SpO2: 98 %      Physical Exam  Vitals signs and nursing note reviewed.   Cardiovascular:      Rate and Rhythm: Regular rhythm.      Heart sounds: Normal heart sounds.   Pulmonary:      Breath sounds: Normal breath sounds.   Musculoskeletal:      Right hand: She exhibits decreased range of motion, tenderness and bony tenderness. She exhibits normal capillary refill, no deformity and no swelling. Normal sensation noted. Normal strength noted.        Hands:       Comments: Patient has pain with palpation over fifth metacarpal and fifth MCP joint.  Strength 5 out of 5, CMS intact, range of motion limited to pain.   Neurological:      Mental Status: She is alert and oriented to person, place, and time.         ED Course        Procedures              Critical Care time:               Results for orders placed or performed during the hospital encounter of 11/08/20 (from the past 24 hour(s))   XR Hand Right G/E 3 Views    Narrative    PROCEDURE:  XR HAND RT G/E 3 VW    HISTORY: right hand pain from slamming hand onto a table    COMPARISON:  None.    TECHNIQUE:  3 views of the right hand were obtained.    FINDINGS:  No fracture or dislocation is identified. The joint spaces  are preserved.        Impression     IMPRESSION: No acute fracture.      KAYLA WYNNE MD   XR Finger Right G/E 2 Views    Narrative    PROCEDURE:  XR FINGER RT G/E 2 VW    HISTORY: right hand pinky pain from slamming hand on table    COMPARISON:  None.    TECHNIQUE:  2 views of the right fifth finger were obtained.    FINDINGS:  No fracture or dislocation is identified. The joint spaces  are preserved.        Impression    IMPRESSION: No acute fracture.      KAYLA WYNNE MD       Medications - No data to display    Assessments & Plan (with Medical Decision Making)   #1.  Right hand injury    Discussed exam findings as well as x-ray reading with patient.  Elevation along with icing as discussed with patient.  Tylenol or ibuprofen as directed for pain.  Any additional concerns please return to emergency department or follow-up with primary care provider.  Patient verbalized understanding and agreement of plan.        I have reviewed the nursing notes.    I have reviewed the findings, diagnosis, plan and need for follow up with the patient.      New Prescriptions    No medications on file       Final diagnoses:   Injury of hand, right, initial encounter       11/8/2020   [facility]     Cecilio Freitas PA-C  11/08/20 2007

## 2020-11-09 NOTE — ED TRIAGE NOTES
Pt is here with c/o right hand pinky finger and knuckle pain   Pt notes she was mad and threw her fist down and punched a table  Onset a couple days ago

## 2020-11-17 ENCOUNTER — NURSE TRIAGE (OUTPATIENT)
Dept: FAMILY MEDICINE | Facility: OTHER | Age: 30
End: 2020-11-17

## 2020-11-17 DIAGNOSIS — Z20.822 EXPOSURE TO 2019 NOVEL CORONAVIRUS: Primary | ICD-10-CM

## 2020-11-17 NOTE — TELEPHONE ENCOUNTER
"    Reason for Disposition    [1] COVID-19 EXPOSURE (Close Contact) AND [2] within last 14 days BUT [3] NO symptoms    Additional Information    Negative: COVID-19 has been diagnosed by a healthcare provider (HCP)    Negative: COVID-19 lab test positive    Negative: [1] Symptoms of COVID-19 (e.g., cough, fever, SOB, or others) AND [2] lives in an area with community spread    Negative: [1] Symptoms of COVID-19 (e.g., cough, fever, SOB, or others) AND [2] within 14 days of EXPOSURE (close contact) with diagnosed or suspected COVID-19 patient    Negative: [1] Symptoms of COVID-19 (e.g., cough, fever, SOB, or others) AND [2] within 14 days of travel from high-risk area for COVID-19 community spread (identified by CDC)    Negative: [1] Difficulty breathing (shortness of breath) occurs AND [2] onset > 14 days after COVID-19 EXPOSURE (Close Contact) AND [3] no community spread where patient lives    Negative: [1] Dry cough occurs AND [2] onset > 14 days after COVID-19 EXPOSURE AND [3] no community spread where patient lives    Negative: [1] Wet cough (i.e., white-yellow, yellow, green, or fouzia colored sputum) AND [2] onset > 14 days after COVID-19 EXPOSURE AND [3] no community spread where patient lives    Negative: [1] Common cold symptoms AND [2] onset > 14 days after COVID-19 EXPOSURE AND [3] no community spread where patient lives    Negative: [1] COVID-19 EXPOSURE (Close Contact) within last 14 days AND [2] needs COVID-19 lab test to return to work AND [3] NO symptoms    Negative: [1] COVID-19 EXPOSURE (Close Contact) within last 14 days AND [2] exposed person is a healthcare worker who was NOT using all recommended personal protective equipment (i.e., a respirator-N95 mask, eye protection, gloves, and gown) AND [3] NO symptoms    Answer Assessment - Initial Assessment Questions  1. CLOSE CONTACT: \"Who is the person with the confirmed or suspected COVID-19 infection that you were exposed to?\"      niece  2. PLACE of " "CONTACT: \"Where were you when you were exposed to COVID-19?\" (e.g., home, school, medical waiting room; which city?)      Pt's house  3. TYPE of CONTACT: \"How much contact was there?\" (e.g., sitting next to, live in same house, work in same office, same building)      Sat next to   4. DURATION of CONTACT: \"How long were you in contact with the COVID-19 patient?\" (e.g., a few seconds, passed by person, a few minutes, live with the patient)      24 hours  5. DATE of CONTACT: \"When did you have contact with a COVID-19 patient?\" (e.g., how many days ago)      11/13  6. TRAVEL: \"Have you traveled out of the country recently?\" If so, \"When and where?\"      * Also ask about out-of-state travel, since the [facility] has identified some high-risk cities for community spread in the .      * Note: Travel becomes less relevant if there is widespread community transmission where the patient lives.      no  7. COMMUNITY SPREAD: \"Are there lots of cases of COVID-19 (community spread) where you live?\" (See public health department website, if unsure)        yes  8. SYMPTOMS: \"Do you have any symptoms?\" (e.g., fever, cough, breathing difficulty)      no  9. PREGNANCY OR POSTPARTUM: \"Is there any chance you are pregnant?\" \"When was your last menstrual period?\" \"Did you deliver in the last 2 weeks?\"      no  10. HIGH RISK: \"Do you have any heart or lung problems? Do you have a weak immune system?\" (e.g., CHF, COPD, asthma, HIV positive, chemotherapy, renal failure, diabetes mellitus, sickle cell anemia)        asthma    Protocols used: CORONAVIRUS (COVID-19) EXPOSURE-A- 8.4.20      "

## 2020-11-23 ENCOUNTER — HOSPITAL ENCOUNTER (EMERGENCY)
Facility: HOSPITAL | Age: 30
Discharge: HOME | End: 2020-11-23
Attending: NURSE PRACTITIONER | Admitting: NURSE PRACTITIONER
Payer: COMMERCIAL

## 2020-11-23 VITALS
DIASTOLIC BLOOD PRESSURE: 71 MMHG | HEART RATE: 96 BPM | TEMPERATURE: 98.9 F | OXYGEN SATURATION: 100 % | RESPIRATION RATE: 16 BRPM | SYSTOLIC BLOOD PRESSURE: 113 MMHG

## 2020-11-23 DIAGNOSIS — R22.0 RIGHT FACIAL SWELLING: ICD-10-CM

## 2020-11-23 DIAGNOSIS — L02.811 CELLULITIS AND ABSCESS OF HEAD: Primary | ICD-10-CM

## 2020-11-23 DIAGNOSIS — L03.811 CELLULITIS AND ABSCESS OF HEAD: Primary | ICD-10-CM

## 2020-11-23 PROCEDURE — G0463 HOSPITAL OUTPT CLINIC VISIT: HCPCS

## 2020-11-23 PROCEDURE — 99213 OFFICE O/P EST LOW 20 MIN: CPT | Performed by: NURSE PRACTITIONER

## 2020-11-23 RX ORDER — BUPROPION HYDROCHLORIDE 300 MG/1
300 TABLET ORAL EVERY MORNING
COMMUNITY
End: 2021-11-28

## 2020-11-23 RX ORDER — CLINDAMYCIN HCL 300 MG
300 CAPSULE ORAL 3 TIMES DAILY
Qty: 30 CAPSULE | Refills: 0 | Status: SHIPPED | OUTPATIENT
Start: 2020-11-23 | End: 2020-11-30

## 2020-11-23 ASSESSMENT — ENCOUNTER SYMPTOMS
FEVER: 0
FACIAL SWELLING: 1
WOUND: 1
CHILLS: 0

## 2020-11-23 NOTE — ED PROVIDER NOTES
History     Chief Complaint   Patient presents with     Oral Swelling     HPI  Ihsan Millan is a 30 year old female who presents to urgent care for concerns of right facial swelling.  Patient reports she woke up this morning with right jaw swelling and a wound to the area.  She states there is some drainage coming from the wounds it is painful and causing difficulty opening her mouth.  Able to swallow own secretions.  No fevers, chills, nausea vomiting or body aches.  Patient believes she got bitten by a spider.  No wounds or lesions to this area yesterday when she went to bed.  He has tried warm and cold compresses.    Allergies:  Allergies   Allergen Reactions     Skin Adhesives [Mecrylate] Dermatitis     Patient developed contact dermatitis after application of topical adhesive to surgical incision     Lexapro [Escitalopram] Nausea and Vomiting     Codeine Sulfate Rash     Penicillins Rash     Tramadol Rash       Problem List:    Patient Active Problem List    Diagnosis Date Noted     RUQ abdominal pain 02/12/2017     Priority: Medium     Pneumonia of right middle lobe due to infectious organism 02/12/2017     Priority: Medium     Tobacco abuse 02/12/2017     Priority: Medium     Status post laparoscopic assisted vaginal hysterectomy (LAVH) 06/25/2015     Priority: Medium     Surgery, elective 06/24/2015     Priority: Medium     Endometriosis 04/16/2015     Priority: Medium     Depo lupron 4/15.  Laparoscopy 3/15       ASCUS on Pap smear 07/18/2014     Priority: Medium     + hpv colpo 7/14       Postpartum depression 07/18/2014     Priority: Medium     zoloft rx       H. pylori infection      Priority: Medium     Moderate persistent asthma 03/19/2014     Priority: Medium     GERD (gastroesophageal reflux disease) 09/12/2013     Priority: Medium     Intermittent asthma 09/12/2013     Priority: Medium     Increased with pregnancy  Send for eval and asthma action plan  Smoking cessation counseling  Flu shot  given          Past Medical History:    Past Medical History:   Diagnosis Date     Asthma      GERD (gastroesophageal reflux disease)      H. pylori infection        Past Surgical History:    Past Surgical History:   Procedure Laterality Date     COLONOSCOPY N/A 11/6/2014    Procedure: COLONOSCOPY;  Surgeon: Zacarias Paz MD;  Location: [facility]     DILATION AND CURETTAGE, HYSTEROSCOPY DIAGNOSTIC, COMBINED  8/1/2014    Procedure: COMBINED DILATION AND CURETTAGE, HYSTEROSCOPY DIAGNOSTIC;  Surgeon: Rodolfo Clifford MD;  [facility]     ESOPHAGOSCOPY, GASTROSCOPY, DUODENOSCOPY (EGD), COMBINED N/A 9/5/2014    Procedure: COMBINED ESOPHAGOSCOPY, GASTROSCOPY, DUODENOSCOPY (EGD);  Surgeon: Zacarias Paz MD;  [facility]     HERNIORRHAPHY UMBILICAL N/A 9/3/2019    Procedure: OPEN UMBILICAL HERNIA REPAIR;  Surgeon: Carter Vaughn MD;  [facility]     LAPAROSCOPIC ASSISTED HYSTERECTOMY VAGINAL N/A 6/24/2015    Procedure: LAPAROSCOPIC ASSISTED HYSTERECTOMY VAGINAL;  Surgeon: Rodolfo Clifford MD;  [facility]     LAPAROSCOPIC OOPHORECTOMY Right 3/23/2016    Procedure: LAPAROSCOPIC OOPHORECTOMY;  Surgeon: Rodolfo Clifford MD;  [facility]     LAPAROSCOPIC SALPINGO-OOPHORECTOMY Left 5/3/2017    Procedure: LAPAROSCOPIC SALPINGO-OOPHORECTOMY;  LAPAROSCOPIC LEFT OOPHORECTOMY;  Surgeon: Rodolfo Clifford MD;  [facility]     LAPAROSCOPY DIAGNOSTIC (GYN) N/A 3/18/2015    Procedure: LAPAROSCOPY DIAGNOSTIC (GYN);  Surgeon: Rodolfo Clifford MD;  Location: [facility]     no surgeries       SALPINGECTOMY Bilateral 6/24/2015    Procedure: SALPINGECTOMY;  Surgeon: Rodolfo Clifford MD;  [facility]       Family History:    Family History   Problem Relation Age of Onset     Asthma Mother      Unknown/Adopted Maternal Grandmother      Unknown/Adopted Maternal Grandfather        Social History:  Marital Status:  Single [1]  Social History     Tobacco Use     Smoking status: Current Every Day Smoker     Packs/day: 0.50      Years: 7.00     Pack years: 3.50     Types: Cigarettes     Smokeless tobacco: Never Used     Tobacco comment: declines quitline referral 9/10/19   Substance Use Topics     Alcohol use: Yes     Comment: social     Drug use: No        Medications:         Acetaminophen (TYLENOL PO)       albuterol (PROAIR HFA/PROVENTIL HFA/VENTOLIN HFA) 108 (90 Base) MCG/ACT inhaler       albuterol (PROAIR HFA/PROVENTIL HFA/VENTOLIN HFA) 108 (90 BASE) MCG/ACT Inhaler       buPROPion (WELLBUTRIN XL) 300 MG 24 hr tablet       clindamycin (CLEOCIN) 300 MG capsule       estradiol (ESTRACE) 1 MG tablet       lithium (ESKALITH) 300 MG tablet       OMEPRAZOLE PO       ibuprofen (ADVIL/MOTRIN) 800 MG tablet          Review of Systems   Constitutional: Negative for chills and fever.   HENT: Positive for facial swelling. Negative for dental problem.    Skin: Positive for wound.   All other systems reviewed and are negative.      Physical Exam   BP: 113/71  Pulse: 96  Temp: 98.9  F (37.2  C)  Resp: 16  SpO2: 100 %      Physical Exam  Vitals signs and nursing note reviewed.   Constitutional:       Appearance: Normal appearance. She is not ill-appearing or toxic-appearing.   HENT:      Head: Normocephalic and atraumatic.      Jaw: Swelling present. No trismus.        Comments: Swelling to the right lower jaw along with erythema and lesion.  No appreciable discharge.  No fluctuance.  No obvious abscess to oral mucosa.     Mouth/Throat:      Mouth: Mucous membranes are moist.      Pharynx: No oropharyngeal exudate or posterior oropharyngeal erythema.   Eyes:      Pupils: Pupils are equal, round, and reactive to light.   Neck:      Musculoskeletal: Neck supple.   Cardiovascular:      Rate and Rhythm: Normal rate and regular rhythm.      Heart sounds: Normal heart sounds.   Pulmonary:      Effort: Pulmonary effort is normal. No respiratory distress.      Breath sounds: Normal breath sounds. No wheezing, rhonchi or rales.   Skin:     General: Skin is  warm.      Capillary Refill: Capillary refill takes less than 2 seconds.      Findings: Erythema present.   Neurological:      Mental Status: She is alert and oriented to person, place, and time.         ED Course        Procedures               No results found for this or any previous visit (from the past 24 hour(s)).    Medications - No data to display    Assessments & Plan (with Medical Decision Making)   This is a 30-year-old female that presented to urgent care for concerns of facial swelling that she noted this morning when she woke up.  Patient has swelling to right jaw along with erythema and wound to the area.  Area is tender to the touch.  No appreciable discharge.  No fluctuance.  No trismus.  No obvious abscess to oral mucosa.  Patient able to swallow own saliva.  Vital signs are stable.    Patient appears to have cellulitis and abscess to her face.  Possibly from a spider bite.  Allergy to penicillin.  Clindamycin as prescribed.  Recommended she continue warm compresses.  Follow-up with PCP in 5 to 7 days as needed.  Return to ED/UC for worsening or concerning symptoms not limited to increase in swelling, pain or discharge.  Patient verbalized understanding is in agreement with plan of care.    I have reviewed the nursing notes.    I have reviewed the findings, diagnosis, plan and need for follow up with the patient.      New Prescriptions    CLINDAMYCIN (CLEOCIN) 300 MG CAPSULE    Take 1 capsule (300 mg) by mouth 3 times daily for 10 days       Final diagnoses:   Right facial swelling   Cellulitis and abscess of head       11/23/2020   [facility]     Mpocandis, Tejncsolis, CNP  11/23/20 1400

## 2020-11-23 NOTE — ED AVS SNAPSHOT
[facility]  [address] [address]  Phone: [phone]                                    Ihsan Millan   MRN: [id number]    Department: [facility]   Date of Visit: 11/23/2020           After Visit Summary Signature Page    I have received my discharge instructions, and my questions have been answered. I have discussed any challenges I see with this plan with the nurse or doctor.    ..........................................................................................................................................  Patient/Patient Representative Signature      ..........................................................................................................................................  Patient Representative Print Name and Relationship to Patient    ..................................................               ................................................  Date                                   Time    ..........................................................................................................................................  Reviewed by Signature/Title    ...................................................              ..............................................  Date                                               Time          22EPIC Rev 08/18

## 2020-11-23 NOTE — ED TRIAGE NOTES
Pt states when she woke up this am she had swelling right lower jaw near corner of lip. Denies any fevers or body aches.

## 2020-11-23 NOTE — ED TRIAGE NOTES
Pt presents today with c/o swelling right lower jaw. Started today. No known cause, states her teeth are good. PT tried tylenol and warm compress.

## 2020-11-30 ENCOUNTER — HOSPITAL ENCOUNTER (EMERGENCY)
Facility: HOSPITAL | Age: 30
Discharge: HOME | End: 2020-12-01
Attending: EMERGENCY MEDICINE | Admitting: EMERGENCY MEDICINE
Payer: COMMERCIAL

## 2020-11-30 DIAGNOSIS — R10.84 ABDOMINAL PAIN, GENERALIZED: ICD-10-CM

## 2020-11-30 LAB
BASOPHILS # BLD AUTO: 0 10E9/L (ref 0–0.2)
BASOPHILS NFR BLD AUTO: 0.2 %
DIFFERENTIAL METHOD BLD: NORMAL
EOSINOPHIL # BLD AUTO: 0.2 10E9/L (ref 0–0.7)
EOSINOPHIL NFR BLD AUTO: 2.9 %
ERYTHROCYTE [DISTWIDTH] IN BLOOD BY AUTOMATED COUNT: 13.5 % (ref 10–15)
HCT VFR BLD AUTO: 38.1 % (ref 35–47)
HGB BLD-MCNC: 12.9 G/DL (ref 11.7–15.7)
IMM GRANULOCYTES # BLD: 0 10E9/L (ref 0–0.4)
IMM GRANULOCYTES NFR BLD: 0.2 %
LACTATE BLD-SCNC: 0.6 MMOL/L (ref 0.7–2)
LYMPHOCYTES # BLD AUTO: 3.3 10E9/L (ref 0.8–5.3)
LYMPHOCYTES NFR BLD AUTO: 39.7 %
MCH RBC QN AUTO: 30.2 PG (ref 26.5–33)
MCHC RBC AUTO-ENTMCNC: 33.9 G/DL (ref 31.5–36.5)
MCV RBC AUTO: 89 FL (ref 78–100)
MONOCYTES # BLD AUTO: 0.4 10E9/L (ref 0–1.3)
MONOCYTES NFR BLD AUTO: 4.7 %
NEUTROPHILS # BLD AUTO: 4.3 10E9/L (ref 1.6–8.3)
NEUTROPHILS NFR BLD AUTO: 52.3 %
NRBC # BLD AUTO: 0 10*3/UL
NRBC BLD AUTO-RTO: 0 /100
PLATELET # BLD AUTO: 219 10E9/L (ref 150–450)
RBC # BLD AUTO: 4.27 10E12/L (ref 3.8–5.2)
WBC # BLD AUTO: 8.3 10E9/L (ref 4–11)

## 2020-11-30 PROCEDURE — 99285 EMERGENCY DEPT VISIT HI MDM: CPT | Mod: 25

## 2020-11-30 PROCEDURE — 83690 ASSAY OF LIPASE: CPT | Performed by: EMERGENCY MEDICINE

## 2020-11-30 PROCEDURE — 250N000011 HC RX IP 250 OP 636: Performed by: EMERGENCY MEDICINE

## 2020-11-30 PROCEDURE — 96375 TX/PRO/DX INJ NEW DRUG ADDON: CPT

## 2020-11-30 PROCEDURE — 36415 COLL VENOUS BLD VENIPUNCTURE: CPT

## 2020-11-30 PROCEDURE — 258N000003 HC RX IP 258 OP 636: Performed by: EMERGENCY MEDICINE

## 2020-11-30 PROCEDURE — 83605 ASSAY OF LACTIC ACID: CPT | Performed by: EMERGENCY MEDICINE

## 2020-11-30 PROCEDURE — 99284 EMERGENCY DEPT VISIT MOD MDM: CPT | Performed by: EMERGENCY MEDICINE

## 2020-11-30 PROCEDURE — 80053 COMPREHEN METABOLIC PANEL: CPT | Performed by: EMERGENCY MEDICINE

## 2020-11-30 PROCEDURE — 85025 COMPLETE CBC W/AUTO DIFF WBC: CPT | Performed by: EMERGENCY MEDICINE

## 2020-11-30 PROCEDURE — 96361 HYDRATE IV INFUSION ADD-ON: CPT

## 2020-11-30 PROCEDURE — 96374 THER/PROPH/DIAG INJ IV PUSH: CPT | Mod: XU

## 2020-11-30 RX ORDER — SODIUM CHLORIDE 9 MG/ML
INJECTION, SOLUTION INTRAVENOUS CONTINUOUS
Status: DISCONTINUED | OUTPATIENT
Start: 2020-12-01 | End: 2020-12-01 | Stop reason: HOSPADM

## 2020-11-30 RX ORDER — MORPHINE SULFATE 4 MG/ML
4 INJECTION, SOLUTION INTRAMUSCULAR; INTRAVENOUS ONCE
Status: COMPLETED | OUTPATIENT
Start: 2020-11-30 | End: 2020-11-30

## 2020-11-30 RX ORDER — ONDANSETRON 2 MG/ML
4 INJECTION INTRAMUSCULAR; INTRAVENOUS ONCE
Status: COMPLETED | OUTPATIENT
Start: 2020-11-30 | End: 2020-11-30

## 2020-11-30 RX ADMIN — MORPHINE SULFATE 4 MG: 4 INJECTION, SOLUTION INTRAMUSCULAR; INTRAVENOUS at 23:44

## 2020-11-30 RX ADMIN — ONDANSETRON 4 MG: 2 INJECTION INTRAMUSCULAR; INTRAVENOUS at 23:43

## 2020-11-30 RX ADMIN — SODIUM CHLORIDE 1000 ML: 9 INJECTION, SOLUTION INTRAVENOUS at 23:42

## 2020-11-30 ASSESSMENT — ENCOUNTER SYMPTOMS
VOMITING: 1
ENDOCRINE NEGATIVE: 1
MUSCULOSKELETAL NEGATIVE: 1
MYALGIAS: 0
CARDIOVASCULAR NEGATIVE: 1
ABDOMINAL PAIN: 1
ALLERGIC/IMMUNOLOGIC NEGATIVE: 1
NAUSEA: 1
NECK PAIN: 0
EYES NEGATIVE: 1
PSYCHIATRIC NEGATIVE: 1
HEMATOLOGIC/LYMPHATIC NEGATIVE: 1
CONSTITUTIONAL NEGATIVE: 1
RESPIRATORY NEGATIVE: 1
NECK STIFFNESS: 0
NEUROLOGICAL NEGATIVE: 1

## 2020-11-30 NOTE — ED AVS SNAPSHOT
[facility]  [address] [address]  Phone: [phone]                                    Ihsan Millan   MRN: [id number]    Department: [facility]   Date of Visit: 11/30/2020           After Visit Summary Signature Page    I have received my discharge instructions, and my questions have been answered. I have discussed any challenges I see with this plan with the nurse or doctor.    ..........................................................................................................................................  Patient/Patient Representative Signature      ..........................................................................................................................................  Patient Representative Print Name and Relationship to Patient    ..................................................               ................................................  Date                                   Time    ..........................................................................................................................................  Reviewed by Signature/Title    ...................................................              ..............................................  Date                                               Time          22EPIC Rev 08/18

## 2020-12-01 ENCOUNTER — APPOINTMENT (OUTPATIENT)
Dept: CT IMAGING | Facility: HOSPITAL | Age: 30
End: 2020-12-01
Attending: EMERGENCY MEDICINE
Payer: COMMERCIAL

## 2020-12-01 VITALS
HEART RATE: 87 BPM | TEMPERATURE: 98.4 F | WEIGHT: 130 LBS | SYSTOLIC BLOOD PRESSURE: 96 MMHG | RESPIRATION RATE: 20 BRPM | OXYGEN SATURATION: 96 % | DIASTOLIC BLOOD PRESSURE: 66 MMHG | BODY MASS INDEX: 22.31 KG/M2

## 2020-12-01 LAB
ALBUMIN SERPL-MCNC: 3.6 G/DL (ref 3.4–5)
ALBUMIN UR-MCNC: NEGATIVE MG/DL
ALP SERPL-CCNC: 84 U/L (ref 40–150)
ALT SERPL W P-5'-P-CCNC: 26 U/L (ref 0–50)
ANION GAP SERPL CALCULATED.3IONS-SCNC: 5 MMOL/L (ref 3–14)
APPEARANCE UR: CLEAR
AST SERPL W P-5'-P-CCNC: 9 U/L (ref 0–45)
BACTERIA #/AREA URNS HPF: ABNORMAL /HPF
BILIRUB SERPL-MCNC: 0.2 MG/DL (ref 0.2–1.3)
BILIRUB UR QL STRIP: NEGATIVE
BUN SERPL-MCNC: 27 MG/DL (ref 7–30)
CALCIUM SERPL-MCNC: 8.4 MG/DL (ref 8.5–10.1)
CHLORIDE SERPL-SCNC: 110 MMOL/L (ref 94–109)
CO2 SERPL-SCNC: 26 MMOL/L (ref 20–32)
COLOR UR AUTO: ABNORMAL
CREAT SERPL-MCNC: 0.71 MG/DL (ref 0.52–1.04)
GFR SERPL CREATININE-BSD FRML MDRD: >90 ML/MIN/{1.73_M2}
GLUCOSE SERPL-MCNC: 84 MG/DL (ref 70–99)
GLUCOSE UR STRIP-MCNC: NEGATIVE MG/DL
HCG UR QL: NEGATIVE
HGB UR QL STRIP: NEGATIVE
KETONES UR STRIP-MCNC: NEGATIVE MG/DL
LEUKOCYTE ESTERASE UR QL STRIP: NEGATIVE
LIPASE SERPL-CCNC: 220 U/L (ref 73–393)
MUCOUS THREADS #/AREA URNS LPF: PRESENT /LPF
NITRATE UR QL: NEGATIVE
PH UR STRIP: 6 PH (ref 4.7–8)
POTASSIUM SERPL-SCNC: 3.8 MMOL/L (ref 3.4–5.3)
PROT SERPL-MCNC: 6.5 G/DL (ref 6.8–8.8)
RBC #/AREA URNS AUTO: <1 /HPF (ref 0–2)
SODIUM SERPL-SCNC: 141 MMOL/L (ref 133–144)
SOURCE: ABNORMAL
SP GR UR STRIP: 1.01 (ref 1–1.03)
SQUAMOUS #/AREA URNS AUTO: 0 /HPF (ref 0–1)
UROBILINOGEN UR STRIP-MCNC: NORMAL MG/DL (ref 0–2)
WBC #/AREA URNS AUTO: 2 /HPF (ref 0–5)

## 2020-12-01 PROCEDURE — 255N000002 HC RX 255 OP 636: Performed by: EMERGENCY MEDICINE

## 2020-12-01 PROCEDURE — 81025 URINE PREGNANCY TEST: CPT | Performed by: EMERGENCY MEDICINE

## 2020-12-01 PROCEDURE — 81001 URINALYSIS AUTO W/SCOPE: CPT | Performed by: EMERGENCY MEDICINE

## 2020-12-01 PROCEDURE — 74177 CT ABD & PELVIS W/CONTRAST: CPT

## 2020-12-01 RX ORDER — IOPAMIDOL 612 MG/ML
100 INJECTION, SOLUTION INTRAVASCULAR ONCE
Status: COMPLETED | OUTPATIENT
Start: 2020-12-01 | End: 2020-12-01

## 2020-12-01 RX ADMIN — IOPAMIDOL 100 ML: 612 INJECTION, SOLUTION INTRAVENOUS at 01:41

## 2020-12-01 NOTE — DISCHARGE INSTRUCTIONS
1) Follow the aftercare instructions provided.   2) You have been given a medication or prescription for a medication that can make you drowsy. Do not drink, drive, ride a bicycle or operate any heavy machinery while using this medication.   3) You must follow up with your doctor in 12 to 24 hours.

## 2020-12-01 NOTE — ED TRIAGE NOTES
Pt in for complains of RLQ abdominal pain ongoing the last 2 days. Pt reports she had hernia sx in the area recently and has noticed a lump in the area as well. Denies issues with B or B.

## 2020-12-01 NOTE — ED PROVIDER NOTES
History     Chief Complaint   Patient presents with     Abdominal Pain     RLQ     HPI  Ihsan Millan is a 30 year old female who presents today with complaints of abdominal pain.  Symptoms present for the last few days and gradually worsening.  Symptoms accompanied by nausea and had one episode of nonbilious emesis.  Patient has otherwise been in usual state of health.  Has a history of endometriosis.  Patient denies any ill contacts or recent travel outside the country.  Denies any vaginal bleeding.  States she has had a hysterectomy and oophorectomy  Allergies:  Allergies   Allergen Reactions     Skin Adhesives [Mecrylate] Dermatitis     Patient developed contact dermatitis after application of topical adhesive to surgical incision     Lexapro [Escitalopram] Nausea and Vomiting     Codeine Sulfate Rash     Penicillins Rash     Tramadol Rash       Problem List:    Patient Active Problem List    Diagnosis Date Noted     RUQ abdominal pain 02/12/2017     Priority: Medium     Pneumonia of right middle lobe due to infectious organism 02/12/2017     Priority: Medium     Tobacco abuse 02/12/2017     Priority: Medium     Status post laparoscopic assisted vaginal hysterectomy (LAVH) 06/25/2015     Priority: Medium     Surgery, elective 06/24/2015     Priority: Medium     Endometriosis 04/16/2015     Priority: Medium     Depo lupron 4/15.  Laparoscopy 3/15       ASCUS on Pap smear 07/18/2014     Priority: Medium     + hpv colpo 7/14       Postpartum depression 07/18/2014     Priority: Medium     zoloft rx       H. pylori infection      Priority: Medium     Moderate persistent asthma 03/19/2014     Priority: Medium     GERD (gastroesophageal reflux disease) 09/12/2013     Priority: Medium     Intermittent asthma 09/12/2013     Priority: Medium     Increased with pregnancy  Send for eval and asthma action plan  Smoking cessation counseling  Flu shot given          Past Medical History:    Past Medical History:    Diagnosis Date     Asthma      GERD (gastroesophageal reflux disease)      H. pylori infection        Past Surgical History:    Past Surgical History:   Procedure Laterality Date     COLONOSCOPY N/A 11/6/2014    Procedure: COLONOSCOPY;  Surgeon: Zacarias Paz MD;  [facility]     DILATION AND CURETTAGE, HYSTEROSCOPY DIAGNOSTIC, COMBINED  8/1/2014    Procedure: COMBINED DILATION AND CURETTAGE, HYSTEROSCOPY DIAGNOSTIC;  Surgeon: Rodolfo Clifford MD;  [facility]     ESOPHAGOSCOPY, GASTROSCOPY, DUODENOSCOPY (EGD), COMBINED N/A 9/5/2014    Procedure: COMBINED ESOPHAGOSCOPY, GASTROSCOPY, DUODENOSCOPY (EGD);  Surgeon: Zacarias Paz MD;  [facility]     HERNIORRHAPHY UMBILICAL N/A 9/3/2019    Procedure: OPEN UMBILICAL HERNIA REPAIR;  Surgeon: Carter Vaughn MD;  [facility]     LAPAROSCOPIC ASSISTED HYSTERECTOMY VAGINAL N/A 6/24/2015    Procedure: LAPAROSCOPIC ASSISTED HYSTERECTOMY VAGINAL;  Surgeon: Rodolfo Clifford MD;  [facility]     LAPAROSCOPIC OOPHORECTOMY Right 3/23/2016    Procedure: LAPAROSCOPIC OOPHORECTOMY;  Surgeon: Rodolfo Clifford MD;  [facility]     LAPAROSCOPIC SALPINGO-OOPHORECTOMY Left 5/3/2017    Procedure: LAPAROSCOPIC SALPINGO-OOPHORECTOMY;  LAPAROSCOPIC LEFT OOPHORECTOMY;  Surgeon: Rodolfo Clifford MD;  [facility]     LAPAROSCOPY DIAGNOSTIC (GYN) N/A 3/18/2015    Procedure: LAPAROSCOPY DIAGNOSTIC (GYN);  Surgeon: Rodolfo Clifford MD;  [facility]     no surgeries       SALPINGECTOMY Bilateral 6/24/2015    Procedure: SALPINGECTOMY;  Surgeon: Rodolfo Clifford MD;  [facility]       Family History:    Family History   Problem Relation Age of Onset     Asthma Mother      Unknown/Adopted Maternal Grandmother      Unknown/Adopted Maternal Grandfather        Social History:  Marital Status:  Single [1]  Social History     Tobacco Use     Smoking status: Current Every Day Smoker     Packs/day: 0.50     Years: 7.00     Pack years: 3.50     Types: Cigarettes      Smokeless tobacco: Never Used     Tobacco comment: declines quitline referral 9/10/19   Substance Use Topics     Alcohol use: Yes     Comment: social     Drug use: No        Medications:         albuterol (PROAIR HFA/PROVENTIL HFA/VENTOLIN HFA) 108 (90 Base) MCG/ACT inhaler       buPROPion (WELLBUTRIN XL) 300 MG 24 hr tablet       estradiol (ESTRACE) 1 MG tablet       lithium (ESKALITH) 300 MG tablet       OMEPRAZOLE PO       Acetaminophen (TYLENOL PO)       albuterol (PROAIR HFA/PROVENTIL HFA/VENTOLIN HFA) 108 (90 BASE) MCG/ACT Inhaler       ibuprofen (ADVIL/MOTRIN) 800 MG tablet          Review of Systems   Constitutional: Negative.    HENT: Negative.    Eyes: Negative.    Respiratory: Negative.    Cardiovascular: Negative.    Gastrointestinal: Positive for abdominal pain, nausea and vomiting.   Endocrine: Negative.    Genitourinary: Negative.    Musculoskeletal: Negative.  Negative for myalgias, neck pain and neck stiffness.   Skin: Negative.    Allergic/Immunologic: Negative.    Neurological: Negative.    Hematological: Negative.    Psychiatric/Behavioral: Negative.        Physical Exam   BP: 116/75  Pulse: 106  Temp: 98.4  F (36.9  C)  Resp: 20  Weight: 59 kg (130 lb)  SpO2: 100 %      Physical Exam  Constitutional:       General: She is not in acute distress.     Appearance: She is well-developed and normal weight. She is not toxic-appearing.   HENT:      Head: Normocephalic and atraumatic.   Cardiovascular:      Rate and Rhythm: Normal rate and regular rhythm.   Abdominal:      General: Abdomen is flat. Bowel sounds are normal. There is no distension.      Palpations: Abdomen is soft.      Tenderness: There is abdominal tenderness in the right lower quadrant. Negative signs include Hanna's sign and McBurney's sign.   Skin:     General: Skin is warm.      Capillary Refill: Capillary refill takes less than 2 seconds.   Neurological:      General: No focal deficit present.      Mental Status: She is alert.          ED Course        Procedures    CT - Normal appendix. No diverticulosis. Enteritis noted but nonspecific (per vrad)       Results for orders placed or performed during the hospital encounter of 11/30/20 (from the past 24 hour(s))   CBC with platelets differential   Result Value Ref Range    WBC 8.3 4.0 - 11.0 10e9/L    RBC Count 4.27 3.8 - 5.2 10e12/L    Hemoglobin 12.9 11.7 - 15.7 g/dL    Hematocrit 38.1 35.0 - 47.0 %    MCV 89 78 - 100 fl    MCH 30.2 26.5 - 33.0 pg    MCHC 33.9 31.5 - 36.5 g/dL    RDW 13.5 10.0 - 15.0 %    Platelet Count 219 150 - 450 10e9/L    Diff Method Automated Method     % Neutrophils 52.3 %    % Lymphocytes 39.7 %    % Monocytes 4.7 %    % Eosinophils 2.9 %    % Basophils 0.2 %    % Immature Granulocytes 0.2 %    Nucleated RBCs 0 0 /100    Absolute Neutrophil 4.3 1.6 - 8.3 10e9/L    Absolute Lymphocytes 3.3 0.8 - 5.3 10e9/L    Absolute Monocytes 0.4 0.0 - 1.3 10e9/L    Absolute Eosinophils 0.2 0.0 - 0.7 10e9/L    Absolute Basophils 0.0 0.0 - 0.2 10e9/L    Abs Immature Granulocytes 0.0 0 - 0.4 10e9/L    Absolute Nucleated RBC 0.0    Comprehensive metabolic panel   Result Value Ref Range    Sodium 141 133 - 144 mmol/L    Potassium 3.8 3.4 - 5.3 mmol/L    Chloride 110 (H) 94 - 109 mmol/L    Carbon Dioxide 26 20 - 32 mmol/L    Anion Gap 5 3 - 14 mmol/L    Glucose 84 70 - 99 mg/dL    Urea Nitrogen 27 7 - 30 mg/dL    Creatinine 0.71 0.52 - 1.04 mg/dL    GFR Estimate >90 >60 mL/min/[1.73_m2]    GFR Estimate If Black >90 >60 mL/min/[1.73_m2]    Calcium 8.4 (L) 8.5 - 10.1 mg/dL    Bilirubin Total 0.2 0.2 - 1.3 mg/dL    Albumin 3.6 3.4 - 5.0 g/dL    Protein Total 6.5 (L) 6.8 - 8.8 g/dL    Alkaline Phosphatase 84 40 - 150 U/L    ALT 26 0 - 50 U/L    AST 9 0 - 45 U/L   Lipase   Result Value Ref Range    Lipase 220 73 - 393 U/L   Lactic acid whole blood   Result Value Ref Range    Lactic Acid 0.6 (L) 0.7 - 2.0 mmol/L   HCG qualitative urine   Result Value Ref Range    HCG Qual Urine Negative  NEG^Negative   UA with Microscopic   Result Value Ref Range    Color Urine Straw     Appearance Urine Clear     Glucose Urine Negative NEG^Negative mg/dL    Bilirubin Urine Negative NEG^Negative    Ketones Urine Negative NEG^Negative mg/dL    Specific Gravity Urine 1.014 1.003 - 1.035    Blood Urine Negative NEG^Negative    pH Urine 6.0 4.7 - 8.0 pH    Protein Albumin Urine Negative NEG^Negative mg/dL    Urobilinogen mg/dL Normal 0.0 - 2.0 mg/dL    Nitrite Urine Negative NEG^Negative    Leukocyte Esterase Urine Negative NEG^Negative    Source Midstream Urine     WBC Urine 2 0 - 5 /HPF    RBC Urine <1 0 - 2 /HPF    Bacteria Urine None (A) NEG^Negative /HPF    Squamous Epithelial /HPF Urine 0 0 - 1 /HPF    Mucous Urine Present (A) NEG^Negative /LPF       Medications   0.9% sodium chloride BOLUS (has no administration in time range)     Followed by   sodium chloride 0.9% infusion (has no administration in time range)   ondansetron (ZOFRAN) injection 4 mg (has no administration in time range)   morphine (PF) injection 4 mg (has no administration in time range)       Assessments & Plan (with Medical Decision Making)     30-year-old female presents today with complaints of lower abdominal pain.  Symptoms gradually worsening.  Has a history of hysterectomy and bilateral oophorectomy.      On exam patient nontoxic-appearing and abdomen is soft and nontender and nondistended.  Labs as above and CT showed no evidence of appendicitis.  Nonspecific enteritis noted.  Patient's abdominal exam remains soft and nontender.      At this point etiology of symptoms not entirely clear.  Patient is going to be discharged home and is instructed to follow-up with her primary care doctor in 12 to 24 hours or sooner in the ER if symptoms worsen.    Due to the nature of this electronic medical record, laboratory results, imaging results, diagnosis, other information and medications reported above may not represent information available to me  at the the time of my care and disposition. Medications reported above may have not been ordered by me.     Portions of the record may have been created with voice recognition software. Occasional wrong-word or 'sound-a- like' substitution may have occurred due to the inherent limitations of voice recognition software. Though the chart has been reviewed, there may be inadvertent transcription errors. Read the chart carefully and recognize, using context, where substitutions have occurred.       New Prescriptions    No medications on file       Final diagnoses:   Abdominal pain, generalized       11/30/2020   [facility]     Celso Dover MD  12/01/20 3542

## 2021-02-13 ENCOUNTER — HOSPITAL ENCOUNTER (EMERGENCY)
Facility: HOSPITAL | Age: 31
Discharge: HOME | End: 2021-02-13
Attending: PHYSICIAN ASSISTANT | Admitting: PHYSICIAN ASSISTANT
Payer: COMMERCIAL

## 2021-02-13 ENCOUNTER — APPOINTMENT (OUTPATIENT)
Dept: GENERAL RADIOLOGY | Facility: HOSPITAL | Age: 31
End: 2021-02-13
Attending: PHYSICIAN ASSISTANT
Payer: COMMERCIAL

## 2021-02-13 VITALS
OXYGEN SATURATION: 99 % | RESPIRATION RATE: 16 BRPM | TEMPERATURE: 97.2 F | HEART RATE: 102 BPM | DIASTOLIC BLOOD PRESSURE: 73 MMHG | SYSTOLIC BLOOD PRESSURE: 113 MMHG

## 2021-02-13 DIAGNOSIS — R07.9 CHEST PAIN OF UNKNOWN ETIOLOGY: ICD-10-CM

## 2021-02-13 LAB
ALBUMIN SERPL-MCNC: 4 G/DL (ref 3.4–5)
ALP SERPL-CCNC: 112 U/L (ref 40–150)
ALT SERPL W P-5'-P-CCNC: 30 U/L (ref 0–50)
ANION GAP SERPL CALCULATED.3IONS-SCNC: 3 MMOL/L (ref 3–14)
AST SERPL W P-5'-P-CCNC: 17 U/L (ref 0–45)
BASOPHILS # BLD AUTO: 0 10E9/L (ref 0–0.2)
BASOPHILS NFR BLD AUTO: 0.4 %
BILIRUB SERPL-MCNC: 0.2 MG/DL (ref 0.2–1.3)
BUN SERPL-MCNC: 22 MG/DL (ref 7–30)
CALCIUM SERPL-MCNC: 8.4 MG/DL (ref 8.5–10.1)
CHLORIDE SERPL-SCNC: 111 MMOL/L (ref 94–109)
CO2 SERPL-SCNC: 24 MMOL/L (ref 20–32)
CREAT SERPL-MCNC: 1.27 MG/DL (ref 0.52–1.04)
D DIMER PPP FEU-MCNC: 0.3 UG/ML FEU (ref 0–0.5)
DIFFERENTIAL METHOD BLD: NORMAL
EOSINOPHIL # BLD AUTO: 0.3 10E9/L (ref 0–0.7)
EOSINOPHIL NFR BLD AUTO: 3.8 %
ERYTHROCYTE [DISTWIDTH] IN BLOOD BY AUTOMATED COUNT: 13.2 % (ref 10–15)
GFR SERPL CREATININE-BSD FRML MDRD: 56 ML/MIN/{1.73_M2}
GLUCOSE SERPL-MCNC: 84 MG/DL (ref 70–99)
HCT VFR BLD AUTO: 44 % (ref 35–47)
HGB BLD-MCNC: 14.6 G/DL (ref 11.7–15.7)
IMM GRANULOCYTES # BLD: 0 10E9/L (ref 0–0.4)
IMM GRANULOCYTES NFR BLD: 0.1 %
LITHIUM SERPL-SCNC: <0.2 MMOL/L (ref 0.6–1.2)
LYMPHOCYTES # BLD AUTO: 3.4 10E9/L (ref 0.8–5.3)
LYMPHOCYTES NFR BLD AUTO: 46.7 %
MCH RBC QN AUTO: 30 PG (ref 26.5–33)
MCHC RBC AUTO-ENTMCNC: 33.2 G/DL (ref 31.5–36.5)
MCV RBC AUTO: 90 FL (ref 78–100)
MONOCYTES # BLD AUTO: 0.4 10E9/L (ref 0–1.3)
MONOCYTES NFR BLD AUTO: 5.7 %
NEUTROPHILS # BLD AUTO: 3.1 10E9/L (ref 1.6–8.3)
NEUTROPHILS NFR BLD AUTO: 43.3 %
NRBC # BLD AUTO: 0 10*3/UL
NRBC BLD AUTO-RTO: 0 /100
PLATELET # BLD AUTO: 296 10E9/L (ref 150–450)
POTASSIUM SERPL-SCNC: 3.8 MMOL/L (ref 3.4–5.3)
PROT SERPL-MCNC: 7.2 G/DL (ref 6.8–8.8)
RBC # BLD AUTO: 4.87 10E12/L (ref 3.8–5.2)
SODIUM SERPL-SCNC: 138 MMOL/L (ref 133–144)
TROPONIN I SERPL-MCNC: <0.015 UG/L (ref 0–0.04)
WBC # BLD AUTO: 7.2 10E9/L (ref 4–11)

## 2021-02-13 PROCEDURE — 96372 THER/PROPH/DIAG INJ SC/IM: CPT | Performed by: PHYSICIAN ASSISTANT

## 2021-02-13 PROCEDURE — 250N000011 HC RX IP 250 OP 636: Performed by: PHYSICIAN ASSISTANT

## 2021-02-13 PROCEDURE — 99285 EMERGENCY DEPT VISIT HI MDM: CPT | Performed by: PHYSICIAN ASSISTANT

## 2021-02-13 PROCEDURE — 80053 COMPREHEN METABOLIC PANEL: CPT | Performed by: PHYSICIAN ASSISTANT

## 2021-02-13 PROCEDURE — 71045 X-RAY EXAM CHEST 1 VIEW: CPT

## 2021-02-13 PROCEDURE — 80178 ASSAY OF LITHIUM: CPT | Performed by: PHYSICIAN ASSISTANT

## 2021-02-13 PROCEDURE — 85025 COMPLETE CBC W/AUTO DIFF WBC: CPT | Performed by: PHYSICIAN ASSISTANT

## 2021-02-13 PROCEDURE — 84484 ASSAY OF TROPONIN QUANT: CPT | Performed by: PHYSICIAN ASSISTANT

## 2021-02-13 PROCEDURE — 99285 EMERGENCY DEPT VISIT HI MDM: CPT | Mod: 25

## 2021-02-13 PROCEDURE — 36415 COLL VENOUS BLD VENIPUNCTURE: CPT

## 2021-02-13 PROCEDURE — 93005 ELECTROCARDIOGRAM TRACING: CPT

## 2021-02-13 PROCEDURE — 93010 ELECTROCARDIOGRAM REPORT: CPT | Performed by: INTERNAL MEDICINE

## 2021-02-13 PROCEDURE — 85379 FIBRIN DEGRADATION QUANT: CPT | Performed by: PHYSICIAN ASSISTANT

## 2021-02-13 RX ORDER — LORAZEPAM 2 MG/ML
1 INJECTION INTRAMUSCULAR ONCE
Status: COMPLETED | OUTPATIENT
Start: 2021-02-13 | End: 2021-02-13

## 2021-02-13 RX ORDER — ASPIRIN 81 MG/1
81 TABLET, CHEWABLE ORAL DAILY
COMMUNITY

## 2021-02-13 RX ADMIN — LORAZEPAM 1 MG: 2 INJECTION INTRAMUSCULAR; INTRAVENOUS at 17:46

## 2021-02-13 ASSESSMENT — ENCOUNTER SYMPTOMS
FEVER: 0
BRUISES/BLEEDS EASILY: 0
NECK STIFFNESS: 0
VOMITING: 0
CHEST TIGHTNESS: 1
LIGHT-HEADEDNESS: 0
COUGH: 0
BACK PAIN: 0
HEADACHES: 0
NECK PAIN: 0
ACTIVITY CHANGE: 0
PALPITATIONS: 0
SHORTNESS OF BREATH: 0
WEAKNESS: 0
NAUSEA: 0
DIZZINESS: 0
ABDOMINAL PAIN: 0
FATIGUE: 0
NERVOUS/ANXIOUS: 1
APPETITE CHANGE: 0
CHILLS: 0

## 2021-02-13 NOTE — ED TRIAGE NOTES
Patient presents with complaints of SOB stating she feels like she can't catch her breath. She also states she's having chest pain. States this started last night and that she does have a hx of asthma as well as anxiety.

## 2021-02-13 NOTE — ED PROVIDER NOTES
"  History     Chief Complaint   Patient presents with     Shortness of Breath     Chest Pain     The history is provided by the patient.     Ihsan Millan is a 30 year old female who presented to the emergency department ambulatory along with daughter for evaluation of chest pain.  The patient reports she has been experiencing continuous left-sided anterior chest discomfort since \"last night.\"  Significant amount of home stress and anxiety.  Denies cough or appreciable dyspnea.  Denies fevers or chills.  Denies diaphoresis.  Denies radiation of the pain.  Described as sharp and knifelike in nature.  Worse with deep breath and position.  Described as an 8.    Allergies:  Allergies   Allergen Reactions     Skin Adhesives [Mecrylate] Dermatitis     Patient developed contact dermatitis after application of topical adhesive to surgical incision     Lexapro [Escitalopram] Nausea and Vomiting     Codeine Sulfate Rash     Penicillins Rash     Tramadol Rash       Problem List:    Patient Active Problem List    Diagnosis Date Noted     RUQ abdominal pain 02/12/2017     Priority: Medium     Pneumonia of right middle lobe due to infectious organism 02/12/2017     Priority: Medium     Tobacco abuse 02/12/2017     Priority: Medium     Status post laparoscopic assisted vaginal hysterectomy (LAVH) 06/25/2015     Priority: Medium     Surgery, elective 06/24/2015     Priority: Medium     Endometriosis 04/16/2015     Priority: Medium     Depo lupron 4/15.  Laparoscopy 3/15       ASCUS on Pap smear 07/18/2014     Priority: Medium     + hpv colpo 7/14       Postpartum depression 07/18/2014     Priority: Medium     zoloft rx       H. pylori infection      Priority: Medium     Moderate persistent asthma 03/19/2014     Priority: Medium     GERD (gastroesophageal reflux disease) 09/12/2013     Priority: Medium     Intermittent asthma 09/12/2013     Priority: Medium     Increased with pregnancy  Send for eval and asthma action " plan  Smoking cessation counseling  Flu shot given          Past Medical History:    Past Medical History:   Diagnosis Date     Asthma      GERD (gastroesophageal reflux disease)      H. pylori infection        Past Surgical History:    Past Surgical History:   Procedure Laterality Date     COLONOSCOPY N/A 11/6/2014    Procedure: COLONOSCOPY;  Surgeon: Zacarias Paz MD;  Location: [facility]     DILATION AND CURETTAGE, HYSTEROSCOPY DIAGNOSTIC, COMBINED  8/1/2014    Procedure: COMBINED DILATION AND CURETTAGE, HYSTEROSCOPY DIAGNOSTIC;  Surgeon: Rodolfo Clifford MD;  [facility]: [facility]     ESOPHAGOSCOPY, GASTROSCOPY, DUODENOSCOPY (EGD), COMBINED N/A 9/5/2014    Procedure: COMBINED ESOPHAGOSCOPY, GASTROSCOPY, DUODENOSCOPY (EGD);  Surgeon: Zacarias Paz MD;  [facility]     HERNIORRHAPHY UMBILICAL N/A 9/3/2019    Procedure: OPEN UMBILICAL HERNIA REPAIR;  Surgeon: Carter Vaughn MD;  [facility]     LAPAROSCOPIC ASSISTED HYSTERECTOMY VAGINAL N/A 6/24/2015    Procedure: LAPAROSCOPIC ASSISTED HYSTERECTOMY VAGINAL;  Surgeon: Rodolfo Clifford MD;  [facility]     LAPAROSCOPIC OOPHORECTOMY Right 3/23/2016    Procedure: LAPAROSCOPIC OOPHORECTOMY;  Surgeon: Rodolfo Clifford MD;  [facility]     LAPAROSCOPIC SALPINGO-OOPHORECTOMY Left 5/3/2017    Procedure: LAPAROSCOPIC SALPINGO-OOPHORECTOMY;  LAPAROSCOPIC LEFT OOPHORECTOMY;  Surgeon: Rodolfo Clifford MD;  [facility]     LAPAROSCOPY DIAGNOSTIC (GYN) N/A 3/18/2015    Procedure: LAPAROSCOPY DIAGNOSTIC (GYN);  Surgeon: Rodolfo Clifford MD;  [facility]: [facility]     no surgeries       SALPINGECTOMY Bilateral 6/24/2015    Procedure: SALPINGECTOMY;  Surgeon: Rodolfo Clifford MD;  [facility]       Family History:    Family History   Problem Relation Age of Onset     Asthma Mother      Unknown/Adopted Maternal Grandmother      Unknown/Adopted Maternal Grandfather        Social History:  Marital Status:  Single [1]  Social History     Tobacco Use     Smoking status: Current  Every Day Smoker     Packs/day: 0.75     Years: 7.00     Pack years: 5.25     Types: Cigarettes     Smokeless tobacco: Never Used     Tobacco comment: declines quitline referral 9/10/19   Substance Use Topics     Alcohol use: Not Currently     Comment: social     Drug use: No        Medications:         Acetaminophen (TYLENOL PO)       albuterol (PROAIR HFA/PROVENTIL HFA/VENTOLIN HFA) 108 (90 BASE) MCG/ACT Inhaler       aspirin (ASA) 81 MG chewable tablet       buPROPion (WELLBUTRIN XL) 300 MG 24 hr tablet       lithium (ESKALITH) 300 MG tablet       albuterol (PROAIR HFA/PROVENTIL HFA/VENTOLIN HFA) 108 (90 Base) MCG/ACT inhaler       estradiol (ESTRACE) 1 MG tablet       ibuprofen (ADVIL/MOTRIN) 800 MG tablet       OMEPRAZOLE PO          Review of Systems   Constitutional: Negative for activity change, appetite change, chills, fatigue and fever.   Respiratory: Positive for chest tightness. Negative for cough and shortness of breath.    Cardiovascular: Positive for chest pain. Negative for palpitations and leg swelling.   Gastrointestinal: Negative for abdominal pain, nausea and vomiting.   Genitourinary: Negative.    Musculoskeletal: Negative for back pain, neck pain and neck stiffness.   Skin: Negative.    Allergic/Immunologic: Negative for immunocompromised state.   Neurological: Negative for dizziness, weakness, light-headedness and headaches.   Hematological: Does not bruise/bleed easily.        The patient did take a single baby aspirin.   Psychiatric/Behavioral: The patient is nervous/anxious.        Physical Exam   BP: 113/73  Pulse: 102  Temp: 97.2  F (36.2  C)  Resp: 16  SpO2: 99 %      Physical Exam  Vitals signs and nursing note reviewed.   Constitutional:       General: She is not in acute distress.     Appearance: Normal appearance. She is normal weight. She is not ill-appearing, toxic-appearing or diaphoretic.      Comments: Pleasant and talkative 30-year-old female found semireclined on the exam bed  in no distress.   Neck:      Musculoskeletal: Normal range of motion and neck supple.   Cardiovascular:      Rate and Rhythm: Normal rate and regular rhythm.      Pulses: Normal pulses.   Pulmonary:      Effort: Pulmonary effort is normal. No respiratory distress.      Breath sounds: Normal breath sounds. No wheezing.   Chest:      Chest wall: Tenderness present.          Comments: She has focal tenderness upon palpation of the left anterior chest at the above  Abdominal:      General: There is no distension.      Palpations: Abdomen is soft.      Tenderness: There is no abdominal tenderness. There is no guarding.   Skin:     General: Skin is warm and dry.      Capillary Refill: Capillary refill takes less than 2 seconds.   Neurological:      General: No focal deficit present.      Mental Status: She is alert and oriented to person, place, and time.   Psychiatric:         Mood and Affect: Mood normal.         ED Course     ED Course as of Feb 13 1754   Sat Feb 13, 2021   1642 Chest x-ray shows no evidence of focal consolidation, pneumothorax, or widened mediastinum.      1711 EKG shows a normal sinus rhythm at a rate of 88.  Normal NJ interval.  Normal QRS duration.  Normal QTC.  Normal axis.  Normal P wave duration.  There are no concerning ST segments.  There are no concerning T waves.  There is no evidence of ectopy, preexcitation, or ischemia.  Comparison the previous EKG shows no change.        Procedures               Critical Care time:  none          HEART Score  Background  Calculates the overall risk of adverse event in patient's presenting with chest pain.  Based on 5 criteria (each assigned 0-2 points) including suspiciousness of history, EKG, age, risk factors and troponin.    Data  30 year old female  has GERD (gastroesophageal reflux disease); Intermittent asthma; Moderate persistent asthma; H. pylori infection; ASCUS on Pap smear; Postpartum depression; Endometriosis; Surgery, elective; Status post  laparoscopic assisted vaginal hysterectomy (LAVH); RUQ abdominal pain; Pneumonia of right middle lobe due to infectious organism; and Tobacco abuse on their problem list.   reports that she has been smoking cigarettes. She has a 5.25 pack-year smoking history. She has never used smokeless tobacco.  family history includes Asthma in her mother; Unknown/Adopted in her maternal grandfather and maternal grandmother.  Lab Results   Component Value Date    TROPI <0.015 02/13/2021     Criteria   0-2 points for each of 5 items (maximum of 10 points):  Score 0- History slightly suspicious for coronary syndrome  Score 0- EKG Normal  Score 0- Age <45 years old  Score 1- One to 2 risk factors for atherosclerotic disease  Score 0- Within normal limits for troponin levels  Interpretation  Risk of adverse outcome  Heart Score: 1  Total Score 0-3- Adverse Outcome Risk 2.5% - Supports early discharge with appropriate follow-up           Results for orders placed or performed during the hospital encounter of 02/13/21 (from the past 24 hour(s))   CBC with platelets differential   Result Value Ref Range    WBC 7.2 4.0 - 11.0 10e9/L    RBC Count 4.87 3.8 - 5.2 10e12/L    Hemoglobin 14.6 11.7 - 15.7 g/dL    Hematocrit 44.0 35.0 - 47.0 %    MCV 90 78 - 100 fl    MCH 30.0 26.5 - 33.0 pg    MCHC 33.2 31.5 - 36.5 g/dL    RDW 13.2 10.0 - 15.0 %    Platelet Count 296 150 - 450 10e9/L    Diff Method Automated Method     % Neutrophils 43.3 %    % Lymphocytes 46.7 %    % Monocytes 5.7 %    % Eosinophils 3.8 %    % Basophils 0.4 %    % Immature Granulocytes 0.1 %    Nucleated RBCs 0 0 /100    Absolute Neutrophil 3.1 1.6 - 8.3 10e9/L    Absolute Lymphocytes 3.4 0.8 - 5.3 10e9/L    Absolute Monocytes 0.4 0.0 - 1.3 10e9/L    Absolute Eosinophils 0.3 0.0 - 0.7 10e9/L    Absolute Basophils 0.0 0.0 - 0.2 10e9/L    Abs Immature Granulocytes 0.0 0 - 0.4 10e9/L    Absolute Nucleated RBC 0.0    D dimer quantitative   Result Value Ref Range    D Dimer 0.3  0.0 - 0.50 ug/ml FEU   Comprehensive metabolic panel   Result Value Ref Range    Sodium 138 133 - 144 mmol/L    Potassium 3.8 3.4 - 5.3 mmol/L    Chloride 111 (H) 94 - 109 mmol/L    Carbon Dioxide 24 20 - 32 mmol/L    Anion Gap 3 3 - 14 mmol/L    Glucose 84 70 - 99 mg/dL    Urea Nitrogen 22 7 - 30 mg/dL    Creatinine 1.27 (H) 0.52 - 1.04 mg/dL    GFR Estimate 56 (L) >60 mL/min/[1.73_m2]    GFR Estimate If Black 65 >60 mL/min/[1.73_m2]    Calcium 8.4 (L) 8.5 - 10.1 mg/dL    Bilirubin Total 0.2 0.2 - 1.3 mg/dL    Albumin 4.0 3.4 - 5.0 g/dL    Protein Total 7.2 6.8 - 8.8 g/dL    Alkaline Phosphatase 112 40 - 150 U/L    ALT 30 0 - 50 U/L    AST 17 0 - 45 U/L   Troponin I   Result Value Ref Range    Troponin I ES <0.015 0.000 - 0.045 ug/L   Lithium level   Result Value Ref Range    Lithium Level <0.20 (L) 0.60 - 1.20 mmol/L   XR Chest Port 1 View    Narrative    PROCEDURE:  XR CHEST PORT 1 VW    HISTORY:  chest pain.     COMPARISON:  2019    FINDINGS:   The cardiac silhouette is normal in size. The pulmonary vasculature is  normal.  The lungs are clear. No pleural effusion or pneumothorax.      Impression    IMPRESSION:  No acute cardiopulmonary disease.      KAYLA WYNNE MD       Medications   LORazepam (ATIVAN) injection 1 mg (1 mg Intramuscular Given 2/13/21 1746)       Assessments & Plan (with Medical Decision Making)   Findings as above.  EKG is unchanged.  Troponin and D-dimer are negative.  Atypical symptoms that are sharp and stabbing in nature anterior chest worse with palpation and deep breath.  Chest x-ray shows no concerning signs.  Heart score is low.  I believe the patient can be safely discharged with close follow-up.  HPI, exam, symptoms, and work-up is not consistent with acute coronary syndrome, thoracic aortic dissection, pericarditis, pneumothorax, pulmonary embolism, or other intrathoracic catastrophe.  She will follow-up early next week in the clinic for recheck.  She does have a decrease in her  renal function recently.  This can be followed in the clinic.  She has no flank pain or lower urinary tract symptoms.  The patient return here for any other questions or concerns.    This document was prepared using a combination of typing and voice generated software.  While every attempt was made for accuracy, spelling and grammatical errors may exist.    I have reviewed the nursing notes.    I have reviewed the findings, diagnosis, plan and need for follow up with the patient.       New Prescriptions    No medications on file       Final diagnoses:   Chest pain of unknown etiology       2/13/2021   [facility]     Huyen Lucas PA-C  02/13/21 [id number]       Huyen Lucas PA-C  02/13/21 [id number]
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
## 2021-02-13 NOTE — DISCHARGE INSTRUCTIONS
Rest and stay hydrated.    Follow-up in the clinic next week for recheck and repeat kidney labs.     Return here for any worsening symptoms, new symptoms, or other concerns.
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
## 2021-02-16 ENCOUNTER — TELEPHONE (OUTPATIENT)
Dept: EMERGENCY MEDICINE | Facility: HOSPITAL | Age: 31
End: 2021-02-16
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
## 2021-04-18 ENCOUNTER — HEALTH MAINTENANCE LETTER (OUTPATIENT)
Age: 31
End: 2021-04-18

## 2021-06-13 ENCOUNTER — APPOINTMENT (OUTPATIENT)
Dept: GENERAL RADIOLOGY | Facility: HOSPITAL | Age: 31
End: 2021-06-13
Attending: INTERNAL MEDICINE
Payer: COMMERCIAL

## 2021-06-13 ENCOUNTER — HOSPITAL ENCOUNTER (EMERGENCY)
Facility: HOSPITAL | Age: 31
Discharge: HOME | End: 2021-06-14
Attending: INTERNAL MEDICINE | Admitting: INTERNAL MEDICINE
Payer: COMMERCIAL

## 2021-06-13 VITALS
DIASTOLIC BLOOD PRESSURE: 80 MMHG | SYSTOLIC BLOOD PRESSURE: 119 MMHG | TEMPERATURE: 97.5 F | OXYGEN SATURATION: 99 % | HEART RATE: 107 BPM | RESPIRATION RATE: 16 BRPM

## 2021-06-13 DIAGNOSIS — S46.911A STRAIN OF RIGHT SHOULDER, INITIAL ENCOUNTER: ICD-10-CM

## 2021-06-13 PROCEDURE — 73030 X-RAY EXAM OF SHOULDER: CPT | Mod: RT

## 2021-06-13 PROCEDURE — 99283 EMERGENCY DEPT VISIT LOW MDM: CPT | Performed by: INTERNAL MEDICINE

## 2021-06-13 PROCEDURE — 99283 EMERGENCY DEPT VISIT LOW MDM: CPT

## 2021-06-13 PROCEDURE — 73010 X-RAY EXAM OF SHOULDER BLADE: CPT | Mod: RT

## 2021-06-13 RX ORDER — OXYCODONE HYDROCHLORIDE 5 MG/1
5 TABLET ORAL EVERY 6 HOURS PRN
Qty: 8 TABLET | Refills: 0 | Status: SHIPPED | OUTPATIENT
Start: 2021-06-13 | End: 2021-06-15

## 2021-06-14 NOTE — ED TRIAGE NOTES
Pt states right shoulder has been increasing in pain. Denies any injury, has been ripping out old carpet.

## 2021-06-14 NOTE — ED NOTES
Pt presents to ED with c/o right shoulder pain.  This pain began while patient was ripping up carpet and continued to get worse.  Pt denies injury.  CMS intact.  8/10 pain.  Pt did drive herself to ED today.

## 2021-06-20 ASSESSMENT — ENCOUNTER SYMPTOMS
ABDOMINAL PAIN: 0
NECK STIFFNESS: 0
ARTHRALGIAS: 0
EYE REDNESS: 0
SHORTNESS OF BREATH: 0
COLOR CHANGE: 0
FEVER: 0
CONFUSION: 0
HEADACHES: 0
DIFFICULTY URINATING: 0

## 2021-06-20 NOTE — ED PROVIDER NOTES
History     Chief Complaint   Patient presents with     Shoulder Pain     The history is provided by the patient.   Shoulder Pain  Location:  Shoulder  Shoulder location:  R shoulder  Pain details:     Quality:  Aching and shooting    Radiates to:  R arm    Severity:  No pain    Onset quality:  Gradual    Duration:  4 days    Timing:  Constant    Progression:  Worsening  Associated symptoms: no fever          Allergies:  Allergies   Allergen Reactions     Skin Adhesives [Mecrylate] Dermatitis     Patient developed contact dermatitis after application of topical adhesive to surgical incision     Lexapro [Escitalopram] Nausea and Vomiting     Codeine Sulfate Rash     Penicillins Rash     Tramadol Rash       Problem List:    Patient Active Problem List    Diagnosis Date Noted     RUQ abdominal pain 02/12/2017     Priority: Medium     Pneumonia of right middle lobe due to infectious organism 02/12/2017     Priority: Medium     Tobacco abuse 02/12/2017     Priority: Medium     Status post laparoscopic assisted vaginal hysterectomy (LAVH) 06/25/2015     Priority: Medium     Surgery, elective 06/24/2015     Priority: Medium     Endometriosis 04/16/2015     Priority: Medium     Depo lupron 4/15.  Laparoscopy 3/15       ASCUS on Pap smear 07/18/2014     Priority: Medium     + hpv colpo 7/14       Postpartum depression 07/18/2014     Priority: Medium     zoloft rx       H. pylori infection      Priority: Medium     Moderate persistent asthma 03/19/2014     Priority: Medium     GERD (gastroesophageal reflux disease) 09/12/2013     Priority: Medium     Intermittent asthma 09/12/2013     Priority: Medium     Increased with pregnancy  Send for eval and asthma action plan  Smoking cessation counseling  Flu shot given          Past Medical History:    Past Medical History:   Diagnosis Date     Asthma      GERD (gastroesophageal reflux disease)      H. pylori infection        Past Surgical History:    Past Surgical History:    Procedure Laterality Date     COLONOSCOPY N/A 11/6/2014    Procedure: COLONOSCOPY;  Surgeon: Zacarias Paz MD;  [facility]     DILATION AND CURETTAGE, HYSTEROSCOPY DIAGNOSTIC, COMBINED  8/1/2014    Procedure: COMBINED DILATION AND CURETTAGE, HYSTEROSCOPY DIAGNOSTIC;  Surgeon: Rodolfo Clifford MD;  [facility]     ESOPHAGOSCOPY, GASTROSCOPY, DUODENOSCOPY (EGD), COMBINED N/A 9/5/2014    Procedure: COMBINED ESOPHAGOSCOPY, GASTROSCOPY, DUODENOSCOPY (EGD);  Surgeon: Zacarias Paz MD;  [facility]     HERNIORRHAPHY UMBILICAL N/A 9/3/2019    Procedure: OPEN UMBILICAL HERNIA REPAIR;  Surgeon: Carter Vaughn MD;  [facility]     LAPAROSCOPIC ASSISTED HYSTERECTOMY VAGINAL N/A 6/24/2015    Procedure: LAPAROSCOPIC ASSISTED HYSTERECTOMY VAGINAL;  Surgeon: Rodolfo Clifford MD;  [facility]     LAPAROSCOPIC OOPHORECTOMY Right 3/23/2016    Procedure: LAPAROSCOPIC OOPHORECTOMY;  Surgeon: Rodolfo Clifford MD;  [facility]     LAPAROSCOPIC SALPINGO-OOPHORECTOMY Left 5/3/2017    Procedure: LAPAROSCOPIC SALPINGO-OOPHORECTOMY;  LAPAROSCOPIC LEFT OOPHORECTOMY;  Surgeon: Rodolfo Clifford MD;  [facility]     LAPAROSCOPY DIAGNOSTIC (GYN) N/A 3/18/2015    Procedure: LAPAROSCOPY DIAGNOSTIC (GYN);  Surgeon: Rodolfo Clifford MD;  [facility]     no surgeries       SALPINGECTOMY Bilateral 6/24/2015    Procedure: SALPINGECTOMY;  Surgeon: Rodolfo Clifford MD;  [facility]       Family History:    Family History   Problem Relation Age of Onset     Asthma Mother      Unknown/Adopted Maternal Grandmother      Unknown/Adopted Maternal Grandfather        Social History:  Marital Status:  Single [1]  Social History     Tobacco Use     Smoking status: Current Every Day Smoker     Packs/day: 0.75     Years: 7.00     Pack years: 5.25     Types: Cigarettes     Smokeless tobacco: Never Used     Tobacco comment: declines quitline referral 9/10/19   Substance Use Topics     Alcohol use: Not Currently     Comment: social      Drug use: No        Medications:    Acetaminophen (TYLENOL PO)  albuterol (PROAIR HFA/PROVENTIL HFA/VENTOLIN HFA) 108 (90 Base) MCG/ACT inhaler  albuterol (PROAIR HFA/PROVENTIL HFA/VENTOLIN HFA) 108 (90 BASE) MCG/ACT Inhaler  aspirin (ASA) 81 MG chewable tablet  buPROPion (WELLBUTRIN XL) 300 MG 24 hr tablet  estradiol (ESTRACE) 1 MG tablet  ibuprofen (ADVIL/MOTRIN) 800 MG tablet  lithium (ESKALITH) 300 MG tablet  OMEPRAZOLE PO          Review of Systems   Constitutional: Negative for fever.   HENT: Negative for congestion.    Eyes: Negative for redness.   Respiratory: Negative for shortness of breath.    Cardiovascular: Negative for chest pain.   Gastrointestinal: Negative for abdominal pain.   Genitourinary: Negative for difficulty urinating.   Musculoskeletal: Negative for arthralgias and neck stiffness.   Skin: Negative for color change.   Neurological: Negative for headaches.   Psychiatric/Behavioral: Negative for confusion.   All other systems reviewed and are negative.      Physical Exam   BP: 119/80  Pulse: 107  Temp: 97.5  F (36.4  C)  Resp: 16  SpO2: 99 %      Physical Exam  Constitutional:       General: She is not in acute distress.     Appearance: She is not diaphoretic.   HENT:      Head: Atraumatic.   Eyes:      Pupils: Pupils are equal, round, and reactive to light.   Cardiovascular:      Rate and Rhythm: Regular rhythm.      Heart sounds: Normal heart sounds.   Pulmonary:      Effort: No respiratory distress.      Breath sounds: Normal breath sounds.   Chest:      Chest wall: No tenderness.   Abdominal:      General: Bowel sounds are normal.      Palpations: Abdomen is soft.      Tenderness: There is no abdominal tenderness.   Musculoskeletal:      Right shoulder: She exhibits tenderness, pain and spasm. She exhibits normal range of motion, no bony tenderness, no swelling, no effusion and no crepitus.      Cervical back: She exhibits no tenderness.      Thoracic back: She exhibits no tenderness.       Lumbar back: She exhibits no tenderness.   Skin:     Findings: No abrasion or laceration.   Neurological:      Mental Status: She is alert and oriented to person, place, and time.         ED Course        Procedures                   No results found for this or any previous visit (from the past 24 hour(s)).    Medications - No data to display    Assessments & Plan (with Medical Decision Making)   Right shoulder pain for 4 days after ripping up carpet  Xray negative  Likely shoulder strain, pt taking NSAIDs without releif  Short term 2 day oxycodone for pain , arm sling applied   Follow-up with PCP/ ortho    I have reviewed the nursing notes.    I have reviewed the findings, diagnosis, plan and need for follow up with the patient.      Discharge Medication List as of 6/13/2021 11:27 PM      START taking these medications    Details   oxyCODONE (ROXICODONE) 5 MG tablet Take 1 tablet (5 mg) by mouth every 6 hours as needed for pain, Disp-8 tablet, R-0, Local Print             Final diagnoses:   Strain of right shoulder, initial encounter       6/13/2021   [facility]     Rj Figueroa MD  06/20/21 0252

## 2021-07-25 ENCOUNTER — APPOINTMENT (OUTPATIENT)
Dept: GENERAL RADIOLOGY | Facility: HOSPITAL | Age: 31
End: 2021-07-25
Attending: PHYSICIAN ASSISTANT
Payer: COMMERCIAL

## 2021-07-25 ENCOUNTER — HOSPITAL ENCOUNTER (EMERGENCY)
Facility: HOSPITAL | Age: 31
Discharge: HOME | End: 2021-07-25
Attending: PHYSICIAN ASSISTANT | Admitting: PHYSICIAN ASSISTANT
Payer: COMMERCIAL

## 2021-07-25 VITALS
OXYGEN SATURATION: 100 % | HEART RATE: 93 BPM | TEMPERATURE: 99.2 F | RESPIRATION RATE: 18 BRPM | SYSTOLIC BLOOD PRESSURE: 119 MMHG | DIASTOLIC BLOOD PRESSURE: 83 MMHG

## 2021-07-25 DIAGNOSIS — S60.221A CONTUSION OF RIGHT HAND, INITIAL ENCOUNTER: ICD-10-CM

## 2021-07-25 PROCEDURE — 99213 OFFICE O/P EST LOW 20 MIN: CPT | Performed by: PHYSICIAN ASSISTANT

## 2021-07-25 PROCEDURE — G0463 HOSPITAL OUTPT CLINIC VISIT: HCPCS

## 2021-07-25 PROCEDURE — 73130 X-RAY EXAM OF HAND: CPT | Mod: RT

## 2021-07-25 PROCEDURE — 250N000013 HC RX MED GY IP 250 OP 250 PS 637: Performed by: PHYSICIAN ASSISTANT

## 2021-07-25 RX ORDER — ACETAMINOPHEN 325 MG/1
975 TABLET ORAL ONCE
Status: COMPLETED | OUTPATIENT
Start: 2021-07-25 | End: 2021-07-25

## 2021-07-25 RX ADMIN — ACETAMINOPHEN 975 MG: 325 TABLET, FILM COATED ORAL at 12:16

## 2021-07-25 NOTE — ED TRIAGE NOTES
Pt presets with right hand pain. Reports she was moving a couch and got hand stuck between railing and couch. Incident happened last night. Still has ROM in hand but states she can't move her middle finger. Pt took baby aspirin at 0800 this morning.

## 2021-07-25 NOTE — ED PROVIDER NOTES
History     Chief Complaint   Patient presents with     Hand Injury     HPI  Ihsan Millan is a 30 year old female who presents with complaints of right hand pain over dorsum of 3-5th MCPs since yesterday when she pinched her hand between a couch and railing while carrying it.      Allergies:  Allergies   Allergen Reactions     Skin Adhesives [Mecrylate] Dermatitis     Patient developed contact dermatitis after application of topical adhesive to surgical incision     Lexapro [Escitalopram] Nausea and Vomiting     Codeine Sulfate Rash     Penicillins Rash     Tramadol Rash       Problem List:    Patient Active Problem List    Diagnosis Date Noted     RUQ abdominal pain 02/12/2017     Priority: Medium     Pneumonia of right middle lobe due to infectious organism 02/12/2017     Priority: Medium     Tobacco abuse 02/12/2017     Priority: Medium     Status post laparoscopic assisted vaginal hysterectomy (LAVH) 06/25/2015     Priority: Medium     Surgery, elective 06/24/2015     Priority: Medium     Endometriosis 04/16/2015     Priority: Medium     Depo lupron 4/15.  Laparoscopy 3/15       ASCUS on Pap smear 07/18/2014     Priority: Medium     + hpv colpo 7/14       Postpartum depression 07/18/2014     Priority: Medium     zoloft rx       H. pylori infection      Priority: Medium     Moderate persistent asthma 03/19/2014     Priority: Medium     GERD (gastroesophageal reflux disease) 09/12/2013     Priority: Medium     Intermittent asthma 09/12/2013     Priority: Medium     Increased with pregnancy  Send for eval and asthma action plan  Smoking cessation counseling  Flu shot given          Past Medical History:    Past Medical History:   Diagnosis Date     Asthma      GERD (gastroesophageal reflux disease)      H. pylori infection        Social History:  Marital Status:  Single [1]  Social History     Tobacco Use     Smoking status: Current Every Day Smoker     Packs/day: 0.75     Years: 7.00     Pack years: 5.25      Types: Cigarettes     Smokeless tobacco: Never Used     Tobacco comment: declines quitline referral 9/10/19   Substance Use Topics     Alcohol use: Not Currently     Comment: social     Drug use: No        Medications:    Acetaminophen (TYLENOL PO)  albuterol (PROAIR HFA/PROVENTIL HFA/VENTOLIN HFA) 108 (90 Base) MCG/ACT inhaler  albuterol (PROAIR HFA/PROVENTIL HFA/VENTOLIN HFA) 108 (90 BASE) MCG/ACT Inhaler  aspirin (ASA) 81 MG chewable tablet  buPROPion (WELLBUTRIN XL) 300 MG 24 hr tablet  estradiol (ESTRACE) 1 MG tablet  ibuprofen (ADVIL/MOTRIN) 800 MG tablet  lithium (ESKALITH) 300 MG tablet  OMEPRAZOLE PO          Review of Systems :  Constitutional: Negative for fever.   MS: Pos right hand pain.    Physical Exam   BP: 119/83  Pulse: 93  Temp: 99.2  F (37.3  C)  Resp: 18  SpO2: 100 %    Physical Exam   Constitutional: Well-developed and well-nourished.   Head: Normocephalic and atraumatic.   Eyes: Conjunctivae and EOM are normal. Pupils are equal, round, and reactive to light.   Neck: Normal range of motion.   Pulmonary/Chest: Effort normal  Skin: Skin is warm and dry. No rash noted.   Neuro: Gait normal.    MS: Swelling, tenderness over right 3-5 MCP.  ROM limited due to pain.  CMS intact distal to injury.      ED Course               Results for orders placed or performed during the hospital encounter of 07/25/21 (from the past 24 hour(s))   XR Hand Right G/E 3 Views    Narrative    PROCEDURE:  XR HAND RT G/E 3 VW    HISTORY: pain of 3rd-4th MCPs    COMPARISON:  None.    TECHNIQUE:  3 views of the right hand were obtained.    FINDINGS:  No fracture or dislocation is identified. The joint spaces  are preserved.        Impression    IMPRESSION: No acute fracture.      KAYLA WYNNE MD         SYSTEM ID:  [id number]       Medications   acetaminophen (TYLENOL) tablet 975 mg (975 mg Oral Given 7/25/21 1216)       Assessments & Plan (with Medical Decision Making)     I have reviewed the nursing notes.    I  have reviewed the findings, diagnosis, plan and need for follow up with the patient.  May take up to 600 mg ibuprofen every 6 hours as needed for pain.     May take two extra strength Tylenol (acetaminophen 500 mg) every 8 hours as needed for pain.   Wrapped with gauze and Coban.         Medication List      There are no discharge medications for this visit.         Final diagnoses:   Contusion of right hand, initial encounter       TISHA Reyna on 7/25/2021 at 12:31 PM   7/25/2021   [facility]      Billy Howell PA  07/25/21 0095

## 2021-10-03 ENCOUNTER — HEALTH MAINTENANCE LETTER (OUTPATIENT)
Age: 31
End: 2021-10-03

## 2021-11-28 ENCOUNTER — HOSPITAL ENCOUNTER (EMERGENCY)
Facility: HOSPITAL | Age: 31
Discharge: HOME | End: 2021-11-29
Attending: EMERGENCY MEDICINE | Admitting: EMERGENCY MEDICINE
Payer: COMMERCIAL

## 2021-11-28 ENCOUNTER — APPOINTMENT (OUTPATIENT)
Dept: CT IMAGING | Facility: HOSPITAL | Age: 31
End: 2021-11-28
Attending: EMERGENCY MEDICINE
Payer: COMMERCIAL

## 2021-11-28 DIAGNOSIS — N30.00 ACUTE CYSTITIS WITHOUT HEMATURIA: ICD-10-CM

## 2021-11-28 DIAGNOSIS — R10.9 FLANK PAIN: ICD-10-CM

## 2021-11-28 LAB
ALBUMIN SERPL-MCNC: 3.7 G/DL (ref 3.4–5)
ALP SERPL-CCNC: 88 U/L (ref 40–150)
ALT SERPL W P-5'-P-CCNC: 21 U/L (ref 0–50)
ANION GAP SERPL CALCULATED.3IONS-SCNC: 5 MMOL/L (ref 3–14)
AST SERPL W P-5'-P-CCNC: 11 U/L (ref 0–45)
BASOPHILS # BLD AUTO: 0 10E3/UL (ref 0–0.2)
BASOPHILS NFR BLD AUTO: 0 %
BILIRUB SERPL-MCNC: 0.2 MG/DL (ref 0.2–1.3)
BUN SERPL-MCNC: 21 MG/DL (ref 7–30)
CALCIUM SERPL-MCNC: 8.3 MG/DL (ref 8.5–10.1)
CHLORIDE BLD-SCNC: 110 MMOL/L (ref 94–109)
CO2 SERPL-SCNC: 27 MMOL/L (ref 20–32)
CREAT SERPL-MCNC: 1.3 MG/DL (ref 0.52–1.04)
EOSINOPHIL # BLD AUTO: 0.3 10E3/UL (ref 0–0.7)
EOSINOPHIL NFR BLD AUTO: 5 %
ERYTHROCYTE [DISTWIDTH] IN BLOOD BY AUTOMATED COUNT: 12.6 % (ref 10–15)
GFR SERPL CREATININE-BSD FRML MDRD: 55 ML/MIN/1.73M2
GLUCOSE BLD-MCNC: 81 MG/DL (ref 70–99)
HCT VFR BLD AUTO: 40.4 % (ref 35–47)
HGB BLD-MCNC: 13.6 G/DL (ref 11.7–15.7)
IMM GRANULOCYTES # BLD: 0 10E3/UL
IMM GRANULOCYTES NFR BLD: 0 %
LYMPHOCYTES # BLD AUTO: 2.6 10E3/UL (ref 0.8–5.3)
LYMPHOCYTES NFR BLD AUTO: 46 %
MCH RBC QN AUTO: 29.8 PG (ref 26.5–33)
MCHC RBC AUTO-ENTMCNC: 33.7 G/DL (ref 31.5–36.5)
MCV RBC AUTO: 89 FL (ref 78–100)
MONOCYTES # BLD AUTO: 0.4 10E3/UL (ref 0–1.3)
MONOCYTES NFR BLD AUTO: 6 %
NEUTROPHILS # BLD AUTO: 2.5 10E3/UL (ref 1.6–8.3)
NEUTROPHILS NFR BLD AUTO: 43 %
NRBC # BLD AUTO: 0 10E3/UL
NRBC BLD AUTO-RTO: 0 /100
PLATELET # BLD AUTO: 236 10E3/UL (ref 150–450)
POTASSIUM BLD-SCNC: 3.7 MMOL/L (ref 3.4–5.3)
PROT SERPL-MCNC: 6.8 G/DL (ref 6.8–8.8)
RBC # BLD AUTO: 4.56 10E6/UL (ref 3.8–5.2)
SODIUM SERPL-SCNC: 142 MMOL/L (ref 133–144)
WBC # BLD AUTO: 5.8 10E3/UL (ref 4–11)

## 2021-11-28 PROCEDURE — 87086 URINE CULTURE/COLONY COUNT: CPT | Performed by: EMERGENCY MEDICINE

## 2021-11-28 PROCEDURE — 96374 THER/PROPH/DIAG INJ IV PUSH: CPT

## 2021-11-28 PROCEDURE — 74176 CT ABD & PELVIS W/O CONTRAST: CPT

## 2021-11-28 PROCEDURE — 250N000011 HC RX IP 250 OP 636: Performed by: EMERGENCY MEDICINE

## 2021-11-28 PROCEDURE — 84075 ASSAY ALKALINE PHOSPHATASE: CPT | Performed by: EMERGENCY MEDICINE

## 2021-11-28 PROCEDURE — 81001 URINALYSIS AUTO W/SCOPE: CPT | Performed by: EMERGENCY MEDICINE

## 2021-11-28 PROCEDURE — 36415 COLL VENOUS BLD VENIPUNCTURE: CPT | Performed by: EMERGENCY MEDICINE

## 2021-11-28 PROCEDURE — 99285 EMERGENCY DEPT VISIT HI MDM: CPT | Mod: 25

## 2021-11-28 PROCEDURE — 99285 EMERGENCY DEPT VISIT HI MDM: CPT | Performed by: EMERGENCY MEDICINE

## 2021-11-28 PROCEDURE — 81025 URINE PREGNANCY TEST: CPT | Performed by: EMERGENCY MEDICINE

## 2021-11-28 PROCEDURE — 85025 COMPLETE CBC W/AUTO DIFF WBC: CPT | Performed by: EMERGENCY MEDICINE

## 2021-11-28 RX ORDER — KETOROLAC TROMETHAMINE 30 MG/ML
30 INJECTION, SOLUTION INTRAMUSCULAR; INTRAVENOUS ONCE
Status: COMPLETED | OUTPATIENT
Start: 2021-11-28 | End: 2021-11-28

## 2021-11-28 RX ORDER — GABAPENTIN 300 MG/1
300 CAPSULE ORAL 2 TIMES DAILY
COMMUNITY
Start: 2021-11-23 | End: 2022-06-02

## 2021-11-28 RX ORDER — FENTANYL CITRATE 50 UG/ML
50 INJECTION, SOLUTION INTRAMUSCULAR; INTRAVENOUS ONCE
Status: COMPLETED | OUTPATIENT
Start: 2021-11-29 | End: 2021-11-29

## 2021-11-28 RX ORDER — GABAPENTIN 100 MG/1
CAPSULE ORAL
COMMUNITY
Start: 2021-09-19 | End: 2021-12-02

## 2021-11-28 RX ORDER — TRAZODONE HYDROCHLORIDE 50 MG/1
TABLET, FILM COATED ORAL AT BEDTIME
COMMUNITY
Start: 2021-11-23 | End: 2023-04-13

## 2021-11-28 RX ADMIN — KETOROLAC TROMETHAMINE 30 MG: 30 INJECTION, SOLUTION INTRAMUSCULAR; INTRAVENOUS at 22:15

## 2021-11-28 ASSESSMENT — ENCOUNTER SYMPTOMS
EYES NEGATIVE: 1
FLANK PAIN: 1
RESPIRATORY NEGATIVE: 1
CONSTITUTIONAL NEGATIVE: 1
NEUROLOGICAL NEGATIVE: 1
ABDOMINAL PAIN: 1
CARDIOVASCULAR NEGATIVE: 1

## 2021-11-29 VITALS
HEART RATE: 75 BPM | SYSTOLIC BLOOD PRESSURE: 95 MMHG | OXYGEN SATURATION: 96 % | TEMPERATURE: 97.6 F | RESPIRATION RATE: 16 BRPM | DIASTOLIC BLOOD PRESSURE: 66 MMHG

## 2021-11-29 LAB
ALBUMIN UR-MCNC: 20 MG/DL
APPEARANCE UR: ABNORMAL
BILIRUB UR QL STRIP: NEGATIVE
COLOR UR AUTO: YELLOW
GLUCOSE UR STRIP-MCNC: NEGATIVE MG/DL
HCG UR QL: NEGATIVE
HGB UR QL STRIP: NEGATIVE
KETONES UR STRIP-MCNC: NEGATIVE MG/DL
LEUKOCYTE ESTERASE UR QL STRIP: ABNORMAL
MUCOUS THREADS #/AREA URNS LPF: PRESENT /LPF
NITRATE UR QL: NEGATIVE
PH UR STRIP: 6.5 [PH] (ref 4.7–8)
RBC URINE: 3 /HPF
SP GR UR STRIP: 1.03 (ref 1–1.03)
SQUAMOUS EPITHELIAL: 7 /HPF
UROBILINOGEN UR STRIP-MCNC: NORMAL MG/DL
WBC URINE: 17 /HPF

## 2021-11-29 PROCEDURE — 250N000011 HC RX IP 250 OP 636: Performed by: EMERGENCY MEDICINE

## 2021-11-29 PROCEDURE — 250N000013 HC RX MED GY IP 250 OP 250 PS 637: Performed by: EMERGENCY MEDICINE

## 2021-11-29 PROCEDURE — 96375 TX/PRO/DX INJ NEW DRUG ADDON: CPT

## 2021-11-29 RX ORDER — NAPROXEN 500 MG/1
500 TABLET ORAL 2 TIMES DAILY WITH MEALS
Qty: 30 TABLET | Refills: 0 | Status: SHIPPED | OUTPATIENT
Start: 2021-11-29 | End: 2022-05-15

## 2021-11-29 RX ORDER — CIPROFLOXACIN 500 MG/1
500 TABLET, FILM COATED ORAL ONCE
Status: COMPLETED | OUTPATIENT
Start: 2021-11-29 | End: 2021-11-29

## 2021-11-29 RX ORDER — CIPROFLOXACIN 500 MG/1
500 TABLET, FILM COATED ORAL 2 TIMES DAILY
Qty: 10 TABLET | Refills: 0 | Status: SHIPPED | OUTPATIENT
Start: 2021-11-29 | End: 2021-12-03

## 2021-11-29 RX ADMIN — CIPROFLOXACIN 500 MG: 500 TABLET, FILM COATED ORAL at 00:22

## 2021-11-29 RX ADMIN — FENTANYL CITRATE 50 MCG: 50 INJECTION INTRAMUSCULAR; INTRAVENOUS at 00:07

## 2021-11-29 NOTE — ED PROVIDER NOTES
"  History     Chief Complaint   Patient presents with     Flank Pain     c/o bilateral flank pain.      HPI  Ihsan Millan is a 31 year old female who was to the emergency department complaining of a 1 week history of bilateral flank pain.  She states the pain is getting progressively worse.  She relates that she does have a history of \"dilated kidneys\".  She states the pain occasionally radiates into her abdomen.  She has had no nausea or vomiting.  She denies hematuria and dysuria.  She is not had diarrhea or constipation.  She has not had fevers or shaking chills.    Allergies:  Allergies   Allergen Reactions     Skin Adhesives [Mecrylate] Dermatitis     Patient developed contact dermatitis after application of topical adhesive to surgical incision     Lexapro [Escitalopram] Nausea and Vomiting     Codeine Sulfate Rash     Penicillins Rash     Tramadol Rash       Problem List:    Patient Active Problem List    Diagnosis Date Noted     RUQ abdominal pain 02/12/2017     Priority: Medium     Pneumonia of right middle lobe due to infectious organism 02/12/2017     Priority: Medium     Tobacco abuse 02/12/2017     Priority: Medium     Status post laparoscopic assisted vaginal hysterectomy (LAVH) 06/25/2015     Priority: Medium     Surgery, elective 06/24/2015     Priority: Medium     Endometriosis 04/16/2015     Priority: Medium     Depo lupron 4/15.  Laparoscopy 3/15       ASCUS on Pap smear 07/18/2014     Priority: Medium     + hpv colpo 7/14       Postpartum depression 07/18/2014     Priority: Medium     zoloft rx       H. pylori infection      Priority: Medium     Moderate persistent asthma 03/19/2014     Priority: Medium     GERD (gastroesophageal reflux disease) 09/12/2013     Priority: Medium     Intermittent asthma 09/12/2013     Priority: Medium     Increased with pregnancy  Send for eval and asthma action plan  Smoking cessation counseling  Flu shot given          Past Medical History:    Past Medical " History:   Diagnosis Date     Asthma      GERD (gastroesophageal reflux disease)      H. pylori infection        Past Surgical History:    Past Surgical History:   Procedure Laterality Date     COLONOSCOPY N/A 11/6/2014    Procedure: COLONOSCOPY;  Surgeon: Zacarias Paz MD;  Location: [facility]     DILATION AND CURETTAGE, HYSTEROSCOPY DIAGNOSTIC, COMBINED  8/1/2014    Procedure: COMBINED DILATION AND CURETTAGE, HYSTEROSCOPY DIAGNOSTIC;  Surgeon: Rodolfo Clifford MD;  Location: [facility]     ESOPHAGOSCOPY, GASTROSCOPY, DUODENOSCOPY (EGD), COMBINED N/A 9/5/2014    Procedure: COMBINED ESOPHAGOSCOPY, GASTROSCOPY, DUODENOSCOPY (EGD);  Surgeon: Zacarias Paz MD;  Location: [facility]     HERNIORRHAPHY UMBILICAL N/A 9/3/2019    Procedure: OPEN UMBILICAL HERNIA REPAIR;  Surgeon: Carter Vaughn MD;  Location: [facility]     LAPAROSCOPIC ASSISTED HYSTERECTOMY VAGINAL N/A 6/24/2015    Procedure: LAPAROSCOPIC ASSISTED HYSTERECTOMY VAGINAL;  Surgeon: Rodolfo Clifford MD;  Location: [facility]     LAPAROSCOPIC OOPHORECTOMY Right 3/23/2016    Procedure: LAPAROSCOPIC OOPHORECTOMY;  Surgeon: Rodolfo Clifford MD;  Location: [facility]     LAPAROSCOPIC SALPINGO-OOPHORECTOMY Left 5/3/2017    Procedure: LAPAROSCOPIC SALPINGO-OOPHORECTOMY;  LAPAROSCOPIC LEFT OOPHORECTOMY;  Surgeon: Rodolfo Clifford MD;  Location: [facility]     LAPAROSCOPY DIAGNOSTIC (GYN) N/A 3/18/2015    Procedure: LAPAROSCOPY DIAGNOSTIC (GYN);  Surgeon: Rodolfo Clifford MD;  Location: [facility]     no surgeries       SALPINGECTOMY Bilateral 6/24/2015    Procedure: SALPINGECTOMY;  Surgeon: Rodolfo Clifford MD;  Location: [facility]       Family History:    Family History   Problem Relation Age of Onset     Asthma Mother      Unknown/Adopted Maternal Grandmother      Unknown/Adopted Maternal Grandfather        Social History:  Marital Status:  Single [1]  Social History     Tobacco Use     Smoking status: Current Every Day Smoker     Packs/day: 0.75     Years: 7.00     Pack years: 5.25     Types: Cigarettes      Smokeless tobacco: Never Used     Tobacco comment: declines quitline referral 9/10/19   Substance Use Topics     Alcohol use: Not Currently     Comment: social     Drug use: No        Medications:    albuterol (PROAIR HFA/PROVENTIL HFA/VENTOLIN HFA) 108 (90 Base) MCG/ACT inhaler  aspirin (ASA) 81 MG chewable tablet  ciprofloxacin (CIPRO) 500 MG tablet  FLUoxetine (PROZAC) 20 MG capsule  gabapentin (NEURONTIN) 100 MG capsule  lithium (ESKALITH) 300 MG tablet  OMEPRAZOLE PO  traZODone (DESYREL) 50 MG tablet  Acetaminophen (TYLENOL PO)  albuterol (PROAIR HFA/PROVENTIL HFA/VENTOLIN HFA) 108 (90 BASE) MCG/ACT Inhaler  gabapentin (NEURONTIN) 300 MG capsule  ibuprofen (ADVIL/MOTRIN) 800 MG tablet          Review of Systems   Constitutional: Negative.    HENT: Negative.    Eyes: Negative.    Respiratory: Negative.    Cardiovascular: Negative.    Gastrointestinal: Positive for abdominal pain.   Genitourinary: Positive for flank pain.   Skin: Negative.    Neurological: Negative.    Please see history of chief complaint.  All other systems reviewed and they are found unremarkable per    Physical Exam   BP: 95/66  Pulse: 106  Temp: 97.6  F (36.4  C)  Resp: 16  SpO2: 100 %      Physical Exam is a 31-year-old female who is awake alert oriented person place and time.  She is pleasant and cooperative with my exam does appear mildly uncomfortable at rest.  HEENT normocephalic extraocular muscles intact pupils equally round and active light and oropharynx is clear.  Neck is supple is full range of motion without pain.  Lungs clear bilaterally.  Heart maintains a regular rate and rhythm.  Abdomen is soft no mass no organomegaly rebound.  Patient does have some tenderness to percussion on her flanks bilaterally.  Extremities have full range of motion no edema.  Neurologic exam no focal deficit.  Dermatologic exam no diffuse skin rashes or lesions noted.    ED Course          Noncontrast CT of the abdomen and pelvis is obtained and  "interpreted by radiology as \"no acute intra-abdominal pathology is identified on this unenhanced exam.  Specifically there is no evidence of hydronephrosis, renal or ureteral calculus.\"       Patient remained very stable throughout her stay in the department. I discussed lab and CT findings with her. She will be discharged with prescriptions for pain medication and antibiotics. I will advise her to be rechecked by her physician as soon as possible this coming week.           Results for orders placed or performed during the hospital encounter of 11/28/21 (from the past 24 hour(s))   CBC with platelets differential    Narrative    The following orders were created for panel order CBC with platelets differential.  Procedure                               Abnormality         Status                     ---------                               -----------         ------                     CBC with platelets and d...[[id number]]                      Final result                 Please view results for these tests on the individual orders.   Comprehensive metabolic panel   Result Value Ref Range    Sodium 142 133 - 144 mmol/L    Potassium 3.7 3.4 - 5.3 mmol/L    Chloride 110 (H) 94 - 109 mmol/L    Carbon Dioxide (CO2) 27 20 - 32 mmol/L    Anion Gap 5 3 - 14 mmol/L    Urea Nitrogen 21 7 - 30 mg/dL    Creatinine 1.30 (H) 0.52 - 1.04 mg/dL    Calcium 8.3 (L) 8.5 - 10.1 mg/dL    Glucose 81 70 - 99 mg/dL    Alkaline Phosphatase 88 40 - 150 U/L    AST 11 0 - 45 U/L    ALT 21 0 - 50 U/L    Protein Total 6.8 6.8 - 8.8 g/dL    Albumin 3.7 3.4 - 5.0 g/dL    Bilirubin Total 0.2 0.2 - 1.3 mg/dL    GFR Estimate 55 (L) >60 mL/min/1.73m2   CBC with platelets and differential   Result Value Ref Range    WBC Count 5.8 4.0 - 11.0 10e3/uL    RBC Count 4.56 3.80 - 5.20 10e6/uL    Hemoglobin 13.6 11.7 - 15.7 g/dL    Hematocrit 40.4 35.0 - 47.0 %    MCV 89 78 - 100 fL    MCH 29.8 26.5 - 33.0 pg    MCHC 33.7 31.5 - 36.5 g/dL    RDW 12.6 10.0 - " 15.0 %    Platelet Count 236 150 - 450 10e3/uL    % Neutrophils 43 %    % Lymphocytes 46 %    % Monocytes 6 %    % Eosinophils 5 %    % Basophils 0 %    % Immature Granulocytes 0 %    NRBCs per 100 WBC 0 <1 /100    Absolute Neutrophils 2.5 1.6 - 8.3 10e3/uL    Absolute Lymphocytes 2.6 0.8 - 5.3 10e3/uL    Absolute Monocytes 0.4 0.0 - 1.3 10e3/uL    Absolute Eosinophils 0.3 0.0 - 0.7 10e3/uL    Absolute Basophils 0.0 0.0 - 0.2 10e3/uL    Absolute Immature Granulocytes 0.0 <=0.0 10e3/uL    Absolute NRBCs 0.0 10e3/uL   UA with Microscopic reflex to Culture    Specimen: Urine, Clean Catch   Result Value Ref Range    Color Urine Yellow Colorless, Straw, Light Yellow, Yellow    Appearance Urine Slightly Cloudy (A) Clear    Glucose Urine Negative Negative mg/dL    Bilirubin Urine Negative Negative    Ketones Urine Negative Negative mg/dL    Specific Gravity Urine 1.030 1.003 - 1.035    Blood Urine Negative Negative    pH Urine 6.5 4.7 - 8.0    Protein Albumin Urine 20  (A) Negative mg/dL    Urobilinogen Urine Normal Normal, 2.0 mg/dL    Nitrite Urine Negative Negative    Leukocyte Esterase Urine Moderate (A) Negative    Mucus Urine Present (A) None Seen /LPF    RBC Urine 3 (H) <=2 /HPF    WBC Urine 17 (H) <=5 /HPF    Squamous Epithelials Urine 7 (H) <=1 /HPF    Narrative    Urine Culture ordered based on laboratory criteria   HCG qualitative urine   Result Value Ref Range    hCG Urine Qualitative Negative Negative       Medications   ketorolac (TORADOL) injection 30 mg (30 mg Intravenous Given 11/28/21 2215)   fentaNYL (PF) (SUBLIMAZE) injection 50 mcg (50 mcg Intravenous Given 11/29/21 0007)   ciprofloxacin (CIPRO) tablet 500 mg (500 mg Oral Given 11/29/21 0022)       Assessments & Plan (with Medical Decision Making)     I have reviewed the nursing notes.    I have reviewed the findings, diagnosis, plan and need for follow up with the patient.      New Prescriptions    CIPROFLOXACIN (CIPRO) 500 MG TABLET    Take 1 tablet  (500 mg) by mouth 2 times daily       Final diagnoses:   Flank pain   Acute cystitis without hematuria       11/28/2021   [facility]     Yusuf Antonio,   11/29/21 0053

## 2021-11-30 LAB — BACTERIA UR CULT: NORMAL

## 2021-12-02 ENCOUNTER — HOSPITAL ENCOUNTER (EMERGENCY)
Facility: HOSPITAL | Age: 31
Discharge: HOME | End: 2021-12-03
Attending: INTERNAL MEDICINE | Admitting: INTERNAL MEDICINE
Payer: COMMERCIAL

## 2021-12-02 ENCOUNTER — APPOINTMENT (OUTPATIENT)
Dept: CT IMAGING | Facility: HOSPITAL | Age: 31
End: 2021-12-02
Attending: INTERNAL MEDICINE
Payer: COMMERCIAL

## 2021-12-02 DIAGNOSIS — R10.31 RLQ ABDOMINAL PAIN: ICD-10-CM

## 2021-12-02 LAB
ALBUMIN SERPL-MCNC: 4 G/DL (ref 3.4–5)
ALP SERPL-CCNC: 98 U/L (ref 40–150)
ALT SERPL W P-5'-P-CCNC: 20 U/L (ref 0–50)
ANION GAP SERPL CALCULATED.3IONS-SCNC: 8 MMOL/L (ref 3–14)
AST SERPL W P-5'-P-CCNC: 9 U/L (ref 0–45)
BASOPHILS # BLD AUTO: 0 10E3/UL (ref 0–0.2)
BASOPHILS NFR BLD AUTO: 0 %
BILIRUB SERPL-MCNC: 0.3 MG/DL (ref 0.2–1.3)
BUN SERPL-MCNC: 21 MG/DL (ref 7–30)
CALCIUM SERPL-MCNC: 9.1 MG/DL (ref 8.5–10.1)
CHLORIDE BLD-SCNC: 107 MMOL/L (ref 94–109)
CO2 SERPL-SCNC: 26 MMOL/L (ref 20–32)
CREAT SERPL-MCNC: 0.89 MG/DL (ref 0.52–1.04)
CRP SERPL-MCNC: <2.9 MG/L (ref 0–8)
EOSINOPHIL # BLD AUTO: 0.3 10E3/UL (ref 0–0.7)
EOSINOPHIL NFR BLD AUTO: 4 %
ERYTHROCYTE [DISTWIDTH] IN BLOOD BY AUTOMATED COUNT: 12.5 % (ref 10–15)
GFR SERPL CREATININE-BSD FRML MDRD: 87 ML/MIN/1.73M2
GLUCOSE BLD-MCNC: 103 MG/DL (ref 70–99)
HCT VFR BLD AUTO: 40.7 % (ref 35–47)
HGB BLD-MCNC: 13.8 G/DL (ref 11.7–15.7)
HOLD SPECIMEN: NORMAL
IMM GRANULOCYTES # BLD: 0 10E3/UL
IMM GRANULOCYTES NFR BLD: 0 %
LYMPHOCYTES # BLD AUTO: 2.9 10E3/UL (ref 0.8–5.3)
LYMPHOCYTES NFR BLD AUTO: 40 %
MCH RBC QN AUTO: 29.7 PG (ref 26.5–33)
MCHC RBC AUTO-ENTMCNC: 33.9 G/DL (ref 31.5–36.5)
MCV RBC AUTO: 88 FL (ref 78–100)
MONOCYTES # BLD AUTO: 0.4 10E3/UL (ref 0–1.3)
MONOCYTES NFR BLD AUTO: 5 %
NEUTROPHILS # BLD AUTO: 3.7 10E3/UL (ref 1.6–8.3)
NEUTROPHILS NFR BLD AUTO: 51 %
NRBC # BLD AUTO: 0 10E3/UL
NRBC BLD AUTO-RTO: 0 /100
PLATELET # BLD AUTO: 253 10E3/UL (ref 150–450)
POTASSIUM BLD-SCNC: 3.9 MMOL/L (ref 3.4–5.3)
PROT SERPL-MCNC: 7.2 G/DL (ref 6.8–8.8)
RBC # BLD AUTO: 4.65 10E6/UL (ref 3.8–5.2)
SODIUM SERPL-SCNC: 141 MMOL/L (ref 133–144)
WBC # BLD AUTO: 7.3 10E3/UL (ref 4–11)

## 2021-12-02 PROCEDURE — 96374 THER/PROPH/DIAG INJ IV PUSH: CPT | Mod: XU

## 2021-12-02 PROCEDURE — 85025 COMPLETE CBC W/AUTO DIFF WBC: CPT | Performed by: NURSE PRACTITIONER

## 2021-12-02 PROCEDURE — 250N000011 HC RX IP 250 OP 636: Performed by: INTERNAL MEDICINE

## 2021-12-02 PROCEDURE — 36415 COLL VENOUS BLD VENIPUNCTURE: CPT | Performed by: NURSE PRACTITIONER

## 2021-12-02 PROCEDURE — 96375 TX/PRO/DX INJ NEW DRUG ADDON: CPT

## 2021-12-02 PROCEDURE — 80053 COMPREHEN METABOLIC PANEL: CPT | Performed by: NURSE PRACTITIONER

## 2021-12-02 PROCEDURE — 99285 EMERGENCY DEPT VISIT HI MDM: CPT | Performed by: INTERNAL MEDICINE

## 2021-12-02 PROCEDURE — 96361 HYDRATE IV INFUSION ADD-ON: CPT

## 2021-12-02 PROCEDURE — 74177 CT ABD & PELVIS W/CONTRAST: CPT

## 2021-12-02 PROCEDURE — 258N000003 HC RX IP 258 OP 636: Performed by: INTERNAL MEDICINE

## 2021-12-02 PROCEDURE — 86140 C-REACTIVE PROTEIN: CPT | Performed by: INTERNAL MEDICINE

## 2021-12-02 PROCEDURE — 99285 EMERGENCY DEPT VISIT HI MDM: CPT | Mod: 25

## 2021-12-02 RX ORDER — IOPAMIDOL 755 MG/ML
63 INJECTION, SOLUTION INTRAVASCULAR ONCE
Status: COMPLETED | OUTPATIENT
Start: 2021-12-02 | End: 2021-12-02

## 2021-12-02 RX ORDER — ONDANSETRON 2 MG/ML
4 INJECTION INTRAMUSCULAR; INTRAVENOUS ONCE
Status: COMPLETED | OUTPATIENT
Start: 2021-12-02 | End: 2021-12-02

## 2021-12-02 RX ORDER — MORPHINE SULFATE 2 MG/ML
4 INJECTION, SOLUTION INTRAMUSCULAR; INTRAVENOUS ONCE
Status: COMPLETED | OUTPATIENT
Start: 2021-12-02 | End: 2021-12-02

## 2021-12-02 RX ADMIN — IOPAMIDOL 63 ML: 755 INJECTION, SOLUTION INTRAVENOUS at 23:20

## 2021-12-02 RX ADMIN — SODIUM CHLORIDE 1000 ML: 9 INJECTION, SOLUTION INTRAVENOUS at 22:57

## 2021-12-02 RX ADMIN — MORPHINE SULFATE 4 MG: 2 INJECTION, SOLUTION INTRAMUSCULAR; INTRAVENOUS at 22:58

## 2021-12-02 RX ADMIN — ONDANSETRON 4 MG: 2 INJECTION INTRAMUSCULAR; INTRAVENOUS at 22:57

## 2021-12-03 VITALS
BODY MASS INDEX: 21.46 KG/M2 | HEART RATE: 83 BPM | DIASTOLIC BLOOD PRESSURE: 55 MMHG | TEMPERATURE: 98.7 F | OXYGEN SATURATION: 98 % | SYSTOLIC BLOOD PRESSURE: 90 MMHG | RESPIRATION RATE: 18 BRPM | WEIGHT: 125 LBS

## 2021-12-03 LAB
ALBUMIN UR-MCNC: 10 MG/DL
APPEARANCE UR: CLEAR
BILIRUB UR QL STRIP: NEGATIVE
COLOR UR AUTO: ABNORMAL
GLUCOSE UR STRIP-MCNC: NEGATIVE MG/DL
HCG UR QL: NEGATIVE
HGB UR QL STRIP: NEGATIVE
KETONES UR STRIP-MCNC: NEGATIVE MG/DL
LEUKOCYTE ESTERASE UR QL STRIP: ABNORMAL
MUCOUS THREADS #/AREA URNS LPF: PRESENT /LPF
NITRATE UR QL: NEGATIVE
PH UR STRIP: 5.5 [PH] (ref 4.7–8)
RBC URINE: 0 /HPF
SP GR UR STRIP: 1.01 (ref 1–1.03)
SQUAMOUS EPITHELIAL: 1 /HPF
UROBILINOGEN UR STRIP-MCNC: NORMAL MG/DL
WBC URINE: 1 /HPF

## 2021-12-03 PROCEDURE — 81001 URINALYSIS AUTO W/SCOPE: CPT | Performed by: NURSE PRACTITIONER

## 2021-12-03 PROCEDURE — 81025 URINE PREGNANCY TEST: CPT | Performed by: NURSE PRACTITIONER

## 2021-12-03 RX ORDER — CIPROFLOXACIN 500 MG/1
500 TABLET, FILM COATED ORAL 2 TIMES DAILY
Qty: 10 TABLET | Refills: 0 | Status: SHIPPED | OUTPATIENT
Start: 2021-12-03 | End: 2022-05-15

## 2021-12-03 ASSESSMENT — ENCOUNTER SYMPTOMS
NECK STIFFNESS: 0
ARTHRALGIAS: 0
NAUSEA: 1
FEVER: 0
HEADACHES: 0
EYE REDNESS: 0
DIFFICULTY URINATING: 0
ABDOMINAL PAIN: 1
COLOR CHANGE: 0
SHORTNESS OF BREATH: 0
CONFUSION: 0

## 2021-12-03 NOTE — ED PROVIDER NOTES
History     Chief Complaint   Patient presents with     Abdominal Pain     The history is provided by the patient.   Abdominal Pain  Pain location:  RLQ  Pain quality: aching and sharp    Pain severity:  Moderate  Onset quality:  Gradual  Duration:  1 day  Timing:  Constant  Progression:  Worsening  Chronicity:  New  Associated symptoms: nausea    Associated symptoms: no chest pain, no fever and no shortness of breath        Allergies:  Allergies   Allergen Reactions     Skin Adhesives [Mecrylate] Dermatitis     Patient developed contact dermatitis after application of topical adhesive to surgical incision     Lexapro [Escitalopram] Nausea and Vomiting     Codeine Sulfate Rash     Penicillins Rash     Tramadol Rash       Problem List:    Patient Active Problem List    Diagnosis Date Noted     RUQ abdominal pain 02/12/2017     Priority: Medium     Pneumonia of right middle lobe due to infectious organism 02/12/2017     Priority: Medium     Tobacco abuse 02/12/2017     Priority: Medium     Status post laparoscopic assisted vaginal hysterectomy (LAVH) 06/25/2015     Priority: Medium     Surgery, elective 06/24/2015     Priority: Medium     Endometriosis 04/16/2015     Priority: Medium     Depo lupron 4/15.  Laparoscopy 3/15       ASCUS on Pap smear 07/18/2014     Priority: Medium     + hpv colpo 7/14       Postpartum depression 07/18/2014     Priority: Medium     zoloft rx       H. pylori infection      Priority: Medium     Moderate persistent asthma 03/19/2014     Priority: Medium     GERD (gastroesophageal reflux disease) 09/12/2013     Priority: Medium     Intermittent asthma 09/12/2013     Priority: Medium     Increased with pregnancy  Send for eval and asthma action plan  Smoking cessation counseling  Flu shot given          Past Medical History:    Past Medical History:   Diagnosis Date     Asthma      GERD (gastroesophageal reflux disease)      H. pylori infection        Past Surgical History:    Past Surgical  History:   Procedure Laterality Date     COLONOSCOPY N/A 11/6/2014    Procedure: COLONOSCOPY;  Surgeon: Zacarias Paz MD;  Location: [facility]     DILATION AND CURETTAGE, HYSTEROSCOPY DIAGNOSTIC, COMBINED  8/1/2014    Procedure: COMBINED DILATION AND CURETTAGE, HYSTEROSCOPY DIAGNOSTIC;  Surgeon: Rodolfo Clifford MD;  [facility]: [facility]     ESOPHAGOSCOPY, GASTROSCOPY, DUODENOSCOPY (EGD), COMBINED N/A 9/5/2014    Procedure: COMBINED ESOPHAGOSCOPY, GASTROSCOPY, DUODENOSCOPY (EGD);  Surgeon: Zacarias Paz MD;  [facility]     HERNIORRHAPHY UMBILICAL N/A 9/3/2019    Procedure: OPEN UMBILICAL HERNIA REPAIR;  Surgeon: Carter Vaughn MD;  [facility]     LAPAROSCOPIC ASSISTED HYSTERECTOMY VAGINAL N/A 6/24/2015    Procedure: LAPAROSCOPIC ASSISTED HYSTERECTOMY VAGINAL;  Surgeon: Rodolfo Clifford MD;  [facility]     LAPAROSCOPIC OOPHORECTOMY Right 3/23/2016    Procedure: LAPAROSCOPIC OOPHORECTOMY;  Surgeon: Rodolfo Clifford MD;  [facility]     LAPAROSCOPIC SALPINGO-OOPHORECTOMY Left 5/3/2017    Procedure: LAPAROSCOPIC SALPINGO-OOPHORECTOMY;  LAPAROSCOPIC LEFT OOPHORECTOMY;  Surgeon: Rodolfo Clifford MD;  [facility]     LAPAROSCOPY DIAGNOSTIC (GYN) N/A 3/18/2015    Procedure: LAPAROSCOPY DIAGNOSTIC (GYN);  Surgeon: Rodolfo Clifford MD;  Location: [facility]     no surgeries       SALPINGECTOMY Bilateral 6/24/2015    Procedure: SALPINGECTOMY;  Surgeon: Rodolfo Clifford MD;  [facility]       Family History:    Family History   Problem Relation Age of Onset     Asthma Mother      Unknown/Adopted Maternal Grandmother      Unknown/Adopted Maternal Grandfather        Social History:  Marital Status:  Single [1]  Social History     Tobacco Use     Smoking status: Current Every Day Smoker     Packs/day: 0.75     Years: 7.00     Pack years: 5.25     Types: Cigarettes     Smokeless tobacco: Never Used     Tobacco comment: declines quitline referral 9/10/19   Substance Use Topics     Alcohol use: Not Currently      Comment: social     Drug use: No        Medications:    Acetaminophen (TYLENOL PO)  albuterol (PROAIR HFA/PROVENTIL HFA/VENTOLIN HFA) 108 (90 BASE) MCG/ACT Inhaler  aspirin (ASA) 81 MG chewable tablet  ciprofloxacin (CIPRO) 500 MG tablet  FLUoxetine (PROZAC) 20 MG capsule  gabapentin (NEURONTIN) 300 MG capsule  lithium (ESKALITH) 300 MG tablet  naproxen (NAPROSYN) 500 MG tablet  OMEPRAZOLE PO  traZODone (DESYREL) 50 MG tablet          Review of Systems   Constitutional: Negative for fever.   HENT: Negative for congestion.    Eyes: Negative for redness.   Respiratory: Negative for shortness of breath.    Cardiovascular: Negative for chest pain.   Gastrointestinal: Positive for abdominal pain and nausea.   Genitourinary: Negative for difficulty urinating.   Musculoskeletal: Negative for arthralgias and neck stiffness.   Skin: Negative for color change.   Neurological: Negative for headaches.   Psychiatric/Behavioral: Negative for confusion.   All other systems reviewed and are negative.      Physical Exam   BP: 118/76  Pulse: 97  Temp: 98.7  F (37.1  C)  Resp: 18  Weight: 56.7 kg (125 lb)  SpO2: 98 %      Physical Exam  Constitutional:       General: She is not in acute distress.     Appearance: She is not diaphoretic.   HENT:      Head: Atraumatic.      Mouth/Throat:      Pharynx: No oropharyngeal exudate.   Eyes:      General: No scleral icterus.     Pupils: Pupils are equal, round, and reactive to light.   Cardiovascular:      Heart sounds: Normal heart sounds.   Pulmonary:      Effort: No respiratory distress.      Breath sounds: Normal breath sounds.   Abdominal:      General: Bowel sounds are normal.      Palpations: Abdomen is soft.      Tenderness: There is abdominal tenderness in the right lower quadrant.   Musculoskeletal:         General: No tenderness.   Skin:     General: Skin is warm.      Findings: No rash.         ED Course                 Procedures             Results for orders placed or performed  during the hospital encounter of 12/02/21 (from the past 24 hour(s))   CBC with platelets differential    Narrative    The following orders were created for panel order CBC with platelets differential.  Procedure                               Abnormality         Status                     ---------                               -----------         ------                     CBC with platelets and d...[[id number]]                      Final result                 Please view results for these tests on the individual orders.   Comprehensive metabolic panel   Result Value Ref [place]    Sodium 141 133 - 144 mmol/L    Potassium 3.9 3.4 - 5.3 mmol/L    Chloride 107 94 - 109 mmol/L    Carbon Dioxide (CO2) 26 20 - 32 mmol/L    Anion Gap 8 3 - 14 mmol/L    Urea Nitrogen 21 7 - 30 mg/dL    Creatinine 0.89 0.52 - 1.04 mg/dL    Calcium 9.1 8.5 - 10.1 mg/dL    Glucose 103 (H) 70 - 99 mg/dL    Alkaline Phosphatase 98 40 - 150 U/L    AST 9 0 - 45 U/L    ALT 20 0 - 50 U/L    Protein Total 7.2 6.8 - 8.8 g/dL    Albumin 4.0 3.4 - 5.0 g/dL    Bilirubin Total 0.3 0.2 - 1.3 mg/dL    GFR Estimate 87 >60 mL/min/1.73m2   [place] Draw    Narrative    The following orders were created for panel order [place] Draw.  Procedure                               Abnormality         Status                     ---------                               -----------         ------                     Extra Blue Top Tube[[id number]]                              Final result               Extra Red Top Tube[[id number]]                               Final result               Extra Green Top (Lithium...[[id number]]                      Final result                 Please view results for these tests on the individual orders.   CBC with platelets and differential   Result Value Ref [place]    WBC Count 7.3 4.0 - 11.0 10e3/uL    RBC Count 4.65 3.80 - 5.20 10e6/uL    Hemoglobin 13.8 11.7 - 15.7 g/dL    Hematocrit 40.7 35.0 - 47.0 %    MCV 88 78 - 100 fL    MCH 29.7  26.5 - 33.0 pg    MCHC 33.9 31.5 - 36.5 g/dL    RDW 12.5 10.0 - 15.0 %    Platelet Count 253 150 - 450 10e3/uL    % Neutrophils 51 %    % Lymphocytes 40 %    % Monocytes 5 %    % Eosinophils 4 %    % Basophils 0 %    % Immature Granulocytes 0 %    NRBCs per 100 WBC 0 <1 /100    Absolute Neutrophils 3.7 1.6 - 8.3 10e3/uL    Absolute Lymphocytes 2.9 0.8 - 5.3 10e3/uL    Absolute Monocytes 0.4 0.0 - 1.3 10e3/uL    Absolute Eosinophils 0.3 0.0 - 0.7 10e3/uL    Absolute Basophils 0.0 0.0 - 0.2 10e3/uL    Absolute Immature Granulocytes 0.0 <=0.4 10e3/uL    Absolute NRBCs 0.0 10e3/uL   Extra Blue Top Tube   Result Value Ref [place]    Hold Specimen JIC    Extra Red Top Tube   Result Value Ref [place]    Hold Specimen JIC    Extra Green Top (Lithium Heparin) ON ICE   Result Value Ref [place]    Hold Specimen JIC    CRP inflammation   Result Value Ref [place]    CRP Inflammation <2.9 0.0 - 8.0 mg/L   Abd/pelvis CT - IV contrast only TRAUMA / AAA    Narrative    CT ABDOMEN PELVIS W CONTRAST    CLINICAL HISTORY: Female, age 31 years,  RLQ abdominal pain,  appendicitis suspected (Age >= 14y);    Comparison:  CT scan abdomen and pelvis 11/28/2021    TECHNIQUE:  CT was performed of the abdomen and pelvis with IV  contrast. Sagittal, coronal, axial and MIP reconstructions were  reviewed.     FINDINGS:  The lung bases are clear.    Stomach and duodenum: Normal    Liver: Heterogeneous density suggesting hepatic steatosis at the  falciform ligament.    Gallbladder: Normal.    Spleen: Normal.    Pancreas: Normal.    Adrenal glands: Normal    Kidneys: Normal.    Ureters: Visualized portions are normal.    Urinary bladder: Normal.    Large and small bowel: Large volume of stool throughout the colon. No  evidence of acute inflammatory process.    Appendix: Normal.    No evidence of pathologic lymph node enlargement.    The abdominal aorta is normal in appearance. The uterus is surgically  absent.     Bony structures demonstrate no acute  abnormality.      Impression    IMPRESSION:   No acute abnormality.    Additional findings as described above.    This report is in agreement with the preliminary report.    JANE NOEL MD         SYSTEM ID:  [id number]   HCG qualitative urine   Result Value Ref [place]    hCG Urine Qualitative Negative Negative   UA with Microscopic reflex to Culture    Specimen: Urine, Midstream   Result Value Ref [place]    Color Urine Light Yellow Colorless, Straw, Light Yellow, Yellow    Appearance Urine Clear Clear    Glucose Urine Negative Negative mg/dL    Bilirubin Urine Negative Negative    Ketones Urine Negative Negative mg/dL    Specific Gravity Urine 1.015 1.003 - 1.035    Blood Urine Negative Negative    pH Urine 5.5 4.7 - 8.0    Protein Albumin Urine 10  (A) Negative mg/dL    Urobilinogen Urine Normal Normal, 2.0 mg/dL    Nitrite Urine Negative Negative    Leukocyte Esterase Urine Small (A) Negative    Mucus Urine Present (A) None Seen /LPF    RBC Urine 0 <=2 /HPF    WBC Urine 1 <=5 /HPF    Squamous Epithelials Urine 1 <=1 /HPF    Narrative    Urine Culture not indicated       Medications   0.9% sodium chloride BOLUS (0 mLs Intravenous Stopped 12/3/21 0000)   morphine (PF) injection 4 mg (4 mg Intravenous Given 12/2/21 2258)   ondansetron (ZOFRAN) injection 4 mg (4 mg Intravenous Given 12/2/21 2257)   iopamidol (ISOVUE-370) solution 63 mL (63 mLs Intravenous Given 12/2/21 2320)   sodium chloride (PF) 0.9% PF flush 60 mL (50 mLs Intravenous Given 12/2/21 2320)       Assessments & Plan (with Medical Decision Making)   RLQ pain tenderness since last night  Labs reviiwed  CT abd reported as no acute finding  Symptom improved after receiving morphine zofran in ER  Unclear etiology   D C Home, follow-up with PCP  I have reviewed the nursing notes.    I have reviewed the findings, diagnosis, plan and need for follow up with the patient.      Discharge Medication List as of 12/3/2021 11:21 AM          Final diagnoses:   RLQ  abdominal pain       12/2/2021   [facility]     Rj Figueroa MD  12/03/21 [id number]

## 2021-12-03 NOTE — DISCHARGE INSTRUCTIONS
What to expect when you have contrast    During your exam, we will inject  contrast  into your vein or artery. (Contrast is a clear liquid with iodine in it. It shows up on X-rays.)    You may feel warm or hot. You may have a metal taste in your mouth and a slight upset stomach. You may also feel pressure near the kidneys and bladder. These effects will last about 1 to 3 minutes.    Please tell us if you have:    Sneezing     Itching    Hives     Swelling in the face    A hoarse voice    Breathing problems    Other new symptoms    Serious problems are rare.  They may include:    Irregular heartbeat     Seizures    Kidney failure              Tissue damage    Shock      Death    If you have any problems during the exam, we  will treat them right away.    When you get home    Call your hospital if you have any new symptoms in the next 2 days, like hives or swelling. (Phone numbers are at the bottom of this page.) Or call your family doctor.     If you have wheezing or trouble breathing, call 911.    Self-care  -Drink at least 4 extra glasses of water today.   This reduces the stress on your kidneys.  -Keep taking your regular medicines.    The contrast will pass out of your body in your  Urine(pee). This will happen in the next 24 hours. You  will not feel this. Your urine will not  change color.    If you have kidney problems or take metformin    Drink 4 to 8 large glasses of water for the next  2 days, if you are not on a fluid restriction.    ?If you take metformin (Glucophage or Glucovance) for diabetes, keep taking it.      ?Your kidney function tests are abnormal.  If you take Metformin, do not take it for 48 hours. Please go to your clinic for a blood test within 3 days after your exam before the restarting this medicine.     (Note to provider:please give patient prescription for lab tests.)    ?Special instructions: --    I have read and understand the above information.    Patient Sign  Here:______________________________________Date:________Time:______    Staff Sign Here:________________________________________Date:_______Time:______      Radiology Departments:     ?[facility]: [phone] ?[place]: [phone]     ?[place]: [phone] ?[place]:[phone]      ?[place]: [phone]  ?[place]: [phone]  ?[place]:[phone]    ?[facility] [place]:[phone]  ?[facility] [place]:[phone]

## 2022-05-10 ENCOUNTER — TRANSFERRED RECORDS (OUTPATIENT)
Dept: HEALTH INFORMATION MANAGEMENT | Facility: CLINIC | Age: 32
End: 2022-05-10
Payer: COMMERCIAL

## 2022-05-15 ENCOUNTER — APPOINTMENT (OUTPATIENT)
Dept: GENERAL RADIOLOGY | Facility: HOSPITAL | Age: 32
End: 2022-05-15
Attending: PHYSICIAN ASSISTANT
Payer: COMMERCIAL

## 2022-05-15 ENCOUNTER — HOSPITAL ENCOUNTER (EMERGENCY)
Facility: HOSPITAL | Age: 32
Discharge: HOME | End: 2022-05-15
Attending: PHYSICIAN ASSISTANT | Admitting: PHYSICIAN ASSISTANT
Payer: COMMERCIAL

## 2022-05-15 ENCOUNTER — HEALTH MAINTENANCE LETTER (OUTPATIENT)
Age: 32
End: 2022-05-15

## 2022-05-15 VITALS
OXYGEN SATURATION: 98 % | HEART RATE: 97 BPM | WEIGHT: 130 LBS | DIASTOLIC BLOOD PRESSURE: 78 MMHG | SYSTOLIC BLOOD PRESSURE: 114 MMHG | BODY MASS INDEX: 22.31 KG/M2 | RESPIRATION RATE: 16 BRPM | TEMPERATURE: 97.7 F

## 2022-05-15 DIAGNOSIS — S49.91XA INJURY OF RIGHT SHOULDER, INITIAL ENCOUNTER: ICD-10-CM

## 2022-05-15 DIAGNOSIS — S69.91XA WRIST INJURY, RIGHT, INITIAL ENCOUNTER: ICD-10-CM

## 2022-05-15 DIAGNOSIS — S69.91XA HAND INJURY, RIGHT, INITIAL ENCOUNTER: ICD-10-CM

## 2022-05-15 PROCEDURE — 73030 X-RAY EXAM OF SHOULDER: CPT | Mod: RT

## 2022-05-15 PROCEDURE — G0463 HOSPITAL OUTPT CLINIC VISIT: HCPCS | Mod: 25

## 2022-05-15 PROCEDURE — 96372 THER/PROPH/DIAG INJ SC/IM: CPT | Performed by: PHYSICIAN ASSISTANT

## 2022-05-15 PROCEDURE — 99214 OFFICE O/P EST MOD 30 MIN: CPT | Performed by: PHYSICIAN ASSISTANT

## 2022-05-15 PROCEDURE — 73130 X-RAY EXAM OF HAND: CPT | Mod: RT

## 2022-05-15 PROCEDURE — 250N000011 HC RX IP 250 OP 636: Performed by: PHYSICIAN ASSISTANT

## 2022-05-15 RX ORDER — DEXTROAMPHETAMINE SACCHARATE, AMPHETAMINE ASPARTATE, DEXTROAMPHETAMINE SULFATE AND AMPHETAMINE SULFATE 7.5; 7.5; 7.5; 7.5 MG/1; MG/1; MG/1; MG/1
TABLET ORAL
COMMUNITY
Start: 2022-05-10 | End: 2023-04-13

## 2022-05-15 RX ORDER — KETOROLAC TROMETHAMINE 30 MG/ML
30 INJECTION, SOLUTION INTRAMUSCULAR; INTRAVENOUS ONCE
Status: COMPLETED | OUTPATIENT
Start: 2022-05-15 | End: 2022-05-15

## 2022-05-15 RX ORDER — ESTRADIOL 1 MG/1
TABLET ORAL
COMMUNITY
Start: 2022-02-17 | End: 2023-04-13

## 2022-05-15 RX ORDER — DILTIAZEM HYDROCHLORIDE 60 MG/1
TABLET, FILM COATED ORAL
COMMUNITY
Start: 2022-05-11 | End: 2023-04-13

## 2022-05-15 RX ORDER — METHYLPREDNISOLONE 4 MG
4 TABLET, DOSE PACK ORAL SEE ADMIN INSTRUCTIONS
COMMUNITY
End: 2023-02-04

## 2022-05-15 RX ORDER — ARIPIPRAZOLE 5 MG/1
5 TABLET ORAL
COMMUNITY
Start: 2022-01-18 | End: 2022-06-02

## 2022-05-15 RX ADMIN — KETOROLAC TROMETHAMINE 30 MG: 30 INJECTION, SOLUTION INTRAMUSCULAR at 14:54

## 2022-05-15 NOTE — ED TRIAGE NOTES
Pt c/o pain in right arm from hand to shoulder. States she has 3 major deaths/losses in her family and was feeling frustrated and punched the wall last night.

## 2022-05-15 NOTE — ED PROVIDER NOTES
History     Chief Complaint   Patient presents with     Arm Pain     HPI  Ihsan Millan is a 31 year old female who presents to urgent care for evaluation of right arm injury.  Patient states that she punched a wall last night causing pain in her right hand, right wrist, and right shoulder.  Patient states that she did ice her shoulder and hand.  She states that she also took some Tylenol this morning.  She denies any previous fractures, dislocations or surgeries to right arm.    Allergies:  Allergies   Allergen Reactions     Skin Adhesives [Mecrylate] Dermatitis     Patient developed contact dermatitis after application of topical adhesive to surgical incision     Lexapro [Escitalopram] Nausea and Vomiting     Codeine Sulfate Rash     Penicillins Rash     Tramadol Rash       Problem List:    Patient Active Problem List    Diagnosis Date Noted     RUQ abdominal pain 02/12/2017     Priority: Medium     Pneumonia of right middle lobe due to infectious organism 02/12/2017     Priority: Medium     Tobacco abuse 02/12/2017     Priority: Medium     Status post laparoscopic assisted vaginal hysterectomy (LAVH) 06/25/2015     Priority: Medium     Surgery, elective 06/24/2015     Priority: Medium     Endometriosis 04/16/2015     Priority: Medium     Depo lupron 4/15.  Laparoscopy 3/15       ASCUS on Pap smear 07/18/2014     Priority: Medium     + hpv colpo 7/14       Postpartum depression 07/18/2014     Priority: Medium     zoloft rx       H. pylori infection      Priority: Medium     Moderate persistent asthma 03/19/2014     Priority: Medium     GERD (gastroesophageal reflux disease) 09/12/2013     Priority: Medium     Intermittent asthma 09/12/2013     Priority: Medium     Increased with pregnancy  Send for eval and asthma action plan  Smoking cessation counseling  Flu shot given          Past Medical History:    Past Medical History:   Diagnosis Date     Asthma      GERD (gastroesophageal reflux disease)      H.  pylori infection        Past Surgical History:    Past Surgical History:   Procedure Laterality Date     COLONOSCOPY N/A 11/6/2014    Procedure: COLONOSCOPY;  Surgeon: Zacarias Paz MD;  [facility]     DILATION AND CURETTAGE, HYSTEROSCOPY DIAGNOSTIC, COMBINED  8/1/2014    Procedure: COMBINED DILATION AND CURETTAGE, HYSTEROSCOPY DIAGNOSTIC;  Surgeon: Rodolfo Clifford MD;  [facility]     ESOPHAGOSCOPY, GASTROSCOPY, DUODENOSCOPY (EGD), COMBINED N/A 9/5/2014    Procedure: COMBINED ESOPHAGOSCOPY, GASTROSCOPY, DUODENOSCOPY (EGD);  Surgeon: Zacarias Paz MD;  [facility]     HERNIORRHAPHY UMBILICAL N/A 9/3/2019    Procedure: OPEN UMBILICAL HERNIA REPAIR;  Surgeon: Carter Vaughn MD;  [facility]     LAPAROSCOPIC ASSISTED HYSTERECTOMY VAGINAL N/A 6/24/2015    Procedure: LAPAROSCOPIC ASSISTED HYSTERECTOMY VAGINAL;  Surgeon: Rodolfo Clifford MD;  [facility]     LAPAROSCOPIC OOPHORECTOMY Right 3/23/2016    Procedure: LAPAROSCOPIC OOPHORECTOMY;  Surgeon: Rodolfo Clifford MD;  [facility]     LAPAROSCOPIC SALPINGO-OOPHORECTOMY Left 5/3/2017    Procedure: LAPAROSCOPIC SALPINGO-OOPHORECTOMY;  LAPAROSCOPIC LEFT OOPHORECTOMY;  Surgeon: Rodolfo Clifford MD;  [facility]     LAPAROSCOPY DIAGNOSTIC (GYN) N/A 3/18/2015    Procedure: LAPAROSCOPY DIAGNOSTIC (GYN);  Surgeon: Rodolfo Clifford MD;  [facility]     no surgeries       SALPINGECTOMY Bilateral 6/24/2015    Procedure: SALPINGECTOMY;  Surgeon: Rodolfo Clifford MD;  [facility]       Family History:    Family History   Problem Relation Age of Onset     Asthma Mother      Unknown/Adopted Maternal Grandmother      Unknown/Adopted Maternal Grandfather        Social History:  Marital Status:  Single [1]  Social History     Tobacco Use     Smoking status: Current Every Day Smoker     Packs/day: 0.75     Years: 7.00     Pack years: 5.25     Types: Cigarettes     Smokeless tobacco: Never Used     Tobacco comment: declines quitline referral 9/10/19    Substance Use Topics     Alcohol use: Not Currently     Comment: social     Drug use: No        Medications:    Acetaminophen (TYLENOL PO)  albuterol (PROAIR HFA/PROVENTIL HFA/VENTOLIN HFA) 108 (90 BASE) MCG/ACT Inhaler  amphetamine-dextroamphetamine (ADDERALL) 30 MG tablet  ARIPiprazole (ABILIFY) 5 MG tablet  aspirin (ASA) 81 MG chewable tablet  estradiol (ESTRACE) 1 MG tablet  FLUoxetine (PROZAC) 20 MG capsule  gabapentin (NEURONTIN) 300 MG capsule  lithium (ESKALITH) 300 MG tablet  methylPREDNISolone (MEDROL DOSEPAK) 4 MG tablet therapy pack  OMEPRAZOLE PO  SYMBICORT 80-4.5 MCG/ACT Inhaler  tiZANidine (ZANAFLEX) 4 MG tablet  traZODone (DESYREL) 50 MG tablet          Review of Systems   Musculoskeletal:        Arm injury   All other systems reviewed and are negative.      Physical Exam   BP: 114/78  Pulse: 97  Temp: 97.7  F (36.5  C)  Resp: 16  Weight: 59 kg (130 lb)  SpO2: 98 %      Physical Exam  Vitals and nursing note reviewed.   Constitutional:       Appearance: Normal appearance. She is not ill-appearing.   Cardiovascular:      Rate and Rhythm: Regular rhythm.      Heart sounds: Normal heart sounds.   Pulmonary:      Breath sounds: Normal breath sounds.   Musculoskeletal:      Right shoulder: Tenderness and bony tenderness present. Decreased range of motion.      Comments: Patient has diffuse tenderness over entire right shoulder.  Patient refuses to do range of motion because of pain.    Patient also has diffuse tenderness over right wrist and right hand.  Patient also refuses to do range of motion or strength testing due to pain.  No obvious deformities, step-offs, abrasions, lacerations, or swelling is visible on physical exam of right shoulder or right wrist or right hand.   Neurological:      Mental Status: She is oriented to person, place, and time.         ED Course                 Procedures             Critical Care time:               No results found for this or any previous visit (from the past  24 hour(s)).    Medications   ketorolac (TORADOL) injection 30 mg (30 mg Intramuscular Given 5/15/22 8063)       Assessments & Plan (with Medical Decision Making)   #1.  Right shoulder injury  #2.  Right wrist injury  #3.  Right hand injury    Discussed exam findings with patient as well as the read report of right shoulder x-ray and right hand x-ray.  No apparent acute fracture or dislocations.  Patient was given Toradol 30 mg IM in urgent care today.  She is instructed to not take any more NSAIDs for the rest of the day but may take Tylenol as directed.  Patient is also given discharge prescription for Zanaflex.  She is encouraged to alternate ice and heat for her pain and also to avoid hitting any more walls.  Patient's right arm was placed in a sling for comfort.  Any additional concerns please return to urgent care or follow-up with primary care provider.  Patient verbalized understanding and agreement of plan.    I have reviewed the nursing notes.    I have reviewed the findings, diagnosis, plan and need for follow up with the patient.    New Prescriptions    TIZANIDINE (ZANAFLEX) 4 MG TABLET    Take 1 tablet (4 mg) by mouth 3 times daily as needed for muscle spasms (MUSCLE SPASM)       Final diagnoses:   Injury of right shoulder, initial encounter   Wrist injury, right, initial encounter   Hand injury, right, initial encounter       5/15/2022   [facility]     Cecilio Freitas PA-C  05/15/22 [id number]

## 2022-05-31 ENCOUNTER — HOSPITAL ENCOUNTER (OUTPATIENT)
Dept: BEHAVIORAL HEALTH | Facility: HOSPITAL | Age: 32
Discharge: HOME | End: 2022-05-31
Attending: SOCIAL WORKER | Admitting: SOCIAL WORKER
Payer: COMMERCIAL

## 2022-05-31 PROCEDURE — 90791 PSYCH DIAGNOSTIC EVALUATION: CPT | Performed by: SOCIAL WORKER

## 2022-05-31 ASSESSMENT — ANXIETY QUESTIONNAIRES
7. FEELING AFRAID AS IF SOMETHING AWFUL MIGHT HAPPEN: MORE THAN HALF THE DAYS
5. BEING SO RESTLESS THAT IT IS HARD TO SIT STILL: SEVERAL DAYS
GAD7 TOTAL SCORE: 11
IF YOU CHECKED OFF ANY PROBLEMS ON THIS QUESTIONNAIRE, HOW DIFFICULT HAVE THESE PROBLEMS MADE IT FOR YOU TO DO YOUR WORK, TAKE CARE OF THINGS AT HOME, OR GET ALONG WITH OTHER PEOPLE: VERY DIFFICULT
1. FEELING NERVOUS, ANXIOUS, OR ON EDGE: SEVERAL DAYS
2. NOT BEING ABLE TO STOP OR CONTROL WORRYING: MORE THAN HALF THE DAYS
6. BECOMING EASILY ANNOYED OR IRRITABLE: SEVERAL DAYS
3. WORRYING TOO MUCH ABOUT DIFFERENT THINGS: MORE THAN HALF THE DAYS
GAD7 TOTAL SCORE: 11

## 2022-05-31 ASSESSMENT — PATIENT HEALTH QUESTIONNAIRE - PHQ9
5. POOR APPETITE OR OVEREATING: MORE THAN HALF THE DAYS
SUM OF ALL RESPONSES TO PHQ QUESTIONS 1-9: 17
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
## 2022-06-02 ENCOUNTER — HOSPITAL ENCOUNTER (OUTPATIENT)
Dept: BEHAVIORAL HEALTH | Facility: HOSPITAL | Age: 32
Discharge: HOME | End: 2022-06-02
Attending: PSYCHIATRY & NEUROLOGY
Payer: COMMERCIAL

## 2022-06-02 DIAGNOSIS — F33.0 MAJOR DEPRESSIVE DISORDER, RECURRENT EPISODE, MILD (H): Primary | ICD-10-CM

## 2022-06-02 DIAGNOSIS — M62.838 MUSCLE SPASM: ICD-10-CM

## 2022-06-02 DIAGNOSIS — F33.1 MAJOR DEPRESSIVE DISORDER, RECURRENT EPISODE, MODERATE (H): ICD-10-CM

## 2022-06-02 DIAGNOSIS — K21.9 GASTROESOPHAGEAL REFLUX DISEASE WITHOUT ESOPHAGITIS: ICD-10-CM

## 2022-06-02 DIAGNOSIS — F90.9 ATTENTION DEFICIT HYPERACTIVITY DISORDER (ADHD), UNSPECIFIED ADHD TYPE: ICD-10-CM

## 2022-06-02 PROCEDURE — H0035 MH PARTIAL HOSP TX UNDER 24H: HCPCS | Performed by: SOCIAL WORKER

## 2022-06-02 RX ORDER — ESTRADIOL 1 MG/1
1 TABLET ORAL DAILY
Qty: 60 TABLET | Refills: 0 | Status: SHIPPED | OUTPATIENT
Start: 2022-06-02 | End: 2023-04-13

## 2022-06-02 RX ORDER — TRAZODONE HYDROCHLORIDE 150 MG/1
150 TABLET ORAL
Qty: 60 TABLET | Refills: 0 | Status: SHIPPED | OUTPATIENT
Start: 2022-06-02 | End: 2023-04-13

## 2022-06-02 RX ORDER — LITHIUM CARBONATE 600 MG/1
600 CAPSULE ORAL 2 TIMES DAILY
Qty: 120 CAPSULE | Refills: 0 | Status: SHIPPED | OUTPATIENT
Start: 2022-06-02 | End: 2023-02-04

## 2022-06-02 RX ORDER — DEXTROAMPHETAMINE SACCHARATE, AMPHETAMINE ASPARTATE, DEXTROAMPHETAMINE SULFATE AND AMPHETAMINE SULFATE 7.5; 7.5; 7.5; 7.5 MG/1; MG/1; MG/1; MG/1
30 TABLET ORAL 2 TIMES DAILY
Qty: 60 TABLET | Refills: 0 | Status: SHIPPED | OUTPATIENT
Start: 2022-06-02 | End: 2023-03-10

## 2022-06-02 RX ORDER — TIZANIDINE HYDROCHLORIDE 4 MG/1
4 CAPSULE, GELATIN COATED ORAL DAILY PRN
Qty: 30 CAPSULE | Refills: 0 | Status: SHIPPED | OUTPATIENT
Start: 2022-06-02 | End: 2023-03-04

## 2022-06-02 RX ORDER — GABAPENTIN 300 MG/1
300 CAPSULE ORAL 2 TIMES DAILY
Qty: 120 CAPSULE | Refills: 0 | Status: SHIPPED | OUTPATIENT
Start: 2022-06-02 | End: 2023-02-04

## 2022-06-02 RX ORDER — ARIPIPRAZOLE 5 MG/1
5 TABLET ORAL DAILY
Qty: 60 TABLET | Refills: 0 | Status: SHIPPED | OUTPATIENT
Start: 2022-06-02 | End: 2023-04-13

## 2022-06-02 NOTE — PROGRESS NOTES
"Group Therapy Progress Notes     Will learn and practice 1-2 Coping skills to help improve depression symptoms and to interrupt suicidal ideations.     Area of Treatment Focus:  Personal Safety     Therapeutic Interventions/Treatment Strategies:  Assist clients in establishing / strengthening support network  Assist to identify treatment goals  Assist with discharge planning  Assess / reassess for appropriate therapy program involvement, encourage participation in therapies  Assess / reassess level of potential for harm to self or others  Engage in safety planning when indicated  Facilitate increased self awareness  Teach adaptive coping skills and communication skills  Use reality based supportive approach     Response to Treatment Strategies:  Accepted Feedback, Gave Feedback, Listened  and Focused on Goals     Name of Groups:  Coping Skills 10-10:50; 1-1:50     Description and Therapeutic Outcome:     Ihsan was not present during 10:00am Coping Skills group.     During the follow up Coping Skills group, Ihsan presented as mostly quietly attentive, though responsive when prompted. She identifies that she will work on Relationship Circles by setting healthy Boundaries on unhealthy friendships. \"I detach with hate\" and will work on the Detach with Love concept. Ihsan presents as interested and focused and appropriate for PHP.    Is this a Weekly Review of the Progress on the Treatment Plan?  NO    Are Treatment Plan Goals being addressed?  YES      Are Treatment Plan Strategies to Address Goals Effective?  YES      Are there any current contracts in place?  YES           "

## 2022-06-02 NOTE — TREATMENT PLAN
Individualized Treatment Plan     Date of Plan: 2022    Name: Ihsan Millan MRN: [id number]    : 1990    Programs:  [facility] ()     DSM-V Diagnoses: 296.32 (F33.1) Major Depressive Disorder, Recurrent Episode, Moderate _ and With anxious distress;  ADHD; Generalized Anxiety Disorder; Insomnia; Stimulant Use Disorder Per Dr Bridget Forrester 22  DA Date: 22  Psychosocial & Contextual Factors: family of origin issues, financial hardship, health issues, limited social support, mental health symptoms and occupational / vocational stress  WHODAS: 39  LOCUS: 22      Team Members Contributing to Plan:  Dr. Vandana Do, [facility]  Marina Avery, [facility]  Aliyah Perry [facility]    Client Strengths:  caring, committed to sobriety, creative, empathetic, goal-focused, good listener, has a previous history of therapy, insightful, intelligent, motivated, open to learning, open to suggestions / feedback, responsible parent, supportive, wants to learn, willing to ask questions, willing to relate to others and work history    Client Participation in Plan:  Agrees with plan   Received copy of treatment plan     Areas of Vulnerability:  Suicidal Ideation   Anxiety  Depressive symptoms   Physical/medical: per medical chart   Trauma/Abuse/Neglect    Long-Term Goals:  Knowledge about illness and management of symptoms   Maintenance of personal safety   Maintenance of sobriety     Abuse Prevention Plan:  Safe, therapeutic environment   Safety coping plan as needed   Education regarding illness and skill development   Medication adjustment/management (MI/CD)     Discharge Criteria:  Satisfactory progress toward treatment goals   Improvement re: identified problems and symptoms   Ability to continue recovery at next level of service   Has a discharge plan in place   Ability to maintain sobriety  Regular attendance as scheduled           Areas of Treatment Focus            Area of Treatment  Focus:   Personal Safety  Start Date:    6/2/22    Goal:  Target Date: 6/10/22 Status: Active  Client will notify staff when needing assistance to develop or implement a coping plan to manage suicidal or self injurious urges.  Client will learn and practice 1 - 2 coping skills to help improve anxiety/panic and depressive symptoms.      Progress:             Treatment Strategies:   Assist clients in establishing / strengthening support network  Assist to identify treatment goals  Assist with discharge planning  Assess / reassess for appropriate therapy program involvement, encourage participation in therapies  Assess / reassess level of potential for harm to self or others  Engage in safety planning when indicated  Facilitate increased self awareness  Teach adaptive coping skills and communication skills  Use reality based supportive approach    These services will include group therapy and OT group therapy daily and individual therapy and medications management as needed.                 Area of Treatment Focus:   Symptom Stabilization and Management  Start Date:    6/13/22    Goal:  Target Date: 6/17/22 Status: Active  Will Improve Mindfulness / Stay in the Here and Now Intentionally bringing your attention to something beautiful, pleasant, or interesting that is occurring or is present in your immediate environment or experience on a regular basis.      Progress:             Treatment Strategies:   Assist clients in establishing / strengthening support network  Assist with discharge planning  Engage in safety planning when indicated  Facilitate increased self awareness  Teach adaptive coping skills and communication skills  Use reality based supportive approach    These services will include group therapy and OT group therapy daily and individual therapy and medications management as needed.               Area of Treatment Focus:   Community Resources / Support and Discharge Planning  Start Date:    6/20/22     Goal:  Target Date: 6/27/22 Status: Active  Will increase effective use of support / increase ability to ask for help. Identify support needs, create a list of things you could use help with, Identify providers you will continue to see for individual therapy, psychiatric care, group therapy, or other specialized providers. and Create a return to work, return to school, or return to daily routines plan.      Progress:             Treatment Strategies:   Assist clients in establishing / strengthening support network  Assist with discharge planning  Engage in safety planning when indicated  Facilitate increased self awareness  Teach adaptive coping skills and communication skills  Use reality based supportive approach    These services will include group therapy and OT group therapy daily and individual therapy and medications management as needed.

## 2022-06-02 NOTE — PROGRESS NOTES
Group Therapy Progress Notes      Client Initial Individualized Goals for Treatment: Client will notify staff when needing assistance to develop or implement a coping plan to manage suicidal or self injurious urges.  Client will learn and practice 1 - 2 coping skills to help improve anxiety/panic and depressive symptoms.     Area of Treatment Focus:  Personal Safety     Therapeutic Interventions/Treatment Strategies:  Assist clients in establishing / strengthening support network  Assist with discharge planning  Assess / reassess level of potential for harm to self or others  Engage in safety planning when indicated  Facilitate increased self awareness  Teach adaptive coping skills and communication skills  Use reality based supportive approach     Response to Treatment Strategies:  Accepted Feedback, Listened  and Accepted Support     Name of Groups: Psychotherapy Wrap Up Group 2-3pm     Description and Therapeutic Outcome:   In psychotherapy group, Ihsan reviewed the takeaways from her first day.  She identified appreciating learning more about setting boundaries; She identified she wants to change how she currently does this in a hateful manner to doing it with love.  She advised she is spending time with her daughter tonight and will be working on focusing on the present moment - no safety concerns identified. Ihsan had a good first day being alert engaged, and open to feedback; she remains appropriate for PHP.         Is this a Weekly Review of the Progress on the Treatment Plan?  NO     Are Treatment Plan Goals being addressed?  YES        Are Treatment Plan Strategies to Address Goals Effective?  YES        Are there any current contracts in place?  YES

## 2022-06-06 ENCOUNTER — HOSPITAL ENCOUNTER (OUTPATIENT)
Dept: BEHAVIORAL HEALTH | Facility: HOSPITAL | Age: 32
Discharge: HOME | End: 2022-06-06
Attending: PSYCHIATRY & NEUROLOGY
Payer: COMMERCIAL

## 2022-06-06 PROCEDURE — H0035 MH PARTIAL HOSP TX UNDER 24H: HCPCS | Performed by: SOCIAL WORKER

## 2022-06-06 NOTE — PROGRESS NOTES
"Group Therapy Progress Notes     Client Initial Individualized Goals for Treatment:     Will learn and practice 1-2 Coping skills to help improve depression symptoms and to interrupt suicidal ideations.    Area of Treatment Focus:  Personal Safety    Therapeutic Interventions/Treatment Strategies:  Assist clients in establishing / strengthening support network  Assist to identify treatment goals  Assist with discharge planning  Assess / reassess for appropriate therapy program involvement, encourage participation in therapies  Assess / reassess level of potential for harm to self or others  Engage in safety planning when indicated  Facilitate increased self awareness  Teach adaptive coping skills and communication skills  Use reality based supportive approach    Response to Treatment Strategies:  Accepted Feedback, Gave Feedback, Listened  and Focused on Goals    Name of Groups:  Coping Skills - Time: 10-10:50; Emotional Regulation 1-1:50    Description and Therapeutic Outcome:     Ihsan was not present during 10:00am Coping Skills group.     During Emotional Regulation group, Ihsan presented as mostly quiet, though was amenable to read from the handout. She stated \"I am strong in spite of everything I have been going through. I am creating my own happiness\". Encouraged 3 Good Things, 3C's and Gratitude skills. She presents as attentive and intentional regarding her wellness and remains appropriate for PHP.    Is this a Weekly Review of the Progress on the Treatment Plan?  NO    Are Treatment Plan Goals being addressed?  YES      Are Treatment Plan Strategies to Address Goals Effective?  YES      Are there any current contracts in place?  YES           "

## 2022-06-06 NOTE — PROGRESS NOTES
Group Therapy Progress Notes     Client Initial Individualized Goals for Treatment:     Client will notify staff when needing assistance to develop or implement a coping plan to manage suicidal or self injurious urges.  Client will learn and practice 1 - 2 coping skills to help improve anxiety/panic and depressive symptoms.    Area of Treatment Focus:  Personal Safety    Therapeutic Interventions/Treatment Strategies:  Assist clients in establishing / strengthening support network  Assist to identify treatment goals  Assist with discharge planning  Assess / reassess for appropriate therapy program involvement, encourage participation in therapies  Assess / reassess level of potential for harm to self or others  Engage in safety planning when indicated  Facilitate increased self awareness  Teach adaptive coping skills and communication skills  Use reality based supportive approach    Response to Treatment Strategies:  Accepted Feedback, Gave Feedback, Listened , Focused on Goals and Attentive    Name of Groups:  Assertiveness - Time: 11:10-12:00    Description and Therapeutic Outcome:   Assertiveness   OT Group - Assertiveness, Boundaries, and Positive Affirmations  This group focuses on passive, aggressive, and assertive communication styles and educates clients on how each of the styles can affect our relationships and how we respond to situations. Reviewed each type of communication and benefits and consequences of each type.  Also encouraged assertive body language and the use of SAS (State the problem, Ask for what you need, and Spell out the benefits of cooperation).  Clients are given the opportunity to share scenarios where they utilized each type of communication and what the end result of each type was.      Ihsan notes that she is a passive communicator and sometimes falls back into this communication style when she is trying to make a change.        Is this a Weekly Review of the Progress on the Treatment  Plan?  YES    Are Treatment Plan Goals being addressed?  YES      Are Treatment Plan Strategies to Address Goals Effective?  YES      Are there any current contracts in place?  YES

## 2022-06-06 NOTE — PROGRESS NOTES
"Group Therapy Progress Notes     Client Initial Individualized Goals for Treatment: Will learn and practice 1-2 Coping skills to help improve depression symptoms and to interrupt suicidal ideations.    Area of Treatment Focus:  Personal Safety and Symptom Stabilization and Management    Therapeutic Interventions/Treatment Strategies:  Assist clients in establishing / strengthening support network  Assist to identify treatment goals  Assist with discharge planning  Assess / reassess for appropriate therapy program involvement, encourage participation in therapies  Assess / reassess level of potential for harm to self or others  Engage in safety planning when indicated  Facilitate increased self awareness  Teach adaptive coping skills and communication skills    Response to Treatment Strategies:  Accepted Feedback, Listened  and Focused on Goals    Name of Groups: Psychotherapy Wrap Up Group 2-3pm    Description and Therapeutic Outcome:   During wrap up group, Ihsan reviewed her takeaways from today. She presented as very quiet during group and was slightly withdrawn.  She reported relating to the Hello Emotion topic today and using think, feel,choose to manage her responses to emotions. In the past she reported become angry at certain emotions, but is working on increasing awareness and acceptance.  She identified working on \"not losing the present to the past\" when reminded of certain events/emotions.  Ihsan reported no specific plans for the night but will continue working on mindfulness and awareness; no safety concerns identified.  She remains appropriate for PHP.      Is this a Weekly Review of the Progress on the Treatment Plan?  YES    Are Treatment Plan Goals being addressed?  YES      Are Treatment Plan Strategies to Address Goals Effective?  YES      Are there any current contracts in place?  YES             "

## 2022-06-07 ENCOUNTER — HOSPITAL ENCOUNTER (OUTPATIENT)
Dept: BEHAVIORAL HEALTH | Facility: HOSPITAL | Age: 32
Discharge: HOME | End: 2022-06-07
Attending: PSYCHIATRY & NEUROLOGY
Payer: COMMERCIAL

## 2022-06-07 PROCEDURE — H0035 MH PARTIAL HOSP TX UNDER 24H: HCPCS | Performed by: SOCIAL WORKER

## 2022-06-07 NOTE — PROGRESS NOTES
Group Therapy Progress Notes     Client Initial Individualized Goals for Treatment:     Will learn and practice 1-2 Coping skills to help improve depression symptoms and to interrupt suicidal ideations.    Area of Treatment Focus:  Personal Safety    Therapeutic Interventions/Treatment Strategies:  Assist clients in establishing / strengthening support network  Assist to identify treatment goals  Assist with discharge planning  Assess / reassess for appropriate therapy program involvement, encourage participation in therapies  Assess / reassess level of potential for harm to self or others  Engage in safety planning when indicated  Facilitate increased self awareness  Teach adaptive coping skills and communication skills  Use reality based supportive approach    Response to Treatment Strategies:  Accepted Feedback, Gave Feedback, Listened  and Focused on Goals    Name of Groups:  Spirituality - Time: 10-10:50; Affirmations1-1:50    Description and Therapeutic Outcome:     During Spirituality group, Ihsan presented as focused and easily responsive when prompted. She was also amenable today to read out loud from the worksheet. Although not initiating feedback, Ihsan presented as interested in the topic, providing positive eye contact and responded to a peer that engaged with her. She presents as intentional regarding her wellness and remains appropriate for PHP.    Ihsan was not present during the 1-1:50 Affirmations group. Handouts will be provided and affirmations encouraged.    Is this a Weekly Review of the Progress on the Treatment Plan?  NO    Are Treatment Plan Goals being addressed?  YES      Are Treatment Plan Strategies to Address Goals Effective?  YES      Are there any current contracts in place?  YES

## 2022-06-07 NOTE — PROGRESS NOTES
Ihsan was not able to attend [facility] today due to childcare issue. She plans to return on Monday.

## 2022-06-07 NOTE — PROGRESS NOTES
Group Therapy Progress Notes     Client Initial Individualized Goals for Treatment:     Will learn and practice 1-2 Coping skills to help improve depression symptoms and to interrupt suicidal ideations.    Area of Treatment Focus:  Personal Safety    Therapeutic Interventions/Treatment Strategies:  Assist clients in establishing / strengthening support network  Assist to identify treatment goals  Assist with discharge planning  Assess / reassess for appropriate therapy program involvement, encourage participation in therapies  Assess / reassess level of potential for harm to self or others  Engage in safety planning when indicated  Facilitate increased self awareness  Teach adaptive coping skills and communication skills  Use reality based supportive approach    Response to Treatment Strategies:  Accepted Feedback, Gave Feedback, Listened  and Focused on Goals    Name of Groups:  Psychotherapy group 9-10a    Description and Therapeutic Outcome:     Ihsan did not attend this group due to oversleeping, she states she will be at [facility] shortly.      Is this a Weekly Review of the Progress on the Treatment Plan?  NO    Are Treatment Plan Goals being addressed?  YES      Are Treatment Plan Strategies to Address Goals Effective?  YES      Are there any current contracts in place?  YES

## 2022-06-07 NOTE — PROGRESS NOTES
Group Therapy Progress Notes     Client Initial Individualized Goals for Treatment: Will learn and practice 1-2 Coping skills to help improve depression symptoms and to interrupt suicidal ideations.    Area of Treatment Focus:  Personal Safety    Therapeutic Interventions/Treatment Strategies:  Assist clients in establishing / strengthening support network  Assist to identify treatment goals  Assist with discharge planning  Assess / reassess for appropriate therapy program involvement, encourage participation in therapies  Assess / reassess level of potential for harm to self or others  Engage in safety planning when indicated  Facilitate increased self awareness  Teach adaptive coping skills and communication skills  Use reality based supportive approach    Response to Treatment Strategies:  Focused on Goals and Accepted Support    Name of Groups:  Employment Planning - Time: 11:10-12:00    Description and Therapeutic Outcome:   OT life skills group - Finding Purpose  This group focus involves clients identifying their purpose in life.  Education and handouts provided on this.  Discussed how our purposeful activities can change throughout our life span.  Discussion on setting healthy boundaries and finding time for self care as our purposeful activities can cause stress and anxiety in life.  Encouraged a healthy balanced lifestyle and seeking out purposeful activities.  In OT group today, Ihsan presented as quiet, shy but focused.  Only spoke when cued to do so and declined wanting to share anything about her purposeful activities in life.        Is this a Weekly Review of the Progress on the Treatment Plan?  NO    Are Treatment Plan Goals being addressed?  YES      Are Treatment Plan Strategies to Address Goals Effective?  YES      Are there any current contracts in place?  YES

## 2022-06-08 ENCOUNTER — HOSPITAL ENCOUNTER (OUTPATIENT)
Dept: BEHAVIORAL HEALTH | Facility: HOSPITAL | Age: 32
Discharge: HOME | End: 2022-06-08
Attending: PSYCHIATRY & NEUROLOGY
Payer: COMMERCIAL

## 2022-06-08 PROCEDURE — H0035 MH PARTIAL HOSP TX UNDER 24H: HCPCS | Performed by: SOCIAL WORKER

## 2022-06-08 NOTE — PROGRESS NOTES
Group Therapy Progress Notes     Client Initial Individualized Goals for Treatment:  Will learn and practice 1-2 Coping skills to help improve depression symptoms and to interrupt suicidal ideations.    Area of Treatment Focus:  Personal Safety and Symptom Stabilization and Management    Therapeutic Interventions/Treatment Strategies:  Assist clients in establishing / strengthening support network  Assist to identify treatment goals  Assist with discharge planning  Assess / reassess for appropriate therapy program involvement, encourage participation in therapies  Assess / reassess level of potential for harm to self or others  Engage in safety planning when indicated  Facilitate increased self awareness  Teach adaptive coping skills and communication skills    Response to Treatment Strategies:  Accepted Feedback, Gave Feedback, Listened  and Accepted Support    Name of Groups:  Network Development - Time: 11:10-12:00    Description and Therapeutic Outcome:   Discussed the steps to consider when thinking about network or support development I.e. Getting to know yourself/insight, development of physical and emotional resources, matthieu your passion not people, and being proactive and invite people into your life. Provided handout on rating and identifying symptoms of depression and anxiety, Identification of what is helpful and not helpful when experiencing anxiety or depressive episodes, and levels of support during these episodes.  Discussed identifying who in your support group is best suited for different symptoms. Lastly discussed ways to build and assess support. Provided group members with a list of chemical and mental health resources.  In OT group today, Ihsan presented as brighter and more engaged.  States that her dogs are her most loyal supports.  Discussed how she does not feel like she can trust people so it makes it hard to use them as supports.         Is this a Weekly Review of the Progress on the  Treatment Plan?  NO    Are Treatment Plan Goals being addressed?  YES      Are Treatment Plan Strategies to Address Goals Effective?  YES      Are there any current contracts in place?  YES

## 2022-06-08 NOTE — PROGRESS NOTES
Group Therapy Progress Notes     Client Initial Individualized Goals for Treatment: Will learn and practice 1-2 Coping skills to help improve depression symptoms and to interrupt suicidal ideations.    Area of Treatment Focus:  Personal Safety and Symptom Stabilization and Management    Therapeutic Interventions/Treatment Strategies:  Assist clients in establishing / strengthening support network  Assist to identify treatment goals  Assist with discharge planning  Assess / reassess for appropriate therapy program involvement, encourage participation in therapies  Assess / reassess level of potential for harm to self or others  Engage in safety planning when indicated  Facilitate increased self awareness  Teach adaptive coping skills and communication skills    Response to Treatment Strategies:  Accepted Feedback, Listened  and Focused on Goals    Name of Groups: Psychotherapy Wrap Up Group 2-3pm    Description and Therapeutic Outcome:   Ihsan was not present in this group today.    Is this a Weekly Review of the Progress on the Treatment Plan?  No    Are Treatment Plan Goals being addressed?  YES      Are Treatment Plan Strategies to Address Goals Effective?  YES      Are there any current contracts in place?  YES

## 2022-06-08 NOTE — PROGRESS NOTES
Group Therapy Progress Notes     Client Initial Individualized Goals for Treatment:     Will learn and practice 1-2 Coping skills to help improve depression symptoms and to interrupt suicidal ideations.    Area of Treatment Focus:  Personal Safety    Therapeutic Interventions/Treatment Strategies:  Assist clients in establishing / strengthening support network  Assist to identify treatment goals  Assist with discharge planning  Assess / reassess for appropriate therapy program involvement, encourage participation in therapies  Assess / reassess level of potential for harm to self or others  Engage in safety planning when indicated  Facilitate increased self awareness  Teach adaptive coping skills and communication skills  Use reality based supportive approach    Response to Treatment Strategies:  Accepted Feedback, Gave Feedback, Listened  and Focused on Goals    Name of Groups:  Psychosis - Time: 10-10:50; Depression 1-1:50    Description and Therapeutic Outcome:     During Psychosis group, Ihsan presented as mostly quiet.  She did read when prompted and responded briefly to a question from a peer. Ihsan presented as moderately distracted. When asked to read, she did not know where the place was. Ihsan is using music for interruption. Staff will continue to promote practicing the skills. She remains appropriate for PHP.    Ihsan was not present during the 1-1:50 group on Depression due to a medical appointment.      Is this a Weekly Review of the Progress on the Treatment Plan?  NO    Are Treatment Plan Goals being addressed?  YES      Are Treatment Plan Strategies to Address Goals Effective?  YES      Are there any current contracts in place?  YES

## 2022-06-09 ENCOUNTER — TELEPHONE (OUTPATIENT)
Dept: BEHAVIORAL HEALTH | Facility: HOSPITAL | Age: 32
End: 2022-06-09
Payer: COMMERCIAL

## 2022-06-09 ENCOUNTER — HOSPITAL ENCOUNTER (OUTPATIENT)
Dept: BEHAVIORAL HEALTH | Facility: HOSPITAL | Age: 32
Discharge: HOME | End: 2022-06-09
Attending: PSYCHIATRY & NEUROLOGY
Payer: COMMERCIAL

## 2022-06-09 PROCEDURE — H0035 MH PARTIAL HOSP TX UNDER 24H: HCPCS | Performed by: SOCIAL WORKER

## 2022-06-09 NOTE — PROGRESS NOTES
Group Therapy Progress Notes     Client Initial Individualized Goals for Treatment: Will learn and practice 1-2 Coping skills to help improve depression symptoms and to interrupt suicidal ideations.    Area of Treatment Focus:  Personal Safety and Symptom Stabilization and Management    Therapeutic Interventions/Treatment Strategies:  Assist clients in establishing / strengthening support network  Assist to identify treatment goals  Assist with discharge planning  Assess / reassess for appropriate therapy program involvement, encourage participation in therapies  Assess / reassess level of potential for harm to self or others  Engage in safety planning when indicated  Facilitate increased self awareness  Teach adaptive coping skills and communication skills    Response to Treatment Strategies:  Accepted Feedback, Listened  and Focused on Goals    Name of Groups: Psychotherapy Wrap Up Group 2-3pm    Description and Therapeutic Outcome:   In psychotherapy wrap up group, Ihsan reviewed her take away from today - initially stated she was very tired and struggled to stay awake today - discussed her on-going sleep issues - struggled to fall asleep and then does not stay asleep once she falls asleep so feels like she got maybe 2 hours of sleep last night.  Discussed some options for sleep hygiene routine - the group gave her some helpful feedback and suggestions.  She did report struggling with having 7 dogs right now - again was given support and feedback from the group and accepted this well.  She did state she started a new sleep med and it does not seem to be working well - offered to have meet with Dr. Velasquez again next and review and she would like to do this.  She did also report that when she gets home from the group, she takes the paperwork from the day and goes over it with her boyfriend because he always asks what she learned in the day so she is making it a pattern to go over all the paperwork with him after  group to help her remember what she is learning.  Reviewed evening plans - no safety issues noted - she remains appropriate for PHP.    Is this a Weekly Review of the Progress on the Treatment Plan?  No    Are Treatment Plan Goals being addressed?  YES      Are Treatment Plan Strategies to Address Goals Effective?  YES      Are there any current contracts in place?  YES

## 2022-06-09 NOTE — PROGRESS NOTES
Group Therapy Progress Notes     Client Initial Individualized Goals for Treatment: Will learn and practice 1-2 Coping skills to help improve depression symptoms and to interrupt suicidal ideations.    Area of Treatment Focus:  Personal Safety and Symptom Stabilization and Management    Therapeutic Interventions/Treatment Strategies:  Assist clients in establishing / strengthening support network  Assist to identify treatment goals  Assist with discharge planning  Assess / reassess for appropriate therapy program involvement, encourage participation in therapies  Assess / reassess level of potential for harm to self or others  Engage in safety planning when indicated  Facilitate increased self awareness  Teach adaptive coping skills and communication skills    Response to Treatment Strategies:  Accepted Feedback, Gave Feedback, Listened  and Accepted Support    Name of Groups:  Healthy Leisure - Time: 11:10-12:00    Description and Therapeutic Outcome:   OT group - Healthy leisure  Group today is focused on healthy leisure exploration.  Watched a short video on the benefits of healthy leisure, particularly outdoor activity and the benefits it provides to our overall health.  Handouts and education provided on the benefits of hobbies and leisure activities.  Discussed the 6 types of leisure (social, creative, physical, cognitive, relaxation and spiritual) and clients completed a checklist to learn about what types of leisure they engage in mostly.  Clients ended group with creating a leisure goal.  Handout issued with healthy leisure options to do on the [place].  In OT group today, Ihsan presents as somewhat distracted but was participatory and engaged.  States that she would like to spend more time outside, set a realistic goal for this.  Talks about spending much ofher time on crafting.         Is this a Weekly Review of the Progress on the Treatment Plan?  NO    Are Treatment Plan Goals being  addressed?  YES      Are Treatment Plan Strategies to Address Goals Effective?  YES      Are there any current contracts in place?  YES

## 2022-06-09 NOTE — TELEPHONE ENCOUNTER
Medical opinion forms faxed to:    [facility]  F: [phone]    Copy made, original sent to scanning.

## 2022-06-09 NOTE — PROGRESS NOTES
"Group Therapy Progress Notes     Client Initial Individualized Goals for Treatment:     Will learn and practice 1-2 Coping skills to help improve depression symptoms and to interrupt suicidal ideations.    Area of Treatment Focus:  Personal Safety    Therapeutic Interventions/Treatment Strategies:  Assist clients in establishing / strengthening support network  Assist to identify treatment goals  Assist with discharge planning  Assess / reassess for appropriate therapy program involvement, encourage participation in therapies  Assess / reassess level of potential for harm to self or others  Engage in safety planning when indicated  Facilitate increased self awareness  Teach adaptive coping skills and communication skills  Use reality based supportive approach    Response to Treatment Strategies:  Accepted Feedback, Gave Feedback and Listened     Name of Groups:  Schemas - Time: 10-10:50; 1-1:50    Description and Therapeutic Outcome:     During Schemas group, Ihsan presented as attentive and responsive. She has been working on coloring pictures that are positive quotes. Staff reinforced her \"Life is Tough and So Are you\" quote and underlined her resilience despite adversity. Her responses reflected a grasp of the concept. She has also been sharing her insights from group with her boyfriend.    During the follow up group, Ihsan read from the handouts. She was very tired due to poor sleep the night before hence had minimal responses. She presents as intentional regarding her wellness and remains appropriate for PHP.    Is this a Weekly Review of the Progress on the Treatment Plan?  NO    Are Treatment Plan Goals being addressed?  YES      Are Treatment Plan Strategies to Address Goals Effective?  YES      Are there any current contracts in place?  YES           "

## 2022-06-10 ENCOUNTER — MEDICAL CORRESPONDENCE (OUTPATIENT)
Dept: CT IMAGING | Facility: HOSPITAL | Age: 32
End: 2022-06-10
Payer: COMMERCIAL

## 2022-06-13 ENCOUNTER — TELEPHONE (OUTPATIENT)
Dept: BEHAVIORAL HEALTH | Facility: HOSPITAL | Age: 32
End: 2022-06-13
Payer: COMMERCIAL

## 2022-06-13 NOTE — TELEPHONE ENCOUNTER
Patient is unable to attend PHP today d/t prearranged appointments. She plans to return tomorrow 6/14/22.

## 2022-06-14 ENCOUNTER — HOSPITAL ENCOUNTER (OUTPATIENT)
Dept: BEHAVIORAL HEALTH | Facility: HOSPITAL | Age: 32
Discharge: HOME | End: 2022-06-14
Attending: PSYCHIATRY & NEUROLOGY
Payer: COMMERCIAL

## 2022-06-14 PROCEDURE — H0035 MH PARTIAL HOSP TX UNDER 24H: HCPCS | Performed by: SOCIAL WORKER

## 2022-06-14 NOTE — PROGRESS NOTES
Group Therapy Progress Notes     Client Initial Individualized Goals for Treatment: Will Improve Mindfulness / Stay in the Here and Now Intentionally bringing your attention to something beautiful, pleasant, or interesting that is occurring or is present in your immediate environment or experience on a regular basis.    Area of Treatment Focus:  Symptom Stabilization and Management    Therapeutic Interventions/Treatment Strategies:  Assist clients in establishing / strengthening support network  Assist with discharge planning  Engage in safety planning when indicated  Facilitate increased self awareness  Teach adaptive coping skills and communication skills  Use reality based supportive approach    Response to Treatment Strategies:  Accepted Feedback, Gave Feedback, Listened  and Accepted Support    Name of Groups:  Pain Management - Time: 11:10-12:00    Description and Therapeutic Outcome:   Pain Management  OT life skills group with focus on pain management and its relation to physical and emotional pain.   Education provided on tips to manage both emotional and physical pain, including the anti- inflammatory diet, adapting your home environment to reduce pain and manage fatigue, creating a healthy support network and socializing.  Handout given with focus on balance and arthritis friendly exercises, body scan and body awareness.  In OT group today, Ihsan is quiet but focused.  States that she has chronic physical pain as well as emotional pain.  States that she feels she is limited in her support to help cope and manage the pain.        Is this a Weekly Review of the Progress on the Treatment Plan?  NO    Are Treatment Plan Goals being addressed?  YES      Are Treatment Plan Strategies to Address Goals Effective?  YES      Are there any current contracts in place?  YES
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
## 2022-06-15 ENCOUNTER — HOSPITAL ENCOUNTER (OUTPATIENT)
Dept: BEHAVIORAL HEALTH | Facility: HOSPITAL | Age: 32
Discharge: HOME | End: 2022-06-15
Attending: PSYCHIATRY & NEUROLOGY
Payer: COMMERCIAL

## 2022-06-15 PROCEDURE — H0035 MH PARTIAL HOSP TX UNDER 24H: HCPCS | Performed by: SOCIAL WORKER

## 2022-06-15 NOTE — PROGRESS NOTES
"Group Therapy Progress Notes     Client Initial Individualized Goals for Treatment:     Will Improve Mindfulness / Stay in the Here and Now Intentionally bringing your attention to something beautiful, pleasant, or interesting that is occurring or is present in your immediate environment or experience on a regular basis.    Area of Treatment Focus:  Symptom Stabilization and Management    Therapeutic Interventions/Treatment Strategies:  Assist clients in establishing / strengthening support network  Assist with discharge planning  Engage in safety planning when indicated  Facilitate increased self awareness  Teach adaptive coping skills and communication skills  Use reality based supportive approach    Response to Treatment Strategies:  Accepted Feedback, Gave Feedback, Listened  and Focused on Goals    Name of Groups:  Coping Skills - Time: 10-10:50; 1-1:50    Description and Therapeutic Outcome:     Ihsan presented as humorous, social and engaged during Coping Skills group. She reports having called her Mom and decided and did forgive her mom regarding their issues. Ihsan was pleased to share that \"I went through my notes from group yesterday\" and presents with awareness regarding the importance of beginning to practice and implement the skills. Discussed Present Moment, 3 Good Things; PTG; gaining Mastery; Be Metric. Ihsan continues to present as appropriate for PHP.    Ihsan was not present during the 1-1:50 follow up Coping Skills group.    Is this a Weekly Review of the Progress on the Treatment Plan?  NO    Are Treatment Plan Goals being addressed?  YES      Are Treatment Plan Strategies to Address Goals Effective?  YES      Are there any current contracts in place?  YES           "

## 2022-06-15 NOTE — PROGRESS NOTES
Group Therapy Progress Notes     Client Initial Individualized Goals for Treatment: Will Improve Mindfulness / Stay in the Here and Now Intentionally bringing your attention to something beautiful, pleasant, or interesting that is occurring or is present in your immediate environment or experience on a regular basis.    Area of Treatment Focus:  Symptom Stabilization and Management    Therapeutic Interventions/Treatment Strategies:  Assist clients in establishing / strengthening support network  Assist with discharge planning  Engage in safety planning when indicated  Facilitate increased self awareness  Teach adaptive coping skills and communication skills  Use reality based supportive approach    Response to Treatment Strategies:  Accepted Feedback, Gave Feedback and Accepted Support    Name of Groups:  Exercise - Time: 11:10-12:00    Description and Therapeutic Outcome:   Exercise  OT life skills group with focus on the benefits of exercise.  Education provided with handouts on positive effects of exercise and mental health, different activities to try, ways to improve follow through, how to take your pulse, and target heart rates for age.  Worksheets provided for clients to create individualized exercise goals and exercise log. Theraband is offered with demonstrations as well as a handouts with specific theraband home exercise program guidelines.  In OT group today, Ihsan is quiet but brighter today.  States that she walk a lot and feels her current exercise regimen is sufficient.         Is this a Weekly Review of the Progress on the Treatment Plan?  NO    Are Treatment Plan Goals being addressed?  YES      Are Treatment Plan Strategies to Address Goals Effective?  YES      Are there any current contracts in place?  YES

## 2022-06-16 ENCOUNTER — HOSPITAL ENCOUNTER (OUTPATIENT)
Dept: BEHAVIORAL HEALTH | Facility: HOSPITAL | Age: 32
Discharge: HOME | End: 2022-06-16
Attending: PSYCHIATRY & NEUROLOGY
Payer: COMMERCIAL

## 2022-06-16 PROCEDURE — H0035 MH PARTIAL HOSP TX UNDER 24H: HCPCS | Performed by: SOCIAL WORKER

## 2022-06-16 NOTE — PROGRESS NOTES
Ihsan did not attend [facility] today due to having two medical appointments, she will return tomorrow.
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
## 2022-06-16 NOTE — PROGRESS NOTES
Group Therapy Progress Notes     Client Initial Individualized Goals for Treatment:     Will Improve Mindfulness / Stay in the Here and Now Intentionally bringing your attention to something beautiful, pleasant, or interesting that is occurring or is present in your immediate environment or experience on a regular basis.    Area of Treatment Focus:  Symptom Stabilization and Management    Therapeutic Interventions/Treatment Strategies:  Assist clients in establishing / strengthening support network  Assist with discharge planning  Engage in safety planning when indicated  Facilitate increased self awareness  Teach adaptive coping skills and communication skills  Use reality based supportive approach    Response to Treatment Strategies:  Accepted Feedback, Gave Feedback, Listened  and Focused on Goals    Name of Groups:  Psychotherapy group 9-10a    Description and Therapeutic Outcome:   In psychotherapy group, Ihsan shares that she continues to feel better physically regarding her breathing and upper respiratory issues and is sleeping and eating better. She presents as brighter and denies fatigue today.  She states that she has been reviewing the handouts from PHP when she is at home and has been practicing present moment, drop the rope and gratitude.  She states she is grateful that her daughter is home with her and that her significant other has been a strong support for her.  Ihsan states that she met with her CPS worker yesterday afternoon and it went well. She states that she is very appreciative of the support from [facility] staff regarding helping her with filling out and faxing paperwork to the [facility] for cash assistance/SNAP.     Ihsan did talk to this clinician individually during the break and apologized for leaving group early and getting here a little late and states that she is truly learning from the program and that she has been in a huge life transition from reuniting with her daughter, moving into a new  house with her boyfriend in [place] so that she has a better living environment for her daughter and focusing on her sobriety and managing her mental health.  Ihsan admits that she has been very overwhelmed with PTSD symptoms and nightmares and that the support of the PHP program is helping her get through this. Ihsan states that she does plan to return to [facility] and restart the outpatient program upon completing PHP.  She denies any safety concerns at this time, Ihsan continues to be appropriate for PHP.      Is this a Weekly Review of the Progress on the Treatment Plan?  NO    Are Treatment Plan Goals being addressed?  YES      Are Treatment Plan Strategies to Address Goals Effective?  YES      Are there any current contracts in place?  YES

## 2022-06-16 NOTE — PROGRESS NOTES
"Group Therapy Progress Notes     Client Initial Individualized Goals for Treatment: Will Improve Mindfulness / Stay in the Here and Now Intentionally bringing your attention to something beautiful, pleasant, or interesting that is occurring or is present in your immediate environment or experience on a regular basis.    Area of Treatment Focus:  Symptom Stabilization and Management    Therapeutic Interventions/Treatment Strategies:  Assist clients in establishing / strengthening support network  Assist to identify treatment goals  Assess / reassess for appropriate therapy program involvement, encourage participation in therapies  Assess / reassess level of potential for harm to self or others  Engage in safety planning when indicated  Facilitate increased self awareness  Teach adaptive coping skills and communication skills  Use reality based supportive approach    Response to Treatment Strategies:  Accepted Feedback, Gave Feedback, Listened , Focused on Goals, Attentive and Accepted Support    Name of Groups:  Psychotherapy Wrap Up Group 2-3pm    Description and Therapeutic Outcome:   In psychotherapy wrapup group, Ihsan reviewed her takeaways from today.  She presented as engaged during group, increasing her self awareness, and intentional regarding her wellness.  She identified that self-harm was discussed today; she identified struggling with this in the past and found the \"letter to SI\" as particularly helpful in framing that as the past.  New healthy coping skills include mindfulness and looking for positive quotes. She also identified the four agreements surrounding not taking things personally and not making assumptions as very impactful.  Advised this has been something she has struggled with for years.  Discussed ways she will interrupt this including 3 c's, distraction, and Rewire.  She advised she will be spending the evening with her daughter; she was encouraged to also implements some \"me time\" to " continue working on her own wellness.  She remains appropriate for PHP.       Is this a Weekly Review of the Progress on the Treatment Plan?  NO    Are Treatment Plan Goals being addressed?  YES      Are Treatment Plan Strategies to Address Goals Effective?  YES      Are there any current contracts in place?  YES

## 2022-06-16 NOTE — PROGRESS NOTES
Group Therapy Progress Notes     Client Initial Individualized Goals for Treatment: Will Improve Mindfulness / Stay in the Here and Now Intentionally bringing your attention to something beautiful, pleasant, or interesting that is occurring or is present in your immediate environment or experience on a regular basis.    Area of Treatment Focus:  Symptom Stabilization and Management    Therapeutic Interventions/Treatment Strategies:  Assist clients in establishing / strengthening support network  Assist with discharge planning  Engage in safety planning when indicated  Facilitate increased self awareness  Teach adaptive coping skills and communication skills  Use reality based supportive approach    Response to Treatment Strategies:  Accepted Feedback, Listened , Focused on Goals and Attentive    Name of Groups:  Nutrition - Time: 11:10-12:00    Description and Therapeutic Outcome:   OT wellness group on Nutrition (diet/healthy food)  This group explores a variety of topics and educates on the relationship between physical health and mental health and how these aspects support the other.  Clients will learn skills to help with utilizing a balanced diet for health management.  Clients will increase knowledge and awareness on how exercise supports self care and wellness, identify barriers to healthy food choices, and will develop a feasible plan to obtain a healthy meal plan.  Clients will learn how to read Nutrition Facts Labels and will develop mindfulness skills related to eating.  In OT group today, Ihsan is brighter and more engaged.  States that she lives off gas station food.  Acknowledges that she needs to make healthier choices and eat with her kids.         Is this a Weekly Review of the Progress on the Treatment Plan?  NO    Are Treatment Plan Goals being addressed?  YES      Are Treatment Plan Strategies to Address Goals Effective?  YES      Are there any current contracts in place?  YES

## 2022-06-16 NOTE — PROGRESS NOTES
"Group Therapy Progress Notes     Client Initial Individualized Goals for Treatment:     Will Improve Mindfulness / Stay in the Here and Now Intentionally bringing your attention to something beautiful, pleasant, or interesting that is occurring or is present in your immediate environment or experience on a regular basis.    Area of Treatment Focus:  Symptom Stabilization and Management    Therapeutic Interventions/Treatment Strategies:  Assist clients in establishing / strengthening support network  Assist with discharge planning  Engage in safety planning when indicated  Facilitate increased self awareness  Teach adaptive coping skills and communication skills  Use reality based supportive approach    Response to Treatment Strategies:  Accepted Feedback, Gave Feedback, Listened  and Focused on Goals    Name of Groups: (BPD)  Borderline Personality Disorder - Time: 10-10:50; The Four Agreements 1-1:50    Description and Therapeutic Outcome:     During the BPD group, Ihsan presented as alert and participatory. She grasps the concept and identified having some of the features of this diagnosis. She owns becoming anxious then falling into pits of hopelessness but also accepts being competent and confident. Ihsan will work on the 3 C's, 3 Good Things, Gratitude, and With Awareness comes Accountability.    During the Four Agreements group, Ihsan identified that she will work on \"Don't Take Anything Personally and Don't Make Assumptions. She is aware of often taking things personally and assuming and will Interrupt this thinking with : THINK skill- Is it True, Helpful, Inspiring, Necessary, Kind. She referred to forgiving her mom and shared this for a peer's benefit. She has not Defaulted to 'cutting' for her own wellness and also for the sake of her daughter. Ihsan presents as intentional regarding her wellness and remains appropriate for PHP.    Is this a Weekly Review of the Progress on the Treatment Plan?  NO    Are " Treatment Plan Goals being addressed?  YES      Are Treatment Plan Strategies to Address Goals Effective?  YES      Are there any current contracts in place?  YES

## 2022-06-16 NOTE — PROGRESS NOTES
Contacted [facility] Ihsan's [facility]er[facility] regarding [facility]'s transition plan from PHP and contact information for the  who will be picking up Shawanda's case load while she is on maternity leave.      Shawanda states that she met with Ihsan yesterday and was impressed with how much Ihsan has learned and retained from PHP so far. She states that Ihsan has followed through with the CPS plan and that they are planning to close her case in the next couple of months.  Shawanda shares that Ihsan's significant other is a strong support for her and he does not have a substance use history so is very supportive of her sobriety.  Shawanda states that she has communicated with [facility] about Ihsan returning to the program after completing [facility] and asked if PHP staff can assist with coordinating with [facility] for her admission back into their program. Megan will email this writer the contact information for her replacement and asks that we send him the discharge summary upon completion of PHP.

## 2022-06-17 ENCOUNTER — TELEPHONE (OUTPATIENT)
Dept: BEHAVIORAL HEALTH | Facility: HOSPITAL | Age: 32
End: 2022-06-17
Payer: COMMERCIAL

## 2022-06-20 ENCOUNTER — HOSPITAL ENCOUNTER (OUTPATIENT)
Dept: BEHAVIORAL HEALTH | Facility: HOSPITAL | Age: 32
Discharge: HOME | End: 2022-06-20
Attending: PSYCHIATRY & NEUROLOGY
Payer: COMMERCIAL

## 2022-06-20 PROCEDURE — H0035 MH PARTIAL HOSP TX UNDER 24H: HCPCS | Performed by: SOCIAL WORKER

## 2022-06-20 NOTE — PROGRESS NOTES
Ihsan was not present during 1-1:50 Mindfulness group due to a medical appointment for her daughter.

## 2022-06-20 NOTE — PROGRESS NOTES
Group Therapy Progress Notes     Client Initial Individualized Goals for Treatment:     Will increase effective use of support / increase ability to ask for help. Identify support needs, create a list of things you could use help with, Identify providers you will continue to see for individual therapy, psychiatric care, group therapy, or other specialized providers. and Create a return to work, return to school, or return to daily routines plan.    Area of Treatment Focus:  Community Resources / Support and Discharge Planning    Therapeutic Interventions/Treatment Strategies:  Assist clients in establishing / strengthening support network  Assist with discharge planning  Engage in safety planning when indicated  Facilitate increased self awareness  Teach adaptive coping skills and communication skills  Use reality based supportive approach    Response to Treatment Strategies:  Accepted Feedback, Gave Feedback, Listened  and Focused on Goals    Name of Groups:  Medication Management - Time: 11:10-12:00    Description and Therapeutic Outcome:   Life Skills OT group - Medication Management  Clients will identify importance of medication compliance for recovery and address barriers to successful medication management.  Education provided in handouts and discussion on the general role of medications, side effects, drug interactions, dosages, treatment strategies, and safe/effective use.  Clients will identify tools to support medication compliance.  The goal is to help clients understand the many aspects involved with medication management and to utilize tools to develop a routine to maximize independence and wellness recovery specific to medication management.    Ihsan notes that she feels that her dose of lithium isn't quite right. She notes that she hasn't had a blood draw in about 1 year.        Is this a Weekly Review of the Progress on the Treatment Plan?  YES    Are Treatment Plan Goals being  addressed?  YES      Are Treatment Plan Strategies to Address Goals Effective?  YES      Are there any current contracts in place?  YES
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
## 2022-06-21 ENCOUNTER — TELEPHONE (OUTPATIENT)
Dept: BEHAVIORAL HEALTH | Facility: CLINIC | Age: 32
End: 2022-06-21
Payer: COMMERCIAL

## 2022-06-21 ENCOUNTER — HOSPITAL ENCOUNTER (OUTPATIENT)
Dept: BEHAVIORAL HEALTH | Facility: HOSPITAL | Age: 32
Discharge: HOME | End: 2022-06-21
Attending: PSYCHIATRY & NEUROLOGY
Payer: COMMERCIAL

## 2022-06-21 DIAGNOSIS — G47.00 INSOMNIA, UNSPECIFIED TYPE: ICD-10-CM

## 2022-06-21 DIAGNOSIS — F32.9 MAJOR DEPRESSIVE DISORDER, REMISSION STATUS UNSPECIFIED, UNSPECIFIED WHETHER RECURRENT: Primary | ICD-10-CM

## 2022-06-21 DIAGNOSIS — F90.9 ATTENTION DEFICIT HYPERACTIVITY DISORDER (ADHD), UNSPECIFIED ADHD TYPE: ICD-10-CM

## 2022-06-21 DIAGNOSIS — F41.1 GAD (GENERALIZED ANXIETY DISORDER): ICD-10-CM

## 2022-06-21 DIAGNOSIS — F15.90 STIMULANT USE DISORDER: ICD-10-CM

## 2022-06-21 PROCEDURE — 99214 OFFICE O/P EST MOD 30 MIN: CPT | Performed by: NURSE PRACTITIONER

## 2022-06-21 PROCEDURE — H0035 MH PARTIAL HOSP TX UNDER 24H: HCPCS | Performed by: SOCIAL WORKER
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
## 2022-06-21 NOTE — PROGRESS NOTES
Group Therapy Progress Notes     Client Initial Individualized Goals for Treatment: Will increase effective use of support / increase ability to ask for help. Identify support needs, create a list of things you could use help with, Identify providers you will continue to see for individual therapy, psychiatric care, group therapy, or other specialized providers. and Create a return to work, return to school, or return to daily routines plan.    Area of Treatment Focus:  Community Resources / Support and Discharge Planning    Therapeutic Interventions/Treatment Strategies:  Assist clients in establishing / strengthening support network  Assist with discharge planning  Engage in safety planning when indicated  Facilitate increased self awareness  Teach adaptive coping skills and communication skills  Use reality based supportive approach    Response to Treatment Strategies:  Accepted Feedback, Gave Feedback, Listened , Focused on Goals, Attentive and Accepted Support    Name of Groups:  Time Management - Time: 11:10-12:00    Description and Therapeutic Outcome:   Self development group - OT - Time Management  Time management group focuses on assisting clients to define self and increase positive self -regard.  Psychoeducation provided related to developing strategies/skills to support effective time management.  Clients will increase knowledge and awareness of time management, increase knowledge/awareness of skills/techniques/behaviors that support time management and when/how to utilize new time management strategies.  Clients will recognize that other peers share similar feelings, thoughts, and problems, and will expand their knowledge/skills through observing others self exploration and working through issues and personal development.  The goal is to help clients learn strategies to have success with time management ie. prioritizing, task analysis, limiting distractions, giving yourself enough time, and use of  lists.  In OT group today, Ihsan is focused and engaged.  States that her time management is poor.  States that she is easily distracted and does not sleep well.  Encouraged setting multiple timers on her phone and staying organized.        Is this a Weekly Review of the Progress on the Treatment Plan?  NO    Are Treatment Plan Goals being addressed?  YES      Are Treatment Plan Strategies to Address Goals Effective?  YES      Are there any current contracts in place?  YES

## 2022-06-21 NOTE — TELEPHONE ENCOUNTER
----- Message from Zhane Canchola sent at 6/21/2022  3:21 PM CDT -----  Regarding: [facility]  Hello,    Can you please add patient to schedule 6/23 and 6/24?    Thank you,  Amara

## 2022-06-22 ENCOUNTER — HOSPITAL ENCOUNTER (OUTPATIENT)
Dept: BEHAVIORAL HEALTH | Facility: HOSPITAL | Age: 32
Discharge: HOME | End: 2022-06-22
Attending: PSYCHIATRY & NEUROLOGY
Payer: COMMERCIAL

## 2022-06-22 PROCEDURE — H0035 MH PARTIAL HOSP TX UNDER 24H: HCPCS | Performed by: SOCIAL WORKER

## 2022-06-22 NOTE — PROGRESS NOTES
"Group Therapy Progress Notes     Client Initial Individualized Goals for Treatment:     Will increase effective use of support / increase ability to ask for help. Identify support needs, create a list of things you could use help with, Identify providers you will continue to see for individual therapy, psychiatric care, group therapy, or other specialized providers. and Create a return to work, return to school, or return to daily routines plan.    Area of Treatment Focus:  Community Resources / Support and Discharge Planning    Therapeutic Interventions/Treatment Strategies:  Assist clients in establishing / strengthening support network  Assist with discharge planning  Engage in safety planning when indicated  Facilitate increased self awareness  Teach adaptive coping skills and communication skills  Use reality based supportive approach    Response to Treatment Strategies:  Accepted Feedback, Gave Feedback, Listened  and Focused on Goals    Name of Groups:  Psychotherapy wrap up group - 2-3    Description and Therapeutic Outcome:   In psychotherapy wrap up group, Ihsan reviewed her take away from today - she presented as bright and engaging with a great sense of humor - reported how she is feeling more grounded and using present moment - reports her daughter noting a positive difference in her presentation since starting the program.  She reports really digesting the concept of \"with awareness comes accountability\" and reminding herself that she just needs to do things.  Reviewed self disclosure and affirmations today - she states she has really been working on the positives with herself - did an sheet today about a mirror - came out with the idea that  \"I'm perfect at being me\" and \"no one else can be me\" - likes to use the skill Know my truth.  Has a daily planner and wrote skills down and positive affirmations - she likes the thought that \"Failure is a bruise and not a tatoo\".   Was able to do the affirmations " and self questions today - also reviewed the concept of forgiveness of her mom and was able to forgive her mom and move forward and drop the rope.  Reviewed her positive supports and she feels she has made good supports here, at [facility] and her daughter.  Reports she now feels like she has purpose, has hope and worth.  Great group for Ihsan and she remains appropriate for PHP.      Is this a Weekly Review of the Progress on the Treatment Plan?  NO    Are Treatment Plan Goals being addressed?  YES      Are Treatment Plan Strategies to Address Goals Effective?  YES      Are there any current contracts in place?  YES

## 2022-06-22 NOTE — PROGRESS NOTES
"Group Therapy Progress Notes     Client Initial Individualized Goals for Treatment: Will increase effective use of support / increase ability to ask for help. Identify support needs, create a list of things you could use help with, Identify providers you will continue to see for individual therapy, psychiatric care, group therapy, or other specialized providers. and Create a return to work, return to school, or return to daily routines plan.    Area of Treatment Focus:  Community Resources / Support and Discharge Planning    Therapeutic Interventions/Treatment Strategies:  Assist clients in establishing / strengthening support network  Assist with discharge planning  Engage in safety planning when indicated  Facilitate increased self awareness  Teach adaptive coping skills and communication skills  Use reality based supportive approach    Response to Treatment Strategies:  Accepted Feedback, Gave Feedback, Listened , Focused on Goals, Attentive and Accepted Support    Name of Groups:  Healthy Goals - Time: 11:10-12:00    Description and Therapeutic Outcome:   Healthy Goals  OT life skills group with focus on identification of appropriate goals for overall health and wellbeing.  Clients will create individualized goals, identify barriers to meeting those goals, and problem solve to make goals achievable.  Educated in creating a SMART goal (Specific, Measurable, Attainable, Relevant, Timely).  \"Setting healthy goals\" handout provided as well as individualized goal setting worksheet. In OT group today, Ihsan is bright and engaged.  Set a goal to get to PHP on time the next few days.         Is this a Weekly Review of the Progress on the Treatment Plan?  NO    Are Treatment Plan Goals being addressed?  YES      Are Treatment Plan Strategies to Address Goals Effective?  YES      Are there any current contracts in place?  YES             "

## 2022-06-22 NOTE — PROGRESS NOTES
"Group Therapy Progress Notes     Client Initial Individualized Goals for Treatment:     Will increase effective use of support / increase ability to ask for help. Identify support needs, create a list of things you could use help with, Identify providers you will continue to see for individual therapy, psychiatric care, group therapy, or other specialized providers. and Create a return to work, return to school, or return to daily routines plan.    Area of Treatment Focus:  Community Resources / Support and Discharge Planning    Therapeutic Interventions/Treatment Strategies:  Assist clients in establishing / strengthening support network  Assist with discharge planning  Engage in safety planning when indicated  Facilitate increased self awareness  Teach adaptive coping skills and communication skills  Use reality based supportive approach    Response to Treatment Strategies:  Accepted Feedback, Gave Feedback, Listened  and Focused on Goals    Name of Groups:  Psychotherapy wrap up group - 2-3    Description and Therapeutic Outcome:   In psychotherapy wrap up group, Ihsan reviewed her take away from today - she noted that she missed the morning group but was able to attend the rest - reviewed PTG and really has been focusing on growth and not the trauma - reviewed today how much growth she thought she has had since she started the program.  She also called her mom and forgave her so she was able to move on - that was her own personal PTG and not allowing the trauma to continue to define her.  States she feels like she can now get up and move forward each day and is not waking up and being upset that she woke up.  Good discussion about some anxiety she had at the beach last night - open to suggestions from the group about skills she can practice and how to go into the situation \"armed\" if she knows it will be a trigger for her.  Really good group for Ihsan as she engaged brightly with humor and accepting feedback and " support from her peers.  Reviewed evening plans - no safety issues noted - Ihsan remains appropriate for PHP.      Is this a Weekly Review of the Progress on the Treatment Plan?  NO    Are Treatment Plan Goals being addressed?  YES      Are Treatment Plan Strategies to Address Goals Effective?  YES      Are there any current contracts in place?  YES

## 2022-06-22 NOTE — PROGRESS NOTES
"Group Therapy Progress Notes     Client Initial Individualized Goals for Treatment:     Will increase effective use of support / increase ability to ask for help. Identify support needs, create a list of things you could use help with, Identify providers you will continue to see for individual therapy, psychiatric care, group therapy, or other specialized providers. and Create a return to work, return to school, or return to daily routines plan.    Area of Treatment Focus:  Community Resources / Support and Discharge Planning    Therapeutic Interventions/Treatment Strategies:  Assist clients in establishing / strengthening support network  Assist with discharge planning  Engage in safety planning when indicated  Facilitate increased self awareness  Teach adaptive coping skills and communication skills  Use reality based supportive approach    Response to Treatment Strategies:  Accepted Feedback, Gave Feedback, Listened  and Focused on Goals    Name of Groups:  Wise Mind - Time: 10-10:50; Post Traumatic Growth (PTG) 1-1:50    Description and Therapeutic Outcome:     Ihsan was not present during 10:00 am Wise Mind group.    During PTG group, Ihsan presented as bright, social, engaged and with positive eye contact. \"Eye contact was always a challenge for me, but it is becoming easier.\" She selected the topic \"as I think it is important for us to recognize our growth and where we are today\". She identified the challenge she had previously of \"not wanting to wake up, but now I want to wake up. I can move now.  I want to go to [facility]. I want to and am working on the skills. Since I have been in PHP, and working on my wellness, I was able to call my mom and tell her I forgive her\". She is aware that the forgiveness is not just for her mom, \"but for me to be able to move forward\". Ihsan presents as intentional regarding her wellness. She is working on the PTG, 3 Good Things; 3 C's; Gratitude; Present Moment; With Awareness " comes accountability; Time Limit; Know My Truth; Adulting - by working on being a better parent. Ihsan continues to remain appropriate for PHP.    Is this a Weekly Review of the Progress on the Treatment Plan?  NO    Are Treatment Plan Goals being addressed?  YES      Are Treatment Plan Strategies to Address Goals Effective?  YES      Are there any current contracts in place?  YES

## 2022-06-23 ENCOUNTER — HOSPITAL ENCOUNTER (OUTPATIENT)
Dept: BEHAVIORAL HEALTH | Facility: HOSPITAL | Age: 32
Discharge: HOME | End: 2022-06-23
Attending: PSYCHIATRY & NEUROLOGY
Payer: COMMERCIAL

## 2022-06-23 PROCEDURE — H0035 MH PARTIAL HOSP TX UNDER 24H: HCPCS | Performed by: SOCIAL WORKER

## 2022-06-23 NOTE — PROGRESS NOTES
Group Therapy Progress Notes     Client Initial Individualized Goals for Treatment:     Will increase effective use of support / increase ability to ask for help. Identify support needs, create a list of things you could use help with, Identify providers you will continue to see for individual therapy, psychiatric care, group therapy, or other specialized providers. and Create a return to work, return to school, or return to daily routines plan.     Area of Treatment Focus:  Community Resources / Support and Discharge Planning    Therapeutic Interventions/Treatment Strategies:  Assist clients in establishing / strengthening support network  Assist with discharge planning  Engage in safety planning when indicated  Facilitate increased self awareness  Teach adaptive coping skills and communication skills  Use reality based supportive approach    Response to Treatment Strategies:  Accepted Feedback, Gave Feedback, Listened , Focused on Goals, Attentive and Accepted Support    Name of Groups:  Money Management - Time: 11:10-12:00    Description and Therapeutic Outcome: OT - Life Skills - Money Management  In this group, clients learn the basics of money management as well as learn about their money management style (spenders, savers, amassers, conscientious manager and risk takers).  Will discuss what their style means for them and discuss basic skills they can use to improve their habits if needed.  Education and handout also given on savings tips, creating a budget and shopping tips.    Ihsan did not comment much on the topic today. What she did share indicates that there are stressors in regards to money in her life.        Is this a Weekly Review of the Progress on the Treatment Plan?  NO    Are Treatment Plan Goals being addressed?  YES      Are Treatment Plan Strategies to Address Goals Effective?  YES      Are there any current contracts in place?  YES

## 2022-06-23 NOTE — PROGRESS NOTES
"Group Therapy Progress Notes     Client Initial Individualized Goals for Treatment:     Will increase effective use of support / increase ability to ask for help. Identify support needs, create a list of things you could use help with, Identify providers you will continue to see for individual therapy, psychiatric care, group therapy, or other specialized providers. and Create a return to work, return to school, or return to daily routines plan.    Area of Treatment Focus:  Community Resources / Support and Discharge Planning    Therapeutic Interventions/Treatment Strategies:  Assist clients in establishing / strengthening support network  Assist with discharge planning  Engage in safety planning when indicated  Facilitate increased self awareness  Teach adaptive coping skills and communication skills  Use reality based supportive approach    Response to Treatment Strategies:  Accepted Feedback, Gave Feedback, Listened  and Focused on Goals    Name of Groups:  Forgiveness - Time: 10-10:50; 1-1:50    Description and Therapeutic Outcome:     During the Forgiveness group, Ihsan presented as bright, with occasional humor and participatory.  She was able to relate to the topic due to past relationship issues as well as her relationship with her mom. She reiterated her forgiveness of her mom which she had accomplished and shared with the group during this past week. \"It's not hard when you accept that the forgiveness is for you so you can move forward.\"    During the follow up group, Ihsan continued to present as bright and positive. She contributed by providing positives to a graduating peer as well as positives regarding her time in the program. She also provided a quote : \"Don't mock a pain you have not endured\". She will continue to apply the Forgiveness Option of Compassion for offenders by implementing the \"Just Like Me\" Compassion skill yet without compromise. Ihsan will continue to work on saying NO to counter " Codependency issues, practice self care; and continue to develop healthy parenting skills. Ihsan has cut out the names of the skills, and decorated her binder with them as well as filing all her handouts in the binder. She continues to present as intentional regarding her wellness.    Is this a Weekly Review of the Progress on the Treatment Plan?    NO    Are Treatment Plan Goals being addressed?  YES      Are Treatment Plan Strategies to Address Goals Effective?  YES      Are there any current contracts in place?  YES

## 2022-06-23 NOTE — PROGRESS NOTES
"Group Therapy Progress Notes     Client Initial Individualized Goals for Treatment: Will increase effective use of support / increase ability to ask for help. Identify support needs, create a list of things you could use help with, Identify providers you will continue to see for individual therapy, psychiatric care, group therapy, or other specialized providers. and Create a return to work, return to school, or return to daily routines plan.    Area of Treatment Focus:  Community Resources / Support and Discharge Planning    Therapeutic Interventions/Treatment Strategies:  Assist clients in establishing / strengthening support network  Assist with discharge planning  Facilitate increased self awareness  Teach adaptive coping skills and communication skills  Use reality based supportive approach    Response to Treatment Strategies:  Accepted Feedback, Gave Feedback, Listened , Focused on Goals, Attentive and Accepted Support    Name of Groups: Psychotherapy Wrap Up Group 2-3pm     Description and Therapeutic Outcome:    In psychotherapy wrap up group, Ihsan reviewed her take away from today.  During group she presented with a bright affect, engaged well with her peers with good humor, and offered positive feedback to peers.  She reported forgiveness was discussed today which was extremely helpful; she related this to forgiving her mom using the skills learned and described the freedom she has experienced with this.  She reported gratitude for PHP and the skills she has learned and that they are \"downloading\".  She demonstrated her positive affirmations book and positive quote boards she made for peers.  Reviewed a few positive affirmations/quotes that were particularly impactful surrounding forgiveness and how she had been staying stuck.  During a discussion with a peer surrounding saying no and putting oneself first, she recognized this as a tough area for her; she made note of the \"put your own oxygen mask on " "first\" phrase and appreciated feedback surrounding this.  During group, Ihsan received a text stating she could visit with her son saumya and was very excited.  Continuing to work on her skills and reviewing the information learned during the day.  She remains appropriate for PHP.    Is this a Weekly Review of the Progress on the Treatment Plan?  NO    Are Treatment Plan Goals being addressed?  YES      Are Treatment Plan Strategies to Address Goals Effective?  YES      Are there any current contracts in place?  YES             "

## 2022-06-24 ENCOUNTER — HOSPITAL ENCOUNTER (OUTPATIENT)
Dept: BEHAVIORAL HEALTH | Facility: HOSPITAL | Age: 32
Discharge: HOME | End: 2022-06-24
Attending: PSYCHIATRY & NEUROLOGY
Payer: COMMERCIAL

## 2022-06-24 PROCEDURE — H0035 MH PARTIAL HOSP TX UNDER 24H: HCPCS | Performed by: SOCIAL WORKER

## 2022-06-24 NOTE — PROGRESS NOTES
\  Group Therapy Progress Notes     Client Initial Individualized Goals for Treatment: Will increase effective use of support / increase ability to ask for help. Identify support needs, create a list of things you could use help with, Identify providers you will continue to see for individual therapy, psychiatric care, group therapy, or other specialized providers. and Create a return to work, return to school, or return to daily routines plan.    Area of Treatment Focus:  Community Resources / Support and Discharge Planning    Therapeutic Interventions/Treatment Strategies:  Assist clients in establishing / strengthening support network  Assist with discharge planning  Facilitate increased self awareness  Teach adaptive coping skills and communication skills  Use reality based supportive approach    Response to Treatment Strategies:  Accepted Feedback, Gave Feedback, Listened , Focused on Goals and Accepted Support    Name of Groups:  Stress Management - Time: 11:10-12:00    Description and Therapeutic Outcome:   In OT group today, Ihsan presents as quiet but focused.  OT group today was focused on progressive muscle relaxation and biofeedback using biodots.  Educated in and handout provided on progressive muscle relaxation (PMR) for stress management and physical relaxation. Discussed the benefits of PMR as well as indications for use.  Ended group with guiding the patients through a PMR routine.  Educated in the use of biofeedback for recognizing emotional states in the body. Handed out biodots for patients to check their mood/energy level.  Discussed techniques that can be used to change our emotions and in turn the color of the biodot. Ihsan states some of the PMR routine she was not able to do due to pain, adapted to her abilities.       Is this a Weekly Review of the Progress on the Treatment Plan?  NO    Are Treatment Plan Goals being addressed?  YES      Are Treatment Plan Strategies to Address Goals  Effective?  YES      Are there any current contracts in place?  YES
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
## 2022-06-27 ASSESSMENT — ANXIETY QUESTIONNAIRES
GAD7 TOTAL SCORE: 2
6. BECOMING EASILY ANNOYED OR IRRITABLE: NOT AT ALL
5. BEING SO RESTLESS THAT IT IS HARD TO SIT STILL: NOT AT ALL
GAD7 TOTAL SCORE: 2
IF YOU CHECKED OFF ANY PROBLEMS ON THIS QUESTIONNAIRE, HOW DIFFICULT HAVE THESE PROBLEMS MADE IT FOR YOU TO DO YOUR WORK, TAKE CARE OF THINGS AT HOME, OR GET ALONG WITH OTHER PEOPLE: NOT DIFFICULT AT ALL
7. FEELING AFRAID AS IF SOMETHING AWFUL MIGHT HAPPEN: NOT AT ALL
3. WORRYING TOO MUCH ABOUT DIFFERENT THINGS: NOT AT ALL
4. TROUBLE RELAXING: SEVERAL DAYS
1. FEELING NERVOUS, ANXIOUS, OR ON EDGE: SEVERAL DAYS
2. NOT BEING ABLE TO STOP OR CONTROL WORRYING: NOT AT ALL

## 2022-06-27 ASSESSMENT — PATIENT HEALTH QUESTIONNAIRE - PHQ9: SUM OF ALL RESPONSES TO PHQ QUESTIONS 1-9: 5

## 2022-06-27 NOTE — PROGRESS NOTES
Group Therapy Progress Notes     Client Initial Individualized Goals for Treatment: Will increase effective use of support / increase ability to ask for help. Identify support needs, create a list of things you could use help with, Identify providers you will continue to see for individual therapy, psychiatric care, group therapy, or other specialized providers. and Create a return to work, return to school, or return to daily routines plan.    Area of Treatment Focus:  Community Resources / Support and Discharge Planning    Therapeutic Interventions/Treatment Strategies:  Assist clients in establishing / strengthening support network  Assist with discharge planning  Facilitate increased self awareness  Teach adaptive coping skills and communication skills    Response to Treatment Strategies:  Accepted Feedback, Gave Feedback, Focused on Goals, Attentive and Accepted Support    Name of Groups:  Psychotherapy Wrap Up Group 2-3 pm    Description and Therapeutic Outcome:   In psychotherapy wrap up group, Ihsan reviewed her takeaways from today.  Ihsan was very participatory during group with great insight and appropriate humor. She advised they reviewed life situations and how to address them today.  This reminded her of how far she has come and the benefits of the skills.  She advised each of the scenarios she knew the best skills to use and finds 3 c's and know my truth particularly useful.  During group discussion with a peer surrounding poor self esteem, Ihsan gave genuine, positive feedback regarding embracing her own identity and not personalizing other opinions. She expressed gratitude for the skills she has learned and is excited to apply them with her kids; Ihsan was encouraged to continue working with her outpatient supports to maintain information learned.  Ihsan will discharge from [facility] today.      Is this a Weekly Review of the Progress on the Treatment Plan?  NO    Are Treatment Plan Goals being  addressed?  YES      Are Treatment Plan Strategies to Address Goals Effective?  YES      Are there any current contracts in place?  YES

## 2022-06-27 NOTE — PROGRESS NOTES
Group Therapy Progress Notes     Client Initial Individualized Goals for Treatment: Will increase effective use of support / increase ability to ask for help. Identify support needs, create a list of things you could use help with, Identify providers you will continue to see for individual therapy, psychiatric care, group therapy, or other specialized providers. and Create a return to work, return to school, or return to daily routines plan.    Area of Treatment Focus:  Community Resources / Support and Discharge Planning    Therapeutic Interventions/Treatment Strategies:  Assist clients in establishing / strengthening support network  Assist with discharge planning  Facilitate increased self awareness  Teach adaptive coping skills and communication skills    Response to Treatment Strategies:  Accepted Feedback, Gave Feedback, Listened , Focused on Goals and Accepted Support    Name of Groups: Life Situation Skills Group - 1:00 - 1:50    Description and Therapeutic Outcome:   In life situation group today, Ihsan presented with a calm affect appropriately using humor and engaging with peers.  This was a good group for Ihsan and she provided excellent feedback to peers.  She identified the most difficult situation for her in the past was to not personalize other people's comments; however, she has done well at taking on 3 c's and know my truth to over come these scenarios. She reinforced this concept with a peer in a positive manner and provided additional supportive feedback. Ihsan will discharge from [facility] today.      Is this a Weekly Review of the Progress on the Treatment Plan?  NO    Are Treatment Plan Goals being addressed?  YES      Are Treatment Plan Strategies to Address Goals Effective?  YES      Are there any current contracts in place?  YES

## 2022-07-07 ENCOUNTER — APPOINTMENT (OUTPATIENT)
Dept: PSYCHOLOGY | Facility: OTHER | Age: 32
End: 2022-07-07
Attending: SOCIAL WORKER
Payer: COMMERCIAL

## 2022-07-18 ENCOUNTER — TELEPHONE (OUTPATIENT)
Dept: BEHAVIORAL HEALTH | Facility: HOSPITAL | Age: 32
End: 2022-07-18

## 2022-07-18 NOTE — TELEPHONE ENCOUNTER
Attempt # 1  Outcome: Left Message   Comment: Left message to return call to schedule intake DA for PHP.

## 2022-07-21 ENCOUNTER — TELEPHONE (OUTPATIENT)
Dept: BEHAVIORAL HEALTH | Facility: HOSPITAL | Age: 32
End: 2022-07-21

## 2022-07-21 NOTE — TELEPHONE ENCOUNTER
Attempt # 1  Outcome: Left Message   Comment: Left message to return call regarding PHP intake appointment.

## 2022-07-25 NOTE — TELEPHONE ENCOUNTER
Attempt # 2  Outcome: Left Message   Comment: Left message to return call to discuss [place] PHP intake, and whether patient would still like to participate. Awaiting return call.

## 2022-08-02 ENCOUNTER — TELEPHONE (OUTPATIENT)
Dept: BEHAVIORAL HEALTH | Facility: HOSPITAL | Age: 32
End: 2022-08-02

## 2022-08-04 ENCOUNTER — HOSPITAL ENCOUNTER (OUTPATIENT)
Dept: BEHAVIORAL HEALTH | Facility: HOSPITAL | Age: 32
Discharge: HOME | End: 2022-08-04
Attending: SOCIAL WORKER | Admitting: SOCIAL WORKER
Payer: COMMERCIAL

## 2022-08-04 PROCEDURE — 90791 PSYCH DIAGNOSTIC EVALUATION: CPT | Performed by: SOCIAL WORKER
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
## 2022-08-08 ASSESSMENT — ANXIETY QUESTIONNAIRES
4. TROUBLE RELAXING: MORE THAN HALF THE DAYS
6. BECOMING EASILY ANNOYED OR IRRITABLE: NOT AT ALL
7. FEELING AFRAID AS IF SOMETHING AWFUL MIGHT HAPPEN: SEVERAL DAYS
2. NOT BEING ABLE TO STOP OR CONTROL WORRYING: NEARLY EVERY DAY
5. BEING SO RESTLESS THAT IT IS HARD TO SIT STILL: SEVERAL DAYS
GAD7 TOTAL SCORE: 11
GAD7 TOTAL SCORE: 11
1. FEELING NERVOUS, ANXIOUS, OR ON EDGE: MORE THAN HALF THE DAYS
3. WORRYING TOO MUCH ABOUT DIFFERENT THINGS: MORE THAN HALF THE DAYS

## 2022-08-08 ASSESSMENT — PATIENT HEALTH QUESTIONNAIRE - PHQ9: SUM OF ALL RESPONSES TO PHQ QUESTIONS 1-9: 15

## 2022-08-16 ENCOUNTER — HOSPITAL ENCOUNTER (OUTPATIENT)
Dept: BEHAVIORAL HEALTH | Facility: HOSPITAL | Age: 32
Discharge: HOME | End: 2022-08-16
Attending: PSYCHIATRY & NEUROLOGY
Payer: COMMERCIAL

## 2022-08-16 PROCEDURE — H0035 MH PARTIAL HOSP TX UNDER 24H: HCPCS | Performed by: SOCIAL WORKER

## 2022-08-16 NOTE — PROGRESS NOTES
Group Therapy Progress Notes     Client Initial Individualized Goals for Treatment:     Will report on symptoms and identify skills to use to manage depression and anxiety and Will learn and practice 1-2 coping skills to help improve moods and report daily the skills she is practicing outside of the program as she starts to build more confidence in using her skills    Area of Treatment Focus:  Personal Safety  Symptom Stabilization and Management     Therapeutic Interventions/Treatment Strategies:  Assist clients in establishing / strengthening support network  Assess / reassess level of potential for harm to self or others  Facilitate increased self awareness  Teach adaptive coping skills and communication skills    Response to Treatment Strategies:  Accepted Feedback, Gave Feedback, Listened  and Focused on Goals    Name of Groups:  Default/Focused Mode - Time: 10-10:50; 1-1:50    Description and Therapeutic Outcome:     During Default/Focused Mode group, Ihsan presented as bright, appropriately humorous at times and participatory. She was able to provide feedback on the topic and has awareness of defaulting to reacting. She now GAPs by walking away; being in the Present Moment with acceptance of her unhealthy relationship and by finding new housing; using the 3 Good Things; 3 C's; I am [place] It; Gratitude. Ihsan presents as intentional regarding her wellness and sobriety and remains appropriate for PHP.    Ihsan was not present during the 1-1:50 follow up group.    Is this a Weekly Review of the Progress on the Treatment Plan?  NO    Are Treatment Plan Goals being addressed?  YES      Are Treatment Plan Strategies to Address Goals Effective?  YES      Are there any current contracts in place?  YES

## 2022-08-16 NOTE — PROGRESS NOTES
Group Therapy Progress Notes     Client Initial Individualized Goals for Treatment: Will report on symptoms and identify skills to use to manage depression and anxiety and Will learn and practice 1-2 coping skills to help improve moods and report daily the skills she is practicing outside of the program as she starts to build more confidence in using her skills    Area of Treatment Focus:  Personal Safety and Symptom Stabilization and Management    Therapeutic Interventions/Treatment Strategies:  Assist clients in establishing / strengthening support network  Assess / reassess level of potential for harm to self or others  Facilitate increased self awareness  Teach adaptive coping skills and communication skills    Response to Treatment Strategies:  Accepted Feedback, Gave Feedback and Accepted Support    Name of Groups:  Time Management - Time: 11:10-12:00    Description and Therapeutic Outcome:   Self development group - OT - Time Management  Time management group focuses on assisting clients to define self and increase positive self -regard.  Psychoeducation provided related to developing strategies/skills to support effective time management.  Clients will increase knowledge and awareness of time management, increase knowledge/awareness of skills/techniques/behaviors that support time management and when/how to utilize new time management strategies.  Clients will recognize that other peers share similar feelings, thoughts, and problems, and will expand their knowledge/skills through observing others self exploration and working through issues and personal development.  The goal is to help clients learn strategies to have success with time management ie. prioritizing, task analysis, limiting distractions, giving yourself enough time, and use of lists.  In OT group today, Ihsan presents as engaged but distracted at times.  States that she is having a hard time focusing today.  Does identify time management as  difficult for her, stating she is always late and not organized.  Discussed techniques to better manage her time and aide in organization.         Is this a Weekly Review of the Progress on the Treatment Plan?  NO    Are Treatment Plan Goals being addressed?  YES      Are Treatment Plan Strategies to Address Goals Effective?  YES      Are there any current contracts in place?  YES

## 2022-08-16 NOTE — TREATMENT PLAN
Individualized Treatment Plan     Date of Plan: 2022    Name: Ihsan Millan MRN: [id number]    : 1990    Programs:  [facility] ()     DSM-V Diagnoses: 296.32 (F33.1) Major Depressive Disorder, Recurrent Episode, Moderate _ and With anxious distress;  ADHD; Generalized Anxiety Disorder; Insomnia; Stimulant Use Disorder Per DA 22    DA Date: 2022  Psychosocial & Contextual Factors: family of origin issues, financial hardship, health issues, housing , limited social support, mental health symptoms, occupational / vocational stress, relationship stress and transportation issues  WHODAS: 39  LOCUS: 22      Team Members Contributing to Plan:  Dr. Vandana Do, [facility]  Marina Avery, [facility]  Aliyah Perry HOLDEN    Client Strengths:  caring, committed to sobriety, creative, empathetic, goal-focused, good listener, has a previous history of therapy, insightful, intelligent, motivated, open to learning, open to suggestions / feedback, responsible parent, supportive, wants to learn, willing to ask questions, willing to relate to others and work history    Client Participation in Plan:  Contributed to goals and plan   Agrees with plan   Received copy of treatment plan     Areas of Vulnerability:  Suicidal Ideation   Anxiety  Depressive symptoms   Physical/medical:    Trauma/Abuse/Neglect    Long-Term Goals:  Knowledge about illness and management of symptoms   Maintenance of personal safety   Maintenance of sobriety   Effective management of impulsivity     Abuse Prevention Plan:  Safe, therapeutic environment   Safety coping plan as needed   Education regarding illness and skill development   Coordination with care providers   Impluse control education and intervention   Medication adjustment/management (MI/CD)     Discharge Criteria:  Satisfactory progress toward treatment goals   Improvement re: identified problems and symptoms   Ability to continue recovery at  next level of service   Has a discharge plan in place   Has safety/coping plan in place   Regular attendance as scheduled           Areas of Treatment Focus            Area of Treatment Focus:   Personal Safety and Symptom Stabilization and Management  Start Date:    8.16.22    Goal:  Target Date: 8.19.22 Status: Active  Will report on symptoms and identify skills to use to manage depression and anxiety and Will learn and practice 1-2 coping skills to help improve moods and report daily the skills she is practicing outside of the program as she starts to build more confidence in using her skills      Progress:             Treatment Strategies:   Assist clients in establishing / strengthening support network  Assess / reassess level of potential for harm to self or others  Engage in safety planning when indicated  Facilitate increased self awareness  Teach adaptive coping skills and communication skills    These services will include group therapy and OT group therapy daily and individual therapy and medications management as needed.                 Area of Treatment Focus:   Personal Safety and Symptom Stabilization and Management  Start Date:    8.22.22    Goal:  Target Date: 8.26.22 Status: Active  Will learn and practice 1-2 coping skills to help improve moods and continue to report daily in group how you are practicing these skills outside of the group and Will Improve Mindfulness / Stay in the Here and Now Intentionally bringing your attention to something beautiful, pleasant, or interesting that is occurring or is present in your immediate environment or experience on a regular basis.      Progress:             Treatment Strategies:   Assist clients in establishing / strengthening support network  Assess / reassess level of potential for harm to self or others  Engage in safety planning when indicated  Facilitate increased self awareness  Provide education regarding mindfulness and grounding techniques  Teach  adaptive coping skills and communication skills    These services will include group therapy and OT group therapy daily and individual therapy and medications management as needed.               Area of Treatment Focus:   Symptom Stabilization and Management and Community Resources / Support and Discharge Planning  Start Date:    8.29.22    Goal:  Target Date: 9.2.22 Status: Active  Will improve wellness related behaviors by reporting to the group the skills you have gained the most confidence in using and Will develop an aftercare / transition plan by reporting to the group the services you are connected with for after the program      Progress:             Treatment Strategies:   Assist clients in establishing / strengthening support network  Assist with discharge planning  Assess / reassess level of potential for harm to self or others  Engage in safety planning when indicated  Facilitate increased self awareness  Provide education regarding community based services  Teach adaptive coping skills and communication skills    These services will include group therapy and OT group therapy daily and individual therapy and medications management as needed.

## 2022-08-16 NOTE — PROGRESS NOTES
Group Therapy Progress Notes     Client Initial Individualized Goals for Treatment:     Will report on symptoms and identify skills to use to manage depression and anxiety and Will learn and practice 1-2 coping skills to help improve moods and report daily the skills she is practicing outside of the program as she starts to build more confidence in using her skills    Area of Treatment Focus:  Personal Safety  Symptom Stabilization and Management     Therapeutic Interventions/Treatment Strategies:  Assist clients in establishing / strengthening support network  Assess / reassess level of potential for harm to self or others  Facilitate increased self awareness  Teach adaptive coping skills and communication skills    Response to Treatment Strategies:  Accepted Feedback, Gave Feedback, Listened  and Focused on Goals    Name of Groups:  Psychotherapy group 9-10            Psychotherapy wrap up group 2-3    Description and Therapeutic Outcome:   In psychotherapy group, Ihsan introduced herself to the group as this was her first group today - reviewed that she has had a rough couple of weeks - car went out, daksha kicked her out but then calmed down and is allowing her to stay until she finds a new place, put an application in for [facility] and is waiting.  Is really missing her brother and grandma right now, and is not able to see her son - her ex is not allowing her contact.  She states she called  but has to follow up with CPS before they will help her.  Reports sobriety has been good - 6 months for the most part.  She states she stopped treatment because the groups were too big and overwhelming and had some conflicts with the .  Her CPS worker agreed to let her do PHP again in lieu of the CD treatment right now.  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  Reviewed skills  "- she has been using 3C's, deep breathing, I'm worth it, rewire and know your truth.  Wants to start focusing on know your truth more.  States she is maintaining sobriety because when she was using, they took her daughter - she states it \"destroyed\" her and her daughter and this is helping to keep her sober.  Also reports she has not cut in 2 months - focuses on coloring and writing affirmations to help distract her.  Also has been using \"tatoo therapy\" to help distract her.  Good group for Ihsan - she is glad to be back in the program and engaged very well.  She did note that she was seeing Dr. Bridget Forrester for meds but is not able to see her anymore due to her taking a leave - she was supposed to start on Ketamine nose spray - we will start looking at other options in the community.  She also is seeing Marina temporarily for therapy and will need a long term provider.    Ihsan was not present in Psychotherapy wrap up group as she had to go and  her daughter.  She remains appropriate for PHP.      Is this a Weekly Review of the Progress on the Treatment Plan?  NO    Are Treatment Plan Goals being addressed?  YES      Are Treatment Plan Strategies to Address Goals Effective?  YES      Are there any current contracts in place?  YES           "

## 2022-08-17 ENCOUNTER — HOSPITAL ENCOUNTER (OUTPATIENT)
Dept: BEHAVIORAL HEALTH | Facility: HOSPITAL | Age: 32
Discharge: HOME | End: 2022-08-17
Attending: PSYCHIATRY & NEUROLOGY
Payer: COMMERCIAL

## 2022-08-17 ENCOUNTER — HOSPITAL ENCOUNTER (EMERGENCY)
Facility: HOSPITAL | Age: 32
Discharge: HOME | End: 2022-08-17
Attending: NURSE PRACTITIONER | Admitting: NURSE PRACTITIONER
Payer: COMMERCIAL

## 2022-08-17 VITALS
OXYGEN SATURATION: 98 % | DIASTOLIC BLOOD PRESSURE: 74 MMHG | RESPIRATION RATE: 16 BRPM | TEMPERATURE: 98.5 F | SYSTOLIC BLOOD PRESSURE: 115 MMHG | HEART RATE: 95 BPM

## 2022-08-17 DIAGNOSIS — M54.2 NECK PAIN: Primary | ICD-10-CM

## 2022-08-17 PROCEDURE — H0035 MH PARTIAL HOSP TX UNDER 24H: HCPCS | Performed by: SOCIAL WORKER

## 2022-08-17 PROCEDURE — 99212 OFFICE O/P EST SF 10 MIN: CPT | Performed by: NURSE PRACTITIONER

## 2022-08-17 PROCEDURE — G0463 HOSPITAL OUTPT CLINIC VISIT: HCPCS

## 2022-08-17 ASSESSMENT — ENCOUNTER SYMPTOMS
NECK STIFFNESS: 1
CHILLS: 0
SHORTNESS OF BREATH: 0
FEVER: 0
PSYCHIATRIC NEGATIVE: 1
NECK PAIN: 1
DIARRHEA: 0
VOMITING: 0
NAUSEA: 0

## 2022-08-17 ASSESSMENT — ACTIVITIES OF DAILY LIVING (ADL): ADLS_ACUITY_SCORE: 35

## 2022-08-17 NOTE — PROGRESS NOTES
"Group Therapy Progress Notes     Client Initial Individualized Goals for Treatment: Will report on symptoms and identify skills to use to manage depression and anxiety and Will learn and practice 1-2 coping skills to help improve moods and report daily the skills she is practicing outside of the program as she starts to build more confidence in using her skills    Area of Treatment Focus:  Personal Safety and Symptom Stabilization and Management    Therapeutic Interventions/Treatment Strategies:  Assist clients in establishing / strengthening support network  Assess / reassess level of potential for harm to self or others  Facilitate increased self awareness  Teach adaptive coping skills and communication skills    Response to Treatment Strategies:  Accepted Feedback, Gave Feedback, Listened  and Focused on Goals    Name of Groups:  Healthy Goals - Time: 11:10-12:00    Description and Therapeutic Outcome:   Healthy Goals  OT life skills group with focus on identification of appropriate goals for overall health and wellbeing.  Clients will create individualized goals, identify barriers to meeting those goals, and problem solve to make goals achievable.  Educated in creating a SMART goal (Specific, Measurable, Attainable, Relevant, Timely).  \"Setting healthy goals\" handout provided as well as individualized goal setting worksheet.  In OT group today, Ihsan presents as focused and engaged.  Created a SMART goal to get her GED.       Is this a Weekly Review of the Progress on the Treatment Plan?  NO    Are Treatment Plan Goals being addressed?  YES      Are Treatment Plan Strategies to Address Goals Effective?  YES      Are there any current contracts in place?  YES             "

## 2022-08-17 NOTE — PROGRESS NOTES
"Group Therapy Progress Notes     Client Initial Individualized Goals for Treatment:     Will report on symptoms and identify skills to use to manage depression and anxiety and Will learn and practice 1-2 coping skills to help improve moods and report daily the skills she is practicing outside of the program as she starts to build more confidence in using her skills    Area of Treatment Focus:  Personal Safety  Symptom Stabilization and Management     Therapeutic Interventions/Treatment Strategies:  Assist clients in establishing / strengthening support network  Assess / reassess level of potential for harm to self or others  Facilitate increased self awareness  Teach adaptive coping skills and communication skills    Response to Treatment Strategies:  Accepted Feedback, Gave Feedback, Listened  and Focused on Goals    Name of Groups:  Psychotherapy wrap up group 2-3    Description and Therapeutic Outcome:   In psychotherapy wrap up group - Ihsan reviewed her take away from today - reviewed resilience today - she reports \"I am still me and resilience is ordinary - not extra ordinary.  Really liked the concept that it is common to make it through tough times.  Also liked 7 day happiness challenge to help her organize her thoughts, has been trying to practice gratitudes intentionally.  Also discussed the concept of Who are you? And not being your relationships, not your emotions.  Good discussion about settling a specific time each day to practice gratitudes - will practice before going to bed.  Also reviewed goals today - wants to get GED and is getting the resources she needs to start the program.  Reviewed evening plans - she admitted she is still struggling at nights due to where she is living - plans to write down quotes tonight, also painted a vanity last night and showed the group and was proud.  Also got to see her son for 3 hours at the park and was very happy about this.  Is also likely going to have him " Saturday evening.  Looking at getting her some resources for clothes for her daughter as she has very limited funds for new school clothes.  Will practice 3 good things before bed tonight.  Good group for Ihsan - she is working hard to be intentional about her wellness and is reaching out for help.   She remains appropriate for PHP.      Is this a Weekly Review of the Progress on the Treatment Plan?  NO    Are Treatment Plan Goals being addressed?  YES      Are Treatment Plan Strategies to Address Goals Effective?  YES      Are there any current contracts in place?  YES

## 2022-08-17 NOTE — PROGRESS NOTES
"Group Therapy Progress Notes     Client Initial Individualized Goals for Treatment:      Will report on symptoms and identify skills to use to manage depression and anxiety and Will learn and practice 1-2 coping skills to help improve moods and report daily the skills she is practicing outside of the program as she starts to build more confidence in using her skills    Area of Treatment Focus:  Personal Safety   Symptom Stabilization and Management    Therapeutic Interventions/Treatment Strategies:  Assist clients in establishing / strengthening support network  Assess / reassess level of potential for harm to self or others  Facilitate increased self awareness  Teach adaptive coping skills and communication skills    Response to Treatment Strategies:  Accepted Feedback, Gave Feedback, Listened  and Focused on Goals    Name of Groups:  Resilience - Time: 10-10:50; 1-1:50    Description and Therapeutic Outcome:     During Resilience group, Ihsan presented as focused, attentive, and relaxed. She grasps the concept of resilience and being able to start over. She will work on Present Moment and \"That was then and this is now\" and \"Restart\". She identified her resilience at this time is \"to put my happiness first\". She is doing this by working on moving out of her present relationship and into her own apartment; working on spending time with her son who lives with the dad; attending PHP.    During the follow up group, Ihsan identified \"I am not my circumstances, my relationships, emotions or the actions of others.  I Know My Truth and will apply 3 C's\" and the THE skill. I am a fighter\". She will also work on Gratitude toward self. Ihsan presents as intentional regarding her wellness and remains appropriate for PHP.    Is this a Weekly Review of the Progress on the Treatment Plan?  NO    Are Treatment Plan Goals being addressed?  YES      Are Treatment Plan Strategies to Address Goals Effective?  YES      Are there any " current contracts in place?  YES

## 2022-08-18 ENCOUNTER — HOSPITAL ENCOUNTER (OUTPATIENT)
Dept: BEHAVIORAL HEALTH | Facility: HOSPITAL | Age: 32
Discharge: HOME | End: 2022-08-18
Attending: PSYCHIATRY & NEUROLOGY
Payer: COMMERCIAL

## 2022-08-18 DIAGNOSIS — F33.2 SEVERE EPISODE OF RECURRENT MAJOR DEPRESSIVE DISORDER, WITHOUT PSYCHOTIC FEATURES (H): Primary | ICD-10-CM

## 2022-08-18 DIAGNOSIS — J45.20 INTERMITTENT ASTHMA, UNSPECIFIED ASTHMA SEVERITY, UNSPECIFIED WHETHER COMPLICATED: ICD-10-CM

## 2022-08-18 DIAGNOSIS — Z90.710 STATUS POST LAPAROSCOPIC ASSISTED VAGINAL HYSTERECTOMY (LAVH): ICD-10-CM

## 2022-08-18 DIAGNOSIS — K21.9 GASTROESOPHAGEAL REFLUX DISEASE, UNSPECIFIED WHETHER ESOPHAGITIS PRESENT: ICD-10-CM

## 2022-08-18 PROCEDURE — 99214 OFFICE O/P EST MOD 30 MIN: CPT | Performed by: PSYCHIATRY & NEUROLOGY

## 2022-08-18 PROCEDURE — H0035 MH PARTIAL HOSP TX UNDER 24H: HCPCS | Performed by: SOCIAL WORKER

## 2022-08-18 RX ORDER — DEXTROAMPHETAMINE SACCHARATE, AMPHETAMINE ASPARTATE, DEXTROAMPHETAMINE SULFATE AND AMPHETAMINE SULFATE 7.5; 7.5; 7.5; 7.5 MG/1; MG/1; MG/1; MG/1
30 TABLET ORAL 2 TIMES DAILY
Qty: 60 TABLET | Refills: 0 | Status: SHIPPED | OUTPATIENT
Start: 2022-09-18 | End: 2022-10-18

## 2022-08-18 RX ORDER — ARIPIPRAZOLE 5 MG/1
5 TABLET ORAL DAILY
Qty: 30 TABLET | Refills: 1 | Status: SHIPPED | OUTPATIENT
Start: 2022-08-18 | End: 2023-04-13

## 2022-08-18 RX ORDER — GABAPENTIN 300 MG/1
300 CAPSULE ORAL 2 TIMES DAILY
Qty: 30 CAPSULE | Refills: 1 | Status: SHIPPED | OUTPATIENT
Start: 2022-08-18 | End: 2023-02-04

## 2022-08-18 RX ORDER — DEXTROAMPHETAMINE SACCHARATE, AMPHETAMINE ASPARTATE, DEXTROAMPHETAMINE SULFATE AND AMPHETAMINE SULFATE 7.5; 7.5; 7.5; 7.5 MG/1; MG/1; MG/1; MG/1
30 TABLET ORAL 2 TIMES DAILY
Qty: 60 TABLET | Refills: 0 | Status: SHIPPED | OUTPATIENT
Start: 2022-10-19 | End: 2022-11-18

## 2022-08-18 RX ORDER — ESTRADIOL 1 MG/1
1 TABLET ORAL DAILY
Qty: 30 TABLET | Refills: 1 | Status: SHIPPED | OUTPATIENT
Start: 2022-08-18 | End: 2023-04-13

## 2022-08-18 RX ORDER — ALBUTEROL SULFATE 90 UG/1
1-2 AEROSOL, METERED RESPIRATORY (INHALATION) EVERY 4 HOURS PRN
Qty: 18 G | Refills: 1 | Status: SHIPPED | OUTPATIENT
Start: 2022-08-18

## 2022-08-18 RX ORDER — DEXTROAMPHETAMINE SACCHARATE, AMPHETAMINE ASPARTATE, DEXTROAMPHETAMINE SULFATE AND AMPHETAMINE SULFATE 7.5; 7.5; 7.5; 7.5 MG/1; MG/1; MG/1; MG/1
30 TABLET ORAL 2 TIMES DAILY
Qty: 60 TABLET | Refills: 0 | Status: SHIPPED | OUTPATIENT
Start: 2022-08-18 | End: 2022-09-17

## 2022-08-18 RX ORDER — HYDROXYZINE PAMOATE 50 MG/1
50 CAPSULE ORAL
Qty: 30 CAPSULE | Refills: 1 | Status: SHIPPED | OUTPATIENT
Start: 2022-08-18 | End: 2022-09-17

## 2022-08-18 RX ORDER — LITHIUM CARBONATE 300 MG
TABLET ORAL
Qty: 90 TABLET | Refills: 1 | Status: SHIPPED | OUTPATIENT
Start: 2022-08-18 | End: 2023-02-04

## 2022-08-18 NOTE — DISCHARGE INSTRUCTIONS
Being that you declined imaging today, recommend imaging in the near future if symptoms do not improve.    Review handouts provided with information on when to return.    Alternate Tylenol and ibuprofen as needed for pain.    Follow-up with primary care provider or return to urgent care-ED with any worsening in condition or additional concerns.
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
## 2022-08-18 NOTE — ED PROVIDER NOTES
History     Chief Complaint   Patient presents with     Neck Pain     HPI  Ihsan Millan is a 31 year old female who presents to urgent care today with complaints of neck pain which started yesterday.  Patient states she fell 2 weeks ago and landed on her back and then hit neck.  Denies any headache, LOC, vision changes, dizziness or vertigo.  Denies any history of neck pain.  Denies any recent illness/infection.  Denies any fever, chills, nausea, vomiting, diarrhea, shortness of breath or chest pain.  No other concerns.    Allergies:  Allergies   Allergen Reactions     Skin Adhesives [Mecrylate] Dermatitis     Patient developed contact dermatitis after application of topical adhesive to surgical incision     Lexapro [Escitalopram] Nausea and Vomiting     Codeine Sulfate Rash     Penicillins Rash     Tramadol Rash       Problem List:    Patient Active Problem List    Diagnosis Date Noted     RUQ abdominal pain 02/12/2017     Priority: Medium     Pneumonia of right middle lobe due to infectious organism 02/12/2017     Priority: Medium     Tobacco abuse 02/12/2017     Priority: Medium     Status post laparoscopic assisted vaginal hysterectomy (LAVH) 06/25/2015     Priority: Medium     Surgery, elective 06/24/2015     Priority: Medium     Endometriosis 04/16/2015     Priority: Medium     Depo lupron 4/15.  Laparoscopy 3/15       ASCUS on Pap smear 07/18/2014     Priority: Medium     + hpv colpo 7/14       Postpartum depression 07/18/2014     Priority: Medium     zoloft rx       H. pylori infection      Priority: Medium     Moderate persistent asthma 03/19/2014     Priority: Medium     GERD (gastroesophageal reflux disease) 09/12/2013     Priority: Medium     Intermittent asthma 09/12/2013     Priority: Medium     Increased with pregnancy  Send for eval and asthma action plan  Smoking cessation counseling  Flu shot given          Past Medical History:    Past Medical History:   Diagnosis Date     Asthma      GERD  (gastroesophageal reflux disease)      H. pylori infection        Past Surgical History:    Past Surgical History:   Procedure Laterality Date     COLONOSCOPY N/A 11/6/2014    Procedure: COLONOSCOPY;  Surgeon: Zacarias Paz MD;  Location: [facility]     DILATION AND CURETTAGE, HYSTEROSCOPY DIAGNOSTIC, COMBINED  8/1/2014    Procedure: COMBINED DILATION AND CURETTAGE, HYSTEROSCOPY DIAGNOSTIC;  Surgeon: Rodolfo Clifford MD;  Location: [facility]     ESOPHAGOSCOPY, GASTROSCOPY, DUODENOSCOPY (EGD), COMBINED N/A 9/5/2014    Procedure: COMBINED ESOPHAGOSCOPY, GASTROSCOPY, DUODENOSCOPY (EGD);  Surgeon: Zacarias Paz MD;  Location: [facility]     HERNIORRHAPHY UMBILICAL N/A 9/3/2019    Procedure: OPEN UMBILICAL HERNIA REPAIR;  Surgeon: Carter Vaughn MD;  Location: [facility]     LAPAROSCOPIC ASSISTED HYSTERECTOMY VAGINAL N/A 6/24/2015    Procedure: LAPAROSCOPIC ASSISTED HYSTERECTOMY VAGINAL;  Surgeon: Rodolfo Clifford MD;  [facility]     LAPAROSCOPIC OOPHORECTOMY Right 3/23/2016    Procedure: LAPAROSCOPIC OOPHORECTOMY;  Surgeon: Rodolfo Clifford MD;  [facility]     LAPAROSCOPIC SALPINGO-OOPHORECTOMY Left 5/3/2017    Procedure: LAPAROSCOPIC SALPINGO-OOPHORECTOMY;  LAPAROSCOPIC LEFT OOPHORECTOMY;  Surgeon: Rodolfo Clifford MD;  [facility]     LAPAROSCOPY DIAGNOSTIC (GYN) N/A 3/18/2015    Procedure: LAPAROSCOPY DIAGNOSTIC (GYN);  Surgeon: Rodolfo Clifford MD;  Location: [facility]     no surgeries       SALPINGECTOMY Bilateral 6/24/2015    Procedure: SALPINGECTOMY;  Surgeon: Rodolfo Clifford MD;  Location: [facility]       Family History:    Family History   Problem Relation Age of Onset     Asthma Mother      Unknown/Adopted Maternal Grandmother      Unknown/Adopted Maternal Grandfather        Social History:  Marital Status:  Single [1]  Social History     Tobacco Use     Smoking status: Current Every Day Smoker     Packs/day: 0.75     Years: 7.00     Pack years: 5.25     Types: Cigarettes     Smokeless tobacco: Never Used     Tobacco  comment: declines quitline referral 9/10/19   Substance Use Topics     Alcohol use: Not Currently     Comment: social     Drug use: No        Medications:    Acetaminophen (TYLENOL PO)  albuterol (PROAIR HFA/PROVENTIL HFA/VENTOLIN HFA) 108 (90 BASE) MCG/ACT Inhaler  amphetamine-dextroamphetamine (ADDERALL) 30 MG tablet  amphetamine-dextroamphetamine (ADDERALL) 30 MG tablet  ARIPiprazole (ABILIFY) 5 MG tablet  aspirin (ASA) 81 MG chewable tablet  estradiol (ESTRACE) 1 MG tablet  estradiol (ESTRACE) 1 MG tablet  FLUoxetine (PROZAC) 20 MG capsule  gabapentin (NEURONTIN) 300 MG capsule  lithium (ESKALITH) 300 MG tablet  lithium (ESKALITH) 600 MG capsule  methylPREDNISolone (MEDROL DOSEPAK) 4 MG tablet therapy pack  omeprazole (PRILOSEC) 20 MG DR capsule  OMEPRAZOLE PO  SYMBICORT 80-4.5 MCG/ACT Inhaler  tiZANidine (ZANAFLEX) 4 MG capsule  traZODone (DESYREL) 150 MG tablet  traZODone (DESYREL) 50 MG tablet      Review of Systems   Constitutional: Negative for chills and fever.   Respiratory: Negative for shortness of breath.    Cardiovascular: Negative for chest pain.   Gastrointestinal: Negative for diarrhea, nausea and vomiting.   Musculoskeletal: Positive for neck pain and neck stiffness. Negative for gait problem.   Psychiatric/Behavioral: Negative.      Physical Exam   BP: 115/74  Pulse: 95  Temp: 98.5  F (36.9  C)  Resp: 16  SpO2: 98 %    Physical Exam  Vitals and nursing note reviewed.   Constitutional:       General: She is not in acute distress.     Appearance: Normal appearance. She is not ill-appearing or toxic-appearing.   Cardiovascular:      Rate and Rhythm: Normal rate and regular rhythm.      Pulses: Normal pulses.      Heart sounds: Normal heart sounds.   Pulmonary:      Effort: Pulmonary effort is normal.      Breath sounds: Normal breath sounds.   Abdominal:      General: Bowel sounds are normal.      Palpations: Abdomen is soft.      Tenderness: There is no abdominal tenderness.   Musculoskeletal:       "Cervical back: Rigidity and tenderness present.   Skin:     General: Skin is warm and dry.   Neurological:      Mental Status: She is alert.   Psychiatric:         Mood and Affect: Mood normal.       ED Course     No results found for this or any previous visit (from the past 24 hour(s)).    Medications - No data to display    Assessments & Plan (with Medical Decision Making)     I have reviewed the nursing notes.    I have reviewed the findings, diagnosis, plan and need for follow up with the patient.  (M54.2) Neck pain  (primary encounter diagnosis)  Plan:   Patient ambulatory with a nontoxic appearance.  Patient able to stand from a seated position and ambulate without difficulty.  Patient denies any recent illness or infection.  Denies any fever, chills, nausea, vomiting, diarrhea, shortness of breath or chest pain.  Patient fell 2 weeks ago landing on her back and then hitting her neck.  Denies any headache, LOC, dizziness, vertigo or vision changes.  Denies any bowel or bladder dysfunction.  Denies any saddle anesthesia.  Denies any numbness or tingling to bilateral lower extremities not attributed to pain.  Patient has midline tenderness upon palpation of neck, ordered imaging and then patient declined imaging after it was ordered due to not wanting to wait.  Declines any pain medication or muscle relaxers in urgent care. Patient states \"I will just go home and take APAP and follow up with Dr. Houston tomorrow.  Due to midline tenderness, recommend patient to follow-up in the near future for imaging and monitor closely for any worsening in condition or additional concerns and return immediately.  Red flags and when to return reviewed with patient.  Patient in agreement with treatment plan.    Discharge Medication List as of 8/17/2022  9:09 PM        Final diagnoses:   Neck pain     8/17/2022   [facility]     Tammy Castro NP  08/17/22 [id number]    "

## 2022-08-18 NOTE — PROGRESS NOTES
"Group Therapy Progress Notes     Client Initial Individualized Goals for Treatment:      Will report on symptoms and identify skills to use to manage depression and anxiety and Will learn and practice 1-2 coping skills to help improve moods and report daily the skills she is practicing outside of the program as she starts to build more confidence in using her skills    Area of Treatment Focus:  Personal Safety  Symptom Stabilization and Management    Therapeutic Interventions/Treatment Strategies:  Assist clients in establishing / strengthening support network  Assess / reassess level of potential for harm to self or others  Facilitate increased self awareness  Teach adaptive coping skills and communication skills    Response to Treatment Strategies:  Accepted Feedback, Gave Feedback, Listened  and Focused on Goals    Name of Groups:  Coping with Fear - Time: 10-10:50; Self Development 1-1:50    Description and Therapeutic Outcome:     During Coping With Fear group, Ihsan presented as social and participatory. She identified having social anxiety and the challenge of being in crowds. She does use Deep Breathing which is supportive for her. She will work on My Name Is...and I am safe; Present Moment - Is it happening right now?; 3 Good Things; reaching out to someone by paying them a complement. She owns practicing this today in the elevator by complementing a person on their shoes. She also identified : I am strong; a good mom; worth it.    During the Self Development group, Ihsan was amenable to role-playing support to friend who also had social anxiety. She implemented listening skills; positive eye contact \"which is usually hard for me\"; related with Just Like Me; promoted the Present Moment as anxiety is about what is not happening yet. Ihsan presents as intentional regarding her wellness and remains appropriate for PHP.      Is this a Weekly Review of the Progress on the Treatment Plan?  NO    Are Treatment " Plan Goals being addressed?  YES      Are Treatment Plan Strategies to Address Goals Effective?  YES      Are there any current contracts in place?  YES

## 2022-08-18 NOTE — ED TRIAGE NOTES
Pt presents with c/o midline neck pain. Reports she fell two weeks ago but the pain just started yesterday. Denies trauma or injury in the last few days. Pt did try tylenol two hours ago without relief. Worsens with walking. States the pain just shoots down her spine.

## 2022-08-18 NOTE — ED TRIAGE NOTES
Patient presents with complaints of neck pain that she states she woke up with yesterday and has been getting worse today.

## 2022-08-18 NOTE — PROGRESS NOTES
Group Therapy Progress Notes     Client Initial Individualized Goals for Treatment: Will report on symptoms and identify skills to use to manage depression and anxiety and Will learn and practice 1-2 coping skills to help improve moods and report daily the skills she is practicing outside of the program as she starts to build more confidence in using her skills    Area of Treatment Focus:  Personal Safety and Symptom Stabilization and Management    Therapeutic Interventions/Treatment Strategies:  Assist clients in establishing / strengthening support network  Assess / reassess level of potential for harm to self or others  Facilitate increased self awareness  Teach adaptive coping skills and communication skills    Response to Treatment Strategies:  Accepted Feedback, Gave Feedback, Listened  and Accepted Support    Name of Groups:  Money Management - Time: 11:10-12:00    Description and Therapeutic Outcome:   OT - Life Skills - Money Management  In this group, clients learn the basics of money management as well as learn about their money management style (spenders, savers, amassers, conscientious manager and risk takers).  Will discuss what their style means for them and discuss basic skills they can use to improve their habits if needed.  Education and handout also given on savings tips, creating a budget and shopping tips.  In OT group today, Ihsan presents as quiet but engaged, follows along in the handout and completed handout on spending habits.  Does have a pain flare up today so verbal participation was minimal.       Is this a Weekly Review of the Progress on the Treatment Plan?  NO    Are Treatment Plan Goals being addressed?  YES      Are Treatment Plan Strategies to Address Goals Effective?  YES      Are there any current contracts in place?  YES

## 2022-08-19 ENCOUNTER — HOSPITAL ENCOUNTER (OUTPATIENT)
Dept: BEHAVIORAL HEALTH | Facility: HOSPITAL | Age: 32
Discharge: HOME | End: 2022-08-19
Attending: PSYCHIATRY & NEUROLOGY
Payer: COMMERCIAL

## 2022-08-19 PROCEDURE — H0035 MH PARTIAL HOSP TX UNDER 24H: HCPCS | Performed by: SOCIAL WORKER

## 2022-08-19 NOTE — PROGRESS NOTES
Group Therapy Progress Notes     Client Initial Individualized Goals for Treatment:      Will report on symptoms and identify skills to use to manage depression and anxiety and Will learn and practice 1-2 coping skills to help improve moods and report daily the skills she is practicing outside of the program as she starts to build more confidence in using her skills    Area of Treatment Focus:  Personal Safety  Symptom Stabilization and Management    Therapeutic Interventions/Treatment Strategies:  Assist clients in establishing / strengthening support network  Assess / reassess level of potential for harm to self or others  Facilitate increased self awareness  Teach adaptive coping skills and communication skills    Response to Treatment Strategies:  Accepted Feedback, Gave Feedback, Listened  and Focused on Goals    Name of Groups:  Psychotherapy group 9-10a    Description and Therapeutic Outcome:   In psychotherapy group, Ihsan shares that she has not been sleeping well and attributes this to her sleep routine being maladjusted for the last year.  She shares that she did have a good night and used GAP and deep breathing when becoming irritated with her boyfriend and did not get angry at him.  Ihsan discusses some of her challenges with finances and housing, she was accepting of resources offered to her by PHP staff and appreciated the support. Ihsan states that she has been out of her medications for the last week and believes that some of her exhaustion and irritability is from not taking her medications.  She will meet with the psychiatrist today to review medications and get refills.  Ihsan states that she has been applying present moment every day and practices gratitudes and 3 good things about her life.   She continues to be appropriate for PHP.      Is this a Weekly Review of the Progress on the Treatment Plan?  NO    Are Treatment Plan Goals being addressed?  YES      Are Treatment Plan Strategies to  Address Goals Effective?  YES      Are there any current contracts in place?  YES

## 2022-08-19 NOTE — PROGRESS NOTES
Group Therapy Progress Notes     Client Initial Individualized Goals for Treatment: Will report on symptoms and identify skills to use to manage depression and anxiety and Will learn and practice 1-2 coping skills to help improve moods and report daily the skills she is practicing outside of the program as she starts to build more confidence in using her skills      Area of Treatment Focus:  Personal Safety and Symptom Stabilization and Management    Therapeutic Interventions/Treatment Strategies:  Assist clients in establishing / strengthening support network  Assess / reassess level of potential for harm to self or others  Facilitate increased self awareness  Teach adaptive coping skills and communication skills    Response to Treatment Strategies:  Accepted Feedback, Focused on Goals and Accepted Support    Name of Groups:  Stress Management - Time: 11:10-12:00    Description and Therapeutic Outcome:   Educated and handout provided on essential oils and aromatherapy.  Discussed quality of oils and usage of oils.  Also educated in massage to us in conjunction with aromatherapy or as a stand alone treatment for anxiety, pain etc.  Clients were given the opportunity to make a bath salt and sugar scrub with added essential oils for self care.  In OT group today, Ihsan presents as disengaged.  Does appear a bit distracted at times.         Is this a Weekly Review of the Progress on the Treatment Plan?  NO    Are Treatment Plan Goals being addressed?  YES      Are Treatment Plan Strategies to Address Goals Effective?  YES      Are there any current contracts in place?  YES

## 2022-08-19 NOTE — PROGRESS NOTES
"Group Therapy Progress Notes     Client Initial Individualized Goals for Treatment:      Will report on symptoms and identify skills to use to manage depression and anxiety and Will learn and practice 1-2 coping skills to help improve moods and report daily the skills she is practicing outside of the program as she starts to build more confidence in using her skills    Area of Treatment Focus:  Personal Safety  Symptom Stabilization and Management    Therapeutic Interventions/Treatment Strategies:  Assist clients in establishing / strengthening support network  Assess / reassess level of potential for harm to self or others  Facilitate increased self awareness  Teach adaptive coping skills and communication skills    Response to Treatment Strategies:  Accepted Feedback, Gave Feedback, Listened  and Focused on Goals    Name of Groups:  Post Trauma Growth - Time: 10-10:50; Self Development group 1-1:50    Description and Therapeutic Outcome:      During PTG - Post Traumatic Growth group, Ihsan presented as \"tired\" due to poor sleep. She is following up with the Provider re this. Ihsan grasps the concept and shift from the focus on the D- Disorder of PTSD to the G-Growth of Post Trauma. She owns that trauma can bring an increased sense of one's own strength: 'If I lived through that, I can live through anything\". She will continue to practice Present Moment, 3 Good Things, AAA; 3 C's; Breathing. She presents as intentional regarding her wellness and remains appropriate for PHP.    Ihsan was not present during 1-1:50 Self Development group.     Is this a Weekly Review of the Progress on the Treatment Plan?  NO    Are Treatment Plan Goals being addressed?  YES      Are Treatment Plan Strategies to Address Goals Effective?  YES      Are there any current contracts in place?  YES           "

## 2022-08-22 ENCOUNTER — HOSPITAL ENCOUNTER (OUTPATIENT)
Dept: BEHAVIORAL HEALTH | Facility: HOSPITAL | Age: 32
Discharge: HOME | End: 2022-08-22
Attending: PSYCHIATRY & NEUROLOGY
Payer: COMMERCIAL

## 2022-08-22 PROCEDURE — H0035 MH PARTIAL HOSP TX UNDER 24H: HCPCS | Performed by: SOCIAL WORKER

## 2022-08-22 NOTE — PROGRESS NOTES
"Group Therapy Progress Notes     Client Initial Individualized Goals for Treatment:      Will report on symptoms and identify skills to use to manage depression and anxiety and Will learn and practice 1-2 coping skills to help improve moods and report daily the skills she is practicing outside of the program as she starts to build more confidence in using her skills    Area of Treatment Focus:  Personal Safety   Symptom Stabilization and Management    Therapeutic Interventions/Treatment Strategies:  Assist clients in establishing / strengthening support network  Assess / reassess level of potential for harm to self or others  Facilitate increased self awareness  Teach adaptive coping skills and communication skills    Response to Treatment Strategies:  Accepted Feedback, Gave Feedback, Listened  and Focused on Goals    Name of Groups:  Psychotherapy group 9-10a    Description and Therapeutic Outcome:   In psychotherapy group, Ihsan presents as engaged and participatory this morning.  She shares that she is focusing on being healthy and \"being a better mom\" for her daughter.  She is also proud of herself for maintaining sobriety and shares that she has been keeping herself busy with painting and other crafts; she states \"I need to keep my brain busy and then I don't think about using.\"  Ihsan states that she is having financial challenges and asks about resources for school clothes and supplies for her daughter and food support.  Ihsan admits that asking for help is hard for her and that she is frustrated about her housing and financial challenges she is experiencing.  She shares that she has been using skills to interrupt negative thoughts including present moment, GAP and drop the rope.  Ihsan adds that she has not been taking her medication as she is in need of refills; [facility] clinicians have arranged for her to meet with the psychiatrist this week. Ihsan is appreciative of the support, she continues to be " appropriate for PHP.    Is this a Weekly Review of the Progress on the Treatment Plan?  NO    Are Treatment Plan Goals being addressed?  YES      Are Treatment Plan Strategies to Address Goals Effective?  YES      Are there any current contracts in place?  YES

## 2022-08-22 NOTE — PROGRESS NOTES
Group Therapy Progress Notes     Client Initial Individualized Goals for Treatment:      Will report on symptoms and identify skills to use to manage depression and anxiety and Will learn and practice 1-2 coping skills to help improve moods and report daily the skills she is practicing outside of the program as she starts to build more confidence in using her skills    Area of Treatment Focus:  Personal Safety  Symptom Stabilization and Management    Therapeutic Interventions/Treatment Strategies:  Assist clients in establishing / strengthening support network  Assess / reassess level of potential for harm to self or others  Facilitate increased self awareness  Teach adaptive coping skills and communication skills    Response to Treatment Strategies:  Accepted Feedback, Gave Feedback, Listened  and Focused on Goals    Name of Groups:  Psychotherapy group 9-10a    Description and Therapeutic Outcome:   In psychotherapy group, Ihsan reports feeling tired and having a stomach ache, she stats she started her lithium and other medications yesterday and is likely experiencing some side effects from her medications. Ihsan shares that she practiced GAP and drop the rope yesterday and did not engaged in a argument with her boyfriend. She shares that she also used coping skills related to keeping busy and worked on a vanity that she has been refurbishing and shared pictures of how it looks re-painted.  Ihsan was very proud of herself and states that painting and crafts also help her manage her sobriety.  Ihsan makes efforts to engage this morning, she continues to be appropriate for PHP.     Is this a Weekly Review of the Progress on the Treatment Plan?  NO    Are Treatment Plan Goals being addressed?  YES      Are Treatment Plan Strategies to Address Goals Effective?  YES      Are there any current contracts in place?  YES

## 2022-08-22 NOTE — PROGRESS NOTES
Ihsan was not present during 1-1:50 Communication group due to an appointment.   Implemented All Fall with Harm Risk Interventions:  [place] to call system. Call bell, personal items and telephone within reach. Instruct patient to call for assistance. Room bathroom lighting operational. Non-slip footwear when patient is off stretcher. Physically safe environment: no spills, clutter or unnecessary equipment. Stretcher in lowest position, wheels locked, appropriate side rails in place. Provide visual cue, wrist band, yellow gown, etc. Monitor gait and stability. Monitor for mental status changes and reorient to person, place, and time. Review medications for side effects contributing to fall risk. Reinforce activity limits and safety measures with patient and family. Provide visual clues: red socks.

## 2022-08-22 NOTE — PROGRESS NOTES
"Group Therapy Progress Notes     Client Initial Individualized Goals for Treatment:    Will learn and practice 1-2 coping skills to help improve moods and continue to report daily in group how you are practicing these skills outside of the group and Will Improve Mindfulness / Stay in the Here and Now Intentionally bringing your attention to something beautiful, pleasant, or interesting that is occurring or is present in your immediate environment or experience on a regular basis.    Area of Treatment Focus:  Personal Safety  Symptom Stabilization and Management    Therapeutic Interventions/Treatment Strategies:  Assist clients in establishing / strengthening support network  Assess / reassess level of potential for harm to self or others  Engage in safety planning when indicated  Facilitate increased self awareness  Provide education regarding Mindfulness and Grounding Tchniques  Teach adaptive coping skills and communication skills    Response to Treatment Strategies:  Accepted Feedback, Gave Feedback, Listened  and Focused on Goals    Name of Groups:  Communication - Time: 10-10:50; 1-1:50    Description and Therapeutic Outcome:     During Communication group, Ihsan presented as alert, focused and participatory. She identified the challenge of making new friends due to history of friends \"who did me wrong\". She is amenable to be a better friend that is a \"better version of me', just not right now. She will work on self care and making a home for her and her children. Also, she is pleased that the CPS case is closed. She identifies being a good friend when she commits to a friendship: loyal, non-judgemental; honest; funny;. She will also work on Common humanity and Just Like Me; No mistakes only lessons and 3 Good Things.   Ihsan presents as intentional regarding her wellness and remains appropriate for PHP.    Ihsan was not present during the 1-1:50 Communication group due to an appointment.      Is this a Weekly " Review of the Progress on the Treatment Plan?  YES    Are Treatment Plan Goals being addressed?  YES      Are Treatment Plan Strategies to Address Goals Effective?  YES      Are there any current contracts in place?  YES

## 2022-08-22 NOTE — TREATMENT PLAN
Individualized Treatment Plan     Date of Plan: 2022    Name: Ihsan Millan MRN: [id number]    : 1990    Programs:  [facility] ()     DSM-V Diagnoses: 296.32 (F33.1) Major Depressive Disorder, Recurrent Episode, Moderate _ and With anxious distress;  ADHD; Generalized Anxiety Disorder; Insomnia; Stimulant Use Disorder Per DA 22    DA Date: 2022  Psychosocial & Contextual Factors: family of origin issues, financial hardship, health issues, housing , limited social support, mental health symptoms, occupational / vocational stress, relationship stress and transportation issues  WHODAS: 39  LOCUS: 22      Team Members Contributing to Plan:  Dr. Vandana Do, [facility]  Marina Avery, [facility]  Aliyah Perry HOLDEN    Client Strengths:  caring, committed to sobriety, creative, empathetic, goal-focused, good listener, has a previous history of therapy, insightful, intelligent, motivated, open to learning, open to suggestions / feedback, responsible parent, supportive, wants to learn, willing to ask questions, willing to relate to others and work history    Client Participation in Plan:  Contributed to goals and plan   Agrees with plan   Received copy of treatment plan     Areas of Vulnerability:  Suicidal Ideation   Anxiety  Depressive symptoms   Physical/medical:    Trauma/Abuse/Neglect    Long-Term Goals:  Knowledge about illness and management of symptoms   Maintenance of personal safety   Maintenance of sobriety   Effective management of impulsivity     Abuse Prevention Plan:  Safe, therapeutic environment   Safety coping plan as needed   Education regarding illness and skill development   Coordination with care providers   Impluse control education and intervention   Medication adjustment/management (MI/CD)     Discharge Criteria:  Satisfactory progress toward treatment goals   Improvement re: identified problems and symptoms   Ability to continue recovery at  "next level of service   Has a discharge plan in place   Has safety/coping plan in place   Regular attendance as scheduled           Areas of Treatment Focus            Area of Treatment Focus:   Personal Safety and Symptom Stabilization and Management  Start Date:    8.16.22    Goal:  Target Date: 8.19.22 Status: Active  Will report on symptoms and identify skills to use to manage depression and anxiety and Will learn and practice 1-2 coping skills to help improve moods and report daily the skills she is practicing outside of the program as she starts to build more confidence in using her skills      Progress:    Ihsan had a good first week of PHP.  She has been engaged and focused, she has been working on managing both emotions and her sobriety and is doing very well with follow through on attending programming and other appointments.  She learned about Default/Focused Mode and was able to provide feedback on the topic and has awareness of defaulting to reacting. She now GAPs by walking away; being in the Present Moment with acceptance of her unhealthy relationship and by finding new housing; using the 3 Good Things; 3 C's; I am [place] It; Gratitude.      Ihsan worked on resilience and identifies that she is \"not just stuck\" and can start over and \"be in control\" of her life.She will work on Present Moment and \"That was then and this is now\" and \"Restart\". She identified her resilience at this time is \"to put my happiness first\". She is doing this by working on moving out of her present relationship and into her own apartment; working on spending time with her son who lives with the dad; attending PHP.   During the follow up group, Ihsan identified \"I am not my circumstances, my relationships, emotions or the actions of others.  I Know My Truth and will apply 3 C's\" and the THE skill. I am a fighter\". She will also work on Gratitude toward self.     In the Coping With Fear group, Ihsan presented as social and " "participatory. She identified having social anxiety and the challenge of being in crowds. She does use Deep Breathing which is supportive for her. She will work on My Name Is...and I am safe; Present Moment - Is it happening right now?; 3 Good Things; reaching out to someone by paying them a complement. She owns practicing this today in the elevator by complementing a person on their shoes. She also identified : I am strong; a good mom; worth it.     Ihsan did very well in the Self Development group and role-played support to friend who also had social anxiety. She implemented listening skills; positive eye contact \"which is usually hard for me\"; related with Just Like Me; promoted the Present Moment as anxiety is about what is not happening yet.    Ihsan learned about Post Traumatic Growth this week and grasps the concept and shift from the focus on the D- Disorder of PTSD to the G-Growth of Post Trauma. She owns that trauma can bring an increased sense of one's own strength: 'If I lived through that, I can live through anything\". She will continue to practice Present Moment, 3 Good Things, AAA; 3 C's; Breathing.      Ihsan has been very intentional about being engaged and participating in the PHP program, she also met with Dr Natarajan, psychiatrist at [facility]-he assisted her with medication management and refilled/adjusted some of her medications.  Ihsan also accepted support regarding resources for food assistance and clothing/school supplies for her daughter.  She continues to manage her sobriety and denies any safety concerns or suicidal ideation at this time.  Ihsan continues to be appropriate for PHP.            Treatment Strategies:   Assist clients in establishing / strengthening support network  Assess / reassess level of potential for harm to self or others  Engage in safety planning when indicated  Facilitate increased self awareness  Teach adaptive coping skills and communication " skills    These services will include group therapy and OT group therapy daily and individual therapy and medications management as needed.                 Area of Treatment Focus:   Personal Safety and Symptom Stabilization and Management  Start Date:    8.22.22    Goal:  Target Date: 8.26.22 Status: Active  Will learn and practice 1-2 coping skills to help improve moods and continue to report daily in group how you are practicing these skills outside of the group and Will Improve Mindfulness / Stay in the Here and Now Intentionally bringing your attention to something beautiful, pleasant, or interesting that is occurring or is present in your immediate environment or experience on a regular basis.      Progress:             Treatment Strategies:   Assist clients in establishing / strengthening support network  Assess / reassess level of potential for harm to self or others  Engage in safety planning when indicated  Facilitate increased self awareness  Provide education regarding mindfulness and grounding techniques  Teach adaptive coping skills and communication skills    These services will include group therapy and OT group therapy daily and individual therapy and medications management as needed.               Area of Treatment Focus:   Symptom Stabilization and Management and Community Resources / Support and Discharge Planning  Start Date:    8.29.22    Goal:  Target Date: 9.2.22 Status: Active  Will improve wellness related behaviors by reporting to the group the skills you have gained the most confidence in using and Will develop an aftercare / transition plan by reporting to the group the services you are connected with for after the program      Progress:             Treatment Strategies:   Assist clients in establishing / strengthening support network  Assist with discharge planning  Assess / reassess level of potential for harm to self or others  Engage in safety planning when indicated  Facilitate  increased self awareness  Provide education regarding community based services  Teach adaptive coping skills and communication skills    These services will include group therapy and OT group therapy daily and individual therapy and medications management as needed.

## 2022-08-22 NOTE — PROGRESS NOTES
"Group Therapy Progress Notes     Client Initial Individualized Goals for Treatment: Will learn and practice 1-2 coping skills to help improve moods and continue to report daily in group how you are practicing these skills outside of the group and Will Improve Mindfulness / Stay in the Here and Now Intentionally bringing your attention to something beautiful, pleasant, or interesting that is occurring or is present in your immediate environment or experience on a regular basis.    Area of Treatment Focus:  Personal Safety and Symptom Stabilization and Management    Therapeutic Interventions/Treatment Strategies:  Assist clients in establishing / strengthening support network  Assess / reassess level of potential for harm to self or others  Engage in safety planning when indicated  Facilitate increased self awareness  Provide education regarding Mindfulness and grounding techniques  Teach adaptive coping skills and communication skills    Response to Treatment Strategies:  Accepted Feedback, Gave Feedback, Listened , Focused on Goals, Attentive and Accepted Support    Name of Groups:  Psychotherapy - Time: 9-9:50    Description and Therapeutic Outcome:   In psychotherapy group, Ihsan reviewed her weekend.  Reports having a pretty rough weekend - threw her phone and broke it and punched a wall - got into it with Fran because he was getting upset with her that someone was messaging her and would not believe her that no one was and she \"snapped\" and threw her phone and broke it.  She continues to report the significant struggle it is living there and trying to remain healthy - is still looking at every option out there and has applied to [facility].  Wanted to cut badly this weekend but did not - stayed busy with her daughter and used distractions.  Found the book - its okay to not be okay and started reading that again.  Was very aware of how much she missed her brother and grandma this weekend.  Also started thinking that " Fran recently lost his mom also so might be grieving just like her.  She put the mirror on the dresser she refinished and put positive messages all over it with Pavel Wilburn.  Also went to the park with her cousin to get away from the house.  She has job interview today so will not be in afternoon group.  Also started the meds on Friday and picked other ones up today.  Will plan to meet with the doctor later in the week.  She did admit that she has not been eating again - low on food and wants the food for her daughter - she was open to eating granola bars and a few snacks in the group to help her focus today.  Ihsan engaged very well and continues to be willing to accept the support and feedback from the group.    Ihsan was not present in the afternoon psychotherapy wrap up group.      Is this a Weekly Review of the Progress on the Treatment Plan?  NO    Are Treatment Plan Goals being addressed?  YES      Are Treatment Plan Strategies to Address Goals Effective?  YES      Are there any current contracts in place?  YES

## 2022-08-23 ENCOUNTER — TELEPHONE (OUTPATIENT)
Dept: BEHAVIORAL HEALTH | Facility: HOSPITAL | Age: 32
End: 2022-08-23

## 2022-08-23 DIAGNOSIS — F41.1 GAD (GENERALIZED ANXIETY DISORDER): ICD-10-CM

## 2022-08-23 DIAGNOSIS — G47.00 INSOMNIA, UNSPECIFIED TYPE: ICD-10-CM

## 2022-08-23 DIAGNOSIS — F32.9 MAJOR DEPRESSIVE DISORDER, REMISSION STATUS UNSPECIFIED, UNSPECIFIED WHETHER RECURRENT: Primary | ICD-10-CM

## 2022-08-23 DIAGNOSIS — F90.9 ATTENTION DEFICIT HYPERACTIVITY DISORDER (ADHD), UNSPECIFIED ADHD TYPE: ICD-10-CM

## 2022-08-23 DIAGNOSIS — F15.90 STIMULANT USE DISORDER: ICD-10-CM

## 2022-08-24 ENCOUNTER — HOSPITAL ENCOUNTER (OUTPATIENT)
Dept: BEHAVIORAL HEALTH | Facility: HOSPITAL | Age: 32
Discharge: HOME | End: 2022-08-24
Attending: PSYCHIATRY & NEUROLOGY
Payer: COMMERCIAL

## 2022-08-24 PROCEDURE — H0035 MH PARTIAL HOSP TX UNDER 24H: HCPCS | Performed by: SOCIAL WORKER

## 2022-08-24 NOTE — PROGRESS NOTES
"Group Therapy Progress Notes     Client Initial Individualized Goals for Treatment: Will learn and practice 1-2 coping skills to help improve moods and continue to report daily in group how you are practicing these skills outside of the group and Will Improve Mindfulness / Stay in the Here and Now Intentionally bringing your attention to something beautiful, pleasant, or interesting that is occurring or is present in your immediate environment or experience on a regular basis.    Area of Treatment Focus:  Personal Safety and Symptom Stabilization and Management    Therapeutic Interventions/Treatment Strategies:  Assist clients in establishing / strengthening support network  Assess / reassess level of potential for harm to self or others  Engage in safety planning when indicated  Facilitate increased self awareness  Provide education regarding emotion regulation  Teach adaptive coping skills and communication skills    Response to Treatment Strategies:  Accepted Feedback, Focused on Goals and Attentive    Name of Groups:  Emotion Regulation - Time: 11:10-12:00    Description and Therapeutic Outcome:   OT group- Emotion Management  OT group today was focused on emotion regulation.  Clients are given handouts outlining 10 emotion regulation skills.  In group, specific emotion regulation skills are discussed and practiced together.  Handout \"Checking your basic needs to aide in self/emotion regulation\" was issued and discussed.  Encouraged clients to identify what basic needs they are doing well with as well as set a goal to work on improving 1-2 basic needs.  In OT group today, Ihsan is quiet but engaged.  Only participates when cued to do so and even then, answers as shirt and vague today.         Is this a Weekly Review of the Progress on the Treatment Plan?  NO    Are Treatment Plan Goals being addressed?  YES      Are Treatment Plan Strategies to Address Goals Effective?  YES      Are there any current contracts " in place?  YES

## 2022-08-24 NOTE — PROGRESS NOTES
"Group Therapy Progress Notes     Client Initial Individualized Goals for Treatment:     Will learn and practice 1-2 coping skills to help improve moods and continue to report daily in group how you are practicing these skills outside of the group and Will Improve Mindfulness / Stay in the Here and Now Intentionally bringing your attention to something beautiful, pleasant, or interesting that is occurring or is present in your immediate environment or experience on a regular basis.    Area of Treatment Focus:  Personal Safety  Symptom Stabilization and Management    Therapeutic Interventions/Treatment Strategies:  Assist clients in establishing / strengthening support network  Assess / reassess level of potential for harm to self or others  Facilitate increased self awareness  Provide education regarding Mindfulness and Grounding Techniques  Teach adaptive coping skills and communication skills    Response to Treatment Strategies:  Accepted Feedback, Gave Feedback, Listened  and Focused on Goals    Name of Groups:  Coping with Anger - Time: 10-10:50; 1-1:50    Description and Therapeutic Outcome:     During Coping with Anger group, Ihsan, though \"tired\", presented with a broad affect and was participatory. She owns having anger issues evidenced in throwing her 'phone with the intent to hurt her boyfriend. Her 'phone broke in the process \"because of bad aim\".He \"kept poking\" verbally and that was her reaction. She recognizes that: If this person wants to go off the wall, that is their thing. I don't need to respond to their anger or feel threatened.\"   Ihsan will work on Awareness, 3 C's; Just Like Me, AAA; 3 Good Things; Present Moment - especially to counter thoughts that \"he is being just like my ex\".  She presents as intentional regarding her wellness and remains appropriate for PHP.    Ihsan was not present during 1-1:50 Coping with Anger follow up group.    Is this a Weekly Review of the Progress on the " Treatment Plan?  NO    Are Treatment Plan Goals being addressed?  YES      Are Treatment Plan Strategies to Address Goals Effective?  YES      Are there any current contracts in place?  YES

## 2022-08-25 ENCOUNTER — TELEPHONE (OUTPATIENT)
Dept: BEHAVIORAL HEALTH | Facility: HOSPITAL | Age: 32
End: 2022-08-25

## 2022-08-26 ENCOUNTER — HOSPITAL ENCOUNTER (OUTPATIENT)
Dept: BEHAVIORAL HEALTH | Facility: HOSPITAL | Age: 32
Discharge: HOME | End: 2022-08-26
Attending: PSYCHIATRY & NEUROLOGY
Payer: COMMERCIAL

## 2022-08-26 PROCEDURE — H0035 MH PARTIAL HOSP TX UNDER 24H: HCPCS | Performed by: SOCIAL WORKER

## 2022-08-29 ENCOUNTER — HOSPITAL ENCOUNTER (OUTPATIENT)
Dept: BEHAVIORAL HEALTH | Facility: HOSPITAL | Age: 32
Discharge: HOME | End: 2022-08-29
Attending: PSYCHIATRY & NEUROLOGY
Payer: COMMERCIAL

## 2022-08-29 PROCEDURE — H0035 MH PARTIAL HOSP TX UNDER 24H: HCPCS | Performed by: SOCIAL WORKER

## 2022-08-29 NOTE — PROGRESS NOTES
"Group Therapy Progress Notes     Client Initial Individualized Goals for Treatment: Will improve wellness related behaviors by reporting to the group the skills you have gained the most confidence in using and Will develop an aftercare / transition plan by reporting to the group the services you are connected with for after the program    Area of Treatment Focus:  Symptom Stabilization and Management and Community Resources / Support and Discharge Planning    Therapeutic Interventions/Treatment Strategies:  Assist clients in establishing / strengthening support network  Assist with discharge planning  Assess / reassess level of potential for harm to self or others  Engage in safety planning when indicated  Facilitate increased self awareness  Teach adaptive coping skills and communication skills    Response to Treatment Strategies:  Accepted Feedback, Gave Feedback, Listened , Focused on Goals, Attentive and Accepted Support    Name of Groups:  Assertiveness - Time: 11:10-12:00    Description and Therapeutic Outcome:   Assertiveness   OT Group - Assertiveness, Boundaries, and Positive Affirmations  This group focuses on passive, aggressive, and assertive communication styles and educates clients on how each of the styles can affect our relationships and how we respond to situations. Reviewed each type of communication and benefits and consequences of each type.  Also encouraged assertive body language and the use of SAS (State the problem, Ask for what you need, and Spell out the benefits of cooperation).  Clients are given the opportunity to share scenarios where they utilized each type of communication and what the end result of each type was.  In OT group today, Ihsan presents as focused and engaged.  States that she has used all communication styles but primarily uses passive.  States that she \"feels bad\" so takes on too much and does not set boundaries.       Is this a Weekly Review of the Progress on the " Treatment Plan?  NO    Are Treatment Plan Goals being addressed?  YES      Are Treatment Plan Strategies to Address Goals Effective?  YES      Are there any current contracts in place?  YES
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
## 2022-09-04 ENCOUNTER — HEALTH MAINTENANCE LETTER (OUTPATIENT)
Age: 32
End: 2022-09-04

## 2022-09-11 ENCOUNTER — APPOINTMENT (OUTPATIENT)
Dept: GENERAL RADIOLOGY | Facility: HOSPITAL | Age: 32
End: 2022-09-11
Attending: INTERNAL MEDICINE
Payer: COMMERCIAL

## 2022-09-11 ENCOUNTER — HOSPITAL ENCOUNTER (EMERGENCY)
Facility: HOSPITAL | Age: 32
Discharge: HOME | End: 2022-09-12
Attending: INTERNAL MEDICINE | Admitting: INTERNAL MEDICINE
Payer: COMMERCIAL

## 2022-09-11 DIAGNOSIS — R07.89 CHEST WALL PAIN: ICD-10-CM

## 2022-09-11 LAB
ALBUMIN SERPL-MCNC: 3.8 G/DL (ref 3.4–5)
ALP SERPL-CCNC: 109 U/L (ref 40–150)
ALT SERPL W P-5'-P-CCNC: 26 U/L (ref 0–50)
ANION GAP SERPL CALCULATED.3IONS-SCNC: 5 MMOL/L (ref 3–14)
AST SERPL W P-5'-P-CCNC: 13 U/L (ref 0–45)
BASOPHILS # BLD AUTO: 0 10E3/UL (ref 0–0.2)
BASOPHILS NFR BLD AUTO: 0 %
BILIRUB SERPL-MCNC: 0.1 MG/DL (ref 0.2–1.3)
BUN SERPL-MCNC: 23 MG/DL (ref 7–30)
CALCIUM SERPL-MCNC: 8.8 MG/DL (ref 8.5–10.1)
CHLORIDE BLD-SCNC: 108 MMOL/L (ref 94–109)
CO2 SERPL-SCNC: 26 MMOL/L (ref 20–32)
CREAT SERPL-MCNC: 0.75 MG/DL (ref 0.52–1.04)
EOSINOPHIL # BLD AUTO: 0.3 10E3/UL (ref 0–0.7)
EOSINOPHIL NFR BLD AUTO: 4 %
ERYTHROCYTE [DISTWIDTH] IN BLOOD BY AUTOMATED COUNT: 13.2 % (ref 10–15)
ETHANOL SERPL-MCNC: <0.01 G/DL
GFR SERPL CREATININE-BSD FRML MDRD: >90 ML/MIN/1.73M2
GLUCOSE BLD-MCNC: 101 MG/DL (ref 70–99)
HCT VFR BLD AUTO: 39.3 % (ref 35–47)
HGB BLD-MCNC: 13.3 G/DL (ref 11.7–15.7)
IMM GRANULOCYTES # BLD: 0 10E3/UL
IMM GRANULOCYTES NFR BLD: 0 %
LYMPHOCYTES # BLD AUTO: 4 10E3/UL (ref 0.8–5.3)
LYMPHOCYTES NFR BLD AUTO: 49 %
MCH RBC QN AUTO: 29.4 PG (ref 26.5–33)
MCHC RBC AUTO-ENTMCNC: 33.8 G/DL (ref 31.5–36.5)
MCV RBC AUTO: 87 FL (ref 78–100)
MONOCYTES # BLD AUTO: 0.5 10E3/UL (ref 0–1.3)
MONOCYTES NFR BLD AUTO: 5 %
NEUTROPHILS # BLD AUTO: 3.5 10E3/UL (ref 1.6–8.3)
NEUTROPHILS NFR BLD AUTO: 42 %
NRBC # BLD AUTO: 0 10E3/UL
NRBC BLD AUTO-RTO: 0 /100
PLATELET # BLD AUTO: 277 10E3/UL (ref 150–450)
POTASSIUM BLD-SCNC: 3.8 MMOL/L (ref 3.4–5.3)
PROT SERPL-MCNC: 6.9 G/DL (ref 6.8–8.8)
RBC # BLD AUTO: 4.53 10E6/UL (ref 3.8–5.2)
SODIUM SERPL-SCNC: 139 MMOL/L (ref 133–144)
TROPONIN I SERPL HS-MCNC: <3 NG/L
WBC # BLD AUTO: 8.4 10E3/UL (ref 4–11)

## 2022-09-11 PROCEDURE — 36415 COLL VENOUS BLD VENIPUNCTURE: CPT | Performed by: INTERNAL MEDICINE

## 2022-09-11 PROCEDURE — 80053 COMPREHEN METABOLIC PANEL: CPT | Performed by: INTERNAL MEDICINE

## 2022-09-11 PROCEDURE — 250N000011 HC RX IP 250 OP 636: Performed by: INTERNAL MEDICINE

## 2022-09-11 PROCEDURE — 93005 ELECTROCARDIOGRAM TRACING: CPT

## 2022-09-11 PROCEDURE — 250N000013 HC RX MED GY IP 250 OP 250 PS 637: Performed by: INTERNAL MEDICINE

## 2022-09-11 PROCEDURE — 82040 ASSAY OF SERUM ALBUMIN: CPT | Performed by: INTERNAL MEDICINE

## 2022-09-11 PROCEDURE — 99285 EMERGENCY DEPT VISIT HI MDM: CPT | Mod: 25

## 2022-09-11 PROCEDURE — 82077 ASSAY SPEC XCP UR&BREATH IA: CPT | Performed by: INTERNAL MEDICINE

## 2022-09-11 PROCEDURE — 93010 ELECTROCARDIOGRAM REPORT: CPT | Performed by: INTERNAL MEDICINE

## 2022-09-11 PROCEDURE — 99285 EMERGENCY DEPT VISIT HI MDM: CPT | Performed by: INTERNAL MEDICINE

## 2022-09-11 PROCEDURE — 84484 ASSAY OF TROPONIN QUANT: CPT | Performed by: INTERNAL MEDICINE

## 2022-09-11 PROCEDURE — 96374 THER/PROPH/DIAG INJ IV PUSH: CPT

## 2022-09-11 PROCEDURE — 85025 COMPLETE CBC W/AUTO DIFF WBC: CPT | Performed by: INTERNAL MEDICINE

## 2022-09-11 PROCEDURE — 71045 X-RAY EXAM CHEST 1 VIEW: CPT

## 2022-09-11 PROCEDURE — 80178 ASSAY OF LITHIUM: CPT | Performed by: INTERNAL MEDICINE

## 2022-09-11 RX ORDER — DIAZEPAM 10 MG/2ML
5 INJECTION, SOLUTION INTRAMUSCULAR; INTRAVENOUS ONCE
Status: COMPLETED | OUTPATIENT
Start: 2022-09-11 | End: 2022-09-11

## 2022-09-11 RX ORDER — MAGNESIUM HYDROXIDE/ALUMINUM HYDROXICE/SIMETHICONE 120; 1200; 1200 MG/30ML; MG/30ML; MG/30ML
30 SUSPENSION ORAL ONCE
Status: COMPLETED | OUTPATIENT
Start: 2022-09-11 | End: 2022-09-11

## 2022-09-11 RX ADMIN — DIAZEPAM 5 MG: 5 INJECTION, SOLUTION INTRAMUSCULAR; INTRAVENOUS at 22:48

## 2022-09-11 RX ADMIN — ALUMINUM HYDROXIDE, MAGNESIUM HYDROXIDE, AND SIMETHICONE 30 ML: 200; 200; 20 SUSPENSION ORAL at 23:29

## 2022-09-11 ASSESSMENT — ENCOUNTER SYMPTOMS
VOICE CHANGE: 0
HEADACHES: 0
BLOOD IN STOOL: 0
FEVER: 0
DIAPHORESIS: 0
ABDOMINAL DISTENTION: 0
NAUSEA: 0
SHORTNESS OF BREATH: 0
MYALGIAS: 0
NECK STIFFNESS: 0
CHILLS: 0
DYSURIA: 0
ANAL BLEEDING: 0
HEMATURIA: 0
COLOR CHANGE: 0
DIZZINESS: 0
ABDOMINAL PAIN: 0
WOUND: 0
VOMITING: 0
FLANK PAIN: 0
BACK PAIN: 0
LIGHT-HEADEDNESS: 0
CONFUSION: 0
NUMBNESS: 1
WHEEZING: 0
ARTHRALGIAS: 0
PALPITATIONS: 0
COUGH: 0
CHEST TIGHTNESS: 0
NECK PAIN: 0

## 2022-09-11 ASSESSMENT — ACTIVITIES OF DAILY LIVING (ADL): ADLS_ACUITY_SCORE: 35

## 2022-09-12 VITALS
SYSTOLIC BLOOD PRESSURE: 103 MMHG | TEMPERATURE: 97.6 F | RESPIRATION RATE: 18 BRPM | DIASTOLIC BLOOD PRESSURE: 76 MMHG | OXYGEN SATURATION: 98 % | HEART RATE: 90 BPM

## 2022-09-12 LAB — LITHIUM SERPL-SCNC: <0.2 MMOL/L

## 2022-09-12 PROCEDURE — 250N000013 HC RX MED GY IP 250 OP 250 PS 637: Performed by: INTERNAL MEDICINE

## 2022-09-12 RX ORDER — ACETAMINOPHEN 325 MG/1
975 TABLET ORAL ONCE
Status: COMPLETED | OUTPATIENT
Start: 2022-09-12 | End: 2022-09-12

## 2022-09-12 RX ORDER — OXYCODONE HYDROCHLORIDE 5 MG/1
5 TABLET ORAL ONCE
Status: COMPLETED | OUTPATIENT
Start: 2022-09-12 | End: 2022-09-12

## 2022-09-12 RX ORDER — NAPROXEN 500 MG/1
250 TABLET ORAL 2 TIMES DAILY WITH MEALS
Qty: 4 TABLET | Refills: 0 | Status: SHIPPED | OUTPATIENT
Start: 2022-09-12 | End: 2022-09-16

## 2022-09-12 RX ADMIN — ACETAMINOPHEN 325MG 975 MG: 325 TABLET ORAL at 00:47

## 2022-09-12 RX ADMIN — OXYCODONE HYDROCHLORIDE 5 MG: 5 TABLET ORAL at 00:47

## 2022-09-12 ASSESSMENT — ACTIVITIES OF DAILY LIVING (ADL): ADLS_ACUITY_SCORE: 35

## 2022-09-12 NOTE — ED NOTES
Patient is discharging to home given information on chest pain and use of naproxen. Patient stated understanding of these instructions and is discharging by private auto.

## 2022-09-12 NOTE — ED PROVIDER NOTES
History     Chief Complaint   Patient presents with     Chest Pain     The history is provided by the patient.   Chest Pain  Pain location:  L chest  Pain quality: sharp    Pain radiates to:  Does not radiate  Onset quality:  Gradual  Duration:  12 hours  Timing:  Constant  Progression:  Worsening  Chronicity:  New  Associated symptoms: numbness    Associated symptoms: no abdominal pain, no back pain, no cough, no diaphoresis, no dizziness, no fever, no headache, no nausea, no palpitations, no shortness of breath and no vomiting          Allergies:  Allergies   Allergen Reactions     Skin Adhesives [Mecrylate] Dermatitis     Patient developed contact dermatitis after application of topical adhesive to surgical incision     Lexapro [Escitalopram] Nausea and Vomiting     Codeine Sulfate Rash     Penicillins Rash     Tramadol Rash       Problem List:    Patient Active Problem List    Diagnosis Date Noted     RUQ abdominal pain 02/12/2017     Priority: Medium     Pneumonia of right middle lobe due to infectious organism 02/12/2017     Priority: Medium     Tobacco abuse 02/12/2017     Priority: Medium     Status post laparoscopic assisted vaginal hysterectomy (LAVH) 06/25/2015     Priority: Medium     Surgery, elective 06/24/2015     Priority: Medium     Endometriosis 04/16/2015     Priority: Medium     Depo lupron 4/15.  Laparoscopy 3/15       ASCUS on Pap smear 07/18/2014     Priority: Medium     + hpv colpo 7/14       Postpartum depression 07/18/2014     Priority: Medium     zoloft rx       H. pylori infection      Priority: Medium     Moderate persistent asthma 03/19/2014     Priority: Medium     GERD (gastroesophageal reflux disease) 09/12/2013     Priority: Medium     Intermittent asthma 09/12/2013     Priority: Medium     Increased with pregnancy  Send for eval and asthma action plan  Smoking cessation counseling  Flu shot given          Past Medical History:    Past Medical History:   Diagnosis Date     Asthma       GERD (gastroesophageal reflux disease)      H. pylori infection        Past Surgical History:    Past Surgical History:   Procedure Laterality Date     COLONOSCOPY N/A 11/6/2014    Procedure: COLONOSCOPY;  Surgeon: Zacarias Paz MD;  Location: [facility]     DILATION AND CURETTAGE, HYSTEROSCOPY DIAGNOSTIC, COMBINED  8/1/2014    Procedure: COMBINED DILATION AND CURETTAGE, HYSTEROSCOPY DIAGNOSTIC;  Surgeon: Rodolfo Clifford MD;  Location: [facility]     ESOPHAGOSCOPY, GASTROSCOPY, DUODENOSCOPY (EGD), COMBINED N/A 9/5/2014    Procedure: COMBINED ESOPHAGOSCOPY, GASTROSCOPY, DUODENOSCOPY (EGD);  Surgeon: Zacarias Paz MD;  Location: [facility]     HERNIORRHAPHY UMBILICAL N/A 9/3/2019    Procedure: OPEN UMBILICAL HERNIA REPAIR;  Surgeon: Carter Vaughn MD;  Location: [facility]     LAPAROSCOPIC ASSISTED HYSTERECTOMY VAGINAL N/A 6/24/2015    Procedure: LAPAROSCOPIC ASSISTED HYSTERECTOMY VAGINAL;  Surgeon: Rodolfo Clifford MD;  Location: [facility]     LAPAROSCOPIC OOPHORECTOMY Right 3/23/2016    Procedure: LAPAROSCOPIC OOPHORECTOMY;  Surgeon: Rodolfo Clifford MD;  Location: [facility]     LAPAROSCOPIC SALPINGO-OOPHORECTOMY Left 5/3/2017    Procedure: LAPAROSCOPIC SALPINGO-OOPHORECTOMY;  LAPAROSCOPIC LEFT OOPHORECTOMY;  Surgeon: Rodolfo Clifford MD;  Location: [facility]     LAPAROSCOPY DIAGNOSTIC (GYN) N/A 3/18/2015    Procedure: LAPAROSCOPY DIAGNOSTIC (GYN);  Surgeon: Rodolfo Clifford MD;  Location: [facility]     no surgeries       SALPINGECTOMY Bilateral 6/24/2015    Procedure: SALPINGECTOMY;  Surgeon: Rodolfo Clifford MD;  Location: [facility]       Family History:    Family History   Problem Relation Age of Onset     Asthma Mother      Unknown/Adopted Maternal Grandmother      Unknown/Adopted Maternal Grandfather        Social History:  Marital Status:  Single [1]  Social History     Tobacco Use     Smoking status: Current Every Day Smoker     Packs/day: 0.75     Years: 7.00     Pack years: 5.25     Types: Cigarettes     Smokeless tobacco: Never Used      Tobacco comment: declines quitline referral 9/10/19   Substance Use Topics     Alcohol use: Not Currently     Comment: social     Drug use: No        Medications:    Acetaminophen (TYLENOL PO)  albuterol (PROAIR HFA/PROVENTIL HFA/VENTOLIN HFA) 108 (90 BASE) MCG/ACT Inhaler  amphetamine-dextroamphetamine (ADDERALL) 30 MG tablet  ARIPiprazole (ABILIFY) 5 MG tablet  aspirin (ASA) 81 MG chewable tablet  estradiol (ESTRACE) 1 MG tablet  gabapentin (NEURONTIN) 300 MG capsule  hydrOXYzine (VISTARIL) 50 MG capsule  lithium (ESKALITH) 300 MG tablet  naproxen (NAPROSYN) 500 MG tablet  omeprazole (PRILOSEC) 20 MG DR capsule  traZODone (DESYREL) 150 MG tablet  albuterol (PROAIR HFA/PROVENTIL HFA/VENTOLIN HFA) 108 (90 Base) MCG/ACT inhaler  amphetamine-dextroamphetamine (ADDERALL) 30 MG tablet  [START ON 9/18/2022] amphetamine-dextroamphetamine (ADDERALL) 30 MG tablet  [START ON 10/19/2022] amphetamine-dextroamphetamine (ADDERALL) 30 MG tablet  amphetamine-dextroamphetamine (ADDERALL) 30 MG tablet  ARIPiprazole (ABILIFY) 5 MG tablet  estradiol (ESTRACE) 1 MG tablet  estradiol (ESTRACE) 1 MG tablet  FLUoxetine (PROZAC) 20 MG capsule  FLUoxetine (PROZAC) 20 MG capsule  gabapentin (NEURONTIN) 300 MG capsule  lithium (ESKALITH) 300 MG tablet  lithium (ESKALITH) 600 MG capsule  methylPREDNISolone (MEDROL DOSEPAK) 4 MG tablet therapy pack  omeprazole (PRILOSEC) 20 MG DR capsule  OMEPRAZOLE PO  SYMBICORT 80-4.5 MCG/ACT Inhaler  tiZANidine (ZANAFLEX) 4 MG capsule  traZODone (DESYREL) 50 MG tablet          Review of Systems   Constitutional: Negative for chills, diaphoresis and fever.   HENT: Negative for voice change.    Eyes: Negative for visual disturbance.   Respiratory: Negative for cough, chest tightness, shortness of breath and wheezing.    Cardiovascular: Positive for chest pain. Negative for palpitations and leg swelling.   Gastrointestinal: Negative for abdominal distention, abdominal pain, anal bleeding, blood in stool, nausea  and vomiting.   Genitourinary: Negative for decreased urine volume, dysuria, flank pain and hematuria.   Musculoskeletal: Negative for arthralgias, back pain, gait problem, myalgias, neck pain and neck stiffness.   Skin: Negative for color change, pallor, rash and wound.   Neurological: Positive for numbness. Negative for dizziness, syncope, light-headedness and headaches.   Psychiatric/Behavioral: Negative for confusion and suicidal ideas.       Physical Exam   BP: 122/81  Pulse: 105  Temp: 97.6  F (36.4  C)  Resp: 16  SpO2: 98 %      Physical Exam  Vitals and nursing note reviewed.   Constitutional:       Appearance: She is well-developed.   HENT:      Head: Normocephalic and atraumatic.      Mouth/Throat:      Pharynx: No oropharyngeal exudate.   Eyes:      Conjunctiva/sclera: Conjunctivae normal.      Pupils: Pupils are equal, round, and reactive to light.   Neck:      Thyroid: No thyromegaly.      Vascular: No JVD.      Trachea: No tracheal deviation.   Cardiovascular:      Rate and Rhythm: Normal rate and regular rhythm.      Heart sounds: Normal heart sounds. No murmur heard.    No friction rub. No gallop.   Pulmonary:      Effort: Pulmonary effort is normal. No respiratory distress.      Breath sounds: Normal breath sounds. No stridor. No wheezing or rales.   Chest:      Chest wall: Tenderness present.       Abdominal:      General: Bowel sounds are normal. There is no distension.      Palpations: Abdomen is soft. There is no mass.      Tenderness: There is no abdominal tenderness. There is no guarding or rebound.   Musculoskeletal:         General: No tenderness. Normal range of motion.      Cervical back: Normal range of motion and neck supple.   Lymphadenopathy:      Cervical: No cervical adenopathy.   Skin:     General: Skin is warm and dry.      Coloration: Skin is not pale.      Findings: No erythema or rash.   Neurological:      Mental Status: She is alert and oriented to person, place, and time.    Psychiatric:         Behavior: Behavior normal.         ED Course                 Procedures                No results found for this or any previous visit (from the past 24 hour(s)).    Medications   diazepam (VALIUM) injection 5 mg (5 mg Intravenous Given 9/11/22 1646)   alum & mag hydroxide-simethicone (MAALOX) suspension 30 mL (30 mLs Oral Given 9/11/22 3332)   oxyCODONE (ROXICODONE) tablet 5 mg (5 mg Oral Given 9/12/22 0047)   acetaminophen (TYLENOL) tablet 975 mg (975 mg Oral Given 9/12/22 0047)       Assessments & Plan (with Medical Decision Making)   Left chest wall pain, left arm numbness  EKG NSR  Labs reviewed    Symptoms improed after receiving muscle relaxant in er   Likely chest wall pain  D C home, follow-up with PCP    I have reviewed the nursing notes.    I have reviewed the findings, diagnosis, plan and need for follow up with the patient.      Discharge Medication List as of 9/12/2022  1:24 AM      START taking these medications    Details   naproxen (NAPROSYN) 500 MG tablet Take 0.5 tablets (250 mg) by mouth 2 times daily (with meals) for 4 days, Disp-4 tablet, R-0, Local Print             Final diagnoses:   Chest wall pain       9/11/2022   [facility]     Rj Figueroa MD  09/16/22 0100

## 2022-09-12 NOTE — ED TRIAGE NOTES
Patient presents with c/o chest pain and tingling going down arm into hand. Patient reports symptoms started this morning around 0600, pain woke patient from sleeping. Patient any precipitating or alleviating factors.

## 2022-10-24 ENCOUNTER — OFFICE VISIT (OUTPATIENT)
Dept: CHIROPRACTIC MEDICINE | Facility: OTHER | Age: 32
End: 2022-10-24
Attending: CHIROPRACTOR
Payer: COMMERCIAL

## 2022-10-24 DIAGNOSIS — M99.01 SEGMENTAL AND SOMATIC DYSFUNCTION OF CERVICAL REGION: ICD-10-CM

## 2022-10-24 DIAGNOSIS — M99.03 SEGMENTAL AND SOMATIC DYSFUNCTION OF LUMBAR REGION: Primary | ICD-10-CM

## 2022-10-24 DIAGNOSIS — M54.50 ACUTE BILATERAL LOW BACK PAIN WITHOUT SCIATICA: ICD-10-CM

## 2022-10-24 DIAGNOSIS — M99.02 SEGMENTAL AND SOMATIC DYSFUNCTION OF THORACIC REGION: ICD-10-CM

## 2022-10-24 PROCEDURE — 98941 CHIROPRACT MANJ 3-4 REGIONS: CPT | Mod: AT | Performed by: CHIROPRACTOR

## 2022-10-24 PROCEDURE — 99202 OFFICE O/P NEW SF 15 MIN: CPT | Mod: 25 | Performed by: CHIROPRACTOR

## 2022-10-24 NOTE — PROGRESS NOTES
Subjective Finding:    Chief compalint: Patient presents with:  Back Pain  , Pain Scale: 5/10, Intensity: sharp, Duration: 3 days, Radiating: no.    Date of injury:     Activities that the pain restricts:   Home/household/hobbies/social activities: yes.  Work duties: no.  Sleep: no.  Makes symptoms better: rest.  Makes symptoms worse: activity and walking.  Have you seen anyone else for the symptoms? Yes: MD.  Work related: no.  Automobile related injury: no.    Objective and Assessment:    Posture Analysis:   High shoulder: .  Head tilt: .  High iliac crest: .  Head carriage: neutral.  Thoracic Kyphosis: forward.  Lumbar Lordosis: neutral.    Lumbar Range of Motion: extension decreased.  Cervical Range of Motion: .  Thoracic Range of Motion: extension decreased.  Extremity Range of Motion: .    Palpation:   T paraspinals: sharp pain, no    Segmental dysfunction pre-treatment and treatment area: T6, T8 and L3.  C45    Assessment post-treatment:  Cervical: .  Thoracic: ROM increased.  Lumbar: ROM increased.    Comments: .      Complicating Factors: .    Procedure(s):  CMT:  31696 Chiropractic manipulative treatment 1-2 regions performed   Thoracic: Diversified, See above for level, Prone and Lumbar: Diversified, See above for level, Side posture    Modalities:  None performed this visit    Therapeutic procedures:  None    Plan:  Treatment plan: PRN.  Instructed patient: stretch as instructed at visit.  Short term goals: reduce pain and increase ROM.  Long term goals: restore normal function.  Prognosis: excellent.

## 2022-12-10 ENCOUNTER — APPOINTMENT (OUTPATIENT)
Dept: CT IMAGING | Facility: HOSPITAL | Age: 32
End: 2022-12-10
Attending: NURSE PRACTITIONER
Payer: COMMERCIAL

## 2022-12-10 ENCOUNTER — HOSPITAL ENCOUNTER (EMERGENCY)
Facility: HOSPITAL | Age: 32
Discharge: HOME | End: 2022-12-10
Attending: NURSE PRACTITIONER | Admitting: NURSE PRACTITIONER
Payer: COMMERCIAL

## 2022-12-10 VITALS
HEART RATE: 88 BPM | DIASTOLIC BLOOD PRESSURE: 66 MMHG | RESPIRATION RATE: 20 BRPM | BODY MASS INDEX: 24.89 KG/M2 | SYSTOLIC BLOOD PRESSURE: 118 MMHG | WEIGHT: 145 LBS | TEMPERATURE: 100 F | OXYGEN SATURATION: 100 %

## 2022-12-10 DIAGNOSIS — N39.0 URINARY TRACT INFECTION: ICD-10-CM

## 2022-12-10 LAB
ALBUMIN SERPL BCG-MCNC: 4.2 G/DL (ref 3.5–5.2)
ALBUMIN UR-MCNC: 20 MG/DL
ALP SERPL-CCNC: 108 U/L (ref 35–104)
ALT SERPL W P-5'-P-CCNC: 15 U/L (ref 10–35)
ANION GAP SERPL CALCULATED.3IONS-SCNC: 9 MMOL/L (ref 7–15)
APPEARANCE UR: ABNORMAL
AST SERPL W P-5'-P-CCNC: 14 U/L (ref 10–35)
BACTERIA #/AREA URNS HPF: ABNORMAL /HPF
BASOPHILS # BLD AUTO: 0 10E3/UL (ref 0–0.2)
BASOPHILS NFR BLD AUTO: 0 %
BILIRUB SERPL-MCNC: 0.2 MG/DL
BILIRUB UR QL STRIP: NEGATIVE
BUN SERPL-MCNC: 18.3 MG/DL (ref 6–20)
CALCIUM SERPL-MCNC: 8.9 MG/DL (ref 8.6–10)
CHLORIDE SERPL-SCNC: 104 MMOL/L (ref 98–107)
COLOR UR AUTO: YELLOW
CREAT SERPL-MCNC: 0.83 MG/DL (ref 0.51–0.95)
CRP SERPL-MCNC: 10.24 MG/L
DEPRECATED HCO3 PLAS-SCNC: 22 MMOL/L (ref 22–29)
EOSINOPHIL # BLD AUTO: 0.1 10E3/UL (ref 0–0.7)
EOSINOPHIL NFR BLD AUTO: 1 %
ERYTHROCYTE [DISTWIDTH] IN BLOOD BY AUTOMATED COUNT: 12.8 % (ref 10–15)
FLUAV RNA SPEC QL NAA+PROBE: NEGATIVE
FLUBV RNA RESP QL NAA+PROBE: NEGATIVE
GFR SERPL CREATININE-BSD FRML MDRD: >90 ML/MIN/1.73M2
GLUCOSE SERPL-MCNC: 113 MG/DL (ref 70–99)
GLUCOSE UR STRIP-MCNC: NEGATIVE MG/DL
HCG UR QL: NEGATIVE
HCT VFR BLD AUTO: 39.9 % (ref 35–47)
HGB BLD-MCNC: 13.7 G/DL (ref 11.7–15.7)
HGB UR QL STRIP: NEGATIVE
HOLD SPECIMEN: NORMAL
IMM GRANULOCYTES # BLD: 0 10E3/UL
IMM GRANULOCYTES NFR BLD: 0 %
KETONES UR STRIP-MCNC: NEGATIVE MG/DL
LACTATE SERPL-SCNC: 0.8 MMOL/L (ref 0.7–2)
LEUKOCYTE ESTERASE UR QL STRIP: NEGATIVE
LIPASE SERPL-CCNC: 31 U/L (ref 13–60)
LYMPHOCYTES # BLD AUTO: 0.7 10E3/UL (ref 0.8–5.3)
LYMPHOCYTES NFR BLD AUTO: 9 %
MCH RBC QN AUTO: 29.5 PG (ref 26.5–33)
MCHC RBC AUTO-ENTMCNC: 34.3 G/DL (ref 31.5–36.5)
MCV RBC AUTO: 86 FL (ref 78–100)
MONOCYTES # BLD AUTO: 0.4 10E3/UL (ref 0–1.3)
MONOCYTES NFR BLD AUTO: 5 %
MUCOUS THREADS #/AREA URNS LPF: PRESENT /LPF
NEUTROPHILS # BLD AUTO: 7.4 10E3/UL (ref 1.6–8.3)
NEUTROPHILS NFR BLD AUTO: 85 %
NITRATE UR QL: POSITIVE
NRBC # BLD AUTO: 0 10E3/UL
NRBC BLD AUTO-RTO: 0 /100
PH UR STRIP: 6 [PH] (ref 4.7–8)
PLATELET # BLD AUTO: 264 10E3/UL (ref 150–450)
POTASSIUM SERPL-SCNC: 4.2 MMOL/L (ref 3.4–5.3)
PROT SERPL-MCNC: 6.9 G/DL (ref 6.4–8.3)
RBC # BLD AUTO: 4.65 10E6/UL (ref 3.8–5.2)
RBC URINE: <1 /HPF
RSV RNA SPEC NAA+PROBE: NEGATIVE
SARS-COV-2 RNA RESP QL NAA+PROBE: NEGATIVE
SODIUM SERPL-SCNC: 135 MMOL/L (ref 136–145)
SP GR UR STRIP: 1.03 (ref 1–1.03)
SQUAMOUS EPITHELIAL: 2 /HPF
UROBILINOGEN UR STRIP-MCNC: NORMAL MG/DL
WBC # BLD AUTO: 8.6 10E3/UL (ref 4–11)
WBC URINE: 11 /HPF

## 2022-12-10 PROCEDURE — 36415 COLL VENOUS BLD VENIPUNCTURE: CPT | Performed by: STUDENT IN AN ORGANIZED HEALTH CARE EDUCATION/TRAINING PROGRAM

## 2022-12-10 PROCEDURE — 83690 ASSAY OF LIPASE: CPT | Performed by: NURSE PRACTITIONER

## 2022-12-10 PROCEDURE — 87637 SARSCOV2&INF A&B&RSV AMP PRB: CPT | Performed by: NURSE PRACTITIONER

## 2022-12-10 PROCEDURE — 250N000011 HC RX IP 250 OP 636: Performed by: NURSE PRACTITIONER

## 2022-12-10 PROCEDURE — 74177 CT ABD & PELVIS W/CONTRAST: CPT

## 2022-12-10 PROCEDURE — 96361 HYDRATE IV INFUSION ADD-ON: CPT

## 2022-12-10 PROCEDURE — 99285 EMERGENCY DEPT VISIT HI MDM: CPT | Mod: 25,CS

## 2022-12-10 PROCEDURE — 86140 C-REACTIVE PROTEIN: CPT | Performed by: NURSE PRACTITIONER

## 2022-12-10 PROCEDURE — 80053 COMPREHEN METABOLIC PANEL: CPT | Performed by: STUDENT IN AN ORGANIZED HEALTH CARE EDUCATION/TRAINING PROGRAM

## 2022-12-10 PROCEDURE — C9803 HOPD COVID-19 SPEC COLLECT: HCPCS

## 2022-12-10 PROCEDURE — 99284 EMERGENCY DEPT VISIT MOD MDM: CPT | Mod: CS | Performed by: NURSE PRACTITIONER

## 2022-12-10 PROCEDURE — 85025 COMPLETE CBC W/AUTO DIFF WBC: CPT | Performed by: STUDENT IN AN ORGANIZED HEALTH CARE EDUCATION/TRAINING PROGRAM

## 2022-12-10 PROCEDURE — 82040 ASSAY OF SERUM ALBUMIN: CPT | Performed by: STUDENT IN AN ORGANIZED HEALTH CARE EDUCATION/TRAINING PROGRAM

## 2022-12-10 PROCEDURE — 83605 ASSAY OF LACTIC ACID: CPT | Performed by: NURSE PRACTITIONER

## 2022-12-10 PROCEDURE — 96374 THER/PROPH/DIAG INJ IV PUSH: CPT | Mod: XU

## 2022-12-10 PROCEDURE — 81025 URINE PREGNANCY TEST: CPT | Performed by: NURSE PRACTITIONER

## 2022-12-10 PROCEDURE — 258N000003 HC RX IP 258 OP 636: Performed by: NURSE PRACTITIONER

## 2022-12-10 PROCEDURE — 87086 URINE CULTURE/COLONY COUNT: CPT | Performed by: NURSE PRACTITIONER

## 2022-12-10 PROCEDURE — 81001 URINALYSIS AUTO W/SCOPE: CPT | Performed by: NURSE PRACTITIONER

## 2022-12-10 RX ORDER — KETOROLAC TROMETHAMINE 30 MG/ML
30 INJECTION, SOLUTION INTRAMUSCULAR; INTRAVENOUS ONCE
Status: COMPLETED | OUTPATIENT
Start: 2022-12-10 | End: 2022-12-10

## 2022-12-10 RX ORDER — SODIUM CHLORIDE, SODIUM LACTATE, POTASSIUM CHLORIDE, CALCIUM CHLORIDE 600; 310; 30; 20 MG/100ML; MG/100ML; MG/100ML; MG/100ML
125 INJECTION, SOLUTION INTRAVENOUS CONTINUOUS
Status: DISCONTINUED | OUTPATIENT
Start: 2022-12-10 | End: 2022-12-10

## 2022-12-10 RX ORDER — PHENAZOPYRIDINE HYDROCHLORIDE 200 MG/1
200 TABLET, FILM COATED ORAL 3 TIMES DAILY PRN
Qty: 6 TABLET | Refills: 0 | Status: SHIPPED | OUTPATIENT
Start: 2022-12-10 | End: 2023-02-04

## 2022-12-10 RX ORDER — IOPAMIDOL 755 MG/ML
71 INJECTION, SOLUTION INTRAVASCULAR ONCE
Status: COMPLETED | OUTPATIENT
Start: 2022-12-10 | End: 2022-12-10

## 2022-12-10 RX ORDER — CEPHALEXIN 500 MG/1
500 CAPSULE ORAL 2 TIMES DAILY
Qty: 20 CAPSULE | Refills: 0 | Status: SHIPPED | OUTPATIENT
Start: 2022-12-10 | End: 2022-12-11

## 2022-12-10 RX ADMIN — SODIUM CHLORIDE, POTASSIUM CHLORIDE, SODIUM LACTATE AND CALCIUM CHLORIDE 1000 ML: 600; 310; 30; 20 INJECTION, SOLUTION INTRAVENOUS at 20:06

## 2022-12-10 RX ADMIN — KETOROLAC TROMETHAMINE 30 MG: 30 INJECTION, SOLUTION INTRAMUSCULAR; INTRAVENOUS at 20:10

## 2022-12-10 RX ADMIN — IOPAMIDOL 71 ML: 755 INJECTION, SOLUTION INTRAVENOUS at 20:21

## 2022-12-10 ASSESSMENT — ACTIVITIES OF DAILY LIVING (ADL): ADLS_ACUITY_SCORE: 35

## 2022-12-10 NOTE — ED TRIAGE NOTES
"\"Right lower abdominal pain started this morning and it is getting worse.  I also have tingling over my whole body which started with the abdominal pain.\"      "

## 2022-12-11 RX ORDER — CEPHALEXIN 500 MG/1
500 CAPSULE ORAL 2 TIMES DAILY
Qty: 14 CAPSULE | Refills: 0 | Status: SHIPPED | OUTPATIENT
Start: 2022-12-11 | End: 2022-12-18

## 2022-12-11 NOTE — ED PROVIDER NOTES
"EMERGENCY MEDICINE NOTE - LISA DAMON, CNP    ID:   Ihsan Millan    CC:  Chief Complaint   Patient presents with     Abdominal Pain     Tingling        MEANS OF ARRIVAL:  private car    PCP:   [facility] [place]    SUBJECTIVE:   HPI:   Ihsan Millan is a 32 year old individual with history of asthma, GERD, history of salpingo-oophorectomy, comes in today for abdominal pain that started today.   Patient states she had right lower quadrant pain that started today.  With this she has had \"tingling all over her body\".  No other problems reported with this.  Currently states that she feels fatigued and has had emesis x1.  No hematic emesis reported.  Last bowel movement 2 days ago.  No problems with urination such as dysuria or hematuria.  No flank pain reported.  Denies pregnancy or vaginal drainage.    Review of Systems:  Significant positives/negatives were reviewed and mentioned in HPI.  All remaining body systems are negative or non-contributory.    I have reviewed the PMH, Meds, Allergies, and SH, including:  ALLERGIES:  Allergies   Allergen Reactions     Skin Adhesives [Mecrylate] Dermatitis     Patient developed contact dermatitis after application of topical adhesive to surgical incision     Lexapro [Escitalopram] Nausea and Vomiting     Codeine Sulfate Rash     Penicillins Rash     Tramadol Rash       CURRENT MEDICATIONS:  Current Outpatient Rx   Medication Sig Dispense Refill     Acetaminophen (TYLENOL PO) Take 1,000 mg by mouth every 8 hours as needed for mild pain or fever       albuterol (PROAIR HFA/PROVENTIL HFA/VENTOLIN HFA) 108 (90 Base) MCG/ACT inhaler Inhale 1-2 puffs into the lungs every 4 hours as needed for shortness of breath / dyspnea or wheezing 18 g 1     albuterol (PROAIR HFA/PROVENTIL HFA/VENTOLIN HFA) 108 (90 BASE) MCG/ACT Inhaler Inhale 2 puffs into the lungs every 6 hours as needed for shortness of breath / dyspnea or wheezing 1 Inhaler 0     albuterol (PROVENTIL) (2.5 " MG/3ML) 0.083% neb solution Inhale 2.5 mg into the lungs       albuterol (PROVENTIL) (2.5 MG/3ML) 0.083% neb solution        albuterol (VENTOLIN HFA) 108 (90 Base) MCG/ACT inhaler Inhale 2 puffs into the lungs       amphetamine-dextroamphetamine (ADDERALL) 30 MG tablet Take 1 tablet (30 mg) by mouth 2 times daily 60 tablet 0     amphetamine-dextroamphetamine (ADDERALL) 30 MG tablet TAKE 1 TABLET BY MOUTH TWICE A DAY AS DIRECTED       ARIPiprazole (ABILIFY) 5 MG tablet Take 1 tablet (5 mg) by mouth daily for 30 days 30 tablet 1     ARIPiprazole (ABILIFY) 5 MG tablet Take 1 tablet (5 mg) by mouth daily 60 tablet 0     aspirin (ASA) 81 MG chewable tablet Take 324 mg by mouth       aspirin (ASA) 81 MG chewable tablet Take 81 mg by mouth daily       betamethasone dipropionate (DIPROSONE) 0.05 % external cream APPLY TO AFFECTED AREA ONCE DAILY AS NEEDED FOR SKIN IRRITATION       ciprofloxacin (CIPRO) 500 MG tablet TAKE 1 TABLET BY MOUTH 2 TIMES DAILY FOR 3 DAYS       cloNIDine (CATAPRES) 0.1 MG tablet Take 1 tablet by mouth       cloNIDine (CATAPRES) 0.1 MG tablet        cloNIDine (CATAPRES) 0.2 MG tablet Take 0.2 mg by mouth 2 times daily       estradiol (ESTRACE) 1 MG tablet Take 1 mg by mouth       estradiol (ESTRACE) 1 MG tablet Take 1 tablet (1 mg) by mouth daily for 30 days 30 tablet 1     estradiol (ESTRACE) 1 MG tablet Take 1 tablet (1 mg) by mouth daily 60 tablet 0     estradiol (ESTRACE) 1 MG tablet        FLUoxetine (PROZAC) 20 MG capsule Take 1 capsule by mouth       FLUoxetine (PROZAC) 20 MG capsule Take 3 capsules (60 mg) by mouth daily for 30 days 90 capsule 1     FLUoxetine (PROZAC) 20 MG capsule Take 1 capsule (20 mg) by mouth daily 60 capsule 0     gabapentin (NEURONTIN) 300 MG capsule Take 1 capsule (300 mg) by mouth 2 times daily for 30 days 30 capsule 1     gabapentin (NEURONTIN) 300 MG capsule Take 1 capsule (300 mg) by mouth 2 times daily 120 capsule 0     hydrOXYzine (ATARAX) 25 MG tablet Take 1  tablet by mouth       hydrOXYzine (ATARAX) 25 MG tablet TAKE 1 TABLET BY MOUTH TWICE DAILY AS NEEDED FOR ANXIETY       ipratropium - albuterol 0.5 mg/2.5 mg/3 mL (DUONEB) 0.5-2.5 (3) MG/3ML neb solution NEBULIZE CONTENTS OF 1 VIAL EVERY 6 HOURS AS NEEDED FOR SHORTNESS OF BREATH OR WHEEZING.       lithium (ESKALITH) 300 MG tablet Take 1 tablet (300 mg) by mouth every morning AND 2 tablets (600 mg) At Bedtime. Do all this for 30 days. 90 tablet 1     lithium (ESKALITH) 300 MG tablet 1 tablet by Oral or Feeding Tube route 2 times daily  2     lithium (ESKALITH) 600 MG capsule Take 1 capsule (600 mg) by mouth 2 times daily 120 capsule 0     meclizine (ANTIVERT) 25 MG tablet        methylPREDNISolone (MEDROL DOSEPAK) 4 MG tablet therapy pack Take 4 mg by mouth See Admin Instructions Follow Package Directions       naproxen (NAPROSYN) 500 MG tablet Take 1 tablet by mouth       omeprazole (PRILOSEC) 20 MG DR capsule Take 20 mg by mouth       omeprazole (PRILOSEC) 20 MG DR capsule Take 1 capsule (20 mg) by mouth daily 60 capsule 0     OMEPRAZOLE PO Take 20 mg by mouth every morning       ondansetron (ZOFRAN ODT) 4 MG ODT tab Place 4 mg under the tongue       predniSONE (DELTASONE) 10 MG tablet TAKE 2 TABS BY MOUTH TWICE DAILY FOR 5 DAYS, 1 TAB TWICE DAILY FOR 5 DAYS, 1 TAB DAILY FOR 5 DAYS, THEN 1 TAB EVERY OTHER DAY FOR 3 DOSES       SM MUCUS RELIEF 600 MG 12 hr tablet Take 1 tablet by mouth 2 times daily       solifenacin (VESICARE) 5 MG tablet Take 5 mg by mouth daily       SYMBICORT 80-4.5 MCG/ACT Inhaler        tiZANidine (ZANAFLEX) 4 MG capsule Take 1 capsule (4 mg) by mouth daily as needed for muscle spasms (Patient not taking: Reported on 9/11/2022) 30 capsule 0     tiZANidine (ZANAFLEX) 4 MG tablet TAKE 1 TABLET BY MOUTH THREE TIMES DAILY AS NEEDED FOR MUSCLE SPASM       traZODone (DESYREL) 150 MG tablet Take 1 tablet (150 mg) by mouth nightly as needed for sleep 60 tablet 0     traZODone (DESYREL) 50 MG tablet At  Bedtime           PMH:  Patient Active Problem List   Diagnosis     GERD (gastroesophageal reflux disease)     Intermittent asthma     Moderate persistent asthma     H. pylori infection     ASCUS on Pap smear     Postpartum depression     Endometriosis     Contraceptive management     Status post laparoscopic assisted vaginal hysterectomy (LAVH)     RUQ abdominal pain     Pneumonia of right middle lobe due to infectious organism     Tobacco abuse     Pregnant state, incidental     Past Surgical History:   Procedure Laterality Date     COLONOSCOPY N/A 11/6/2014    Procedure: COLONOSCOPY;  Surgeon: Zacarias aPz MD;  Location: [facility]     DILATION AND CURETTAGE, HYSTEROSCOPY DIAGNOSTIC, COMBINED  8/1/2014    Procedure: COMBINED DILATION AND CURETTAGE, HYSTEROSCOPY DIAGNOSTIC;  Surgeon: Rodolfo Clifford MD;  Location: [facility]     ESOPHAGOSCOPY, GASTROSCOPY, DUODENOSCOPY (EGD), COMBINED N/A 9/5/2014    Procedure: COMBINED ESOPHAGOSCOPY, GASTROSCOPY, DUODENOSCOPY (EGD);  Surgeon: Zacarias Paz MD;  Location: [facility]     HERNIORRHAPHY UMBILICAL N/A 9/3/2019    Procedure: OPEN UMBILICAL HERNIA REPAIR;  Surgeon: Carter Vaughn MD;  Location: [facility]     LAPAROSCOPIC ASSISTED HYSTERECTOMY VAGINAL N/A 6/24/2015    Procedure: LAPAROSCOPIC ASSISTED HYSTERECTOMY VAGINAL;  Surgeon: Rodolfo Clifford MD;  [facility]     LAPAROSCOPIC OOPHORECTOMY Right 3/23/2016    Procedure: LAPAROSCOPIC OOPHORECTOMY;  Surgeon: Rodolfo Clifford MD;  [facility]     LAPAROSCOPIC SALPINGO-OOPHORECTOMY Left 5/3/2017    Procedure: LAPAROSCOPIC SALPINGO-OOPHORECTOMY;  LAPAROSCOPIC LEFT OOPHORECTOMY;  Surgeon: Rodolfo Clifford MD;  [facility]     LAPAROSCOPY DIAGNOSTIC (GYN) N/A 3/18/2015    Procedure: LAPAROSCOPY DIAGNOSTIC (GYN);  Surgeon: Rodolfo Clifford MD;  Location: [facility]     no surgeries       SALPINGECTOMY Bilateral 6/24/2015    Procedure: SALPINGECTOMY;  Surgeon: Rodolfo Clifford MD;  Location: [facility]     Social History     Tobacco Use      Smoking status: Every Day     Packs/day: 0.75     Years: 7.00     Pack years: 5.25     Types: Cigarettes     Smokeless tobacco: Never     Tobacco comments:     declines quitline referral 9/10/19   Substance Use Topics     Alcohol use: Not Currently     Comment: social     Drug use: No       OBJECTIVE:     Vitals:    12/10/22 1740 12/10/22 2021 12/10/22 2106   BP: 117/79 115/66 118/66   Pulse: 106 100 88   Resp: 18 20 20   Temp: (!) 102  F (38.9  C) (!) 100.6  F (38.1  C) 100  F (37.8  C)   TempSrc: Tympanic Oral Oral   SpO2: 96% 100% 100%   Weight: 65.8 kg (145 lb)       Body mass index is 24.89 kg/m .  GENERAL APPEARANCE:  The patient is a 32 year old well-developed, well-nourished individual that appears as stated age.  NECK:  Supple.  Trachea is midline.  LUNGS:  Breathing is easy.  Breath sounds are equal and clear bilaterally.  No wheezes, rhonchi, or rales.  HEART: Tachycardic rate with regular rhythm with normal S1 and S2.  No murmurs, gallops, or rubs.  ABDOMEN:  Sof and flat.  Right lower quadrant tenderness with guarding  is present.  Peritoneal signs noted.  No CVA tenderness.  No flank mass present.  GENITOURINARY: No pelvic pain or bladder distention noted upon palpation.  NEUROLOGIC:  No focal sensory or motor deficits are noted.    PSYCHIATRIC:  The patient is awake, alert, and oriented x4.  Recent and remote memory is intact.  Appropriate mood and affect.  Calm and cooperative with history and physical exam.  SKIN:  Warm, dry, and well perfused.  Good turgor.  No lesions, nodules, or rashes are noted.  No bruising noted.     Comment: Discrepancies between my note and notes on behalf of the nursing team or other care providers are secondary to my findings reflecting my physical examination and questioning of the patient.  Any conflicting information provided is not in line with my examination of the patient.    LAB:     Recent Results (from the past 24 hour(s))   UA with Microscopic reflex to  Culture    Collection Time: 12/10/22  5:39 PM    Specimen: Urine, Hopson Catheter   Result Value Ref Range    Color Urine Yellow Colorless, Straw, Light Yellow, Yellow    Appearance Urine Slightly Cloudy (A) Clear    Glucose Urine Negative Negative mg/dL    Bilirubin Urine Negative Negative    Ketones Urine Negative Negative mg/dL    Specific Gravity Urine 1.035 1.003 - 1.035    Blood Urine Negative Negative    pH Urine 6.0 4.7 - 8.0    Protein Albumin Urine 20 (A) Negative mg/dL    Urobilinogen Urine Normal Normal, 2.0 mg/dL    Nitrite Urine Positive (A) Negative    Leukocyte Esterase Urine Negative Negative    Bacteria Urine Many (A) None Seen /HPF    Mucus Urine Present (A) None Seen /LPF    RBC Urine <1 <=2 /HPF    WBC Urine 11 (H) <=5 /HPF    Squamous Epithelials Urine 2 (H) <=1 /HPF   HCG qualitative urine (UPT)    Collection Time: 12/10/22  5:39 PM   Result Value Ref Range    hCG Urine Qualitative Negative Negative   Comprehensive metabolic panel    Collection Time: 12/10/22  6:26 PM   Result Value Ref Range    Sodium 135 (L) 136 - 145 mmol/L    Potassium 4.2 3.4 - 5.3 mmol/L    Chloride 104 98 - 107 mmol/L    Carbon Dioxide (CO2) 22 22 - 29 mmol/L    Anion Gap 9 7 - 15 mmol/L    Urea Nitrogen 18.3 6.0 - 20.0 mg/dL    Creatinine 0.83 0.51 - 0.95 mg/dL    Calcium 8.9 8.6 - 10.0 mg/dL    Glucose 113 (H) 70 - 99 mg/dL    Alkaline Phosphatase 108 (H) 35 - 104 U/L    AST 14 10 - 35 U/L    ALT 15 10 - 35 U/L    Protein Total 6.9 6.4 - 8.3 g/dL    Albumin 4.2 3.5 - 5.2 g/dL    Bilirubin Total 0.2 <=1.2 mg/dL    GFR Estimate >90 >60 mL/min/1.73m2   CBC with platelets and differential    Collection Time: 12/10/22  6:26 PM   Result Value Ref Range    WBC Count 8.6 4.0 - 11.0 10e3/uL    RBC Count 4.65 3.80 - 5.20 10e6/uL    Hemoglobin 13.7 11.7 - 15.7 g/dL    Hematocrit 39.9 35.0 - 47.0 %    MCV 86 78 - 100 fL    MCH 29.5 26.5 - 33.0 pg    MCHC 34.3 31.5 - 36.5 g/dL    RDW 12.8 10.0 - 15.0 %    Platelet Count 264 150 -  450 10e3/uL    % Neutrophils 85 %    % Lymphocytes 9 %    % Monocytes 5 %    % Eosinophils 1 %    % Basophils 0 %    % Immature Granulocytes 0 %    NRBCs per 100 WBC 0 <1 /100    Absolute Neutrophils 7.4 1.6 - 8.3 10e3/uL    Absolute Lymphocytes 0.7 (L) 0.8 - 5.3 10e3/uL    Absolute Monocytes 0.4 0.0 - 1.3 10e3/uL    Absolute Eosinophils 0.1 0.0 - 0.7 10e3/uL    Absolute Basophils 0.0 0.0 - 0.2 10e3/uL    Absolute Immature Granulocytes 0.0 <=0.4 10e3/uL    Absolute NRBCs 0.0 10e3/uL   Extra Blue Top Tube    Collection Time: 12/10/22  6:26 PM   Result Value Ref Range    Hold Specimen JIC    Extra Red Top Tube    Collection Time: 12/10/22  6:26 PM   Result Value Ref Range    Hold Specimen JIC    Extra Green Top (Lithium Heparin) Tube    Collection Time: 12/10/22  6:26 PM   Result Value Ref Range    Hold Specimen JIC    Lipase    Collection Time: 12/10/22  6:26 PM   Result Value Ref Range    Lipase 31 13 - 60 U/L   CRP inflammation    Collection Time: 12/10/22  6:26 PM   Result Value Ref Range    CRP Inflammation 10.24 (H) <5.00 mg/L   Lactic acid whole blood    Collection Time: 12/10/22  6:30 PM   Result Value Ref Range    Lactic Acid 0.8 0.7 - 2.0 mmol/L   Symptomatic Influenza A/B & SARS-CoV2 (COVID-19) Virus PCR Multiplex Nasopharyngeal    Collection Time: 12/10/22  8:07 PM    Specimen: Nasopharyngeal; Swab   Result Value Ref Range    Influenza A PCR Negative Negative    Influenza B PCR Negative Negative    RSV PCR Negative Negative    SARS CoV2 PCR Negative Negative       IMAGING STUDIES:   ,  Results for orders placed or performed during the hospital encounter of 12/10/22   CT Abdomen Pelvis w Contrast    Narrative    Exam:CT ABDOMEN PELVIS W CONTRAST    History: 32 years Female right lower quadrant pain.     Comparisons: 12/2/2021    Technique: Axial CT imaging of the abdomen and pelvis was performed  with contrast. Coronal and sagittal reconstructions were obtained.   This exam was performed using one or more  of the following dose  reduction techniques:  Automated exposure control, adjustment of the SHAMAR and/or KV according  to patient's size, and/or use of iterative reconstruction technique.       FINDINGS:  Lung bases:The lung bases are clear    Abdomen:  Liver:Unremarkable  Gallbladder and biliary tree:No calcified gallstones are present.  There is no evidence of biliary dilatation.  Pancreas:Unremarkable  Spleen:Unremarkable  Adrenals:Normal    Kidneys and ureters:Unremarkable    Lymph nodes:There is no significant lymphadenopathy    Bowel:No abnormally distended or thickened loops of bowel are present.  There is no evidence of bowel obstruction.    Appendix:Unremarkable    Vessels:Unremarkable    Osseous structures:Unremarkable    Pelvis:There is no evidence of mass or lymphadenopathy. No abnormal  fluid collections are present.            Impression    IMPRESSION: No acute intra-abdominal findings. The appendix is  unremarkable.    ESDRAS MACK MD         SYSTEM ID:  [id number]       ED COURSE/ MDM/ ASSESSMENT/ PLAN:   Diagnostic Testing:  Diagnostic testing was conducted.  Labs show WBC of 8.6 with hemoglobin 13.7.  Electrolytes, renal, hepatic functions benign.  Lipase and lactic acid negative.  CRP is 10.24.  Influenza, RSV, and COVID are negative.  UA is positive for nitrites with many bacteria.  Negative pregnancy.  Urine culture pending.  The CT has been read by the radiologist with the interpretation of no acute abnormalities or inflammatory processes.    Assessment:   Patient presents to the ER today for abdominal pain..  Upon arrival, vitals signs show blood pressure 117/79 with a pulse of 106.  Temperature 38.9  C.  Respirations 18 with oxygenation of 96% on room air.  Examination was completed.  The patient is alert and oriented.  Does have right lower quadrant tenderness to palpation with guarding.  Slight peritoneal signs noted with palpation.  No CVA tenderness.  Patient does have tachycardia upon  examination.  No pelvic abnormalities noted upon palpation.    Potential diagnosis which have been considered and evaluated include appendicitis, pancreatitis, cholecystitis, peritonitis, UTI, pregnancy, ureteral stone, viral enteritis as well as others. Many of these have been excluded using the various modalities and assessment as noted on the chart. At the present time, the diagnosis given seems to be the most likely UTI    ED Course/MDM/Plan:   Patient comes in with complaints of right lower quadrant pain.  He is febrile and tachycardic upon arrival.  Did initiate SIRS work-up upon arrival.  Patient hydrated with 1 L LR.  Ketorolac 30 mg IV given.  Lab work obtained showing no leukocytosis or lactic acidosis.  Did have elevated CRP.  UA and pregnancy test are pending.  Due to tachycardia and significant fever did test for influenza and COVID which are negative.  Also did CT abdomen pelvis with contrast due to the severity of pain in the right lower quadrant on palpation which was negative for acute inflammatory process or abnormality.  UA did come back and is positive at this time.  Patient does not want to stay around for second liter of IV fluids.  Will discharge patient home on cephalexin 500 mg twice daily x7 days.  Also gave Pyridium 200 mg 3 times daily as needed for pain control.  Advised patient to do hydration in the interim.  Advised patient to follow-up with PCP in 5 days for reevaluation.  Return to ER if any new or worsening symptoms.  Patient verbalized understand agrees with plan of care.  Patient discharged home.      DIAGNOSIS:   (N39.0) Urinary tract infection        Critical Care Time: None      Impression and plan discussed with patient. Questions answered, concerns addressed, indications for urgent re-evaluation reviewed, and  given. Patient/Parent/Caregiver agree with treatment plan and have no further questions at this time.  AVS provided at discharge.    This note was created by  the Dragon Voice Dictation System. Inadvertent typographical errors, due to software recognition problems, may still exist.      Renny DAMON, CNP  [facility]  EMERGENCY DEPARTMENT  [address] [place] [id number]  [phone]       Renny Tyson APRN CNP  12/10/22 7177

## 2022-12-11 NOTE — DISCHARGE INSTRUCTIONS
Antibiotic use:   An antibiotic was ordered to help fight the bacterial infection that was acquired.  You need to take this medication exactly as prescribed.  DO NOT stop taking this medication until the prescribed end date, even if you are feeling better.  This way the affecting bacteria in your body are killed off.   You should eat probiotic yogurt (with live cultures) or Kefir (similar to yogurt) while taking antibiotic to promote regrowth of good bacteria in your digestive tract, which can be depleted by antibiotics.  Birth control and antibiotics (if applicable):   Birth control pills contain estrogens. Some antibiotics cause the enzymes in the liver to increase the break-down of estrogens and thereby can decrease the levels of estrogens in the body and the effectiveness of the birth control medications.  This can result in unwanted pregnancy.  To be safe it is wise to use a back-up-method of birth control while you take, and for 7 days after finishing the antibiotic.  Coumadin and antibiotics (if applicable):   Finally, if on coumadin, let your coumadin prescriber know. You likely need to have your INR checked by your Primary Care Provider in 2-3 days. Contact your clinic for an appointment.      Hydrate:   During this time it is important to keep well hydrated with water, juices, or low calorie sports drinks.  Using 1/2 strength G2, Gatorade Zero, or PowerAde Zero is best choice (mix half and half with water).  DO NOT use caffeinated or alcoholic beverages for hydration, as these actually dehydrate you.         Follow-up with your primary care provider for reevaluation.  Contact your primary care provider if you have any questions or concerns.  Do not hesitate to return to the ER if any new or worsening symptoms.     Please read the attached instructions (if any).  They highlight more specific treatments and interventions for you at home.              Thank you for letting me participate in your care and wish  you a fast and uneventful recovery,    Renny Tyson APRN, CNP    Do not hesitate to contact me with questions or concerns.  [email or web address]  [email or web address]  [email or web address]

## 2022-12-12 LAB — BACTERIA UR CULT: ABNORMAL

## 2022-12-12 NOTE — ED NOTES
Reviewed this patient's urinary culture which should demonstrates E. coli which is essentially pansensitive.  This patient was started on Keflex appropriately.  No further intervention needed at this time.     Darshan Zapien, STEFANIA  12/12/22 0904

## 2023-01-15 ENCOUNTER — HOSPITAL ENCOUNTER (EMERGENCY)
Facility: HOSPITAL | Age: 33
Discharge: HOME | End: 2023-01-15
Attending: EMERGENCY MEDICINE | Admitting: EMERGENCY MEDICINE
Payer: COMMERCIAL

## 2023-01-15 ENCOUNTER — APPOINTMENT (OUTPATIENT)
Dept: CT IMAGING | Facility: HOSPITAL | Age: 33
End: 2023-01-15
Attending: EMERGENCY MEDICINE
Payer: COMMERCIAL

## 2023-01-15 VITALS
RESPIRATION RATE: 16 BRPM | DIASTOLIC BLOOD PRESSURE: 79 MMHG | HEART RATE: 97 BPM | SYSTOLIC BLOOD PRESSURE: 122 MMHG | TEMPERATURE: 99.7 F | OXYGEN SATURATION: 98 %

## 2023-01-15 DIAGNOSIS — R10.31 ABDOMINAL PAIN, RIGHT LOWER QUADRANT: ICD-10-CM

## 2023-01-15 LAB
ALBUMIN SERPL BCG-MCNC: 4.4 G/DL (ref 3.5–5.2)
ALBUMIN UR-MCNC: 10 MG/DL
ALP SERPL-CCNC: 104 U/L (ref 35–104)
ALT SERPL W P-5'-P-CCNC: 14 U/L (ref 10–35)
ANION GAP SERPL CALCULATED.3IONS-SCNC: 10 MMOL/L (ref 7–15)
APPEARANCE UR: CLEAR
AST SERPL W P-5'-P-CCNC: 14 U/L (ref 10–35)
BACTERIA #/AREA URNS HPF: ABNORMAL /HPF
BASOPHILS # BLD AUTO: 0 10E3/UL (ref 0–0.2)
BASOPHILS NFR BLD AUTO: 0 %
BILIRUB DIRECT SERPL-MCNC: <0.2 MG/DL (ref 0–0.3)
BILIRUB SERPL-MCNC: 0.2 MG/DL
BILIRUB UR QL STRIP: NEGATIVE
BUN SERPL-MCNC: 16.4 MG/DL (ref 6–20)
CALCIUM SERPL-MCNC: 9 MG/DL (ref 8.6–10)
CHLORIDE SERPL-SCNC: 102 MMOL/L (ref 98–107)
COLOR UR AUTO: ABNORMAL
CREAT SERPL-MCNC: 0.79 MG/DL (ref 0.51–0.95)
DEPRECATED HCO3 PLAS-SCNC: 24 MMOL/L (ref 22–29)
EOSINOPHIL # BLD AUTO: 0.1 10E3/UL (ref 0–0.7)
EOSINOPHIL NFR BLD AUTO: 1 %
ERYTHROCYTE [DISTWIDTH] IN BLOOD BY AUTOMATED COUNT: 13.2 % (ref 10–15)
GFR SERPL CREATININE-BSD FRML MDRD: >90 ML/MIN/1.73M2
GLUCOSE SERPL-MCNC: 104 MG/DL (ref 70–99)
GLUCOSE UR STRIP-MCNC: NEGATIVE MG/DL
HCT VFR BLD AUTO: 42.5 % (ref 35–47)
HGB BLD-MCNC: 14.4 G/DL (ref 11.7–15.7)
HGB UR QL STRIP: NEGATIVE
HOLD SPECIMEN: NORMAL
IMM GRANULOCYTES # BLD: 0 10E3/UL
IMM GRANULOCYTES NFR BLD: 0 %
KETONES UR STRIP-MCNC: NEGATIVE MG/DL
LEUKOCYTE ESTERASE UR QL STRIP: NEGATIVE
LIPASE SERPL-CCNC: 30 U/L (ref 13–60)
LYMPHOCYTES # BLD AUTO: 0.7 10E3/UL (ref 0.8–5.3)
LYMPHOCYTES NFR BLD AUTO: 13 %
MCH RBC QN AUTO: 29.3 PG (ref 26.5–33)
MCHC RBC AUTO-ENTMCNC: 33.9 G/DL (ref 31.5–36.5)
MCV RBC AUTO: 87 FL (ref 78–100)
MONOCYTES # BLD AUTO: 0.3 10E3/UL (ref 0–1.3)
MONOCYTES NFR BLD AUTO: 6 %
MUCOUS THREADS #/AREA URNS LPF: PRESENT /LPF
NEUTROPHILS # BLD AUTO: 4.2 10E3/UL (ref 1.6–8.3)
NEUTROPHILS NFR BLD AUTO: 80 %
NITRATE UR QL: POSITIVE
NRBC # BLD AUTO: 0 10E3/UL
NRBC BLD AUTO-RTO: 0 /100
PH UR STRIP: 6.5 [PH] (ref 4.7–8)
PLATELET # BLD AUTO: 213 10E3/UL (ref 150–450)
POTASSIUM SERPL-SCNC: 3.8 MMOL/L (ref 3.4–5.3)
PROT SERPL-MCNC: 6.7 G/DL (ref 6.4–8.3)
RBC # BLD AUTO: 4.91 10E6/UL (ref 3.8–5.2)
RBC URINE: 2 /HPF
SODIUM SERPL-SCNC: 136 MMOL/L (ref 136–145)
SP GR UR STRIP: 1.01 (ref 1–1.03)
SQUAMOUS EPITHELIAL: 3 /HPF
UROBILINOGEN UR STRIP-MCNC: NORMAL MG/DL
WBC # BLD AUTO: 5.3 10E3/UL (ref 4–11)
WBC URINE: 1 /HPF

## 2023-01-15 PROCEDURE — 87186 SC STD MICRODIL/AGAR DIL: CPT | Performed by: EMERGENCY MEDICINE

## 2023-01-15 PROCEDURE — 96375 TX/PRO/DX INJ NEW DRUG ADDON: CPT

## 2023-01-15 PROCEDURE — 80053 COMPREHEN METABOLIC PANEL: CPT | Performed by: EMERGENCY MEDICINE

## 2023-01-15 PROCEDURE — 258N000003 HC RX IP 258 OP 636: Performed by: EMERGENCY MEDICINE

## 2023-01-15 PROCEDURE — 96374 THER/PROPH/DIAG INJ IV PUSH: CPT

## 2023-01-15 PROCEDURE — 96361 HYDRATE IV INFUSION ADD-ON: CPT

## 2023-01-15 PROCEDURE — 82248 BILIRUBIN DIRECT: CPT | Performed by: EMERGENCY MEDICINE

## 2023-01-15 PROCEDURE — 99285 EMERGENCY DEPT VISIT HI MDM: CPT | Mod: 25

## 2023-01-15 PROCEDURE — 250N000013 HC RX MED GY IP 250 OP 250 PS 637: Performed by: EMERGENCY MEDICINE

## 2023-01-15 PROCEDURE — 99284 EMERGENCY DEPT VISIT MOD MDM: CPT | Performed by: EMERGENCY MEDICINE

## 2023-01-15 PROCEDURE — 250N000011 HC RX IP 250 OP 636: Performed by: EMERGENCY MEDICINE

## 2023-01-15 PROCEDURE — 74177 CT ABD & PELVIS W/CONTRAST: CPT

## 2023-01-15 PROCEDURE — 81001 URINALYSIS AUTO W/SCOPE: CPT | Performed by: EMERGENCY MEDICINE

## 2023-01-15 PROCEDURE — 85004 AUTOMATED DIFF WBC COUNT: CPT | Performed by: EMERGENCY MEDICINE

## 2023-01-15 PROCEDURE — 83690 ASSAY OF LIPASE: CPT | Performed by: EMERGENCY MEDICINE

## 2023-01-15 PROCEDURE — 36415 COLL VENOUS BLD VENIPUNCTURE: CPT | Performed by: EMERGENCY MEDICINE

## 2023-01-15 RX ORDER — MORPHINE SULFATE 4 MG/ML
4 INJECTION, SOLUTION INTRAMUSCULAR; INTRAVENOUS ONCE
Status: COMPLETED | OUTPATIENT
Start: 2023-01-15 | End: 2023-01-15

## 2023-01-15 RX ORDER — ONDANSETRON 4 MG/1
4 TABLET, ORALLY DISINTEGRATING ORAL EVERY 6 HOURS PRN
Qty: 20 TABLET | Refills: 0 | Status: SHIPPED | OUTPATIENT
Start: 2023-01-15

## 2023-01-15 RX ORDER — IOPAMIDOL 755 MG/ML
70 INJECTION, SOLUTION INTRAVASCULAR ONCE
Status: COMPLETED | OUTPATIENT
Start: 2023-01-15 | End: 2023-01-15

## 2023-01-15 RX ORDER — DICYCLOMINE HCL 20 MG
20 TABLET ORAL 2 TIMES DAILY
Qty: 20 TABLET | Refills: 0 | Status: SHIPPED | OUTPATIENT
Start: 2023-01-15 | End: 2023-01-25

## 2023-01-15 RX ORDER — DICYCLOMINE HYDROCHLORIDE 10 MG/1
20 CAPSULE ORAL ONCE
Status: COMPLETED | OUTPATIENT
Start: 2023-01-15 | End: 2023-01-15

## 2023-01-15 RX ORDER — ACETAMINOPHEN 325 MG/1
650 TABLET ORAL ONCE
Status: COMPLETED | OUTPATIENT
Start: 2023-01-15 | End: 2023-01-15

## 2023-01-15 RX ORDER — KETOROLAC TROMETHAMINE 15 MG/ML
15 INJECTION, SOLUTION INTRAMUSCULAR; INTRAVENOUS ONCE
Status: COMPLETED | OUTPATIENT
Start: 2023-01-15 | End: 2023-01-15

## 2023-01-15 RX ORDER — OXYCODONE HYDROCHLORIDE 5 MG/1
5 TABLET ORAL ONCE
Status: COMPLETED | OUTPATIENT
Start: 2023-01-15 | End: 2023-01-15

## 2023-01-15 RX ADMIN — IOPAMIDOL 70 ML: 755 INJECTION, SOLUTION INTRAVENOUS at 17:04

## 2023-01-15 RX ADMIN — KETOROLAC TROMETHAMINE 15 MG: 15 INJECTION, SOLUTION INTRAMUSCULAR; INTRAVENOUS at 16:54

## 2023-01-15 RX ADMIN — SODIUM CHLORIDE 1000 ML: 9 INJECTION, SOLUTION INTRAVENOUS at 16:09

## 2023-01-15 RX ADMIN — ACETAMINOPHEN 650 MG: 325 TABLET, FILM COATED ORAL at 16:08

## 2023-01-15 RX ADMIN — MORPHINE SULFATE 4 MG: 4 INJECTION INTRAVENOUS at 16:08

## 2023-01-15 RX ADMIN — DICYCLOMINE HYDROCHLORIDE 20 MG: 10 CAPSULE ORAL at 18:08

## 2023-01-15 RX ADMIN — OXYCODONE HYDROCHLORIDE 5 MG: 5 TABLET ORAL at 18:08

## 2023-01-15 ASSESSMENT — ACTIVITIES OF DAILY LIVING (ADL): ADLS_ACUITY_SCORE: 35

## 2023-01-15 NOTE — ED TRIAGE NOTES
Pt presents with RLQ pain, sharp, constant. Started last night. Now vomiting and getting hard to walk due to pain per pt report.  Denies fever or chills. States no chance of pregnancy due to hysterectomy. States she has her other organs     Triage Assessment       Row Name 01/15/23 1545       Respiratory WDL    Respiratory WDL WDL       Skin Circulation/Temperature WDL    Skin Circulation/Temperature WDL WDL       Cardiac WDL    Cardiac WDL X;rhythm    Pulse Rate & Regularity tachycardic

## 2023-01-15 NOTE — ED PROVIDER NOTES
EMERGENCY DEPARTMENT ENCOUNTER      NAME: Ihsan Millan  AGE: 32 year old female  YOB: 1990  MRN: [id number]  EVALUATION DATE & TIME: 1/15/2023  3:49 PM    PCP: [facility] [place]    ED PROVIDER: Jovanni Turner M.D.      Chief Complaint   Patient presents with     Abdominal Pain     RLQ starting last evening     Vomiting         FINAL IMPRESSION:  1. Abdominal pain, right lower quadrant        ED COURSE & MEDICAL DECISION MAKIN year old female presents to the Emergency Department for evaluation of lower abdominal pain.  Patient is vitally stable and well-appearing when she arrives to the emergency department although is somewhat uncomfortable.  She has a moderate amount of right lower quadrant abdominal tenderness to deep palpation.  She underwent labs and imaging as below which appear stable and reassuring including normal white blood cell count, normal liver function tests and other intra-abdominal labs including kidney function.  Urine analysis reveals no evidence of infection or blood, she is status post hysterectomy, so no concerns for pregnancy, does not have any vaginal bleeding or discharge concerns.  CT abdomen pelvis ultimately obtained which showed no evidence of appendicitis or other acute intra-abdominal process to account for her symptoms.  She received some IV fluids and medications here.  Continued to have some ongoing discomfort but we discussed a plan for supportive measures, Zofran, Bentyl, continue Tylenol and NSAIDs as well as some dietary modifications and I expect will see some improvement within the next few days.  Clinic follow-up was advised for later this week if symptoms are persistent.  ED return precautions discussed.  Patient discharged in stable condition.    At the conclusion of the encounter I discussed the results of all of the tests and the disposition. The questions were answered. The patient or family acknowledged understanding and was agreeable  with the care plan.         MEDICATIONS GIVEN IN THE EMERGENCY:  Medications   0.9% sodium chloride BOLUS (0 mLs Intravenous Stopped 1/15/23 1701)   acetaminophen (TYLENOL) tablet 650 mg (650 mg Oral Given 1/15/23 1608)   morphine (PF) injection 4 mg (4 mg Intravenous Given 1/15/23 1608)   ketorolac (TORADOL) injection 15 mg (15 mg Intravenous Given 1/15/23 1654)   sodium chloride (PF) 0.9% PF flush 50 mL (50 mLs Intravenous Given 1/15/23 1704)   iopamidol (ISOVUE-370) solution 70 mL (70 mLs Intravenous Given 1/15/23 1704)   oxyCODONE (ROXICODONE) tablet 5 mg (5 mg Oral Given 1/15/23 1808)   dicyclomine (BENTYL) capsule 20 mg (20 mg Oral Given 1/15/23 1808)       NEW PRESCRIPTIONS STARTED AT TODAY'S ER VISIT  Discharge Medication List as of 1/15/2023  6:04 PM      START taking these medications    Details   dicyclomine (BENTYL) 20 MG tablet Take 1 tablet (20 mg) by mouth 2 times daily for 10 days, Disp-20 tablet, R-0, Local Print                =================================================================    HPI    Patient information was obtained from: Patient      Ihsan Millan is a 32 year old female with a pertinent history of GERD, hysterectomy who presents to this ED today for evaluation of abdominal pain.  Patient reports pain in her lower abdomen primarily on the right side which started yesterday and has progressed, becoming more severe today now associated with a couple episodes of nonbloody vomit.  She has noted no true stool changes diarrhea or constipation.  Denies any dysuria or hematuria.  Denies any abnormal vaginal bleeding or discharge.  She is status post hysterectomy and oophorectomy, no other previous abdominal surgeries.  Has not taken anything today for pain.      REVIEW OF SYSTEMS   All systems reviewed and negative except as noted in HPI.    PAST MEDICAL HISTORY:  Past Medical History:   Diagnosis Date     Asthma      GERD (gastroesophageal reflux disease)      H. pylori infection         PAST SURGICAL HISTORY:  Past Surgical History:   Procedure Laterality Date     COLONOSCOPY N/A 11/6/2014    Procedure: COLONOSCOPY;  Surgeon: Zacarias Paz MD;  Location: [facility]     DILATION AND CURETTAGE, HYSTEROSCOPY DIAGNOSTIC, COMBINED  8/1/2014    Procedure: COMBINED DILATION AND CURETTAGE, HYSTEROSCOPY DIAGNOSTIC;  Surgeon: Rodolfo Clifford MD;  Location: [facility]     ESOPHAGOSCOPY, GASTROSCOPY, DUODENOSCOPY (EGD), COMBINED N/A 9/5/2014    Procedure: COMBINED ESOPHAGOSCOPY, GASTROSCOPY, DUODENOSCOPY (EGD);  Surgeon: Zacarias Paz MD;  Location: [facility]     HERNIORRHAPHY UMBILICAL N/A 9/3/2019    Procedure: OPEN UMBILICAL HERNIA REPAIR;  Surgeon: Carter Vaughn MD;  Location: [facility]     LAPAROSCOPIC ASSISTED HYSTERECTOMY VAGINAL N/A 6/24/2015    Procedure: LAPAROSCOPIC ASSISTED HYSTERECTOMY VAGINAL;  Surgeon: Rodolfo Clifford MD;  Location: [facility]     LAPAROSCOPIC OOPHORECTOMY Right 3/23/2016    Procedure: LAPAROSCOPIC OOPHORECTOMY;  Surgeon: Rodolfo Clifford MD;  Location: [facility]     LAPAROSCOPIC SALPINGO-OOPHORECTOMY Left 5/3/2017    Procedure: LAPAROSCOPIC SALPINGO-OOPHORECTOMY;  LAPAROSCOPIC LEFT OOPHORECTOMY;  Surgeon: Rodolfo Clifford MD;  Location: [facility]     LAPAROSCOPY DIAGNOSTIC (GYN) N/A 3/18/2015    Procedure: LAPAROSCOPY DIAGNOSTIC (GYN);  Surgeon: Rodolfo Clifford MD;  Location: [facility]     no surgeries       SALPINGECTOMY Bilateral 6/24/2015    Procedure: SALPINGECTOMY;  Surgeon: Rodolfo Clifford MD;  Location: [facility]           CURRENT MEDICATIONS:    No current facility-administered medications for this encounter.     Current Outpatient Medications   Medication     dicyclomine (BENTYL) 20 MG tablet     ondansetron (ZOFRAN ODT) 4 MG ODT tab     Acetaminophen (TYLENOL PO)     albuterol (PROAIR HFA/PROVENTIL HFA/VENTOLIN HFA) 108 (90 Base) MCG/ACT inhaler     albuterol (PROAIR HFA/PROVENTIL HFA/VENTOLIN HFA) 108 (90 BASE) MCG/ACT Inhaler     albuterol (PROVENTIL) (2.5 MG/3ML) 0.083% neb solution      albuterol (PROVENTIL) (2.5 MG/3ML) 0.083% neb solution     albuterol (VENTOLIN HFA) 108 (90 Base) MCG/ACT inhaler     amphetamine-dextroamphetamine (ADDERALL) 30 MG tablet     amphetamine-dextroamphetamine (ADDERALL) 30 MG tablet     ARIPiprazole (ABILIFY) 5 MG tablet     ARIPiprazole (ABILIFY) 5 MG tablet     aspirin (ASA) 81 MG chewable tablet     aspirin (ASA) 81 MG chewable tablet     betamethasone dipropionate (DIPROSONE) 0.05 % external cream     ciprofloxacin (CIPRO) 500 MG tablet     cloNIDine (CATAPRES) 0.1 MG tablet     cloNIDine (CATAPRES) 0.1 MG tablet     cloNIDine (CATAPRES) 0.2 MG tablet     estradiol (ESTRACE) 1 MG tablet     estradiol (ESTRACE) 1 MG tablet     estradiol (ESTRACE) 1 MG tablet     estradiol (ESTRACE) 1 MG tablet     FLUoxetine (PROZAC) 20 MG capsule     FLUoxetine (PROZAC) 20 MG capsule     FLUoxetine (PROZAC) 20 MG capsule     gabapentin (NEURONTIN) 300 MG capsule     gabapentin (NEURONTIN) 300 MG capsule     hydrOXYzine (ATARAX) 25 MG tablet     hydrOXYzine (ATARAX) 25 MG tablet     ipratropium - albuterol 0.5 mg/2.5 mg/3 mL (DUONEB) 0.5-2.5 (3) MG/3ML neb solution     lithium (ESKALITH) 300 MG tablet     lithium (ESKALITH) 300 MG tablet     lithium (ESKALITH) 600 MG capsule     meclizine (ANTIVERT) 25 MG tablet     methylPREDNISolone (MEDROL DOSEPAK) 4 MG tablet therapy pack     naproxen (NAPROSYN) 500 MG tablet     omeprazole (PRILOSEC) 20 MG DR capsule     omeprazole (PRILOSEC) 20 MG DR capsule     OMEPRAZOLE PO     phenazopyridine (PYRIDIUM) 200 MG tablet     predniSONE (DELTASONE) 10 MG tablet     SM MUCUS RELIEF 600 MG 12 hr tablet     solifenacin (VESICARE) 5 MG tablet     SYMBICORT 80-4.5 MCG/ACT Inhaler     tiZANidine (ZANAFLEX) 4 MG capsule     tiZANidine (ZANAFLEX) 4 MG tablet     traZODone (DESYREL) 150 MG tablet     traZODone (DESYREL) 50 MG tablet         ALLERGIES:  Allergies   Allergen Reactions     Skin Adhesives [Mecrylate] Dermatitis     Patient developed contact  dermatitis after application of topical adhesive to surgical incision     Lexapro [Escitalopram] Nausea and Vomiting     Codeine Sulfate Rash     Penicillins Rash     Tramadol Rash       FAMILY HISTORY:  Family History   Problem Relation Age of Onset     Asthma Mother      Unknown/Adopted Maternal Grandmother      Unknown/Adopted Maternal Grandfather        SOCIAL HISTORY:   Social History     Socioeconomic History     Marital status: Single   Tobacco Use     Smoking status: Every Day     Packs/day: 0.75     Years: 7.00     Pack years: 5.25     Types: Cigarettes     Smokeless tobacco: Never     Tobacco comments:     declines quitline referral 9/10/19   Substance and Sexual Activity     Alcohol use: Not Currently     Comment: social     Drug use: No     Sexual activity: Yes     Partners: Male     Birth control/protection: None, Female Surgical   Other Topics Concern     Parent/sibling w/ CABG, MI or angioplasty before 65F 55M? No   Social History Narrative    7/17: Ihsan lives with her 2 children. She stays home with her children.        VITALS:  /79   Pulse 97   Temp 99.7  F (37.6  C) (Tympanic)   Resp 16   LMP  (LMP Unknown)   SpO2 98%     PHYSICAL EXAM    Constitutional: Well developed female patient, sitting in bed, uncomfortable but nontoxic-appearing  HENT: Normocephalic, Atraumatic. Neck Supple.  Eyes: EOMI, Conjunctiva normal.  Respiratory: Breathing comfortably on room air. Speaks full sentences easily. Lungs clear to ascultation.  Cardiovascular: Normal heart rate, Regular rhythm. No peripheral edema.  Abdomen: Soft, moderate right lower quadrant tenderness with some guarding  Musculoskeletal: Good range of motion in all major joints. No major deformities noted.  Integument: Warm, Dry.  Neurologic: Alert & awake, Normal motor function, Normal sensory function, No focal deficits noted.   Psychiatric: Cooperative. Affect appropriate.     LAB:  All pertinent labs reviewed and interpreted.  Labs  Ordered and Resulted from Time of ED Arrival to Time of ED Departure   BASIC METABOLIC PANEL - Abnormal       Result Value    Sodium 136      Potassium 3.8      Chloride 102      Carbon Dioxide (CO2) 24      Anion Gap 10      Urea Nitrogen 16.4      Creatinine 0.79      Calcium 9.0      Glucose 104 (*)     GFR Estimate >90     UA MACROSCOPIC WITH REFLEX TO MICRO AND CULTURE - Abnormal    Color Urine Light Yellow      Appearance Urine Clear      Glucose Urine Negative      Bilirubin Urine Negative      Ketones Urine Negative      Specific Gravity Urine 1.015      Blood Urine Negative      pH Urine 6.5      Protein Albumin Urine 10 (*)     Urobilinogen Urine Normal      Nitrite Urine Positive (*)     Leukocyte Esterase Urine Negative      Bacteria Urine Few (*)     Mucus Urine Present (*)     RBC Urine 2      WBC Urine 1      Squamous Epithelials Urine 3 (*)    CBC WITH PLATELETS AND DIFFERENTIAL - Abnormal    WBC Count 5.3      RBC Count 4.91      Hemoglobin 14.4      Hematocrit 42.5      MCV 87      MCH 29.3      MCHC 33.9      RDW 13.2      Platelet Count 213      % Neutrophils 80      % Lymphocytes 13      % Monocytes 6      % Eosinophils 1      % Basophils 0      % Immature Granulocytes 0      NRBCs per 100 WBC 0      Absolute Neutrophils 4.2      Absolute Lymphocytes 0.7 (*)     Absolute Monocytes 0.3      Absolute Eosinophils 0.1      Absolute Basophils 0.0      Absolute Immature Granulocytes 0.0      Absolute NRBCs 0.0     HEPATIC FUNCTION PANEL - Normal    Protein Total 6.7      Albumin 4.4      Bilirubin Total 0.2      Alkaline Phosphatase 104      AST 14      ALT 14      Bilirubin Direct <0.20     LIPASE - Normal    Lipase 30     URINE CULTURE       RADIOLOGY:  Reviewed all pertinent imaging. Please see official radiology report.  CT Abdomen Pelvis w Contrast   Final Result   IMPRESSION:    No evidence of acute abnormality      Nonacute findings as described are similar in appearance when compared   to the  prior study..      This facility minimizes radiation dose by adjusting the mA and/or kV   according to each patient size.      This CT scan was performed using one or more the following dose   reduction techniques:      -Automated exposure control,   -Adjustment of the mA and/or kV according to patient's size, and/or,   -Use of iterative reconstruction technique.      JANE NOEL MD            SYSTEM ID:  [id number]            Jovanni Turner M.D.  Emergency Medicine  [facility]  [address] [place] [id number] [phone]  Dept: [phone]       Jovanni Turner MD  01/15/23 4350

## 2023-01-15 NOTE — DISCHARGE INSTRUCTIONS
You were seen in the emergency department at [facility] for lower abdominal pain.  Your evaluation included lab and urine testing as well as a CT scan which did not show any serious cause of your pain.  Specifically we did not see any signs of infection like appendicitis.  We think that your symptoms could be related to some gastrointestinal discomfort, something like colitis or food poisoning which we would expect will get better within the next few days.  We are going to prescribe you some Zofran you can use for nausea.  We would recommend sticking to a very bland diet drinking lots of liquids but avoiding spicy foods fatty foods and alcohol.  You can take Tylenol 650 mg every 6 hours and ibuprofen 600 mg every 6 hours for pain.  We are also going to prescribe some Bentyl you can use if you find it helpful for gut spasms and pain.  Please plan to review things in clinic if persistent through this week.

## 2023-01-17 DIAGNOSIS — N39.0 URINARY TRACT INFECTION: Primary | ICD-10-CM

## 2023-01-17 LAB — BACTERIA UR CULT: ABNORMAL

## 2023-01-17 RX ORDER — NITROFURANTOIN 25; 75 MG/1; MG/1
100 CAPSULE ORAL 2 TIMES DAILY
Qty: 10 CAPSULE | Refills: 0 | Status: SHIPPED | OUTPATIENT
Start: 2023-01-17 | End: 2023-01-22

## 2023-01-27 ENCOUNTER — OFFICE VISIT (OUTPATIENT)
Dept: CHIROPRACTIC MEDICINE | Facility: OTHER | Age: 33
End: 2023-01-27
Attending: CHIROPRACTOR
Payer: COMMERCIAL

## 2023-01-27 DIAGNOSIS — M99.03 SEGMENTAL AND SOMATIC DYSFUNCTION OF LUMBAR REGION: Primary | ICD-10-CM

## 2023-01-27 DIAGNOSIS — M99.01 SEGMENTAL AND SOMATIC DYSFUNCTION OF CERVICAL REGION: ICD-10-CM

## 2023-01-27 DIAGNOSIS — M54.50 ACUTE BILATERAL LOW BACK PAIN WITHOUT SCIATICA: ICD-10-CM

## 2023-01-27 DIAGNOSIS — M99.02 SEGMENTAL AND SOMATIC DYSFUNCTION OF THORACIC REGION: ICD-10-CM

## 2023-01-27 PROCEDURE — 99212 OFFICE O/P EST SF 10 MIN: CPT | Mod: 25 | Performed by: CHIROPRACTOR

## 2023-01-27 PROCEDURE — 98941 CHIROPRACT MANJ 3-4 REGIONS: CPT | Mod: AT | Performed by: CHIROPRACTOR

## 2023-01-30 NOTE — PROGRESS NOTES
Subjective Finding:    Chief compalint: Patient presents with:  Back Pain  , Pain Scale: 5/10, Intensity: sharp, Duration: 3 days, Radiating: no.    Date of injury:     Activities that the pain restricts:   Home/household/hobbies/social activities: yes.  Work duties: no.  Sleep: no.  Makes symptoms better: rest.  Makes symptoms worse: activity and walking.  Have you seen anyone else for the symptoms? Yes: MD.  Work related: no.  Automobile related injury: no.    Objective and Assessment:    Posture Analysis:   High shoulder: .  Head tilt: .  High iliac crest: .  Head carriage: neutral.  Thoracic Kyphosis: forward.  Lumbar Lordosis: neutral.    Lumbar Range of Motion: extension decreased.  Cervical Range of Motion: .  Thoracic Range of Motion: extension decreased.  Extremity Range of Motion: .    Palpation:   T paraspinals: sharp pain, no    Segmental dysfunction pre-treatment and treatment area: T6, T8 and L3.  C45    Assessment post-treatment:  Cervical: .  Thoracic: ROM increased.  Lumbar: ROM increased.    Comments: .      Complicating Factors: .    Procedure(s):  CMT:  78175 Chiropractic manipulative treatment 1-2 regions performed   Thoracic: Diversified, See above for level, Prone and Lumbar: Diversified, See above for level, Side posture    Modalities:  None performed this visit    Therapeutic procedures:  None    Plan:  Treatment plan: PRN.  Instructed patient: stretch as instructed at visit.  Short term goals: reduce pain and increase ROM.  Long term goals: restore normal function.  Prognosis: excellent.

## 2023-02-04 ENCOUNTER — HOSPITAL ENCOUNTER (EMERGENCY)
Facility: HOSPITAL | Age: 33
Discharge: HOME | End: 2023-02-04
Attending: NURSE PRACTITIONER | Admitting: NURSE PRACTITIONER
Payer: COMMERCIAL

## 2023-02-04 ENCOUNTER — APPOINTMENT (OUTPATIENT)
Dept: GENERAL RADIOLOGY | Facility: HOSPITAL | Age: 33
End: 2023-02-04
Attending: NURSE PRACTITIONER
Payer: COMMERCIAL

## 2023-02-04 VITALS
SYSTOLIC BLOOD PRESSURE: 129 MMHG | DIASTOLIC BLOOD PRESSURE: 86 MMHG | HEART RATE: 86 BPM | OXYGEN SATURATION: 99 % | RESPIRATION RATE: 18 BRPM | TEMPERATURE: 98.7 F

## 2023-02-04 DIAGNOSIS — Z98.890 HISTORY OF ARTHROSCOPIC KNEE SURGERY: ICD-10-CM

## 2023-02-04 DIAGNOSIS — S89.92XA KNEE INJURY, LEFT, INITIAL ENCOUNTER: ICD-10-CM

## 2023-02-04 PROCEDURE — 73562 X-RAY EXAM OF KNEE 3: CPT | Mod: LT

## 2023-02-04 PROCEDURE — 99213 OFFICE O/P EST LOW 20 MIN: CPT | Performed by: NURSE PRACTITIONER

## 2023-02-04 PROCEDURE — G0463 HOSPITAL OUTPT CLINIC VISIT: HCPCS

## 2023-02-04 PROCEDURE — 250N000013 HC RX MED GY IP 250 OP 250 PS 637: Performed by: NURSE PRACTITIONER

## 2023-02-04 RX ORDER — DEXTROAMPHETAMINE SULFATE, DEXTROAMPHETAMINE SACCHARATE, AMPHETAMINE SULFATE AND AMPHETAMINE ASPARTATE 7.5; 7.5; 7.5; 7.5 MG/1; MG/1; MG/1; MG/1
30 CAPSULE, EXTENDED RELEASE ORAL DAILY
COMMUNITY
Start: 2022-11-17 | End: 2023-04-13

## 2023-02-04 RX ORDER — IBUPROFEN 600 MG/1
600 TABLET, FILM COATED ORAL ONCE
Status: COMPLETED | OUTPATIENT
Start: 2023-02-04 | End: 2023-02-04

## 2023-02-04 RX ORDER — HYDROXYZINE PAMOATE 50 MG/1
50 CAPSULE ORAL PRN
COMMUNITY
Start: 2023-01-12 | End: 2023-04-13

## 2023-02-04 RX ADMIN — IBUPROFEN 600 MG: 600 TABLET ORAL at 13:03

## 2023-02-04 ASSESSMENT — ACTIVITIES OF DAILY LIVING (ADL): ADLS_ACUITY_SCORE: 35

## 2023-02-04 ASSESSMENT — ENCOUNTER SYMPTOMS
CHILLS: 0
NAUSEA: 0
COLOR CHANGE: 1
VOMITING: 0
FEVER: 0
ACTIVITY CHANGE: 1
NUMBNESS: 0

## 2023-02-04 NOTE — ED TRIAGE NOTES
Fell last week, tripped, smashed knee, hx of ortho surgery on knee, cannot fully straighten. Left knee. Therapies tried: ice, heat, asprin and tylenol, has not had full relief.    Jessa Behrman, LPN

## 2023-02-04 NOTE — DISCHARGE INSTRUCTIONS
Keep affected extremity elevated as much as possible for next 24 - 48 hours. Ice to affected area 20 minutes every hour as needed for comfort. After 48 hours you can apply heat. Ibuprofen 600 to 800 mg (3 - 4 tabs of over the counter med) every six to eight hours as needed;not to exceed maximum amount of 3200 mg in 24 hours. Take with food. Tylenol 650 to 1000 mg every four to six hours as needed (not to exceed more than 4000 mg in a 24 hour period). May use interchangeably. Suggest medicating around the clock for the next 24-48 hours. Use ace wrap until you can walk on your left knee without having discomfort.  Slowly start to wiggle your toes and move left lower leg as often as possible but not beyond the point of pain. Follow up with primary provider as needed  Orthopedic referral placed

## 2023-02-04 NOTE — ED PROVIDER NOTES
History     Chief Complaint   Patient presents with     Knee Pain     HPI  Ihsan Millan is a 32 year old female who presents with left knee pain that occurred 4 to 5 days ago when she tripped and fell on cement floor while playing with her dog.  Has applied heat, ice, and taken acetaminophen and aspirin.  Last dose 325 mg aspirin this morning did not decrease her discomfort.  History of orthopedic surgery for torn meniscus 3 to 4 years ago in [place].  Smoker.  Denies fevers, chills, nausea, and vomiting.     Musculoskeletal problem/pain      Duration: four to five days ago    Description  Location: left knee    Intensity:  5/10. Burning sharp pain    Accompanying signs and symptoms: swelling    History  Previous similar problem: YES  Previous evaluation:  Surgery for torn meniscus three to four years ago in [place] and orthopedic evaluation    Precipitating or alleviating factors:  Trauma or overuse: YES- fell on cement  Aggravating factors include: extension makes pain worse    Therapies tried and outcome: heat, ice, acetaminophen and aspirin. Last dose aspirin 325 (took 4 81 mg). Did not help.     Allergies:  Allergies   Allergen Reactions     Skin Adhesives [Mecrylate] Dermatitis     Patient developed contact dermatitis after application of topical adhesive to surgical incision     Lexapro [Escitalopram] Nausea and Vomiting     Codeine Sulfate Rash     Penicillins Rash     Tramadol Rash       Problem List:    Patient Active Problem List    Diagnosis Date Noted     RUQ abdominal pain 02/12/2017     Priority: Medium     Pneumonia of right middle lobe due to infectious organism 02/12/2017     Priority: Medium     Tobacco abuse 02/12/2017     Priority: Medium     Status post laparoscopic assisted vaginal hysterectomy (LAVH) 06/25/2015     Priority: Medium     Endometriosis 04/16/2015     Priority: Medium     Depo lupron 4/15.  Laparoscopy 3/15       ASCUS on Pap smear 07/18/2014     Priority: Medium     + hpv  colpo 7/14       Postpartum depression 07/18/2014     Priority: Medium     zoloft rx       H. pylori infection      Priority: Medium     Moderate persistent asthma 03/19/2014     Priority: Medium     GERD (gastroesophageal reflux disease) 09/12/2013     Priority: Medium     Intermittent asthma 09/12/2013     Priority: Medium     Increased with pregnancy  Send for eval and asthma action plan  Smoking cessation counseling  Flu shot given       Pregnant state, incidental 03/23/2011     Priority: Medium     Contraceptive management 11/09/2010     Priority: Medium        Past Medical History:    Past Medical History:   Diagnosis Date     Asthma      GERD (gastroesophageal reflux disease)      H. pylori infection        Past Surgical History:    Past Surgical History:   Procedure Laterality Date     COLONOSCOPY N/A 11/6/2014    Procedure: COLONOSCOPY;  Surgeon: Zacarias Paz MD;  Location: [facility]     DILATION AND CURETTAGE, HYSTEROSCOPY DIAGNOSTIC, COMBINED  8/1/2014    Procedure: COMBINED DILATION AND CURETTAGE, HYSTEROSCOPY DIAGNOSTIC;  Surgeon: Rodolfo Clifford MD;  Location: [facility]     ESOPHAGOSCOPY, GASTROSCOPY, DUODENOSCOPY (EGD), COMBINED N/A 9/5/2014    Procedure: COMBINED ESOPHAGOSCOPY, GASTROSCOPY, DUODENOSCOPY (EGD);  Surgeon: Zacarias Paz MD;  [facility]     HERNIORRHAPHY UMBILICAL N/A 9/3/2019    Procedure: OPEN UMBILICAL HERNIA REPAIR;  Surgeon: Carter Vaughn MD;  [facility]     LAPAROSCOPIC ASSISTED HYSTERECTOMY VAGINAL N/A 6/24/2015    Procedure: LAPAROSCOPIC ASSISTED HYSTERECTOMY VAGINAL;  Surgeon: Rodolfo Clifford MD;  [facility]     LAPAROSCOPIC OOPHORECTOMY Right 3/23/2016    Procedure: LAPAROSCOPIC OOPHORECTOMY;  Surgeon: Rodolfo Clifford MD;  [facility]     LAPAROSCOPIC SALPINGO-OOPHORECTOMY Left 5/3/2017    Procedure: LAPAROSCOPIC SALPINGO-OOPHORECTOMY;  LAPAROSCOPIC LEFT OOPHORECTOMY;  Surgeon: Rodolfo Clifford MD;  [facility]     LAPAROSCOPY DIAGNOSTIC (GYN) N/A  3/18/2015    Procedure: LAPAROSCOPY DIAGNOSTIC (GYN);  Surgeon: Rodolfo Clifford MD;  Location: [facility]     no surgeries       SALPINGECTOMY Bilateral 6/24/2015    Procedure: SALPINGECTOMY;  Surgeon: Rodolfo Clifford MD;  [facility]       Family History:    Family History   Problem Relation Age of Onset     Asthma Mother      Unknown/Adopted Maternal Grandmother      Unknown/Adopted Maternal Grandfather        Social History:  Marital Status:  Single [1]  Social History     Tobacco Use     Smoking status: Every Day     Packs/day: 0.75     Years: 7.00     Pack years: 5.25     Types: Cigarettes     Smokeless tobacco: Never     Tobacco comments:     declines quitline referral 9/10/19   Substance Use Topics     Alcohol use: Not Currently     Comment: social     Drug use: No        Medications:    Acetaminophen (TYLENOL PO)  ADDERALL XR 30 MG 24 hr capsule  albuterol (PROAIR HFA/PROVENTIL HFA/VENTOLIN HFA) 108 (90 Base) MCG/ACT inhaler  albuterol (PROAIR HFA/PROVENTIL HFA/VENTOLIN HFA) 108 (90 Base) MCG/ACT inhaler  albuterol (PROAIR HFA/PROVENTIL HFA/VENTOLIN HFA) 108 (90 BASE) MCG/ACT Inhaler  albuterol (PROVENTIL) (2.5 MG/3ML) 0.083% neb solution  albuterol (PROVENTIL) (2.5 MG/3ML) 0.083% neb solution  amphetamine-dextroamphetamine (ADDERALL) 30 MG tablet  amphetamine-dextroamphetamine (ADDERALL) 30 MG tablet  ARIPiprazole (ABILIFY) 5 MG tablet  aspirin (ASA) 81 MG chewable tablet  aspirin (ASA) 81 MG chewable tablet  betamethasone dipropionate (DIPROSONE) 0.05 % external cream  cloNIDine (CATAPRES) 0.1 MG tablet  cloNIDine (CATAPRES) 0.1 MG tablet  cloNIDine (CATAPRES) 0.2 MG tablet  estradiol (ESTRACE) 1 MG tablet  estradiol (ESTRACE) 1 MG tablet  estradiol (ESTRACE) 1 MG tablet  FLUoxetine (PROZAC) 20 MG capsule  FLUoxetine (PROZAC) 20 MG capsule  hydrOXYzine (ATARAX) 25 MG tablet  hydrOXYzine (VISTARIL) 50 MG capsule  ipratropium - albuterol 0.5 mg/2.5 mg/3 mL (DUONEB) 0.5-2.5 (3) MG/3ML neb solution  meclizine  (ANTIVERT) 25 MG tablet  naproxen (NAPROSYN) 500 MG tablet  omeprazole (PRILOSEC) 20 MG DR capsule  ondansetron (ZOFRAN ODT) 4 MG ODT tab  SM MUCUS RELIEF 600 MG 12 hr tablet  SYMBICORT 80-4.5 MCG/ACT Inhaler  tiZANidine (ZANAFLEX) 4 MG capsule  tiZANidine (ZANAFLEX) 4 MG tablet  traZODone (DESYREL) 150 MG tablet  traZODone (DESYREL) 50 MG tablet  ARIPiprazole (ABILIFY) 5 MG tablet  estradiol (ESTRACE) 1 MG tablet  FLUoxetine (PROZAC) 20 MG capsule          Review of Systems   Constitutional: Positive for activity change. Negative for chills and fever.   Gastrointestinal: Negative for nausea and vomiting.   Skin: Positive for color change (bruising).   Neurological: Negative for numbness.       Physical Exam   BP: 129/86  Pulse: 86  Temp: 98.7  F (37.1  C)  Resp: 18  SpO2: 99 %      Physical Exam  Vitals and nursing note reviewed.   Constitutional:       General: She is in acute distress (Mild).      Appearance: She is normal weight.   Cardiovascular:      Rate and Rhythm: Normal rate.      Pulses:           Dorsalis pedis pulses are 3+ on the left side.        Posterior tibial pulses are 3+ on the left side.   Pulmonary:      Effort: Pulmonary effort is normal.   Musculoskeletal:      Left upper leg: Normal.      Left knee: Swelling (Mild) and bony tenderness present. No erythema or ecchymosis. Tenderness present over the medial joint line, MCL and patellar tendon. No lateral joint line, LCL, ACL or PCL tenderness. Normal pulse.      Instability Tests: Anterior drawer test negative. Posterior drawer test negative.      Left lower leg: Normal. No swelling or tenderness. No edema.      Comments: Popliteal pulse obtained with Doppler   Skin:     General: Skin is warm and dry.      Capillary Refill: Capillary refill takes less than 2 seconds.      Findings: No bruising or erythema.   Neurological:      Mental Status: She is alert and oriented to person, place, and time.   Psychiatric:         Behavior: Behavior normal.          ED Course                 Procedures             Results for orders placed or performed during the hospital encounter of 02/04/23 (from the past 24 hour(s))   XR Knee Left 3 Views    Narrative    PROCEDURE:  XR KNEE LEFT 3 VIEWS    HISTORY: fell last week, hx of surgery    COMPARISON:  Radiographs 4/17/2018    TECHNIQUE:  3 view left knee radiographs    FINDINGS:    No acute osseous abnormality. Joints are appropriately aligned. Joint  spaces are preserved.     No patellar tilt or lateral subluxation. No joint effusion. Soft  tissue is unremarkable.      Impression    IMPRESSION:   No acute osseous abnormality.    ROJELIO WESTBROOK MD         SYSTEM ID:  [id number]       Medications   ibuprofen (ADVIL/MOTRIN) tablet 600 mg (has no administration in time range)       Assessments & Plan (with Medical Decision Making)     I have reviewed the nursing notes.    I have reviewed the findings, diagnosis, plan and need for follow up with the patient.  (Z98.890) History of arthroscopic knee surgery  (S89.92XA) Knee injury, left, initial encounter  Comment: 32 year old female who presents with left knee pain that occurred 4 to 5 days ago when she tripped and fell on cement floor while playing with her dog.  Has applied heat, ice, and taken acetaminophen and aspirin.  Last dose 325 mg aspirin this morning did not decrease her discomfort.  History of orthopedic surgery for torn meniscus 3 to 4 years ago in [place].  Smoker.  Denies fevers, chills, nausea, and vomiting.     MDM: mild swelling over medial knee and joint line.  Does have 0.5 cm scabbed area over distal patella.  No ecchymosis or erythema.  Popliteal pulse obtained with Doppler.  Pedal pulses 3+    Left knee x-ray reviewed and per radiology;No acute osseous abnormality.    Ace wrap applied to left knee per LPN  Ibuprofen 600 mg given p.o.    Plan: Keep affected extremity elevated as much as possible for next 24 - 48 hours. Ice to affected area 20 minutes every hour  as needed for comfort. After 48 hours you can apply heat. Ibuprofen 600 to 800 mg (3 - 4 tabs of over the counter med) every six to eight hours as needed;not to exceed maximum amount of 3200 mg in 24 hours. Take with food. Tylenol 650 to 1000 mg every four to six hours as needed (not to exceed more than 4000 mg in a 24 hour period). May use interchangeably. Suggest medicating around the clock for the next 24-48 hours. Use ace wrap until you can walk on your left knee without having discomfort.  Slowly start to wiggle your toes and move left lower leg as often as possible but not beyond the point of pain. Follow up with primary provider as needed  Orthopedic referral placed  These discharge instructions and medications were reviewed with her and understanding verbalized.    This document was prepared using a combination of typing and voice generated software.  While every attempt was made for accuracy, spelling and grammatical errors may exist.    Medical Decision Making  The patient's presentation is strongly suggestive of an acute and uncomplicated illness or injury.    The patient's evaluation involved:  ordering and/or review of 1 test(s) in this encounter (see separate area of note for details)    The patient's management involved only low risk treatment.    New Prescriptions    No medications on file       Final diagnoses:   Knee injury, left, initial encounter   History of arthroscopic knee surgery       2/4/2023   [facility]       Nadira Tiwari, CNP  02/04/23 [id number]

## 2023-03-04 ENCOUNTER — MYC REFILL (OUTPATIENT)
Dept: PSYCHIATRY | Facility: OTHER | Age: 33
End: 2023-03-04

## 2023-03-04 DIAGNOSIS — M62.838 MUSCLE SPASM: ICD-10-CM

## 2023-03-04 DIAGNOSIS — K21.9 GASTROESOPHAGEAL REFLUX DISEASE WITHOUT ESOPHAGITIS: ICD-10-CM

## 2023-03-06 RX ORDER — TIZANIDINE HYDROCHLORIDE 4 MG/1
4 CAPSULE, GELATIN COATED ORAL DAILY PRN
Qty: 30 CAPSULE | Refills: 0 | Status: SHIPPED | OUTPATIENT
Start: 2023-03-06 | End: 2023-04-13

## 2023-03-10 DIAGNOSIS — F90.9 ATTENTION DEFICIT HYPERACTIVITY DISORDER (ADHD), UNSPECIFIED ADHD TYPE: ICD-10-CM

## 2023-03-10 RX ORDER — DEXTROAMPHETAMINE SACCHARATE, AMPHETAMINE ASPARTATE, DEXTROAMPHETAMINE SULFATE AND AMPHETAMINE SULFATE 7.5; 7.5; 7.5; 7.5 MG/1; MG/1; MG/1; MG/1
30 TABLET ORAL 2 TIMES DAILY
Qty: 60 TABLET | Refills: 0 | Status: SHIPPED | OUTPATIENT
Start: 2023-03-10

## 2023-03-14 ENCOUNTER — APPOINTMENT (OUTPATIENT)
Dept: CT IMAGING | Facility: HOSPITAL | Age: 33
End: 2023-03-14
Attending: NURSE PRACTITIONER
Payer: COMMERCIAL

## 2023-03-14 ENCOUNTER — HOSPITAL ENCOUNTER (EMERGENCY)
Facility: HOSPITAL | Age: 33
Discharge: HOME | End: 2023-03-14
Attending: NURSE PRACTITIONER | Admitting: NURSE PRACTITIONER
Payer: COMMERCIAL

## 2023-03-14 VITALS
SYSTOLIC BLOOD PRESSURE: 105 MMHG | TEMPERATURE: 97.2 F | BODY MASS INDEX: 22.31 KG/M2 | DIASTOLIC BLOOD PRESSURE: 74 MMHG | HEART RATE: 98 BPM | WEIGHT: 130 LBS | RESPIRATION RATE: 18 BRPM | OXYGEN SATURATION: 98 %

## 2023-03-14 DIAGNOSIS — J02.9 SORE THROAT: Primary | ICD-10-CM

## 2023-03-14 LAB
ANION GAP SERPL CALCULATED.3IONS-SCNC: 11 MMOL/L (ref 7–15)
BUN SERPL-MCNC: 22.6 MG/DL (ref 6–20)
CALCIUM SERPL-MCNC: 9.2 MG/DL (ref 8.6–10)
CHLORIDE SERPL-SCNC: 102 MMOL/L (ref 98–107)
CREAT SERPL-MCNC: 0.87 MG/DL (ref 0.51–0.95)
DEPRECATED HCO3 PLAS-SCNC: 27 MMOL/L (ref 22–29)
ERYTHROCYTE [DISTWIDTH] IN BLOOD BY AUTOMATED COUNT: 13.7 % (ref 10–15)
GFR SERPL CREATININE-BSD FRML MDRD: 90 ML/MIN/1.73M2
GLUCOSE SERPL-MCNC: 89 MG/DL (ref 70–99)
GROUP A STREP BY PCR: NOT DETECTED
HCT VFR BLD AUTO: 39.5 % (ref 35–47)
HGB BLD-MCNC: 13.2 G/DL (ref 11.7–15.7)
HOLD SPECIMEN: NORMAL
MCH RBC QN AUTO: 28.9 PG (ref 26.5–33)
MCHC RBC AUTO-ENTMCNC: 33.4 G/DL (ref 31.5–36.5)
MCV RBC AUTO: 87 FL (ref 78–100)
PLATELET # BLD AUTO: 228 10E3/UL (ref 150–450)
POTASSIUM SERPL-SCNC: 3.8 MMOL/L (ref 3.4–5.3)
RBC # BLD AUTO: 4.56 10E6/UL (ref 3.8–5.2)
SODIUM SERPL-SCNC: 140 MMOL/L (ref 136–145)
TSH SERPL DL<=0.005 MIU/L-ACNC: 1.31 UIU/ML (ref 0.3–4.2)
WBC # BLD AUTO: 5.7 10E3/UL (ref 4–11)

## 2023-03-14 PROCEDURE — 96374 THER/PROPH/DIAG INJ IV PUSH: CPT

## 2023-03-14 PROCEDURE — 87651 STREP A DNA AMP PROBE: CPT | Performed by: NURSE PRACTITIONER

## 2023-03-14 PROCEDURE — 36415 COLL VENOUS BLD VENIPUNCTURE: CPT | Performed by: NURSE PRACTITIONER

## 2023-03-14 PROCEDURE — G0463 HOSPITAL OUTPT CLINIC VISIT: HCPCS | Mod: 25

## 2023-03-14 PROCEDURE — 99213 OFFICE O/P EST LOW 20 MIN: CPT | Performed by: NURSE PRACTITIONER

## 2023-03-14 PROCEDURE — 250N000011 HC RX IP 250 OP 636: Performed by: RADIOLOGY

## 2023-03-14 PROCEDURE — 84443 ASSAY THYROID STIM HORMONE: CPT | Performed by: NURSE PRACTITIONER

## 2023-03-14 PROCEDURE — 80048 BASIC METABOLIC PNL TOTAL CA: CPT | Performed by: NURSE PRACTITIONER

## 2023-03-14 PROCEDURE — 85014 HEMATOCRIT: CPT | Performed by: NURSE PRACTITIONER

## 2023-03-14 PROCEDURE — 70491 CT SOFT TISSUE NECK W/DYE: CPT | Mod: 59

## 2023-03-14 RX ORDER — IOPAMIDOL 755 MG/ML
70 INJECTION, SOLUTION INTRAVASCULAR ONCE
Status: COMPLETED | OUTPATIENT
Start: 2023-03-14 | End: 2023-03-14

## 2023-03-14 RX ADMIN — IOPAMIDOL 70 ML: 755 INJECTION, SOLUTION INTRAVENOUS at 16:18

## 2023-03-14 ASSESSMENT — ENCOUNTER SYMPTOMS
NAUSEA: 0
ABDOMINAL PAIN: 0
CHILLS: 0
TROUBLE SWALLOWING: 1
NECK PAIN: 0
EYE REDNESS: 0
COUGH: 0
FEVER: 0
SHORTNESS OF BREATH: 0
PSYCHIATRIC NEGATIVE: 1
RHINORRHEA: 0
VOMITING: 0
DIARRHEA: 0
NECK STIFFNESS: 0
EYE DISCHARGE: 0
SORE THROAT: 1
MYALGIAS: 0
HEADACHES: 0

## 2023-03-14 ASSESSMENT — ACTIVITIES OF DAILY LIVING (ADL): ADLS_ACUITY_SCORE: 35

## 2023-03-14 NOTE — ED TRIAGE NOTES
Pt presents with c/o sore throat  Pt states that she feels like there is something stuck in her throat.   Did have a swollen gland on her right side, that did go down but now as of 4 days ago her throat feels super swollen,some coughing.  No otc meds taken

## 2023-03-14 NOTE — ED TRIAGE NOTES
"\"For 4 days I feel like my throat is swollen.  7 days ago I had a swollen gland on right side of the neck and Dr. ZURDO Forrester said that it would go down and it has.\"      "

## 2023-03-14 NOTE — Clinical Note
Ihsan Millan was seen and treated in our emergency department on 3/14/2023.  She may return to work on 03/15/2023.       If you have any questions or concerns, please don't hesitate to call.      Tammy Castro, NP

## 2023-03-14 NOTE — DISCHARGE INSTRUCTIONS
Warm salt water gargles, honey or over-the-counter Chloraseptic spray as needed for sore throat    Follow-up with primary care provider or return urgent care/ED with any worsening in condition or additional concerns.

## 2023-03-14 NOTE — ED PROVIDER NOTES
History     Chief Complaint   Patient presents with     Oral Swelling     HPI  Ihsan Millan is a 32 year old female who presents to urgent care today with complaints of a sore throat and difficulty swallowing.  Patient states she feels like she has something stuck in her throat.  She has had this happen in the past as well.  Denies any fever, chills, nausea, vomiting, diarrhea, shortness of breath or chest pain.  Denies any URI symptoms otherwise.  Talking without difficulty.  No drooling.  No other concerns    Allergies:  Allergies   Allergen Reactions     Skin Adhesives [Mecrylate] Dermatitis     Patient developed contact dermatitis after application of topical adhesive to surgical incision     Lexapro [Escitalopram] Nausea and Vomiting     Codeine Sulfate Rash     Penicillins Rash     Tramadol Rash       Problem List:    Patient Active Problem List    Diagnosis Date Noted     RUQ abdominal pain 02/12/2017     Priority: Medium     Pneumonia of right middle lobe due to infectious organism 02/12/2017     Priority: Medium     Tobacco abuse 02/12/2017     Priority: Medium     Status post laparoscopic assisted vaginal hysterectomy (LAVH) 06/25/2015     Priority: Medium     Endometriosis 04/16/2015     Priority: Medium     Depo lupron 4/15.  Laparoscopy 3/15       ASCUS on Pap smear 07/18/2014     Priority: Medium     + hpv colpo 7/14       Postpartum depression 07/18/2014     Priority: Medium     zoloft rx       H. pylori infection      Priority: Medium     Moderate persistent asthma 03/19/2014     Priority: Medium     GERD (gastroesophageal reflux disease) 09/12/2013     Priority: Medium     Intermittent asthma 09/12/2013     Priority: Medium     Increased with pregnancy  Send for eval and asthma action plan  Smoking cessation counseling  Flu shot given       Pregnant state, incidental 03/23/2011     Priority: Medium     Contraceptive management 11/09/2010     Priority: Medium        Past Medical History:     Past Medical History:   Diagnosis Date     Asthma      GERD (gastroesophageal reflux disease)      H. pylori infection        Past Surgical History:    Past Surgical History:   Procedure Laterality Date     COLONOSCOPY N/A 11/6/2014    Procedure: COLONOSCOPY;  Surgeon: Zacarias Paz MD;  Location: [facility]     DILATION AND CURETTAGE, HYSTEROSCOPY DIAGNOSTIC, COMBINED  8/1/2014    Procedure: COMBINED DILATION AND CURETTAGE, HYSTEROSCOPY DIAGNOSTIC;  Surgeon: Rodolfo Clifford MD;  Location: [facility]     ESOPHAGOSCOPY, GASTROSCOPY, DUODENOSCOPY (EGD), COMBINED N/A 9/5/2014    Procedure: COMBINED ESOPHAGOSCOPY, GASTROSCOPY, DUODENOSCOPY (EGD);  Surgeon: Zacarias Paz MD;  Location: [facility]     HERNIORRHAPHY UMBILICAL N/A 9/3/2019    Procedure: OPEN UMBILICAL HERNIA REPAIR;  Surgeon: Carter Vaughn MD;  Location: [facility]     LAPAROSCOPIC ASSISTED HYSTERECTOMY VAGINAL N/A 6/24/2015    Procedure: LAPAROSCOPIC ASSISTED HYSTERECTOMY VAGINAL;  Surgeon: Rdoolfo Clifford MD;  Location: [facility]     LAPAROSCOPIC OOPHORECTOMY Right 3/23/2016    Procedure: LAPAROSCOPIC OOPHORECTOMY;  Surgeon: Rodolfo Clifford MD;  Location: [facility]     LAPAROSCOPIC SALPINGO-OOPHORECTOMY Left 5/3/2017    Procedure: LAPAROSCOPIC SALPINGO-OOPHORECTOMY;  LAPAROSCOPIC LEFT OOPHORECTOMY;  Surgeon: Rodolfo Clifford MD;  [facility]     LAPAROSCOPY DIAGNOSTIC (GYN) N/A 3/18/2015    Procedure: LAPAROSCOPY DIAGNOSTIC (GYN);  Surgeon: Rodolfo Clifford MD;  Location: [facility]     no surgeries       SALPINGECTOMY Bilateral 6/24/2015    Procedure: SALPINGECTOMY;  Surgeon: Rodolfo Clifford MD;  Location: [facility]       Family History:    Family History   Problem Relation Age of Onset     Asthma Mother      Unknown/Adopted Maternal Grandmother      Unknown/Adopted Maternal Grandfather        Social History:  Marital Status:  Single [1]  Social History     Tobacco Use     Smoking status: Every Day     Packs/day: 0.75     Years: 7.00     Pack years: 5.25     Types: Cigarettes      Smokeless tobacco: Never     Tobacco comments:     declines quitline referral 9/10/19   Substance Use Topics     Alcohol use: Not Currently     Comment: social     Drug use: No        Medications:    Acetaminophen (TYLENOL PO)  ADDERALL XR 30 MG 24 hr capsule  albuterol (PROAIR HFA/PROVENTIL HFA/VENTOLIN HFA) 108 (90 Base) MCG/ACT inhaler  albuterol (PROAIR HFA/PROVENTIL HFA/VENTOLIN HFA) 108 (90 Base) MCG/ACT inhaler  albuterol (PROAIR HFA/PROVENTIL HFA/VENTOLIN HFA) 108 (90 BASE) MCG/ACT Inhaler  albuterol (PROVENTIL) (2.5 MG/3ML) 0.083% neb solution  albuterol (PROVENTIL) (2.5 MG/3ML) 0.083% neb solution  amphetamine-dextroamphetamine (ADDERALL) 30 MG tablet  amphetamine-dextroamphetamine (ADDERALL) 30 MG tablet  amphetamine-dextroamphetamine (ADDERALL) 30 MG tablet  ARIPiprazole (ABILIFY) 5 MG tablet  ARIPiprazole (ABILIFY) 5 MG tablet  aspirin (ASA) 81 MG chewable tablet  aspirin (ASA) 81 MG chewable tablet  betamethasone dipropionate (DIPROSONE) 0.05 % external cream  cloNIDine (CATAPRES) 0.1 MG tablet  cloNIDine (CATAPRES) 0.1 MG tablet  cloNIDine (CATAPRES) 0.2 MG tablet  estradiol (ESTRACE) 1 MG tablet  estradiol (ESTRACE) 1 MG tablet  estradiol (ESTRACE) 1 MG tablet  estradiol (ESTRACE) 1 MG tablet  FLUoxetine (PROZAC) 20 MG capsule  FLUoxetine (PROZAC) 20 MG capsule  FLUoxetine (PROZAC) 20 MG capsule  hydrOXYzine (ATARAX) 25 MG tablet  hydrOXYzine (VISTARIL) 50 MG capsule  ipratropium - albuterol 0.5 mg/2.5 mg/3 mL (DUONEB) 0.5-2.5 (3) MG/3ML neb solution  meclizine (ANTIVERT) 25 MG tablet  naproxen (NAPROSYN) 500 MG tablet  omeprazole (PRILOSEC) 20 MG DR capsule  ondansetron (ZOFRAN ODT) 4 MG ODT tab  SM MUCUS RELIEF 600 MG 12 hr tablet  SYMBICORT 80-4.5 MCG/ACT Inhaler  tiZANidine (ZANAFLEX) 4 MG capsule  tiZANidine (ZANAFLEX) 4 MG tablet  traZODone (DESYREL) 150 MG tablet  traZODone (DESYREL) 50 MG tablet      Review of Systems   Constitutional: Negative for chills and fever.   HENT: Positive for  sore throat and trouble swallowing. Negative for congestion, ear pain and rhinorrhea.    Eyes: Negative for discharge and redness.   Respiratory: Negative for cough and shortness of breath.    Cardiovascular: Negative for chest pain.   Gastrointestinal: Negative for abdominal pain, diarrhea, nausea and vomiting.   Genitourinary: Negative for decreased urine volume.   Musculoskeletal: Negative for gait problem, myalgias, neck pain and neck stiffness.   Skin: Negative for rash.   Neurological: Negative for headaches.   Psychiatric/Behavioral: Negative.      Physical Exam   BP: 105/74  Pulse: 98  Temp: 97.2  F (36.2  C)  Resp: 18  Weight: 59 kg (130 lb)  SpO2: 98 %    Physical Exam  Vitals and nursing note reviewed.   Constitutional:       General: She is not in acute distress.     Appearance: Normal appearance. She is not ill-appearing or toxic-appearing.   HENT:      Head: Normocephalic.      Right Ear: Tympanic membrane, ear canal and external ear normal.      Left Ear: Tympanic membrane, ear canal and external ear normal.      Nose: Nose normal.      Mouth/Throat:      Mouth: Mucous membranes are moist.      Pharynx: Oropharynx is clear. No oropharyngeal exudate or posterior oropharyngeal erythema.   Cardiovascular:      Rate and Rhythm: Normal rate and regular rhythm.      Pulses: Normal pulses.      Heart sounds: Normal heart sounds.   Pulmonary:      Effort: Pulmonary effort is normal.      Breath sounds: Normal breath sounds.   Abdominal:      General: Bowel sounds are normal.      Palpations: Abdomen is soft.      Tenderness: There is no abdominal tenderness.   Musculoskeletal:      Cervical back: Normal range of motion and neck supple. No rigidity or tenderness.   Lymphadenopathy:      Cervical: Cervical adenopathy present.   Neurological:      Mental Status: She is alert.   Psychiatric:         Mood and Affect: Mood normal.       ED Course       Results for orders placed or performed during the hospital  encounter of 03/14/23 (from the past 24 hour(s))   Group A Streptococcus PCR Throat Swab    Specimen: Throat; Swab   Result Value Ref Range    Group A strep by PCR Not Detected Not Detected    Narrative    The Xpert Xpress Strep A test, performed on the [employer], is a rapid, qualitative in vitro diagnostic test for the detection of Streptococcus pyogenes (Group A ß-hemolytic Streptococcus, Strep A) in throat swab specimens from patients with signs and symptoms of pharyngitis. The Xpert Xpress Strep A test can be used as an aid in the diagnosis of Group A Streptococcal pharyngitis. The assay is not intended to monitor treatment for Group A Streptococcus infections. The Xpert Xpress Strep A test utilizes an automated real-time polymerase chain reaction (PCR) to detect Streptococcus pyogenes DNA.   Basic metabolic panel   Result Value Ref Range    Sodium 140 136 - 145 mmol/L    Potassium 3.8 3.4 - 5.3 mmol/L    Chloride 102 98 - 107 mmol/L    Carbon Dioxide (CO2) 27 22 - 29 mmol/L    Anion Gap 11 7 - 15 mmol/L    Urea Nitrogen 22.6 (H) 6.0 - 20.0 mg/dL    Creatinine 0.87 0.51 - 0.95 mg/dL    Calcium 9.2 8.6 - 10.0 mg/dL    Glucose 89 70 - 99 mg/dL    GFR Estimate 90 >60 mL/min/1.73m2   CBC with platelets   Result Value Ref Range    WBC Count 5.7 4.0 - 11.0 10e3/uL    RBC Count 4.56 3.80 - 5.20 10e6/uL    Hemoglobin 13.2 11.7 - 15.7 g/dL    Hematocrit 39.5 35.0 - 47.0 %    MCV 87 78 - 100 fL    MCH 28.9 26.5 - 33.0 pg    MCHC 33.4 31.5 - 36.5 g/dL    RDW 13.7 10.0 - 15.0 %    Platelet Count 228 150 - 450 10e3/uL   TSH with free T4 reflex   Result Value Ref Range    TSH 1.31 0.30 - 4.20 uIU/mL   Extra Tube    Narrative    The following orders were created for panel order Extra Tube.  Procedure                               Abnormality         Status                     ---------                               -----------         ------                     Extra Blue Top Tube[[id number]]                               Final result               Extra Red Top Tube[[id number]]                               Final result               Extra Heparinized Syringe[[id number]]                        Final result                 Please view results for these tests on the individual orders.   Extra Blue Top Tube   Result Value Ref Range    Hold Specimen JIC    Extra Red Top Tube   Result Value Ref Range    Hold Specimen JIC    Extra Heparinized Syringe   Result Value Ref Range    Hold Specimen JIC    Soft tissue neck CT w contrast    Narrative    Exam: CT SOFT TISSUE NECK W CONTRAST    Exam reason:  c/o neck swelling, difficulty swallowing    Technique: Axial scans obtained of the neck after IV contrast  administration. Coronal and sagittal reconstructions performed. This  CT was performed using one or more of the following dose reduction  techniques: automated exposure control, adjustment of the mA and/or kV  according to patient size, and/or use of iterative reconstruction  technique.    Meds/Contrast: Isovue 370 70ml Given    Comparison: None.     FINDINGS:    Visualized brain parenchyma is normal.  VIsualized orbits are normal.   Paranasal sinuses are well aerated.    Parotid glands: Normal  Submandibular glands: Normal.  Sublingual spaces are symmetric.    Oral cavity and floor of mouth: Normal  Nasopharynx, Oropharynx, Hypopharynx, Larynx: Normal    Thyroid gland: Normal    Vessels: Normal    Lymph nodes: No lymphadenopathy by size criteria.    Osseous:  No acute osseous abnormality.      Impression    IMPRESSION:    No acute findings in the neck.    MARIA ISABEL NAIDU MD         SYSTEM ID:  [id number]       Medications   iopamidol (ISOVUE-370) solution 70 mL (70 mLs Intravenous $Given 3/14/23 1618)   sodium chloride (PF) 0.9% PF flush 50 mL (50 mLs Intravenous $Given 3/14/23 1618)       Assessments & Plan (with Medical Decision Making)     I have reviewed the nursing notes.    I have reviewed the findings, diagnosis, plan and  need for follow up with the patient.  (J02.9) Sore throat  (primary encounter diagnosis)  Plan:   Patient ambulatory with a nontoxic appearance.  Patient arrived with complaints of sore throat and difficulty swallowing feeling like something is stuck in her throat.  Normal exam.  Strep negative.  TSH normal.  CT soft tissue neck shows no acute findings.  Patient physically able to swallow without difficulty.  No difficulty breathing.  No drooling.  No difficulty talking.  No other URI symptoms.  Warm salt water gargles, honey or over-the-counter Chloraseptic spray as needed for sore throat.  Patient to follow-up with primary care provider or return to urgent care/ED with any worsening in condition or additional concerns.  Patient is in agreement treatment plan.    New Prescriptions    No medications on file     Final diagnoses:   Sore throat     3/14/2023   [facility]     Tammy Castro, SHERIF  03/14/23 [id number]

## 2023-04-13 ENCOUNTER — APPOINTMENT (OUTPATIENT)
Dept: GENERAL RADIOLOGY | Facility: HOSPITAL | Age: 33
End: 2023-04-13
Attending: NURSE PRACTITIONER
Payer: COMMERCIAL

## 2023-04-13 ENCOUNTER — HOSPITAL ENCOUNTER (EMERGENCY)
Facility: HOSPITAL | Age: 33
Discharge: HOME | End: 2023-04-13
Attending: NURSE PRACTITIONER | Admitting: NURSE PRACTITIONER
Payer: COMMERCIAL

## 2023-04-13 VITALS
HEART RATE: 88 BPM | DIASTOLIC BLOOD PRESSURE: 64 MMHG | SYSTOLIC BLOOD PRESSURE: 94 MMHG | TEMPERATURE: 98.3 F | OXYGEN SATURATION: 97 % | BODY MASS INDEX: 23.31 KG/M2 | RESPIRATION RATE: 14 BRPM | WEIGHT: 135.8 LBS

## 2023-04-13 DIAGNOSIS — R07.89 CHEST WALL PAIN: ICD-10-CM

## 2023-04-13 LAB
ALBUMIN SERPL BCG-MCNC: 3.9 G/DL (ref 3.5–5.2)
ALP SERPL-CCNC: 99 U/L (ref 35–104)
ALT SERPL W P-5'-P-CCNC: 61 U/L (ref 10–35)
ANION GAP SERPL CALCULATED.3IONS-SCNC: 9 MMOL/L (ref 7–15)
AST SERPL W P-5'-P-CCNC: 25 U/L (ref 10–35)
BASOPHILS # BLD AUTO: 0 10E3/UL (ref 0–0.2)
BASOPHILS NFR BLD AUTO: 0 %
BILIRUB SERPL-MCNC: <0.2 MG/DL
BUN SERPL-MCNC: 22.4 MG/DL (ref 6–20)
CALCIUM SERPL-MCNC: 9.1 MG/DL (ref 8.6–10)
CHLORIDE SERPL-SCNC: 104 MMOL/L (ref 98–107)
CREAT SERPL-MCNC: 0.82 MG/DL (ref 0.51–0.95)
DEPRECATED HCO3 PLAS-SCNC: 27 MMOL/L (ref 22–29)
EOSINOPHIL # BLD AUTO: 0.2 10E3/UL (ref 0–0.7)
EOSINOPHIL NFR BLD AUTO: 4 %
ERYTHROCYTE [DISTWIDTH] IN BLOOD BY AUTOMATED COUNT: 13.8 % (ref 10–15)
GFR SERPL CREATININE-BSD FRML MDRD: >90 ML/MIN/1.73M2
GLUCOSE SERPL-MCNC: 91 MG/DL (ref 70–99)
HCT VFR BLD AUTO: 37.5 % (ref 35–47)
HGB BLD-MCNC: 12.6 G/DL (ref 11.7–15.7)
IMM GRANULOCYTES # BLD: 0 10E3/UL
IMM GRANULOCYTES NFR BLD: 0 %
LYMPHOCYTES # BLD AUTO: 2.9 10E3/UL (ref 0.8–5.3)
LYMPHOCYTES NFR BLD AUTO: 49 %
MAGNESIUM SERPL-MCNC: 2 MG/DL (ref 1.7–2.3)
MCH RBC QN AUTO: 29.2 PG (ref 26.5–33)
MCHC RBC AUTO-ENTMCNC: 33.6 G/DL (ref 31.5–36.5)
MCV RBC AUTO: 87 FL (ref 78–100)
MONOCYTES # BLD AUTO: 0.4 10E3/UL (ref 0–1.3)
MONOCYTES NFR BLD AUTO: 6 %
NEUTROPHILS # BLD AUTO: 2.5 10E3/UL (ref 1.6–8.3)
NEUTROPHILS NFR BLD AUTO: 41 %
NRBC # BLD AUTO: 0 10E3/UL
NRBC BLD AUTO-RTO: 0 /100
PLATELET # BLD AUTO: 212 10E3/UL (ref 150–450)
POTASSIUM SERPL-SCNC: 3.8 MMOL/L (ref 3.4–5.3)
PROT SERPL-MCNC: 6.3 G/DL (ref 6.4–8.3)
RBC # BLD AUTO: 4.31 10E6/UL (ref 3.8–5.2)
SODIUM SERPL-SCNC: 140 MMOL/L (ref 136–145)
TROPONIN T SERPL HS-MCNC: <6 NG/L
WBC # BLD AUTO: 6.1 10E3/UL (ref 4–11)

## 2023-04-13 PROCEDURE — 96372 THER/PROPH/DIAG INJ SC/IM: CPT | Performed by: NURSE PRACTITIONER

## 2023-04-13 PROCEDURE — 85025 COMPLETE CBC W/AUTO DIFF WBC: CPT | Performed by: NURSE PRACTITIONER

## 2023-04-13 PROCEDURE — 84484 ASSAY OF TROPONIN QUANT: CPT | Performed by: NURSE PRACTITIONER

## 2023-04-13 PROCEDURE — 80053 COMPREHEN METABOLIC PANEL: CPT | Performed by: NURSE PRACTITIONER

## 2023-04-13 PROCEDURE — 250N000011 HC RX IP 250 OP 636: Performed by: NURSE PRACTITIONER

## 2023-04-13 PROCEDURE — 71046 X-RAY EXAM CHEST 2 VIEWS: CPT

## 2023-04-13 PROCEDURE — 99285 EMERGENCY DEPT VISIT HI MDM: CPT | Mod: 25 | Performed by: NURSE PRACTITIONER

## 2023-04-13 PROCEDURE — 99283 EMERGENCY DEPT VISIT LOW MDM: CPT | Performed by: NURSE PRACTITIONER

## 2023-04-13 PROCEDURE — 83735 ASSAY OF MAGNESIUM: CPT | Performed by: NURSE PRACTITIONER

## 2023-04-13 PROCEDURE — 93005 ELECTROCARDIOGRAM TRACING: CPT | Performed by: NURSE PRACTITIONER

## 2023-04-13 PROCEDURE — 93010 ELECTROCARDIOGRAM REPORT: CPT | Performed by: INTERNAL MEDICINE

## 2023-04-13 PROCEDURE — 36415 COLL VENOUS BLD VENIPUNCTURE: CPT | Performed by: NURSE PRACTITIONER

## 2023-04-13 RX ORDER — KETOROLAC TROMETHAMINE 30 MG/ML
60 INJECTION, SOLUTION INTRAMUSCULAR; INTRAVENOUS ONCE
Status: COMPLETED | OUTPATIENT
Start: 2023-04-13 | End: 2023-04-13

## 2023-04-13 RX ORDER — QUETIAPINE FUMARATE 50 MG/1
TABLET, FILM COATED ORAL
COMMUNITY
Start: 2023-02-08

## 2023-04-13 RX ADMIN — KETOROLAC TROMETHAMINE 60 MG: 30 INJECTION, SOLUTION INTRAMUSCULAR at 21:57

## 2023-04-14 NOTE — ED NOTES
Patient provided written and verbal discharge instructions and patient verbalizes understanding. Patient left department ambulatory.

## 2023-04-14 NOTE — ED PROVIDER NOTES
EMERGENCY MEDICINE NOTE - LISA DAMON, CNP    ID:   Ihsan Millan    CC:  Chief Complaint   Patient presents with     Chest Pain     Left chest, started 4 days ago        MEANS OF ARRIVAL:  private car    PCP:   [facility] [place]    SUBJECTIVE:   HPI:   Ihsan Millan is a 32 year old individual with history of GERD, moderate persistent asthma, comes in today for chest pain.   Patient states that she has had left-sided chest pain for the past 4 days.  It is getting worse and more persistent so comes in.   Patient denies fever, chills, shortness of breath, cough, nausea.  Denies any trauma.  States pain worsens with deep breath and cough.  Is only on the left anterior chest wall and nonradiating.    Review of Systems:  Significant positives/negatives were reviewed and mentioned in HPI.  All remaining body systems are negative or non-contributory.    I have reviewed the PMH, Meds, Allergies, and SH, including:  ALLERGIES:  Allergies   Allergen Reactions     Skin Adhesives [Mecrylate] Dermatitis     Patient developed contact dermatitis after application of topical adhesive to surgical incision     Lexapro [Escitalopram] Nausea and Vomiting     Codeine Sulfate Rash     Penicillins Rash     Tramadol Rash       CURRENT MEDICATIONS:  Current Outpatient Rx   Medication Sig Dispense Refill     Acetaminophen (TYLENOL PO) Take 1,000 mg by mouth every 8 hours as needed for mild pain or fever       ADDERALL XR 30 MG 24 hr capsule Take 30 mg by mouth daily       albuterol (PROAIR HFA/PROVENTIL HFA/VENTOLIN HFA) 108 (90 Base) MCG/ACT inhaler Inhale 2 puffs into the lungs       albuterol (PROAIR HFA/PROVENTIL HFA/VENTOLIN HFA) 108 (90 Base) MCG/ACT inhaler Inhale 1-2 puffs into the lungs every 4 hours as needed for shortness of breath / dyspnea or wheezing 18 g 1     albuterol (PROVENTIL) (2.5 MG/3ML) 0.083% neb solution Inhale 2.5 mg into the lungs       albuterol (PROVENTIL) (2.5 MG/3ML) 0.083% neb solution         amphetamine-dextroamphetamine (ADDERALL) 30 MG tablet Take 1 tablet (30 mg) by mouth 2 times daily 60 tablet 0     amphetamine-dextroamphetamine (ADDERALL) 30 MG tablet Take 1 tablet (30 mg) by mouth 2 times daily 60 tablet 0     amphetamine-dextroamphetamine (ADDERALL) 30 MG tablet TAKE 1 TABLET BY MOUTH TWICE A DAY AS DIRECTED       ARIPiprazole (ABILIFY) 5 MG tablet Take 1 tablet (5 mg) by mouth daily for 30 days 30 tablet 1     ARIPiprazole (ABILIFY) 5 MG tablet Take 1 tablet (5 mg) by mouth daily 60 tablet 0     aspirin (ASA) 81 MG chewable tablet Take 324 mg by mouth       aspirin (ASA) 81 MG chewable tablet Take 81 mg by mouth daily       betamethasone dipropionate (DIPROSONE) 0.05 % external cream APPLY TO AFFECTED AREA ONCE DAILY AS NEEDED FOR SKIN IRRITATION       cloNIDine (CATAPRES) 0.1 MG tablet Take 1 tablet by mouth       cloNIDine (CATAPRES) 0.1 MG tablet        cloNIDine (CATAPRES) 0.2 MG tablet Take 0.2 mg by mouth 2 times daily       estradiol (ESTRACE) 1 MG tablet Take 1 mg by mouth       estradiol (ESTRACE) 1 MG tablet Take 1 tablet (1 mg) by mouth daily for 30 days 30 tablet 1     estradiol (ESTRACE) 1 MG tablet Take 1 tablet (1 mg) by mouth daily 60 tablet 0     estradiol (ESTRACE) 1 MG tablet        FLUoxetine (PROZAC) 20 MG capsule Take 1 capsule by mouth       FLUoxetine (PROZAC) 20 MG capsule Take 3 capsules (60 mg) by mouth daily for 30 days 90 capsule 1     FLUoxetine (PROZAC) 20 MG capsule Take 1 capsule (20 mg) by mouth daily 60 capsule 0     hydrOXYzine (ATARAX) 25 MG tablet Take 1 tablet by mouth       hydrOXYzine (VISTARIL) 50 MG capsule Take 50 mg by mouth as needed       ipratropium - albuterol 0.5 mg/2.5 mg/3 mL (DUONEB) 0.5-2.5 (3) MG/3ML neb solution NEBULIZE CONTENTS OF 1 VIAL EVERY 6 HOURS AS NEEDED FOR SHORTNESS OF BREATH OR WHEEZING.       meclizine (ANTIVERT) 25 MG tablet        naproxen (NAPROSYN) 500 MG tablet Take 1 tablet by mouth       omeprazole (PRILOSEC) 20 MG  DR capsule Take 1 capsule (20 mg) by mouth daily 60 capsule 0     ondansetron (ZOFRAN ODT) 4 MG ODT tab Take 1 tablet (4 mg) by mouth every 6 hours as needed for nausea or vomiting 20 tablet 0     SM MUCUS RELIEF 600 MG 12 hr tablet Take 1 tablet by mouth 2 times daily       SYMBICORT 80-4.5 MCG/ACT Inhaler        tiZANidine (ZANAFLEX) 4 MG capsule Take 1 capsule (4 mg) by mouth daily as needed for muscle spasms 30 capsule 0     tiZANidine (ZANAFLEX) 4 MG tablet TAKE 1 TABLET BY MOUTH THREE TIMES DAILY AS NEEDED FOR MUSCLE SPASM       traZODone (DESYREL) 150 MG tablet Take 1 tablet (150 mg) by mouth nightly as needed for sleep 60 tablet 0     traZODone (DESYREL) 50 MG tablet At Bedtime           PMH:  Patient Active Problem List   Diagnosis     GERD (gastroesophageal reflux disease)     Intermittent asthma     Moderate persistent asthma     H. pylori infection     ASCUS on Pap smear     Postpartum depression     Endometriosis     Contraceptive management     Status post laparoscopic assisted vaginal hysterectomy (LAVH)     RUQ abdominal pain     Pneumonia of right middle lobe due to infectious organism     Tobacco abuse     Pregnant state, incidental     Past Surgical History:   Procedure Laterality Date     COLONOSCOPY N/A 11/6/2014    Procedure: COLONOSCOPY;  Surgeon: Zacarias Paz MD;  Location: [facility]     DILATION AND CURETTAGE, HYSTEROSCOPY DIAGNOSTIC, COMBINED  8/1/2014    Procedure: COMBINED DILATION AND CURETTAGE, HYSTEROSCOPY DIAGNOSTIC;  Surgeon: Rodolfo Clifford MD;  [facility]     ESOPHAGOSCOPY, GASTROSCOPY, DUODENOSCOPY (EGD), COMBINED N/A 9/5/2014    Procedure: COMBINED ESOPHAGOSCOPY, GASTROSCOPY, DUODENOSCOPY (EGD);  Surgeon: Zacarias Paz MD;  [facility]     HERNIORRHAPHY UMBILICAL N/A 9/3/2019    Procedure: OPEN UMBILICAL HERNIA REPAIR;  Surgeon: Carter Vaughn MD;  [facility]     LAPAROSCOPIC ASSISTED HYSTERECTOMY VAGINAL N/A 6/24/2015    Procedure: LAPAROSCOPIC ASSISTED  HYSTERECTOMY VAGINAL;  Surgeon: Rodolfo Clifford MD;  [facility]     LAPAROSCOPIC OOPHORECTOMY Right 3/23/2016    Procedure: LAPAROSCOPIC OOPHORECTOMY;  Surgeon: Rodolfo Clifford MD;  [facility]     LAPAROSCOPIC SALPINGO-OOPHORECTOMY Left 5/3/2017    Procedure: LAPAROSCOPIC SALPINGO-OOPHORECTOMY;  LAPAROSCOPIC LEFT OOPHORECTOMY;  Surgeon: Rodolfo Clifford MD;  [facility]     LAPAROSCOPY DIAGNOSTIC (GYN) N/A 3/18/2015    Procedure: LAPAROSCOPY DIAGNOSTIC (GYN);  Surgeon: Rodolfo Clifford MD;  Location: [facility]     no surgeries       SALPINGECTOMY Bilateral 6/24/2015    Procedure: SALPINGECTOMY;  Surgeon: Rodolfo Clifford MD;  [facility]     Social History     Tobacco Use     Smoking status: Every Day     Packs/day: 0.75     Years: 7.00     Pack years: 5.25     Types: Cigarettes     Smokeless tobacco: Never     Tobacco comments:     declines quitline referral 9/10/19   Substance Use Topics     Alcohol use: Not Currently     Comment: social     Drug use: No       OBJECTIVE:     Vitals:    04/13/23 2200 04/13/23 2202 04/13/23 2203 04/13/23 2230   BP: 100/73  100/73 94/64   Pulse: 100  100 88   Resp: 14  19 14   Temp:  98.3  F (36.8  C)     TempSrc:  Oral     SpO2: 99%  98% 97%   Weight:         Body mass index is 23.31 kg/m .  GENERAL APPEARANCE:  The patient is a 32 year old well-developed, well-nourished individual in no acute distress that appears as stated age.  NECK:  Supple.  Trachea is midline.    CHEST:  Symmetric.  Tenderness to palpation over left anterior chest.  No crepitus or deformity.  LUNGS:  Breathing is easy.  Breath sounds are equal bilaterally.  Faint inspiratory wheezes bilateral lower lobes.  HEART:  Regular rate and rhythm with normal S1 and S2.  No murmurs, gallops, or rubs.  ABDOMEN:  No abdominal bruits or thrills present upon auscultation/palpation.  EXTREMITIES:  No cyanosis, clubbing, or edema.   NEUROLOGIC:  No focal sensory or motor deficits are noted.    PSYCHIATRIC:  The patient  is awake, alert, and oriented x4.  Recent and remote memory is intact.  Appropriate mood and affect.  Calm and cooperative with history and physical exam.  SKIN:  Warm, dry, and well perfused.  Good turgor.  No lesions, nodules, or rashes are noted.  No bruising noted.    Comment: Discrepancies between my note and notes on behalf of the nursing team or other care providers are secondary to my findings reflecting my physical examination and questioning of the patient.  Any conflicting information provided is not in line with my examination of the patient.    ECG:    ECG competed at 2129 and personally reviewed at 2133 showing sinus tachycardia with ventricular rate in the 100's.  Normal axis.  Normal ECG.  When compared to ECG from 9/11/2022, no significant changes noted.    LAB:     Recent Results (from the past 24 hour(s))   Comprehensive metabolic panel    Collection Time: 04/13/23  9:44 PM   Result Value Ref Range    Sodium 140 136 - 145 mmol/L    Potassium 3.8 3.4 - 5.3 mmol/L    Chloride 104 98 - 107 mmol/L    Carbon Dioxide (CO2) 27 22 - 29 mmol/L    Anion Gap 9 7 - 15 mmol/L    Urea Nitrogen 22.4 (H) 6.0 - 20.0 mg/dL    Creatinine 0.82 0.51 - 0.95 mg/dL    Calcium 9.1 8.6 - 10.0 mg/dL    Glucose 91 70 - 99 mg/dL    Alkaline Phosphatase 99 35 - 104 U/L    AST 25 10 - 35 U/L    ALT 61 (H) 10 - 35 U/L    Protein Total 6.3 (L) 6.4 - 8.3 g/dL    Albumin 3.9 3.5 - 5.2 g/dL    Bilirubin Total <0.2 <=1.2 mg/dL    GFR Estimate >90 >60 mL/min/1.73m2   Troponin T, High Sensitivity    Collection Time: 04/13/23  9:44 PM   Result Value Ref Range    Troponin T, High Sensitivity <6 <=14 ng/L   Magnesium    Collection Time: 04/13/23  9:44 PM   Result Value Ref Range    Magnesium 2.0 1.7 - 2.3 mg/dL   CBC with platelets and differential    Collection Time: 04/13/23  9:44 PM   Result Value Ref Range    WBC Count 6.1 4.0 - 11.0 10e3/uL    RBC Count 4.31 3.80 - 5.20 10e6/uL    Hemoglobin 12.6 11.7 - 15.7 g/dL    Hematocrit 37.5  35.0 - 47.0 %    MCV 87 78 - 100 fL    MCH 29.2 26.5 - 33.0 pg    MCHC 33.6 31.5 - 36.5 g/dL    RDW 13.8 10.0 - 15.0 %    Platelet Count 212 150 - 450 10e3/uL    % Neutrophils 41 %    % Lymphocytes 49 %    % Monocytes 6 %    % Eosinophils 4 %    % Basophils 0 %    % Immature Granulocytes 0 %    NRBCs per 100 WBC 0 <1 /100    Absolute Neutrophils 2.5 1.6 - 8.3 10e3/uL    Absolute Lymphocytes 2.9 0.8 - 5.3 10e3/uL    Absolute Monocytes 0.4 0.0 - 1.3 10e3/uL    Absolute Eosinophils 0.2 0.0 - 0.7 10e3/uL    Absolute Basophils 0.0 0.0 - 0.2 10e3/uL    Absolute Immature Granulocytes 0.0 <=0.4 10e3/uL    Absolute NRBCs 0.0 10e3/uL     IMAGING STUDIES:   2 view chest x-ray personally reviewed showing no acute cardiopulmonary abnormalities.  Radiology overread pending.    ED COURSE/ MDM/ ASSESSMENT/ PLAN:   Diagnostic Testing:  Diagnostic testing was conducted.  Labs show WBC is 6.1 with hemoglobin 12.6.  Electrolytes, renal, hepatic functions benign.  Troponin negative.  The Plain Film X-rays have been personally reviewed with the interpretation of no acute cardiopulmonary abnormalities.  Radiology overread pending.      Assessment:   Patient presents to the ER today for left-sided chest pain.  Upon arrival, vitals signs are normal.  Examination was completed.  The patient is alert and oriented.  Does have tenderness over left anterior chest wall.  No crepitus or deformities.  No respiratory distress.  Does have mild inspiratory wheezes in lower lobes bilaterally.  Rest of physical examination benign.    Potential diagnosis which have been considered and evaluated include costochondritis, chest wall contusion, pneumothorax, MI/NSTEMI, thoracic aneurysm, PE, as well as others. Many of these have been excluded using the various modalities and assessment as noted on the chart. At the present time, the diagnosis given seems to be the most likely chest wall pain.    ED Course/MDM/Plan:   Patient comes in with 4 days complaints  of left anterior chest pain that is been getting worse.  Pain worsens with deep breath and palpation of the anterior chest wall.  Vital signs normal upon arrival.  Patient in no distress.  ECG obtained showing no acute abnormalities.  Troponin negative making ACS unlikely.  Rest of lab work is benign.  Chest x-ray shows no acute abnormalities.  Patient given ketorolac 60 mg IM with improvement.  Patient likely has costochondritis versus chest wall pain.  Advised patient to go home with acetaminophen/ibuprofen for pain control.  Follow-up with PCP.  Return to ER for any new or worsening symptoms.  Patient verbalized understand agrees with plan of care.  Patient discharged home.     DIAGNOSIS:   (R07.89) Chest wall pain      Critical Care Time: None     Impression and plan discussed with patient. Questions answered, concerns addressed, indications for urgent re-evaluation reviewed, and  given. Patient/Parent/Caregiver agree with treatment plan and have no further questions at this time.  AVS provided at discharge.    This note was created by the Dragon Voice Dictation System. Inadvertent typographical errors, due to software recognition problems, may still exist.      Renny DAMON, CNP  [facility]  EMERGENCY DEPARTMENT  [address] [place] [id number]  [phone]       Renny Tyson APRN CNP  04/13/23 [id number]

## 2023-04-14 NOTE — ED NOTES
"Patient presents with 4 days of worsening left sided anterior chest pain. Patient reports pain 9/10 that is \"like someone is taking a knife\" to chest. Patient decided to be evaluated after having some left arm paresthesias today. Patient states that pain is worse with a deep breath and is tender to palpation. Patient states that she did try some OTC acid medications, \"in case it was heartburn\" and reports no relief.  "

## 2023-04-14 NOTE — DISCHARGE INSTRUCTIONS
Pain control:   If your past medical conditions, allergies, current medications, or current status does not prevent you from using acetaminophen and/or ibuprofen, use the following:   Acetaminophen 650-1000 mg every 6 hours as needed for pain in addition to ibuprofen 400-600 mg every 6 hours as needed for pain.  Take these two medications together if wanted.    Remember that these are for AS NEEDED.  If not needed, do not take.          Follow-up with your primary care provider for reevaluation.  Contact your primary care provider if you have any questions or concerns.  Do not hesitate to return to the ER if any new or worsening symptoms.     Please read the attached instructions (if any).  They highlight more specific treatments and interventions for you at home.              Thank you for letting me participate in your care and wish you a fast and uneventful recovery,    Renny DAMON CNP    Do not hesitate to contact me with questions or concerns.  [email or web address]  [email or web address]

## 2023-05-29 ENCOUNTER — HOSPITAL ENCOUNTER (EMERGENCY)
Facility: HOSPITAL | Age: 33
Discharge: HOME | End: 2023-05-29
Attending: PHYSICIAN ASSISTANT | Admitting: PHYSICIAN ASSISTANT
Payer: COMMERCIAL

## 2023-05-29 VITALS
TEMPERATURE: 97.9 F | RESPIRATION RATE: 16 BRPM | DIASTOLIC BLOOD PRESSURE: 73 MMHG | SYSTOLIC BLOOD PRESSURE: 104 MMHG | HEART RATE: 85 BPM | OXYGEN SATURATION: 98 %

## 2023-05-29 DIAGNOSIS — S61.411A LACERATION OF RIGHT HAND WITHOUT FOREIGN BODY, INITIAL ENCOUNTER: ICD-10-CM

## 2023-05-29 PROCEDURE — 999N000104 HC STATISTIC NO CHARGE

## 2023-05-29 PROCEDURE — 99214 OFFICE O/P EST MOD 30 MIN: CPT | Performed by: PHYSICIAN ASSISTANT

## 2023-05-29 PROCEDURE — 12001 RPR S/N/AX/GEN/TRNK 2.5CM/<: CPT

## 2023-05-29 NOTE — ED TRIAGE NOTES
Pt presents with c/o lac on right hand  Was putting a box with picture frames down and one broke and got her right hand in the crease of her middle figner and knuckle, palm side.  Pain is 6/10  Rinsed it out with water at home

## 2023-05-29 NOTE — ED TRIAGE NOTES
Patient was upset and threw a bow and it caught her hand, laceration between index and middle finger

## 2023-05-29 NOTE — ED PROVIDER NOTES
History     Chief Complaint   Patient presents with     Laceration     HPI  Ihsan Millan is a 32 year old female who presents with laceration to right hand that occurred just PTA. She cut it on the edge of a picture frame. Tetanus it not UTD be she declines a tetanus shot today.     Allergies:  Allergies   Allergen Reactions     Skin Adhesives [Cyanoacrylate] Dermatitis     Patient developed contact dermatitis after application of topical adhesive to surgical incision     Lexapro [Escitalopram] Nausea and Vomiting     Codeine Sulfate Rash     Penicillins Rash     Tramadol Rash       Problem List:    Patient Active Problem List    Diagnosis Date Noted     RUQ abdominal pain 02/12/2017     Priority: Medium     Pneumonia of right middle lobe due to infectious organism 02/12/2017     Priority: Medium     Tobacco abuse 02/12/2017     Priority: Medium     Status post laparoscopic assisted vaginal hysterectomy (LAVH) 06/25/2015     Priority: Medium     Endometriosis 04/16/2015     Priority: Medium     Depo lupron 4/15.  Laparoscopy 3/15       ASCUS on Pap smear 07/18/2014     Priority: Medium     + hpv colpo 7/14       Postpartum depression 07/18/2014     Priority: Medium     zoloft rx       H. pylori infection      Priority: Medium     Moderate persistent asthma 03/19/2014     Priority: Medium     GERD (gastroesophageal reflux disease) 09/12/2013     Priority: Medium     Intermittent asthma 09/12/2013     Priority: Medium     Increased with pregnancy  Send for eval and asthma action plan  Smoking cessation counseling  Flu shot given       Pregnant state, incidental 03/23/2011     Priority: Medium     Contraceptive management 11/09/2010     Priority: Medium        Past Medical History:    Past Medical History:   Diagnosis Date     Asthma      GERD (gastroesophageal reflux disease)      H. pylori infection        Past Surgical History:    Past Surgical History:   Procedure Laterality Date     COLONOSCOPY N/A  11/6/2014    Procedure: COLONOSCOPY;  Surgeon: Zacarias Paz MD;  [facility]     DILATION AND CURETTAGE, HYSTEROSCOPY DIAGNOSTIC, COMBINED  8/1/2014    Procedure: COMBINED DILATION AND CURETTAGE, HYSTEROSCOPY DIAGNOSTIC;  Surgeon: Rodolfo Clifford MD;  [facility]     ESOPHAGOSCOPY, GASTROSCOPY, DUODENOSCOPY (EGD), COMBINED N/A 9/5/2014    Procedure: COMBINED ESOPHAGOSCOPY, GASTROSCOPY, DUODENOSCOPY (EGD);  Surgeon: Zacarias Paz MD;  [facility]     HERNIORRHAPHY UMBILICAL N/A 9/3/2019    Procedure: OPEN UMBILICAL HERNIA REPAIR;  Surgeon: Carter Vaughn MD;  [facility]     LAPAROSCOPIC ASSISTED HYSTERECTOMY VAGINAL N/A 6/24/2015    Procedure: LAPAROSCOPIC ASSISTED HYSTERECTOMY VAGINAL;  Surgeon: Rodolfo Clifford MD;  [facility]     LAPAROSCOPIC OOPHORECTOMY Right 3/23/2016    Procedure: LAPAROSCOPIC OOPHORECTOMY;  Surgeon: Rodolfo Clifford MD;  [facility]     LAPAROSCOPIC SALPINGO-OOPHORECTOMY Left 5/3/2017    Procedure: LAPAROSCOPIC SALPINGO-OOPHORECTOMY;  LAPAROSCOPIC LEFT OOPHORECTOMY;  Surgeon: Rodolfo Clifford MD;  [facility]     LAPAROSCOPY DIAGNOSTIC (GYN) N/A 3/18/2015    Procedure: LAPAROSCOPY DIAGNOSTIC (GYN);  Surgeon: Rodolfo Clifford MD;  [facility]     no surgeries       SALPINGECTOMY Bilateral 6/24/2015    Procedure: SALPINGECTOMY;  Surgeon: Rodolfo Clifford MD;  [facility]       Family History:    Family History   Problem Relation Age of Onset     Asthma Mother      Unknown/Adopted Maternal Grandmother      Unknown/Adopted Maternal Grandfather        Social History:  Marital Status:  Single [1]  Social History     Tobacco Use     Smoking status: Every Day     Packs/day: 0.75     Years: 7.00     Pack years: 5.25     Types: Cigarettes     Smokeless tobacco: Never     Tobacco comments:     declines quitline referral 9/10/19   Substance Use Topics     Alcohol use: Not Currently     Comment: social     Drug use: No        Medications:    Acetaminophen (TYLENOL  PO)  albuterol (PROAIR HFA/PROVENTIL HFA/VENTOLIN HFA) 108 (90 Base) MCG/ACT inhaler  albuterol (PROVENTIL) (2.5 MG/3ML) 0.083% neb solution  amphetamine-dextroamphetamine (ADDERALL) 30 MG tablet  amphetamine-dextroamphetamine (ADDERALL) 30 MG tablet  aspirin (ASA) 81 MG chewable tablet  cloNIDine (CATAPRES) 0.1 MG tablet  estradiol (ESTRACE) 1 MG tablet  hydrOXYzine (ATARAX) 25 MG tablet  naproxen (NAPROSYN) 500 MG tablet  omeprazole (PRILOSEC) 20 MG DR capsule  ondansetron (ZOFRAN ODT) 4 MG ODT tab  QUEtiapine (SEROQUEL) 50 MG tablet  sertraline (ZOLOFT) 50 MG tablet          Review of Systems   All other systems reviewed and are negative.      Physical Exam   BP: 104/73  Pulse: 85  Temp: 97.9  F (36.6  C)  Resp: 16  SpO2: 98 %      Physical Exam  Vitals and nursing note reviewed.   Constitutional:       General: She is not in acute distress.     Appearance: Normal appearance.   Cardiovascular:      Rate and Rhythm: Normal rate.      Pulses: Normal pulses.   Pulmonary:      Effort: Pulmonary effort is normal.   Musculoskeletal:         General: Normal range of motion.      Right hand: Laceration (1cm laceration to base of 3rd finger, palmar aspect. Partial thickness. ) present. No swelling or deformity. Normal range of motion. Normal strength. Normal sensation. There is no disruption of two-point discrimination. Normal pulse.   Skin:     General: Skin is warm.      Capillary Refill: Capillary refill takes less than 2 seconds.   Neurological:      Mental Status: She is alert.   Psychiatric:         Mood and Affect: Mood normal.         ED Course                 [facility]    -Laceration Repair    Date/Time: 5/29/2023 3:39 PM    Performed by: Elizabeth Billy PA-C  Authorized by: Elizabeth Billy PA-C    Risks, benefits and alternatives discussed.      ANESTHESIA (see MAR for exact dosages):     Anesthesia method:  Local infiltration    Local anesthetic:  Lidocaine 1% w/o epi  LACERATION DETAILS      Location:  Hand    Hand location:  R palm    Length (cm):  1    Depth (mm):  2    REPAIR TYPE:     Repair type:  Simple      EXPLORATION:     Hemostasis achieved with:  Direct pressure    Wound exploration: wound explored through full range of motion and entire depth of wound probed and visualized      Wound extent: fascia not violated, no foreign body, no signs of injury, no nerve damage, no tendon damage, no underlying fracture and no vascular damage      Contaminated: no      TREATMENT:     Area cleansed with:  Hibiclens    Amount of cleaning:  Standard    Irrigation solution:  Sterile saline    Irrigation volume:  500    Irrigation method:  Syringe    SKIN REPAIR     Repair method:  Sutures    Suture size:  5-0    Suture material:  Nylon    Suture technique:  Simple interrupted    Number of sutures:  2    APPROXIMATION     Approximation:  Close    POST-PROCEDURE DETAILS     Dressing:  Antibiotic ointment and non-adherent dressing        PROCEDURE    Patient Tolerance:  Patient tolerated the procedure well with no immediate complications           No results found for this or any previous visit (from the past 24 hour(s)).    Medications - No data to display    Assessments & Plan (with Medical Decision Making)   Wound repaired without complications, as above. No tendon or nerve involvement. Declines tetanus update. She was discharged home following in stable condition.     Plan:  Return here for suture removal in 7 days.   Keep area clean and dry for 24 hours.   Keep clean bandage over the area, changing twice daily. Apply antibiotic ointment for the first few days with each dressing change.   Return here with any signs of infection as discussed.     I have reviewed the nursing notes.    I have reviewed the findings, diagnosis, plan and need for follow up with the patient.    New Prescriptions    No medications on file       Final diagnoses:   Laceration of right hand without foreign body, initial encounter        5/29/2023   [facility]

## 2023-05-29 NOTE — DISCHARGE INSTRUCTIONS
Return here for suture removal in 7 days.   Keep area clean and dry for 24 hours.   Keep clean bandage over the area, changing twice daily. Apply antibiotic ointment for the first few days with each dressing change.   Return here with any signs of infection as discussed.

## 2023-06-03 ENCOUNTER — HEALTH MAINTENANCE LETTER (OUTPATIENT)
Age: 33
End: 2023-06-03

## 2023-06-21 ENCOUNTER — HOSPITAL ENCOUNTER (EMERGENCY)
Facility: HOSPITAL | Age: 33
Discharge: HOME | End: 2023-06-21
Attending: EMERGENCY MEDICINE | Admitting: EMERGENCY MEDICINE
Payer: COMMERCIAL

## 2023-06-21 VITALS
RESPIRATION RATE: 18 BRPM | DIASTOLIC BLOOD PRESSURE: 62 MMHG | SYSTOLIC BLOOD PRESSURE: 94 MMHG | TEMPERATURE: 98.6 F | OXYGEN SATURATION: 96 % | HEART RATE: 102 BPM

## 2023-06-21 DIAGNOSIS — R45.851 SUICIDAL IDEATION: ICD-10-CM

## 2023-06-21 LAB — SARS-COV-2 RNA RESP QL NAA+PROBE: NEGATIVE

## 2023-06-21 PROCEDURE — 87635 SARS-COV-2 COVID-19 AMP PRB: CPT | Performed by: EMERGENCY MEDICINE

## 2023-06-21 PROCEDURE — 250N000013 HC RX MED GY IP 250 OP 250 PS 637: Performed by: EMERGENCY MEDICINE

## 2023-06-21 PROCEDURE — 99284 EMERGENCY DEPT VISIT MOD MDM: CPT | Performed by: EMERGENCY MEDICINE

## 2023-06-21 PROCEDURE — 93005 ELECTROCARDIOGRAM TRACING: CPT | Mod: 76

## 2023-06-21 PROCEDURE — 93010 ELECTROCARDIOGRAM REPORT: CPT | Performed by: INTERNAL MEDICINE

## 2023-06-21 PROCEDURE — 99285 EMERGENCY DEPT VISIT HI MDM: CPT

## 2023-06-21 PROCEDURE — C9803 HOPD COVID-19 SPEC COLLECT: HCPCS

## 2023-06-21 RX ORDER — OLANZAPINE 5 MG/1
5 TABLET, ORALLY DISINTEGRATING ORAL ONCE
Status: COMPLETED | OUTPATIENT
Start: 2023-06-21 | End: 2023-06-21

## 2023-06-21 RX ADMIN — OLANZAPINE 5 MG: 5 TABLET, ORALLY DISINTEGRATING ORAL at 01:23

## 2023-06-21 ASSESSMENT — COLUMBIA-SUICIDE SEVERITY RATING SCALE - C-SSRS
ATTEMPT LIFETIME: NO
1. SINCE LAST CONTACT, HAVE YOU WISHED YOU WERE DEAD OR WISHED YOU COULD GO TO SLEEP AND NOT WAKE UP?: NO
2. HAVE YOU ACTUALLY HAD ANY THOUGHTS OF KILLING YOURSELF?: YES
REASONS FOR IDEATION LIFETIME: COMPLETELY TO END OR STOP THE PAIN (YOU COULDN'T GO ON LIVING WITH THE PAIN OR HOW YOU WERE FEELING)
5. HAVE YOU STARTED TO WORK OUT OR WORKED OUT THE DETAILS OF HOW TO KILL YOURSELF? DO YOU INTEND TO CARRY OUT THIS PLAN?: YES
2. HAVE YOU ACTUALLY HAD ANY THOUGHTS OF KILLING YOURSELF?: YES
3. HAVE YOU BEEN THINKING ABOUT HOW YOU MIGHT KILL YOURSELF?: YES
ATTEMPT SINCE LAST CONTACT: NO
6. HAVE YOU EVER DONE ANYTHING, STARTED TO DO ANYTHING, OR PREPARED TO DO ANYTHING TO END YOUR LIFE?: NO
TOTAL  NUMBER OF ABORTED OR SELF INTERRUPTED ATTEMPTS SINCE LAST CONTACT: 1
TOTAL  NUMBER OF INTERRUPTED ATTEMPTS LIFETIME: NO
TOTAL  NUMBER OF ABORTED OR SELF INTERRUPTED ATTEMPTS PAST 3 MONTHS: YES
TOTAL  NUMBER OF ABORTED OR SELF INTERRUPTED ATTEMPTS SINCE LAST CONTACT: NO
TOTAL  NUMBER OF INTERRUPTED ATTEMPTS SINCE LAST CONTACT: NO
1. HAVE YOU WISHED YOU WERE DEAD OR WISHED YOU COULD GO TO SLEEP AND NOT WAKE UP?: YES
REASONS FOR IDEATION PAST MONTH: COMPLETELY TO END OR STOP THE PAIN (YOU COULDN'T GO ON LIVING WITH THE PAIN OR HOW YOU WERE FEELING)
2. HAVE YOU ACTUALLY HAD ANY THOUGHTS OF KILLING YOURSELF?: NO
5. HAVE YOU STARTED TO WORK OUT OR WORKED OUT THE DETAILS OF HOW TO KILL YOURSELF? DO YOU INTEND TO CARRY OUT THIS PLAN?: YES
TOTAL  NUMBER OF ABORTED OR SELF INTERRUPTED ATTEMPTS LIFETIME: 1
4. HAVE YOU HAD THESE THOUGHTS AND HAD SOME INTENTION OF ACTING ON THEM?: YES
1. IN THE PAST MONTH, HAVE YOU WISHED YOU WERE DEAD OR WISHED YOU COULD GO TO SLEEP AND NOT WAKE UP?: YES
4. HAVE YOU HAD THESE THOUGHTS AND HAD SOME INTENTION OF ACTING ON THEM?: YES
TOTAL  NUMBER OF ABORTED OR SELF INTERRUPTED ATTEMPTS LIFETIME: YES
SUICIDE, SINCE LAST CONTACT: NO
6. HAVE YOU EVER DONE ANYTHING, STARTED TO DO ANYTHING, OR PREPARED TO DO ANYTHING TO END YOUR LIFE?: NO

## 2023-06-21 ASSESSMENT — ENCOUNTER SYMPTOMS
FEVER: 0
SHORTNESS OF BREATH: 0
CHILLS: 0
COUGH: 0

## 2023-06-21 ASSESSMENT — ACTIVITIES OF DAILY LIVING (ADL)
ADLS_ACUITY_SCORE: 35

## 2023-06-21 NOTE — PLAN OF CARE
Patient moved to room 7 by this nurse and patient placed on continuous cardiac monitoring. Patient began talking with nurse about her sadness. She stated that she just desperately misses her Grandma (who raised her so was more like her Mother) and her brother. Both of them passed away in the last 3 years. She has not yet dealt with their death. She states her and her bf had an argument that escalated and because she was already so sad and anxious. She stated that she threatened to take a handful of pills but she put them in her can of pop instead. She states she only agreed to have police bring her in to the hospital because they told her it would only be about a 1/2 hour visit. She got upset once finished talking to DEC because they told her she was placed on a  (24 hold). This nurse spent approx. 15 mins with patient talking about life, puppies, and things that make her happy. Got her a blanket and a bottle of water. Offered restroom.   Nae Valdes RN, PCS on 6/21/2023 at 1:48 AM

## 2023-06-21 NOTE — ED NOTES
Pt gave permission to have visitors, does not matter who they are. Her cousin Melissa is visiting pt in ED 7.

## 2023-06-21 NOTE — ED TRIAGE NOTES
Pt brought into ED 8 by PD. They said she was reported to take a bottle of Zoloft about 45 mins to an hour ago by her boyfriend. Pt denies taking anything but states she was suicidal tonight and that was her plan. She did not follow through. Pt is A/Ox4. Pt denies chest pain, nausea, SOB.      Triage Assessment     Row Name 06/21/23 0017       Triage Assessment (Adult)    Airway WDL WDL       Respiratory WDL    Respiratory WDL WDL       Skin Circulation/Temperature WDL    Skin Circulation/Temperature WDL WDL       Cardiac WDL    Cardiac WDL WDL       Peripheral/Neurovascular WDL    Peripheral Neurovascular WDL WDL       Cognitive/Neuro/Behavioral WDL    Cognitive/Neuro/Behavioral WDL WDL  SI, WNL initially. Will assess immediately indepth.

## 2023-06-21 NOTE — CONSULTS
"Diagnostic Evaluation Consultation  Crisis Assessment    Patient was assessed: Can  Patient location: [facility]  Was a release of information signed: No. Reason: did not witness      Referral Data and Chief Complaint  Ihsan is a 32 year old, who uses she/her pronouns, and presents to the ED via police. Patient is referred to the ED by family/friends. Patient is presenting to the ED for the following concerns: Pt presents for possible overdose.      Informed Consent and Assessment Methods     Patient is her own guardian. Writer met with patient and explained the crisis assessment process, including applicable information disclosures and limits to confidentiality, assessed understanding of the process, and obtained consent to proceed with the assessment. Patient was observed to be able to participate in the assessment as evidenced by alert and oriented. Assessment methods included conducting a formal interview with patient, review of medical records, collaboration with medical staff, and obtaining relevant collateral information from family and community providers when available..     Over the course of this crisis assessment provided reassurance, offered validation, engaged patient in problem solving and disposition planning, worked with patient on safety and aftercare planning, assisted in processing patient's thoughts and feeling relating to feeling upset about being in [facility] ED and provided psychoeducation. Patient's response to interventions was pt appears hesitant to interventions.     Summary of Patient Situation     Pt presents for possible overdose. Pt reports that she was feeling suicidal today and thoughts about taking pills, \"but I didn't. I have been missing my grandmother and brother a lot and I just thought about being with them and being able to talk to them. I miss them. They were my biggest supports. I lost my brother in 2020 and my grandmother last year. My brother was an addict and overdosed, that " "made me go deeper into my addiction but I am sober now.\" Pt reports history of self-harm via cutting, last engaged in 1-2 months ago. Pt reports no history of previous suicide attempts. Pt denies SI/SIB/SA/HI and denies plans, means, or intent to harm herself or others. Pt denies current hallucinations or delusions. Pt reports her protective factors are her children, \"I have two kids alive and my oldest passed away. My son lives with his dad and my daughter lives with me. I have visitation with my son, but his dad hasn't followed the court orders.\" Pt reports, when she was thinking about taking the pills, she saw her daughter's picture and this stopped her. Pt reports her boyfriend thought he did overdose and he called 911. Pt reports she does not have a phone and can't contact him. Pt reports feeling safe to return home. Pt engaged appropriately in safety planning. Pt identified the following skills and supports: \"I could have sat down and talked with my boyfriend or taken a walk. I don't know why I didn't do that.\" Pt reports that she has previously been in therapy, and is reluctant to try again. Pt reports she has had therapists leave the clinics after she started to open up, making it hard for her to trust the process. Pt reports she has no way of contacting her boyfriend and he doesn't have a phone. Pt presents as irritable, anxious, alert, and oriented. Pt appears to be functioning below her baseline.    Brief Psychosocial History     Pt reports she lives in a duplex with her daughter and boyfriend. Pt reports she also have 3 dogs and one of them recently had 8 puppies. 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 Pt reports she gets food stamps for financial support. Pt denies education involvement, legal issues, spiritual/cultural concerns, and  status. Pt reports the following hobbies: \"puppies and daughter.\" Pt reports her biggest supports " "have been family that have passed away in the past few years, \"I am trying to open up to my boyfriend more\".     Significant Clinical History     Pt reports history of PTSD, ADHD, bipolar, and depression. Pt reports she has never been to the ED for mental health issues. Pt reports history of methamphetamine abuse and reports she attended treatment and has been sober for 1 year and 4 months. Pt reports current involvement in medication management. Pt reports history of therapy.     Collateral Information  No collateral in chart. Pt does not know boyfriend's phone number and has no way of getting it.     Risk Assessment  [place] Suicide Severity Rating Scale Full Clinical Version: n/a  Suicidal Ideation  1. Wish to be Dead (Lifetime): Yes  1. Wish to be Dead (Past 1 Month): Yes  2. Non-Specific Active Suicidal Thoughts (Lifetime): Yes  2. Non-Specific Active Suicidal Thoughts (Past 1 Month): Yes  3. Active Suicidal Ideation with any Methods (Not Plan) Without Intent to Act (Lifetime): Yes  3. Active Suicidal Ideation with any Methods (Not Plan) Without Intent to Act (Past 1 Month): Yes  4. Active Suicidal Ideation with Some Intent to Act, Without Specific Plan (Lifetime): Yes  4. Active Suicidal Ideation with Some Intent to Act, Without Specific Plan (Past 1 Month): Yes  5. Active Suicidal Ideation with Specific Plan and Intent (Lifetime): Yes  5. Active Suicidal Ideation with Specific Plan and Intent (Past 1 Month): Yes  Intensity of Ideation  Most Severe Ideation Rating (Lifetime): 5  Most Severe Ideation Rating (Past 1 Month): 5  Frequency (Lifetime): Many times each day  Frequency (Past 1 Month): Many times each day  Duration (Lifetime): More than 8 hours/persistent or continuous  Duration (Past 1 Month): More than 8 hours/persistent or continuous  Controllability (Lifetime): Unable to control thoughts  Controllability (Past 1 Month): Unable to control thoughts  Deterrents (Lifetime): Deterrents most likely did " not stop you  Deterrents (Past 1 Month): Deterrents most likely did not stop you  Reasons for Ideation (Lifetime): Completely to end or stop the pain (You couldn't go on living with the pain or how you were feeling)  Reasons for Ideation (Past 1 Month): Completely to end or stop the pain (You couldn't go on living with the pain or how you were feeling)  Suicidal Behavior  Actual Attempt (Lifetime): No  Has subject engaged in non-suicidal self-injurious behavior? (Lifetime): Yes  Has subject engaged in non-suicidal self-injurious behavior? (Past 3 Months): Yes  Interrupted Attempts (Lifetime): No  Aborted or Self-Interrupted Attempt (Lifetime): Yes  Total Number of Aborted or Self-Interrupted Attempts (Lifetime): 1  Aborted or Self-Interrupted Attempt (Past 3 Months): Yes  Total Number of Aborted or Self-Interrupted Attempts (Past 3 Months): 1  Preparatory Acts or Behavior (Lifetime): No  C-SSRS Risk (Lifetime/Recent)  Calculated C-SSRS Risk Score (Lifetime/Recent): High Risk    [place] Suicide Severity Rating Scale Since Last Contact: n/a    Validity of evaluation is impacted by presenting factors during interview.   Comments regarding subjective versus objective responses to [place] tool: pt was irritable at time of assessment and is refusing labs  Environmental or Psychosocial Events: loss of a loved one, challenging interpersonal relationships, impulsivity/recklessness, unemployment/underemployment, excessive debt, poor finances and other life stressors  Chronic Risk Factors: history of abuse or neglect and history of Non-Suicidal Self Injury (NSSI)   Warning Signs: seeking access to means to hurt or kill self, talking or writing about death, dying, or suicide, hopelessness, pain (new or worsening), rage, anger, seeking revenge, acting reckless or engaging in risky activities, feeling trapped, like there is no way out, increasing substance use or abuse, withdrawing from friends, family, and society, anxiety,  "agitation, unable to sleep, sleeping all the time, dramatic changes in mood, no reason for living, no sense of purpose in life and engaging in self-destructive behavior  Protective Factors: intact marriage or domestic partnership, responsibilities and duties to others, including pets and children and lives in a responsibly safe and stable environment  Interpretation of Risk Scoring, Risk Mitigation Interventions and Safety Plan:  Pt risk indicated as high. Pt was going to attempt suicide tonight by overdose and stopped when she saw a picture of her daughter. Pt reports she is not feeling suicidal anymore, although is refusing labs and is irritable upon assessment.     Does the patient have thoughts of harming others? No     Is the patient engaging in sexually inappropriate behavior?  no        Current Substance Abuse     Is there recent substance abuse? no     Was a urine drug screen or blood alcohol level obtained: Pt is refusing all labs       Mental Status Exam     Affect: Appropriate   Appearance: Disheveled    Attention Span/Concentration: Attentive  Eye Contact: Engaged   Fund of Knowledge: Appropriate    Language /Speech Content: Fluent   Language /Speech Volume: Normal    Language /Speech Rate/Productions: Normal    Recent Memory: Intact   Remote Memory: Intact   Mood: Anxious and Irritable    Orientation to Person: Yes    Orientation to Place: Yes   Orientation to Time of Day: Yes    Orientation to Date: Yes    Situation (Do they understand why they are here?): Yes    Psychomotor Behavior: Normal    Thought Content: Clear   Thought Form: Intact      History of commitment: No     Medication    Psychotropic medications: Yes. Pt is currently taking see HPI. Medication compliant: No: \"sometimes i forget\". Recent medication changes: No  Medication changes made in the last two weeks: No       Current Care Team    Primary Care Provider: No  Psychiatrist: Bridget @ [facility]  Therapist: No  : No   " "  CTSS or ARMHS: No  ACT Team: No  Other: No      Diagnosis    Post-traumatic stress disorder - (F43.1)  Generalized anxiety disorder - (F41.1)    Clinical Summary and Substantiation of Recommendations   A lower level of care has been unsuccessful in treating and stabilizing patient s mental health symptoms because of pt poor engagement in safety planning, refusing to give labs, and unable to get collateral from pt boyfriend due to him not having a phone. However, with brief observation, monitored therapeutic treatment, and intervention of mental health symptoms in the ED, symptoms may be mitigated with potential for disposition to a less restrictive level of care than an inpatient setting. Patient is not currently on the inpatient worklist. Next steps in care include gaining collateral from pt boyfriend, encourage pt to engage in a better safety plan, including therapy, and pt medical clearance. Extended Care will follow, working to address gaining collateral from pt boyfriend and encourage pt to engage in a better safety plan, including therapy.      Disposition    Recommended disposition: Other: Observation       Reviewed case and recommendations with attending provider. Attending Name: Dr. Mancilla      Attending concurs with disposition: Yes       Patient and/or validated legal guardian concurs with disposition: No: \"I would like to leave\"       Final disposition: Other: Observation      Assessment Details    Patient interview started at: 12:30AM and completed at: 12:48AM.     Total duration spent on the patient case in minutes: .50 hrs      CPT code(s) utilized: 94523 - Psychotherapy for Crisis - 60 (30-74*) min       Roxana Allen, KINJALC, ELISHAC, MS, CORNELIUS, CELE, Psychotherapist  [facility] - Triage & Transition Services  Callback: [phone]      {Add \"Dot  "

## 2023-06-21 NOTE — PLAN OF CARE
Ihsan Faustinbrain  June 21, 2023  Plan of Care Hand-off Note     Patient Care Path: Observation    Plan for Care:     A lower level of care has been unsuccessful in treating and stabilizing patient s mental health symptoms because of pt poor engagement in safety planning, refusing to give labs, and unable to get collateral from pt boyfriend due to him not having a phone. However, with brief observation, monitored therapeutic treatment, and intervention of mental health symptoms in the ED, symptoms may be mitigated with potential for disposition to a less restrictive level of care than an inpatient setting. Patient is not currently on the inpatient worklist. Next steps in care include gaining collateral from pt boyfriend, encourage pt to engage in a better safety plan, including therapy, and pt medical clearance. Extended Care will follow, working to address gaining collateral from pt boyfriend and encourage pt to engage in a better safety plan, including therapy.       Critical Safety Issues: pt interrupted attempt to kill herself tonight, which pt boyfriend thinks she did engage in the attempt    Overview:  This patient is a child/adolescent: No    This patient has additional special visitor precautions: No    Legal Status: VJ hold    Contacts:   None. Boyfriend does not have a phone. ED is attempting to get him a phone by having police bring pt boyfriend her phone at home and then ED calling him    Psychiatry Consult:  Psychiatry Consult not requested because did not consent    Updated Attending Provider regarding plan of care.    Roxana Allen, KINJALC, LADC

## 2023-06-21 NOTE — ED NOTES
Attempted to get a hold of pt's boyfriend but he does not have a phone. Called [place] [employer] and they went to his house and knocked on the door for about 5 minutes and was shining lights in the house but nobody will answer the door.     Ayden Alfonso, MSN, RN on 6/21/2023 at 1:26 AM

## 2023-06-21 NOTE — CONSULTS
"[facility] Crisis Reassessment      Ihsan Millan was reassessed at the request of Franki Mancilla MD and ED staff for the following reasons: pt was overnight on observation, wants to go home and says she is safe to do so.  Cousin is in room with pt. Pt was first seen on 6/21/2023 at 12:30 am by BLAS [place]; see the initial assessment note for details.      Patient Presentation    Initial ED presentation details: Initial  sensed pt \"hesitant to interventions.\"  Ex-boyfriend who reportedly called EMS could not be reached last night.  Initial  did not belief safety plan was engaged in sufficiently to believe pt may want to or have ability to keep self safe.     Current patient presentation: Patient remains somewhat non-committal to therapeutic supports outside of her current medication manager.  Current writer had some concern pt may be under influence of something but her cousin had come to visit patient.  Pt may be significantly sleep deprived.  This presentation adds to sense of apathy or active BRODIE.  However there are a number of circumstantial changes that allowed current writer to confirm patient's narrative as much as possible as well as to better assess current risk to patient.     Changes observed since initial assessment: Patient tells writer what is going on with ex-boyfriend, Fran, currently living in there apartment with patient's daughter.  Fran is currently moving out.  Fran states he saw patient put pills in mouth but did not see if pt swallowed them.  Fran went to phone 911.   Fran states that patient does not open up to people, she does not tend to talk directly with anyone about how she is feeling.  He is not sure if she is using but has not seen her do so.   Fran confirms that cousin who was in the ED room with patient lives in the upstairs apartment from patient.   The cousin refused to talk with  or provide her own phone number.  The boyfriend is moving " out as mentioned and both say it is for the best.    The patient says she no longer wants to end her life.  She has a daughter, age 11 who is currently with cousin's mother in the upstairs apartment.  Her daughter needs her.  All the new puppies need care.   Patient adds that she forgets to take meds (ADHD) and that she thinks she needs to go talk with medication provider.  She says the meds help her depression if she can remember to take them.      Pt has family around her.   Part of issue last night was conflict with now ex boyfriend.  The patient lacks good social support but does have a couple of supports close to her.  She identifies deterrents to ending her life. Reportedly, patient has never had an actual attempt although she reported earlier assessment she has chronic SI.   Enough information was gathered to support discharging patient.  While patient does not appear to be an imminent risk for suicide, writer is not confident patient will accept the therapeutic, social and sober supports recommended.  [facility] will follow up to set up counselor (therapist) for patient as patient agreed to.       Risk of Harm    Risk Assessment    [place] Suicide Severity Rating Scale Full Clinical Version: 6/21/2023 (around. 12:30 am)  Suicidal Ideation  1. Wish to be Dead (Lifetime): Yes  1. Wish to be Dead (Past 1 Month): Yes  2. Non-Specific Active Suicidal Thoughts (Lifetime): Yes  2. Non-Specific Active Suicidal Thoughts (Past 1 Month): Yes  3. Active Suicidal Ideation with any Methods (Not Plan) Without Intent to Act (Lifetime): Yes  3. Active Suicidal Ideation with any Methods (Not Plan) Without Intent to Act (Past 1 Month): Yes  4. Active Suicidal Ideation with Some Intent to Act, Without Specific Plan (Lifetime): Yes  4. Active Suicidal Ideation with Some Intent to Act, Without Specific Plan (Past 1 Month): Yes  5. Active Suicidal Ideation with Specific Plan and Intent (Lifetime): Yes  5. Active Suicidal Ideation with  Specific Plan and Intent (Past 1 Month): Yes  Intensity of Ideation  Most Severe Ideation Rating (Lifetime): 5  Most Severe Ideation Rating (Past 1 Month): 5  Frequency (Lifetime): Many times each day  Frequency (Past 1 Month): Many times each day  Duration (Lifetime): More than 8 hours/persistent or continuous  Duration (Past 1 Month): More than 8 hours/persistent or continuous  Controllability (Lifetime): Unable to control thoughts  Controllability (Past 1 Month): Unable to control thoughts  Deterrents (Lifetime): Deterrents most likely did not stop you  Deterrents (Past 1 Month): Deterrents most likely did not stop you  Reasons for Ideation (Lifetime): Completely to end or stop the pain (You couldn't go on living with the pain or how you were feeling)  Reasons for Ideation (Past 1 Month): Completely to end or stop the pain (You couldn't go on living with the pain or how you were feeling)  Suicidal Behavior  Actual Attempt (Lifetime): No  Has subject engaged in non-suicidal self-injurious behavior? (Lifetime): Yes  Has subject engaged in non-suicidal self-injurious behavior? (Past 3 Months): Yes  Interrupted Attempts (Lifetime): No  Aborted or Self-Interrupted Attempt (Lifetime): Yes  Total Number of Aborted or Self-Interrupted Attempts (Lifetime): 1  Aborted or Self-Interrupted Attempt (Past 3 Months): Yes  Total Number of Aborted or Self-Interrupted Attempts (Past 3 Months): 1  Preparatory Acts or Behavior (Lifetime): No  C-SSRS Risk (Lifetime/Recent)  Calculated C-SSRS Risk Score (Lifetime/Recent): High Risk    [place] Suicide Severity Rating Scale Since Last Contact: 6/21/2023 (6:53 am)   Suicidal Ideation (Since Last Contact)  1. Wish to be Dead (Since Last Contact): No  2. Non-Specific Active Suicidal Thoughts (Since Last Contact): No  Suicidal Behavior (Since Last Contact)  Actual Attempt (Since Last Contact): No  Has subject engaged in non-suicidal self-injurious behavior? (Since Last Contact):  No  Interrupted Attempts (Since Last Contact): No  Aborted or Self-Interrupted Attempt (Since Last Contact): No  Preparatory Acts or Behavior (Since Last Contact): No  Suicide (Since Last Contact): No     C-SSRS Risk (Since Last Contact)  Calculated C-SSRS Risk Score (Since Last Contact): No Risk Indicated    Validity of evaluation is impacted by presenting factors during interview patient is somewhat guarded regarding thoughts feelings and emotions  Comments regarding subjective versus objective responses to [place] tool:   Environmental or Psychosocial Events: legal issues such as DWI, DUI, lawsuit, CPS involvement, etc., loss of a loved one, loss of relationship due to divorce/separation, impulsivity/recklessness and social isolation  Chronic Risk Factors: parental mental health issue, parental substance abuse issue and history of Non-Suicidal Self Injury (NSSI)   Warning Signs: seeking access to means to hurt or kill self, talking or writing about death, dying, or suicide, acting reckless or engaging in risky activities, feeling trapped, like there is no way out, increasing substance use or abuse, withdrawing from friends, family, and society, anxiety, agitation, unable to sleep, sleeping all the time, dramatic changes in mood, no reason for living, no sense of purpose in life, engaging in self-destructive behavior, recent losses (physical, financial, personal) and recent discharges from emergency department or inpatient psychiatric care  Protective Factors: responsibilities and duties to others, including pets and children and reality testing ability  Interpretation of Risk Scoring, Risk Mitigation Interventions and Safety Plan:      Does the patient have thoughts of harming others? No    Mental Status Exam   Affect: Appropriate   Appearance: Appropriate    Attention Span/Concentration: Attentive?    Eye Contact: Engaged   Fund of Knowledge: Appropriate    Language /Speech Content: Fluent   Language /Speech  "Volume: Normal    Language /Speech Rate/Productions: somewhat slow, somewhat mumbled or slurred at times    Recent Memory: Intact   Remote Memory: Intact   Mood: Other: somewhat apathetic (lack of sleep contributing, likely)     Orientation to Person: Yes    Orientation to Place: Yes   Orientation to Time of Day: Yes    Orientation to Date: Yes    Situation (Do they understand why they are here?): Yes    Psychomotor Behavior: Normal    Thought Content: Clear   Thought Form: Intact       Additional Collateral Information    The following information was received from Fran whose relationship to the patient is ex-boyfriend/roommate (moving currently). Information was obtained via phone. Their phone number is  and they last had contact with patient on last night, Fran called EMS.    What happened today: Pt has been depressed about grandmother and brother's death over last approx 3 year span.  Pt and Fran also got into conflict about his moving out.  Patient reportedly put pills in mouth.  Fran left reportedly to all EMS.  Pt says she did not ingest.  Fran says he was not sure if she did but he was not going to stand by and do nothing if she did.      What is different about patient's functioning: Fran reports the patient was the one who wanted to re-mani relationship.  She then started telling (unidentified) \"people\" the two were not together.  Pt has been sad about brother and grandmother's deaths and has been sad about that    Concern about alcohol/drug use:  Fran says he is not sure if pt has been using but he has not witnessed any use.     What do you think the patient needs: to see a therapist or counselor; to start talking to others about her thoughts and feelings    Has patient made comments about wanting to kill themselves/others:  Fran was somewhat unclear here.  He says \"not really, not directly but last night.  She has been missing grandmother and brother and \"wants to see them.\" " "     If d/c is recommended, can they take part in safety/aftercare planning:  He is moving out but says the cousin lives in apartment above them and is sure they will help/\"watch\" her.      Other information:  Fran believes pt can be safely discharged but worries about her a little.  He doesn't think she'd actually end her life but adds he doesn't know about pt's future behavior.         Therapeutic Intervention  The following therapeutic methodologies were employed when working with the patient: Establishing rapport, Active listening, Assess dimensions of crisis, Apply solution-focused therapy to address current crisis, Identify additional supports and alternative coping skills, Establish a discharge plan, Motivational Interviewing, Trauma-Informed Care and Safety planning. Patient response to intervention: reasonably engaged and cooperative; not overly enthusiastic.    Diagnosis:     Post-traumatic stress disorder - (F43.1)  Generalized anxiety disorder - (F41.1)    Clinical Substantiation of Recommendations    After speaking with pt collateral, the patient and seeing that patient has support with her, discharge to outpatient providers and recommended resources appears to be most appropriate care for pt needs.  Pt is encouraged to add a good therapist as well as sober support to her current medication management.   Patient is encouraged to set up system that works for her to remind her to take medications daily.    Plan:    Disposition  Recommended disposition: Individual Therapy, Medication Management and Other: sober supports        Reviewed case and recommendations with attending provider. Attending Name: Franki Mancilla MD      Attending concurs with disposition: Yes      Patient and/or verified legal guardian concurs with disposition: Yes      Final disposition: Individual therapy , Medication management and Other: Patient strongly encouraged to add individual therapy and sober supports to current med " "management; Pt says she will but writer doubt especially that pt will engage with sober supports at this time. .         Assessment Details  Total duration spent on the patient case in minutes: 1.0 hrs     CPT code(s) utilized: 91232 - Psychotherapy for Crisis - 60 (30-74*) min       Lorraine Kauffman, [facility], [facility]  Callback: [phone]    Aftercare Plan  If I am feeling unsafe or I am in a crisis, I will:   Contact my established care providers   Call the [place] Suicide Prevention Lifeline: 988  Go to the nearest emergency room   Call 911      Crisis Line [facility]  [phone]     Crisis Line [facility]  [phone]     [facility]-- crisis stabilization service  [phone]     Crisis Text Line  Text \"[place]\" sc5192     Warning signs that I or other people might notice when a crisis is developing for me: thinking about or using non Rx substances, engaging in or thinking about engaging in self harm; focusing on thoughts that are unsupportive of my wellbeing; isolating      Things I am able to do on my own to cope or help me feel better: get outside when weather and time of day permit, go for walks; use creative skills, color, write;       Things that I am able to do with others to cope or help me better: consider attending on line or in person the many sober supports:  AA, NA, [facility] Sob[facility], [facility] and more.   NA phone ; connect with a good counselor; make and keep appointments with providers      Things I can use or do for distraction: call a crisis line or NA/AA intergroup;  call cousin or sister; get outside, write, color engage in activities that I enjoy     Changes I can make to support my mental health and wellness: consider getting re-involved with a support community; connect with a good counselor; see medication provider; put my needs first and take good care of me, I have to be okay in order to care for my kids, my home and our pets       People in my " "life that I can ask for help: Cousin, Sister, HOMERO (); various sober supports; crisis lines      Your county has a mental health crisis team you can call 24/7: Crisis Line [facility]  [phone]  Crisis Line [facility] [phone]     Other things that are important when I'm in crisis: this too shall pass; I have people whom I love, count on me and do not want me to be gone or hurt      ADDITIONAL RESOURCES BELOW:         Crisis Lines  Crisis Text Line  Text 067433  You will be connected with a trained live crisis counselor to provide support.     Por espanol, texto  WALTER a 742162 o texto a 442-AYUDAME en WhatsApp     The [facility] Project (LGBTQ Youth Crisis Line)  3.105.118.6488  text START to 363-954        Community Resources  Fast Tracker  Linking people to mental health and substance use disorder resources  Inktd.Ideal Binary      [place] Mental Health Warm Line  Peer to peer support  Monday thru Saturday, 12 pm to 10 pm  048.092.9587 or 9.503.838.2384  Text \"Support\" to 25037     National [place] on Mental Illness (ANA MARIA)  052.122.4414 or 1888.ANA MARIA.HELPS        Mental Health Apps  My3  https://WebTuner.org/     VirtualHopeBox  https://Cohda Wireless.org/apps/virtual-hope-box/     Substance Use Disorder Direct Access Resources     It is recommended that you abstain from all mood altering chemicals. Please contact the sober support hotline (572-291-2624) if needed; phones are answered 24 hours a day, 7 days a week.     To access substance use treatment if needed, you must have a comprehensive assessment completed to begin any treatment program.      If uninsured, please contact your county of residence for eligibility screen to substance use disorder evaluation and treatment:     [place] - [phone]   [place] - [phone]   [place] - [phone]   [place] - [phone]   [place] - [phone]   [place] - [phone]   [place] - [phone]   [place] - " [phone]         Community BRODIE Evaluations If needed:  Clients may call their county for a full list of providers - Availability and services listed belo are subject to change, please call the provider to confirm     [facility]  [phone]  [facility] [place], [place], [address]  *Please call the above number to schedule a comprehensive assessment for determination of level of care needs. In person and virtual appointments available Mon-Fri.     [facility] [place], [address] Floor, [address], [place] [address] (next to the outpatient lab)    Phone: [phone]   Provides bridging services to people with Opiate Use Disorders (OUD) seeking care. This is a front door to Medication Assisted Treatments (MAT), ages 16+  Walk In hours: Monday-Friday 9:00am-3:00pm     [facility]  [phone]  Walk in Assessments: Mon-Friday 7a-1:45p  [address], [place], [address]     [facility]  Central Access [phone]  [facility] [place], [place], [address]  *by appointment only     [place]  [phone] (phone consultation available )  Locations in: [place], [facility], and [place], [place]  [place] virtual IOP [phone] or visit [email or web address]   Also offers LGBTQ programming     [facility]  [phone]  [address]  [place], [place], [address]  *Currently only offered via telehealth - call to set up an appointment     [facility]  [address] [place], [place], [address]  [facility] Recovery Program  For more information to to make a referral call:  [phone]  Walk-in on   9-11 a.m.     [facility]  [phone]  [address] [place], [place], [address]  *available by appointments only     [facility] - [facility] specific  [phone] [address] [place], [place], [address]  *available by  appointment only     Eric  [phone]  [address] [place], [place], [address]  *walk in assessments available M-F starting at 7 am.     [facility]  [phone]  Locations: [facility], [place], [facility], and [place]  *Walk in assessments availble M-F starting at 8 am -virtual only     [facility]  [phone]  [address] [place] [address]     [facility]  Virtual + Locations: [place], [place], [place], [place], [facility], [place], [place], [place]   [phone]  *available by appointment only     [facility]  [phone]  [address] [facility]  [place], [place], [address]     [facility]  [phone]  [address] [place], [place], [address]  *available by appointment     [facility]  [phone]  [address] [place], [place], [address]  *walk ins available M-TH from 9-3     [facility]  MAT program: [phone]  [address] [place], [place], [address]     [place]  [phone]  Same day substance use disorder assessments are available Monday - Friday, via walk-in or by appointment at the [place] location.  [address] [place] [address], [address], [place] [address]      [facility] - adolescent and adult SUDs services  [phone]  Offer services Monday through Friday, as well as evening hours Monday through Thursday. Normally, a first appointment will be scheduled within one week  [email or web address]-[email or web address]-treatment  Locations all over [place]     If you or a loved one are intoxicated, you may be required to detox at a detox facility before starting treatment. The following are detox facilities that you can self present to. All detox facilities are able to help you complete an assessment prior to discharge if you choose:     [facility]: Arrive at a [facility] for immediate medical evaluation     [place]  [place]: [address] [place], [place], [address].         [phone]     [place]: [address] [place], [place], [address]  [phone]      [facility] ([facility]): [address] [place], [place], [address]  [phone]      [facility]: [address], [place], [address], [phone]        Ways to help cope with sobriety:     -- Take prescribed medicines as scheduled  -- Keep follow-up appointments  -- Talk to others about your concerns  -- Get regular exercise  -- Practice deep breathing skills  -- Eat a healthy diet  -- Use community resources, including hotline numbers, [facility] crisis and support meetings  -- Stay sober and avoid places/people/things associated with substance use  --Maintain a daily schedule/routine  --Get at least 7-8 hours of sleep per night  --Create a list 10--20 healthy activities that you can do that are enjoyable and do not involve substance use  --Create daily goals (approx. 1-4 goals) per day and work to achieve them throughout the day.        Free Resources:     [facility] ([facility])  [facility] connects people seeking recovery to resources that help foster and sustain long-term recovery. Whether you are seeking resources for treatment, transportation, housing, job training, education, health care or other pathways to recovery, [facility] is a great place to start.  Phone: 964.243.4511. www.[place]Receept.Groupe Adeuza (Great listing of all types of recovery and non-recovery related resources)     Alcoholics Anonymous  Phone: 9-568-ALCOHOL  Website: HTTP://WWW.AA.ORG/  AA [place] (423-388-7211 or http://a[place].org)  AA [place] (212-160-4661 or www.aastpaul.org)     Narcotics Anonymous  Phone: 703.776.2165  Website: www.Yodle.VIDDIX.     [facility]  [address] [place] [address], [address], [place], [place],  Phone: [phone]  Drop-in Hours: Monday-Friday 9-11:30 am. By appointment at other times.  Provides:  [facility] is a drop-in center on the [facility] that provides a safe space for individuals who are homeless and have a history of chemical use. Sobriety is not a requirement but drugs and alcohol are not allowed on the property.  Services: Non-clients can access drop-in services such as Recovery and Harm Reduction Groups, referrals to case management, community activities, shower facilities, and a pool table. Individuals who are homeless and have chemical health needs may be eligible for enrollment into [facility]'s case management program. Clients and  work together to access benefits, treatment, health care, shelter, and external housing resources.

## 2023-06-21 NOTE — ED NOTES
Pt states she has been depressed for a few years due to her brother dying and her kids being taken away around the same time. Pt states that she has though intermittently about suicide and tonight had a plan to take her zoloft pills. PD states boyfriend believes she took a whole bottle (unknown how much in bottle, PD did not bring it in) she denies this. States she does not want to commit suicide anymore. Pt is A/Ox4, no signs of physical distress at time of assessment, and is ambulatory. She denies chest pain, nausea, SOB. She denies any drug use. Poison control was called and they recommended to monitor for 8 hrs for tachycardia, N/V, seizures, QTC elongation, rigidity, serotonin syndrome, agitation. Recommended use of ativan to manage symptoms and charcoal if provider thinks necessary.

## 2023-06-21 NOTE — ED PROVIDER NOTES
History     Chief Complaint   Patient presents with     Drug Overdose     HPI  Ihsan Millan is a 32 year old female who is here with reported suicidal statement.  Got an argument with boyfriend.  Threatened to kill herself by ingesting a lot of pills.  Boyfriend states she down the pills, patient states she put him down the drain.  Unable to reach boyfriend due to him not having phone.    Allergies:  Allergies   Allergen Reactions     Skin Adhesives [Cyanoacrylate] Dermatitis     Patient developed contact dermatitis after application of topical adhesive to surgical incision     Lexapro [Escitalopram] Nausea and Vomiting     Codeine Sulfate Rash     Penicillins Rash     Tramadol Rash       Problem List:    Patient Active Problem List    Diagnosis Date Noted     RUQ abdominal pain 02/12/2017     Priority: Medium     Pneumonia of right middle lobe due to infectious organism 02/12/2017     Priority: Medium     Tobacco abuse 02/12/2017     Priority: Medium     Status post laparoscopic assisted vaginal hysterectomy (LAVH) 06/25/2015     Priority: Medium     Endometriosis 04/16/2015     Priority: Medium     Depo lupron 4/15.  Laparoscopy 3/15       ASCUS on Pap smear 07/18/2014     Priority: Medium     + hpv colpo 7/14       Postpartum depression 07/18/2014     Priority: Medium     zoloft rx       H. pylori infection      Priority: Medium     Moderate persistent asthma 03/19/2014     Priority: Medium     GERD (gastroesophageal reflux disease) 09/12/2013     Priority: Medium     Intermittent asthma 09/12/2013     Priority: Medium     Increased with pregnancy  Send for eval and asthma action plan  Smoking cessation counseling  Flu shot given       Pregnant state, incidental 03/23/2011     Priority: Medium     Contraceptive management 11/09/2010     Priority: Medium        Past Medical History:    Past Medical History:   Diagnosis Date     Asthma      GERD (gastroesophageal reflux disease)      H. pylori infection         Past Surgical History:    Past Surgical History:   Procedure Laterality Date     COLONOSCOPY N/A 11/6/2014    Procedure: COLONOSCOPY;  Surgeon: Zacarias Paz MD;  [facility]     DILATION AND CURETTAGE, HYSTEROSCOPY DIAGNOSTIC, COMBINED  8/1/2014    Procedure: COMBINED DILATION AND CURETTAGE, HYSTEROSCOPY DIAGNOSTIC;  Surgeon: Rodolfo Clifford MD;  [facility]     ESOPHAGOSCOPY, GASTROSCOPY, DUODENOSCOPY (EGD), COMBINED N/A 9/5/2014    Procedure: COMBINED ESOPHAGOSCOPY, GASTROSCOPY, DUODENOSCOPY (EGD);  Surgeon: Zacarias Paz MD;  [facility]     HERNIORRHAPHY UMBILICAL N/A 9/3/2019    Procedure: OPEN UMBILICAL HERNIA REPAIR;  Surgeon: Carter Vaughn MD;  [facility]     LAPAROSCOPIC ASSISTED HYSTERECTOMY VAGINAL N/A 6/24/2015    Procedure: LAPAROSCOPIC ASSISTED HYSTERECTOMY VAGINAL;  Surgeon: Rodolfo Clifford MD;  [facility]     LAPAROSCOPIC OOPHORECTOMY Right 3/23/2016    Procedure: LAPAROSCOPIC OOPHORECTOMY;  Surgeon: Rodolfo Clifford MD;  [facility]     LAPAROSCOPIC SALPINGO-OOPHORECTOMY Left 5/3/2017    Procedure: LAPAROSCOPIC SALPINGO-OOPHORECTOMY;  LAPAROSCOPIC LEFT OOPHORECTOMY;  Surgeon: Rodoflo Clifford MD;  [facility]     LAPAROSCOPY DIAGNOSTIC (GYN) N/A 3/18/2015    Procedure: LAPAROSCOPY DIAGNOSTIC (GYN);  Surgeon: Rodolfo Clifford MD;  [facility]     no surgeries       SALPINGECTOMY Bilateral 6/24/2015    Procedure: SALPINGECTOMY;  Surgeon: Rodolfo Clifford MD;  [facility]       Family History:    Family History   Problem Relation Age of Onset     Asthma Mother      Unknown/Adopted Maternal Grandmother      Unknown/Adopted Maternal Grandfather        Social History:  Marital Status:  Single [1]  Social History     Tobacco Use     Smoking status: Every Day     Packs/day: 0.75     Years: 7.00     Pack years: 5.25     Types: Cigarettes     Smokeless tobacco: Never     Tobacco comments:     declines quitline referral 9/10/19   Substance Use Topics      Alcohol use: Not Currently     Comment: social     Drug use: No        Medications:    Acetaminophen (TYLENOL PO)  albuterol (PROAIR HFA/PROVENTIL HFA/VENTOLIN HFA) 108 (90 Base) MCG/ACT inhaler  albuterol (PROVENTIL) (2.5 MG/3ML) 0.083% neb solution  amphetamine-dextroamphetamine (ADDERALL) 30 MG tablet  amphetamine-dextroamphetamine (ADDERALL) 30 MG tablet  aspirin (ASA) 81 MG chewable tablet  cloNIDine (CATAPRES) 0.1 MG tablet  estradiol (ESTRACE) 1 MG tablet  hydrOXYzine (ATARAX) 25 MG tablet  naproxen (NAPROSYN) 500 MG tablet  omeprazole (PRILOSEC) 20 MG DR capsule  ondansetron (ZOFRAN ODT) 4 MG ODT tab  QUEtiapine (SEROQUEL) 50 MG tablet  sertraline (ZOLOFT) 50 MG tablet          Review of Systems   Constitutional: Negative for chills and fever.   Respiratory: Negative for cough and shortness of breath.    All other systems reviewed and are negative.      Physical Exam   BP: 114/82  Pulse: 94  Resp: 12  SpO2: 96 %      Physical Exam  Constitutional:       General: She is not in acute distress.     Appearance: Normal appearance.   HENT:      Head: Normocephalic and atraumatic.      Right Ear: External ear normal.      Left Ear: External ear normal.      Nose: Nose normal.   Eyes:      General: No scleral icterus.     Extraocular Movements: Extraocular movements intact.   Pulmonary:      Effort: Pulmonary effort is normal. No respiratory distress.   Musculoskeletal:         General: No deformity or signs of injury.   Skin:     General: Skin is dry.      Capillary Refill: Capillary refill takes less than 2 seconds.      Coloration: Skin is not jaundiced.   Neurological:      General: No focal deficit present.      Mental Status: She is alert and oriented to person, place, and time.      Comments: No resting tremor, no nystagmus   Psychiatric:      Comments: Patient flustered, depressed, anxious         ED Course              Procedures              EKG Interpretation:      Interpreted by Eddie Mancilla MD  Time  reviewed:  Symptoms at time of EKG: suicidal   Rhythm: normal sinus   Rate: Normal  Axis: Normal  Ectopy: none  Conduction: normal  ST Segments/ T Waves: No acute ischemic changes  Q Waves: none  Comparison to prior:     Clinical Impression: normal EKG                Critical Care time:  none               Results for orders placed or performed during the hospital encounter of 06/21/23 (from the past 24 hour(s))   Asymptomatic COVID-19 Virus (Coronavirus) by PCR Nasopharyngeal    Specimen: Nasopharyngeal; Swab   Result Value Ref Range    SARS CoV2 PCR Negative Negative    Narrative    Testing was performed using the Xpert Xpress SARS-CoV-2 Assay on the Cepheid Gene-Xpert Instrument Systems. Additional information about this Emergency Use Authorization (EUA) assay can be found via the Lab Guide. This test should be ordered for the detection of SARS-CoV-2 in individuals who meet SARS-CoV-2 clinical and/or epidemiological criteria as well as from individuals without symptoms or other reasons to suspect COVID-19. Test performance for asymptomatic patients has only been established in anterior nasal swab specimens. This test is for in vitro diagnostic use under the FDA EUA for laboratories certified under CLIA to perform high complexity testing. This test has not been FDA cleared or approved. A negative result does not rule out the presence of PCR inhibitors in the specimen or target RNA concentration below the limit of detection for the assay. The possibility of a false negative should be considered if the patient's recent exposure or clinical presentation suggests COVID-19. This test was validated by [facility] laboratory. This laboratory is certified under the Clinical Laboratory Improvement Amendments (CLIA) as qualified to perform high complexity testing.       Medications   OLANZapine zydis (zyPREXA) ODT tab 5 mg (5 mg Oral $Given 6/21/23 0123)       Assessments & Plan (with Medical Decision  Making)     I have reviewed the nursing notes.    I have reviewed the findings, diagnosis, plan and need for follow up with the patient.          Medical Decision Making  The patient's presentation was of high complexity (an acute health issue posing potential threat to life or bodily function).    The patient's evaluation involved:  ordering and/or review of 3+ test(s) in this encounter (see separate area of note for details)    The patient's management necessitated moderate risk (prescription drug management including medications given in the ED) and further care after sign-out to Dr. Tavares (see their note for further management).    32-year-old female here with reported suicidal statement.  Initially EMS showed the patient's house, patient did not want to be transferred, then PD showed up the house, brought patient here.  Patient's boyfriend reported patient Siler an entire bottle of pills.  Patient denies this.  Asked [place] PD to go to patient's apartment to interview boyfriend or to call us so we can put him on a phone call with DEC, however when they went there, no one answered the door after knocking for 5 minutes the shining lights inside the apartment.    Poison control recommended watching patient for 8 hours due to reported sertraline ingestion.  2 EKGs showed minimal QTc changes, first one was 460 and second one was 478, taken about 2.5 hours apart. Third ECG is 470.    At this point, I suspect patient may not have actually taken any sertraline. Pt asking to go stay w/ cousin and contract for safety, this seems reasonable, will discuss w/ DEC>    New Prescriptions    No medications on file       Final diagnoses:   Suicidal ideation       6/21/2023   [facility]     Ramu Mancilla MD  06/21/23 2055       Ramu Mancilla MD  06/21/23 8251

## 2023-06-21 NOTE — ED NOTES
Epic downtime from 06/21/23 0200 to 06/21/2023 0245. Actual times may not reflect the correct time.     Ayden Alfonso, MSN, RN on 6/21/2023 at 2:53 AM

## 2023-06-21 NOTE — DISCHARGE INSTRUCTIONS
"Aftercare Plan  If I am feeling unsafe or I am in a crisis, I will:   Contact my established care providers   Call the National Suicide Prevention Lifeline: 988  Go to the nearest emergency room   Call 911     Crisis Line [facility]  [phone]    Crisis Line [facility]  [phone]     [facility]-- crisis stabilization service  [phone]    Crisis Text Line  Text \"[place]\" qp3636    Warning signs that I or other people might notice when a crisis is developing for me: thinking about or using non Rx substances, engaging in or thinking about engaging in self harm; focusing on thoughts that are unsupportive of my wellbeing; isolating     Things I am able to do on my own to cope or help me feel better: get outside when weather and time of day permit, go for walks; use creative skills, color, write;      Things that I am able to do with others to cope or help me better: consider attending on line or in person the many sober supports:  AA, NA, [facility] Sobriety, [facility] refuge and more.   NA phone ; connect with a good counselor; make and keep appointments with providers     Things I can use or do for distraction: call a crisis line or NA/AA intergroup;  call cousin or sister; get outside, write, color engage in activities that I enjoy    Changes I can make to support my mental health and wellness: consider getting re-involved with a support community; connect with a good counselor; see medication provider; put my needs first and take good care of me, I have to be okay in order to care for my kids, my home and our pets      People in my life that I can ask for help: Cousin, Sister, BHP (); various sober supports; crisis lines     Your county has a mental health crisis team you can call 24/7: Crisis Line [facility]  [phone]  Crisis Line [facility] [phone]    Other things that are important when I'm in crisis: this too shall pass; I " "have people whom I love, count on me and do not want me to be gone or hurt     ADDITIONAL RESOURCES BELOW:       Crisis Lines  Crisis Text Line  Text 086265  You will be connected with a trained live crisis counselor to provide support.    Taylor cifuentes, jessi WATSON a 794565 o texto a 442-AYUDAME en WhatsApp    The [facility] Project (LGBTQ Youth Crisis Line)  2.951.174.0497  text START to 160-204      Community Resources  Fast Tracker  Linking people to mental health and substance use disorder resources  QuadROI.Glowbl     [place] Mental Health Warm Line  Peer to peer support  Monday thru Saturday, 12 pm to 10 pm  862.739.7953 or 4.773.524.4667  Text \"Support\" to 49674    National [place] on Mental Illness (ANA MARIA)  536.533.2661 or 1.888.ANA MARIA.HELPS      Mental Health Apps  My3  https://MedSocket.Glowbl/    VirtualHopeBox  https://Sensys Networks/apps/virtual-hope-box/    Substance Use Disorder Direct Access Resources    It is recommended that you abstain from all mood altering chemicals. Please contact the sober support hotline (278-666-6818) if needed; phones are answered 24 hours a day, 7 days a week.    To access substance use treatment if needed, you must have a comprehensive assessment completed to begin any treatment program.     If uninsured, please contact your county of residence for eligibility screen to substance use disorder evaluation and treatment:    [place] - [phone]   [place] - [phone]   [facility] [phone]   [facility] [phone]   [place] - [phone]   [facility] [phone]   [facility] [phone]   [place] - [phone]       Community BRODIE Evaluations If needed:  Clients may call their county for a full list of providers - Availability and services listed belo are subject to change, please call the provider to confirm    [facility]  [phone] [address] [place], [place], [address]  *Please call the above number to schedule a comprehensive " assessment for determination of level of care needs. In person and virtual appointments available Mon-Fri.    [facility], [address] Floor, [address], [place] [address] (next to the outpatient lab)    Phone: [phone]   Provides bridging services to people with Opiate Use Disorders (OUD) seeking care. This is a front door to Medication Assisted Treatments (MAT), ages 16+  Walk In hours: Monday-Friday 9:00am-3:00pm    [facility]  [phone]  Walk in Assessments: Mon-Friday 7a-1:45p  [address], [place], [address]    [facility]  Central Access [phone]  [address] [place], [place], [address]  *by appointment only    [facility]  [phone] (phone consultation available )  Locations in: [place], [facility], and [place], [place]  [place] virtual IOP [phone] or visit [email or web address]   Also offers LGBTQ programming     [facility]  [phone]  [address]  [place], [place], [address]  *Currently only offered via telehealth - call to set up an appointment    [facility]  [address] [place] [address], [address]  [facility] Recovery Program  For more information to to make a referral call:  [phone]  Walk-in on   9-11 a.m.    [facility]  [phone]  [address] [place], [place], [address]  *available by appointments only    [facility]  [phone]  [address] [place], [place], [address]  *available by appointment only    [facility]  [phone] [address] [place], [place], [address]  *walk in assessments available M-F starting at 7 am.    [facility]  [phone]  Locations: [facility], [facility], and [place]  *Walk in assessments availble M-F starting at 8 am -virtual only    [facility]  [phone]  [address] [place] [address]    [facility] Virtual + Locations: [place], [place], [place], [place], [facility], [employer], Alexia Santiago   [phone]  *available by appointment only    [facility]  [phone]  [address]  [place], [place], [address]    [facility]  [phone]  [address] [place], [address]  *available by appointment    [facility]  [phone]  [address] [place], [place], [address]  *walk ins available M-TH from 9-3    [facility]  MAT program: [phone]  [address] [place], [place], [address]    [place]  [phone]  Same day substance use disorder assessments are available Monday - Friday, via walk-in or by appointment at the [place] location.  [address], [place] [address]     [facility] - adolescent and adult SUDs services  [phone]  Offer services Monday through Friday, as well as evening hours Monday through Thursday. Normally, a first appointment will be scheduled within one week  [email or web address]  Locations all over [place]    If you or a loved one are intoxicated, you may be required to detox at a detox facility before starting treatment. The following are detox facilities that you can self present to. All detox facilities are able to help you complete an assessment prior to discharge if you choose:    [facility]: Arrive at a [facility] for immediate medical evaluation    [facility]: [address], [place], [address].         [phone]    [facility]: [address], [place], [address]  [phone]     [facility] ([facility]): [address] [place], [place], [address] [phone]     [facility]: [address] [place], [place], [address], [phone]       Ways to help  cope with sobriety:    -- Take prescribed medicines as scheduled  -- Keep follow-up appointments  -- Talk to others about your concerns  -- Get regular exercise  -- Practice deep breathing skills  -- Eat a healthy diet  -- Use community resources, including hotline numbers, [facility] crisis and support meetings  -- Stay sober and avoid places/people/things associated with substance use  --Maintain a daily schedule/routine  --Get at least 7-8 hours of sleep per night  --Create a list 10--20 healthy activities that you can do that are enjoyable and do not involve substance use  --Create daily goals (approx. 1-4 goals) per day and work to achieve them throughout the day.       Free Resources:    [facility] ([facility])  [facility] connects people seeking recovery to resources that help foster and sustain long-term recovery. Whether you are seeking resources for treatment, transportation, housing, job training, education, health care or other pathways to recovery, [facility] is a great place to start.  Phone: 530.449.8570. www.[place]Adore Me.AFS Technologies (Great listing of all types of recovery and non-recovery related resources)    Alcoholics Anonymous  Phone: 3-759-ALCOHOL  Website: HTTP://WWW.AA.ORG/  AA [place] (730-565-4455 or http://aaminneaSplash.org)  AA [place] (053-201-9353 or www.aastpaul.org)     Narcotics Anonymous  Phone: 253.255.8023  Website: www.Intellistream.[place]arte [place]anche.    [facility] [address], [place], [place],  Phone: [phone]  Drop-in Hours: Monday-Friday 9-11:30 am. By appointment at other times.  Provides: [facility] is a drop-in center on the [place] side of [place] that provides a safe space for individuals who are homeless and have a history of chemical use. Sobriety is not a requirement but drugs and alcohol are not allowed on the property.  Services: Non-clients can access drop-in services such as Recovery and Harm Reduction Groups, referrals to case management,  community activities, shower facilities, and a pool table. Individuals who are homeless and have chemical health needs may be eligible for enrollment into [facility]'s case management program. Clients and  work together to access benefits, treatment, health care, shelter, and external housing resources.

## 2023-07-10 ENCOUNTER — OFFICE VISIT (OUTPATIENT)
Dept: CHIROPRACTIC MEDICINE | Facility: OTHER | Age: 33
End: 2023-07-10
Attending: CHIROPRACTOR
Payer: COMMERCIAL

## 2023-07-10 DIAGNOSIS — M99.03 SEGMENTAL AND SOMATIC DYSFUNCTION OF LUMBAR REGION: Primary | ICD-10-CM

## 2023-07-10 DIAGNOSIS — M54.50 ACUTE BILATERAL LOW BACK PAIN WITHOUT SCIATICA: ICD-10-CM

## 2023-07-10 DIAGNOSIS — M99.01 SEGMENTAL AND SOMATIC DYSFUNCTION OF CERVICAL REGION: ICD-10-CM

## 2023-07-10 DIAGNOSIS — M99.02 SEGMENTAL AND SOMATIC DYSFUNCTION OF THORACIC REGION: ICD-10-CM

## 2023-07-10 PROCEDURE — 98941 CHIROPRACT MANJ 3-4 REGIONS: CPT | Mod: AT | Performed by: CHIROPRACTOR

## 2023-07-13 NOTE — PROGRESS NOTES
Subjective Finding:    Chief compalint: Patient presents with:  Back Pain: Low back pain with neck pain    , Pain Scale: 7/10, Intensity: sharp, Duration: 6 days, Radiating: no.    Date of injury:     Activities that the pain restricts:   Home/household/hobbies/social activities: yes.  Work duties: no.  Sleep: no.  Makes symptoms better: rest.  Makes symptoms worse: activity and walking.  Have you seen anyone else for the symptoms? no.  Work related: no.  Automobile related injury: no.    Objective and Assessment:    Posture Analysis:   High shoulder: .  Head tilt: .  High iliac crest: .  Head carriage: neutral.  Thoracic Kyphosis: forward.  Lumbar Lordosis: neutral.    Lumbar Range of Motion: extension decreased.  Cervical Range of Motion: .  Thoracic Range of Motion: extension decreased.  Extremity Range of Motion: .    Palpation:   C spine   L spine tightness        Segmental dysfunction pre-treatment and treatment area: T6, T8 and L3.  C45    Assessment post-treatment:  Cervical: .  Thoracic: ROM increased.  Lumbar: ROM increased.    Comments: .      Complicating Factors: .    Procedure(s):  CMT:  63719 Chiropractic manipulative treatment 3 regions performed   Thoracic: Diversified, See above for level, Prone and Lumbar: Diversified, See above for level, Side posture  Cervical        Modalities:  None performed this visit    Therapeutic procedures:  None    Plan:  Treatment plan: PRN.  Instructed patient: stretch as instructed at visit.  Short term goals: reduce pain and increase ROM.  Long term goals: restore normal function.  Prognosis: excellent.

## 2023-09-13 ENCOUNTER — OFFICE VISIT (OUTPATIENT)
Dept: CHIROPRACTIC MEDICINE | Facility: OTHER | Age: 33
End: 2023-09-13
Attending: CHIROPRACTOR
Payer: COMMERCIAL

## 2023-09-13 DIAGNOSIS — M99.01 SEGMENTAL AND SOMATIC DYSFUNCTION OF CERVICAL REGION: Primary | ICD-10-CM

## 2023-09-13 DIAGNOSIS — M99.03 SEGMENTAL AND SOMATIC DYSFUNCTION OF LUMBAR REGION: ICD-10-CM

## 2023-09-13 DIAGNOSIS — M99.02 SEGMENTAL AND SOMATIC DYSFUNCTION OF THORACIC REGION: ICD-10-CM

## 2023-09-13 DIAGNOSIS — M54.2 CERVICALGIA: ICD-10-CM

## 2023-09-13 PROCEDURE — 98941 CHIROPRACT MANJ 3-4 REGIONS: CPT | Mod: AT | Performed by: CHIROPRACTOR

## 2023-09-13 NOTE — PROGRESS NOTES
Subjective Finding:    Chief compalint: Patient presents with:  Back Pain: Tightness in upper back and neck region   , Pain Scale: 6/10, Intensity: sharp, Duration: 2 weeks, Radiating: no.    Date of injury:     Activities that the pain restricts:   Home/household/hobbies/social activities: Yes.  Work duties: Yes.  Sleep: No.  Makes symptoms better: rest.  Makes symptoms worse: activity, cervical extension, and cervical flexion.  Have you seen anyone else for the symptoms? No.  Work related: No.  Automobile related injury: No.    Objective and Assessment:    Posture Analysis:   High shoulder: right.  Head tilt: right.  High iliac crest: .  Head carriage: forward.  Thoracic Kyphosis: neutral.  Lumbar Lordosis: neutral.    Lumbar Range of Motion: .  Cervical Range of Motion: extension decreased.  Thoracic Range of Motion: .  Extremity Range of Motion: .    Palpation:   Traps: sharp pain, referred pain: no    Segmental dysfunction pre-treatment and treatment area: C3, C6, C7, T2, and L5.    Assessment post-treatment:  Cervical: ROM increased.  Thoracic: ROM increased.  Lumbar: ROM increased.    Comments: .      Complicating Factors: .    Procedure(s):  [facility]:  00586 Chiropractic manipulative treatment 3-4 regions performed   Cervical: Diversified, See above for level, Supine, Thoracic: Diversified, See above for level, Prone, and Lumbar: Diversified, See above for level, Side posture    Modalities:       Therapeutic procedures:      Plan:  Treatment plan:  2 times per week for 2 weeks.  Instructed patient: stretch as instructed at visit.  Short term goals: increase ROM.  Long term goals: restore normal function.  Prognosis: very good.

## 2023-09-23 ENCOUNTER — HOSPITAL ENCOUNTER (EMERGENCY)
Facility: HOSPITAL | Age: 33
Discharge: HOME | End: 2023-09-23
Attending: PHYSICIAN ASSISTANT | Admitting: PHYSICIAN ASSISTANT
Payer: COMMERCIAL

## 2023-09-23 ENCOUNTER — APPOINTMENT (OUTPATIENT)
Dept: GENERAL RADIOLOGY | Facility: HOSPITAL | Age: 33
End: 2023-09-23
Attending: PHYSICIAN ASSISTANT
Payer: COMMERCIAL

## 2023-09-23 VITALS
HEIGHT: 65 IN | BODY MASS INDEX: 22.63 KG/M2 | TEMPERATURE: 98.7 F | RESPIRATION RATE: 16 BRPM | HEART RATE: 97 BPM | SYSTOLIC BLOOD PRESSURE: 110 MMHG | OXYGEN SATURATION: 98 % | WEIGHT: 135.8 LBS | DIASTOLIC BLOOD PRESSURE: 70 MMHG

## 2023-09-23 DIAGNOSIS — S46.911A RIGHT SHOULDER STRAIN, INITIAL ENCOUNTER: ICD-10-CM

## 2023-09-23 PROCEDURE — G0463 HOSPITAL OUTPT CLINIC VISIT: HCPCS | Mod: 25

## 2023-09-23 PROCEDURE — 99213 OFFICE O/P EST LOW 20 MIN: CPT | Performed by: PHYSICIAN ASSISTANT

## 2023-09-23 PROCEDURE — 73000 X-RAY EXAM OF COLLAR BONE: CPT | Mod: RT

## 2023-09-23 PROCEDURE — 73030 X-RAY EXAM OF SHOULDER: CPT | Mod: RT

## 2023-09-23 RX ORDER — CYCLOBENZAPRINE HCL 10 MG
10 TABLET ORAL 3 TIMES DAILY PRN
Qty: 20 TABLET | Refills: 0 | Status: SHIPPED | OUTPATIENT
Start: 2023-09-23 | End: 2023-09-30

## 2023-09-23 NOTE — ED TRIAGE NOTES
Pt presents with c/o right shoulder pain. Reports that she was walking down her steps at home and they broke, making her fall against the door frame with right shoulder. Reports pain and tingling in shoulder. CMS intact. Limited ROM due to pain. Incident happened about an hour and a half ago. Pt took ibuprofen after.      Triage Assessment       Row Name 09/23/23 1104       Triage Assessment (Adult)    Airway WDL WDL

## 2023-09-23 NOTE — ED PROVIDER NOTES
History     Chief Complaint   Patient presents with    Shoulder Pain     HPI  Ihsan Millan is a 32 year old female who presents with right shoulder pain since falling into a door frame 1.5 hours ago. Painful with rang of motion and palpation to the anterior shoulder and clavicle. No obvious trauma.     Allergies:  Allergies   Allergen Reactions    Skin Adhesives [Cyanoacrylate] Dermatitis     Patient developed contact dermatitis after application of topical adhesive to surgical incision    Lexapro [Escitalopram] Nausea and Vomiting    Codeine Sulfate Rash    Penicillins Rash    Tramadol Rash       Problem List:    Patient Active Problem List    Diagnosis Date Noted    RUQ abdominal pain 02/12/2017     Priority: Medium    Pneumonia of right middle lobe due to infectious organism 02/12/2017     Priority: Medium    Tobacco abuse 02/12/2017     Priority: Medium    Status post laparoscopic assisted vaginal hysterectomy (LAVH) 06/25/2015     Priority: Medium    Endometriosis 04/16/2015     Priority: Medium     Depo lupron 4/15.  Laparoscopy 3/15      ASCUS on Pap smear 07/18/2014     Priority: Medium     + hpv colpo 7/14      Postpartum depression 07/18/2014     Priority: Medium     zoloft rx      H. pylori infection      Priority: Medium    Moderate persistent asthma 03/19/2014     Priority: Medium    GERD (gastroesophageal reflux disease) 09/12/2013     Priority: Medium    Intermittent asthma 09/12/2013     Priority: Medium     Increased with pregnancy  Send for eval and asthma action plan  Smoking cessation counseling  Flu shot given      Pregnant state, incidental 03/23/2011     Priority: Medium    Contraceptive management 11/09/2010     Priority: Medium        Past Medical History:    Past Medical History:   Diagnosis Date    Asthma     GERD (gastroesophageal reflux disease)     H. pylori infection        Past Surgical History:    Past Surgical History:   Procedure Laterality Date    COLONOSCOPY N/A 11/6/2014     Procedure: COLONOSCOPY;  Surgeon: Zacarias Paz MD;  Location: [facility]    DILATION AND CURETTAGE, HYSTEROSCOPY DIAGNOSTIC, COMBINED  8/1/2014    Procedure: COMBINED DILATION AND CURETTAGE, HYSTEROSCOPY DIAGNOSTIC;  Surgeon: Rodolfo Clifford MD;  Location: [facility]    ESOPHAGOSCOPY, GASTROSCOPY, DUODENOSCOPY (EGD), COMBINED N/A 9/5/2014    Procedure: COMBINED ESOPHAGOSCOPY, GASTROSCOPY, DUODENOSCOPY (EGD);  Surgeon: Zacarias Paz MD;  [facility]: [facility]    HERNIORRHAPHY UMBILICAL N/A 9/3/2019    Procedure: OPEN UMBILICAL HERNIA REPAIR;  Surgeon: Carter Vaughn MD;  [facility]: [facility]    LAPAROSCOPIC ASSISTED HYSTERECTOMY VAGINAL N/A 6/24/2015    Procedure: LAPAROSCOPIC ASSISTED HYSTERECTOMY VAGINAL;  Surgeon: Rodolfo Clifford MD;  [facility]    LAPAROSCOPIC OOPHORECTOMY Right 3/23/2016    Procedure: LAPAROSCOPIC OOPHORECTOMY;  Surgeon: Rodolfo Clifford MD;  [facility]    LAPAROSCOPIC SALPINGO-OOPHORECTOMY Left 5/3/2017    Procedure: LAPAROSCOPIC SALPINGO-OOPHORECTOMY;  LAPAROSCOPIC LEFT OOPHORECTOMY;  Surgeon: Rodolfo Clifford MD;  [facility]    LAPAROSCOPY DIAGNOSTIC (GYN) N/A 3/18/2015    Procedure: LAPAROSCOPY DIAGNOSTIC (GYN);  Surgeon: Rodolfo Clifford MD;  Location: [facility]    no surgeries      SALPINGECTOMY Bilateral 6/24/2015    Procedure: SALPINGECTOMY;  Surgeon: Rodolfo Clifford MD;  [facility]: [facility]       Family History:    Family History   Problem Relation Age of Onset    Asthma Mother     Unknown/Adopted Maternal Grandmother     Unknown/Adopted Maternal Grandfather        Social History:  Marital Status:  Single [1]  Social History     Tobacco Use    Smoking status: Every Day     Packs/day: 0.75     Years: 7.00     Pack years: 5.25     Types: Cigarettes    Smokeless tobacco: Never    Tobacco comments:     declines quitline referral 9/10/19   Substance Use Topics    Alcohol use: Not Currently     Comment: social    Drug use: No        Medications:    Acetaminophen (TYLENOL PO)  albuterol (PROAIR  "HFA/PROVENTIL HFA/VENTOLIN HFA) 108 (90 Base) MCG/ACT inhaler  albuterol (PROVENTIL) (2.5 MG/3ML) 0.083% neb solution  amphetamine-dextroamphetamine (ADDERALL) 30 MG tablet  amphetamine-dextroamphetamine (ADDERALL) 30 MG tablet  aspirin (ASA) 81 MG chewable tablet  cloNIDine (CATAPRES) 0.1 MG tablet  cyclobenzaprine (FLEXERIL) 10 MG tablet  estradiol (ESTRACE) 1 MG tablet  hydrOXYzine (ATARAX) 25 MG tablet  naproxen (NAPROSYN) 500 MG tablet  omeprazole (PRILOSEC) 20 MG DR capsule  ondansetron (ZOFRAN ODT) 4 MG ODT tab  QUEtiapine (SEROQUEL) 50 MG tablet  sertraline (ZOLOFT) 50 MG tablet          Review of Systems   All other systems reviewed and are negative.      Physical Exam   BP: 110/70  Pulse: 97  Temp: 98.7  F (37.1  C)  Resp: 16  Height: 165.1 cm (5' 5\")  Weight: 61.6 kg (135 lb 12.9 oz)  SpO2: 98 %      Physical Exam  Vitals and nursing note reviewed.   Constitutional:       General: She is not in acute distress.     Appearance: She is well-developed. She is not diaphoretic.   HENT:      Head: Normocephalic and atraumatic.      Right Ear: External ear normal.      Left Ear: External ear normal.      Nose: Nose normal.      Mouth/Throat:      Pharynx: No oropharyngeal exudate.   Eyes:      General: No scleral icterus.        Right eye: No discharge.         Left eye: No discharge.      Conjunctiva/sclera: Conjunctivae normal.      Pupils: Pupils are equal, round, and reactive to light.   Cardiovascular:      Rate and Rhythm: Normal rate and regular rhythm.      Heart sounds: Normal heart sounds. No murmur heard.     No friction rub. No gallop.   Pulmonary:      Effort: Pulmonary effort is normal. No respiratory distress.      Breath sounds: Normal breath sounds. No wheezing or rales.   Chest:      Chest wall: No tenderness.   Abdominal:      General: Bowel sounds are normal.      Palpations: Abdomen is soft.      Tenderness: There is no abdominal tenderness.   Musculoskeletal:      Right shoulder: Bony " tenderness present. No swelling, deformity, effusion, laceration or crepitus. Decreased range of motion (Due to pain. Passive ROM intact.). Normal strength. Normal pulse.      Right upper arm: Normal.      Cervical back: Full passive range of motion without pain, normal range of motion and neck supple.   Lymphadenopathy:      Cervical: No cervical adenopathy.   Skin:     General: Skin is warm and dry.      Coloration: Skin is not pale.      Findings: No erythema or rash.   Neurological:      Mental Status: She is alert and oriented to person, place, and time.      Cranial Nerves: No cranial nerve deficit.      Coordination: Coordination normal.   Psychiatric:         Behavior: Behavior normal.         Thought Content: Thought content normal.         Judgment: Judgment normal.         ED Course                 Procedures       Results for orders placed or performed during the hospital encounter of 09/23/23 (from the past 24 hour(s))   XR Shoulder Right G/E 3 Views    Narrative    PROCEDURE:  XR SHOULDER RIGHT G/E 3 VIEWS    HISTORY: right shoulder injury, shoulder and clavicle pain    COMPARISON:  Right shoulder radiographs 5/15/2022    TECHNIQUE:  XR SHOULDER RIGHT 4 VIEWS    FINDINGS:   No fracture or dislocation is identified. No suspicious osseous  lesion. The joint spaces are preserved.     No foreign body is seen.     Soft tissues are within normal limits.        Impression    IMPRESSION:   No acute osseous abnormality.    ROJELIO WESTBROOK MD         SYSTEM ID:  [id number]   XR Clavicle Right 2 Views    Narrative    PROCEDURE:  XR CLAVICLE RIGHT 2 VIEWS    HISTORY: pain to distal right clavicle. fell into door.    COMPARISON:  Right shoulder radiographs 5/15/2022    TECHNIQUE:  XR CLAVICLE RIGHT 2 VIEWS    FINDINGS:   No fracture or dislocation is identified. No suspicious osseous  lesion. The joint spaces are preserved.     No foreign body is seen.     Soft tissues are within normal limits.        Impression     IMPRESSION:   No acute osseous abnormality.    ROJELIO WESTBROOK MD         SYSTEM ID:  [id number]       Medications - No data to display    Assessments & Plan (with Medical Decision Making)   Right shoulder and clavicle x-rays are negative for acute fracture or dislocation. Pt was given an ice pack and placed in a sling with improvement following. Flexeril RX provided for pain/spasms. Supportive care was otherwise recommended for shoulder strain. She was discharged home in good condition following.     Plan: Take the Flexeril as prescribed for spasms, no driving or drinking while taking this medications.   Alternate between tylenol and ibuprofen for pain.  Rest the affected painful area as much as possible.    Apply ice for 15-20 minutes intermittently as needed and especially after any offending activity.   Daily stretching.    As pain recedes, begin normal activities slowly as tolerated.      I have reviewed the nursing notes.    I have reviewed the findings, diagnosis, plan and need for follow up with the patient.      New Prescriptions    CYCLOBENZAPRINE (FLEXERIL) 10 MG TABLET    Take 1 tablet (10 mg) by mouth 3 times daily as needed for muscle spasms       Final diagnoses:   Right shoulder strain, initial encounter       9/23/2023   [facility]

## 2023-09-23 NOTE — ED TRIAGE NOTES
Reports she was walking down her steps outside at her home and they broke. She fell against the door frame with the right shoulder and having right shoulder pain and tingling. No pain or numbness/tingling distal to the injury. CMS intact distal to the injury

## 2023-09-23 NOTE — DISCHARGE INSTRUCTIONS
Take the Flexeril as prescribed for spasms, no driving or drinking while taking this medication.   Alternate between tylenol and ibuprofen for pain.  Rest the affected painful area as much as possible.    Apply ice for 15-20 minutes intermittently as needed and especially after any offending activity.   Daily stretching.    As pain recedes, begin normal activities slowly as tolerated.

## 2023-11-05 ENCOUNTER — HOSPITAL ENCOUNTER (EMERGENCY)
Facility: HOSPITAL | Age: 33
Discharge: HOME | End: 2023-11-05
Attending: STUDENT IN AN ORGANIZED HEALTH CARE EDUCATION/TRAINING PROGRAM | Admitting: STUDENT IN AN ORGANIZED HEALTH CARE EDUCATION/TRAINING PROGRAM
Payer: COMMERCIAL

## 2023-11-05 ENCOUNTER — APPOINTMENT (OUTPATIENT)
Dept: GENERAL RADIOLOGY | Facility: HOSPITAL | Age: 33
End: 2023-11-05
Attending: STUDENT IN AN ORGANIZED HEALTH CARE EDUCATION/TRAINING PROGRAM
Payer: COMMERCIAL

## 2023-11-05 VITALS
TEMPERATURE: 97.6 F | OXYGEN SATURATION: 99 % | HEART RATE: 90 BPM | RESPIRATION RATE: 30 BRPM | SYSTOLIC BLOOD PRESSURE: 102 MMHG | DIASTOLIC BLOOD PRESSURE: 69 MMHG

## 2023-11-05 DIAGNOSIS — R42 DIZZINESS: ICD-10-CM

## 2023-11-05 DIAGNOSIS — R05.9 COUGH, UNSPECIFIED TYPE: ICD-10-CM

## 2023-11-05 DIAGNOSIS — R07.9 CHEST PAIN, UNSPECIFIED TYPE: ICD-10-CM

## 2023-11-05 LAB
ALBUMIN SERPL BCG-MCNC: 4.2 G/DL (ref 3.5–5.2)
ALP SERPL-CCNC: 88 U/L (ref 35–104)
ALT SERPL W P-5'-P-CCNC: 14 U/L (ref 0–50)
ANION GAP SERPL CALCULATED.3IONS-SCNC: 9 MMOL/L (ref 7–15)
AST SERPL W P-5'-P-CCNC: 19 U/L (ref 0–45)
BASOPHILS # BLD AUTO: 0 10E3/UL (ref 0–0.2)
BASOPHILS NFR BLD AUTO: 0 %
BILIRUB SERPL-MCNC: <0.2 MG/DL
BUN SERPL-MCNC: 25.4 MG/DL (ref 6–20)
CALCIUM SERPL-MCNC: 8.9 MG/DL (ref 8.6–10)
CHLORIDE SERPL-SCNC: 106 MMOL/L (ref 98–107)
CREAT SERPL-MCNC: 0.78 MG/DL (ref 0.51–0.95)
DEPRECATED HCO3 PLAS-SCNC: 25 MMOL/L (ref 22–29)
EGFRCR SERPLBLD CKD-EPI 2021: >90 ML/MIN/1.73M2
EOSINOPHIL # BLD AUTO: 0.3 10E3/UL (ref 0–0.7)
EOSINOPHIL NFR BLD AUTO: 3 %
ERYTHROCYTE [DISTWIDTH] IN BLOOD BY AUTOMATED COUNT: 13.2 % (ref 10–15)
GLUCOSE SERPL-MCNC: 101 MG/DL (ref 70–99)
HCT VFR BLD AUTO: 37 % (ref 35–47)
HGB BLD-MCNC: 12.4 G/DL (ref 11.7–15.7)
HOLD SPECIMEN: NORMAL
HOLD SPECIMEN: NORMAL
IMM GRANULOCYTES # BLD: 0 10E3/UL
IMM GRANULOCYTES NFR BLD: 0 %
LIPASE SERPL-CCNC: 39 U/L (ref 13–60)
LYMPHOCYTES # BLD AUTO: 4 10E3/UL (ref 0.8–5.3)
LYMPHOCYTES NFR BLD AUTO: 49 %
MCH RBC QN AUTO: 29.7 PG (ref 26.5–33)
MCHC RBC AUTO-ENTMCNC: 33.5 G/DL (ref 31.5–36.5)
MCV RBC AUTO: 89 FL (ref 78–100)
MONOCYTES # BLD AUTO: 0.5 10E3/UL (ref 0–1.3)
MONOCYTES NFR BLD AUTO: 6 %
NEUTROPHILS # BLD AUTO: 3.5 10E3/UL (ref 1.6–8.3)
NEUTROPHILS NFR BLD AUTO: 42 %
NRBC # BLD AUTO: 0 10E3/UL
NRBC BLD AUTO-RTO: 0 /100
PLATELET # BLD AUTO: 251 10E3/UL (ref 150–450)
POTASSIUM SERPL-SCNC: 3.8 MMOL/L (ref 3.4–5.3)
PROT SERPL-MCNC: 6.5 G/DL (ref 6.4–8.3)
RBC # BLD AUTO: 4.18 10E6/UL (ref 3.8–5.2)
SODIUM SERPL-SCNC: 140 MMOL/L (ref 135–145)
TROPONIN T SERPL HS-MCNC: 7 NG/L
WBC # BLD AUTO: 8.3 10E3/UL (ref 4–11)

## 2023-11-05 PROCEDURE — 36415 COLL VENOUS BLD VENIPUNCTURE: CPT | Performed by: STUDENT IN AN ORGANIZED HEALTH CARE EDUCATION/TRAINING PROGRAM

## 2023-11-05 PROCEDURE — 85025 COMPLETE CBC W/AUTO DIFF WBC: CPT | Performed by: STUDENT IN AN ORGANIZED HEALTH CARE EDUCATION/TRAINING PROGRAM

## 2023-11-05 PROCEDURE — 93005 ELECTROCARDIOGRAM TRACING: CPT

## 2023-11-05 PROCEDURE — 99285 EMERGENCY DEPT VISIT HI MDM: CPT

## 2023-11-05 PROCEDURE — 83690 ASSAY OF LIPASE: CPT | Performed by: STUDENT IN AN ORGANIZED HEALTH CARE EDUCATION/TRAINING PROGRAM

## 2023-11-05 PROCEDURE — 250N000009 HC RX 250: Performed by: STUDENT IN AN ORGANIZED HEALTH CARE EDUCATION/TRAINING PROGRAM

## 2023-11-05 PROCEDURE — 71101 X-RAY EXAM UNILAT RIBS/CHEST: CPT | Mod: LT

## 2023-11-05 PROCEDURE — 80053 COMPREHEN METABOLIC PANEL: CPT | Performed by: STUDENT IN AN ORGANIZED HEALTH CARE EDUCATION/TRAINING PROGRAM

## 2023-11-05 PROCEDURE — 250N000013 HC RX MED GY IP 250 OP 250 PS 637: Performed by: STUDENT IN AN ORGANIZED HEALTH CARE EDUCATION/TRAINING PROGRAM

## 2023-11-05 PROCEDURE — 93010 ELECTROCARDIOGRAM REPORT: CPT | Performed by: INTERNAL MEDICINE

## 2023-11-05 PROCEDURE — 99284 EMERGENCY DEPT VISIT MOD MDM: CPT | Performed by: STUDENT IN AN ORGANIZED HEALTH CARE EDUCATION/TRAINING PROGRAM

## 2023-11-05 PROCEDURE — 84484 ASSAY OF TROPONIN QUANT: CPT | Performed by: STUDENT IN AN ORGANIZED HEALTH CARE EDUCATION/TRAINING PROGRAM

## 2023-11-05 RX ORDER — LIDOCAINE HYDROCHLORIDE 20 MG/ML
10 SOLUTION OROPHARYNGEAL ONCE
Status: COMPLETED | OUTPATIENT
Start: 2023-11-05 | End: 2023-11-05

## 2023-11-05 RX ORDER — MAGNESIUM HYDROXIDE/ALUMINUM HYDROXICE/SIMETHICONE 120; 1200; 1200 MG/30ML; MG/30ML; MG/30ML
15 SUSPENSION ORAL ONCE
Status: COMPLETED | OUTPATIENT
Start: 2023-11-05 | End: 2023-11-05

## 2023-11-05 RX ORDER — NITROGLYCERIN 0.4 MG/1
0.4 TABLET SUBLINGUAL EVERY 5 MIN PRN
Status: DISCONTINUED | OUTPATIENT
Start: 2023-11-05 | End: 2023-11-05 | Stop reason: HOSPADM

## 2023-11-05 RX ORDER — HYDROMORPHONE HYDROCHLORIDE 1 MG/ML
0.5 INJECTION, SOLUTION INTRAMUSCULAR; INTRAVENOUS; SUBCUTANEOUS EVERY 30 MIN PRN
Status: DISCONTINUED | OUTPATIENT
Start: 2023-11-05 | End: 2023-11-05 | Stop reason: HOSPADM

## 2023-11-05 RX ORDER — ACETAMINOPHEN 325 MG/1
975 TABLET ORAL ONCE
Status: COMPLETED | OUTPATIENT
Start: 2023-11-05 | End: 2023-11-05

## 2023-11-05 RX ORDER — ONDANSETRON 2 MG/ML
4 INJECTION INTRAMUSCULAR; INTRAVENOUS EVERY 30 MIN PRN
Status: DISCONTINUED | OUTPATIENT
Start: 2023-11-05 | End: 2023-11-05 | Stop reason: HOSPADM

## 2023-11-05 RX ADMIN — ACETAMINOPHEN 975 MG: 325 TABLET, FILM COATED ORAL at 00:52

## 2023-11-05 RX ADMIN — ALUMINUM HYDROXIDE, MAGNESIUM HYDROXIDE, AND SIMETHICONE 15 ML: 200; 200; 20 SUSPENSION ORAL at 00:53

## 2023-11-05 RX ADMIN — LIDOCAINE HYDROCHLORIDE 10 ML: 20 SOLUTION ORAL at 00:52

## 2023-11-05 ASSESSMENT — ACTIVITIES OF DAILY LIVING (ADL): ADLS_ACUITY_SCORE: 35
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
## 2023-11-05 NOTE — DISCHARGE INSTRUCTIONS
Return to the emergency department for worsening symptoms or concerning symptoms.  Follow-up with your primary care provider within the next week.  Call to schedule appointment.  Continue to make efforts to eat and drink more robustly to help with your dizziness and dehydration.  [facility].

## 2023-11-05 NOTE — ED TRIAGE NOTES
C/o left side chest pain for 1.5 weeks that gets worse with coughing. Rates pain 7/10. States she took 4 81 mg aspirin at 6 pm. States has been dizzy for a couple of days now. Reports dizziness is when she stands up. States not eating or drinking much because she forgets to. Denies cardiac history.

## 2023-11-07 LAB
ATRIAL RATE - MUSE: 84 BPM
DIASTOLIC BLOOD PRESSURE - MUSE: NORMAL MMHG
INTERPRETATION ECG - MUSE: NORMAL
P AXIS - MUSE: 77 DEGREES
PR INTERVAL - MUSE: 142 MS
QRS DURATION - MUSE: 82 MS
QT - MUSE: 380 MS
QTC - MUSE: 449 MS
R AXIS - MUSE: 86 DEGREES
SYSTOLIC BLOOD PRESSURE - MUSE: NORMAL MMHG
T AXIS - MUSE: 68 DEGREES
VENTRICULAR RATE- MUSE: 84 BPM

## 2024-02-12 ENCOUNTER — HOSPITAL ENCOUNTER (EMERGENCY)
Facility: HOSPITAL | Age: 34
Discharge: HOME | End: 2024-02-12
Attending: PHYSICIAN ASSISTANT | Admitting: PHYSICIAN ASSISTANT

## 2024-02-12 ENCOUNTER — APPOINTMENT (OUTPATIENT)
Dept: GENERAL RADIOLOGY | Facility: HOSPITAL | Age: 34
End: 2024-02-12
Attending: PHYSICIAN ASSISTANT

## 2024-02-12 VITALS
DIASTOLIC BLOOD PRESSURE: 70 MMHG | HEART RATE: 73 BPM | OXYGEN SATURATION: 97 % | RESPIRATION RATE: 16 BRPM | TEMPERATURE: 97.5 F | SYSTOLIC BLOOD PRESSURE: 102 MMHG

## 2024-02-12 DIAGNOSIS — M25.562 ACUTE PAIN OF LEFT KNEE: ICD-10-CM

## 2024-02-12 PROCEDURE — 250N000013 HC RX MED GY IP 250 OP 250 PS 637: Performed by: PHYSICIAN ASSISTANT

## 2024-02-12 PROCEDURE — 99283 EMERGENCY DEPT VISIT LOW MDM: CPT | Performed by: PHYSICIAN ASSISTANT

## 2024-02-12 PROCEDURE — 99283 EMERGENCY DEPT VISIT LOW MDM: CPT

## 2024-02-12 PROCEDURE — 73562 X-RAY EXAM OF KNEE 3: CPT | Mod: LT

## 2024-02-12 RX ORDER — HYDROCODONE BITARTRATE AND ACETAMINOPHEN 5; 325 MG/1; MG/1
1 TABLET ORAL EVERY 6 HOURS PRN
Qty: 6 TABLET | Refills: 0 | Status: SHIPPED | OUTPATIENT
Start: 2024-02-12 | End: 2024-02-12

## 2024-02-12 RX ORDER — HYDROCODONE BITARTRATE AND ACETAMINOPHEN 5; 325 MG/1; MG/1
1 TABLET ORAL ONCE
Status: COMPLETED | OUTPATIENT
Start: 2024-02-12 | End: 2024-02-12

## 2024-02-12 RX ORDER — HYDROCODONE BITARTRATE AND ACETAMINOPHEN 5; 325 MG/1; MG/1
1 TABLET ORAL 3 TIMES DAILY PRN
Qty: 6 TABLET | Refills: 0 | Status: SHIPPED | OUTPATIENT
Start: 2024-02-12 | End: 2024-02-15

## 2024-02-12 RX ADMIN — HYDROCODONE BITARTRATE AND ACETAMINOPHEN 1 TABLET: 5; 325 TABLET ORAL at 21:00

## 2024-02-13 NOTE — ED TRIAGE NOTES
Patient slipped on ice an hour ago, fell directly on knee cap. Difficulty straightening knee, pain getting worse

## 2024-02-13 NOTE — ED NOTES
Knee immobilizer placed education given to patient.  Patient is discharging to home given information on knee pain and use of hydrocodone APAP. Patient stated understanding of these instructions and is discharging by private auto escorted by wheel chair.     none

## 2024-02-13 NOTE — DISCHARGE INSTRUCTIONS
We are waiting on the official review of your x-ray but there does not appear to be a fracture at this time.  We will call if any subtle abnormalities are found.  You will likely need further evaluation by orthopedics.  Please follow-up this week.  Pain medication sparingly.  Return here as needed.

## 2024-02-13 NOTE — ED PROVIDER NOTES
History     Chief Complaint   Patient presents with    Knee Pain     The history is provided by the patient.     Ihsan Millan is a 33 year old female who presented to the emergency department for evaluation of left knee pain.  Slipped on the ice.  Landed on her kneecap.  No other concerns.    Allergies:  Allergies   Allergen Reactions    Skin Adhesives [Cyanoacrylate] Dermatitis     Patient developed contact dermatitis after application of topical adhesive to surgical incision    Lexapro [Escitalopram] Nausea and Vomiting    Codeine Sulfate Rash    Penicillins Rash    Tramadol Rash       Problem List:    Patient Active Problem List    Diagnosis Date Noted    RUQ abdominal pain 02/12/2017     Priority: Medium    Pneumonia of right middle lobe due to infectious organism 02/12/2017     Priority: Medium    Tobacco abuse 02/12/2017     Priority: Medium    Status post laparoscopic assisted vaginal hysterectomy (LAVH) 06/25/2015     Priority: Medium    Endometriosis 04/16/2015     Priority: Medium     Depo lupron 4/15.  Laparoscopy 3/15      ASCUS on Pap smear 07/18/2014     Priority: Medium     + hpv colpo 7/14      Postpartum depression 07/18/2014     Priority: Medium     zoloft rx      H. pylori infection      Priority: Medium    Moderate persistent asthma 03/19/2014     Priority: Medium    GERD (gastroesophageal reflux disease) 09/12/2013     Priority: Medium    Intermittent asthma 09/12/2013     Priority: Medium     Increased with pregnancy  Send for eval and asthma action plan  Smoking cessation counseling  Flu shot given      Pregnant state, incidental 03/23/2011     Priority: Medium    Contraceptive management 11/09/2010     Priority: Medium        Past Medical History:    Past Medical History:   Diagnosis Date    Asthma     GERD (gastroesophageal reflux disease)     H. pylori infection        Past Surgical History:    Past Surgical History:   Procedure Laterality Date    COLONOSCOPY N/A 11/6/2014     Procedure: COLONOSCOPY;  Surgeon: Zacarias Paz MD;  Location: [facility]    DILATION AND CURETTAGE, HYSTEROSCOPY DIAGNOSTIC, COMBINED  8/1/2014    Procedure: COMBINED DILATION AND CURETTAGE, HYSTEROSCOPY DIAGNOSTIC;  Surgeon: Rodolfo Clifford MD;  [facility]    ESOPHAGOSCOPY, GASTROSCOPY, DUODENOSCOPY (EGD), COMBINED N/A 9/5/2014    Procedure: COMBINED ESOPHAGOSCOPY, GASTROSCOPY, DUODENOSCOPY (EGD);  Surgeon: Zacarias Paz MD;  [facility]    HERNIORRHAPHY UMBILICAL N/A 9/3/2019    Procedure: OPEN UMBILICAL HERNIA REPAIR;  Surgeon: Carter Vaughn MD;  [facility]    LAPAROSCOPIC ASSISTED HYSTERECTOMY VAGINAL N/A 6/24/2015    Procedure: LAPAROSCOPIC ASSISTED HYSTERECTOMY VAGINAL;  Surgeon: Rodolfo Clifford MD;  [facility]    LAPAROSCOPIC OOPHORECTOMY Right 3/23/2016    Procedure: LAPAROSCOPIC OOPHORECTOMY;  Surgeon: Rodolfo Clifford MD;  [facility]    LAPAROSCOPIC SALPINGO-OOPHORECTOMY Left 5/3/2017    Procedure: LAPAROSCOPIC SALPINGO-OOPHORECTOMY;  LAPAROSCOPIC LEFT OOPHORECTOMY;  Surgeon: Rodolfo Clifford MD;  [facility]    LAPAROSCOPY DIAGNOSTIC (GYN) N/A 3/18/2015    Procedure: LAPAROSCOPY DIAGNOSTIC (GYN);  Surgeon: Rodolfo Clifford MD;  [facility]: [facility]    no surgeries      SALPINGECTOMY Bilateral 6/24/2015    Procedure: SALPINGECTOMY;  Surgeon: Rodolfo Clifford MD;  [facility]: [facility]       Family History:    Family History   Problem Relation Age of Onset    Asthma Mother     Unknown/Adopted Maternal Grandmother     Unknown/Adopted Maternal Grandfather        Social History:  Marital Status:  Single [1]  Social History     Tobacco Use    Smoking status: Every Day     Packs/day: 0.75     Years: 7.00     Additional pack years: 0.00     Total pack years: 5.25     Types: Cigarettes    Smokeless tobacco: Never    Tobacco comments:     declines quitline referral 9/10/19   Substance Use Topics    Alcohol use: Not Currently     Comment: social    Drug use: No        Medications:     HYDROcodone-acetaminophen (NORCO) 5-325 MG tablet  Acetaminophen (TYLENOL PO)  albuterol (PROAIR HFA/PROVENTIL HFA/VENTOLIN HFA) 108 (90 Base) MCG/ACT inhaler  albuterol (PROVENTIL) (2.5 MG/3ML) 0.083% neb solution  amphetamine-dextroamphetamine (ADDERALL) 30 MG tablet  amphetamine-dextroamphetamine (ADDERALL) 30 MG tablet  aspirin (ASA) 81 MG chewable tablet  cloNIDine (CATAPRES) 0.1 MG tablet  estradiol (ESTRACE) 1 MG tablet  hydrOXYzine (ATARAX) 25 MG tablet  naproxen (NAPROSYN) 500 MG tablet  omeprazole (PRILOSEC) 20 MG DR capsule  ondansetron (ZOFRAN ODT) 4 MG ODT tab  QUEtiapine (SEROQUEL) 50 MG tablet  sertraline (ZOLOFT) 50 MG tablet          Review of Systems   Musculoskeletal:         See HPI       Physical Exam   BP: 102/70  Pulse: 73  Temp: 97.5  F (36.4  C)  Resp: 16  SpO2: 97 %      Physical Exam  Vitals and nursing note reviewed.   Constitutional:       General: She is not in acute distress.     Appearance: Normal appearance. She is normal weight. She is not ill-appearing, toxic-appearing or diaphoretic.   Musculoskeletal:      Comments: Left knee shows no edema.  There are some tenderness upon palpation of the patella.  Joint spaces are otherwise unremarkable.  No deformity   Skin:     General: Skin is warm and dry.   Neurological:      Mental Status: She is alert.         ED Course              ED Course as of 02/12/24 2135 Mon Feb 12, 2024 2105 My independent review of the knee x-ray shows no evidence of fracture or dislocation.     Procedures              Critical Care time:  none               No results found for this or any previous visit (from the past 24 hour(s)).    Medications   HYDROcodone-acetaminophen (NORCO) 5-325 MG per tablet 1 tablet (1 tablet Oral $Given 2/12/24 2100)       Assessments & Plan (with Medical Decision Making)   33-year-old female left knee pain after a fall.  Unremarkable x-ray.  Knee immobilizer and pain meds.  Orthopedic follow-up.  Return here as  needed.    This document was prepared using a combination of typing and voice generated software.  While every attempt was made for accuracy, spelling and grammatical errors may exist.     I have reviewed the nursing notes.    I have reviewed the findings, diagnosis, plan and need for follow up with the patient.           Medical Decision Making  The patient's presentation was of low complexity (an acute and uncomplicated illness or injury).    The patient's evaluation involved:  ordering and/or review of 1 test(s) in this encounter (knee x-ray)    The patient's management necessitated moderate risk (prescription drug management including medications given in the ED).        New Prescriptions    HYDROCODONE-ACETAMINOPHEN (NORCO) 5-325 MG TABLET    Take 1 tablet by mouth every 6 hours as needed for severe pain       Final diagnoses:   Acute pain of left knee       2/12/2024   [facility]       Huyen Lucas PA-C  02/12/24 2258

## 2024-03-17 ENCOUNTER — HOSPITAL ENCOUNTER (EMERGENCY)
Facility: HOSPITAL | Age: 34
Discharge: HOME | End: 2024-03-18
Attending: EMERGENCY MEDICINE
Payer: MEDICAID

## 2024-03-17 DIAGNOSIS — R07.9 CHEST PAIN, UNSPECIFIED TYPE: ICD-10-CM

## 2024-03-17 PROCEDURE — 96375 TX/PRO/DX INJ NEW DRUG ADDON: CPT | Mod: XU | Performed by: EMERGENCY MEDICINE

## 2024-03-17 PROCEDURE — 99285 EMERGENCY DEPT VISIT HI MDM: CPT | Mod: 25 | Performed by: EMERGENCY MEDICINE

## 2024-03-17 PROCEDURE — 96374 THER/PROPH/DIAG INJ IV PUSH: CPT | Mod: XU | Performed by: EMERGENCY MEDICINE

## 2024-03-17 PROCEDURE — 93005 ELECTROCARDIOGRAM TRACING: CPT | Performed by: EMERGENCY MEDICINE

## 2024-03-17 PROCEDURE — 96361 HYDRATE IV INFUSION ADD-ON: CPT | Performed by: EMERGENCY MEDICINE

## 2024-03-17 PROCEDURE — 93308 TTE F-UP OR LMTD: CPT | Mod: TC | Performed by: EMERGENCY MEDICINE

## 2024-03-17 PROCEDURE — 93308 TTE F-UP OR LMTD: CPT | Mod: 26 | Performed by: EMERGENCY MEDICINE

## 2024-03-17 ASSESSMENT — COLUMBIA-SUICIDE SEVERITY RATING SCALE - C-SSRS
2. HAVE YOU ACTUALLY HAD ANY THOUGHTS OF KILLING YOURSELF IN THE PAST MONTH?: NO
1. IN THE PAST MONTH, HAVE YOU WISHED YOU WERE DEAD OR WISHED YOU COULD GO TO SLEEP AND NOT WAKE UP?: NO
6. HAVE YOU EVER DONE ANYTHING, STARTED TO DO ANYTHING, OR PREPARED TO DO ANYTHING TO END YOUR LIFE?: NO

## 2024-03-17 NOTE — LETTER
March 18, 2024      To Whom It May Concern:      Ihsan Millan was seen in our Emergency Department today, 03/18/24.  I expect her condition to improve over the next few days.  She may return to work/school when improved.    Sincerely,        Ramu Mancilla MD

## 2024-03-18 ENCOUNTER — APPOINTMENT (OUTPATIENT)
Dept: ULTRASOUND IMAGING | Facility: HOSPITAL | Age: 34
End: 2024-03-18
Attending: EMERGENCY MEDICINE
Payer: MEDICAID

## 2024-03-18 ENCOUNTER — APPOINTMENT (OUTPATIENT)
Dept: GENERAL RADIOLOGY | Facility: HOSPITAL | Age: 34
End: 2024-03-18
Attending: EMERGENCY MEDICINE
Payer: MEDICAID

## 2024-03-18 ENCOUNTER — APPOINTMENT (OUTPATIENT)
Dept: CT IMAGING | Facility: HOSPITAL | Age: 34
End: 2024-03-18
Attending: EMERGENCY MEDICINE
Payer: MEDICAID

## 2024-03-18 ENCOUNTER — OFFICE VISIT (OUTPATIENT)
Dept: CHIROPRACTIC MEDICINE | Facility: OTHER | Age: 34
End: 2024-03-18
Attending: CHIROPRACTOR
Payer: MEDICAID

## 2024-03-18 VITALS
OXYGEN SATURATION: 97 % | TEMPERATURE: 97.4 F | HEART RATE: 87 BPM | DIASTOLIC BLOOD PRESSURE: 71 MMHG | SYSTOLIC BLOOD PRESSURE: 104 MMHG | RESPIRATION RATE: 16 BRPM

## 2024-03-18 DIAGNOSIS — M99.02 SEGMENTAL AND SOMATIC DYSFUNCTION OF THORACIC REGION: ICD-10-CM

## 2024-03-18 DIAGNOSIS — M99.01 SEGMENTAL AND SOMATIC DYSFUNCTION OF CERVICAL REGION: ICD-10-CM

## 2024-03-18 DIAGNOSIS — M54.50 ACUTE BILATERAL LOW BACK PAIN WITHOUT SCIATICA: ICD-10-CM

## 2024-03-18 DIAGNOSIS — M99.03 SEGMENTAL AND SOMATIC DYSFUNCTION OF LUMBAR REGION: Primary | ICD-10-CM

## 2024-03-18 LAB
ALBUMIN SERPL BCG-MCNC: 4.1 G/DL (ref 3.5–5.2)
ALP SERPL-CCNC: 82 U/L (ref 40–150)
ALT SERPL W P-5'-P-CCNC: 20 U/L (ref 0–50)
ANION GAP SERPL CALCULATED.3IONS-SCNC: 7 MMOL/L (ref 7–15)
AST SERPL W P-5'-P-CCNC: 19 U/L (ref 0–45)
ATRIAL RATE - MUSE: 95 BPM
BASOPHILS # BLD AUTO: 0 10E3/UL (ref 0–0.2)
BASOPHILS NFR BLD AUTO: 0 %
BILIRUB SERPL-MCNC: 0.2 MG/DL
BUN SERPL-MCNC: 22.2 MG/DL (ref 6–20)
CALCIUM SERPL-MCNC: 8.8 MG/DL (ref 8.6–10)
CHLORIDE SERPL-SCNC: 104 MMOL/L (ref 98–107)
CREAT SERPL-MCNC: 0.85 MG/DL (ref 0.51–0.95)
D DIMER PPP FEU-MCNC: 0.31 UG/ML FEU (ref 0–0.5)
DEPRECATED HCO3 PLAS-SCNC: 28 MMOL/L (ref 22–29)
DIASTOLIC BLOOD PRESSURE - MUSE: NORMAL MMHG
EGFRCR SERPLBLD CKD-EPI 2021: >90 ML/MIN/1.73M2
EOSINOPHIL # BLD AUTO: 0.3 10E3/UL (ref 0–0.7)
EOSINOPHIL NFR BLD AUTO: 4 %
ERYTHROCYTE [DISTWIDTH] IN BLOOD BY AUTOMATED COUNT: 12.8 % (ref 10–15)
FLUAV RNA SPEC QL NAA+PROBE: NEGATIVE
FLUBV RNA RESP QL NAA+PROBE: NEGATIVE
GLUCOSE SERPL-MCNC: 93 MG/DL (ref 70–99)
HCT VFR BLD AUTO: 38.9 % (ref 35–47)
HGB BLD-MCNC: 13.1 G/DL (ref 11.7–15.7)
HOLD SPECIMEN: NORMAL
IMM GRANULOCYTES # BLD: 0 10E3/UL
IMM GRANULOCYTES NFR BLD: 0 %
INTERPRETATION ECG - MUSE: NORMAL
LYMPHOCYTES # BLD AUTO: 3.6 10E3/UL (ref 0.8–5.3)
LYMPHOCYTES NFR BLD AUTO: 51 %
MCH RBC QN AUTO: 29.6 PG (ref 26.5–33)
MCHC RBC AUTO-ENTMCNC: 33.7 G/DL (ref 31.5–36.5)
MCV RBC AUTO: 88 FL (ref 78–100)
MONOCYTES # BLD AUTO: 0.4 10E3/UL (ref 0–1.3)
MONOCYTES NFR BLD AUTO: 5 %
NEUTROPHILS # BLD AUTO: 2.9 10E3/UL (ref 1.6–8.3)
NEUTROPHILS NFR BLD AUTO: 40 %
NRBC # BLD AUTO: 0 10E3/UL
NRBC BLD AUTO-RTO: 0 /100
NT-PROBNP SERPL-MCNC: <36 PG/ML (ref 0–450)
P AXIS - MUSE: 76 DEGREES
PLATELET # BLD AUTO: 259 10E3/UL (ref 150–450)
POTASSIUM SERPL-SCNC: 3.5 MMOL/L (ref 3.4–5.3)
PR INTERVAL - MUSE: 136 MS
PROCALCITONIN SERPL IA-MCNC: 0.02 NG/ML
PROT SERPL-MCNC: 6.4 G/DL (ref 6.4–8.3)
QRS DURATION - MUSE: 78 MS
QT - MUSE: 358 MS
QTC - MUSE: 449 MS
R AXIS - MUSE: 92 DEGREES
RBC # BLD AUTO: 4.43 10E6/UL (ref 3.8–5.2)
RSV RNA SPEC NAA+PROBE: NEGATIVE
SARS-COV-2 RNA RESP QL NAA+PROBE: NEGATIVE
SODIUM SERPL-SCNC: 139 MMOL/L (ref 135–145)
SYSTOLIC BLOOD PRESSURE - MUSE: NORMAL MMHG
T AXIS - MUSE: 72 DEGREES
TROPONIN T SERPL HS-MCNC: <6 NG/L
VENTRICULAR RATE- MUSE: 95 BPM
WBC # BLD AUTO: 7.2 10E3/UL (ref 4–11)

## 2024-03-18 PROCEDURE — 84145 PROCALCITONIN (PCT): CPT | Performed by: EMERGENCY MEDICINE

## 2024-03-18 PROCEDURE — 83880 ASSAY OF NATRIURETIC PEPTIDE: CPT | Performed by: EMERGENCY MEDICINE

## 2024-03-18 PROCEDURE — 250N000011 HC RX IP 250 OP 636: Mod: JZ | Performed by: EMERGENCY MEDICINE

## 2024-03-18 PROCEDURE — 93010 ELECTROCARDIOGRAM REPORT: CPT | Performed by: HOSPITALIST

## 2024-03-18 PROCEDURE — 250N000011 HC RX IP 250 OP 636: Performed by: EMERGENCY MEDICINE

## 2024-03-18 PROCEDURE — 85025 COMPLETE CBC W/AUTO DIFF WBC: CPT | Performed by: EMERGENCY MEDICINE

## 2024-03-18 PROCEDURE — 87637 SARSCOV2&INF A&B&RSV AMP PRB: CPT | Performed by: EMERGENCY MEDICINE

## 2024-03-18 PROCEDURE — 85379 FIBRIN DEGRADATION QUANT: CPT | Performed by: EMERGENCY MEDICINE

## 2024-03-18 PROCEDURE — 36415 COLL VENOUS BLD VENIPUNCTURE: CPT | Performed by: EMERGENCY MEDICINE

## 2024-03-18 PROCEDURE — 84484 ASSAY OF TROPONIN QUANT: CPT | Performed by: EMERGENCY MEDICINE

## 2024-03-18 PROCEDURE — 71275 CT ANGIOGRAPHY CHEST: CPT

## 2024-03-18 PROCEDURE — 71045 X-RAY EXAM CHEST 1 VIEW: CPT

## 2024-03-18 PROCEDURE — 98941 CHIROPRACT MANJ 3-4 REGIONS: CPT | Mod: AT | Performed by: CHIROPRACTOR

## 2024-03-18 PROCEDURE — 258N000003 HC RX IP 258 OP 636: Performed by: EMERGENCY MEDICINE

## 2024-03-18 PROCEDURE — 80053 COMPREHEN METABOLIC PANEL: CPT | Performed by: EMERGENCY MEDICINE

## 2024-03-18 RX ORDER — IOPAMIDOL 755 MG/ML
53 INJECTION, SOLUTION INTRAVASCULAR ONCE
Status: COMPLETED | OUTPATIENT
Start: 2024-03-18 | End: 2024-03-18

## 2024-03-18 RX ORDER — KETOROLAC TROMETHAMINE 15 MG/ML
15 INJECTION, SOLUTION INTRAMUSCULAR; INTRAVENOUS ONCE
Status: COMPLETED | OUTPATIENT
Start: 2024-03-18 | End: 2024-03-18

## 2024-03-18 RX ORDER — HYDROMORPHONE HYDROCHLORIDE 1 MG/ML
0.5 INJECTION, SOLUTION INTRAMUSCULAR; INTRAVENOUS; SUBCUTANEOUS ONCE
Status: COMPLETED | OUTPATIENT
Start: 2024-03-18 | End: 2024-03-18

## 2024-03-18 RX ADMIN — IOPAMIDOL 53 ML: 755 INJECTION, SOLUTION INTRAVENOUS at 01:42

## 2024-03-18 RX ADMIN — SODIUM CHLORIDE 1000 ML: 9 INJECTION, SOLUTION INTRAVENOUS at 00:28

## 2024-03-18 RX ADMIN — KETOROLAC TROMETHAMINE 15 MG: 15 INJECTION, SOLUTION INTRAMUSCULAR; INTRAVENOUS at 00:29

## 2024-03-18 RX ADMIN — HYDROMORPHONE HYDROCHLORIDE 0.5 MG: 1 INJECTION, SOLUTION INTRAMUSCULAR; INTRAVENOUS; SUBCUTANEOUS at 01:35

## 2024-03-18 ASSESSMENT — ENCOUNTER SYMPTOMS
COUGH: 0
SHORTNESS OF BREATH: 1
FEVER: 0
CHILLS: 0

## 2024-03-18 ASSESSMENT — ACTIVITIES OF DAILY LIVING (ADL)
ADLS_ACUITY_SCORE: 35

## 2024-03-18 NOTE — PROGRESS NOTES
Subjective Finding:    Chief compalint: Patient presents with:  Back Pain: Tightness in lower back , Pain Scale: 5/10, Intensity: sharp, Duration: 1 weeks, Radiating:  bilateral buttock.    Date of injury:     Activities that the pain restricts:   Home/household/hobbies/social activities: Yes.  Work duties: Yes.  Sleep: No.  Makes symptoms better: laying down.  Makes symptoms worse: activity.  Have you seen anyone else for the symptoms? No.  Work related: No.  Automobile related injury: No.    Objective and Assessment:    Posture Analysis:   High shoulder: .  Head tilt: .  High iliac crest: .  Head carriage: neutral.  Thoracic Kyphosis: neutral.  Lumbar Lordosis: forward.    Lumbar Range of Motion: extension decreased.  Cervical Range of Motion: .  Thoracic Range of Motion: .  Extremity Range of Motion: .    Palpation:   Quad lumb: left, referred pain: no  Lev scapulae: sharp pain, referred pain: no    Segmental dysfunction pre-treatment and treatment area: C5, C6, T4, L4, and L5.    Assessment post-treatment:  Cervical: ROM increased.  Thoracic: ROM increased.  Lumbar: ROM increased.    Comments: .      Complicating Factors: .    Procedure(s):  CMT:  98252 Chiropractic manipulative treatment 3-4 regions performed   Cervical: Diversified, See above for level, Supine, Thoracic: Diversified, See above for level, Prone, and Lumbar: Diversified, See above for level, Side posture    Modalities:  None performed this visit    Therapeutic procedures:  None    Plan:  Treatment plan:  2 times per week for 1 weeks.  Instructed patient: stretch as instructed at visit.  Short term goals: increase ROM.  Long term goals: restore normal function.  Prognosis: excellent.

## 2024-03-18 NOTE — ED PROVIDER NOTES
History     Chief Complaint   Patient presents with    Shortness of Breath     HPI  Ihsan Millan is a 33 year old female who is here with difficulty breathing and chest pain.  4 days or so difficulty breathing.  2 days of chest pain.  Got worse today prompting her to come in.  States it feels like pneumonia.  Denies coughing, states last time this happened she did not have any coughing till 5 days or so after difficulty breathing started.  States she has pain and swelling in both of her legs, attributes that to jumping around a lot in a cab.    Allergies:  Allergies   Allergen Reactions    Skin Adhesives [Cyanoacrylate] Dermatitis     Patient developed contact dermatitis after application of topical adhesive to surgical incision    Lexapro [Escitalopram] Nausea and Vomiting    Codeine Sulfate Rash    Penicillins Rash    Tramadol Rash       Problem List:    Patient Active Problem List    Diagnosis Date Noted    RUQ abdominal pain 02/12/2017     Priority: Medium    Pneumonia of right middle lobe due to infectious organism 02/12/2017     Priority: Medium    Tobacco abuse 02/12/2017     Priority: Medium    Status post laparoscopic assisted vaginal hysterectomy (LAVH) 06/25/2015     Priority: Medium    Endometriosis 04/16/2015     Priority: Medium     Depo lupron 4/15.  Laparoscopy 3/15      ASCUS on Pap smear 07/18/2014     Priority: Medium     + hpv colpo 7/14      Postpartum depression 07/18/2014     Priority: Medium     zoloft rx      H. pylori infection      Priority: Medium    Moderate persistent asthma 03/19/2014     Priority: Medium    GERD (gastroesophageal reflux disease) 09/12/2013     Priority: Medium    Intermittent asthma 09/12/2013     Priority: Medium     Increased with pregnancy  Send for eval and asthma action plan  Smoking cessation counseling  Flu shot given      Pregnant state, incidental 03/23/2011     Priority: Medium    Contraceptive management 11/09/2010     Priority: Medium        Past  Medical History:    Past Medical History:   Diagnosis Date    Asthma     GERD (gastroesophageal reflux disease)     H. pylori infection        Past Surgical History:    Past Surgical History:   Procedure Laterality Date    COLONOSCOPY N/A 11/6/2014    Procedure: COLONOSCOPY;  Surgeon: Zacarias Paz MD;  Location: [facility]    DILATION AND CURETTAGE, HYSTEROSCOPY DIAGNOSTIC, COMBINED  8/1/2014    Procedure: COMBINED DILATION AND CURETTAGE, HYSTEROSCOPY DIAGNOSTIC;  Surgeon: Rodolfo Clifford MD;  Location: [facility]    ESOPHAGOSCOPY, GASTROSCOPY, DUODENOSCOPY (EGD), COMBINED N/A 9/5/2014    Procedure: COMBINED ESOPHAGOSCOPY, GASTROSCOPY, DUODENOSCOPY (EGD);  Surgeon: Zacarias Paz MD;  [facility]    HERNIORRHAPHY UMBILICAL N/A 9/3/2019    Procedure: OPEN UMBILICAL HERNIA REPAIR;  Surgeon: Carter Vaughn MD;  [facility]    LAPAROSCOPIC ASSISTED HYSTERECTOMY VAGINAL N/A 6/24/2015    Procedure: LAPAROSCOPIC ASSISTED HYSTERECTOMY VAGINAL;  Surgeon: Rodolfo Clifford MD;  [facility]    LAPAROSCOPIC OOPHORECTOMY Right 3/23/2016    Procedure: LAPAROSCOPIC OOPHORECTOMY;  Surgeon: Rodolfo Clifford MD;  [facility]    LAPAROSCOPIC SALPINGO-OOPHORECTOMY Left 5/3/2017    Procedure: LAPAROSCOPIC SALPINGO-OOPHORECTOMY;  LAPAROSCOPIC LEFT OOPHORECTOMY;  Surgeon: Rodolfo Clifford MD;  [facility]    LAPAROSCOPY DIAGNOSTIC (GYN) N/A 3/18/2015    Procedure: LAPAROSCOPY DIAGNOSTIC (GYN);  Surgeon: Rodolfo Clifford MD;  Location: [facility]    no surgeries      SALPINGECTOMY Bilateral 6/24/2015    Procedure: SALPINGECTOMY;  Surgeon: Rodolfo Clifford MD;  [facility]       Family History:    Family History   Problem Relation Age of Onset    Asthma Mother     Unknown/Adopted Maternal Grandmother     Unknown/Adopted Maternal Grandfather        Social History:  Marital Status:  Single [1]  Social History     Tobacco Use    Smoking status: Every Day     Packs/day: 0.50     Years: 7.00     Additional pack years: 0.00     Total  pack years: 3.50     Types: Cigarettes    Smokeless tobacco: Never    Tobacco comments:     declines quitline referral 9/10/19   Substance Use Topics    Alcohol use: Not Currently     Comment: social    Drug use: No        Medications:    amphetamine-dextroamphetamine (ADDERALL) 30 MG tablet  aspirin (ASA) 81 MG chewable tablet  cloNIDine (CATAPRES) 0.1 MG tablet  hydrOXYzine (ATARAX) 25 MG tablet  omeprazole (PRILOSEC) 20 MG DR capsule  QUEtiapine (SEROQUEL) 50 MG tablet  Acetaminophen (TYLENOL PO)  albuterol (PROAIR HFA/PROVENTIL HFA/VENTOLIN HFA) 108 (90 Base) MCG/ACT inhaler  albuterol (PROVENTIL) (2.5 MG/3ML) 0.083% neb solution  amphetamine-dextroamphetamine (ADDERALL) 30 MG tablet  estradiol (ESTRACE) 1 MG tablet  naproxen (NAPROSYN) 500 MG tablet  ondansetron (ZOFRAN ODT) 4 MG ODT tab  sertraline (ZOLOFT) 50 MG tablet          Review of Systems   Constitutional:  Negative for chills and fever.   Respiratory:  Positive for shortness of breath. Negative for cough.    All other systems reviewed and are negative.      Physical Exam   BP: 106/74  Pulse: 104  Temp: 97.4  F (36.3  C)  Resp: 18  SpO2: 100 %      Physical Exam  Constitutional:       General: She is not in acute distress.     Appearance: She is not diaphoretic.   HENT:      Head: Normocephalic and atraumatic.      Right Ear: External ear normal.      Left Ear: External ear normal.      Nose: No congestion or rhinorrhea.      Mouth/Throat:      Pharynx: Oropharynx is clear. No oropharyngeal exudate.   Eyes:      General: No scleral icterus.     Pupils: Pupils are equal, round, and reactive to light.   Cardiovascular:      Rate and Rhythm: Normal rate and regular rhythm.      Heart sounds: Normal heart sounds.   Pulmonary:      Effort: No respiratory distress.      Breath sounds: Rhonchi present.   Abdominal:      General: Bowel sounds are normal.      Palpations: Abdomen is soft.      Tenderness: There is no abdominal tenderness.   Musculoskeletal:          General: No tenderness.      Cervical back: Normal range of motion and neck supple.      Right lower leg: No tenderness. No edema.      Left lower leg: No tenderness. No edema.   Skin:     General: Skin is warm.      Capillary Refill: Capillary refill takes less than 2 seconds.      Findings: No rash.   Neurological:      Mental Status: Mental status is at baseline.      Cranial Nerves: No cranial nerve deficit.   Psychiatric:         Mood and Affect: Mood normal.         Behavior: Behavior normal.         ED Course        Procedures    Results for orders placed during the hospital encounter of 03/17/24    POC US ECHO LIMITED    Impression  [facility] Procedure Note    Limited Bedside ED Cardiac Ultrasound:    PROCEDURE: PERFORMED BY: Dr. Ramu Mancilla MD  INDICATIONS/SYMPTOM:  Chest Pain and Shortness of Breath  PROBE: Cardiac phased array probe  BODY LOCATION: Chest  FINDINGS:  The ultrasound was performed utilizing the subcostal, parasternal long axis, parasternal short axis, and apical 4 chamber views.  Cardiac contractility:  Present  Gross estimation of cardiac kinesis: normal  Pericardial Effusion:  None  RV:LV ratio: LV > RV  INTERPRETATION:    Chamber size and motion were grossly normal with LV > RV, normal cardiac kinesis.  No pericardial effusion was found.  IVC visualized and findings indicate normovolemia.  IMAGE DOCUMENTATION: Images were archived to PACs system.            EKG Interpretation:      Interpreted by Ramu Mancilla MD  Time reviewed:   Symptoms at time of EKG: cp   Rhythm: normal sinus   Rate: normal  Axis: normal  Ectopy: none  Conduction: normal  ST Segments/ T Waves: No ST-T wave changes  Q Waves: none  Comparison to prior:     Clinical Impression: normal EKG      Critical Care time:               Results for orders placed or performed during the hospital encounter of 03/17/24 (from the past 24 hour(s))   EKG 12-lead, tracing only   Result Value Ref [place]    Systolic Blood  Pressure  mmHg    Diastolic Blood Pressure  mmHg    Ventricular Rate 95 BPM    Atrial Rate 95 BPM    OK Interval 136 ms    QRS Duration 78 ms     ms    QTc 449 ms    P Axis 76 degrees    R AXIS 92 degrees    T Axis 72 degrees    Interpretation ECG       Sinus rhythm with sinus arrhythmia  Rightward axis  Borderline ECG  When compared with ECG of 05-NOV-2023 00:43,  No significant change was found     CBC with platelets differential    Narrative    The following orders were created for panel order CBC with platelets differential.  Procedure                               Abnormality         Status                     ---------                               -----------         ------                     CBC with platelets and d...[[id number]]                      Final result                 Please view results for these tests on the individual orders.   D dimer quantitative   Result Value Ref [place]    D-Dimer Quantitative 0.31 0.00 - 0.50 ug/mL FEU    Narrative    This D-dimer assay is intended for use in conjunction with a clinical pretest probability assessment model to exclude pulmonary embolism (PE) and deep venous thrombosis (DVT) in outpatients suspected of PE or DVT. The cut-off value is 0.50 ug/mL FEU.   Comprehensive metabolic panel   Result Value Ref [place]    Sodium 139 135 - 145 mmol/L    Potassium 3.5 3.4 - 5.3 mmol/L    Carbon Dioxide (CO2) 28 22 - 29 mmol/L    Anion Gap 7 7 - 15 mmol/L    Urea Nitrogen 22.2 (H) 6.0 - 20.0 mg/dL    Creatinine 0.85 0.51 - 0.95 mg/dL    GFR Estimate >90 >60 mL/min/1.73m2    Calcium 8.8 8.6 - 10.0 mg/dL    Chloride 104 98 - 107 mmol/L    Glucose 93 70 - 99 mg/dL    Alkaline Phosphatase 82 40 - 150 U/L    AST 19 0 - 45 U/L    ALT 20 0 - 50 U/L    Protein Total 6.4 6.4 - 8.3 g/dL    Albumin 4.1 3.5 - 5.2 g/dL    Bilirubin Total 0.2 <=1.2 mg/dL   Troponin T, High Sensitivity   Result Value Ref [place]    Troponin T, High Sensitivity <6 <=14 ng/L   Procalcitonin   Result  Value Ref [place]    Procalcitonin 0.02 <0.50 ng/mL   Nt probnp inpatient (BNP)   Result Value Ref [facility]    N terminal Pro BNP Inpatient <36 0 - 450 pg/mL   CBC with platelets and differential   Result Value Ref [place]    WBC Count 7.2 4.0 - 11.0 10e3/uL    RBC Count 4.43 3.80 - 5.20 10e6/uL    Hemoglobin 13.1 11.7 - 15.7 g/dL    Hematocrit 38.9 35.0 - 47.0 %    MCV 88 78 - 100 fL    MCH 29.6 26.5 - 33.0 pg    MCHC 33.7 31.5 - 36.5 g/dL    RDW 12.8 10.0 - 15.0 %    Platelet Count 259 150 - 450 10e3/uL    % Neutrophils 40 %    % Lymphocytes 51 %    % Monocytes 5 %    % Eosinophils 4 %    % Basophils 0 %    % Immature Granulocytes 0 %    NRBCs per 100 WBC 0 <1 /100    Absolute Neutrophils 2.9 1.6 - 8.3 10e3/uL    Absolute Lymphocytes 3.6 0.8 - 5.3 10e3/uL    Absolute Monocytes 0.4 0.0 - 1.3 10e3/uL    Absolute Eosinophils 0.3 0.0 - 0.7 10e3/uL    Absolute Basophils 0.0 0.0 - 0.2 10e3/uL    Absolute Immature Granulocytes 0.0 <=0.4 10e3/uL    Absolute NRBCs 0.0 10e3/uL   [facility] Draw    Narrative    The following orders were created for panel order [facility] Draw.  Procedure                               Abnormality         Status                     ---------                               -----------         ------                     Extra Red Top Tube[[id number]]                               Final result               Extra Green Top (Lithium...[[id number]]                      Final result               Extra Purple Top Tube[[id number]]                            Final result                 Please view results for these tests on the individual orders.   Extra Red Top Tube   Result Value Ref [facility]    Hold Specimen JIC    Extra Green Top (Lithium Heparin) Tube   Result Value Ref [facility]    Hold Specimen JIC    Extra Purple Top Tube   Result Value Ref [place]    Hold Specimen JIC    POC US ECHO LIMITED    Impression    [facility] Procedure Note      Limited Bedside ED Cardiac Ultrasound:    PROCEDURE: PERFORMED BY:   Ramu Mancilla MD  INDICATIONS/SYMPTOM:  Chest Pain and Shortness of Breath  PROBE: Cardiac phased array probe  BODY LOCATION: Chest  FINDINGS:   The ultrasound was performed utilizing the subcostal, parasternal long axis, parasternal short axis, and apical 4 chamber views.  Cardiac contractility:  Present  Gross estimation of cardiac kinesis: normal  Pericardial Effusion:  None  RV:LV ratio: LV > RV  INTERPRETATION:    Chamber size and motion were grossly normal with LV > RV, normal cardiac kinesis.  No pericardial effusion was found.  IVC visualized and findings indicate normovolemia.  IMAGE DOCUMENTATION: Images were archived to PACs system.         Symptomatic Influenza A/B, RSV, & SARS-CoV2 PCR (COVID-19) Nasopharyngeal    Specimen: Nasopharyngeal; Swab   Result Value Ref [place]    Influenza A PCR Negative Negative    Influenza B PCR Negative Negative    RSV PCR Negative Negative    SARS CoV2 PCR Negative Negative    Narrative    Testing was performed using the Xpert Xpress CoV2/Flu/RSV Assay on the [employer] GeneXpert Instrument. This test should be ordered for the detection of SARS-CoV-2, influenza, and RSV viruses in individuals who meet clinical and/or epidemiological criteria. Test performance is unknown in asymptomatic patients. This test is for in vitro diagnostic use under the FDA EUA for laboratories certified under CLIA to perform high or moderate complexity testing. This test has not been FDA cleared or approved. A negative result does not rule out the presence of PCR inhibitors in the specimen or target RNA in concentration below the limit of detection for the assay. If only one viral target is positive but coinfection with multiple targets is suspected, the sample should be re-tested with another FDA cleared, approved, or authorized test, if coinfection would change clinical management. This test was validated by the [facility]. These laboratories are certified under the Clinical  Laboratory Improvement Amendments of 1988 (CLIA-88) as qualified to perform high complexity laboratory testing.       Medications   ketorolac (TORADOL) injection 15 mg (15 mg Intravenous $Given 3/18/24 0029)   sodium chloride 0.9% BOLUS 1,000 mL (0 mLs Intravenous Stopped 3/18/24 0135)   HYDROmorphone (PF) (DILAUDID) injection 0.5 mg (0.5 mg Intravenous $Given 3/18/24 0135)   iopamidol (ISOVUE-370) solution 53 mL (53 mLs Intravenous $Given 3/18/24 0142)   sodium chloride (PF) 0.9% PF flush 100 mL (100 mLs Intravenous $Given 3/18/24 0142)       Assessments & Plan (with Medical Decision Making)     I have reviewed the nursing notes.    I have reviewed the findings, diagnosis, plan and need for follow up with the patient.          Medical Decision Making  The patient's presentation was of moderate complexity (an undiagnosed new problem with uncertain diagnosis).    The patient's evaluation involved:  review of 3+ test result(s) ordered prior to this encounter (old films ad ecgs)  ordering and/or review of 3+ test(s) in this encounter (see separate area of note for details)    The patient's management necessitated high risk (a parenteral controlled substance).    33-year-old female here with difficulty breathing and chest pain.  Given absence of cough, will order dimer as   I cannot PERC her out due to tachycardia.  However if x-ray shows obvious infiltrate, will stop PE workup even if dimer positive.    Reassessment  Toradol did not improve patient's pain.  Plain film did not show infiltrate.  All labs are normal.  Patient remained symptomatic and given no improvement, we considered pericarditis given positional nature.  Patient had no EKG changes consistent pericarditis, no friction rub is auscultated and patient without fever or pericardial effusion so pericarditis criteria not met.  Patient still had difficulty breathing due to pain, bilaterally, so that point despite a D-dimer, I scan patient, no pathology  identified.    Will message patient's primary to CBC in a few days to make sure she is getting better.  At this point we have ruled out pericarditis, pulm embolism, ACS, pneumonia, pneumothorax.  Aortic dissection unlikely given negative dimer.    Discharge Medication List as of 3/18/2024  3:21 AM          Final diagnoses:   Chest pain, unspecified type       3/17/2024   [facility]       Ramu Mancilla MD  03/18/24 [id number]

## 2024-03-18 NOTE — DISCHARGE INSTRUCTIONS
You came to the ER with difficulty breathing and chest pain. Despite a full workup, we could not figure out why you are having these symptoms. We ruled out pneumonia, a heart attack, a pulmonary embolism, pneumothorax, and rib fractures. An unlikely but possible diagnosis is pericarditis. I want you to see Dr. Houston this week and see what he thinks, I will message him to help facilitate this. Come back if you feel worse as sometimes pathology takes a few days to fully blossom and show itself.     For pain, take 800mg of ibuprofen every 8 hours. You can also take 1,000mg of tylenol on top of that if you wish. Do not take ibuprofen with aspirin.      What to expect when you have contrast    During your exam, we will inject  contrast  into your vein or artery. (Contrast is a clear liquid with iodine in it. It shows up on X-rays.)    You may feel warm or hot. You may have a metal taste in your mouth and a slight upset stomach. You may also feel pressure near the kidneys and bladder. These effects will last about 1 to 3 minutes.    Please tell us if you have:   Sneezing    Itching   Hives    Swelling in the face   A hoarse voice   Breathing problems   Other new symptoms    Serious problems are rare.  They may include:   Irregular heartbeat    Seizures   Kidney failure             Tissue damage   Shock     Death    If you have any problems during the exam, we  will treat them right away.    When you get home    Call your hospital if you have any new symptoms in the next 2 days, like hives or swelling. (Phone numbers are at the bottom of this page.) Or call your family doctor.     If you have wheezing or trouble breathing, call 911.    Self-care  -Drink at least 4 extra glasses of water today.   This reduces the stress on your kidneys.  -Keep taking your regular medicines.    The contrast will pass out of your body in your  Urine(pee). This will happen in the next 24 hours. You  will not feel this. Your urine will  not  change color.    If you have kidney problems or take metformin    Drink 4 to 8 large glasses of water for the next  2 days, if you are not on a fluid restriction.    ?If you take metformin (Glucophage or Glucovance) for diabetes, keep taking it.      ?Your kidney function tests are abnormal.  If you take Metformin, do not take it for 48 hours. Please go to your clinic for a blood test within 3 days after your exam before the restarting this medicine.     (Note to provider:please give patient prescription for lab tests.)    ?Special instructions:     I have read and understand the above information.    Patient Sign Here:______________________________________Date:________Time:______    Staff Sign Here:________________________________________Date:_______Time:______      Radiology Departments:     ?[facility]: [phone] ?[place]: [phone]     ?[place]: [phone] ?[facility]:[phone]      ?[place]: [phone]  ?[facility]: [phone]  ?[place]:[phone]    ?[facility]:[phone]  ?[facility]:[phone]

## 2024-05-10 ENCOUNTER — HOSPITAL ENCOUNTER (EMERGENCY)
Facility: HOSPITAL | Age: 34
Discharge: HOME | End: 2024-05-10
Attending: NURSE PRACTITIONER | Admitting: NURSE PRACTITIONER
Payer: MEDICAID

## 2024-05-10 ENCOUNTER — APPOINTMENT (OUTPATIENT)
Dept: GENERAL RADIOLOGY | Facility: HOSPITAL | Age: 34
End: 2024-05-10
Attending: NURSE PRACTITIONER
Payer: MEDICAID

## 2024-05-10 VITALS
TEMPERATURE: 98.5 F | HEIGHT: 65 IN | DIASTOLIC BLOOD PRESSURE: 72 MMHG | WEIGHT: 133 LBS | OXYGEN SATURATION: 99 % | RESPIRATION RATE: 16 BRPM | BODY MASS INDEX: 22.16 KG/M2 | HEART RATE: 96 BPM | SYSTOLIC BLOOD PRESSURE: 103 MMHG

## 2024-05-10 DIAGNOSIS — H92.02 OTALGIA, LEFT: Primary | ICD-10-CM

## 2024-05-10 DIAGNOSIS — M79.674 GREAT TOE PAIN, RIGHT: ICD-10-CM

## 2024-05-10 DIAGNOSIS — H66.92 LEFT OTITIS MEDIA, UNSPECIFIED OTITIS MEDIA TYPE: ICD-10-CM

## 2024-05-10 LAB
HOLD SPECIMEN: NORMAL
URATE SERPL-MCNC: 3.3 MG/DL (ref 2.4–5.7)

## 2024-05-10 PROCEDURE — 73630 X-RAY EXAM OF FOOT: CPT | Mod: RT

## 2024-05-10 PROCEDURE — G0463 HOSPITAL OUTPT CLINIC VISIT: HCPCS

## 2024-05-10 PROCEDURE — 36415 COLL VENOUS BLD VENIPUNCTURE: CPT | Performed by: NURSE PRACTITIONER

## 2024-05-10 PROCEDURE — 84550 ASSAY OF BLOOD/URIC ACID: CPT | Performed by: NURSE PRACTITIONER

## 2024-05-10 PROCEDURE — 99213 OFFICE O/P EST LOW 20 MIN: CPT | Performed by: NURSE PRACTITIONER

## 2024-05-10 RX ORDER — PREDNISONE 10 MG/1
40 TABLET ORAL DAILY
Qty: 30 TABLET | Refills: 0 | Status: SHIPPED | OUTPATIENT
Start: 2024-05-10 | End: 2024-05-26

## 2024-05-10 RX ORDER — AZITHROMYCIN 250 MG/1
TABLET, FILM COATED ORAL
Qty: 6 TABLET | Refills: 0 | Status: SHIPPED | OUTPATIENT
Start: 2024-05-10 | End: 2024-05-15

## 2024-05-10 ASSESSMENT — ENCOUNTER SYMPTOMS
CHILLS: 0
ARTHRALGIAS: 1
FEVER: 0
JOINT SWELLING: 0

## 2024-05-10 ASSESSMENT — ACTIVITIES OF DAILY LIVING (ADL)
ADLS_ACUITY_SCORE: 35
ADLS_ACUITY_SCORE: 35

## 2024-05-10 NOTE — ED PROVIDER NOTES
History     Chief Complaint   Patient presents with    Otalgia     HPI  Ihsan Millan is a 33 year old female who presents to urgent care for evaluation of left ear pain that she tells me started about a couple months ago.  Earlier today she heard a pop sound to her left ear and then the pain worsened.  And she also states that her hearing is decreased.  No ear drainage.  No fever or chills.  Denies recent URI symptoms.  Never had surgeries to her ears.    Additionally, patient is requesting for evaluation of pain to her right great toe near the MTP joint.  Denies any recent trauma or injury.  Notes that she did have some swelling but that has decreased.  No known history of gout.  No new shoes.    Allergies:  Allergies   Allergen Reactions    Skin Adhesives [Cyanoacrylate] Dermatitis     Patient developed contact dermatitis after application of topical adhesive to surgical incision    Lexapro [Escitalopram] Nausea and Vomiting    Codeine Sulfate Rash    Penicillins Rash    Tramadol Rash       Problem List:    Patient Active Problem List    Diagnosis Date Noted    RUQ abdominal pain 02/12/2017     Priority: Medium    Pneumonia of right middle lobe due to infectious organism 02/12/2017     Priority: Medium    Tobacco abuse 02/12/2017     Priority: Medium    Status post laparoscopic assisted vaginal hysterectomy (LAVH) 06/25/2015     Priority: Medium    Endometriosis 04/16/2015     Priority: Medium     Depo lupron 4/15.  Laparoscopy 3/15      ASCUS on Pap smear 07/18/2014     Priority: Medium     + hpv colpo 7/14      Postpartum depression 07/18/2014     Priority: Medium     zoloft rx      H. pylori infection      Priority: Medium    Moderate persistent asthma 03/19/2014     Priority: Medium    GERD (gastroesophageal reflux disease) 09/12/2013     Priority: Medium    Intermittent asthma 09/12/2013     Priority: Medium     Increased with pregnancy  Send for eval and asthma action plan  Smoking cessation  counseling  Flu shot given      Pregnant state, incidental 03/23/2011     Priority: Medium    Contraceptive management 11/09/2010     Priority: Medium        Past Medical History:    Past Medical History:   Diagnosis Date    Asthma     GERD (gastroesophageal reflux disease)     H. pylori infection        Past Surgical History:    Past Surgical History:   Procedure Laterality Date    COLONOSCOPY N/A 11/6/2014    Procedure: COLONOSCOPY;  Surgeon: Zacarias Paz MD;  Location: [facility]    DILATION AND CURETTAGE, HYSTEROSCOPY DIAGNOSTIC, COMBINED  8/1/2014    Procedure: COMBINED DILATION AND CURETTAGE, HYSTEROSCOPY DIAGNOSTIC;  Surgeon: Rodolfo Clifford MD;  Location: [facility]    ESOPHAGOSCOPY, GASTROSCOPY, DUODENOSCOPY (EGD), COMBINED N/A 9/5/2014    Procedure: COMBINED ESOPHAGOSCOPY, GASTROSCOPY, DUODENOSCOPY (EGD);  Surgeon: Zacarias Paz MD;  Location: [facility]    HERNIORRHAPHY UMBILICAL N/A 9/3/2019    Procedure: OPEN UMBILICAL HERNIA REPAIR;  Surgeon: Carter Vaughn MD;  Location: [facility]    LAPAROSCOPIC ASSISTED HYSTERECTOMY VAGINAL N/A 6/24/2015    Procedure: LAPAROSCOPIC ASSISTED HYSTERECTOMY VAGINAL;  Surgeon: Rodolfo Clifford MD;  [facility]    LAPAROSCOPIC OOPHORECTOMY Right 3/23/2016    Procedure: LAPAROSCOPIC OOPHORECTOMY;  Surgeon: Rodolfo Clifford MD;  [facility]    LAPAROSCOPIC SALPINGO-OOPHORECTOMY Left 5/3/2017    Procedure: LAPAROSCOPIC SALPINGO-OOPHORECTOMY;  LAPAROSCOPIC LEFT OOPHORECTOMY;  Surgeon: Rodolfo Clifford MD;  [facility]    LAPAROSCOPY DIAGNOSTIC (GYN) N/A 3/18/2015    Procedure: LAPAROSCOPY DIAGNOSTIC (GYN);  Surgeon: Rodolfo Clifford MD;  Location: [facility]    no surgeries      SALPINGECTOMY Bilateral 6/24/2015    Procedure: SALPINGECTOMY;  Surgeon: Rodolfo Clifford MD;  Location: [facility]       Family History:    Family History   Problem Relation Age of Onset    Asthma Mother     Unknown/Adopted Maternal Grandmother     Unknown/Adopted Maternal Grandfather        Social  "History:  Marital Status:  Single [1]  Social History     Tobacco Use    Smoking status: Every Day     Current packs/day: 0.50     Average packs/day: 0.5 packs/day for 7.0 years (3.5 ttl pk-yrs)     Types: Cigarettes    Smokeless tobacco: Never    Tobacco comments:     declines quitline referral 9/10/19   Substance Use Topics    Alcohol use: Not Currently     Comment: social    Drug use: No        Medications:    Acetaminophen (TYLENOL PO)  albuterol (PROAIR HFA/PROVENTIL HFA/VENTOLIN HFA) 108 (90 Base) MCG/ACT inhaler  albuterol (PROVENTIL) (2.5 MG/3ML) 0.083% neb solution  amphetamine-dextroamphetamine (ADDERALL) 30 MG tablet  amphetamine-dextroamphetamine (ADDERALL) 30 MG tablet  aspirin (ASA) 81 MG chewable tablet  azithromycin (ZITHROMAX) 250 MG tablet  cloNIDine (CATAPRES) 0.1 MG tablet  hydrOXYzine (ATARAX) 25 MG tablet  naproxen (NAPROSYN) 500 MG tablet  omeprazole (PRILOSEC) 20 MG DR capsule  predniSONE (DELTASONE) 10 MG tablet  QUEtiapine (SEROQUEL) 50 MG tablet  estradiol (ESTRACE) 1 MG tablet  ondansetron (ZOFRAN ODT) 4 MG ODT tab  sertraline (ZOLOFT) 50 MG tablet          Review of Systems   Constitutional:  Negative for chills and fever.   HENT:  Positive for ear pain. Negative for ear discharge.    Musculoskeletal:  Positive for arthralgias. Negative for gait problem and joint swelling.   All other systems reviewed and are negative.      Physical Exam   BP: 103/72  Pulse: 96  Temp: 98.5  F (36.9  C)  Resp: 16  Height: 165.1 cm (5' 5\")  Weight: 60.3 kg (133 lb)  SpO2: 99 %      Physical Exam  Vitals and nursing note reviewed.   Constitutional:       Appearance: Normal appearance. She is not ill-appearing or toxic-appearing.   HENT:      Head: Atraumatic.      Right Ear: Tympanic membrane, ear canal and external ear normal. There is no impacted cerumen.      Left Ear: Ear canal and external ear normal. There is no impacted cerumen. Tympanic membrane is erythematous.      Ears:      Comments: Partial " erythema to left TM with some fluid air levels noted.  TM intact.  No drainage to the left ear canal.     Mouth/Throat:      Mouth: Mucous membranes are moist.   Eyes:      Pupils: Pupils are equal, round, and reactive to light.   Cardiovascular:      Rate and Rhythm: Normal rate.      Pulses:           Dorsalis pedis pulses are 2+ on the right side.   Pulmonary:      Effort: Pulmonary effort is normal.   Musculoskeletal:      Cervical back: Neck supple.      Right foot: Normal range of motion. No deformity.   Feet:      Right foot:      Skin integrity: Skin integrity normal. No erythema.      Comments: Mild swelling around MTP joint of right great toe.  Tender to palpation. No bruising or obvious deformity.   Skin:     General: Skin is warm and dry.      Coloration: Skin is not pale.      Findings: No bruising.   Neurological:      Mental Status: She is alert and oriented to person, place, and time.         ED Course        Procedures         Results for orders placed or performed during the hospital encounter of 05/10/24 (from the past 24 hour(s))   Foot  XR, G/E 3 views, right    Narrative    PROCEDURE: XR FOOT RIGHT G/E 3 VIEWS 5/10/2024 3:33 PM    HISTORY: pain, tenderness to MTP joint right great toe; denies injury;  no hx gout    COMPARISONS: None.    TECHNIQUE: 3 views.    FINDINGS: No acute fracture or dislocation is seen. There is no focal  bone lesion. No erosion is seen. No significant degenerative change is  seen.    There is no radiopaque foreign body.         Impression    IMPRESSION: No acute bony abnormality.    KAYYLNN SMITH MD         SYSTEM ID:  [id number]   Uric acid   Result Value Ref Range    Uric Acid 3.3 2.4 - 5.7 mg/dL   Extra Tube    Narrative    The following orders were created for panel order Extra Tube.  Procedure                               Abnormality         Status                     ---------                               -----------         ------                     Extra  Blue Top Tube[[id number]]                              Final result               Extra Red Top Tube[[id number]]                               Final result               Extra Purple Top Tube[[id number]]                            Final result               Extra Heparinized Syringe[[id number]]                        Final result                 Please view results for these tests on the individual orders.   Extra Blue Top Tube   Result Value Ref Range    Hold Specimen JIC    Extra Red Top Tube   Result Value Ref Range    Hold Specimen JIC    Extra Purple Top Tube   Result Value Ref Range    Hold Specimen JIC    Extra Heparinized Syringe   Result Value Ref Range    Hold Specimen JIC        Medications - No data to display    Assessments & Plan (with Medical Decision Making)   33 year old female that presented for evaluation of left ear pain and right great toe pain. She was found to have a right ear infection today which will be treated with azithromycin. Recommended tylenol or ibuprofen as needed for pain. Advised close follow up with primary doctor if no improvement in symptoms especially her hearing.     Patient reports pain and tenderness mostly to the MTP joint of right great toe with mild swelling to the MTP joint. CMS intact. Xray of right foot is negative for acute findings. Uric acid is within normal limits. Slightly suspicious of gout. Possibly due to type of shoes she has been wearing. Will treat with prednisone. Recommended tylenol or ibuprofen as needed for pain. Consider wearing different types of shoes. Follow up with primary doctor as needed    Return to urgent care or emergency department for any worsening or concerning symptoms.     I have reviewed the nursing notes.    I have reviewed the findings, diagnosis, plan and need for follow up with the patient.  This document was prepared using a combination of typing and voice generated software.  While every attempt was made for accuracy, spelling and  grammatical errors may exist.         Discharge Medication List as of 5/10/2024  4:03 PM        START taking these medications    Details   azithromycin (ZITHROMAX) 250 MG tablet Take 2 tablets (500 mg) by mouth daily for 1 day, THEN 1 tablet (250 mg) daily for 4 days., Disp-6 tablet, R-0, [facility]      predniSONE (DELTASONE) 10 MG tablet Take 4 tablets (40 mg) by mouth daily, Disp-30 tablet, R-0, [facility]             Final diagnoses:   Otalgia, left   Left otitis media, unspecified otitis media type   Great toe pain, right       5/10/2024   [facility]       Mpofu, Prudence, CNP  05/12/24 0915

## 2024-05-10 NOTE — ED TRIAGE NOTES
"Pt presents with c/o left ear pain, pt reports symptom onset was a few months ago. Pt reports hearing a \"pop\" earlier today followed by an increase in pain.         "

## 2024-05-10 NOTE — DISCHARGE INSTRUCTIONS
Your ear does show signs of infection today.  This to be treated with antibiotic I prescribed today.  I would continue taking aspirin or ibuprofen or Tylenol as needed for the pain.  If your hearing as well as the pain continues after treatment, please schedule an appointment with your primary doctor for evaluation of your ears.    X-ray of your toe looks good today.  Continue taking pain medication.  I did prescribe the prednisone to see if this would help.  Return to urgent care or emergency room for any worsening or concerning symptoms.

## 2024-05-26 ENCOUNTER — HOSPITAL ENCOUNTER (EMERGENCY)
Facility: HOSPITAL | Age: 34
Discharge: HOME | End: 2024-05-26
Attending: PHYSICIAN ASSISTANT | Admitting: PHYSICIAN ASSISTANT
Payer: MEDICAID

## 2024-05-26 VITALS
BODY MASS INDEX: 22.3 KG/M2 | OXYGEN SATURATION: 95 % | RESPIRATION RATE: 18 BRPM | SYSTOLIC BLOOD PRESSURE: 106 MMHG | DIASTOLIC BLOOD PRESSURE: 74 MMHG | TEMPERATURE: 97.1 F | WEIGHT: 134 LBS | HEART RATE: 103 BPM

## 2024-05-26 DIAGNOSIS — H60.332 ACUTE SWIMMER'S EAR OF LEFT SIDE: ICD-10-CM

## 2024-05-26 PROCEDURE — 250N000009 HC RX 250: Performed by: PHYSICIAN ASSISTANT

## 2024-05-26 PROCEDURE — G0463 HOSPITAL OUTPT CLINIC VISIT: HCPCS

## 2024-05-26 PROCEDURE — 99213 OFFICE O/P EST LOW 20 MIN: CPT | Performed by: PHYSICIAN ASSISTANT

## 2024-05-26 RX ORDER — OFLOXACIN 3 MG/ML
10 SOLUTION AURICULAR (OTIC) ONCE
Status: COMPLETED | OUTPATIENT
Start: 2024-05-26 | End: 2024-05-26

## 2024-05-26 RX ORDER — NEOMYCIN SULFATE, POLYMYXIN B SULFATE AND HYDROCORTISONE 10; 3.5; 1 MG/ML; MG/ML; [USP'U]/ML
4 SUSPENSION/ DROPS AURICULAR (OTIC) 4 TIMES DAILY
Qty: 10 ML | Refills: 0 | Status: SHIPPED | OUTPATIENT
Start: 2024-05-26 | End: 2024-06-02

## 2024-05-26 RX ORDER — OFLOXACIN 3 MG/ML
10 SOLUTION AURICULAR (OTIC) ONCE
Status: DISCONTINUED | OUTPATIENT
Start: 2024-05-26 | End: 2024-05-26

## 2024-05-26 RX ADMIN — OFLOXACIN 10 DROP: 3 SOLUTION AURICULAR (OTIC) at 19:38

## 2024-05-26 ASSESSMENT — COLUMBIA-SUICIDE SEVERITY RATING SCALE - C-SSRS
1. IN THE PAST MONTH, HAVE YOU WISHED YOU WERE DEAD OR WISHED YOU COULD GO TO SLEEP AND NOT WAKE UP?: NO
2. HAVE YOU ACTUALLY HAD ANY THOUGHTS OF KILLING YOURSELF IN THE PAST MONTH?: NO
6. HAVE YOU EVER DONE ANYTHING, STARTED TO DO ANYTHING, OR PREPARED TO DO ANYTHING TO END YOUR LIFE?: NO

## 2024-05-26 ASSESSMENT — ACTIVITIES OF DAILY LIVING (ADL): ADLS_ACUITY_SCORE: 35

## 2024-05-26 NOTE — DISCHARGE INSTRUCTIONS
Use the ear drops as prescribed for your ear infection. Ofloxacin drops dispensed, 10 drops once daily to left ear x 7 days. [facility] not working so could not RX and all pharmacies closed.   Return here with any new or worsening symptoms.

## 2024-05-26 NOTE — ED PROVIDER NOTES
History     Chief Complaint   Patient presents with    Otalgia     HPI  Ihsan Millan is a 33 year old female who presents with external left ear pain since yesterday. No fevers/chills. No drainage.     Allergies:  Allergies   Allergen Reactions    Skin Adhesives [Cyanoacrylate] Dermatitis     Patient developed contact dermatitis after application of topical adhesive to surgical incision    Lexapro [Escitalopram] Nausea and Vomiting    Codeine Sulfate Rash    Penicillins Rash    Tramadol Rash       Problem List:    Patient Active Problem List    Diagnosis Date Noted    RUQ abdominal pain 02/12/2017     Priority: Medium    Pneumonia of right middle lobe due to infectious organism 02/12/2017     Priority: Medium    Tobacco abuse 02/12/2017     Priority: Medium    Status post laparoscopic assisted vaginal hysterectomy (LAVH) 06/25/2015     Priority: Medium    Endometriosis 04/16/2015     Priority: Medium     Depo lupron 4/15.  Laparoscopy 3/15      ASCUS on Pap smear 07/18/2014     Priority: Medium     + hpv colpo 7/14      Postpartum depression 07/18/2014     Priority: Medium     zoloft rx      H. pylori infection      Priority: Medium    Moderate persistent asthma 03/19/2014     Priority: Medium    GERD (gastroesophageal reflux disease) 09/12/2013     Priority: Medium    Intermittent asthma 09/12/2013     Priority: Medium     Increased with pregnancy  Send for eval and asthma action plan  Smoking cessation counseling  Flu shot given      Pregnant state, incidental 03/23/2011     Priority: Medium    Contraceptive management 11/09/2010     Priority: Medium        Past Medical History:    Past Medical History:   Diagnosis Date    Asthma     GERD (gastroesophageal reflux disease)     H. pylori infection        Past Surgical History:    Past Surgical History:   Procedure Laterality Date    COLONOSCOPY N/A 11/6/2014    Procedure: COLONOSCOPY;  Surgeon: Zacarias Paz MD;  Location[facility]    DILATION AND  CURETTAGE, HYSTEROSCOPY DIAGNOSTIC, COMBINED  8/1/2014    Procedure: COMBINED DILATION AND CURETTAGE, HYSTEROSCOPY DIAGNOSTIC;  Surgeon: Rodolfo Clifford MD;  [facility]    ESOPHAGOSCOPY, GASTROSCOPY, DUODENOSCOPY (EGD), COMBINED N/A 9/5/2014    Procedure: COMBINED ESOPHAGOSCOPY, GASTROSCOPY, DUODENOSCOPY (EGD);  Surgeon: Zacarias Paz MD;  [facility]    HERNIORRHAPHY UMBILICAL N/A 9/3/2019    Procedure: OPEN UMBILICAL HERNIA REPAIR;  Surgeon: Carter Vaughn MD;  [facility]    LAPAROSCOPIC ASSISTED HYSTERECTOMY VAGINAL N/A 6/24/2015    Procedure: LAPAROSCOPIC ASSISTED HYSTERECTOMY VAGINAL;  Surgeon: Rodolfo Clifford MD;  [facility]    LAPAROSCOPIC OOPHORECTOMY Right 3/23/2016    Procedure: LAPAROSCOPIC OOPHORECTOMY;  Surgeon: Rodolfo Clifford MD;  [facility]    LAPAROSCOPIC SALPINGO-OOPHORECTOMY Left 5/3/2017    Procedure: LAPAROSCOPIC SALPINGO-OOPHORECTOMY;  LAPAROSCOPIC LEFT OOPHORECTOMY;  Surgeon: Rodolfo Clifford MD;  [facility]    LAPAROSCOPY DIAGNOSTIC (GYN) N/A 3/18/2015    Procedure: LAPAROSCOPY DIAGNOSTIC (GYN);  Surgeon: Rodolfo Clifford MD;  [facility]    no surgeries      SALPINGECTOMY Bilateral 6/24/2015    Procedure: SALPINGECTOMY;  Surgeon: Rodolfo Clifford MD;  [facility]       Family History:    Family History   Problem Relation Age of Onset    Asthma Mother     Unknown/Adopted Maternal Grandmother     Unknown/Adopted Maternal Grandfather        Social History:  Marital Status:  Single [1]  Social History     Tobacco Use    Smoking status: Every Day     Current packs/day: 0.50     Average packs/day: 0.5 packs/day for 7.0 years (3.5 ttl pk-yrs)     Types: Cigarettes    Smokeless tobacco: Never    Tobacco comments:     declines quitline referral 9/10/19   Substance Use Topics    Alcohol use: Not Currently     Comment: social    Drug use: No        Medications:    neomycin-polymyxin-hydrocortisone (CORTISPORIN) 3.5-70150-6 otic suspension  Acetaminophen (TYLENOL  PO)  albuterol (PROAIR HFA/PROVENTIL HFA/VENTOLIN HFA) 108 (90 Base) MCG/ACT inhaler  albuterol (PROVENTIL) (2.5 MG/3ML) 0.083% neb solution  amphetamine-dextroamphetamine (ADDERALL) 30 MG tablet  amphetamine-dextroamphetamine (ADDERALL) 30 MG tablet  aspirin (ASA) 81 MG chewable tablet  cloNIDine (CATAPRES) 0.1 MG tablet  estradiol (ESTRACE) 1 MG tablet  hydrOXYzine (ATARAX) 25 MG tablet  naproxen (NAPROSYN) 500 MG tablet  omeprazole (PRILOSEC) 20 MG DR capsule  ondansetron (ZOFRAN ODT) 4 MG ODT tab  predniSONE (DELTASONE) 10 MG tablet  QUEtiapine (SEROQUEL) 50 MG tablet  sertraline (ZOLOFT) 50 MG tablet          Review of Systems   All other systems reviewed and are negative.      Physical Exam   BP: 106/74  Pulse: 103  Temp: 97.1  F (36.2  C)  Resp: 18  Weight: 60.8 kg (134 lb)  SpO2: 95 %      Physical Exam  Vitals and nursing note reviewed.   Constitutional:       General: She is not in acute distress.     Appearance: She is well-developed. She is not diaphoretic.   HENT:      Head: Normocephalic and atraumatic.      Right Ear: External ear normal.      Left Ear: External ear normal. Swelling present.      Nose: Nose normal.      Mouth/Throat:      Pharynx: No oropharyngeal exudate.   Eyes:      General: No scleral icterus.        Right eye: No discharge.         Left eye: No discharge.      Conjunctiva/sclera: Conjunctivae normal.      Pupils: Pupils are equal, round, and reactive to light.   Cardiovascular:      Rate and Rhythm: Normal rate and regular rhythm.      Heart sounds: Normal heart sounds. No murmur heard.     No friction rub. No gallop.   Pulmonary:      Effort: Pulmonary effort is normal. No respiratory distress.      Breath sounds: Normal breath sounds. No wheezing or rales.   Chest:      Chest wall: No tenderness.   Abdominal:      General: Bowel sounds are normal.      Palpations: Abdomen is soft.      Tenderness: There is no abdominal tenderness.   Musculoskeletal:      Cervical back: Normal  range of motion and neck supple.   Lymphadenopathy:      Cervical: No cervical adenopathy.   Skin:     General: Skin is warm and dry.      Coloration: Skin is not pale.      Findings: No erythema or rash.   Neurological:      Mental Status: She is alert and oriented to person, place, and time.      Cranial Nerves: No cranial nerve deficit.      Coordination: Coordination normal.   Psychiatric:         Behavior: Behavior normal.         Thought Content: Thought content normal.         Judgment: Judgment normal.         ED Course        Procedures       No results found for this or any previous visit (from the past 24 hour(s)).    Medications - No data to display    Assessments & Plan (with Medical Decision Making)   Exam consistent with acute otitis externa. TM visible. RX for otic cortisporin drops was prescribed. Pt was discharged home following in stable condition.     Plan:  Use the ear drops as prescribed for your ear infection.   Return here with any new or worsening symptoms.     I have reviewed the nursing notes.    I have reviewed the findings, diagnosis, plan and need for follow up with the patient.      New Prescriptions    NEOMYCIN-POLYMYXIN-HYDROCORTISONE (CORTISPORIN) 3.5-92070-2 OTIC SUSPENSION    Place 4 drops Into the left ear 4 times daily for 7 days       Final diagnoses:   Acute swimmer's ear of left side       5/26/2024   [facility]

## 2024-06-20 ENCOUNTER — OFFICE VISIT (OUTPATIENT)
Dept: CHIROPRACTIC MEDICINE | Facility: OTHER | Age: 34
End: 2024-06-20
Attending: DENTIST
Payer: MEDICAID

## 2024-06-20 DIAGNOSIS — M99.02 SEGMENTAL AND SOMATIC DYSFUNCTION OF THORACIC REGION: ICD-10-CM

## 2024-06-20 DIAGNOSIS — M99.01 SEGMENTAL AND SOMATIC DYSFUNCTION OF CERVICAL REGION: Primary | ICD-10-CM

## 2024-06-20 DIAGNOSIS — M99.03 SEGMENTAL AND SOMATIC DYSFUNCTION OF LUMBAR REGION: ICD-10-CM

## 2024-06-20 DIAGNOSIS — M54.2 CERVICALGIA: ICD-10-CM

## 2024-06-20 PROCEDURE — 99212 OFFICE O/P EST SF 10 MIN: CPT | Mod: 25 | Performed by: CHIROPRACTOR

## 2024-06-20 PROCEDURE — 98941 CHIROPRACT MANJ 3-4 REGIONS: CPT | Mod: AT | Performed by: CHIROPRACTOR

## 2024-06-24 NOTE — PROGRESS NOTES
Subjective Finding:    Chief compalint: Patient presents with:  Back Pain: With neck pain   , Pain Scale: 5/10, Intensity: sharp, Duration: 2 months, Radiating: no.    Date of injury:     Activities that the pain restricts:   Home/household/hobbies/social activities: Yes.  Work duties: Yes.  Sleep: Yes.  Makes symptoms better: rest.  Makes symptoms worse: activity and walking.  Have you seen anyone else for the symptoms? No.  Work related: No.  Automobile related injury: No.    Objective and Assessment:    Posture Analysis:   High shoulder: .  Head tilt: .  High iliac crest: .  Head carriage: neutral.  Thoracic Kyphosis: neutral.  Lumbar Lordosis: forward.    Lumbar Range of Motion: extension decreased.  Cervical Range of Motion: left rotation decreased and right rotation decreased.  Thoracic Range of Motion: .  Extremity Range of Motion: .    Palpation:   Quad lumb: bilateral, referred pain: no  Sub-occiput: dull pain, referred pain: no    Segmental dysfunction pre-treatment and treatment area: C1, T5, L4, and L5.    Assessment post-treatment:  Cervical: ROM increased.  Thoracic: ROM decreased.  Lumbar: ROM increased.    Comments: .      Complicating Factors: .    Procedure(s):  CMT:  41083 Chiropractic manipulative treatment 3-4 regions performed   Cervical: Diversified, See above for level, Supine, Thoracic: Diversified, See above for level, Prone, and Lumbar: Diversified, See above for level, Side posture    Modalities:  None performed this visit    Therapeutic procedures:  None    Plan:  Treatment plan: PRN.  Instructed patient: stretch as instructed at visit.  Short term goals: increase ROM.  Long term goals: increase ADL.  Prognosis: very good.

## 2024-06-25 ENCOUNTER — OFFICE VISIT (OUTPATIENT)
Dept: CHIROPRACTIC MEDICINE | Facility: OTHER | Age: 34
End: 2024-06-25
Attending: CHIROPRACTOR
Payer: MEDICAID

## 2024-06-25 DIAGNOSIS — M99.03 SEGMENTAL AND SOMATIC DYSFUNCTION OF LUMBAR REGION: Primary | ICD-10-CM

## 2024-06-25 DIAGNOSIS — M54.50 ACUTE BILATERAL LOW BACK PAIN WITHOUT SCIATICA: ICD-10-CM

## 2024-06-25 DIAGNOSIS — M99.01 SEGMENTAL AND SOMATIC DYSFUNCTION OF CERVICAL REGION: ICD-10-CM

## 2024-06-25 DIAGNOSIS — M99.02 SEGMENTAL AND SOMATIC DYSFUNCTION OF THORACIC REGION: ICD-10-CM

## 2024-06-25 PROCEDURE — 98941 CHIROPRACT MANJ 3-4 REGIONS: CPT | Mod: AT | Performed by: CHIROPRACTOR

## 2024-07-01 NOTE — PROGRESS NOTES
Subjective Finding:    Chief compalint: Patient presents with:  Back Pain  , Pain Scale: 5/10, Intensity: sharp, Duration: 2 months, Radiating: no.    Date of injury:     Activities that the pain restricts:   Home/household/hobbies/social activities: Yes.  Work duties: Yes.  Sleep: Yes.  Makes symptoms better: rest.  Makes symptoms worse: activity and walking.  Have you seen anyone else for the symptoms? No.  Work related: No.  Automobile related injury: No.    Objective and Assessment:    Posture Analysis:   High shoulder: .  Head tilt: .  High iliac crest: .  Head carriage: neutral.  Thoracic Kyphosis: neutral.  Lumbar Lordosis: forward.    Lumbar Range of Motion: extension decreased.  Cervical Range of Motion: left rotation decreased and right rotation decreased.  Thoracic Range of Motion: .  Extremity Range of Motion: .    Palpation:   Quad lumb: bilateral, referred pain: no  Sub-occiput: dull pain, referred pain: no    Segmental dysfunction pre-treatment and treatment area: C1, T5, L4, and L5.    Assessment post-treatment:  Cervical: ROM increased.  Thoracic: ROM decreased.  Lumbar: ROM increased.    Comments: .      Complicating Factors: .    Procedure(s):  CMT:  27189 Chiropractic manipulative treatment 3-4 regions performed   Cervical: Diversified, See above for level, Supine, Thoracic: Diversified, See above for level, Prone, and Lumbar: Diversified, See above for level, Side posture    Modalities:  None performed this visit    Therapeutic procedures:  None    Plan:  Treatment plan: PRN.  Instructed patient: stretch as instructed at visit.  Short term goals: increase ROM.  Long term goals: increase ADL.  Prognosis: very good.

## 2024-07-07 ENCOUNTER — HEALTH MAINTENANCE LETTER (OUTPATIENT)
Age: 34
End: 2024-07-07

## 2024-09-03 ENCOUNTER — MEDICAL CORRESPONDENCE (OUTPATIENT)
Dept: CT IMAGING | Facility: HOSPITAL | Age: 34
End: 2024-09-03

## 2024-09-04 ENCOUNTER — HOSPITAL ENCOUNTER (OUTPATIENT)
Dept: CT IMAGING | Facility: HOSPITAL | Age: 34
Discharge: HOME | End: 2024-09-04
Attending: INTERNAL MEDICINE | Admitting: INTERNAL MEDICINE
Payer: COMMERCIAL

## 2024-09-04 DIAGNOSIS — R51.9 HEADACHE, UNSPECIFIED: ICD-10-CM

## 2024-09-04 PROCEDURE — 70450 CT HEAD/BRAIN W/O DYE: CPT

## 2024-09-18 ENCOUNTER — HOSPITAL ENCOUNTER (EMERGENCY)
Facility: HOSPITAL | Age: 34
Discharge: HOME | End: 2024-09-19
Attending: EMERGENCY MEDICINE | Admitting: EMERGENCY MEDICINE
Payer: COMMERCIAL

## 2024-09-18 DIAGNOSIS — J45.41 MODERATE PERSISTENT ASTHMA WITH EXACERBATION: ICD-10-CM

## 2024-09-18 PROCEDURE — 99284 EMERGENCY DEPT VISIT MOD MDM: CPT | Performed by: EMERGENCY MEDICINE

## 2024-09-18 PROCEDURE — 99283 EMERGENCY DEPT VISIT LOW MDM: CPT | Mod: 25

## 2024-09-18 RX ORDER — CLONAZEPAM 0.5 MG/1
0.5 TABLET ORAL 2 TIMES DAILY PRN
COMMUNITY
Start: 2024-09-18

## 2024-09-18 RX ORDER — BUSPIRONE HYDROCHLORIDE 5 MG/1
1 TABLET ORAL
COMMUNITY
Start: 2024-06-24

## 2024-09-18 RX ORDER — LISDEXAMFETAMINE DIMESYLATE 40 MG/1
40 CAPSULE ORAL EVERY MORNING
COMMUNITY
Start: 2024-08-21

## 2024-09-18 RX ORDER — ARIPIPRAZOLE 5 MG/1
1 TABLET ORAL
COMMUNITY
Start: 2024-07-23

## 2024-09-18 RX ORDER — MONTELUKAST SODIUM 10 MG/1
1 TABLET ORAL EVERY MORNING
COMMUNITY
Start: 2024-08-21

## 2024-09-18 ASSESSMENT — COLUMBIA-SUICIDE SEVERITY RATING SCALE - C-SSRS
2. HAVE YOU ACTUALLY HAD ANY THOUGHTS OF KILLING YOURSELF IN THE PAST MONTH?: NO
6. HAVE YOU EVER DONE ANYTHING, STARTED TO DO ANYTHING, OR PREPARED TO DO ANYTHING TO END YOUR LIFE?: NO
1. IN THE PAST MONTH, HAVE YOU WISHED YOU WERE DEAD OR WISHED YOU COULD GO TO SLEEP AND NOT WAKE UP?: NO

## 2024-09-19 VITALS
TEMPERATURE: 97.1 F | OXYGEN SATURATION: 98 % | BODY MASS INDEX: 21.46 KG/M2 | RESPIRATION RATE: 20 BRPM | WEIGHT: 128.97 LBS | SYSTOLIC BLOOD PRESSURE: 106 MMHG | DIASTOLIC BLOOD PRESSURE: 72 MMHG | HEART RATE: 78 BPM

## 2024-09-19 LAB
FLUAV RNA SPEC QL NAA+PROBE: NEGATIVE
FLUBV RNA RESP QL NAA+PROBE: NEGATIVE
RSV RNA SPEC NAA+PROBE: NEGATIVE
SARS-COV-2 RNA RESP QL NAA+PROBE: NEGATIVE

## 2024-09-19 PROCEDURE — 250N000012 HC RX MED GY IP 250 OP 636 PS 637: Performed by: EMERGENCY MEDICINE

## 2024-09-19 PROCEDURE — 94640 AIRWAY INHALATION TREATMENT: CPT

## 2024-09-19 PROCEDURE — 87637 SARSCOV2&INF A&B&RSV AMP PRB: CPT | Performed by: EMERGENCY MEDICINE

## 2024-09-19 PROCEDURE — 250N000009 HC RX 250: Performed by: EMERGENCY MEDICINE

## 2024-09-19 RX ORDER — IPRATROPIUM BROMIDE AND ALBUTEROL SULFATE 2.5; .5 MG/3ML; MG/3ML
3 SOLUTION RESPIRATORY (INHALATION) ONCE
Status: COMPLETED | OUTPATIENT
Start: 2024-09-19 | End: 2024-09-19

## 2024-09-19 RX ORDER — PREDNISONE 20 MG/1
60 TABLET ORAL ONCE
Status: COMPLETED | OUTPATIENT
Start: 2024-09-19 | End: 2024-09-19

## 2024-09-19 RX ORDER — PREDNISONE 20 MG/1
60 TABLET ORAL DAILY
Qty: 12 TABLET | Refills: 0 | Status: SHIPPED | OUTPATIENT
Start: 2024-09-20 | End: 2024-09-24

## 2024-09-19 RX ADMIN — IPRATROPIUM BROMIDE AND ALBUTEROL SULFATE 3 ML: .5; 3 SOLUTION RESPIRATORY (INHALATION) at 00:20

## 2024-09-19 RX ADMIN — PREDNISONE 60 MG: 20 TABLET ORAL at 00:08

## 2024-09-19 ASSESSMENT — ENCOUNTER SYMPTOMS
CHILLS: 0
COUGH: 1
SHORTNESS OF BREATH: 1
FEVER: 0

## 2024-09-19 NOTE — ED PROVIDER NOTES
History     Chief Complaint   Patient presents with    Shortness of Breath     HPI  Ihsan Millan is a 33 year old female who is here with difficulty breathing.  Day #2.  Was recently cleaning her carpet which had a lot of animal dander on it then symptoms began after that.  Uses her inhaler daily, multiple times.  Takes 1 pill and has rescue inhaler.  Mild cough.    Allergies:  Allergies   Allergen Reactions    Skin Adhesives [Cyanoacrylate] Dermatitis     Patient developed contact dermatitis after application of topical adhesive to surgical incision    Lexapro [Escitalopram] Nausea and Vomiting    Codeine Sulfate Rash    Penicillins Rash    Tramadol Rash       Problem List:    Patient Active Problem List    Diagnosis Date Noted    RUQ abdominal pain 02/12/2017     Priority: Medium    Pneumonia of right middle lobe due to infectious organism 02/12/2017     Priority: Medium    Tobacco abuse 02/12/2017     Priority: Medium    Status post laparoscopic assisted vaginal hysterectomy (LAVH) 06/25/2015     Priority: Medium    Endometriosis 04/16/2015     Priority: Medium     Depo lupron 4/15.  Laparoscopy 3/15      ASCUS on Pap smear 07/18/2014     Priority: Medium     + hpv colpo 7/14      Postpartum depression 07/18/2014     Priority: Medium     zoloft rx      H. pylori infection      Priority: Medium    Moderate persistent asthma 03/19/2014     Priority: Medium    GERD (gastroesophageal reflux disease) 09/12/2013     Priority: Medium    Intermittent asthma 09/12/2013     Priority: Medium     Increased with pregnancy  Send for eval and asthma action plan  Smoking cessation counseling  Flu shot given      Pregnant state, incidental 03/23/2011     Priority: Medium    Contraceptive management 11/09/2010     Priority: Medium        Past Medical History:    Past Medical History:   Diagnosis Date    Asthma     GERD (gastroesophageal reflux disease)     H. pylori infection        Past Surgical History:    Past Surgical  History:   Procedure Laterality Date    COLONOSCOPY N/A 11/6/2014    Procedure: COLONOSCOPY;  Surgeon: Zacarias Paz MD;  Location: [facility]    DILATION AND CURETTAGE, HYSTEROSCOPY DIAGNOSTIC, COMBINED  8/1/2014    Procedure: COMBINED DILATION AND CURETTAGE, HYSTEROSCOPY DIAGNOSTIC;  Surgeon: Rodolfo Clifford MD;  Location: [facility]    ESOPHAGOSCOPY, GASTROSCOPY, DUODENOSCOPY (EGD), COMBINED N/A 9/5/2014    Procedure: COMBINED ESOPHAGOSCOPY, GASTROSCOPY, DUODENOSCOPY (EGD);  Surgeon: Zacarias Paz MD;  [facility]    HERNIORRHAPHY UMBILICAL N/A 9/3/2019    Procedure: OPEN UMBILICAL HERNIA REPAIR;  Surgeon: Carter Vaughn MD;  [facility]: [facility]    LAPAROSCOPIC ASSISTED HYSTERECTOMY VAGINAL N/A 6/24/2015    Procedure: LAPAROSCOPIC ASSISTED HYSTERECTOMY VAGINAL;  Surgeon: Rodolfo Clifford MD;  [facility]    LAPAROSCOPIC OOPHORECTOMY Right 3/23/2016    Procedure: LAPAROSCOPIC OOPHORECTOMY;  Surgeon: Rodolfo Clifford MD;  [facility]    LAPAROSCOPIC SALPINGO-OOPHORECTOMY Left 5/3/2017    Procedure: LAPAROSCOPIC SALPINGO-OOPHORECTOMY;  LAPAROSCOPIC LEFT OOPHORECTOMY;  Surgeon: Rodolfo Clifford MD;  [facility]    LAPAROSCOPY DIAGNOSTIC (GYN) N/A 3/18/2015    Procedure: LAPAROSCOPY DIAGNOSTIC (GYN);  Surgeon: Rodolfo Clifford MD;  Location: [facility]    no surgeries      SALPINGECTOMY Bilateral 6/24/2015    Procedure: SALPINGECTOMY;  Surgeon: Rodolfo Clifford MD;  [facility]: [facility]       Family History:    Family History   Problem Relation Age of Onset    Asthma Mother     Unknown/Adopted Maternal Grandmother     Unknown/Adopted Maternal Grandfather        Social History:  Marital Status:  Single [1]  Social History     Tobacco Use    Smoking status: Every Day     Current packs/day: 0.50     Average packs/day: 0.5 packs/day for 7.0 years (3.5 ttl pk-yrs)     Types: Cigarettes    Smokeless tobacco: Never    Tobacco comments:     declines quitline referral 9/10/19   Substance Use Topics    Alcohol use: Not Currently      Comment: social    Drug use: No        Medications:    ARIPiprazole (ABILIFY) 5 MG tablet  busPIRone (BUSPAR) 5 MG tablet  clonazePAM (KLONOPIN) 0.5 MG tablet  lisdexamfetamine (VYVANSE) 40 MG capsule  montelukast (SINGULAIR) 10 MG tablet  [START ON 9/20/2024] predniSONE (DELTASONE) 20 MG tablet  Acetaminophen (TYLENOL PO)  albuterol (PROAIR HFA/PROVENTIL HFA/VENTOLIN HFA) 108 (90 Base) MCG/ACT inhaler  albuterol (PROVENTIL) (2.5 MG/3ML) 0.083% neb solution  amphetamine-dextroamphetamine (ADDERALL) 30 MG tablet  amphetamine-dextroamphetamine (ADDERALL) 30 MG tablet  aspirin (ASA) 81 MG chewable tablet  cloNIDine (CATAPRES) 0.1 MG tablet  estradiol (ESTRACE) 1 MG tablet  hydrOXYzine (ATARAX) 25 MG tablet  naproxen (NAPROSYN) 500 MG tablet  omeprazole (PRILOSEC) 20 MG DR capsule  ondansetron (ZOFRAN ODT) 4 MG ODT tab  QUEtiapine (SEROQUEL) 50 MG tablet  sertraline (ZOLOFT) 50 MG tablet          Review of Systems   Constitutional:  Negative for chills and fever.   Respiratory:  Positive for cough and shortness of breath.    All other systems reviewed and are negative.      Physical Exam   BP: 106/72  Pulse: 78  Temp: 97.1  F (36.2  C)  Resp: 20  Weight: 58.5 kg (128 lb 15.5 oz)  SpO2: 100 %      Physical Exam  Constitutional:       General: She is not in acute distress.     Appearance: She is not diaphoretic.   HENT:      Head: Normocephalic and atraumatic.      Right Ear: External ear normal.      Left Ear: External ear normal.      Nose: No congestion or rhinorrhea.      Mouth/Throat:      Pharynx: Oropharynx is clear. No oropharyngeal exudate.   Eyes:      General: No scleral icterus.     Pupils: Pupils are equal, round, and reactive to light.   Cardiovascular:      Rate and Rhythm: Normal rate and regular rhythm.      Heart sounds: Normal heart sounds.   Pulmonary:      Effort: No respiratory distress.      Breath sounds: Decreased breath sounds and wheezing present.   Abdominal:      General: Bowel sounds are  normal.      Palpations: Abdomen is soft.      Tenderness: There is no abdominal tenderness.   Musculoskeletal:         General: No tenderness.      Cervical back: Normal range of motion and neck supple.      Right lower leg: No edema.      Left lower leg: No edema.   Skin:     General: Skin is warm.      Capillary Refill: Capillary refill takes less than 2 seconds.      Findings: No rash.   Neurological:      Mental Status: Mental status is at baseline.      Cranial Nerves: No cranial nerve deficit.   Psychiatric:         Mood and Affect: Mood normal.         Behavior: Behavior normal.         ED Course        Procedures             Critical Care time:               No results found for this or any previous visit (from the past 24 hour(s)).    Medications   ipratropium - albuterol 0.5 mg/2.5 mg/3 mL (DUONEB) neb solution 3 mL (has no administration in time range)   predniSONE (DELTASONE) tablet 60 mg (60 mg Oral $Given 9/19/24 0008)       Assessments & Plan (with Medical Decision Making)     I have reviewed the nursing notes.    I have reviewed the findings, diagnosis, plan and need for follow up with the patient.          Medical Decision Making  The patient's presentation was of moderate complexity (a chronic illness mild to moderate exacerbation, progression, or side effect of treatment).    The patient's evaluation involved:  ordering and/or review of 1 test(s) in this encounter (see separate area of note for details)    The patient's management necessitated moderate risk (prescription drug management including medications given in the ED).    33-year-old female here with asthma exacerbation.  Moving air but very wheezy.  Will give 1 nebulizers and reassess.  Will give steroids.  I told patient she really needs to follow-up with her primary because her asthma is quite poorly controlled as given evidenced by using her rescue inhaler multiple times every day.  She understands this.    New Prescriptions     PREDNISONE (DELTASONE) 20 MG TABLET    Take 3 tablets (60 mg) by mouth daily for 4 days.       Final diagnoses:   Moderate persistent asthma with exacerbation       9/18/2024   [facility]       Ramu Mancilla MD  09/19/24 0009

## 2024-09-19 NOTE — DISCHARGE INSTRUCTIONS
Please make an appointment to see your primary care doctor. Your asthma is very poorly controlled and you need to be on more medications for it.

## 2024-09-19 NOTE — ED NOTES
Patient resting in a position of comfort.   Continuous pulse ox at this time.   Call light within reach, warm blanket given.

## 2024-09-20 ENCOUNTER — APPOINTMENT (OUTPATIENT)
Dept: GENERAL RADIOLOGY | Facility: HOSPITAL | Age: 34
End: 2024-09-20
Attending: EMERGENCY MEDICINE
Payer: COMMERCIAL

## 2024-09-20 ENCOUNTER — HOSPITAL ENCOUNTER (EMERGENCY)
Facility: HOSPITAL | Age: 34
Discharge: HOME | End: 2024-09-20
Attending: EMERGENCY MEDICINE
Payer: COMMERCIAL

## 2024-09-20 VITALS
TEMPERATURE: 97.9 F | RESPIRATION RATE: 18 BRPM | OXYGEN SATURATION: 97 % | HEART RATE: 101 BPM | DIASTOLIC BLOOD PRESSURE: 54 MMHG | SYSTOLIC BLOOD PRESSURE: 97 MMHG

## 2024-09-20 DIAGNOSIS — J45.41 MODERATE PERSISTENT ASTHMA WITH EXACERBATION: ICD-10-CM

## 2024-09-20 LAB
ANION GAP SERPL CALCULATED.3IONS-SCNC: 12 MMOL/L (ref 7–15)
BASE EXCESS BLDV CALC-SCNC: 1.3 MMOL/L (ref -3–3)
BASOPHILS # BLD AUTO: 0 10E3/UL (ref 0–0.2)
BASOPHILS NFR BLD AUTO: 0 %
BUN SERPL-MCNC: 22 MG/DL (ref 6–20)
CALCIUM SERPL-MCNC: 8.9 MG/DL (ref 8.8–10.4)
CHLORIDE SERPL-SCNC: 106 MMOL/L (ref 98–107)
CREAT SERPL-MCNC: 0.84 MG/DL (ref 0.51–0.95)
EGFRCR SERPLBLD CKD-EPI 2021: >90 ML/MIN/1.73M2
EOSINOPHIL # BLD AUTO: 0 10E3/UL (ref 0–0.7)
EOSINOPHIL NFR BLD AUTO: 0 %
ERYTHROCYTE [DISTWIDTH] IN BLOOD BY AUTOMATED COUNT: 13.2 % (ref 10–15)
GLUCOSE SERPL-MCNC: 161 MG/DL (ref 70–99)
HCO3 BLDV-SCNC: 27 MMOL/L (ref 21–28)
HCO3 SERPL-SCNC: 23 MMOL/L (ref 22–29)
HCT VFR BLD AUTO: 37.8 % (ref 35–47)
HGB BLD-MCNC: 12.8 G/DL (ref 11.7–15.7)
HOLD SPECIMEN: NORMAL
HOLD SPECIMEN: NORMAL
IMM GRANULOCYTES # BLD: 0.1 10E3/UL
IMM GRANULOCYTES NFR BLD: 0 %
LYMPHOCYTES # BLD AUTO: 1.7 10E3/UL (ref 0.8–5.3)
LYMPHOCYTES NFR BLD AUTO: 15 %
MCH RBC QN AUTO: 29.8 PG (ref 26.5–33)
MCHC RBC AUTO-ENTMCNC: 33.9 G/DL (ref 31.5–36.5)
MCV RBC AUTO: 88 FL (ref 78–100)
MONOCYTES # BLD AUTO: 0.7 10E3/UL (ref 0–1.3)
MONOCYTES NFR BLD AUTO: 6 %
NEUTROPHILS # BLD AUTO: 8.9 10E3/UL (ref 1.6–8.3)
NEUTROPHILS NFR BLD AUTO: 79 %
NRBC # BLD AUTO: 0 10E3/UL
NRBC BLD AUTO-RTO: 0 /100
O2/TOTAL GAS SETTING VFR VENT: 0 %
OXYHGB MFR BLDV: 81 % (ref 70–75)
PCO2 BLDV: 43 MM HG (ref 40–50)
PH BLDV: 7.4 [PH] (ref 7.32–7.43)
PLATELET # BLD AUTO: 272 10E3/UL (ref 150–450)
PO2 BLDV: 49 MM HG (ref 25–47)
POTASSIUM SERPL-SCNC: 3.5 MMOL/L (ref 3.4–5.3)
PROCALCITONIN SERPL IA-MCNC: 0.03 NG/ML
RBC # BLD AUTO: 4.29 10E6/UL (ref 3.8–5.2)
SAO2 % BLDV: 84.2 % (ref 70–75)
SODIUM SERPL-SCNC: 141 MMOL/L (ref 135–145)
WBC # BLD AUTO: 11.3 10E3/UL (ref 4–11)

## 2024-09-20 PROCEDURE — 84145 PROCALCITONIN (PCT): CPT | Performed by: EMERGENCY MEDICINE

## 2024-09-20 PROCEDURE — 250N000011 HC RX IP 250 OP 636: Performed by: EMERGENCY MEDICINE

## 2024-09-20 PROCEDURE — 99284 EMERGENCY DEPT VISIT MOD MDM: CPT | Performed by: EMERGENCY MEDICINE

## 2024-09-20 PROCEDURE — 99284 EMERGENCY DEPT VISIT MOD MDM: CPT | Mod: 25

## 2024-09-20 PROCEDURE — 258N000003 HC RX IP 258 OP 636: Performed by: EMERGENCY MEDICINE

## 2024-09-20 PROCEDURE — 85025 COMPLETE CBC W/AUTO DIFF WBC: CPT | Performed by: EMERGENCY MEDICINE

## 2024-09-20 PROCEDURE — 250N000009 HC RX 250: Performed by: EMERGENCY MEDICINE

## 2024-09-20 PROCEDURE — 36415 COLL VENOUS BLD VENIPUNCTURE: CPT | Performed by: EMERGENCY MEDICINE

## 2024-09-20 PROCEDURE — 80048 BASIC METABOLIC PNL TOTAL CA: CPT | Performed by: EMERGENCY MEDICINE

## 2024-09-20 PROCEDURE — 71045 X-RAY EXAM CHEST 1 VIEW: CPT

## 2024-09-20 PROCEDURE — 96366 THER/PROPH/DIAG IV INF ADDON: CPT

## 2024-09-20 PROCEDURE — 96365 THER/PROPH/DIAG IV INF INIT: CPT

## 2024-09-20 PROCEDURE — 94640 AIRWAY INHALATION TREATMENT: CPT

## 2024-09-20 PROCEDURE — 82805 BLOOD GASES W/O2 SATURATION: CPT | Performed by: EMERGENCY MEDICINE

## 2024-09-20 RX ORDER — MAGNESIUM SULFATE HEPTAHYDRATE 40 MG/ML
2 INJECTION, SOLUTION INTRAVENOUS ONCE
Status: COMPLETED | OUTPATIENT
Start: 2024-09-20 | End: 2024-09-20

## 2024-09-20 RX ORDER — IPRATROPIUM BROMIDE AND ALBUTEROL SULFATE 2.5; .5 MG/3ML; MG/3ML
3 SOLUTION RESPIRATORY (INHALATION) ONCE
Status: COMPLETED | OUTPATIENT
Start: 2024-09-20 | End: 2024-09-20

## 2024-09-20 RX ADMIN — SODIUM CHLORIDE 1000 ML: 9 INJECTION, SOLUTION INTRAVENOUS at 03:28

## 2024-09-20 RX ADMIN — IPRATROPIUM BROMIDE AND ALBUTEROL SULFATE 3 ML: .5; 3 SOLUTION RESPIRATORY (INHALATION) at 05:06

## 2024-09-20 RX ADMIN — MAGNESIUM SULFATE HEPTAHYDRATE 2 G: 40 INJECTION, SOLUTION INTRAVENOUS at 03:29

## 2024-09-20 ASSESSMENT — ACTIVITIES OF DAILY LIVING (ADL)
ADLS_ACUITY_SCORE: 35

## 2024-09-20 ASSESSMENT — ENCOUNTER SYMPTOMS
CHILLS: 0
SHORTNESS OF BREATH: 1
COUGH: 0
FEVER: 0

## 2024-09-20 NOTE — ED TRIAGE NOTES
Pt brought in by EMS for evaluation of shortness of breath ongoing since being seen for the same symptoms yesterday.

## 2024-09-20 NOTE — ED PROVIDER NOTES
"  History     Chief Complaint   Patient presents with    Shortness of Breath     \"Hard to catch my breath\"    Cough     HPI  Ihsan Millan is a 33 year old female who is here with difficulty breathing and tingling to her legs.  Seen by me yesterday for asthma exacerbation, was given several nebs and prednisone.  States symptoms got worse in the evening when she was laying flat.    Allergies:  Allergies   Allergen Reactions    Skin Adhesives [Cyanoacrylate] Dermatitis     Patient developed contact dermatitis after application of topical adhesive to surgical incision    Lexapro [Escitalopram] Nausea and Vomiting    Codeine Sulfate Rash    Penicillins Rash    Tramadol Rash       Problem List:    Patient Active Problem List    Diagnosis Date Noted    RUQ abdominal pain 02/12/2017     Priority: Medium    Pneumonia of right middle lobe due to infectious organism 02/12/2017     Priority: Medium    Tobacco abuse 02/12/2017     Priority: Medium    Status post laparoscopic assisted vaginal hysterectomy (LAVH) 06/25/2015     Priority: Medium    Endometriosis 04/16/2015     Priority: Medium     Depo lupron 4/15.  Laparoscopy 3/15      ASCUS on Pap smear 07/18/2014     Priority: Medium     + hpv colpo 7/14      Postpartum depression 07/18/2014     Priority: Medium     zoloft rx      H. pylori infection      Priority: Medium    Moderate persistent asthma 03/19/2014     Priority: Medium    GERD (gastroesophageal reflux disease) 09/12/2013     Priority: Medium    Intermittent asthma 09/12/2013     Priority: Medium     Increased with pregnancy  Send for eval and asthma action plan  Smoking cessation counseling  Flu shot given      Pregnant state, incidental 03/23/2011     Priority: Medium    Contraceptive management 11/09/2010     Priority: Medium        Past Medical History:    Past Medical History:   Diagnosis Date    Asthma     GERD (gastroesophageal reflux disease)     H. pylori infection        Past Surgical History:  "   Past Surgical History:   Procedure Laterality Date    COLONOSCOPY N/A 11/6/2014    Procedure: COLONOSCOPY;  Surgeon: Zacarias Paz MD;  [facility]    DILATION AND CURETTAGE, HYSTEROSCOPY DIAGNOSTIC, COMBINED  8/1/2014    Procedure: COMBINED DILATION AND CURETTAGE, HYSTEROSCOPY DIAGNOSTIC;  Surgeon: Rodolfo Clifford MD;  [facility]    ESOPHAGOSCOPY, GASTROSCOPY, DUODENOSCOPY (EGD), COMBINED N/A 9/5/2014    Procedure: COMBINED ESOPHAGOSCOPY, GASTROSCOPY, DUODENOSCOPY (EGD);  Surgeon: Zacarias Paz MD;  [facility]    HERNIORRHAPHY UMBILICAL N/A 9/3/2019    Procedure: OPEN UMBILICAL HERNIA REPAIR;  Surgeon: Caretr Vaughn MD;  [facility]    LAPAROSCOPIC ASSISTED HYSTERECTOMY VAGINAL N/A 6/24/2015    Procedure: LAPAROSCOPIC ASSISTED HYSTERECTOMY VAGINAL;  Surgeon: Rodolfo Clifford MD;  [facility]    LAPAROSCOPIC OOPHORECTOMY Right 3/23/2016    Procedure: LAPAROSCOPIC OOPHORECTOMY;  Surgeon: Rodolfo Clifford MD;  [facility]    LAPAROSCOPIC SALPINGO-OOPHORECTOMY Left 5/3/2017    Procedure: LAPAROSCOPIC SALPINGO-OOPHORECTOMY;  LAPAROSCOPIC LEFT OOPHORECTOMY;  Surgeon: Rodolfo Clifford MD;  [facility]    LAPAROSCOPY DIAGNOSTIC (GYN) N/A 3/18/2015    Procedure: LAPAROSCOPY DIAGNOSTIC (GYN);  Surgeon: Rodolfo Clifford MD;  [facility]    no surgeries      SALPINGECTOMY Bilateral 6/24/2015    Procedure: SALPINGECTOMY;  Surgeon: Rodolfo Clifford MD;  [facility]       Family History:    Family History   Problem Relation Age of Onset    Asthma Mother     Unknown/Adopted Maternal Grandmother     Unknown/Adopted Maternal Grandfather        Social History:  Marital Status:  Single [1]  Social History     Tobacco Use    Smoking status: Every Day     Current packs/day: 0.50     Average packs/day: 0.5 packs/day for 7.0 years (3.5 ttl pk-yrs)     Types: Cigarettes    Smokeless tobacco: Never    Tobacco comments:     declines quitline referral 9/10/19   Substance Use Topics    Alcohol use:  Not Currently     Comment: social    Drug use: No        Medications:    Acetaminophen (TYLENOL PO)  albuterol (PROAIR HFA/PROVENTIL HFA/VENTOLIN HFA) 108 (90 Base) MCG/ACT inhaler  albuterol (PROVENTIL) (2.5 MG/3ML) 0.083% neb solution  amphetamine-dextroamphetamine (ADDERALL) 30 MG tablet  amphetamine-dextroamphetamine (ADDERALL) 30 MG tablet  ARIPiprazole (ABILIFY) 5 MG tablet  aspirin (ASA) 81 MG chewable tablet  busPIRone (BUSPAR) 5 MG tablet  clonazePAM (KLONOPIN) 0.5 MG tablet  cloNIDine (CATAPRES) 0.1 MG tablet  estradiol (ESTRACE) 1 MG tablet  hydrOXYzine (ATARAX) 25 MG tablet  lisdexamfetamine (VYVANSE) 40 MG capsule  montelukast (SINGULAIR) 10 MG tablet  naproxen (NAPROSYN) 500 MG tablet  omeprazole (PRILOSEC) 20 MG DR capsule  ondansetron (ZOFRAN ODT) 4 MG ODT tab  predniSONE (DELTASONE) 20 MG tablet  QUEtiapine (SEROQUEL) 50 MG tablet  sertraline (ZOLOFT) 50 MG tablet          Review of Systems   Constitutional:  Negative for chills and fever.   Respiratory:  Positive for shortness of breath. Negative for cough.    All other systems reviewed and are negative.      Physical Exam   BP: 101/64  Pulse: 101  Temp: 98.4  F (36.9  C)  Resp: 22  SpO2: 100 %      Physical Exam  Constitutional:       General: She is not in acute distress.     Appearance: She is not diaphoretic.   HENT:      Head: Normocephalic and atraumatic.      Right Ear: External ear normal.      Left Ear: External ear normal.      Nose: No congestion or rhinorrhea.      Mouth/Throat:      Pharynx: Oropharynx is clear. No oropharyngeal exudate.   Eyes:      General: No scleral icterus.     Pupils: Pupils are equal, round, and reactive to light.   Cardiovascular:      Rate and Rhythm: Regular rhythm. Tachycardia present.      Heart sounds: Normal heart sounds.   Pulmonary:      Effort: No respiratory distress.      Breath sounds: Wheezing present.   Abdominal:      General: Bowel sounds are normal.      Palpations: Abdomen is soft.       Tenderness: There is no abdominal tenderness.   Musculoskeletal:         General: No tenderness.      Cervical back: Normal range of motion and neck supple.      Right lower leg: No edema.      Left lower leg: No edema.   Skin:     General: Skin is warm.      Capillary Refill: Capillary refill takes less than 2 seconds.      Findings: No rash.   Neurological:      Mental Status: Mental status is at baseline.      Cranial Nerves: No cranial nerve deficit.   Psychiatric:         Mood and Affect: Mood normal.         Behavior: Behavior normal.         ED Course     ED Course as of 09/20/24 0515   Fri Sep 20, 2024   5782 Cxr = no infiltrates by my read     Procedures             Critical Care time:               Results for orders placed or performed during the hospital encounter of 09/20/24 (from the past 24 hour(s))   CBC with platelets differential    Narrative    The following orders were created for panel order CBC with platelets differential.  Procedure                               Abnormality         Status                     ---------                               -----------         ------                     CBC with platelets and d...[[id number]]  Abnormal            Final result                 Please view results for these tests on the individual orders.   Basic metabolic panel   Result Value Ref Range    Sodium 141 135 - 145 mmol/L    Potassium 3.5 3.4 - 5.3 mmol/L    Chloride 106 98 - 107 mmol/L    Carbon Dioxide (CO2) 23 22 - 29 mmol/L    Anion Gap 12 7 - 15 mmol/L    Urea Nitrogen 22.0 (H) 6.0 - 20.0 mg/dL    Creatinine 0.84 0.51 - 0.95 mg/dL    GFR Estimate >90 >60 mL/min/1.73m2    Calcium 8.9 8.8 - 10.4 mg/dL    Glucose 161 (H) 70 - 99 mg/dL   Procalcitonin   Result Value Ref Range    Procalcitonin 0.03 <0.50 ng/mL   Blood gas venous   Result Value Ref Range    pH Venous 7.40 7.32 - 7.43    pCO2 Venous 43 40 - 50 mm Hg    pO2 Venous 49 (H) 25 - 47 mm Hg    Bicarbonate Venous 27 21 - 28 mmol/L    Base  Excess/Deficit Venous 1.3 -3.0 - 3.0 mmol/L    FIO2 0     Oxyhemoglobin Venous 81 (H) 70 - 75 %    O2 Sat, Venous 84.2 (H) 70.0 - 75.0 %    Narrative    In healthy individuals, oxyhemoglobin (O2Hb) and oxygen saturation (SO2) are approximately equal. In the presence of dyshemoglobins, oxyhemoglobin can be considerably lower than oxygen saturation.   CBC with platelets and differential   Result Value Ref Range    WBC Count 11.3 (H) 4.0 - 11.0 10e3/uL    RBC Count 4.29 3.80 - 5.20 10e6/uL    Hemoglobin 12.8 11.7 - 15.7 g/dL    Hematocrit 37.8 35.0 - 47.0 %    MCV 88 78 - 100 fL    MCH 29.8 26.5 - 33.0 pg    MCHC 33.9 31.5 - 36.5 g/dL    RDW 13.2 10.0 - 15.0 %    Platelet Count 272 150 - 450 10e3/uL    % Neutrophils 79 %    % Lymphocytes 15 %    % Monocytes 6 %    % Eosinophils 0 %    % Basophils 0 %    % Immature Granulocytes 0 %    NRBCs per 100 WBC 0 <1 /100    Absolute Neutrophils 8.9 (H) 1.6 - 8.3 10e3/uL    Absolute Lymphocytes 1.7 0.8 - 5.3 10e3/uL    Absolute Monocytes 0.7 0.0 - 1.3 10e3/uL    Absolute Eosinophils 0.0 0.0 - 0.7 10e3/uL    Absolute Basophils 0.0 0.0 - 0.2 10e3/uL    Absolute Immature Granulocytes 0.1 <=0.4 10e3/uL    Absolute NRBCs 0.0 10e3/uL   [place] Draw    Narrative    The following orders were created for panel order [place] Draw.  Procedure                               Abnormality         Status                     ---------                               -----------         ------                     Extra Blue Top Tube[[id number]]                              Final result               Extra Red Top Tube[[id number]]                               Final result                 Please view results for these tests on the individual orders.   Extra Blue Top Tube   Result Value Ref Range    Hold Specimen JIC    Extra Red Top Tube   Result Value Ref Range    Hold Specimen JIC        Medications   magnesium sulfate 2 g in 50 mL sterile water intermittent infusion (2 g Intravenous $New Bag 9/20/24  0993)   sodium chloride 0.9% BOLUS 1,000 mL (1,000 mLs Intravenous $New Bag 9/20/24 8521)   ipratropium - albuterol 0.5 mg/2.5 mg/3 mL (DUONEB) neb solution 3 mL (3 mLs Nebulization $Given 9/20/24 6958)       Assessments & Plan (with Medical Decision Making)     I have reviewed the nursing notes.    I have reviewed the findings, diagnosis, plan and need for follow up with the patient.          Medical Decision Making  The patient's presentation was of moderate complexity (a chronic illness mild to moderate exacerbation, progression, or side effect of treatment).    The patient's evaluation involved:  ordering and/or review of 3+ test(s) in this encounter (see separate area of note for details)    The patient's management necessitated high risk (drug therapy requiring intensive monitoring (iv magnesium)).    33-year-old female here with continued asthma exacerbation.  Will give magnesium, hydrate, VBG to check CO2 is at may because of her paresthesias in her feet.    New Prescriptions    No medications on file       Final diagnoses:   Moderate persistent asthma with exacerbation       9/20/2024   [facility]       Ramu Mancilla MD  09/20/24 5217

## 2024-10-13 ENCOUNTER — HOSPITAL ENCOUNTER (EMERGENCY)
Facility: HOSPITAL | Age: 34
Discharge: HOME | End: 2024-10-13
Attending: PHYSICIAN ASSISTANT | Admitting: PHYSICIAN ASSISTANT
Payer: COMMERCIAL

## 2024-10-13 VITALS
OXYGEN SATURATION: 98 % | TEMPERATURE: 97.2 F | RESPIRATION RATE: 16 BRPM | HEART RATE: 98 BPM | SYSTOLIC BLOOD PRESSURE: 119 MMHG | DIASTOLIC BLOOD PRESSURE: 79 MMHG

## 2024-10-13 DIAGNOSIS — H93.8X2 MASS OF LEFT EAR: Primary | ICD-10-CM

## 2024-10-13 PROCEDURE — G0463 HOSPITAL OUTPT CLINIC VISIT: HCPCS

## 2024-10-13 PROCEDURE — 99213 OFFICE O/P EST LOW 20 MIN: CPT | Performed by: PHYSICIAN ASSISTANT

## 2024-10-13 RX ORDER — CEFADROXIL 500 MG/1
500 CAPSULE ORAL 2 TIMES DAILY
Qty: 14 CAPSULE | Refills: 0 | Status: SHIPPED | OUTPATIENT
Start: 2024-10-13 | End: 2024-10-20

## 2024-10-13 NOTE — ED PROVIDER NOTES
History     Chief Complaint   Patient presents with    Cyst     HPI  Ihsan Millan is a 33 year old female who presents to urgent care for evaluation of mass to left ear.  Patient states that she first noticed this approximately 2 days prior and that is becoming larger and more painful.  She states that it is right where her earlobe connects to the side of her face.  She denies any mechanism of injury, fevers, or any other associated symptoms.    Allergies:  Allergies   Allergen Reactions    Skin Adhesives [Cyanoacrylate] Dermatitis     Patient developed contact dermatitis after application of topical adhesive to surgical incision    Lexapro [Escitalopram] Nausea and Vomiting    Codeine Sulfate Rash    Penicillins Rash    Tramadol Rash       Problem List:    Patient Active Problem List    Diagnosis Date Noted    RUQ abdominal pain 02/12/2017     Priority: Medium    Pneumonia of right middle lobe due to infectious organism 02/12/2017     Priority: Medium    Tobacco abuse 02/12/2017     Priority: Medium    Status post laparoscopic assisted vaginal hysterectomy (LAVH) 06/25/2015     Priority: Medium    Endometriosis 04/16/2015     Priority: Medium     Depo lupron 4/15.  Laparoscopy 3/15      ASCUS on Pap smear 07/18/2014     Priority: Medium     + hpv colpo 7/14      Postpartum depression 07/18/2014     Priority: Medium     zoloft rx      H. pylori infection      Priority: Medium    Moderate persistent asthma 03/19/2014     Priority: Medium    GERD (gastroesophageal reflux disease) 09/12/2013     Priority: Medium    Intermittent asthma 09/12/2013     Priority: Medium     Increased with pregnancy  Send for eval and asthma action plan  Smoking cessation counseling  Flu shot given      Pregnant state, incidental 03/23/2011     Priority: Medium    Contraceptive management 11/09/2010     Priority: Medium        Past Medical History:    Past Medical History:   Diagnosis Date    Asthma     GERD (gastroesophageal reflux  disease)     H. pylori infection        Past Surgical History:    Past Surgical History:   Procedure Laterality Date    COLONOSCOPY N/A 11/6/2014    Procedure: COLONOSCOPY;  Surgeon: Zacarias Paz MD;  Location: [facility]    DILATION AND CURETTAGE, HYSTEROSCOPY DIAGNOSTIC, COMBINED  8/1/2014    Procedure: COMBINED DILATION AND CURETTAGE, HYSTEROSCOPY DIAGNOSTIC;  Surgeon: Rodolfo Clifford MD;  Location: [facility]    ESOPHAGOSCOPY, GASTROSCOPY, DUODENOSCOPY (EGD), COMBINED N/A 9/5/2014    Procedure: COMBINED ESOPHAGOSCOPY, GASTROSCOPY, DUODENOSCOPY (EGD);  Surgeon: Zacarias Paz MD;  Location: [facility]    HERNIORRHAPHY UMBILICAL N/A 9/3/2019    Procedure: OPEN UMBILICAL HERNIA REPAIR;  Surgeon: Carter Vaughn MD;  Location: [facility]    LAPAROSCOPIC ASSISTED HYSTERECTOMY VAGINAL N/A 6/24/2015    Procedure: LAPAROSCOPIC ASSISTED HYSTERECTOMY VAGINAL;  Surgeon: Rodolfo Clifford MD;  Location: [facility]    LAPAROSCOPIC OOPHORECTOMY Right 3/23/2016    Procedure: LAPAROSCOPIC OOPHORECTOMY;  Surgeon: Rodolfo Clifford MD;  [facility]    LAPAROSCOPIC SALPINGO-OOPHORECTOMY Left 5/3/2017    Procedure: LAPAROSCOPIC SALPINGO-OOPHORECTOMY;  LAPAROSCOPIC LEFT OOPHORECTOMY;  Surgeon: Rodolfo Clifford MD;  [facility]    LAPAROSCOPY DIAGNOSTIC (GYN) N/A 3/18/2015    Procedure: LAPAROSCOPY DIAGNOSTIC (GYN);  Surgeon: Rodolfo Clifford MD;  Location: [facility]    no surgeries      SALPINGECTOMY Bilateral 6/24/2015    Procedure: SALPINGECTOMY;  Surgeon: Rodolfo Clifford MD;  Location: [facility]       Family History:    Family History   Problem Relation Age of Onset    Asthma Mother     Unknown/Adopted Maternal Grandmother     Unknown/Adopted Maternal Grandfather        Social History:  Marital Status:  Single [1]  Social History     Tobacco Use    Smoking status: Every Day     Current packs/day: 0.50     Average packs/day: 0.5 packs/day for 7.0 years (3.5 ttl pk-yrs)     Types: Cigarettes    Smokeless tobacco: Never    Tobacco comments:     declines  quitline referral 9/10/19   Substance Use Topics    Alcohol use: Not Currently     Comment: social    Drug use: No        Medications:    cefadroxil (DURICEF) 500 MG capsule  Acetaminophen (TYLENOL PO)  albuterol (PROAIR HFA/PROVENTIL HFA/VENTOLIN HFA) 108 (90 Base) MCG/ACT inhaler  albuterol (PROVENTIL) (2.5 MG/3ML) 0.083% neb solution  amphetamine-dextroamphetamine (ADDERALL) 30 MG tablet  amphetamine-dextroamphetamine (ADDERALL) 30 MG tablet  ARIPiprazole (ABILIFY) 5 MG tablet  aspirin (ASA) 81 MG chewable tablet  busPIRone (BUSPAR) 5 MG tablet  clonazePAM (KLONOPIN) 0.5 MG tablet  cloNIDine (CATAPRES) 0.1 MG tablet  estradiol (ESTRACE) 1 MG tablet  hydrOXYzine (ATARAX) 25 MG tablet  lisdexamfetamine (VYVANSE) 40 MG capsule  montelukast (SINGULAIR) 10 MG tablet  naproxen (NAPROSYN) 500 MG tablet  omeprazole (PRILOSEC) 20 MG DR capsule  ondansetron (ZOFRAN ODT) 4 MG ODT tab  QUEtiapine (SEROQUEL) 50 MG tablet  sertraline (ZOLOFT) 50 MG tablet          Review of Systems   Skin:         Mass of left ear   All other systems reviewed and are negative.      Physical Exam   BP: 119/79  Pulse: 98  Temp: 97.2  F (36.2  C)  Resp: 16  SpO2: 98 %      Physical Exam  Vitals and nursing note reviewed.   Constitutional:       General: She is not in acute distress.     Appearance: She is not ill-appearing or toxic-appearing.   HENT:      Ears:        Comments: Patient has has pea-sized mass located at the connection point of earlobe and face.  There is erythema present here.  Tenderness with palpation.  No fluctuance is appreciated.  Cardiovascular:      Rate and Rhythm: Regular rhythm.      Heart sounds: Normal heart sounds.   Pulmonary:      Breath sounds: Normal breath sounds.   Neurological:      Mental Status: She is alert and oriented to person, place, and time.         ED Course        Procedures             Critical Care time:               No results found for this or any previous visit (from the past 24  hour(s)).    Medications - No data to display    Assessments & Plan (with Medical Decision Making)   #1.  Mass of left ear    Discussed exam findings with patient.  Patient started on course of cefadroxil.  She has a urgent ENT referral placed and will follow-up with them in the next 3 to 5 days.  Tylenol or ibuprofen as directed for pain.  Any additional concerns the meantime patient can return to urgent care or follow-up with primary care provider.  Any emergent concerns patient is to go to emergency department immediately.  Patient verbalized understanding and agreement to plan.      I have reviewed the nursing notes.    I have reviewed the findings, diagnosis, plan and need for follow up with the patient.              New Prescriptions    CEFADROXIL (DURICEF) 500 MG CAPSULE    Take 1 capsule (500 mg) by mouth 2 times daily for 7 days.       Final diagnoses:   Mass of left ear       10/13/2024   [facility]       Cecilio Freitas PA-C  10/13/24 [id number]

## 2024-10-23 ENCOUNTER — OFFICE VISIT (OUTPATIENT)
Dept: CHIROPRACTIC MEDICINE | Facility: OTHER | Age: 34
End: 2024-10-23
Attending: CHIROPRACTOR
Payer: COMMERCIAL

## 2024-10-23 DIAGNOSIS — M54.2 CERVICALGIA: ICD-10-CM

## 2024-10-23 DIAGNOSIS — M99.03 SEGMENTAL AND SOMATIC DYSFUNCTION OF LUMBAR REGION: ICD-10-CM

## 2024-10-23 DIAGNOSIS — M99.01 SEGMENTAL AND SOMATIC DYSFUNCTION OF CERVICAL REGION: Primary | ICD-10-CM

## 2024-10-23 DIAGNOSIS — M99.02 SEGMENTAL AND SOMATIC DYSFUNCTION OF THORACIC REGION: ICD-10-CM

## 2024-10-23 PROCEDURE — 98941 CHIROPRACT MANJ 3-4 REGIONS: CPT | Mod: AT | Performed by: CHIROPRACTOR

## 2024-10-23 NOTE — PROGRESS NOTES
Subjective Finding:    Chief compalint: Patient presents with:  Neck Pain , Pain Scale: 4/10, Intensity: sharp, Duration: 2 weeks, Radiating: no.    Date of injury:     Activities that the pain restricts:   Home/household/hobbies/social activities: Yes.  Work duties: Yes.  Sleep: No.  Makes symptoms better: rest.  Makes symptoms worse: cervical extension and cervical flexion.  Have you seen anyone else for the symptoms? No.  Work related: No.  Automobile related injury: No.    Objective and Assessment:    Posture Analysis:   High shoulder: .  Head tilt: .  High iliac crest: .  Head carriage: forward.  Thoracic Kyphosis: neutral.  Lumbar Lordosis: neutral.    Lumbar Range of Motion: .  Cervical Range of Motion: flexion decreased and extension decreased.  Thoracic Range of Motion: .  Extremity Range of Motion: .    Palpation:       Segmental dysfunction pre-treatment and treatment area: C4, C5, T4, and L4.    Assessment post-treatment:  Cervical: ROM increased.  Thoracic: ROM increased.  Lumbar: ROM increased.    Comments: .      Complicating Factors: .    Procedure(s):  CMT:  09002 Chiropractic manipulative treatment 3-4 regions performed   Cervical: Diversified, See above for level, Supine, Thoracic: Diversified, See above for level, Prone, and Lumbar: Diversified, See above for level, Side posture    Modalities:  None performed this visit    Therapeutic procedures:  None    Plan:  Treatment plan: PRN.  Instructed patient: walk 10 minutes.  Short term goals: increase ROM.  Long term goals: increase ADL.  Prognosis: excellent.                Irritable

## 2024-10-24 ENCOUNTER — MYC MEDICAL ADVICE (OUTPATIENT)
Dept: OTHER | Age: 34
End: 2024-10-24

## 2024-11-27 ENCOUNTER — HOSPITAL ENCOUNTER (EMERGENCY)
Facility: HOSPITAL | Age: 34
Discharge: HOME | End: 2024-11-27
Attending: NURSE PRACTITIONER
Payer: COMMERCIAL

## 2024-11-27 ENCOUNTER — APPOINTMENT (OUTPATIENT)
Dept: GENERAL RADIOLOGY | Facility: HOSPITAL | Age: 34
End: 2024-11-27
Attending: NURSE PRACTITIONER
Payer: COMMERCIAL

## 2024-11-27 VITALS
SYSTOLIC BLOOD PRESSURE: 125 MMHG | DIASTOLIC BLOOD PRESSURE: 82 MMHG | TEMPERATURE: 97.7 F | OXYGEN SATURATION: 98 % | HEART RATE: 113 BPM | RESPIRATION RATE: 16 BRPM

## 2024-11-27 DIAGNOSIS — Z53.21 PATIENT LEFT WITHOUT BEING SEEN: ICD-10-CM

## 2024-11-27 PROCEDURE — 99281 EMR DPT VST MAYX REQ PHY/QHP: CPT | Performed by: NURSE PRACTITIONER

## 2024-11-27 PROCEDURE — 99281 EMR DPT VST MAYX REQ PHY/QHP: CPT

## 2024-11-27 PROCEDURE — 71046 X-RAY EXAM CHEST 2 VIEWS: CPT

## 2024-11-27 PROCEDURE — 87637 SARSCOV2&INF A&B&RSV AMP PRB: CPT | Performed by: EMERGENCY MEDICINE

## 2024-11-27 PROCEDURE — 87637 SARSCOV2&INF A&B&RSV AMP PRB: CPT | Performed by: NURSE PRACTITIONER

## 2024-11-27 ASSESSMENT — ACTIVITIES OF DAILY LIVING (ADL): ADLS_ACUITY_SCORE: 41

## 2024-11-27 NOTE — ED TRIAGE NOTES
Patient has been sick for 3 days. Cough, SOB, hot and cold flashes.States has heaviness in her chest when she coughs and sharp at the end of the cough

## 2024-11-27 NOTE — ED PROVIDER NOTES
Nursing unable to find patient in lobby after triage after being called in.  Patient left without being seen.               Renny Tyson, APRN CNP  11/27/24 [id number]

## 2024-12-03 NOTE — ED NOTES
Care Transitions focused note:      Follow up call on patient who left without being seen.    Called patient. Stated she is better today.  Encouraged her to call her PCP for appt or return to ED for further concerns.      JOESPH Tran

## 2025-01-21 ENCOUNTER — HOSPITAL ENCOUNTER (EMERGENCY)
Facility: HOSPITAL | Age: 35
Discharge: HOME | End: 2025-01-21
Attending: EMERGENCY MEDICINE
Payer: COMMERCIAL

## 2025-01-21 VITALS
RESPIRATION RATE: 16 BRPM | HEART RATE: 103 BPM | DIASTOLIC BLOOD PRESSURE: 78 MMHG | TEMPERATURE: 97.6 F | SYSTOLIC BLOOD PRESSURE: 120 MMHG | OXYGEN SATURATION: 100 % | WEIGHT: 135.47 LBS | BODY MASS INDEX: 22.54 KG/M2

## 2025-01-21 DIAGNOSIS — R20.2 PARESTHESIAS: ICD-10-CM

## 2025-01-21 LAB
ALBUMIN SERPL BCG-MCNC: 4.2 G/DL (ref 3.5–5.2)
ALP SERPL-CCNC: 85 U/L (ref 40–150)
ALT SERPL W P-5'-P-CCNC: 21 U/L (ref 0–50)
ANION GAP SERPL CALCULATED.3IONS-SCNC: 12 MMOL/L (ref 7–15)
AST SERPL W P-5'-P-CCNC: 19 U/L (ref 0–45)
BASOPHILS # BLD AUTO: 0 10E3/UL (ref 0–0.2)
BASOPHILS NFR BLD AUTO: 0 %
BILIRUB SERPL-MCNC: <0.2 MG/DL
BUN SERPL-MCNC: 19.2 MG/DL (ref 6–20)
CA-I BLD-MCNC: 4.4 MG/DL (ref 4.4–5.2)
CALCIUM SERPL-MCNC: 8.8 MG/DL (ref 8.8–10.4)
CHLORIDE SERPL-SCNC: 106 MMOL/L (ref 98–107)
CREAT SERPL-MCNC: 0.83 MG/DL (ref 0.51–0.95)
EGFRCR SERPLBLD CKD-EPI 2021: >90 ML/MIN/1.73M2
EOSINOPHIL # BLD AUTO: 0.3 10E3/UL (ref 0–0.7)
EOSINOPHIL NFR BLD AUTO: 4 %
ERYTHROCYTE [DISTWIDTH] IN BLOOD BY AUTOMATED COUNT: 12.9 % (ref 10–15)
GLUCOSE SERPL-MCNC: 124 MG/DL (ref 70–99)
HCO3 SERPL-SCNC: 25 MMOL/L (ref 22–29)
HCT VFR BLD AUTO: 39.1 % (ref 35–47)
HGB BLD-MCNC: 13 G/DL (ref 11.7–15.7)
IMM GRANULOCYTES # BLD: 0 10E3/UL
IMM GRANULOCYTES NFR BLD: 0 %
LYMPHOCYTES # BLD AUTO: 2.8 10E3/UL (ref 0.8–5.3)
LYMPHOCYTES NFR BLD AUTO: 40 %
MCH RBC QN AUTO: 29.9 PG (ref 26.5–33)
MCHC RBC AUTO-ENTMCNC: 33.2 G/DL (ref 31.5–36.5)
MCV RBC AUTO: 90 FL (ref 78–100)
MONOCYTES # BLD AUTO: 0.3 10E3/UL (ref 0–1.3)
MONOCYTES NFR BLD AUTO: 4 %
NEUTROPHILS # BLD AUTO: 3.8 10E3/UL (ref 1.6–8.3)
NEUTROPHILS NFR BLD AUTO: 53 %
NRBC # BLD AUTO: 0 10E3/UL
NRBC BLD AUTO-RTO: 0 /100
PLATELET # BLD AUTO: 234 10E3/UL (ref 150–450)
POTASSIUM SERPL-SCNC: 3.7 MMOL/L (ref 3.4–5.3)
PROT SERPL-MCNC: 6.4 G/DL (ref 6.4–8.3)
RBC # BLD AUTO: 4.35 10E6/UL (ref 3.8–5.2)
SODIUM SERPL-SCNC: 143 MMOL/L (ref 135–145)
WBC # BLD AUTO: 7.2 10E3/UL (ref 4–11)

## 2025-01-21 PROCEDURE — 99283 EMERGENCY DEPT VISIT LOW MDM: CPT | Performed by: EMERGENCY MEDICINE

## 2025-01-21 PROCEDURE — 36415 COLL VENOUS BLD VENIPUNCTURE: CPT | Performed by: EMERGENCY MEDICINE

## 2025-01-21 PROCEDURE — 85004 AUTOMATED DIFF WBC COUNT: CPT | Performed by: EMERGENCY MEDICINE

## 2025-01-21 PROCEDURE — 82330 ASSAY OF CALCIUM: CPT | Performed by: EMERGENCY MEDICINE

## 2025-01-21 PROCEDURE — 82040 ASSAY OF SERUM ALBUMIN: CPT | Performed by: EMERGENCY MEDICINE

## 2025-01-21 PROCEDURE — 99283 EMERGENCY DEPT VISIT LOW MDM: CPT

## 2025-01-21 PROCEDURE — 84155 ASSAY OF PROTEIN SERUM: CPT | Performed by: EMERGENCY MEDICINE

## 2025-01-21 ASSESSMENT — ENCOUNTER SYMPTOMS
SHORTNESS OF BREATH: 0
FEVER: 0
COUGH: 0
CHILLS: 0

## 2025-01-21 ASSESSMENT — ACTIVITIES OF DAILY LIVING (ADL): ADLS_ACUITY_SCORE: 41

## 2025-01-21 NOTE — ED TRIAGE NOTES
Pt in for evaluation of bilateral arm numbness ongoing the last month. Numbness from shoulders down both arms and into all fingers per pt. Has an appointment with PCP on 1/21. Pt reports no worsening sx today but states she has been dropping things and this was causing her some concern.

## 2025-01-21 NOTE — ED PROVIDER NOTES
History     Chief Complaint   Patient presents with    Numbness     HPI  Ihsan Millan is a 34 year old female who is here with paresthesia and weakness of both upper extremities.  Has been going on for months.  The first 3 months were intermittent.  Now it is constant.  States she has dropped a few things which concerned her and made her want to come in this evening.    Allergies:  Allergies   Allergen Reactions    Skin Adhesives [Cyanoacrylate] Dermatitis     Patient developed contact dermatitis after application of topical adhesive to surgical incision    Lexapro [Escitalopram] Nausea and Vomiting    Codeine Sulfate Rash    Penicillins Rash    Tramadol Rash       Problem List:    Patient Active Problem List    Diagnosis Date Noted    RUQ abdominal pain 02/12/2017     Priority: Medium    Pneumonia of right middle lobe due to infectious organism 02/12/2017     Priority: Medium    Tobacco abuse 02/12/2017     Priority: Medium    Status post laparoscopic assisted vaginal hysterectomy (LAVH) 06/25/2015     Priority: Medium    Endometriosis 04/16/2015     Priority: Medium     Depo lupron 4/15.  Laparoscopy 3/15      ASCUS on Pap smear 07/18/2014     Priority: Medium     + hpv colpo 7/14      Postpartum depression 07/18/2014     Priority: Medium     zoloft rx      H. pylori infection      Priority: Medium    Moderate persistent asthma 03/19/2014     Priority: Medium    GERD (gastroesophageal reflux disease) 09/12/2013     Priority: Medium    Intermittent asthma 09/12/2013     Priority: Medium     Increased with pregnancy  Send for eval and asthma action plan  Smoking cessation counseling  Flu shot given      Pregnant state, incidental 03/23/2011     Priority: Medium    Contraceptive management 11/09/2010     Priority: Medium        Past Medical History:    Past Medical History:   Diagnosis Date    Asthma     GERD (gastroesophageal reflux disease)     H. pylori infection        Past Surgical History:    Past  Surgical History:   Procedure Laterality Date    COLONOSCOPY N/A 11/6/2014    Procedure: COLONOSCOPY;  Surgeon: Zacarias Paz MD;  Location: [facility]    DILATION AND CURETTAGE, HYSTEROSCOPY DIAGNOSTIC, COMBINED  8/1/2014    Procedure: COMBINED DILATION AND CURETTAGE, HYSTEROSCOPY DIAGNOSTIC;  Surgeon: Rodolfo Clifford MD;  [facility]    ESOPHAGOSCOPY, GASTROSCOPY, DUODENOSCOPY (EGD), COMBINED N/A 9/5/2014    Procedure: COMBINED ESOPHAGOSCOPY, GASTROSCOPY, DUODENOSCOPY (EGD);  Surgeon: Zacarias Paz MD;  [facility]    HERNIORRHAPHY UMBILICAL N/A 9/3/2019    Procedure: OPEN UMBILICAL HERNIA REPAIR;  Surgeon: Carter Vaughn MD;  [facility]    LAPAROSCOPIC ASSISTED HYSTERECTOMY VAGINAL N/A 6/24/2015    Procedure: LAPAROSCOPIC ASSISTED HYSTERECTOMY VAGINAL;  Surgeon: Rodolfo Clifford MD;  [facility]    LAPAROSCOPIC OOPHORECTOMY Right 3/23/2016    Procedure: LAPAROSCOPIC OOPHORECTOMY;  Surgeon: Rodolfo Clifford MD;  [facility]    LAPAROSCOPIC SALPINGO-OOPHORECTOMY Left 5/3/2017    Procedure: LAPAROSCOPIC SALPINGO-OOPHORECTOMY;  LAPAROSCOPIC LEFT OOPHORECTOMY;  Surgeon: Rodolfo Clifford MD;  [facility]    LAPAROSCOPY DIAGNOSTIC (GYN) N/A 3/18/2015    Procedure: LAPAROSCOPY DIAGNOSTIC (GYN);  Surgeon: Rodolfo Clifford MD;  Location: [facility]    no surgeries      SALPINGECTOMY Bilateral 6/24/2015    Procedure: SALPINGECTOMY;  Surgeon: Rodolfo Clifford MD;  Location: [facility]       Family History:    Family History   Problem Relation Age of Onset    Asthma Mother     Unknown/Adopted Maternal Grandmother     Unknown/Adopted Maternal Grandfather        Social History:  Marital Status:  Single [1]  Social History     Tobacco Use    Smoking status: Every Day     Current packs/day: 0.50     Average packs/day: 0.5 packs/day for 7.0 years (3.5 ttl pk-yrs)     Types: Cigarettes    Smokeless tobacco: Never    Tobacco comments:     declines quitline referral 9/10/19   Substance Use Topics    Alcohol use: Not  Currently     Comment: social    Drug use: No        Medications:    Acetaminophen (TYLENOL PO)  albuterol (PROAIR HFA/PROVENTIL HFA/VENTOLIN HFA) 108 (90 Base) MCG/ACT inhaler  albuterol (PROVENTIL) (2.5 MG/3ML) 0.083% neb solution  amphetamine-dextroamphetamine (ADDERALL) 30 MG tablet  amphetamine-dextroamphetamine (ADDERALL) 30 MG tablet  ARIPiprazole (ABILIFY) 5 MG tablet  aspirin (ASA) 81 MG chewable tablet  busPIRone (BUSPAR) 5 MG tablet  clonazePAM (KLONOPIN) 0.5 MG tablet  cloNIDine (CATAPRES) 0.1 MG tablet  estradiol (ESTRACE) 1 MG tablet  hydrOXYzine (ATARAX) 25 MG tablet  lisdexamfetamine (VYVANSE) 40 MG capsule  montelukast (SINGULAIR) 10 MG tablet  naproxen (NAPROSYN) 500 MG tablet  omeprazole (PRILOSEC) 20 MG DR capsule  ondansetron (ZOFRAN ODT) 4 MG ODT tab  QUEtiapine (SEROQUEL) 50 MG tablet  sertraline (ZOLOFT) 50 MG tablet          Review of Systems   Constitutional:  Negative for chills and fever.   Respiratory:  Negative for cough and shortness of breath.    All other systems reviewed and are negative.      Physical Exam   BP: 120/78  Pulse: 103  Temp: 97.6  F (36.4  C)  Resp: 16  Weight: 61.4 kg (135 lb 7.6 oz)  SpO2: 100 %      Physical Exam  Constitutional:       General: She is not in acute distress.     Appearance: She is not diaphoretic.   HENT:      Head: Normocephalic and atraumatic.      Right Ear: External ear normal.      Left Ear: External ear normal.      Nose: No congestion or rhinorrhea.      Mouth/Throat:      Pharynx: Oropharynx is clear. No oropharyngeal exudate.   Eyes:      General: No scleral icterus.     Pupils: Pupils are equal, round, and reactive to light.   Cardiovascular:      Rate and Rhythm: Normal rate and regular rhythm.      Heart sounds: Normal heart sounds.   Pulmonary:      Effort: No respiratory distress.      Breath sounds: Normal breath sounds.   Abdominal:      General: Bowel sounds are normal.      Palpations: Abdomen is soft.      Tenderness: There is no  abdominal tenderness.   Musculoskeletal:         General: No tenderness.      Cervical back: Normal range of motion and neck supple.      Right lower leg: No edema.      Left lower leg: No edema.      Comments: 2+ radial pulses bilaterally, hands warm, cap refill less than 2 seconds   Skin:     General: Skin is warm.      Capillary Refill: Capillary refill takes less than 2 seconds.      Findings: No rash.   Neurological:      Mental Status: Mental status is at baseline.      Cranial Nerves: No cranial nerve deficit.      Comments: Sensation intact to light touch and symmetrical, able to discern between sharp and dull both palms, 5 out of 5  strength, CN II through XII intact finger-nose quick and coordinated bilaterally   Psychiatric:         Mood and Affect: Mood normal.         Behavior: Behavior normal.         ED Course        Procedures             Critical Care time:               Results for orders placed or performed during the hospital encounter of 01/21/25 (from the past 24 hours)   CBC with platelets differential    Narrative    The following orders were created for panel order CBC with platelets differential.  Procedure                               Abnormality         Status                     ---------                               -----------         ------                     CBC with platelets and d...[[id number]]                      Final result                 Please view results for these tests on the individual orders.   Ionized Calcium   Result Value Ref Range    Calcium Ionized Whole Blood 4.4 4.4 - 5.2 mg/dL   Comprehensive metabolic panel   Result Value Ref Range    Sodium 143 135 - 145 mmol/L    Potassium 3.7 3.4 - 5.3 mmol/L    Carbon Dioxide (CO2) 25 22 - 29 mmol/L    Anion Gap 12 7 - 15 mmol/L    Urea Nitrogen 19.2 6.0 - 20.0 mg/dL    Creatinine 0.83 0.51 - 0.95 mg/dL    GFR Estimate >90 >60 mL/min/1.73m2    Calcium 8.8 8.8 - 10.4 mg/dL    Chloride 106 98 - 107 mmol/L    Glucose  124 (H) 70 - 99 mg/dL    Alkaline Phosphatase 85 40 - 150 U/L    AST 19 0 - 45 U/L    ALT 21 0 - 50 U/L    Protein Total 6.4 6.4 - 8.3 g/dL    Albumin 4.2 3.5 - 5.2 g/dL    Bilirubin Total <0.2 <=1.2 mg/dL   CBC with platelets and differential   Result Value Ref Range    WBC Count 7.2 4.0 - 11.0 10e3/uL    RBC Count 4.35 3.80 - 5.20 10e6/uL    Hemoglobin 13.0 11.7 - 15.7 g/dL    Hematocrit 39.1 35.0 - 47.0 %    MCV 90 78 - 100 fL    MCH 29.9 26.5 - 33.0 pg    MCHC 33.2 31.5 - 36.5 g/dL    RDW 12.9 10.0 - 15.0 %    Platelet Count 234 150 - 450 10e3/uL    % Neutrophils 53 %    % Lymphocytes 40 %    % Monocytes 4 %    % Eosinophils 4 %    % Basophils 0 %    % Immature Granulocytes 0 %    NRBCs per 100 WBC 0 <1 /100    Absolute Neutrophils 3.8 1.6 - 8.3 10e3/uL    Absolute Lymphocytes 2.8 0.8 - 5.3 10e3/uL    Absolute Monocytes 0.3 0.0 - 1.3 10e3/uL    Absolute Eosinophils 0.3 0.0 - 0.7 10e3/uL    Absolute Basophils 0.0 0.0 - 0.2 10e3/uL    Absolute Immature Granulocytes 0.0 <=0.4 10e3/uL    Absolute NRBCs 0.0 10e3/uL       Medications - No data to display    Assessments & Plan (with Medical Decision Making)     I have reviewed the nursing notes.    I have reviewed the findings, diagnosis, plan and need for follow up with the patient.          Medical Decision Making  The patient's presentation was of moderate complexity (an undiagnosed new problem with uncertain prognosis).    The patient's evaluation involved:  ordering and/or review of 3+ test(s) in this encounter (see separate area of note for details)    The patient's management necessitated only low risk treatment.    34-year-old female here with paresthesias and weakness of both upper extremities.  Did not correlate with exam however may still be intermittent just not perceivable by the patient.  Has a primary follow-up today.  No lab abnormalities to correlate with patient's symptoms.  Likely will need imaging, will defer to primary.    New Prescriptions    No  medications on file       Final diagnoses:   Paresthesias       1/21/2025   [facility]       aRmu Manclila MD  01/21/25 0105

## 2025-01-23 ENCOUNTER — MEDICAL CORRESPONDENCE (OUTPATIENT)
Dept: MRI IMAGING | Facility: HOSPITAL | Age: 35
End: 2025-01-23

## 2025-01-28 ENCOUNTER — HOSPITAL ENCOUNTER (OUTPATIENT)
Dept: MRI IMAGING | Facility: HOSPITAL | Age: 35
Discharge: HOME | End: 2025-01-28
Attending: FAMILY MEDICINE
Payer: COMMERCIAL

## 2025-01-28 DIAGNOSIS — R20.2 PARESTHESIA OF ARM: ICD-10-CM

## 2025-01-28 PROCEDURE — 72141 MRI NECK SPINE W/O DYE: CPT

## 2025-02-02 ENCOUNTER — HOSPITAL ENCOUNTER (EMERGENCY)
Facility: HOSPITAL | Age: 35
Discharge: HOME | End: 2025-02-02
Attending: NURSE PRACTITIONER
Payer: COMMERCIAL

## 2025-02-02 VITALS
RESPIRATION RATE: 15 BRPM | TEMPERATURE: 98.6 F | OXYGEN SATURATION: 100 % | DIASTOLIC BLOOD PRESSURE: 70 MMHG | HEART RATE: 92 BPM | SYSTOLIC BLOOD PRESSURE: 106 MMHG

## 2025-02-02 DIAGNOSIS — G56.02 CARPAL TUNNEL SYNDROME ON LEFT: ICD-10-CM

## 2025-02-02 DIAGNOSIS — M25.532 LEFT WRIST PAIN: Primary | ICD-10-CM

## 2025-02-02 PROCEDURE — G0463 HOSPITAL OUTPT CLINIC VISIT: HCPCS

## 2025-02-02 PROCEDURE — 99213 OFFICE O/P EST LOW 20 MIN: CPT | Performed by: NURSE PRACTITIONER

## 2025-02-02 ASSESSMENT — ENCOUNTER SYMPTOMS: MYALGIAS: 1

## 2025-02-03 NOTE — ED PROVIDER NOTES
History     Chief Complaint   Patient presents with    Wrist Pain     HPI  Ihsan Millan is a 34 year old female who presents ambulatory to urgent care for evaluation of left wrist pain.  Patient tells me that over the last couple of weeks she has been having paresthesias to bilateral arms.  She was seen in the emergency department and had some basic blood work done.  She has since followed up with her primary doctor and had an MRI of the cervical spine done.  She tells me that she is supposed to get EMG testing done but has not scheduled the appointment.  A couple of days ago she started getting left wrist pain.  Denies any recent trauma or injury.  She works as a  and denies any repetitive tasks at work or at home.  She has not taken anything for pain.      Allergies:  Allergies   Allergen Reactions    Skin Adhesives [Cyanoacrylate] Dermatitis     Patient developed contact dermatitis after application of topical adhesive to surgical incision    Lexapro [Escitalopram] Nausea and Vomiting    Codeine Sulfate Rash    Penicillins Rash    Tramadol Rash       Problem List:    Patient Active Problem List    Diagnosis Date Noted    RUQ abdominal pain 02/12/2017     Priority: Medium    Pneumonia of right middle lobe due to infectious organism 02/12/2017     Priority: Medium    Tobacco abuse 02/12/2017     Priority: Medium    Status post laparoscopic assisted vaginal hysterectomy (LAVH) 06/25/2015     Priority: Medium    Endometriosis 04/16/2015     Priority: Medium     Depo lupron 4/15.  Laparoscopy 3/15      ASCUS on Pap smear 07/18/2014     Priority: Medium     + hpv colpo 7/14      Postpartum depression 07/18/2014     Priority: Medium     zoloft rx      H. pylori infection      Priority: Medium    Moderate persistent asthma 03/19/2014     Priority: Medium    GERD (gastroesophageal reflux disease) 09/12/2013     Priority: Medium    Intermittent asthma 09/12/2013     Priority: Medium     Increased  with pregnancy  Send for eval and asthma action plan  Smoking cessation counseling  Flu shot given      Pregnant state, incidental 03/23/2011     Priority: Medium    Contraceptive management 11/09/2010     Priority: Medium        Past Medical History:    Past Medical History:   Diagnosis Date    Asthma     GERD (gastroesophageal reflux disease)     H. pylori infection        Past Surgical History:    Past Surgical History:   Procedure Laterality Date    COLONOSCOPY N/A 11/6/2014    Procedure: COLONOSCOPY;  Surgeon: Zacarias Paz MD;  Location: [facility]    DILATION AND CURETTAGE, HYSTEROSCOPY DIAGNOSTIC, COMBINED  8/1/2014    Procedure: COMBINED DILATION AND CURETTAGE, HYSTEROSCOPY DIAGNOSTIC;  Surgeon: Rodolfo Clifford MD;  Location: [facility]    ESOPHAGOSCOPY, GASTROSCOPY, DUODENOSCOPY (EGD), COMBINED N/A 9/5/2014    Procedure: COMBINED ESOPHAGOSCOPY, GASTROSCOPY, DUODENOSCOPY (EGD);  Surgeon: Zacarias Paz MD;  Location: [facility]    HERNIORRHAPHY UMBILICAL N/A 9/3/2019    Procedure: OPEN UMBILICAL HERNIA REPAIR;  Surgeon: Carter Vaughn MD;  Location: [facility]    LAPAROSCOPIC ASSISTED HYSTERECTOMY VAGINAL N/A 6/24/2015    Procedure: LAPAROSCOPIC ASSISTED HYSTERECTOMY VAGINAL;  Surgeon: Rodolfo Clifford MD;  Location: [facility]    LAPAROSCOPIC OOPHORECTOMY Right 3/23/2016    Procedure: LAPAROSCOPIC OOPHORECTOMY;  Surgeon: Rodolfo Clifford MD;  [facility]    LAPAROSCOPIC SALPINGO-OOPHORECTOMY Left 5/3/2017    Procedure: LAPAROSCOPIC SALPINGO-OOPHORECTOMY;  LAPAROSCOPIC LEFT OOPHORECTOMY;  Surgeon: Rodolfo Clifford MD;  [facility]    LAPAROSCOPY DIAGNOSTIC (GYN) N/A 3/18/2015    Procedure: LAPAROSCOPY DIAGNOSTIC (GYN);  Surgeon: Rodolfo Clifford MD;  Location: [facility]    no surgeries      SALPINGECTOMY Bilateral 6/24/2015    Procedure: SALPINGECTOMY;  Surgeon: Rodolfo Clifford MD;  Location: [facility]       Family History:    Family History   Problem Relation Age of Onset    Asthma Mother     Unknown/Adopted Maternal  Grandmother     Unknown/Adopted Maternal Grandfather        Social History:  Marital Status:  Single [1]  Social History     Tobacco Use    Smoking status: Every Day     Current packs/day: 0.50     Average packs/day: 0.5 packs/day for 7.0 years (3.5 ttl pk-yrs)     Types: Cigarettes    Smokeless tobacco: Never    Tobacco comments:     declines quitline referral 9/10/19   Substance Use Topics    Alcohol use: Not Currently     Comment: social    Drug use: No        Medications:    Acetaminophen (TYLENOL PO)  albuterol (PROAIR HFA/PROVENTIL HFA/VENTOLIN HFA) 108 (90 Base) MCG/ACT inhaler  albuterol (PROVENTIL) (2.5 MG/3ML) 0.083% neb solution  amphetamine-dextroamphetamine (ADDERALL) 30 MG tablet  amphetamine-dextroamphetamine (ADDERALL) 30 MG tablet  ARIPiprazole (ABILIFY) 5 MG tablet  aspirin (ASA) 81 MG chewable tablet  busPIRone (BUSPAR) 5 MG tablet  clonazePAM (KLONOPIN) 0.5 MG tablet  cloNIDine (CATAPRES) 0.1 MG tablet  estradiol (ESTRACE) 1 MG tablet  hydrOXYzine (ATARAX) 25 MG tablet  lisdexamfetamine (VYVANSE) 40 MG capsule  montelukast (SINGULAIR) 10 MG tablet  naproxen (NAPROSYN) 500 MG tablet  omeprazole (PRILOSEC) 20 MG DR capsule  ondansetron (ZOFRAN ODT) 4 MG ODT tab  QUEtiapine (SEROQUEL) 50 MG tablet  sertraline (ZOLOFT) 50 MG tablet          Review of Systems   Musculoskeletal:  Positive for myalgias.   All other systems reviewed and are negative.      Physical Exam   BP: 106/70  Pulse: 92  Temp: 98.6  F (37  C)  Resp: 15  SpO2: 100 %      Physical Exam  Vitals and nursing note reviewed.   Constitutional:       General: She is not in acute distress.     Appearance: Normal appearance. She is well-developed. She is not diaphoretic.   HENT:      Head: Normocephalic and atraumatic.   Eyes:      Conjunctiva/sclera: Conjunctivae normal.   Cardiovascular:      Rate and Rhythm: Normal rate.   Pulmonary:      Effort: Pulmonary effort is normal. No respiratory distress.   Abdominal:      General: Abdomen is  flat.   Musculoskeletal:         General: Tenderness present. No deformity.      Cervical back: Normal range of motion and neck supple.      Comments: Tenderness to palpation to the entire wrist including the ulnar styloid.  No significant swelling appreciated in comparison of both wrist.  She is able to do range of motion movements although does endorse pain with doing so.  No obvious deformity.  Radial pulse intact.     Skin:     General: Skin is warm and dry.      Capillary Refill: Capillary refill takes less than 2 seconds.      Coloration: Skin is not jaundiced or pale.      Findings: No bruising, erythema, lesion or rash.   Neurological:      Mental Status: She is alert and oriented to person, place, and time.         ED Course        Procedures              No results found for this or any previous visit (from the past 24 hours).    Medications - No data to display    Assessments & Plan (with Medical Decision Making)   This is a 34-year-old female that presented for evaluation of wrist pain with no known injury.  Is currently being evaluated by her primary doctor for paresthesias of both arms.  Recently had an MRI of her cervical spine which I reviewed today showing degenerative changes of her C-spine.  She is yet to schedule EMG.    Discussed today's findings with patient.  There is no obvious deformity.  She has generalized tenderness to her entire wrist.  Given no injury used shared decision making and imaging was deferred today.  Her symptoms are suspicious for carpal tunnel syndrome.  Wrist brace was given today.  Recommended taking anti-inflammatories such as ibuprofen for pain as well as icing her wrist.  I encouraged her to schedule appointment for EMG and follow-up with her primary doctor.  Strict return precautions to UC/ER discussed and patient verbalized understanding.    I have reviewed the nursing notes.    I have reviewed the findings, diagnosis, plan and need for follow up with the  patient.  This document was prepared using a combination of typing and voice generated software.  While every attempt was made for accuracy, spelling and grammatical errors may exist.         Discharge Medication List as of 2/2/2025  7:42 PM          Final diagnoses:   Left wrist pain   Carpal tunnel syndrome on left       2/2/2025   [facility]       Brittni Spivey CNP  02/02/25 1954

## 2025-02-03 NOTE — DISCHARGE INSTRUCTIONS
The symptoms that you are describing are suspicious for carpal tunnel syndrome.  Use the wrist brace that was given today, apply ice packs for 20 minutes at a time and take ibuprofen 400 to 800 mg every 6-8 hours as needed for pain.    Schedule appointment with EMG as this will also help in determining the true cause of your symptoms.  Follow-up with your primary doctor for continued care.    Return to urgent care or emergency department for any worsening or concerning symptoms.

## 2025-02-03 NOTE — ED TRIAGE NOTES
Pt presents with c/o left wrist pain. Reports that she is not sure what happened. States she has been in and out of the doctors because her hands are going numb and tingling. Pain started a couple days ago. Denies trauma or injury. Pt took ibuprofen about an hour ago. CMS intact. ROM WNL.

## 2025-03-15 ENCOUNTER — HOSPITAL ENCOUNTER (EMERGENCY)
Facility: HOSPITAL | Age: 35
Discharge: HOME | End: 2025-03-15
Attending: PHYSICIAN ASSISTANT
Payer: COMMERCIAL

## 2025-03-15 VITALS
RESPIRATION RATE: 16 BRPM | HEART RATE: 93 BPM | HEIGHT: 64 IN | BODY MASS INDEX: 23.05 KG/M2 | DIASTOLIC BLOOD PRESSURE: 78 MMHG | OXYGEN SATURATION: 98 % | SYSTOLIC BLOOD PRESSURE: 112 MMHG | TEMPERATURE: 98.2 F | WEIGHT: 135 LBS

## 2025-03-15 DIAGNOSIS — T25.429A: ICD-10-CM

## 2025-03-15 PROCEDURE — G0463 HOSPITAL OUTPT CLINIC VISIT: HCPCS

## 2025-03-15 PROCEDURE — 99213 OFFICE O/P EST LOW 20 MIN: CPT | Performed by: PHYSICIAN ASSISTANT

## 2025-03-15 RX ORDER — SILVER SULFADIAZINE 10 MG/G
CREAM TOPICAL ONCE
Status: DISCONTINUED | OUTPATIENT
Start: 2025-03-15 | End: 2025-03-15 | Stop reason: HOSPADM

## 2025-03-15 RX ORDER — SILVER SULFADIAZINE 10 MG/G
CREAM TOPICAL 2 TIMES DAILY
Qty: 50 G | Refills: 0 | Status: SHIPPED | OUTPATIENT
Start: 2025-03-15

## 2025-03-15 ASSESSMENT — ENCOUNTER SYMPTOMS: WOUND: 1

## 2025-03-15 ASSESSMENT — ACTIVITIES OF DAILY LIVING (ADL): ADLS_ACUITY_SCORE: 41
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
## 2025-03-17 ENCOUNTER — MEDICAL CORRESPONDENCE (OUTPATIENT)
Dept: MRI IMAGING | Facility: HOSPITAL | Age: 35
End: 2025-03-17

## 2025-03-20 ENCOUNTER — OFFICE VISIT (OUTPATIENT)
Dept: CHIROPRACTIC MEDICINE | Facility: OTHER | Age: 35
End: 2025-03-20
Attending: CHIROPRACTOR
Payer: COMMERCIAL

## 2025-03-20 DIAGNOSIS — M99.01 SEGMENTAL AND SOMATIC DYSFUNCTION OF CERVICAL REGION: ICD-10-CM

## 2025-03-20 DIAGNOSIS — M54.50 ACUTE BILATERAL LOW BACK PAIN WITHOUT SCIATICA: ICD-10-CM

## 2025-03-20 DIAGNOSIS — M99.03 SEGMENTAL AND SOMATIC DYSFUNCTION OF LUMBAR REGION: Primary | ICD-10-CM

## 2025-03-20 DIAGNOSIS — M99.02 SEGMENTAL AND SOMATIC DYSFUNCTION OF THORACIC REGION: ICD-10-CM

## 2025-03-20 PROCEDURE — 98941 CHIROPRACT MANJ 3-4 REGIONS: CPT | Mod: AT | Performed by: CHIROPRACTOR

## 2025-03-24 NOTE — PROGRESS NOTES
Subjective Finding:    Chief compalint: Patient presents with:  Back Pain , Pain Scale: 3/10, Intensity: dull, Duration: 1 months, Radiating: no.    Date of injury:     Activities that the pain restricts:   Home/household/hobbies/social activities: Yes.  Work duties: Yes.  Sleep: No.  Makes symptoms better: rest.  Makes symptoms worse: lumbar extension and lumbar flexion.  Have you seen anyone else for the symptoms? No.  Work related: No.  Automobile related injury: No.    Objective and Assessment:    Posture Analysis:   High shoulder: .  Head tilt: .  High iliac crest: .  Head carriage: forward.  Thoracic Kyphosis: neutral.  Lumbar Lordosis: neutral.    Lumbar Range of Motion: extension decreased.  Cervical Range of Motion: .  Thoracic Range of Motion: .  Extremity Range of Motion: .    Palpation:   Quad lumb: bilateral, referred pain: no    Segmental dysfunction pre-treatment and treatment area: C5, T5, L3, and L5.    Assessment post-treatment:  Cervical: ROM increased.  Thoracic: ROM increased.  Lumbar: ROM increased.    Comments: .      Complicating Factors: .    Procedure(s):  CMT:  44461 Chiropractic manipulative treatment 3-4 regions performed   Cervical: Diversified, See above for level, Supine, Thoracic: Diversified, See above for level, Prone, and Pelvis: Diversified, See above for level, Side posture    Modalities:  None performed this visit    Therapeutic procedures:  None    Plan:  Treatment plan:  2 times 2 weeks  .  Instructed patient: stretch as instructed at visit.  Short term goals: increase ROM.  Long term goals: increase ADL.  Prognosis: very good.

## 2025-03-28 ENCOUNTER — HOSPITAL ENCOUNTER (OUTPATIENT)
Dept: MRI IMAGING | Facility: HOSPITAL | Age: 35
Discharge: HOME | End: 2025-03-28
Attending: ORTHOPAEDIC SURGERY | Admitting: ORTHOPAEDIC SURGERY
Payer: COMMERCIAL

## 2025-03-28 DIAGNOSIS — M25.532 LEFT WRIST PAIN: ICD-10-CM

## 2025-03-28 PROCEDURE — 73221 MRI JOINT UPR EXTREM W/O DYE: CPT | Mod: LT

## 2025-04-19 ENCOUNTER — HOSPITAL ENCOUNTER (EMERGENCY)
Facility: HOSPITAL | Age: 35
Discharge: HOME | End: 2025-04-19
Attending: NURSE PRACTITIONER | Admitting: NURSE PRACTITIONER
Payer: COMMERCIAL

## 2025-04-19 VITALS
TEMPERATURE: 97.1 F | DIASTOLIC BLOOD PRESSURE: 82 MMHG | WEIGHT: 135 LBS | SYSTOLIC BLOOD PRESSURE: 114 MMHG | HEIGHT: 64 IN | HEART RATE: 96 BPM | BODY MASS INDEX: 23.05 KG/M2 | OXYGEN SATURATION: 98 % | RESPIRATION RATE: 18 BRPM

## 2025-04-19 DIAGNOSIS — M25.532 LEFT WRIST PAIN: Primary | ICD-10-CM

## 2025-04-19 PROCEDURE — G0463 HOSPITAL OUTPT CLINIC VISIT: HCPCS

## 2025-04-19 PROCEDURE — 99213 OFFICE O/P EST LOW 20 MIN: CPT | Performed by: NURSE PRACTITIONER

## 2025-04-19 RX ORDER — PREDNISONE 20 MG/1
TABLET ORAL
Qty: 10 TABLET | Refills: 0 | Status: SHIPPED | OUTPATIENT
Start: 2025-04-19

## 2025-04-19 ASSESSMENT — ENCOUNTER SYMPTOMS
NAUSEA: 0
CHILLS: 0
PSYCHIATRIC NEGATIVE: 1
SHORTNESS OF BREATH: 0
DIARRHEA: 0
VOMITING: 0
FEVER: 0

## 2025-04-19 ASSESSMENT — ACTIVITIES OF DAILY LIVING (ADL): ADLS_ACUITY_SCORE: 41

## 2025-04-19 NOTE — DISCHARGE INSTRUCTIONS
Rest  Ice  Compression with ace wrap  Elevate  Prednisone as ordered  Close follow up with Dr. Lopez regarding wrist pain  Follow-up with primary care provider or return to urgent care/ED with any worsening in condition or additional concerns.

## 2025-04-19 NOTE — ED PROVIDER NOTES
History     Chief Complaint   Patient presents with    Wrist Pain     HPI  Ihsan Millan is a 34 year old female who presents to urgent care today ambulatory with complaints of left wrist pain that has been ongoing and follows with Dr. Lopez at .  Patient had MRI left wrist without contrast completed on 3/28/2025.  Denies any fall, injury or trauma.  No skin abnormalities.  No OTC meds.  No other concerns.    Allergies:  Allergies   Allergen Reactions    Skin Adhesives [Cyanoacrylate] Dermatitis     Patient developed contact dermatitis after application of topical adhesive to surgical incision    Lexapro [Escitalopram] Nausea and Vomiting    Codeine Sulfate Rash    Penicillins Rash    Tramadol Rash       Problem List:    Patient Active Problem List    Diagnosis Date Noted    RUQ abdominal pain 02/12/2017     Priority: Medium    Pneumonia of right middle lobe due to infectious organism 02/12/2017     Priority: Medium    Tobacco abuse 02/12/2017     Priority: Medium    Status post laparoscopic assisted vaginal hysterectomy (LAVH) 06/25/2015     Priority: Medium    Endometriosis 04/16/2015     Priority: Medium     Depo lupron 4/15.  Laparoscopy 3/15      ASCUS on Pap smear 07/18/2014     Priority: Medium     + hpv colpo 7/14      Postpartum depression 07/18/2014     Priority: Medium     zoloft rx      H. pylori infection      Priority: Medium    Moderate persistent asthma 03/19/2014     Priority: Medium    GERD (gastroesophageal reflux disease) 09/12/2013     Priority: Medium    Intermittent asthma 09/12/2013     Priority: Medium     Increased with pregnancy  Send for eval and asthma action plan  Smoking cessation counseling  Flu shot given      Pregnant state, incidental 03/23/2011     Priority: Medium    Contraceptive management 11/09/2010     Priority: Medium        Past Medical History:    Past Medical History:   Diagnosis Date    Asthma     GERD (gastroesophageal reflux disease)     H. pylori infection         Past Surgical History:    Past Surgical History:   Procedure Laterality Date    COLONOSCOPY N/A 11/6/2014    Procedure: COLONOSCOPY;  Surgeon: Zacarias Paz MD;  Location: [facility]    DILATION AND CURETTAGE, HYSTEROSCOPY DIAGNOSTIC, COMBINED  8/1/2014    Procedure: COMBINED DILATION AND CURETTAGE, HYSTEROSCOPY DIAGNOSTIC;  Surgeon: Rodolfo Clifford MD;  Location: [facility]    ESOPHAGOSCOPY, GASTROSCOPY, DUODENOSCOPY (EGD), COMBINED N/A 9/5/2014    Procedure: COMBINED ESOPHAGOSCOPY, GASTROSCOPY, DUODENOSCOPY (EGD);  Surgeon: Zacarias Paz MD;  Location[facility]    HERNIORRHAPHY UMBILICAL N/A 9/3/2019    Procedure: OPEN UMBILICAL HERNIA REPAIR;  Surgeon: Carter Vaughn MD;  Location: [facility]    LAPAROSCOPIC ASSISTED HYSTERECTOMY VAGINAL N/A 6/24/2015    Procedure: LAPAROSCOPIC ASSISTED HYSTERECTOMY VAGINAL;  Surgeon: Rodolfo Clifford MD;  [facility]    LAPAROSCOPIC OOPHORECTOMY Right 3/23/2016    Procedure: LAPAROSCOPIC OOPHORECTOMY;  Surgeon: Rodolfo Clifford MD;  [facility]    LAPAROSCOPIC SALPINGO-OOPHORECTOMY Left 5/3/2017    Procedure: LAPAROSCOPIC SALPINGO-OOPHORECTOMY;  LAPAROSCOPIC LEFT OOPHORECTOMY;  Surgeon: Rodolfo Clifford MD;  [facility]    LAPAROSCOPY DIAGNOSTIC (GYN) N/A 3/18/2015    Procedure: LAPAROSCOPY DIAGNOSTIC (GYN);  Surgeon: Rodolfo Clifford MD;  Location: [facility]    no surgeries      SALPINGECTOMY Bilateral 6/24/2015    Procedure: SALPINGECTOMY;  Surgeon: Rodolfo Clifford MD;  Location: [facility]       Family History:    Family History   Problem Relation Age of Onset    Asthma Mother     Unknown/Adopted Maternal Grandmother     Unknown/Adopted Maternal Grandfather        Social History:  Marital Status:  Single [1]  Social History     Tobacco Use    Smoking status: Every Day     Current packs/day: 0.50     Average packs/day: 0.5 packs/day for 7.0 years (3.5 ttl pk-yrs)     Types: Cigarettes    Smokeless tobacco: Never    Tobacco comments:     declines quitline referral 9/10/19  "  Substance Use Topics    Alcohol use: Not Currently     Comment: social    Drug use: No        Medications:    predniSONE (DELTASONE) 20 MG tablet  Acetaminophen (TYLENOL PO)  albuterol (PROAIR HFA/PROVENTIL HFA/VENTOLIN HFA) 108 (90 Base) MCG/ACT inhaler  albuterol (PROVENTIL) (2.5 MG/3ML) 0.083% neb solution  amphetamine-dextroamphetamine (ADDERALL) 30 MG tablet  amphetamine-dextroamphetamine (ADDERALL) 30 MG tablet  ARIPiprazole (ABILIFY) 5 MG tablet  aspirin (ASA) 81 MG chewable tablet  busPIRone (BUSPAR) 5 MG tablet  clonazePAM (KLONOPIN) 0.5 MG tablet  cloNIDine (CATAPRES) 0.1 MG tablet  estradiol (ESTRACE) 1 MG tablet  hydrOXYzine (ATARAX) 25 MG tablet  lisdexamfetamine (VYVANSE) 40 MG capsule  montelukast (SINGULAIR) 10 MG tablet  naproxen (NAPROSYN) 500 MG tablet  omeprazole (PRILOSEC) 20 MG DR capsule  ondansetron (ZOFRAN ODT) 4 MG ODT tab  QUEtiapine (SEROQUEL) 50 MG tablet  sertraline (ZOLOFT) 50 MG tablet  silver sulfADIAZINE (SILVADENE) 1 % external cream      Review of Systems   Constitutional:  Negative for chills and fever.   Respiratory:  Negative for shortness of breath.    Cardiovascular:  Negative for chest pain.   Gastrointestinal:  Negative for diarrhea, nausea and vomiting.   Musculoskeletal:  Negative for gait problem.        Left wrist pain    Skin: Negative.    Psychiatric/Behavioral: Negative.       Physical Exam   BP: 114/82  Pulse: 96  Temp: 97.1  F (36.2  C)  Resp: 18  Height: 162.6 cm (5' 4\")  Weight: 61.2 kg (135 lb)  SpO2: 98 %    Physical Exam  Vitals and nursing note reviewed.   Constitutional:       General: She is not in acute distress.     Appearance: Normal appearance. She is not ill-appearing or toxic-appearing.   Cardiovascular:      Rate and Rhythm: Normal rate and regular rhythm.      Pulses: Normal pulses.      Heart sounds: Normal heart sounds.   Pulmonary:      Effort: Pulmonary effort is normal.      Breath sounds: Normal breath sounds.   Musculoskeletal:      Left " forearm: Normal.      Left wrist: Snuff box tenderness present. No swelling, deformity, effusion, lacerations or crepitus. Normal range of motion. Normal pulse.      Left hand: No swelling or deformity. Normal range of motion. Normal capillary refill. Normal pulse.   Skin:     General: Skin is warm and dry.      Capillary Refill: Capillary refill takes less than 2 seconds.   Neurological:      Mental Status: She is alert.   Psychiatric:         Mood and Affect: Mood normal.       ED Course     Procedures    No results found for this or any previous visit (from the past 24 hours).    Medications - No data to display    Assessments & Plan (with Medical Decision Making)     I have reviewed the nursing notes.    I have reviewed the findings, diagnosis, plan and need for follow up with the patient.  (M25.532) Left wrist pain  (primary encounter diagnosis)  Plan:   Patient ambulatory with a nontoxic appearance.  Patient arrived with complaints of ongoing left wrist pain, follows [facility] including recently having an MRI completed on 3/28/2025.  Denies any fall, injury or trauma.  No open skin wounds, bruising, swelling or erythema.  CMS intact.  No signs of infection.  Will start patient on short course of prednisone for pain.  Patient to follow [facility].  Ace wrap as needed.  Follow-up with Dr. Lopez regarding ongoing left wrist pain.  Return to urgent care/ED with any worsening in condition or additional concerns.  Patient in agreement to treatment plan.    Discharge Medication List as of 4/19/2025 11:04 AM        START taking these medications    Details   predniSONE (DELTASONE) 20 MG tablet Take two tablets (= 40mg) each day for 5 (five) days, Disp-10 tablet, R-0, E-Prescribe           Final diagnoses:   Left wrist pain     4/19/2025   [facility]       Tammy Castro NP  04/19/25 7502

## 2025-04-29 ENCOUNTER — OFFICE VISIT (OUTPATIENT)
Dept: CHIROPRACTIC MEDICINE | Facility: OTHER | Age: 35
End: 2025-04-29
Attending: CHIROPRACTOR
Payer: COMMERCIAL

## 2025-04-29 DIAGNOSIS — M99.03 SEGMENTAL AND SOMATIC DYSFUNCTION OF LUMBAR REGION: Primary | ICD-10-CM

## 2025-04-29 DIAGNOSIS — M99.02 SEGMENTAL AND SOMATIC DYSFUNCTION OF THORACIC REGION: ICD-10-CM

## 2025-04-29 DIAGNOSIS — M54.50 ACUTE BILATERAL LOW BACK PAIN WITHOUT SCIATICA: ICD-10-CM

## 2025-04-29 DIAGNOSIS — M99.01 SEGMENTAL AND SOMATIC DYSFUNCTION OF CERVICAL REGION: ICD-10-CM

## 2025-05-01 NOTE — PROGRESS NOTES
Subjective Finding:    Chief compalint: Patient presents with:  Neck Pain: With back pain   , Pain Scale: 5/10, Intensity: dull, Duration: 1 weeks, Radiating: no.    Date of injury:     Activities that the pain restricts:   Home/household/hobbies/social activities: Yes.  Work duties: Yes.  Sleep: Yes.  Makes symptoms better: rest.  Makes symptoms worse: lumbar extension.  Have you seen anyone else for the symptoms? No.  Work related: No.  Automobile related injury: No.    Objective and Assessment:    Posture Analysis:   High shoulder: .  Head tilt: .  High iliac crest: .  Head carriage: neutral.  Thoracic Kyphosis: neutral.  Lumbar Lordosis: neutral.    Lumbar Range of Motion: extension decreased.  Cervical Range of Motion: extension decreased.  Thoracic Range of Motion: .  Extremity Range of Motion: .    Palpation:   Quad lumb: bilateral, referred pain: no    Segmental dysfunction pre-treatment and treatment area: C4, C5, T4, and L5.    Assessment post-treatment:  Cervical: ROM increased.  Thoracic: ROM increased.  Lumbar: ROM increased.    Comments: .      Complicating Factors: .    Procedure(s):  CMT:  89898 Chiropractic manipulative treatment 3-4 regions performed   Cervical: Diversified, See above for level, Supine, Thoracic: Diversified, See above for level, Supine, and Lumbar: Diversified, See above for level, Side posture    Modalities:  None performed this visit    Therapeutic procedures:  None    Plan:  Treatment plan: PRN.  Instructed patient: stretch as instructed at visit.  Short term goals: reduce pain.  Long term goals: increase ADL.  Prognosis: excellent.

## 2025-06-06 ENCOUNTER — APPOINTMENT (OUTPATIENT)
Dept: CT IMAGING | Facility: HOSPITAL | Age: 35
End: 2025-06-06
Attending: INTERNAL MEDICINE

## 2025-06-06 ENCOUNTER — HOSPITAL ENCOUNTER (EMERGENCY)
Facility: HOSPITAL | Age: 35
Discharge: HOME | End: 2025-06-06
Attending: INTERNAL MEDICINE

## 2025-06-06 VITALS
HEART RATE: 97 BPM | OXYGEN SATURATION: 97 % | BODY MASS INDEX: 22.71 KG/M2 | TEMPERATURE: 97.7 F | RESPIRATION RATE: 16 BRPM | HEIGHT: 64 IN | DIASTOLIC BLOOD PRESSURE: 57 MMHG | WEIGHT: 133 LBS | SYSTOLIC BLOOD PRESSURE: 105 MMHG

## 2025-06-06 DIAGNOSIS — R07.89 LEFT-SIDED CHEST WALL PAIN: ICD-10-CM

## 2025-06-06 PROCEDURE — 99284 EMERGENCY DEPT VISIT MOD MDM: CPT | Mod: 25

## 2025-06-06 PROCEDURE — 250N000013 HC RX MED GY IP 250 OP 250 PS 637: Performed by: INTERNAL MEDICINE

## 2025-06-06 PROCEDURE — 99283 EMERGENCY DEPT VISIT LOW MDM: CPT | Performed by: INTERNAL MEDICINE

## 2025-06-06 PROCEDURE — 71250 CT THORAX DX C-: CPT | Mod: 26 | Performed by: RADIOLOGY

## 2025-06-06 PROCEDURE — 71250 CT THORAX DX C-: CPT

## 2025-06-06 RX ORDER — CYCLOBENZAPRINE HCL 10 MG
10 TABLET ORAL 3 TIMES DAILY PRN
Qty: 15 TABLET | Refills: 0 | Status: SHIPPED | OUTPATIENT
Start: 2025-06-06 | End: 2025-06-11

## 2025-06-06 RX ORDER — OXYCODONE HYDROCHLORIDE 5 MG/1
5 TABLET ORAL ONCE
Refills: 0 | Status: COMPLETED | OUTPATIENT
Start: 2025-06-06 | End: 2025-06-06

## 2025-06-06 RX ADMIN — OXYCODONE HYDROCHLORIDE 5 MG: 5 TABLET ORAL at 03:45

## 2025-06-06 ASSESSMENT — ACTIVITIES OF DAILY LIVING (ADL): ADLS_ACUITY_SCORE: 41

## 2025-06-06 NOTE — ED TRIAGE NOTES
"States she was sitting in the car and felt \"pop\" and developed mid/upper left back pain, worse with movement and deep breaths. Denies other symptoms.        "

## 2025-06-10 ASSESSMENT — ENCOUNTER SYMPTOMS
SHORTNESS OF BREATH: 0
FEVER: 0
NECK PAIN: 0
COUGH: 0
VOMITING: 0
DIAPHORESIS: 0
NECK STIFFNESS: 0
VOICE CHANGE: 0
ANAL BLEEDING: 0
HEADACHES: 0
BACK PAIN: 0
COLOR CHANGE: 0
CONFUSION: 0
WHEEZING: 0
HEMATURIA: 0
LIGHT-HEADEDNESS: 0
PALPITATIONS: 0
ABDOMINAL DISTENTION: 0
MYALGIAS: 0
BLOOD IN STOOL: 0
ABDOMINAL PAIN: 0
NUMBNESS: 0
CHILLS: 0
DIZZINESS: 0
CHEST TIGHTNESS: 0
ARTHRALGIAS: 0
FLANK PAIN: 0
WOUND: 0
NAUSEA: 0
DYSURIA: 0

## 2025-06-11 NOTE — ED PROVIDER NOTES
History     Chief Complaint   Patient presents with    Back Pain     The history is provided by the patient.     Ihsan Millan is a 34 year old female who came for left ribs pain after twisting her back while sitting in the car. She felt a pop at the moment and her pain started. Difficulty to move because of  pain exacerbate with moving.     Allergies:  Allergies   Allergen Reactions    Skin Adhesives [Cyanoacrylate] Dermatitis     Patient developed contact dermatitis after application of topical adhesive to surgical incision    Lexapro [Escitalopram] Nausea and Vomiting    Codeine Sulfate Rash    Penicillins Rash    Tramadol Rash       Problem List:    Patient Active Problem List    Diagnosis Date Noted    RUQ abdominal pain 02/12/2017     Priority: Medium    Pneumonia of right middle lobe due to infectious organism 02/12/2017     Priority: Medium    Tobacco abuse 02/12/2017     Priority: Medium    Status post laparoscopic assisted vaginal hysterectomy (LAVH) 06/25/2015     Priority: Medium    Endometriosis 04/16/2015     Priority: Medium     Depo lupron 4/15.  Laparoscopy 3/15      ASCUS on Pap smear 07/18/2014     Priority: Medium     + hpv colpo 7/14      Postpartum depression 07/18/2014     Priority: Medium     zoloft rx      H. pylori infection      Priority: Medium    Moderate persistent asthma 03/19/2014     Priority: Medium    GERD (gastroesophageal reflux disease) 09/12/2013     Priority: Medium    Intermittent asthma 09/12/2013     Priority: Medium     Increased with pregnancy  Send for eval and asthma action plan  Smoking cessation counseling  Flu shot given      Pregnant state, incidental 03/23/2011     Priority: Medium    Contraceptive management 11/09/2010     Priority: Medium        Past Medical History:    Past Medical History:   Diagnosis Date    Asthma     GERD (gastroesophageal reflux disease)     H. pylori infection        Past Surgical History:    Past Surgical History:   Procedure  Laterality Date    COLONOSCOPY N/A 11/6/2014    Procedure: COLONOSCOPY;  Surgeon: Zacarias Paz MD;  [facility]    DILATION AND CURETTAGE, HYSTEROSCOPY DIAGNOSTIC, COMBINED  8/1/2014    Procedure: COMBINED DILATION AND CURETTAGE, HYSTEROSCOPY DIAGNOSTIC;  Surgeon: Rodolfo Clifford MD;  [facility]    ESOPHAGOSCOPY, GASTROSCOPY, DUODENOSCOPY (EGD), COMBINED N/A 9/5/2014    Procedure: COMBINED ESOPHAGOSCOPY, GASTROSCOPY, DUODENOSCOPY (EGD);  Surgeon: Zacarias Paz MD;  [facility]    HERNIORRHAPHY UMBILICAL N/A 9/3/2019    Procedure: OPEN UMBILICAL HERNIA REPAIR;  Surgeon: Carter Vaughn MD;  [facility]    LAPAROSCOPIC ASSISTED HYSTERECTOMY VAGINAL N/A 6/24/2015    Procedure: LAPAROSCOPIC ASSISTED HYSTERECTOMY VAGINAL;  Surgeon: Rodolfo Clifford MD;  [facility]    LAPAROSCOPIC OOPHORECTOMY Right 3/23/2016    Procedure: LAPAROSCOPIC OOPHORECTOMY;  Surgeon: Rodolfo Clifford MD;  [facility]    LAPAROSCOPIC SALPINGO-OOPHORECTOMY Left 5/3/2017    Procedure: LAPAROSCOPIC SALPINGO-OOPHORECTOMY;  LAPAROSCOPIC LEFT OOPHORECTOMY;  Surgeon: Rodolfo Clifford MD;  [facility]    LAPAROSCOPY DIAGNOSTIC (GYN) N/A 3/18/2015    Procedure: LAPAROSCOPY DIAGNOSTIC (GYN);  Surgeon: Rodolfo Clifford MD;  [facility]    no surgeries      SALPINGECTOMY Bilateral 6/24/2015    Procedure: SALPINGECTOMY;  Surgeon: Rodolfo Clifford MD;  [facility]       Family History:    Family History   Problem Relation Age of Onset    Asthma Mother     Unknown/Adopted Maternal Grandmother     Unknown/Adopted Maternal Grandfather        Social History:  Marital Status:  Single [1]  Social History     Tobacco Use    Smoking status: Every Day     Current packs/day: 0.50     Average packs/day: 0.5 packs/day for 7.0 years (3.5 ttl pk-yrs)     Types: Cigarettes    Smokeless tobacco: Never    Tobacco comments:     declines quitline referral 9/10/19   Substance Use Topics    Alcohol use: Not Currently     Comment: social     "Drug use: No        Medications:    cyclobenzaprine (FLEXERIL) 10 MG tablet  Acetaminophen (TYLENOL PO)  albuterol (PROAIR HFA/PROVENTIL HFA/VENTOLIN HFA) 108 (90 Base) MCG/ACT inhaler  albuterol (PROVENTIL) (2.5 MG/3ML) 0.083% neb solution  amphetamine-dextroamphetamine (ADDERALL) 30 MG tablet  amphetamine-dextroamphetamine (ADDERALL) 30 MG tablet  ARIPiprazole (ABILIFY) 5 MG tablet  aspirin (ASA) 81 MG chewable tablet  busPIRone (BUSPAR) 5 MG tablet  clonazePAM (KLONOPIN) 0.5 MG tablet  cloNIDine (CATAPRES) 0.1 MG tablet  estradiol (ESTRACE) 1 MG tablet  hydrOXYzine (ATARAX) 25 MG tablet  lisdexamfetamine (VYVANSE) 40 MG capsule  montelukast (SINGULAIR) 10 MG tablet  naproxen (NAPROSYN) 500 MG tablet  omeprazole (PRILOSEC) 20 MG DR capsule  ondansetron (ZOFRAN ODT) 4 MG ODT tab  predniSONE (DELTASONE) 20 MG tablet  QUEtiapine (SEROQUEL) 50 MG tablet  sertraline (ZOLOFT) 50 MG tablet  silver sulfADIAZINE (SILVADENE) 1 % external cream          Review of Systems   Constitutional:  Negative for chills, diaphoresis and fever.   HENT:  Negative for voice change.    Eyes:  Negative for visual disturbance.   Respiratory:  Negative for cough, chest tightness, shortness of breath and wheezing.    Cardiovascular:  Negative for chest pain, palpitations and leg swelling.   Gastrointestinal:  Negative for abdominal distention, abdominal pain, anal bleeding, blood in stool, nausea and vomiting.   Genitourinary:  Negative for decreased urine volume, dysuria, flank pain and hematuria.   Musculoskeletal:  Negative for arthralgias, back pain, gait problem, myalgias, neck pain and neck stiffness.   Skin:  Negative for color change, pallor, rash and wound.   Neurological:  Negative for dizziness, syncope, light-headedness, numbness and headaches.   Psychiatric/Behavioral:  Negative for confusion and suicidal ideas.        Physical Exam   BP: 105/57  Pulse: 97  Temp: 97.7  F (36.5  C)  Resp: 16  Height: 162.6 cm (5' 4\")  Weight: 60.3 " kg (133 lb)  SpO2: 97 %      Physical Exam  Vitals and nursing note reviewed.   Constitutional:       Appearance: She is well-developed.   HENT:      Head: Normocephalic and atraumatic.      Mouth/Throat:      Pharynx: No oropharyngeal exudate.   Eyes:      Conjunctiva/sclera: Conjunctivae normal.      Pupils: Pupils are equal, round, and reactive to light.   Neck:      Thyroid: No thyromegaly.      Vascular: No JVD.      Trachea: No tracheal deviation.   Cardiovascular:      Rate and Rhythm: Normal rate and regular rhythm.      Heart sounds: Normal heart sounds. No murmur heard.     No friction rub. No gallop.   Pulmonary:      Effort: Pulmonary effort is normal. No respiratory distress.      Breath sounds: Normal breath sounds. No stridor. No wheezing or rales.          Comments: Tenderness in left posterior lower ribs  Chest:      Chest wall: No tenderness.   Abdominal:      General: Bowel sounds are normal. There is no distension.      Palpations: Abdomen is soft. There is no mass.      Tenderness: There is no abdominal tenderness. There is no guarding or rebound.   Musculoskeletal:         General: No tenderness. Normal range of motion.      Cervical back: Normal range of motion and neck supple.   Lymphadenopathy:      Cervical: No cervical adenopathy.   Skin:     General: Skin is warm and dry.      Coloration: Skin is not pale.      Findings: No erythema or rash.   Neurological:      Mental Status: She is alert and oriented to person, place, and time.   Psychiatric:         Behavior: Behavior normal.         ED Course        Procedures                No results found for this or any previous visit (from the past 24 hours).    Medications   oxyCODONE (ROXICODONE) tablet 5 mg (5 mg Oral $Given 6/6/25 8906)       Assessments & Plan (with Medical Decision Making)   Left lower posterior rib pain with movement  CT chest Negative  Likely muscular pain  Flexeril started  D C home, follow-up with PCP  I have reviewed  the nursing notes.    I have reviewed the findings, diagnosis, plan and need for follow up with the patient.          Discharge Medication List as of 6/6/2025  3:40 AM        START taking these medications    Details   cyclobenzaprine (FLEXERIL) 10 MG tablet Take 1 tablet (10 mg) by mouth 3 times daily as needed., Disp-15 tablet, R-0, E-Prescribe             Final diagnoses:   Left-sided chest wall pain       6/6/2025   [facility]       Rj Figueroa MD  06/10/25 2054

## 2025-07-13 ENCOUNTER — HEALTH MAINTENANCE LETTER (OUTPATIENT)
Age: 35
End: 2025-07-13

## 2025-08-01 ENCOUNTER — HOSPITAL ENCOUNTER (EMERGENCY)
Facility: HOSPITAL | Age: 35
Discharge: HOME | End: 2025-08-01
Attending: EMERGENCY MEDICINE
Payer: COMMERCIAL

## 2025-08-01 ENCOUNTER — APPOINTMENT (OUTPATIENT)
Dept: CT IMAGING | Facility: HOSPITAL | Age: 35
End: 2025-08-01
Attending: EMERGENCY MEDICINE
Payer: COMMERCIAL

## 2025-08-01 VITALS
OXYGEN SATURATION: 98 % | DIASTOLIC BLOOD PRESSURE: 68 MMHG | RESPIRATION RATE: 18 BRPM | HEART RATE: 78 BPM | WEIGHT: 128.75 LBS | SYSTOLIC BLOOD PRESSURE: 113 MMHG | TEMPERATURE: 97.4 F | BODY MASS INDEX: 22.1 KG/M2

## 2025-08-01 DIAGNOSIS — R10.31 RIGHT LOWER QUADRANT ABDOMINAL PAIN: Primary | ICD-10-CM

## 2025-08-01 LAB
ALBUMIN SERPL BCG-MCNC: 4.4 G/DL (ref 3.5–5.2)
ALBUMIN UR-MCNC: NEGATIVE MG/DL
ALP SERPL-CCNC: 99 U/L (ref 40–150)
ALT SERPL W P-5'-P-CCNC: 18 U/L (ref 0–50)
ANION GAP SERPL CALCULATED.3IONS-SCNC: 11 MMOL/L (ref 7–15)
APPEARANCE UR: CLEAR
AST SERPL W P-5'-P-CCNC: 17 U/L (ref 0–45)
BACTERIA #/AREA URNS HPF: ABNORMAL /HPF
BASOPHILS # BLD AUTO: 0 10E3/UL (ref 0–0.2)
BASOPHILS NFR BLD AUTO: 0 %
BILIRUB SERPL-MCNC: 0.2 MG/DL
BILIRUB UR QL STRIP: NEGATIVE
BUN SERPL-MCNC: 21.8 MG/DL (ref 6–20)
CALCIUM SERPL-MCNC: 9.5 MG/DL (ref 8.8–10.4)
CHLORIDE SERPL-SCNC: 105 MMOL/L (ref 98–107)
COLOR UR AUTO: ABNORMAL
CREAT SERPL-MCNC: 0.87 MG/DL (ref 0.51–0.95)
EGFRCR SERPLBLD CKD-EPI 2021: 89 ML/MIN/1.73M2
EOSINOPHIL # BLD AUTO: 0.3 10E3/UL (ref 0–0.7)
EOSINOPHIL NFR BLD AUTO: 4 %
ERYTHROCYTE [DISTWIDTH] IN BLOOD BY AUTOMATED COUNT: 13.1 % (ref 10–15)
GLUCOSE SERPL-MCNC: 96 MG/DL (ref 70–99)
GLUCOSE UR STRIP-MCNC: NEGATIVE MG/DL
HCO3 SERPL-SCNC: 26 MMOL/L (ref 22–29)
HCT VFR BLD AUTO: 39.9 % (ref 35–47)
HGB BLD-MCNC: 13.4 G/DL (ref 11.7–15.7)
HGB UR QL STRIP: NEGATIVE
HOLD SPECIMEN: NORMAL
IMM GRANULOCYTES # BLD: 0 10E3/UL
IMM GRANULOCYTES NFR BLD: 0 %
KETONES UR STRIP-MCNC: NEGATIVE MG/DL
LEUKOCYTE ESTERASE UR QL STRIP: ABNORMAL
LIPASE SERPL-CCNC: 52 U/L (ref 13–60)
LYMPHOCYTES # BLD AUTO: 2.9 10E3/UL (ref 0.8–5.3)
LYMPHOCYTES NFR BLD AUTO: 40 %
MAGNESIUM SERPL-MCNC: 2.1 MG/DL (ref 1.7–2.3)
MCH RBC QN AUTO: 29.9 PG (ref 26.5–33)
MCHC RBC AUTO-ENTMCNC: 33.6 G/DL (ref 31.5–36.5)
MCV RBC AUTO: 89 FL (ref 78–100)
MONOCYTES # BLD AUTO: 0.4 10E3/UL (ref 0–1.3)
MONOCYTES NFR BLD AUTO: 6 %
MUCOUS THREADS #/AREA URNS LPF: PRESENT /LPF
NEUTROPHILS # BLD AUTO: 3.7 10E3/UL (ref 1.6–8.3)
NEUTROPHILS NFR BLD AUTO: 51 %
NITRATE UR QL: NEGATIVE
NRBC # BLD AUTO: 0 10E3/UL
NRBC BLD AUTO-RTO: 0 /100
PH UR STRIP: 6 [PH] (ref 4.7–8)
PLATELET # BLD AUTO: 259 10E3/UL (ref 150–450)
POTASSIUM SERPL-SCNC: 3.5 MMOL/L (ref 3.4–5.3)
PROT SERPL-MCNC: 6.8 G/DL (ref 6.4–8.3)
RBC # BLD AUTO: 4.48 10E6/UL (ref 3.8–5.2)
RBC URINE: 0 /HPF
SODIUM SERPL-SCNC: 142 MMOL/L (ref 135–145)
SP GR UR STRIP: 1.02 (ref 1–1.03)
SQUAMOUS EPITHELIAL: 1 /HPF
UROBILINOGEN UR STRIP-MCNC: NORMAL MG/DL
WBC # BLD AUTO: 7.3 10E3/UL (ref 4–11)
WBC URINE: 1 /HPF

## 2025-08-01 PROCEDURE — 96375 TX/PRO/DX INJ NEW DRUG ADDON: CPT | Mod: XU

## 2025-08-01 PROCEDURE — 99285 EMERGENCY DEPT VISIT HI MDM: CPT | Mod: 25 | Performed by: EMERGENCY MEDICINE

## 2025-08-01 PROCEDURE — 74177 CT ABD & PELVIS W/CONTRAST: CPT

## 2025-08-01 PROCEDURE — 83690 ASSAY OF LIPASE: CPT | Performed by: EMERGENCY MEDICINE

## 2025-08-01 PROCEDURE — 250N000011 HC RX IP 250 OP 636: Performed by: EMERGENCY MEDICINE

## 2025-08-01 PROCEDURE — 96361 HYDRATE IV INFUSION ADD-ON: CPT

## 2025-08-01 PROCEDURE — 83735 ASSAY OF MAGNESIUM: CPT | Performed by: EMERGENCY MEDICINE

## 2025-08-01 PROCEDURE — 81003 URINALYSIS AUTO W/O SCOPE: CPT | Performed by: EMERGENCY MEDICINE

## 2025-08-01 PROCEDURE — 80053 COMPREHEN METABOLIC PANEL: CPT | Performed by: EMERGENCY MEDICINE

## 2025-08-01 PROCEDURE — 85025 COMPLETE CBC W/AUTO DIFF WBC: CPT | Performed by: EMERGENCY MEDICINE

## 2025-08-01 PROCEDURE — 36415 COLL VENOUS BLD VENIPUNCTURE: CPT | Performed by: EMERGENCY MEDICINE

## 2025-08-01 PROCEDURE — 99284 EMERGENCY DEPT VISIT MOD MDM: CPT | Performed by: EMERGENCY MEDICINE

## 2025-08-01 PROCEDURE — 96374 THER/PROPH/DIAG INJ IV PUSH: CPT | Mod: XU

## 2025-08-01 PROCEDURE — 258N000003 HC RX IP 258 OP 636: Performed by: EMERGENCY MEDICINE

## 2025-08-01 PROCEDURE — 74177 CT ABD & PELVIS W/CONTRAST: CPT | Mod: 26 | Performed by: RADIOLOGY

## 2025-08-01 RX ORDER — ONDANSETRON 4 MG/1
4 TABLET, ORALLY DISINTEGRATING ORAL EVERY 8 HOURS PRN
Qty: 15 TABLET | Refills: 0 | Status: SHIPPED | OUTPATIENT
Start: 2025-08-01 | End: 2025-08-06

## 2025-08-01 RX ORDER — ONDANSETRON 2 MG/ML
4 INJECTION INTRAMUSCULAR; INTRAVENOUS ONCE
Status: COMPLETED | OUTPATIENT
Start: 2025-08-01 | End: 2025-08-01

## 2025-08-01 RX ORDER — MORPHINE SULFATE 4 MG/ML
4 INJECTION, SOLUTION INTRAMUSCULAR; INTRAVENOUS ONCE
Refills: 0 | Status: COMPLETED | OUTPATIENT
Start: 2025-08-01 | End: 2025-08-01

## 2025-08-01 RX ORDER — IOPAMIDOL 755 MG/ML
64 INJECTION, SOLUTION INTRAVASCULAR ONCE
Status: COMPLETED | OUTPATIENT
Start: 2025-08-01 | End: 2025-08-01

## 2025-08-01 RX ADMIN — IOPAMIDOL 64 ML: 755 INJECTION, SOLUTION INTRAVENOUS at 01:57

## 2025-08-01 RX ADMIN — MORPHINE SULFATE 4 MG: 4 INJECTION, SOLUTION INTRAMUSCULAR; INTRAVENOUS at 01:30

## 2025-08-01 RX ADMIN — ONDANSETRON 4 MG: 2 INJECTION INTRAMUSCULAR; INTRAVENOUS at 01:31

## 2025-08-01 RX ADMIN — SODIUM CHLORIDE 1000 ML: 9 INJECTION, SOLUTION INTRAVENOUS at 01:31

## 2025-08-01 ASSESSMENT — COLUMBIA-SUICIDE SEVERITY RATING SCALE - C-SSRS
6. HAVE YOU EVER DONE ANYTHING, STARTED TO DO ANYTHING, OR PREPARED TO DO ANYTHING TO END YOUR LIFE?: NO
1. IN THE PAST MONTH, HAVE YOU WISHED YOU WERE DEAD OR WISHED YOU COULD GO TO SLEEP AND NOT WAKE UP?: NO
2. HAVE YOU ACTUALLY HAD ANY THOUGHTS OF KILLING YOURSELF IN THE PAST MONTH?: NO

## 2025-08-01 ASSESSMENT — ACTIVITIES OF DAILY LIVING (ADL): ADLS_ACUITY_SCORE: 41

## (undated) DEVICE — SUTURE-MONOCRYL 4-0 PS-2 Y496G

## (undated) DEVICE — TRAY-SKIN PREP POVIDONE/IODINE

## (undated) DEVICE — IRRIGATION-NACL 1000ML

## (undated) DEVICE — LIGHT HANDLE COVER

## (undated) DEVICE — PUNCTURE CLOSURE DEVICE

## (undated) DEVICE — NDL-INSUFFLATION 120MM

## (undated) DEVICE — CANISTER-SUCTION 2000CC

## (undated) DEVICE — CATH-URETHRAL 14FR

## (undated) DEVICE — UTERINE MANIPULATOR-KRONNER MANIPUJECTOR

## (undated) DEVICE — SCD SLEEVE-KNEE REG.

## (undated) DEVICE — GLV-7.5 PROTEXIS PI CLASSIC LF/PF

## (undated) DEVICE — PACK-LAP LAVH-CUSTOM

## (undated) DEVICE — TOWEL-OR DISP 4PKS

## (undated) DEVICE — BDG-STAT STRIP

## (undated) DEVICE — LIGASURE-5MM BLUNT TIP LAPAROSCOPIC

## (undated) DEVICE — PACK-LAPAROTOMY-CUSTOM

## (undated) DEVICE — TOPICAL SKIN ADHESIVE EXOFIN

## (undated) DEVICE — CAUTERY PAD-POLYHESIVE II ADULT

## (undated) DEVICE — BIN-COVIDIEN MESH BIN

## (undated) DEVICE — GLV-7.0 PROTEXIS PI CLASSIC LF/PF

## (undated) DEVICE — APPLICATOR-CHLORAPREP 26ML TINTED CHG 2%+ 70% IPA-SURGICAL

## (undated) DEVICE — IRRIGATION-H2O 1000ML

## (undated) DEVICE — LABEL-STERILE PREPRINTED FOR OR

## (undated) DEVICE — SWABSTICK-BENZOIN

## (undated) DEVICE — STERI-STRIP-1/2" X 4"

## (undated) DEVICE — NDL-21G 1-1/2" NON-SAFETY

## (undated) DEVICE — SCD SLEEVE-THIGH REG.

## (undated) DEVICE — SUTURE-VICRYL 0 UR-6 J603H

## (undated) DEVICE — SOL-NACL 0.9% 1000ML

## (undated) DEVICE — INZII RETRIEVAL SYSTEM-10MM

## (undated) DEVICE — GLV-8.5 BIOGEL LATEX

## (undated) DEVICE — SUTURE-ETHIBOND 0 MO-7 CX41D

## (undated) DEVICE — TUBING-SUCTION 20FT

## (undated) DEVICE — TROCAR-11X100MM BLADED W/FIXATION

## (undated) DEVICE — TUBING-INSUFFLATION/LAPAROFLATOR W/FILTER

## (undated) DEVICE — TROCAR-5X100MM BLADED W/FIXATION

## (undated) DEVICE — SUTURE-VICRYL 3-0 SH J416H

## (undated) DEVICE — GLV-8.0 PROTEXIS PI BLUE W/NEU-THERA LF/PF

## (undated) RX ORDER — DEXAMETHASONE SODIUM PHOSPHATE 10 MG/ML
INJECTION, SOLUTION INTRAMUSCULAR; INTRAVENOUS
Status: DISPENSED
Start: 2017-05-03

## (undated) RX ORDER — ONDANSETRON 2 MG/ML
INJECTION INTRAMUSCULAR; INTRAVENOUS
Status: DISPENSED
Start: 2017-05-03

## (undated) RX ORDER — LIDOCAINE HYDROCHLORIDE 20 MG/ML
INJECTION, SOLUTION EPIDURAL; INFILTRATION; INTRACAUDAL; PERINEURAL
Status: DISPENSED
Start: 2019-09-03

## (undated) RX ORDER — FENTANYL CITRATE 50 UG/ML
INJECTION, SOLUTION INTRAMUSCULAR; INTRAVENOUS
Status: DISPENSED
Start: 2017-05-03

## (undated) RX ORDER — FENTANYL CITRATE 50 UG/ML
INJECTION, SOLUTION INTRAMUSCULAR; INTRAVENOUS
Status: DISPENSED
Start: 2019-09-03

## (undated) RX ORDER — PROPOFOL 10 MG/ML
INJECTION, EMULSION INTRAVENOUS
Status: DISPENSED
Start: 2019-09-03

## (undated) RX ORDER — ONDANSETRON 2 MG/ML
INJECTION INTRAMUSCULAR; INTRAVENOUS
Status: DISPENSED
Start: 2019-09-03

## (undated) RX ORDER — DEXAMETHASONE SODIUM PHOSPHATE 10 MG/ML
INJECTION, SOLUTION INTRAMUSCULAR; INTRAVENOUS
Status: DISPENSED
Start: 2019-09-03

## (undated) RX ORDER — PROPOFOL 10 MG/ML
INJECTION, EMULSION INTRAVENOUS
Status: DISPENSED
Start: 2017-05-03

## (undated) RX ORDER — LIDOCAINE HYDROCHLORIDE 20 MG/ML
INJECTION, SOLUTION EPIDURAL; INFILTRATION; INTRACAUDAL; PERINEURAL
Status: DISPENSED
Start: 2017-05-03